# Patient Record
Sex: MALE | Race: OTHER | NOT HISPANIC OR LATINO | URBAN - METROPOLITAN AREA
[De-identification: names, ages, dates, MRNs, and addresses within clinical notes are randomized per-mention and may not be internally consistent; named-entity substitution may affect disease eponyms.]

---

## 2017-05-12 VITALS
HEART RATE: 91 BPM | RESPIRATION RATE: 18 BRPM | TEMPERATURE: 98 F | SYSTOLIC BLOOD PRESSURE: 110 MMHG | DIASTOLIC BLOOD PRESSURE: 72 MMHG | OXYGEN SATURATION: 98 %

## 2017-05-12 NOTE — H&P ADULT - ADDITIONAL PE
ASA: II  Mallampati: II  EKG: Sinus Rhythm @ 89 BPM with TWI in III, V5, V5, LVH, slightly prolonged QTc 467 ASA: II  Mallampati: II  EKG: Sinus Rhythm @ 89 BPM with TWI in III, V5, V5, LVH, slightly prolonged QTc 467. LVH

## 2017-05-12 NOTE — H&P ADULT - HISTORY OF PRESENT ILLNESS
History obtained from spouse (Cesar Ingram)-->limited historian    62yo male current smoker and ETOH abuse with PMHx significant for HTN and hyperlipidemia presented for routine checkup and endorses dyspnea with exertion, especially when walking more than 3 blocks or climbing 2-3 flights of stairs. Patient denies chest pain, shortness of breath, palpitations, diaphoresis, syncope, dizziness, LE edema, PND, or orthopnea.   Patient had recommended cardiac workup that includes ECG that showing evidence of TWI in the inferolateral leads with LVH, an echocardiogram that showed ~ LVEF 60-65%, grade 1 diastolic dysfunction c/w impaired relaxation and normal filling pressures, mild concentric LVH, mild MR, moderate mitral annular calcification, mild calcification of AV, and moderate aortic stenosis, and stress echocardiogram showing evidence of ischemia (2.0mm down sloping ST depression in the inferolateral leads which resolved in recovery).  In light of CCS II anginal equivalent symptoms and abnormal stress echocardiogram, patient now presents for cardiac catheterization History obtained from spouse (Cesar Ingram)-->limited historian    62yo male current smoker and ETOH abuse with PMHx significant for HTN and hyperlipidemia presented for routine checkup and endorses dyspnea with exertion, especially when walking more than 3 blocks or climbing 2-3 flights of stairs. Patient denies chest pain, shortness of breath, palpitations, diaphoresis, syncope, dizziness, LE edema, PND, or orthopnea.   Patient had recommended cardiac workup that includes ECG that showing evidence of TWI in the inferolateral leads with LVH, an echocardiogram that showed ~ LVEF 60-65%, grade 1 diastolic dysfunction c/w impaired relaxation and normal filling pressures, mild concentric LVH, mild MR, moderate mitral annular calcification, mild calcification of AV, and moderate aortic stenosis, and stress echocardiogram showing evidence of ischemia (2.0mm down sloping ST depression in the inferolateral leads which resolved in recovery).  In light of CCS II anginal equivalent symptoms and abnormal stress echocardiogram, patient now presents for cardiac catheterization with possible intervention. History obtained from spouse (Cesar Ingram)-->limited historian    62yo male current smoker and ETOH abuse (drinks 1 pint of vodka/day and Beers; last drink yesterday evening 5/14/2017) with PMHx significant for HTN and hyperlipidemia presented for routine checkup and endorses dyspnea with exertion, especially when walking more than 3 blocks or climbing 2-3 flights of stairs. Patient denies chest pain, shortness of breath, palpitations, diaphoresis, syncope, dizziness, LE edema, PND, or orthopnea. Patient had recommended cardiac workup that includes ECG that showing evidence of TWI in the inferolateral leads with LVH, an Echocardiogram (5/2017) that showed ~ LVEF 60-65%, grade 1 diastolic dysfunction c/w impaired relaxation and normal filling pressures, mild concentric LVH, mild MR, moderate mitral annular calcification, mild calcification of AV, and moderate aortic stenosis. Stress echocardiogram (5/2017) revealed evidence of ischemia (2.0mm down sloping ST depression in the inferolateral leads which resolved in recovery). In light of CCS II anginal equivalent symptoms and abnormal stress echocardiogram, patient now presents for cardiac catheterization with possible intervention.

## 2017-05-12 NOTE — H&P ADULT - ASSESSMENT
62yo male current smoker and ETOH abuse (drinks 1 pint of vodka/day and Beers; last drink yesterday evening 5/14/2017) with PMHx significant for HTN and hyperlipidemia presents for cardiac catheterization with possible intervention if clinically indicated due to patient's risk factors, stress Echo findings, aortic stenosis and CCS Angina Class II Equivalent symptoms.     Risks & benefits of procedure and alternative therapy have been explained to the patient including but not limited to: allergic reaction, bleeding w/possible need for blood transfusion, infection, renal and vascular compromise, limb damage, arrhythmia, stroke, vessel dissection/perforation, Myocardial infarction, emergent CABG. Informed consent obtained and in chart.

## 2017-05-12 NOTE — H&P ADULT - NEGATIVE CARDIOVASCULAR SYMPTOMS
no orthopnea/no chest pain/no paroxysmal nocturnal dyspnea/no peripheral edema/no palpitations/no claudication

## 2017-05-12 NOTE — H&P ADULT - NSHPSOCIALHISTORY_GEN_ALL_CORE
Admits to smoking 1ppd x 40yrs.  Per wife, patient drinks heavily every day (~1/2 bottle of liquor and several beers per day)

## 2017-05-15 ENCOUNTER — OUTPATIENT (OUTPATIENT)
Dept: OUTPATIENT SERVICES | Facility: HOSPITAL | Age: 62
LOS: 1 days | Discharge: NURSING, REHAB OR LONG-TERM CARE | End: 2017-05-15
Payer: MEDICAID

## 2017-05-15 DIAGNOSIS — I10 ESSENTIAL (PRIMARY) HYPERTENSION: ICD-10-CM

## 2017-05-15 DIAGNOSIS — E78.5 HYPERLIPIDEMIA, UNSPECIFIED: ICD-10-CM

## 2017-05-15 DIAGNOSIS — I25.110 ATHEROSCLEROTIC HEART DISEASE OF NATIVE CORONARY ARTERY WITH UNSTABLE ANGINA PECTORIS: ICD-10-CM

## 2017-05-15 LAB
APPEARANCE UR: CLEAR — SIGNIFICANT CHANGE UP
APTT BLD: 33.5 SEC — SIGNIFICANT CHANGE UP (ref 27.5–37.4)
BASOPHILS NFR BLD AUTO: 0.1 % — SIGNIFICANT CHANGE UP (ref 0–2)
BILIRUB UR-MCNC: NEGATIVE — SIGNIFICANT CHANGE UP
CHOLEST SERPL-MCNC: 204 MG/DL — HIGH
COLOR SPEC: YELLOW — SIGNIFICANT CHANGE UP
CRP SERPL-MCNC: <0.29 MG/DL — SIGNIFICANT CHANGE UP
DIFF PNL FLD: NEGATIVE — SIGNIFICANT CHANGE UP
EOSINOPHIL NFR BLD AUTO: 3.5 % — SIGNIFICANT CHANGE UP (ref 0–6)
GLUCOSE UR QL: NEGATIVE — SIGNIFICANT CHANGE UP
HBA1C BLD-MCNC: 5.5 % — SIGNIFICANT CHANGE UP (ref 4.8–5.6)
HCT VFR BLD CALC: 35.2 % — LOW (ref 39–50)
HDLC SERPL-MCNC: 36 MG/DL — LOW
HGB BLD-MCNC: 12.2 G/DL — LOW (ref 13–17)
INR BLD: 1.19 — HIGH (ref 0.88–1.16)
KETONES UR-MCNC: NEGATIVE — SIGNIFICANT CHANGE UP
LEUKOCYTE ESTERASE UR-ACNC: NEGATIVE — SIGNIFICANT CHANGE UP
LIPID PNL WITH DIRECT LDL SERPL: 105 MG/DL — HIGH
LYMPHOCYTES # BLD AUTO: 36.5 % — SIGNIFICANT CHANGE UP (ref 13–44)
MCHC RBC-ENTMCNC: 31.5 PG — SIGNIFICANT CHANGE UP (ref 27–34)
MCHC RBC-ENTMCNC: 34.7 G/DL — SIGNIFICANT CHANGE UP (ref 32–36)
MCV RBC AUTO: 91 FL — SIGNIFICANT CHANGE UP (ref 80–100)
MONOCYTES NFR BLD AUTO: 9.1 % — SIGNIFICANT CHANGE UP (ref 2–14)
NEUTROPHILS NFR BLD AUTO: 50.8 % — SIGNIFICANT CHANGE UP (ref 43–77)
NITRITE UR-MCNC: NEGATIVE — SIGNIFICANT CHANGE UP
PH UR: 5.5 — SIGNIFICANT CHANGE UP (ref 5–8)
PLATELET # BLD AUTO: 137 K/UL — LOW (ref 150–400)
PROT UR-MCNC: NEGATIVE MG/DL — SIGNIFICANT CHANGE UP
PROTHROM AB SERPL-ACNC: 13.2 SEC — HIGH (ref 9.8–12.7)
RBC # BLD: 3.87 M/UL — LOW (ref 4.2–5.8)
RBC # FLD: 15.8 % — SIGNIFICANT CHANGE UP (ref 10.3–16.9)
SP GR SPEC: <=1.005 — SIGNIFICANT CHANGE UP (ref 1–1.03)
TOTAL CHOLESTEROL/HDL RATIO MEASUREMENT: 5.7 RATIO — HIGH
TRIGL SERPL-MCNC: 316 MG/DL — HIGH
UROBILINOGEN FLD QL: 0.2 E.U./DL — SIGNIFICANT CHANGE UP
WBC # BLD: 8 K/UL — SIGNIFICANT CHANGE UP (ref 3.8–10.5)
WBC # FLD AUTO: 8 K/UL — SIGNIFICANT CHANGE UP (ref 3.8–10.5)

## 2017-05-15 PROCEDURE — 82553 CREATINE MB FRACTION: CPT

## 2017-05-15 PROCEDURE — 85730 THROMBOPLASTIN TIME PARTIAL: CPT

## 2017-05-15 PROCEDURE — 71010: CPT | Mod: 26

## 2017-05-15 PROCEDURE — 85025 COMPLETE CBC W/AUTO DIFF WBC: CPT

## 2017-05-15 PROCEDURE — 81003 URINALYSIS AUTO W/O SCOPE: CPT

## 2017-05-15 PROCEDURE — 93005 ELECTROCARDIOGRAM TRACING: CPT

## 2017-05-15 PROCEDURE — 85610 PROTHROMBIN TIME: CPT

## 2017-05-15 PROCEDURE — C1894: CPT

## 2017-05-15 PROCEDURE — C1769: CPT

## 2017-05-15 PROCEDURE — 93460 R&L HRT ART/VENTRICLE ANGIO: CPT

## 2017-05-15 PROCEDURE — 80061 LIPID PANEL: CPT

## 2017-05-15 PROCEDURE — 86140 C-REACTIVE PROTEIN: CPT

## 2017-05-15 PROCEDURE — C1889: CPT

## 2017-05-15 PROCEDURE — 84443 ASSAY THYROID STIM HORMONE: CPT

## 2017-05-15 PROCEDURE — 93010 ELECTROCARDIOGRAM REPORT: CPT

## 2017-05-15 PROCEDURE — 71045 X-RAY EXAM CHEST 1 VIEW: CPT

## 2017-05-15 PROCEDURE — C1887: CPT

## 2017-05-15 PROCEDURE — 83036 HEMOGLOBIN GLYCOSYLATED A1C: CPT

## 2017-05-15 PROCEDURE — 82550 ASSAY OF CK (CPK): CPT

## 2017-05-15 PROCEDURE — 80053 COMPREHEN METABOLIC PANEL: CPT

## 2017-05-15 RX ORDER — CHLORHEXIDINE GLUCONATE 213 G/1000ML
1 SOLUTION TOPICAL ONCE
Qty: 0 | Refills: 0 | Status: DISCONTINUED | OUTPATIENT
Start: 2017-05-15 | End: 2017-05-15

## 2017-05-15 RX ORDER — SODIUM CHLORIDE 9 MG/ML
500 INJECTION INTRAMUSCULAR; INTRAVENOUS; SUBCUTANEOUS
Qty: 0 | Refills: 0 | Status: DISCONTINUED | OUTPATIENT
Start: 2017-05-15 | End: 2017-05-15

## 2017-05-15 RX ORDER — ASPIRIN/CALCIUM CARB/MAGNESIUM 324 MG
81 TABLET ORAL ONCE
Qty: 0 | Refills: 0 | Status: DISCONTINUED | OUTPATIENT
Start: 2017-05-15 | End: 2017-05-15

## 2017-05-15 RX ADMIN — SODIUM CHLORIDE 50 MILLILITER(S): 9 INJECTION INTRAMUSCULAR; INTRAVENOUS; SUBCUTANEOUS at 13:50

## 2017-05-15 NOTE — PROGRESS NOTE ADULT - SUBJECTIVE AND OBJECTIVE BOX
Interventional Cardiology PA SDA Discharge Note    Patient without complaints.     Afebrile, VSS    Ext:    		Right  Groin:  No     hematoma, no    bruit, dressing; C/D/I  		Right  Radial : No   hematoma,  no   bleeding, dressing; C/D/I      Pulses:    intact RAD/DP/PT to baseline     A/P:    60 y/o male current smoker and ETOH abuse (drinks 1 pint of vodka/day and Beers; last drink yesterday evening 5/14/2017) with PMHx significant for HTN and hyperlipidemia presents for cardiac catheterization with possible intervention if clinically indicated due to patient's risk factors, stress Echo findings, aortic stenosis and CCS Angina Class II Equivalent symptoms. Patient is now s/p cardiac cath revealing distal LM 40%, pLAC 80%, pLcx 80%, pRCA 80%, EF 55%, EDP 6 mmHg, JONEL 0.89cm2 consistent with severe AS, right radial artery and right femoral vein access. Right heart cath revealed RAP 3, RV 22/1 mean 4, PAP 25/7 mean 16, PCWP: 6, Ao/PA sat 92/67, CO/CI 5.2/3.4 consistent with normal RV filling pressures and preserved cardiac output/indices.     1.	Stable for discharge as per attending Dr. Beauchamp after bed rest, pt voids, groin/wrist stable, 30 minutes of ambulation, and pre-op work-up completed.  2.	Follow-up with PMD/Cardiologist Dr. Beauchamp in clinic on Tuesday, May 16, 2017.   3.	Discharged forms signed and copies in chart Interventional Cardiology PA SDA Discharge Note    Patient without complaints.     Afebrile, VSS    Ext:    		Right  Groin:  No     hematoma, no    bruit, dressing; C/D/I  		Right  Radial : No   hematoma,  no   bleeding, dressing; C/D/I      Pulses:    intact RAD/DP/PT to baseline     A/P:    62 y/o male current smoker and ETOH abuse (drinks 1 pint of vodka/day and Beers; last drink yesterday evening 5/14/2017) with PMHx significant for HTN and hyperlipidemia presents for cardiac catheterization with possible intervention if clinically indicated due to patient's risk factors, stress Echo findings, aortic stenosis and CCS Angina Class II Equivalent symptoms. Patient is now s/p cardiac cath revealing distal LM 40%, pLAC 80%, pLcx 80%, pRCA 80%, EF 55%, EDP 6 mmHg, JONEL 0.89cm2 consistent with severe AS, right radial artery and right femoral vein access. Right heart cath revealed RAP 3, RV 22/1 mean 4, PAP 25/7 mean 16, PCWP: 6, Ao/PA sat 92/67, CO/CI 5.2/3.4 consistent with normal RV filling pressures and preserved cardiac output/indices. Patient had small right groin hematoma manually compressed with complete resolution.     1.	Stable for discharge as per attending Dr. Beauchamp after bed rest, pt voids, groin/wrist stable, 30 minutes of ambulation, and pre-op work-up completed.  2.	Follow-up with PMD/Cardiologist Dr. Beauchamp in clinic on Tuesday, May 16, 2017.   3.	Discharged forms signed and copies in chart

## 2017-05-16 PROBLEM — F10.10 ALCOHOL ABUSE, UNCOMPLICATED: Chronic | Status: ACTIVE | Noted: 2017-05-12

## 2017-05-16 PROBLEM — I10 ESSENTIAL (PRIMARY) HYPERTENSION: Chronic | Status: ACTIVE | Noted: 2017-05-12

## 2017-05-16 PROBLEM — F17.200 NICOTINE DEPENDENCE, UNSPECIFIED, UNCOMPLICATED: Chronic | Status: ACTIVE | Noted: 2017-05-12

## 2017-05-16 PROBLEM — E78.5 HYPERLIPIDEMIA, UNSPECIFIED: Chronic | Status: ACTIVE | Noted: 2017-05-12

## 2017-05-16 PROBLEM — Z00.00 ENCOUNTER FOR PREVENTIVE HEALTH EXAMINATION: Status: ACTIVE | Noted: 2017-05-16

## 2017-05-17 PROBLEM — Z86.79 HISTORY OF HYPERTENSION: Status: RESOLVED | Noted: 2017-05-17 | Resolved: 2017-05-17

## 2017-05-17 PROBLEM — F17.200 CURRENT EVERY DAY SMOKER: Status: ACTIVE | Noted: 2017-05-17

## 2017-05-17 PROBLEM — Z86.39 HISTORY OF HYPERLIPIDEMIA: Status: RESOLVED | Noted: 2017-05-17 | Resolved: 2017-05-17

## 2017-05-17 RX ORDER — FOLIC ACID 1 MG/1
1 TABLET ORAL DAILY
Refills: 0 | Status: ACTIVE | COMMUNITY

## 2017-05-17 RX ORDER — CYANOCOBALAMIN (VITAMIN B-12) 100 MCG
100 TABLET ORAL DAILY
Refills: 0 | Status: ACTIVE | COMMUNITY

## 2017-05-17 RX ORDER — VALACYCLOVIR 500 MG/1
500 TABLET, FILM COATED ORAL DAILY
Refills: 0 | Status: ACTIVE | COMMUNITY

## 2017-05-17 RX ORDER — ASPIRIN 81 MG
81 TABLET, DELAYED RELEASE (ENTERIC COATED) ORAL DAILY
Refills: 0 | Status: ACTIVE | COMMUNITY

## 2017-05-17 RX ORDER — CHOLECALCIFEROL (VITAMIN D3) 125 MCG
50 MCG TABLET ORAL
Refills: 0 | Status: ACTIVE | COMMUNITY

## 2017-05-23 ENCOUNTER — APPOINTMENT (OUTPATIENT)
Dept: CARDIOTHORACIC SURGERY | Facility: CLINIC | Age: 62
End: 2017-05-23

## 2017-05-23 ENCOUNTER — INPATIENT (INPATIENT)
Facility: HOSPITAL | Age: 62
LOS: 27 days | Discharge: HOME HEALTH | DRG: 266 | End: 2017-06-20
Attending: THORACIC SURGERY (CARDIOTHORACIC VASCULAR SURGERY) | Admitting: THORACIC SURGERY (CARDIOTHORACIC VASCULAR SURGERY)
Payer: MEDICAID

## 2017-05-23 VITALS
HEART RATE: 104 BPM | BODY MASS INDEX: 23.54 KG/M2 | SYSTOLIC BLOOD PRESSURE: 113 MMHG | HEIGHT: 67 IN | DIASTOLIC BLOOD PRESSURE: 71 MMHG | RESPIRATION RATE: 19 BRPM | OXYGEN SATURATION: 96 % | TEMPERATURE: 98.1 F | WEIGHT: 150 LBS

## 2017-05-23 VITALS
SYSTOLIC BLOOD PRESSURE: 164 MMHG | HEART RATE: 98 BPM | DIASTOLIC BLOOD PRESSURE: 102 MMHG | OXYGEN SATURATION: 99 % | RESPIRATION RATE: 16 BRPM

## 2017-05-23 DIAGNOSIS — Z78.9 OTHER SPECIFIED HEALTH STATUS: ICD-10-CM

## 2017-05-23 DIAGNOSIS — Z86.79 PERSONAL HISTORY OF OTHER DISEASES OF THE CIRCULATORY SYSTEM: ICD-10-CM

## 2017-05-23 DIAGNOSIS — Z87.898 PERSONAL HISTORY OF OTHER SPECIFIED CONDITIONS: ICD-10-CM

## 2017-05-23 DIAGNOSIS — Z86.39 PERSONAL HISTORY OF OTHER ENDOCRINE, NUTRITIONAL AND METABOLIC DISEASE: ICD-10-CM

## 2017-05-23 DIAGNOSIS — F17.200 NICOTINE DEPENDENCE, UNSPECIFIED, UNCOMPLICATED: ICD-10-CM

## 2017-05-23 LAB
ALBUMIN SERPL ELPH-MCNC: 3.9 G/DL — SIGNIFICANT CHANGE UP (ref 3.3–5)
ALP SERPL-CCNC: 103 U/L — SIGNIFICANT CHANGE UP (ref 40–120)
ALT FLD-CCNC: 31 U/L — SIGNIFICANT CHANGE UP (ref 10–45)
ANION GAP SERPL CALC-SCNC: 16 MMOL/L — SIGNIFICANT CHANGE UP (ref 5–17)
APPEARANCE UR: SIGNIFICANT CHANGE UP
APTT BLD: 34.2 SEC — SIGNIFICANT CHANGE UP (ref 27.5–37.4)
AST SERPL-CCNC: 68 U/L — HIGH (ref 10–40)
BASOPHILS NFR BLD AUTO: 0.3 % — SIGNIFICANT CHANGE UP (ref 0–2)
BILIRUB DIRECT SERPL-MCNC: <0.2 MG/DL — SIGNIFICANT CHANGE UP (ref 0–0.2)
BILIRUB SERPL-MCNC: 0.6 MG/DL — SIGNIFICANT CHANGE UP (ref 0.2–1.2)
BILIRUB UR-MCNC: NEGATIVE — SIGNIFICANT CHANGE UP
BUN SERPL-MCNC: 8 MG/DL — SIGNIFICANT CHANGE UP (ref 7–23)
CALCIUM SERPL-MCNC: 9 MG/DL — SIGNIFICANT CHANGE UP (ref 8.4–10.5)
CHLORIDE SERPL-SCNC: 100 MMOL/L — SIGNIFICANT CHANGE UP (ref 96–108)
CO2 SERPL-SCNC: 23 MMOL/L — SIGNIFICANT CHANGE UP (ref 22–31)
COLOR SPEC: YELLOW — SIGNIFICANT CHANGE UP
CREAT SERPL-MCNC: 0.7 MG/DL — SIGNIFICANT CHANGE UP (ref 0.5–1.3)
DIFF PNL FLD: NEGATIVE — SIGNIFICANT CHANGE UP
EOSINOPHIL NFR BLD AUTO: 2.9 % — SIGNIFICANT CHANGE UP (ref 0–6)
GLUCOSE SERPL-MCNC: 87 MG/DL — SIGNIFICANT CHANGE UP (ref 70–99)
GLUCOSE UR QL: NEGATIVE — SIGNIFICANT CHANGE UP
HBA1C BLD-MCNC: 5.2 % — SIGNIFICANT CHANGE UP (ref 4–5.6)
HCT VFR BLD CALC: 37.6 % — LOW (ref 39–50)
HGB BLD-MCNC: 13 G/DL — SIGNIFICANT CHANGE UP (ref 13–17)
INR BLD: 1.14 — SIGNIFICANT CHANGE UP (ref 0.88–1.16)
KETONES UR-MCNC: NEGATIVE — SIGNIFICANT CHANGE UP
LEUKOCYTE ESTERASE UR-ACNC: NEGATIVE — SIGNIFICANT CHANGE UP
LYMPHOCYTES # BLD AUTO: 34 % — SIGNIFICANT CHANGE UP (ref 13–44)
MCHC RBC-ENTMCNC: 31.3 PG — SIGNIFICANT CHANGE UP (ref 27–34)
MCHC RBC-ENTMCNC: 34.6 G/DL — SIGNIFICANT CHANGE UP (ref 32–36)
MCV RBC AUTO: 90.6 FL — SIGNIFICANT CHANGE UP (ref 80–100)
MONOCYTES NFR BLD AUTO: 9.9 % — SIGNIFICANT CHANGE UP (ref 2–14)
NEUTROPHILS NFR BLD AUTO: 52.9 % — SIGNIFICANT CHANGE UP (ref 43–77)
NITRITE UR-MCNC: NEGATIVE — SIGNIFICANT CHANGE UP
NT-PROBNP SERPL-SCNC: 112 PG/ML — SIGNIFICANT CHANGE UP (ref 0–300)
PH UR: 6 — SIGNIFICANT CHANGE UP (ref 5–8)
PLATELET # BLD AUTO: 149 K/UL — LOW (ref 150–400)
POTASSIUM SERPL-MCNC: 4.6 MMOL/L — SIGNIFICANT CHANGE UP (ref 3.5–5.3)
POTASSIUM SERPL-SCNC: 4.6 MMOL/L — SIGNIFICANT CHANGE UP (ref 3.5–5.3)
PROT SERPL-MCNC: 8.7 G/DL — HIGH (ref 6–8.3)
PROT UR-MCNC: NEGATIVE MG/DL — SIGNIFICANT CHANGE UP
PROTHROM AB SERPL-ACNC: 12.7 SEC — SIGNIFICANT CHANGE UP (ref 9.8–12.7)
RBC # BLD: 4.15 M/UL — LOW (ref 4.2–5.8)
RBC # FLD: 16.2 % — SIGNIFICANT CHANGE UP (ref 10.3–16.9)
SODIUM SERPL-SCNC: 139 MMOL/L — SIGNIFICANT CHANGE UP (ref 135–145)
SP GR SPEC: 1.01 — SIGNIFICANT CHANGE UP (ref 1–1.03)
TSH SERPL-MCNC: 0.74 UIU/ML — SIGNIFICANT CHANGE UP (ref 0.35–4.94)
UROBILINOGEN FLD QL: 0.2 E.U./DL — SIGNIFICANT CHANGE UP
WBC # BLD: 8 K/UL — SIGNIFICANT CHANGE UP (ref 3.8–10.5)
WBC # FLD AUTO: 8 K/UL — SIGNIFICANT CHANGE UP (ref 3.8–10.5)

## 2017-05-23 PROCEDURE — 93010 ELECTROCARDIOGRAM REPORT: CPT

## 2017-05-23 RX ORDER — METOPROLOL TARTRATE 50 MG
12.5 TABLET ORAL
Qty: 0 | Refills: 0 | Status: DISCONTINUED | OUTPATIENT
Start: 2017-05-23 | End: 2017-05-23

## 2017-05-23 RX ORDER — METOPROLOL TARTRATE 50 MG
25 TABLET ORAL
Qty: 0 | Refills: 0 | Status: DISCONTINUED | OUTPATIENT
Start: 2017-05-23 | End: 2017-05-26

## 2017-05-23 RX ORDER — HEPARIN SODIUM 5000 [USP'U]/ML
5000 INJECTION INTRAVENOUS; SUBCUTANEOUS EVERY 8 HOURS
Qty: 0 | Refills: 0 | Status: DISCONTINUED | OUTPATIENT
Start: 2017-05-23 | End: 2017-05-26

## 2017-05-23 RX ORDER — THIAMINE MONONITRATE (VIT B1) 100 MG
100 TABLET ORAL DAILY
Qty: 0 | Refills: 0 | Status: DISCONTINUED | OUTPATIENT
Start: 2017-05-23 | End: 2017-05-26

## 2017-05-23 RX ORDER — ATORVASTATIN CALCIUM 80 MG/1
40 TABLET, FILM COATED ORAL AT BEDTIME
Qty: 0 | Refills: 0 | Status: DISCONTINUED | OUTPATIENT
Start: 2017-05-23 | End: 2017-05-24

## 2017-05-23 RX ORDER — NICOTINE POLACRILEX 2 MG
1 GUM BUCCAL DAILY
Qty: 0 | Refills: 0 | Status: DISCONTINUED | OUTPATIENT
Start: 2017-05-23 | End: 2017-05-26

## 2017-05-23 RX ORDER — FOLIC ACID 0.8 MG
1 TABLET ORAL DAILY
Qty: 0 | Refills: 0 | Status: DISCONTINUED | OUTPATIENT
Start: 2017-05-23 | End: 2017-05-26

## 2017-05-23 RX ORDER — ASPIRIN/CALCIUM CARB/MAGNESIUM 324 MG
81 TABLET ORAL DAILY
Qty: 0 | Refills: 0 | Status: DISCONTINUED | OUTPATIENT
Start: 2017-05-23 | End: 2017-05-26

## 2017-05-23 RX ORDER — SODIUM CHLORIDE 9 MG/ML
3 INJECTION INTRAMUSCULAR; INTRAVENOUS; SUBCUTANEOUS EVERY 8 HOURS
Qty: 0 | Refills: 0 | Status: DISCONTINUED | OUTPATIENT
Start: 2017-05-23 | End: 2017-05-26

## 2017-05-23 RX ADMIN — Medication 25 MILLIGRAM(S): at 18:02

## 2017-05-23 RX ADMIN — SODIUM CHLORIDE 3 MILLILITER(S): 9 INJECTION INTRAMUSCULAR; INTRAVENOUS; SUBCUTANEOUS at 22:14

## 2017-05-23 RX ADMIN — HEPARIN SODIUM 5000 UNIT(S): 5000 INJECTION INTRAVENOUS; SUBCUTANEOUS at 22:13

## 2017-05-23 NOTE — H&P ADULT - HISTORY OF PRESENT ILLNESS
61M with PMH current smoking (1ppd x 45 years), current every day ETOH use (1 pink vodka and several beers daily x 45 years), HLD, HTN, who was recently admitted for chest pain workup. Pt came to [facility] in early May due to chest pain and worsening HEIN, echo was done showing EF 65%, moderate mitral annular calcification, moderate aortic stenosis. Cardiac cath then done showing LM 40%, pLAD 80%, pRCA 80%, severe AS. He was discharged home but then presented to [facility] on 5/22 for severe chest pain, which woke him from sleep. Per wife pt was only taking ASA, vitamins, and valcyclovir at home. Pt seen in clinic today and admitted for preop workup prior to possible CABG/ AVR. Currently pt is sitting in chair comfortable, denies any chest pain, SOB, N/V, dizziness, blurred vision. Admits to ETOH problem, shows no interest in quitting permanently. Last drink was 1 beer this AM. Will start patient on appropriate cardiac medication and withdrawal PPX.

## 2017-05-23 NOTE — CONSULT NOTE ADULT - SUBJECTIVE AND OBJECTIVE BOX
Chart reviewed and patient interviewed. Patient is a 61 year old  Black male [facility] current smoker ( 1 PPDx45 years), current every day use of Alcohol ( 1 pint of Vodka daily and several beers daily for 45 years ) and history of HLD and  HTN who was admitted to [facility] in early May 2017 with Chest pain and worsening HEIN. Had Cardiac Cath which showed CAD and Aortic Stenosis. He was discharged home and then seen at [facility] after awakening at home with severe Chest pain on 5/22/17. Patient was seen in Cardiac Clinic on 5/22/17 and was admitted for pre-op work up for possible CABG/AVR. Last drink was a bottle of beer on morning of 5/23/17, the day of admission to [facility].       Patient's wife reports he has gone trough acute Alcohol withdrawal in the pas when hospitalized and has become acutely agitated requiring sedation and restraint.          Mental Status: Patient is a 61 year old male of medium build who appears older than his stated age and is sitting up in bed. He appears calm. Is awake and engagable He is oriented to person, place, and time. Speech is clear and of normal rate and rhythm. Affect is constricted. Mood is euthymic Thought processes are linear. There are no auditory or visual hallucinations There is no suicidal or homicidal ideation Judjement is fair. Insight is limited.           Impression: Substance abuse: Alcohol Dependence            Recommend: Treat for potential Alcohol Withdrawal Syndrome ( Delirium Tremens ). Start with Librium 25mg po q 6 h and adjust dose as needed. Hold for sedation. Can use Lorazepam IV if becomes acutely agitated. Will discuss.

## 2017-05-23 NOTE — H&P ADULT - PMH
Aortic valve stenosis, unspecified etiology    ETOH abuse    HTN (hypertension)    Hyperlipidemia    Smoker

## 2017-05-23 NOTE — H&P ADULT - ASSESSMENT
61M with PMH current smoking (1ppd x 45 years), current every day ETOH use (1 pink vodka and several beers daily x 45 years), HLD, HTN, who is being worked up for CABG/ AVR as outpt, and presented to [facility] with severe CP last night. CC with 2 vessel disease, echo with moderate AS, EF 65%. Now admitted for preop testing. Stable on tele floor.     Neuro/ substance abuse:  - daily ETOH use, 1 pint vodka and ~3 beers a day. cont folic acid/ thiamine supplement  - Dr. Gonzalez consulted, appreciate recs  - monitor closely for signs of withdrawal tonight, will give beer for transition   - head CT ordered  - HIV, hepatitis panel ordered     Cards: moderate AS, 2 vessel CAD  - no chest pain currently, can not use nitro 2/2 AS. start heparin gtt if any symptoms  - started on ASA, metoprolol, statin    GI: cirrhosis workup  - RUQ ultrasound ordered, NPO after MN  - Chest/ Abdomen/ Pelvis CT scan  - ?GI consult for possible EGD  - f/u LFTs, albumin    Resp: no issues, breathing comfortably on room air    Misc: f/u labs, CXR.   - DVT ppx  - ?why on valcyclovir per outside hospital notes  - Admit to [address] [facility], Dr. Pirelli

## 2017-05-23 NOTE — H&P ADULT - NSHPREVIEWOFSYSTEMS_GEN_ALL_CORE
CONSTITUTIONAL: No fevers / chills, sweats, fatigue, weight loss, weight gain  NEUROLOGICAL: No parathesias, seizures, syncope, confusion  EYES: No blurry vision, discharge, pain, loss of vision  ENMT: No difficulty hearing, vertigo, dysphagia, epistaxis, recent dental work  CV: No chest pain, palpitations, HEIN, orthopnea  RESPIRATORY:  No wheezing, SOB, cough / sputum, hemoptysis  GI: No nausea, vomiting, diarrhea, constipation, melena  : No hematuria, dysuria, urgency, incontinence  MUSKULOSKELETAL: No arthritis, joint swelling, muscle weakness  SKIN/BREAST: No rash, itching, hair loss, masses  PSYCHIATRY: No depression, anxiety, suicidal ideation  HEMEATOLOGY:  No bruising easily, enlarged lymph nodes, tender lymph nodes  ENDOCRINE: No cold intolerance, heat intolerance, polydipsia

## 2017-05-23 NOTE — H&P ADULT - NSHPPHYSICALEXAM_GEN_ALL_CORE
Neuro: calm, NAD. +icteris  Cards: RRR, ANTHONY heard throughout  Resp: CTA b/l, unlabored on room air  Abdomen: distended, non tender, +BS  Ext: WWP, no edema b/l

## 2017-05-24 LAB
ALBUMIN SERPL ELPH-MCNC: 3.5 G/DL — SIGNIFICANT CHANGE UP (ref 3.3–5)
ALP SERPL-CCNC: 86 U/L — SIGNIFICANT CHANGE UP (ref 40–120)
ALT FLD-CCNC: 27 U/L — SIGNIFICANT CHANGE UP (ref 10–45)
ANION GAP SERPL CALC-SCNC: 15 MMOL/L — SIGNIFICANT CHANGE UP (ref 5–17)
AST SERPL-CCNC: 53 U/L — HIGH (ref 10–40)
BASOPHILS NFR BLD AUTO: 0.2 % — SIGNIFICANT CHANGE UP (ref 0–2)
BILIRUB SERPL-MCNC: 1.1 MG/DL — SIGNIFICANT CHANGE UP (ref 0.2–1.2)
BUN SERPL-MCNC: 7 MG/DL — SIGNIFICANT CHANGE UP (ref 7–23)
CALCIUM SERPL-MCNC: 8.9 MG/DL — SIGNIFICANT CHANGE UP (ref 8.4–10.5)
CHLORIDE SERPL-SCNC: 100 MMOL/L — SIGNIFICANT CHANGE UP (ref 96–108)
CO2 SERPL-SCNC: 22 MMOL/L — SIGNIFICANT CHANGE UP (ref 22–31)
CREAT SERPL-MCNC: 0.6 MG/DL — SIGNIFICANT CHANGE UP (ref 0.5–1.3)
EOSINOPHIL NFR BLD AUTO: 3.9 % — SIGNIFICANT CHANGE UP (ref 0–6)
GLUCOSE SERPL-MCNC: 106 MG/DL — HIGH (ref 70–99)
HAV IGM SER-ACNC: SIGNIFICANT CHANGE UP
HBV CORE IGM SER-ACNC: SIGNIFICANT CHANGE UP
HBV SURFACE AG SER-ACNC: SIGNIFICANT CHANGE UP
HCT VFR BLD CALC: 38.7 % — LOW (ref 39–50)
HCV AB S/CO SERPL IA: 0.18 S/CO — SIGNIFICANT CHANGE UP
HCV AB SERPL-IMP: SIGNIFICANT CHANGE UP
HGB BLD-MCNC: 13.6 G/DL — SIGNIFICANT CHANGE UP (ref 13–17)
HIV 1+2 AB+HIV1 P24 AG SERPL QL IA: SIGNIFICANT CHANGE UP
LYMPHOCYTES # BLD AUTO: 34.4 % — SIGNIFICANT CHANGE UP (ref 13–44)
MCHC RBC-ENTMCNC: 31.3 PG — SIGNIFICANT CHANGE UP (ref 27–34)
MCHC RBC-ENTMCNC: 35.1 G/DL — SIGNIFICANT CHANGE UP (ref 32–36)
MCV RBC AUTO: 89.2 FL — SIGNIFICANT CHANGE UP (ref 80–100)
MONOCYTES NFR BLD AUTO: 11.1 % — SIGNIFICANT CHANGE UP (ref 2–14)
NEUTROPHILS NFR BLD AUTO: 50.4 % — SIGNIFICANT CHANGE UP (ref 43–77)
PLATELET # BLD AUTO: 141 K/UL — LOW (ref 150–400)
POTASSIUM SERPL-MCNC: 3.9 MMOL/L — SIGNIFICANT CHANGE UP (ref 3.5–5.3)
POTASSIUM SERPL-SCNC: 3.9 MMOL/L — SIGNIFICANT CHANGE UP (ref 3.5–5.3)
PROT SERPL-MCNC: 8.2 G/DL — SIGNIFICANT CHANGE UP (ref 6–8.3)
RBC # BLD: 4.34 M/UL — SIGNIFICANT CHANGE UP (ref 4.2–5.8)
RBC # FLD: 15.6 % — SIGNIFICANT CHANGE UP (ref 10.3–16.9)
SODIUM SERPL-SCNC: 137 MMOL/L — SIGNIFICANT CHANGE UP (ref 135–145)
WBC # BLD: 8.8 K/UL — SIGNIFICANT CHANGE UP (ref 3.8–10.5)
WBC # FLD AUTO: 8.8 K/UL — SIGNIFICANT CHANGE UP (ref 3.8–10.5)

## 2017-05-24 PROCEDURE — 70450 CT HEAD/BRAIN W/O DYE: CPT | Mod: 26

## 2017-05-24 PROCEDURE — 93880 EXTRACRANIAL BILAT STUDY: CPT | Mod: 26

## 2017-05-24 PROCEDURE — 71010: CPT | Mod: 26

## 2017-05-24 PROCEDURE — 94010 BREATHING CAPACITY TEST: CPT | Mod: 26

## 2017-05-24 PROCEDURE — 76705 ECHO EXAM OF ABDOMEN: CPT | Mod: 26

## 2017-05-24 PROCEDURE — 71250 CT THORAX DX C-: CPT | Mod: 26

## 2017-05-24 PROCEDURE — 74176 CT ABD & PELVIS W/O CONTRAST: CPT | Mod: 26

## 2017-05-24 RX ORDER — NICOTINE POLACRILEX 2 MG
1 GUM BUCCAL DAILY
Qty: 0 | Refills: 0 | Status: DISCONTINUED | OUTPATIENT
Start: 2017-05-24 | End: 2017-05-24

## 2017-05-24 RX ORDER — POTASSIUM CHLORIDE 20 MEQ
20 PACKET (EA) ORAL ONCE
Qty: 0 | Refills: 0 | Status: COMPLETED | OUTPATIENT
Start: 2017-05-24 | End: 2017-05-24

## 2017-05-24 RX ADMIN — Medication 1 PATCH: at 11:42

## 2017-05-24 RX ADMIN — Medication 20 MILLIEQUIVALENT(S): at 11:42

## 2017-05-24 RX ADMIN — Medication 25 MILLIGRAM(S): at 11:42

## 2017-05-24 RX ADMIN — Medication 100 MILLIGRAM(S): at 11:42

## 2017-05-24 RX ADMIN — SODIUM CHLORIDE 3 MILLILITER(S): 9 INJECTION INTRAMUSCULAR; INTRAVENOUS; SUBCUTANEOUS at 05:37

## 2017-05-24 RX ADMIN — Medication 25 MILLIGRAM(S): at 17:51

## 2017-05-24 RX ADMIN — HEPARIN SODIUM 5000 UNIT(S): 5000 INJECTION INTRAVENOUS; SUBCUTANEOUS at 22:26

## 2017-05-24 RX ADMIN — Medication 25 MILLIGRAM(S): at 06:02

## 2017-05-24 RX ADMIN — ATORVASTATIN CALCIUM 40 MILLIGRAM(S): 80 TABLET, FILM COATED ORAL at 00:13

## 2017-05-24 RX ADMIN — SODIUM CHLORIDE 3 MILLILITER(S): 9 INJECTION INTRAMUSCULAR; INTRAVENOUS; SUBCUTANEOUS at 14:30

## 2017-05-24 RX ADMIN — Medication 25 MILLIGRAM(S): at 23:51

## 2017-05-24 RX ADMIN — SODIUM CHLORIDE 3 MILLILITER(S): 9 INJECTION INTRAMUSCULAR; INTRAVENOUS; SUBCUTANEOUS at 22:26

## 2017-05-24 RX ADMIN — HEPARIN SODIUM 5000 UNIT(S): 5000 INJECTION INTRAVENOUS; SUBCUTANEOUS at 06:01

## 2017-05-24 RX ADMIN — Medication 1 TABLET(S): at 11:42

## 2017-05-24 RX ADMIN — Medication 1 MILLIGRAM(S): at 11:42

## 2017-05-24 RX ADMIN — Medication 81 MILLIGRAM(S): at 11:42

## 2017-05-24 NOTE — PROGRESS NOTE ADULT - SUBJECTIVE AND OBJECTIVE BOX
Patient discussed on morning rounds with Dr. Wilcox     Operation / Date: RPe-op CABG AVR    SUBJECTIVE ASSESSMENT:  61y Male complains that he only received one beer last night. Denies symtpoms of withdrawal. Denies chest pain or SOB. Denies fever, chills, nausea, vomiting, diarrhea.         Vital Signs Last 24 Hrs  T(C): 35.9, Max: 36.3 ( @ 04:50)  T(F): 96.6, Max: 97.4 ( @ 04:50)  HR: 72 (72 - 86)  BP: 159/98 (119/87 - 159/98)  BP(mean): 100 (96 - 101)  RR: 17 (17 - 19)  SpO2: 94% (94% - 98%)  I&O's Detail        PHYSICAL EXAM:    General: AOx3 in no acute distress. sitting comfortably in bed.    Neurological: No focal neuro deficit.     Cardiovascular: RRR no murmurs rubs or gallops.     Respiratory: CTA bilaterally unlabored on RA.    Gastrointestinal: +icterus soft non tender non distended.     Extremities: WWP no peripheral edema.     Vascular: 2+ distal pulses bilaterally     Incision Sites: N/A    LABS:                        13.6   8.8   )-----------( 141      ( 24 May 2017 05:32 )             38.7       PT/INR - ( 23 May 2017 19:13 )   PT: 12.7 sec;   INR: 1.14          PTT - ( 23 May 2017 19:13 )  PTT:34.2 sec        137  |  100  |  7   ----------------------------<  106<H>  3.9   |  22  |  0.60    Ca    8.9      24 May 2017 05:32    TPro  8.2  /  Alb  3.5  /  TBili  1.1  /  DBili  x   /  AST  53<H>  /  ALT  27  /  AlkPhos  86        Urinalysis Basic - ( 23 May 2017 20:07 )    Color: Yellow / Appearance: SL CLOUDY / S.010 / pH: x  Gluc: x / Ketone: NEGATIVE  / Bili: NEGATIVE / Urobili: 0.2 E.U./dL   Blood: x / Protein: NEGATIVE mg/dL / Nitrite: NEGATIVE   Leuk Esterase: NEGATIVE / RBC: x / WBC x   Sq Epi: x / Non Sq Epi: x / Bacteria: x        MEDICATIONS  (STANDING):  sodium chloride 0.9% lock flush 3milliLiter(s) IV Push every 8 hours  heparin  Injectable 5000Unit(s) SubCutaneous every 8 hours  aspirin  chewable 81milliGRAM(s) Oral daily  thiamine 100milliGRAM(s) Oral daily  folic acid 1milliGRAM(s) Oral daily  metoprolol 25milliGRAM(s) Oral two times a day  nicotine - 21 mG/24Hr(s) Patch 1patch Transdermal daily  chlordiazePOXIDE 25milliGRAM(s) Oral every 6 hours  multivitamin 1Tablet(s) Oral daily    MEDICATIONS  (PRN):        RADIOLOGY & ADDITIONAL TESTS:

## 2017-05-24 NOTE — PROGRESS NOTE ADULT - ASSESSMENT
61M with PMH current smoking (1ppd x 45 years), current every day ETOH use (1 pink vodka and several beers daily x 45 years), HLD, HTN, who is being worked up for CABG/ AVR as outpt, and presented to [facility] with severe CP last night. CC with 2 vessel disease, echo with moderate AS, EF 65%. Now admitted for preop testing. Concern for cirrhosis. Needs GI work up pre-op     Neuro/ substance abuse:  - daily ETOH use, 1 pint vodka and ~3 beers a day. cont folic acid/ thiamine supplement and MVI  - Dr. Gonzalez consulted, appreciate recs Starting on Librium today 25 mg PO TID   - monitor closely for signs of withdrawal tonight  - head CT with previous cortical infarct.   - HIV, hepatitis panel negative     Cards: moderate AS, 2 vessel CAD  - no chest pain currently, can not use nitro 2/2 AS.   - started on ASA, metoprolol, statin Blood pressure well controlled this afternoon   - carotid performed with no significant stenosis.     GI: RUQ ultrasound with evidence of steatosis and early cirrohosis. CT scan with evidence of cirrhosis but no ascites. Albumin, platelets and liver enzymes normal.   - GI consult in the AM.   - f/u LFTs, albumin    Resp: no issues, breathing comfortably on room air  - IS and ambulation     :  No active issues. Creatinine. 0.6   - Trend Creatinine.     Endo: TSH normal HA1c 5.5     Misc: f/u labs, CXR.   - DVT ppx

## 2017-05-25 LAB
ANION GAP SERPL CALC-SCNC: 12 MMOL/L — SIGNIFICANT CHANGE UP (ref 5–17)
APTT BLD: 35.9 SEC — SIGNIFICANT CHANGE UP (ref 27.5–37.4)
BUN SERPL-MCNC: 14 MG/DL — SIGNIFICANT CHANGE UP (ref 7–23)
CALCIUM SERPL-MCNC: 9.3 MG/DL — SIGNIFICANT CHANGE UP (ref 8.4–10.5)
CHLORIDE SERPL-SCNC: 100 MMOL/L — SIGNIFICANT CHANGE UP (ref 96–108)
CO2 SERPL-SCNC: 23 MMOL/L — SIGNIFICANT CHANGE UP (ref 22–31)
CREAT SERPL-MCNC: 0.8 MG/DL — SIGNIFICANT CHANGE UP (ref 0.5–1.3)
GLUCOSE SERPL-MCNC: 107 MG/DL — HIGH (ref 70–99)
HCT VFR BLD CALC: 40.8 % — SIGNIFICANT CHANGE UP (ref 39–50)
HGB BLD-MCNC: 14.2 G/DL — SIGNIFICANT CHANGE UP (ref 13–17)
INR BLD: 1.19 — HIGH (ref 0.88–1.16)
MAGNESIUM SERPL-MCNC: 1.8 MG/DL — SIGNIFICANT CHANGE UP (ref 1.6–2.6)
MCHC RBC-ENTMCNC: 32.1 PG — SIGNIFICANT CHANGE UP (ref 27–34)
MCHC RBC-ENTMCNC: 34.8 G/DL — SIGNIFICANT CHANGE UP (ref 32–36)
MCV RBC AUTO: 92.3 FL — SIGNIFICANT CHANGE UP (ref 80–100)
PHOSPHATE SERPL-MCNC: 3.5 MG/DL — SIGNIFICANT CHANGE UP (ref 2.5–4.5)
PLATELET # BLD AUTO: 144 K/UL — LOW (ref 150–400)
POTASSIUM SERPL-MCNC: 4 MMOL/L — SIGNIFICANT CHANGE UP (ref 3.5–5.3)
POTASSIUM SERPL-SCNC: 4 MMOL/L — SIGNIFICANT CHANGE UP (ref 3.5–5.3)
PROTHROM AB SERPL-ACNC: 13.3 SEC — HIGH (ref 9.8–12.7)
RBC # BLD: 4.42 M/UL — SIGNIFICANT CHANGE UP (ref 4.2–5.8)
RBC # FLD: 16.1 % — SIGNIFICANT CHANGE UP (ref 10.3–16.9)
SODIUM SERPL-SCNC: 135 MMOL/L — SIGNIFICANT CHANGE UP (ref 135–145)
WBC # BLD: 8.7 K/UL — SIGNIFICANT CHANGE UP (ref 3.8–10.5)
WBC # FLD AUTO: 8.7 K/UL — SIGNIFICANT CHANGE UP (ref 3.8–10.5)

## 2017-05-25 PROCEDURE — 93930 UPPER EXTREMITY STUDY: CPT | Mod: 26

## 2017-05-25 PROCEDURE — 71010: CPT | Mod: 26

## 2017-05-25 RX ORDER — MAGNESIUM OXIDE 400 MG ORAL TABLET 241.3 MG
800 TABLET ORAL ONCE
Qty: 0 | Refills: 0 | Status: COMPLETED | OUTPATIENT
Start: 2017-05-25 | End: 2017-05-25

## 2017-05-25 RX ORDER — CHLORHEXIDINE GLUCONATE 213 G/1000ML
10 SOLUTION TOPICAL ONCE
Qty: 0 | Refills: 0 | Status: DISCONTINUED | OUTPATIENT
Start: 2017-05-25 | End: 2017-05-26

## 2017-05-25 RX ORDER — FAMOTIDINE 10 MG/ML
20 INJECTION INTRAVENOUS DAILY
Qty: 0 | Refills: 0 | Status: DISCONTINUED | OUTPATIENT
Start: 2017-05-25 | End: 2017-05-26

## 2017-05-25 RX ORDER — CHLORHEXIDINE GLUCONATE 213 G/1000ML
1 SOLUTION TOPICAL ONCE
Qty: 0 | Refills: 0 | Status: COMPLETED | OUTPATIENT
Start: 2017-05-25 | End: 2017-05-25

## 2017-05-25 RX ADMIN — HEPARIN SODIUM 5000 UNIT(S): 5000 INJECTION INTRAVENOUS; SUBCUTANEOUS at 07:14

## 2017-05-25 RX ADMIN — Medication 25 MILLIGRAM(S): at 12:00

## 2017-05-25 RX ADMIN — Medication 25 MILLIGRAM(S): at 17:37

## 2017-05-25 RX ADMIN — HEPARIN SODIUM 5000 UNIT(S): 5000 INJECTION INTRAVENOUS; SUBCUTANEOUS at 13:16

## 2017-05-25 RX ADMIN — SODIUM CHLORIDE 3 MILLILITER(S): 9 INJECTION INTRAMUSCULAR; INTRAVENOUS; SUBCUTANEOUS at 07:19

## 2017-05-25 RX ADMIN — SODIUM CHLORIDE 3 MILLILITER(S): 9 INJECTION INTRAMUSCULAR; INTRAVENOUS; SUBCUTANEOUS at 13:15

## 2017-05-25 RX ADMIN — Medication 25 MILLIGRAM(S): at 07:14

## 2017-05-25 RX ADMIN — FAMOTIDINE 20 MILLIGRAM(S): 10 INJECTION INTRAVENOUS at 17:37

## 2017-05-25 RX ADMIN — SODIUM CHLORIDE 3 MILLILITER(S): 9 INJECTION INTRAMUSCULAR; INTRAVENOUS; SUBCUTANEOUS at 22:16

## 2017-05-25 RX ADMIN — MAGNESIUM OXIDE 400 MG ORAL TABLET 800 MILLIGRAM(S): 241.3 TABLET ORAL at 09:35

## 2017-05-25 RX ADMIN — Medication 1 PATCH: at 11:43

## 2017-05-25 RX ADMIN — CHLORHEXIDINE GLUCONATE 1 APPLICATION(S): 213 SOLUTION TOPICAL at 21:53

## 2017-05-25 RX ADMIN — Medication 81 MILLIGRAM(S): at 12:00

## 2017-05-25 RX ADMIN — Medication 1 TABLET(S): at 12:00

## 2017-05-25 RX ADMIN — Medication 25 MILLIGRAM(S): at 23:53

## 2017-05-25 RX ADMIN — Medication 1 PATCH: at 12:00

## 2017-05-25 RX ADMIN — HEPARIN SODIUM 5000 UNIT(S): 5000 INJECTION INTRAVENOUS; SUBCUTANEOUS at 22:09

## 2017-05-25 RX ADMIN — Medication 1 MILLIGRAM(S): at 12:00

## 2017-05-25 RX ADMIN — Medication 100 MILLIGRAM(S): at 12:00

## 2017-05-25 NOTE — PRE-OP CHECKLIST - SELECT TESTS ORDERED
Urinalysis/INR/EKG/CBC/CXR/Type and Screen/PT/PTT/BMP CXR/PT/PTT/EKG/Type and Screen/Spirometry/INR/CBC/Urinalysis/BMP

## 2017-05-25 NOTE — PROGRESS NOTE ADULT - SUBJECTIVE AND OBJECTIVE BOX
Patient discussed on morning rounds with Dr. Wilcox    Operation / Date: admitted on 17 preop for CABG/AVR on 17    SUBJECTIVE ASSESSMENT:  61y Male seen at bedside this AM.  Pt is feeling well overall and offers no acute complaints at this time.  Currently denies HA, AMS, CP, palpitations, SOB, n/v/d, fever. No acute overnight events.         Vital Signs Last 24 Hrs  T(C): 36.6, Max: 37 ( @ 06:23)  T(F): 97.8, Max: 98.6 ( @ 06:23)  HR: 78 (69 - 84)  BP: 95/70 (95/70 - 159/98)  BP(mean): 91 (91 - 103)  RR: 18 (17 - 19)  SpO2: 97% (94% - 99%)  I&O's Detail      PHYSICAL EXAM:    General: OOB to chair, no acute distress.     Neurological: AAOx3, no AMS or focal deficits.     Eye: +b/l scleral icterus.    Cardiovascular: RRR, S1/S2, no m/r/g.     Respiratory: No acute distress.  CTA b/l, no w/r/r.     Gastrointestinal: ND, No palpable HSM, NBS, non-TTP.    Extremities: Warm and well perfused, no calf ttp b/l.     Vascular: Pulses 2+ throughout.    Incision Sites: N/A    LABS:                        14.2   8.7   )-----------( 144      ( 25 May 2017 06:07 )             40.8       COUMADIN: No.    PT/INR - ( 25 May 2017 06:07 )   PT: 13.3 sec;   INR: 1.19          PTT - ( 25 May 2017 06:07 )  PTT:35.9 sec        135  |  100  |  14  ----------------------------<  107<H>  4.0   |  23  |  0.80    Ca    9.3      25 May 2017 06:07  Phos  3.5       Mg     1.8         TPro  8.2  /  Alb  3.5  /  TBili  1.1  /  DBili  x   /  AST  53<H>  /  ALT  27  /  AlkPhos  86        Urinalysis Basic - ( 23 May 2017 20:07 )    Color: Yellow / Appearance: SL CLOUDY / S.010 / pH: x  Gluc: x / Ketone: NEGATIVE  / Bili: NEGATIVE / Urobili: 0.2 E.U./dL   Blood: x / Protein: NEGATIVE mg/dL / Nitrite: NEGATIVE   Leuk Esterase: NEGATIVE / RBC: x / WBC x   Sq Epi: x / Non Sq Epi: x / Bacteria: x      MEDICATIONS  (STANDING):  sodium chloride 0.9% lock flush 3milliLiter(s) IV Push every 8 hours  heparin  Injectable 5000Unit(s) SubCutaneous every 8 hours  aspirin  chewable 81milliGRAM(s) Oral daily  thiamine 100milliGRAM(s) Oral daily  folic acid 1milliGRAM(s) Oral daily  metoprolol 25milliGRAM(s) Oral two times a day  nicotine - 21 mG/24Hr(s) Patch 1patch Transdermal daily  chlordiazePOXIDE 25milliGRAM(s) Oral every 6 hours  multivitamin 1Tablet(s) Oral daily  famotidine    Tablet 20milliGRAM(s) Oral daily  chlorhexidine 4% Liquid 1Application(s) Topical once  chlorhexidine 0.12% Liquid 10milliLiter(s) Swish and Spit once    MEDICATIONS  (PRN):        RADIOLOGY & ADDITIONAL TESTS:    EXAM:  XR CHEST 1 VIEW PORT ROUTINE                          PROCEDURE DATE:  2017            INTERPRETATION:  XR CHEST 1 VIEW PORT ROUTINE DATED 2017 6:11 AM    INDICATION: 61 years-old Male with followu p.    PRIOR STUDIES: Chest x-ray from 2017    FINDINGS: AP view of the chest demonstrates no new infiltrate. No pleural   effusion or pneumothorax. Heart size stable. No other change.    IMPRESSION:  No interval change.

## 2017-05-25 NOTE — PROGRESS NOTE ADULT - SUBJECTIVE AND OBJECTIVE BOX
Chart reviewed and patient interviewed. Patient is a 61 year old  Black male [facility] current smoker ( 1 PPDx45 years), current every day use of Alcohol ( 1 pint of Vodka daily and several beers daily for 45 years ) and history of HLD and  HTN who was admitted to [facility] in early May 2017 with Chest pain and worsening HEIN. Had Cardiac Cath which showed CAD and Aortic Stenosis. He was discharged home and then seen at [facility] after awakening at home with severe Chest pain on 5/22/17. Patient was seen in Cardiac Clinic on 5/22/17 and was admitted for pre-op work up for possible CABG/AVR. Last drink was a bottle of beer on morning of 5/23/17, the day of admission to [facility].       Patient's wife reports he has gone through acute Alcohol withdrawal in the past when hospitalized and has become acutely agitated requiring sedation and restraint. Patient continues on Librium 25mg po q 6h and has no signs or symptoms of withdrawal. Patient is scheduled for surgery in Am for CABG and AVR. Continuing Supportive therapy.

## 2017-05-25 NOTE — PROGRESS NOTE ADULT - ASSESSMENT
61y M, current smoker (45 pack years) and every day ETOH user x 45 years with history of HLD, HTN, found to have 2vCAD on recent Cardiac cath and moderate AS on ECHO with EF 65%.  Admitted to [facility] on 5/23/17 to complete pre-op workup in anticipation of OR.  5/24, CT abdomen revealed evidence of cirrhosis with no ascites.  Carotid dopplers b/l with no HD significant stenosis.  CT Head showed no evidence of acute infarct.  That night, pt was found to be smoking in the bathroom and was placed on 61y M, current smoker (45 pack years) and every day ETOH user x 45 years with history of HLD, HTN, found to have 2vCAD on recent Cardiac cath and moderate AS on ECHO with EF 65%.  Admitted to [facility] on 5/23/17 to complete pre-op workup in anticipation of OR.  5/24, CT abdomen revealed evidence of cirrhosis with no ascites.  Carotid dopplers b/l with no HD significant stenosis.  CT Head showed no evidence of acute infarct.  That night, pt was found to be smoking in the bathroom and was placed on 1 to 1 and psych, Dr. Gonzalez, consulted.       1. Neuro: No delirium, significant hx of substance abuse and smoking hx.   -Dr. Gonzalez following for management of substance abuse, continue to appreciate recs.    -C/w Librium, Thiamine and Folate.   -Closely monitor for signs of withdrawal.     2. Respiratory: 98% on RA  -CXR in AM  -IS and ambulation  -Monitor SpO2 for respiratory status    3. CVS: HD Stable, 2vCAD with moderate AS, pre-op for CABG and AVR tomorrow.   -Pre-op workup completed.   -Per Dr. Marquez, b/l Arterial duplex performed in AM to r/o Subclavian stenosis.  No HD significant stenosis b/l.    -C/w ASA 81mg, and lopressor 25mg QD  -holding statin at this time 2/2 elevated LFTs on admission.   -Monitor HR/BP/Tele    4. GI: CT abdomen with evidence of cirrhosis, significant hx of ETOH abuse.  No ascites.    -Per Dr. Wilcox, no indication for GI consult at this time.  May readdress post-operatively.   -PPX: pepcid  -NPO at MN for OR tomorrow.       5. /Renal: Cr: 0.8; No urinary issues  -Trend AM Cr  -Monitor I/O    6. ID: Afebrile, Asymptomatic  -No acute issues at this time, continue to monitor for SIRS    7. Endo: A1C: 5.2, TSH: 0.742  -No acute issues at this time    8. Heme: H/H Stable  -DVT PPX: HSQ 5000 q8h   -CBC, chem in AM    Dispo: OR tomorrow.

## 2017-05-25 NOTE — DIETITIAN INITIAL EVALUATION ADULT. - OTHER INFO
60y/o M admitted for pre-op testing for CABG/AVR. Pt is a smoker and daily ETOH user; 1 pint of vodka + several beers a day for 45 years. Pt reports a normal appetite and intake. Denies GI distress, pain, and mechanical issues. PTA pt reports eating 3x meals/day. Possible suboptimal nutrient/vit/mineral intake due to h/o ETOH abuse. Continue thiamine, folic acid, and MVI. Pt denies recent wt and diet changes. Encouraged a Dash/TLC diet; not receptive to edu. RD to follow post-op and re-attempt diet education. Will follow.

## 2017-05-25 NOTE — CHART NOTE - NSCHARTNOTEFT_GEN_A_CORE
Planned Date of Surgery:                                                                                                                  Surgeon: Dr. Wilcox    Procedure: CABG/AVR for 17    HPI:  61M with PMH current smoking (1ppd x 45 years), current every day ETOH use (1 pink vodka and several beers daily x 45 years), HLD, HTN, who was recently admitted for chest pain workup. Pt came to [facility] in early May due to chest pain and worsening HEIN, echo was done showing EF 65%, moderate mitral annular calcification, moderate aortic stenosis. Cardiac cath then done showing LM 40%, pLAD 80%, pRCA 80%, severe AS. He was discharged home but then presented to [facility] on  for severe chest pain, which woke him from sleep. Per wife pt was only taking ASA, vitamins, and valcyclovir at home. Pt seen in clinic today and admitted for preop workup prior to possible CABG/ AVR. Currently pt is sitting in chair comfortable, denies any chest pain, SOB, N/V, dizziness, blurred vision. Admits to ETOH problem, shows no interest in quitting permanently. Last drink was 1 beer this AM. Will start patient on appropriate cardiac medication and withdrawal PPX.     PAST MEDICAL & SURGICAL HISTORY:  Aortic valve stenosis, unspecified etiology  Hyperlipidemia  Smoker  ETOH abuse  HTN (hypertension)      Allergy Status Unknown      MEDICATIONS  (STANDING):  sodium chloride 0.9% lock flush 3milliLiter(s) IV Push every 8 hours  heparin  Injectable 5000Unit(s) SubCutaneous every 8 hours  aspirin  chewable 81milliGRAM(s) Oral daily  thiamine 100milliGRAM(s) Oral daily  folic acid 1milliGRAM(s) Oral daily  metoprolol 25milliGRAM(s) Oral two times a day  nicotine - 21 mG/24Hr(s) Patch 1patch Transdermal daily  chlordiazePOXIDE 25milliGRAM(s) Oral every 6 hours  multivitamin 1Tablet(s) Oral daily  famotidine    Tablet 20milliGRAM(s) Oral daily  chlorhexidine 4% Liquid 1Application(s) Topical once  chlorhexidine 0.12% Liquid 10milliLiter(s) Swish and Spit once    MEDICATIONS  (PRN):      On Beta Blocker? YES     Labs:                        14.2   8.7   )-----------( 144      ( 25 May 2017 06:07 )             40.8     05-25    135  |  100  |  14  ----------------------------<  107<H>  4.0   |  23  |  0.80    Ca    9.3      25 May 2017 06:07  Phos  3.5       Mg     1.8         TPro  8.2  /  Alb  3.5  /  TBili  1.1  /  DBili  x   /  AST  53<H>  /  ALT  27  /  AlkPhos  86      PT/INR - ( 25 May 2017 06:07 )   PT: 13.3 sec;   INR: 1.19          PTT - ( 25 May 2017 06:07 )  PTT:35.9 sec  Urinalysis Basic - ( 23 May 2017 20:07 )    Color: Yellow / Appearance: SL CLOUDY / S.010 / pH: x  Gluc: x / Ketone: NEGATIVE  / Bili: NEGATIVE / Urobili: 0.2 E.U./dL   Blood: x / Protein: NEGATIVE mg/dL / Nitrite: NEGATIVE   Leuk Esterase: NEGATIVE / RBC: x / WBC x   Sq Epi: x / Non Sq Epi: x / Bacteria: x      Hemoglobin A1C, Whole Blood: 5.2 % ( @ 19:13)    EKG:    Ventricular Rate 94 BPM    Atrial Rate 94 BPM    P-R Interval 184 ms    QRS Duration 90 ms    Q-T Interval 382 ms    QTC Calculation(Bezet) 477 ms    P Axis 47 degrees    R Axis -5 degrees    T Axis 58 degrees    Diagnosis Line Normal sinus rhythm  ST & T wave abnormality, consider lateral ischemia  Prolonged QT    CXR:    EXAM:  XR CHEST 1 VIEW PORT ROUTINE                          PROCEDURE DATE:  2017        INTERPRETATION:  XR CHEST 1 VIEW PORT ROUTINE DATED 2017 6:11 AM    INDICATION: 61 years-old Male with followup.    PRIOR STUDIES: Chest x-ray from 2017    FINDINGS: AP view of the chest demonstrates no new infiltrate. No pleural   effusion or pneumothorax. Heart size stable. No other change.    IMPRESSION:  No interval change.    CT Scans:    CT Chest on 17:  IMPRESSION:  Mildly nodular liver contour with patent paraumbilical vein is noted,   suggestive of liver cirrhosis.     Early honeycombing versus traction bronchiolectasis noted in the right   upper lobe, as well as bibasilar groundglass and reticulation, this could   represent fibrotic NSIP, UIP.    Calcified pulmonary nodules measuring up to 0.3 cm, likely due to sequela   of previous granulomatous disease.    Dense aortic valve calcification.      EXAM:  CT ABDOMEN AND PELVIS                          EXAM:  CT CHEST                          PROCEDURE DATE:  2017    IMPRESSION:  Mildly nodular liver contour with patent paraumbilical vein is noted,   suggestive of liver cirrhosis.     Early honeycombing versus traction bronchiolectasis noted in the right   upper lobe, as well as bibasilar groundglass and reticulation, this could   represent fibrotic NSIP, UIP.    Calcified pulmonary nodules measuring up to 0.3 cm, likely due to sequela   of previous granulomatous disease.    Dense aortic valve calcification.      EXAM:  CT BRAIN                          PROCEDURE DATE:  2017        IMPRESSION:     No acute intracranial hemorrhage, mass effect or CT evidence of recent   transcortical infarction.     Cath Report: LM 40%, pLAD 80%, pRCA 80%,    Echo: EF 65%, moderate mitral annular calcification, moderate aortic stenosis.    PFT's:    Carotid Duplex:  IMPRESSION:  Moderate amount of plaque. No hemodynamically significant stenosis.    Consult in Chart?  YES   Consent in Chart? YES   Pre-op Orders Placed? YES  Blood Prodeucts Ordered? YES   NPO ordered? YES

## 2017-05-25 NOTE — DIETITIAN INITIAL EVALUATION ADULT. - ENERGY NEEDS
IBW 67.3Kg  %IBW 99%  BMI 23.1    Utilized ABW to calculate needs, pt falls within % of IBW. Pro increased for pre-op.

## 2017-05-26 ENCOUNTER — RESULT REVIEW (OUTPATIENT)
Age: 62
End: 2017-05-26

## 2017-05-26 ENCOUNTER — APPOINTMENT (OUTPATIENT)
Dept: CARDIOTHORACIC SURGERY | Facility: HOSPITAL | Age: 62
End: 2017-05-26

## 2017-05-26 LAB
ALBUMIN SERPL ELPH-MCNC: 2.8 G/DL — LOW (ref 3.3–5)
ALBUMIN SERPL ELPH-MCNC: 3 G/DL — LOW (ref 3.3–5)
ALBUMIN SERPL ELPH-MCNC: 3.4 G/DL — SIGNIFICANT CHANGE UP (ref 3.3–5)
ALBUMIN SERPL ELPH-MCNC: 3.4 G/DL — SIGNIFICANT CHANGE UP (ref 3.3–5)
ALP SERPL-CCNC: 43 U/L — SIGNIFICANT CHANGE UP (ref 40–120)
ALP SERPL-CCNC: 43 U/L — SIGNIFICANT CHANGE UP (ref 40–120)
ALP SERPL-CCNC: 77 U/L — SIGNIFICANT CHANGE UP (ref 40–120)
ALP SERPL-CCNC: 79 U/L — SIGNIFICANT CHANGE UP (ref 40–120)
ALT FLD-CCNC: 24 U/L — SIGNIFICANT CHANGE UP (ref 10–45)
ALT FLD-CCNC: 26 U/L — SIGNIFICANT CHANGE UP (ref 10–45)
ALT FLD-CCNC: 41 U/L — SIGNIFICANT CHANGE UP (ref 10–45)
ALT FLD-CCNC: 49 U/L — HIGH (ref 10–45)
ANION GAP SERPL CALC-SCNC: 11 MMOL/L — SIGNIFICANT CHANGE UP (ref 5–17)
ANION GAP SERPL CALC-SCNC: 14 MMOL/L — SIGNIFICANT CHANGE UP (ref 5–17)
ANION GAP SERPL CALC-SCNC: 15 MMOL/L — SIGNIFICANT CHANGE UP (ref 5–17)
ANION GAP SERPL CALC-SCNC: 15 MMOL/L — SIGNIFICANT CHANGE UP (ref 5–17)
ANION GAP SERPL CALC-SCNC: 17 MMOL/L — SIGNIFICANT CHANGE UP (ref 5–17)
APTT BLD: 33.4 SEC — SIGNIFICANT CHANGE UP (ref 27.5–37.4)
APTT BLD: 35 SEC — SIGNIFICANT CHANGE UP (ref 27.5–37.4)
APTT BLD: 38.6 SEC — HIGH (ref 27.5–37.4)
APTT BLD: 43.6 SEC — HIGH (ref 27.5–37.4)
APTT BLD: 56.7 SEC — HIGH (ref 27.5–37.4)
AST SERPL-CCNC: 122 U/L — HIGH (ref 10–40)
AST SERPL-CCNC: 146 U/L — HIGH (ref 10–40)
AST SERPL-CCNC: 44 U/L — HIGH (ref 10–40)
AST SERPL-CCNC: 45 U/L — HIGH (ref 10–40)
BASE EXCESS BLDA CALC-SCNC: -1.8 MMOL/L — SIGNIFICANT CHANGE UP (ref -2–3)
BASE EXCESS BLDA CALC-SCNC: -1.9 MMOL/L — SIGNIFICANT CHANGE UP (ref -2–3)
BASOPHILS NFR BLD AUTO: 0.1 % — SIGNIFICANT CHANGE UP (ref 0–2)
BILIRUB DIRECT SERPL-MCNC: 0.3 MG/DL — HIGH (ref 0–0.2)
BILIRUB INDIRECT FLD-MCNC: 0.8 MG/DL — SIGNIFICANT CHANGE UP (ref 0.2–1)
BILIRUB SERPL-MCNC: 1.1 MG/DL — SIGNIFICANT CHANGE UP (ref 0.2–1.2)
BILIRUB SERPL-MCNC: 1.1 MG/DL — SIGNIFICANT CHANGE UP (ref 0.2–1.2)
BILIRUB SERPL-MCNC: 1.8 MG/DL — HIGH (ref 0.2–1.2)
BILIRUB SERPL-MCNC: 2.5 MG/DL — HIGH (ref 0.2–1.2)
BUN SERPL-MCNC: 12 MG/DL — SIGNIFICANT CHANGE UP (ref 7–23)
BUN SERPL-MCNC: 12 MG/DL — SIGNIFICANT CHANGE UP (ref 7–23)
BUN SERPL-MCNC: 13 MG/DL — SIGNIFICANT CHANGE UP (ref 7–23)
BUN SERPL-MCNC: 23 MG/DL — SIGNIFICANT CHANGE UP (ref 7–23)
BUN SERPL-MCNC: 24 MG/DL — HIGH (ref 7–23)
CA-I BLDA-SCNC: 1.16 MMOL/L — SIGNIFICANT CHANGE UP (ref 1.1–1.3)
CALCIUM SERPL-MCNC: 7.3 MG/DL — LOW (ref 8.4–10.5)
CALCIUM SERPL-MCNC: 7.4 MG/DL — LOW (ref 8.4–10.5)
CALCIUM SERPL-MCNC: 8.3 MG/DL — LOW (ref 8.4–10.5)
CALCIUM SERPL-MCNC: 8.8 MG/DL — SIGNIFICANT CHANGE UP (ref 8.4–10.5)
CALCIUM SERPL-MCNC: 9 MG/DL — SIGNIFICANT CHANGE UP (ref 8.4–10.5)
CHLORIDE SERPL-SCNC: 101 MMOL/L — SIGNIFICANT CHANGE UP (ref 96–108)
CHLORIDE SERPL-SCNC: 108 MMOL/L — SIGNIFICANT CHANGE UP (ref 96–108)
CHLORIDE SERPL-SCNC: 109 MMOL/L — HIGH (ref 96–108)
CHLORIDE SERPL-SCNC: 109 MMOL/L — HIGH (ref 96–108)
CHLORIDE SERPL-SCNC: 99 MMOL/L — SIGNIFICANT CHANGE UP (ref 96–108)
CO2 SERPL-SCNC: 19 MMOL/L — LOW (ref 22–31)
CO2 SERPL-SCNC: 21 MMOL/L — LOW (ref 22–31)
CO2 SERPL-SCNC: 23 MMOL/L — SIGNIFICANT CHANGE UP (ref 22–31)
CO2 SERPL-SCNC: 23 MMOL/L — SIGNIFICANT CHANGE UP (ref 22–31)
CO2 SERPL-SCNC: 25 MMOL/L — SIGNIFICANT CHANGE UP (ref 22–31)
COHGB MFR BLDA: 1.1 % — SIGNIFICANT CHANGE UP
CREAT SERPL-MCNC: 0.7 MG/DL — SIGNIFICANT CHANGE UP (ref 0.5–1.3)
CREAT SERPL-MCNC: 0.9 MG/DL — SIGNIFICANT CHANGE UP (ref 0.5–1.3)
CREAT SERPL-MCNC: 1 MG/DL — SIGNIFICANT CHANGE UP (ref 0.5–1.3)
EOSINOPHIL NFR BLD AUTO: 0.2 % — SIGNIFICANT CHANGE UP (ref 0–6)
GAS PNL BLDA: SIGNIFICANT CHANGE UP
GLUCOSE SERPL-MCNC: 116 MG/DL — HIGH (ref 70–99)
GLUCOSE SERPL-MCNC: 117 MG/DL — HIGH (ref 70–99)
GLUCOSE SERPL-MCNC: 139 MG/DL — HIGH (ref 70–99)
GLUCOSE SERPL-MCNC: 154 MG/DL — HIGH (ref 70–99)
GLUCOSE SERPL-MCNC: 160 MG/DL — HIGH (ref 70–99)
HCO3 BLDA-SCNC: 23 MMOL/L — SIGNIFICANT CHANGE UP (ref 21–28)
HCO3 BLDA-SCNC: 23 MMOL/L — SIGNIFICANT CHANGE UP (ref 21–28)
HCT VFR BLD CALC: 25.2 % — LOW (ref 39–50)
HCT VFR BLD CALC: 25.6 % — LOW (ref 39–50)
HCT VFR BLD CALC: 26 % — LOW (ref 39–50)
HCT VFR BLD CALC: 30.4 % — LOW (ref 39–50)
HCT VFR BLD CALC: 36.3 % — LOW (ref 39–50)
HCT VFR BLD CALC: 37.6 % — LOW (ref 39–50)
HGB BLD-MCNC: 10.5 G/DL — LOW (ref 13–17)
HGB BLD-MCNC: 12.8 G/DL — LOW (ref 13–17)
HGB BLD-MCNC: 12.9 G/DL — LOW (ref 13–17)
HGB BLD-MCNC: 8.6 G/DL — LOW (ref 13–17)
HGB BLD-MCNC: 8.8 G/DL — LOW (ref 13–17)
HGB BLD-MCNC: 9 G/DL — LOW (ref 13–17)
HGB BLDA-MCNC: 13.9 G/DL — SIGNIFICANT CHANGE UP (ref 13–17)
INR BLD: 1.25 — HIGH (ref 0.88–1.16)
INR BLD: 1.53 — HIGH (ref 0.88–1.16)
INR BLD: 1.62 — HIGH (ref 0.88–1.16)
INR BLD: 1.65 — HIGH (ref 0.88–1.16)
INR BLD: 2.1 — HIGH (ref 0.88–1.16)
LACTATE SERPL-SCNC: 1.9 MMOL/L — SIGNIFICANT CHANGE UP (ref 0.5–2)
LACTATE SERPL-SCNC: 2.2 MMOL/L — HIGH (ref 0.5–2)
LACTATE SERPL-SCNC: 2.6 MMOL/L — HIGH (ref 0.5–2)
LACTATE SERPL-SCNC: 3.4 MMOL/L — HIGH (ref 0.5–2)
LACTATE SERPL-SCNC: 5.3 MMOL/L — CRITICAL HIGH (ref 0.5–2)
LYMPHOCYTES # BLD AUTO: 14.3 % — SIGNIFICANT CHANGE UP (ref 13–44)
MAGNESIUM SERPL-MCNC: 1.8 MG/DL — SIGNIFICANT CHANGE UP (ref 1.6–2.6)
MAGNESIUM SERPL-MCNC: 2.1 MG/DL — SIGNIFICANT CHANGE UP (ref 1.6–2.6)
MAGNESIUM SERPL-MCNC: 2.2 MG/DL — SIGNIFICANT CHANGE UP (ref 1.6–2.6)
MCHC RBC-ENTMCNC: 30.1 PG — SIGNIFICANT CHANGE UP (ref 27–34)
MCHC RBC-ENTMCNC: 30.4 PG — SIGNIFICANT CHANGE UP (ref 27–34)
MCHC RBC-ENTMCNC: 30.7 PG — SIGNIFICANT CHANGE UP (ref 27–34)
MCHC RBC-ENTMCNC: 31 PG — SIGNIFICANT CHANGE UP (ref 27–34)
MCHC RBC-ENTMCNC: 31.8 PG — SIGNIFICANT CHANGE UP (ref 27–34)
MCHC RBC-ENTMCNC: 32.4 PG — SIGNIFICANT CHANGE UP (ref 27–34)
MCHC RBC-ENTMCNC: 34.1 G/DL — SIGNIFICANT CHANGE UP (ref 32–36)
MCHC RBC-ENTMCNC: 34.3 G/DL — SIGNIFICANT CHANGE UP (ref 32–36)
MCHC RBC-ENTMCNC: 34.4 G/DL — SIGNIFICANT CHANGE UP (ref 32–36)
MCHC RBC-ENTMCNC: 34.5 G/DL — SIGNIFICANT CHANGE UP (ref 32–36)
MCHC RBC-ENTMCNC: 34.6 G/DL — SIGNIFICANT CHANGE UP (ref 32–36)
MCHC RBC-ENTMCNC: 35.3 G/DL — SIGNIFICANT CHANGE UP (ref 32–36)
MCV RBC AUTO: 88.1 FL — SIGNIFICANT CHANGE UP (ref 80–100)
MCV RBC AUTO: 88.6 FL — SIGNIFICANT CHANGE UP (ref 80–100)
MCV RBC AUTO: 88.9 FL — SIGNIFICANT CHANGE UP (ref 80–100)
MCV RBC AUTO: 90.4 FL — SIGNIFICANT CHANGE UP (ref 80–100)
MCV RBC AUTO: 91.9 FL — SIGNIFICANT CHANGE UP (ref 80–100)
MCV RBC AUTO: 91.9 FL — SIGNIFICANT CHANGE UP (ref 80–100)
METHGB MFR BLDA: 0.3 % — SIGNIFICANT CHANGE UP
MONOCYTES NFR BLD AUTO: 8.8 % — SIGNIFICANT CHANGE UP (ref 2–14)
NEUTROPHILS NFR BLD AUTO: 76.6 % — SIGNIFICANT CHANGE UP (ref 43–77)
O2 CT VFR BLDA CALC: 20.5 ML/DL — SIGNIFICANT CHANGE UP (ref 15–23)
OXYHGB MFR BLDA: 98 % — SIGNIFICANT CHANGE UP (ref 94–100)
PCO2 BLDA: 38 MMHG — SIGNIFICANT CHANGE UP (ref 35–48)
PCO2 BLDA: 38 MMHG — SIGNIFICANT CHANGE UP (ref 35–48)
PH BLDA: 7.39 — SIGNIFICANT CHANGE UP (ref 7.35–7.45)
PH BLDA: 7.4 — SIGNIFICANT CHANGE UP (ref 7.35–7.45)
PHOSPHATE SERPL-MCNC: 3.6 MG/DL — SIGNIFICANT CHANGE UP (ref 2.5–4.5)
PHOSPHATE SERPL-MCNC: 3.6 MG/DL — SIGNIFICANT CHANGE UP (ref 2.5–4.5)
PHOSPHATE SERPL-MCNC: 4.6 MG/DL — HIGH (ref 2.5–4.5)
PLATELET # BLD AUTO: 137 K/UL — LOW (ref 150–400)
PLATELET # BLD AUTO: 149 K/UL — LOW (ref 150–400)
PLATELET # BLD AUTO: 153 K/UL — SIGNIFICANT CHANGE UP (ref 150–400)
PLATELET # BLD AUTO: 84 K/UL — LOW (ref 150–400)
PLATELET # BLD AUTO: 92 K/UL — LOW (ref 150–400)
PLATELET # BLD AUTO: 98 K/UL — LOW (ref 150–400)
PO2 BLDA: 140 MMHG — HIGH (ref 83–108)
PO2 BLDA: 473 MMHG — HIGH (ref 83–108)
POTASSIUM BLDA-SCNC: 3.9 MMOL/L — SIGNIFICANT CHANGE UP (ref 3.5–4.9)
POTASSIUM SERPL-MCNC: 3.7 MMOL/L — SIGNIFICANT CHANGE UP (ref 3.5–5.3)
POTASSIUM SERPL-MCNC: 3.8 MMOL/L — SIGNIFICANT CHANGE UP (ref 3.5–5.3)
POTASSIUM SERPL-MCNC: 3.8 MMOL/L — SIGNIFICANT CHANGE UP (ref 3.5–5.3)
POTASSIUM SERPL-MCNC: 4 MMOL/L — SIGNIFICANT CHANGE UP (ref 3.5–5.3)
POTASSIUM SERPL-MCNC: 4.2 MMOL/L — SIGNIFICANT CHANGE UP (ref 3.5–5.3)
POTASSIUM SERPL-SCNC: 3.7 MMOL/L — SIGNIFICANT CHANGE UP (ref 3.5–5.3)
POTASSIUM SERPL-SCNC: 3.8 MMOL/L — SIGNIFICANT CHANGE UP (ref 3.5–5.3)
POTASSIUM SERPL-SCNC: 3.8 MMOL/L — SIGNIFICANT CHANGE UP (ref 3.5–5.3)
POTASSIUM SERPL-SCNC: 4 MMOL/L — SIGNIFICANT CHANGE UP (ref 3.5–5.3)
POTASSIUM SERPL-SCNC: 4.2 MMOL/L — SIGNIFICANT CHANGE UP (ref 3.5–5.3)
PROT SERPL-MCNC: 4.9 G/DL — LOW (ref 6–8.3)
PROT SERPL-MCNC: 5.1 G/DL — LOW (ref 6–8.3)
PROT SERPL-MCNC: 7.7 G/DL — SIGNIFICANT CHANGE UP (ref 6–8.3)
PROT SERPL-MCNC: 7.9 G/DL — SIGNIFICANT CHANGE UP (ref 6–8.3)
PROTHROM AB SERPL-ACNC: 13.9 SEC — HIGH (ref 9.8–12.7)
PROTHROM AB SERPL-ACNC: 17.1 SEC — HIGH (ref 9.8–12.7)
PROTHROM AB SERPL-ACNC: 18.2 SEC — HIGH (ref 9.8–12.7)
PROTHROM AB SERPL-ACNC: 18.5 SEC — HIGH (ref 9.8–12.7)
PROTHROM AB SERPL-ACNC: 23.7 SEC — HIGH (ref 9.8–12.7)
RBC # BLD: 2.83 M/UL — LOW (ref 4.2–5.8)
RBC # BLD: 2.86 M/UL — LOW (ref 4.2–5.8)
RBC # BLD: 2.89 M/UL — LOW (ref 4.2–5.8)
RBC # BLD: 3.42 M/UL — LOW (ref 4.2–5.8)
RBC # BLD: 3.95 M/UL — LOW (ref 4.2–5.8)
RBC # BLD: 4.16 M/UL — LOW (ref 4.2–5.8)
RBC # FLD: 14.9 % — SIGNIFICANT CHANGE UP (ref 10.3–16.9)
RBC # FLD: 15.4 % — SIGNIFICANT CHANGE UP (ref 10.3–16.9)
RBC # FLD: 15.9 % — SIGNIFICANT CHANGE UP (ref 10.3–16.9)
RBC # FLD: 16.2 % — SIGNIFICANT CHANGE UP (ref 10.3–16.9)
SAO2 % BLDA: 100 % — SIGNIFICANT CHANGE UP (ref 95–100)
SAO2 % BLDA: 98 % — SIGNIFICANT CHANGE UP (ref 95–100)
SODIUM BLDA-SCNC: 141 MMOL/L — SIGNIFICANT CHANGE UP (ref 138–146)
SODIUM SERPL-SCNC: 135 MMOL/L — SIGNIFICANT CHANGE UP (ref 135–145)
SODIUM SERPL-SCNC: 137 MMOL/L — SIGNIFICANT CHANGE UP (ref 135–145)
SODIUM SERPL-SCNC: 144 MMOL/L — SIGNIFICANT CHANGE UP (ref 135–145)
SODIUM SERPL-SCNC: 146 MMOL/L — HIGH (ref 135–145)
SODIUM SERPL-SCNC: 147 MMOL/L — HIGH (ref 135–145)
WBC # BLD: 10.2 K/UL — SIGNIFICANT CHANGE UP (ref 3.8–10.5)
WBC # BLD: 13.5 K/UL — HIGH (ref 3.8–10.5)
WBC # BLD: 7.4 K/UL — SIGNIFICANT CHANGE UP (ref 3.8–10.5)
WBC # BLD: 8.2 K/UL — SIGNIFICANT CHANGE UP (ref 3.8–10.5)
WBC # BLD: 8.3 K/UL — SIGNIFICANT CHANGE UP (ref 3.8–10.5)
WBC # BLD: 8.4 K/UL — SIGNIFICANT CHANGE UP (ref 3.8–10.5)
WBC # FLD AUTO: 10.2 K/UL — SIGNIFICANT CHANGE UP (ref 3.8–10.5)
WBC # FLD AUTO: 13.5 K/UL — HIGH (ref 3.8–10.5)
WBC # FLD AUTO: 7.4 K/UL — SIGNIFICANT CHANGE UP (ref 3.8–10.5)
WBC # FLD AUTO: 8.2 K/UL — SIGNIFICANT CHANGE UP (ref 3.8–10.5)
WBC # FLD AUTO: 8.3 K/UL — SIGNIFICANT CHANGE UP (ref 3.8–10.5)
WBC # FLD AUTO: 8.4 K/UL — SIGNIFICANT CHANGE UP (ref 3.8–10.5)

## 2017-05-26 PROCEDURE — 99291 CRITICAL CARE FIRST HOUR: CPT | Mod: 25

## 2017-05-26 PROCEDURE — 36556 INSERT NON-TUNNEL CV CATH: CPT

## 2017-05-26 PROCEDURE — 93010 ELECTROCARDIOGRAM REPORT: CPT

## 2017-05-26 PROCEDURE — 33533 CABG ARTERIAL SINGLE: CPT | Mod: AS

## 2017-05-26 PROCEDURE — 33405 REPLACEMENT AORTIC VALVE OPN: CPT | Mod: AS

## 2017-05-26 PROCEDURE — 71010: CPT | Mod: 26,77

## 2017-05-26 PROCEDURE — 33517 CABG ARTERY-VEIN SINGLE: CPT | Mod: AS

## 2017-05-26 PROCEDURE — 36620 INSERTION CATHETER ARTERY: CPT

## 2017-05-26 PROCEDURE — 31500 INSERT EMERGENCY AIRWAY: CPT

## 2017-05-26 PROCEDURE — 99292 CRITICAL CARE ADDL 30 MIN: CPT

## 2017-05-26 PROCEDURE — 71010: CPT | Mod: 26

## 2017-05-26 RX ORDER — DEXMEDETOMIDINE HYDROCHLORIDE IN 0.9% SODIUM CHLORIDE 4 UG/ML
0.7 INJECTION INTRAVENOUS
Qty: 200 | Refills: 0 | Status: DISCONTINUED | OUTPATIENT
Start: 2017-05-26 | End: 2017-05-26

## 2017-05-26 RX ORDER — DEXMEDETOMIDINE HYDROCHLORIDE IN 0.9% SODIUM CHLORIDE 4 UG/ML
0.03 INJECTION INTRAVENOUS
Qty: 200 | Refills: 0 | Status: DISCONTINUED | OUTPATIENT
Start: 2017-05-26 | End: 2017-05-26

## 2017-05-26 RX ORDER — MIDAZOLAM HYDROCHLORIDE 1 MG/ML
2 INJECTION, SOLUTION INTRAMUSCULAR; INTRAVENOUS ONCE
Qty: 0 | Refills: 0 | Status: DISCONTINUED | OUTPATIENT
Start: 2017-05-26 | End: 2017-05-26

## 2017-05-26 RX ORDER — INSULIN HUMAN 100 [IU]/ML
1 INJECTION, SOLUTION SUBCUTANEOUS
Qty: 250 | Refills: 0 | Status: DISCONTINUED | OUTPATIENT
Start: 2017-05-26 | End: 2017-05-28

## 2017-05-26 RX ORDER — FENTANYL CITRATE 50 UG/ML
50 INJECTION INTRAVENOUS ONCE
Qty: 0 | Refills: 0 | Status: DISCONTINUED | OUTPATIENT
Start: 2017-05-26 | End: 2017-05-26

## 2017-05-26 RX ORDER — ALBUMIN HUMAN 25 %
250 VIAL (ML) INTRAVENOUS ONCE
Qty: 0 | Refills: 0 | Status: COMPLETED | OUTPATIENT
Start: 2017-05-26 | End: 2017-05-26

## 2017-05-26 RX ORDER — DEXMEDETOMIDINE HYDROCHLORIDE IN 0.9% SODIUM CHLORIDE 4 UG/ML
0.7 INJECTION INTRAVENOUS
Qty: 200 | Refills: 0 | Status: DISCONTINUED | OUTPATIENT
Start: 2017-05-26 | End: 2017-05-29

## 2017-05-26 RX ORDER — CISATRACURIUM BESYLATE 2 MG/ML
10 INJECTION INTRAVENOUS ONCE
Qty: 0 | Refills: 0 | Status: COMPLETED | OUTPATIENT
Start: 2017-05-26 | End: 2017-05-26

## 2017-05-26 RX ORDER — PROTAMINE SULFATE 10 MG/ML
50 AMPUL (ML) INTRAVENOUS ONCE
Qty: 0 | Refills: 0 | Status: DISCONTINUED | OUTPATIENT
Start: 2017-05-26 | End: 2017-05-26

## 2017-05-26 RX ORDER — NOREPINEPHRINE BITARTRATE/D5W 8 MG/250ML
0.04 PLASTIC BAG, INJECTION (ML) INTRAVENOUS
Qty: 8 | Refills: 0 | Status: DISCONTINUED | OUTPATIENT
Start: 2017-05-26 | End: 2017-05-27

## 2017-05-26 RX ORDER — CEFAZOLIN SODIUM 1 G
2000 VIAL (EA) INJECTION EVERY 8 HOURS
Qty: 0 | Refills: 0 | Status: COMPLETED | OUTPATIENT
Start: 2017-05-26 | End: 2017-05-28

## 2017-05-26 RX ORDER — HEPARIN SODIUM 5000 [USP'U]/ML
5000 INJECTION INTRAVENOUS; SUBCUTANEOUS EVERY 8 HOURS
Qty: 0 | Refills: 0 | Status: DISCONTINUED | OUTPATIENT
Start: 2017-05-26 | End: 2017-05-27

## 2017-05-26 RX ORDER — CISATRACURIUM BESYLATE 2 MG/ML
20 INJECTION INTRAVENOUS ONCE
Qty: 0 | Refills: 0 | Status: COMPLETED | OUTPATIENT
Start: 2017-05-26 | End: 2017-05-26

## 2017-05-26 RX ORDER — PROTAMINE SULFATE 10 MG/ML
50 AMPUL (ML) INTRAVENOUS ONCE
Qty: 0 | Refills: 0 | Status: COMPLETED | OUTPATIENT
Start: 2017-05-26 | End: 2017-05-26

## 2017-05-26 RX ORDER — FAMOTIDINE 10 MG/ML
20 INJECTION INTRAVENOUS EVERY 12 HOURS
Qty: 0 | Refills: 0 | Status: COMPLETED | OUTPATIENT
Start: 2017-05-26 | End: 2017-05-27

## 2017-05-26 RX ORDER — FAMOTIDINE 10 MG/ML
20 INJECTION INTRAVENOUS EVERY 12 HOURS
Qty: 0 | Refills: 0 | Status: DISCONTINUED | OUTPATIENT
Start: 2017-05-26 | End: 2017-05-26

## 2017-05-26 RX ORDER — VASOPRESSIN 20 [USP'U]/ML
0.02 INJECTION INTRAVENOUS
Qty: 100 | Refills: 0 | Status: DISCONTINUED | OUTPATIENT
Start: 2017-05-26 | End: 2017-05-27

## 2017-05-26 RX ORDER — CHLORHEXIDINE GLUCONATE 213 G/1000ML
5 SOLUTION TOPICAL EVERY 4 HOURS
Qty: 0 | Refills: 0 | Status: DISCONTINUED | OUTPATIENT
Start: 2017-05-26 | End: 2017-05-30

## 2017-05-26 RX ORDER — ASPIRIN/CALCIUM CARB/MAGNESIUM 324 MG
81 TABLET ORAL DAILY
Qty: 0 | Refills: 0 | Status: DISCONTINUED | OUTPATIENT
Start: 2017-05-26 | End: 2017-06-20

## 2017-05-26 RX ORDER — FAMOTIDINE 10 MG/ML
20 INJECTION INTRAVENOUS EVERY 12 HOURS
Qty: 0 | Refills: 0 | Status: DISCONTINUED | OUTPATIENT
Start: 2017-05-27 | End: 2017-06-09

## 2017-05-26 RX ORDER — POTASSIUM CHLORIDE 20 MEQ
20 PACKET (EA) ORAL
Qty: 0 | Refills: 0 | Status: COMPLETED | OUTPATIENT
Start: 2017-05-26 | End: 2017-05-27

## 2017-05-26 RX ORDER — SODIUM CHLORIDE 9 MG/ML
1000 INJECTION, SOLUTION INTRAVENOUS
Qty: 0 | Refills: 0 | Status: DISCONTINUED | OUTPATIENT
Start: 2017-05-26 | End: 2017-06-20

## 2017-05-26 RX ORDER — CALCIUM GLUCONATE 100 MG/ML
2 VIAL (ML) INTRAVENOUS ONCE
Qty: 0 | Refills: 0 | Status: COMPLETED | OUTPATIENT
Start: 2017-05-26 | End: 2017-05-26

## 2017-05-26 RX ORDER — PROPOFOL 10 MG/ML
10 INJECTION, EMULSION INTRAVENOUS
Qty: 1000 | Refills: 0 | Status: DISCONTINUED | OUTPATIENT
Start: 2017-05-26 | End: 2017-05-26

## 2017-05-26 RX ADMIN — Medication 100 MILLIEQUIVALENT(S): at 23:00

## 2017-05-26 RX ADMIN — FENTANYL CITRATE 50 MICROGRAM(S): 50 INJECTION INTRAVENOUS at 05:40

## 2017-05-26 RX ADMIN — CISATRACURIUM BESYLATE 10 MILLIGRAM(S): 2 INJECTION INTRAVENOUS at 07:53

## 2017-05-26 RX ADMIN — Medication 125 MILLILITER(S): at 17:19

## 2017-05-26 RX ADMIN — Medication 200 GRAM(S): at 19:46

## 2017-05-26 RX ADMIN — CHLORHEXIDINE GLUCONATE 5 MILLILITER(S): 213 SOLUTION TOPICAL at 18:12

## 2017-05-26 RX ADMIN — CHLORHEXIDINE GLUCONATE 5 MILLILITER(S): 213 SOLUTION TOPICAL at 21:13

## 2017-05-26 RX ADMIN — DEXMEDETOMIDINE HYDROCHLORIDE IN 0.9% SODIUM CHLORIDE 11.69 MICROGRAM(S)/KG/HR: 4 INJECTION INTRAVENOUS at 22:24

## 2017-05-26 RX ADMIN — Medication 125 MILLILITER(S): at 17:17

## 2017-05-26 RX ADMIN — FENTANYL CITRATE 50 MICROGRAM(S): 50 INJECTION INTRAVENOUS at 05:20

## 2017-05-26 RX ADMIN — SODIUM CHLORIDE 3 MILLILITER(S): 9 INJECTION INTRAMUSCULAR; INTRAVENOUS; SUBCUTANEOUS at 06:26

## 2017-05-26 RX ADMIN — FAMOTIDINE 20 MILLIGRAM(S): 10 INJECTION INTRAVENOUS at 18:13

## 2017-05-26 RX ADMIN — CISATRACURIUM BESYLATE 20 MILLIGRAM(S): 2 INJECTION INTRAVENOUS at 05:24

## 2017-05-26 RX ADMIN — Medication 5.01 MICROGRAM(S)/KG/MIN: at 17:17

## 2017-05-26 RX ADMIN — MIDAZOLAM HYDROCHLORIDE 2 MILLIGRAM(S): 1 INJECTION, SOLUTION INTRAMUSCULAR; INTRAVENOUS at 05:52

## 2017-05-26 RX ADMIN — Medication 220 MILLIGRAM(S): at 17:12

## 2017-05-26 RX ADMIN — DEXMEDETOMIDINE HYDROCHLORIDE IN 0.9% SODIUM CHLORIDE 11.69 MICROGRAM(S)/KG/HR: 4 INJECTION INTRAVENOUS at 17:29

## 2017-05-26 RX ADMIN — Medication 100 MILLIGRAM(S): at 21:13

## 2017-05-26 NOTE — BRIEF OPERATIVE NOTE - PROCEDURE
AVR (aortic valve replacement)  05/26/2017  #23 Bio  Active  JLIGHTBODY  CABG  05/26/2017  LIMA - LAD  SVG - PDA  EF 65%  Active  JLIGHTBODY

## 2017-05-26 NOTE — PROGRESS NOTE ADULT - SUBJECTIVE AND OBJECTIVE BOX
Date of surgery : 5/26/2017    Surgeon : Brenden     Anesthesia : Mike     Procedure : AVR, CABG LIMA to LAD, SVG to PDA     Pump Time :     163min                         Aortic X -Clamp :    135min                                            Circulatory Arrest :    EF :     Pre OP :          >65%                         Post OP :               710%           Assist Device :    Airway :      Difficult Airway :                   [ ] Yes     [x ] No      ETT             Size :                         Lip Line :                           Ventilator setting :              [ x] ON          [x ] BS +           [x ] ETCO2 35      Mode: AC/ CMV (Assist Control/ Continuous Mandatory Ventilation)  RR (machine): 12  TV (machine): 650  FiO2: 60  PEEP: 5  ITime: 1  MAP: 9  PIP: 27          Lines :      [ ] PA catheter      [x ] Radial Arterial Line              [ x ] Right              [  ] Left       [ ] Femoral Arterial Line          [  ] Right              [  ] Left      [ x] Central Line   IJ                     [x  ] Right              [  ] Left      [x ] Femoral Central line            [ x ] Right              [  ] Left     Tubes :      Blakes :                                      [  ] Med.             [1  ] Pleural      Chest Tubes :                           [  ] Med.             [ 1 ] Pleural       Pacing     Wires :             [   ] Atrial                  [   x]  Venticular      Pacer Setting :       VVI @ 40         Threshold  :                Sensitivity :       Intake     Drips :   none     IVF : 3800     Albumin :     Product :             [3   ]  PRBC       [2  ] Platelet     [ 3  ] FFP          [   ] Cryoprecipitate                                  [   ]  Cell saver : 1174 ml                                   [   ]  Hemostatic agent :                                  [   ]   Factors :       Output      EBL :      Urine : 3000ml      Antibiotics :   ICU Vital Signs Last 24 Hrs  T(C): 36.6, Max: 36.7 (05-25 @ 22:31)  T(F): 97.8, Max: 98.1 (05-25 @ 22:31)  HR: 96 (65 - 96)  BP: 126/67 (83/68 - 138/90)  BP(mean): 98 (73 - 105)  ABP: 114/64 (100/52 - 186/120)  ABP(mean): 84 (72 - 144)  RR: 13 (12 - 19)  SpO2: 100% (97% - 100%)    I&O's Summary    I & Os for current day (as of 26 May 2017 16:29)  =============================================  IN: 0 ml / OUT: 350 ml / NET: -350 ml    ABG - ( 26 May 2017 16:24 )  pH: 7.40  /  pCO2: 38    /  pO2: 140   / HCO3: 23    / Base Excess: -1.8  /  SaO2: 98                              9.0    13.5  )-----------( 92       ( 26 May 2017 15:12 )             26.0     26 May 2017 05:54    137    |  101    |  24     ----------------------------<  116    3.8     |  21     |  0.90     Ca    9.0        26 May 2017 05:54  Phos  3.6       26 May 2017 05:54  Mg     1.8       26 May 2017 05:54    TPro  7.7    /  Alb  3.4    /  TBili  1.1    /  DBili  x      /  AST  45     /  ALT  26     /  AlkPhos  79     26 May 2017 05:54    PT/INR - ( 26 May 2017 15:12 )   PT: 23.7 sec;   INR: 2.10          PTT - ( 26 May 2017 15:12 )  PTT:56.7 sec  MEDICATIONS  (STANDING):  vasopressin Infusion 0.017Unit(s)/Min IV Continuous <Continuous>  aspirin enteric coated 81milliGRAM(s) Oral daily  sodium chloride 0.45%. 1000milliLiter(s) IV Continuous <Continuous>  heparin  Injectable 5000Unit(s) SubCutaneous every 8 hours  ceFAZolin   IVPB 2000milliGRAM(s) IV Intermittent every 8 hours  chlorhexidine 0.12% Liquid 5milliLiter(s) Swish and Spit every 4 hours  famotidine Injectable 20milliGRAM(s) IV Push every 12 hours  insulin Infusion 1Unit(s)/Hr IV Continuous <Continuous>  protamine Injectable 50milliGRAM(s) IV Push once    CAD  Aortic valve stenosis, unspecified etiology  Hyperlipidemia  Smoker  ETOH abuse  HTN (hypertension)  Aortic valve stenosis, unspecified etiology  Coronary artery disease with other form of angina pectoris  Aortic valve stenosis, unspecified etiology  Coronary artery disease with other form of angina pectoris  CABG  AVR (aortic valve replacement)      PHYSICAL EXAM:  Gen : Intubated sedated   Respiratory: decreased in the bases  Cardiovascular: S1 and S2, RRR, no M/G/R  Gastrointestinal: BS+, soft, NT/ND  Extremities: No peripheral edema  Vascular: 2+ peripheral pulses  Neurological: A/O x 3, no focal deficits  Incision: clean dry/ no sign of infection  Lines: no sign of infection

## 2017-05-26 NOTE — PROGRESS NOTE ADULT - ASSESSMENT
60yo with history of heavy alcohol and tobacco use, s/p cardiac workup for angina.  Pt diagnosed with AS and CAD .  S/p Uncomplicated CABG X2, AVR.  Arrived on no pressors but currently on low dose levo.  Of note pt went into ETOH withdrawl pre-op this AM intubated prior to going to surgery for management of agitation    Problems  1. s/p CABG/AVR  2. Hemodynamic instability  3. Etoh withdrawl pre-op  4. Concern for coagulopathy due to history of extensive Etoh use and possible liver disease    Plan   Neuro -- need to assess for neuro exam due to calcified aorta but afterward needs sedation due to withdrawal pre-op   CVS - hemodynamic support, monitor for arrhythmia.  Pt with hyperdynamic LV needs aggressive volume resuscitation.  Monitor for bleeding  wean pressors as tolerated  chest tube management  Pulm - vent weaning as tolerated   GI - GI proph   - monitor UOP  Endo - glycemic control  Heme - correct coagulapathy, monitor for bleeding  ID - periop antibiotics.       Critical post op.    Critical care time spent 50 min

## 2017-05-26 NOTE — BRIEF OPERATIVE NOTE - PRE-OP DX
Aortic valve stenosis, unspecified etiology  05/26/2017    Tera Herrera  Coronary artery disease with other form of angina pectoris  05/26/2017    Tera Herrera

## 2017-05-27 LAB
ALBUMIN SERPL ELPH-MCNC: 2.7 G/DL — LOW (ref 3.3–5)
ALBUMIN SERPL ELPH-MCNC: 3 G/DL — LOW (ref 3.3–5)
ALP SERPL-CCNC: 41 U/L — SIGNIFICANT CHANGE UP (ref 40–120)
ALP SERPL-CCNC: 50 U/L — SIGNIFICANT CHANGE UP (ref 40–120)
ALT FLD-CCNC: 56 U/L — HIGH (ref 10–45)
ALT FLD-CCNC: 60 U/L — HIGH (ref 10–45)
ANION GAP SERPL CALC-SCNC: 10 MMOL/L — SIGNIFICANT CHANGE UP (ref 5–17)
ANION GAP SERPL CALC-SCNC: 12 MMOL/L — SIGNIFICANT CHANGE UP (ref 5–17)
ANION GAP SERPL CALC-SCNC: 14 MMOL/L — SIGNIFICANT CHANGE UP (ref 5–17)
APTT BLD: 32 SEC — SIGNIFICANT CHANGE UP (ref 27.5–37.4)
APTT BLD: 32.1 SEC — SIGNIFICANT CHANGE UP (ref 27.5–37.4)
APTT BLD: 32.2 SEC — SIGNIFICANT CHANGE UP (ref 27.5–37.4)
AST SERPL-CCNC: 145 U/L — HIGH (ref 10–40)
AST SERPL-CCNC: 162 U/L — HIGH (ref 10–40)
BASE EXCESS BLDA CALC-SCNC: -0.4 MMOL/L — SIGNIFICANT CHANGE UP (ref -2–3)
BASE EXCESS BLDV CALC-SCNC: 3.4 MMOL/L — SIGNIFICANT CHANGE UP
BILIRUB DIRECT SERPL-MCNC: 0.4 MG/DL — HIGH (ref 0–0.2)
BILIRUB INDIRECT FLD-MCNC: 0.6 MG/DL — SIGNIFICANT CHANGE UP (ref 0.2–1)
BILIRUB SERPL-MCNC: 1 MG/DL — SIGNIFICANT CHANGE UP (ref 0.2–1.2)
BILIRUB SERPL-MCNC: 1.4 MG/DL — HIGH (ref 0.2–1.2)
BUN SERPL-MCNC: 13 MG/DL — SIGNIFICANT CHANGE UP (ref 7–23)
BUN SERPL-MCNC: 14 MG/DL — SIGNIFICANT CHANGE UP (ref 7–23)
BUN SERPL-MCNC: 14 MG/DL — SIGNIFICANT CHANGE UP (ref 7–23)
CALCIUM SERPL-MCNC: 8 MG/DL — LOW (ref 8.4–10.5)
CALCIUM SERPL-MCNC: 8 MG/DL — LOW (ref 8.4–10.5)
CALCIUM SERPL-MCNC: 8.2 MG/DL — LOW (ref 8.4–10.5)
CHLORIDE SERPL-SCNC: 107 MMOL/L — SIGNIFICANT CHANGE UP (ref 96–108)
CHLORIDE SERPL-SCNC: 108 MMOL/L — SIGNIFICANT CHANGE UP (ref 96–108)
CHLORIDE SERPL-SCNC: 110 MMOL/L — HIGH (ref 96–108)
CO2 SERPL-SCNC: 20 MMOL/L — LOW (ref 22–31)
CO2 SERPL-SCNC: 23 MMOL/L — SIGNIFICANT CHANGE UP (ref 22–31)
CO2 SERPL-SCNC: 25 MMOL/L — SIGNIFICANT CHANGE UP (ref 22–31)
CREAT SERPL-MCNC: 0.7 MG/DL — SIGNIFICANT CHANGE UP (ref 0.5–1.3)
GAS PNL BLDA: SIGNIFICANT CHANGE UP
GAS PNL BLDV: SIGNIFICANT CHANGE UP
GLUCOSE SERPL-MCNC: 110 MG/DL — HIGH (ref 70–99)
GLUCOSE SERPL-MCNC: 137 MG/DL — HIGH (ref 70–99)
GLUCOSE SERPL-MCNC: 79 MG/DL — SIGNIFICANT CHANGE UP (ref 70–99)
HCO3 BLDA-SCNC: 23 MMOL/L — SIGNIFICANT CHANGE UP (ref 21–28)
HCO3 BLDV-SCNC: 27 MMOL/L — SIGNIFICANT CHANGE UP (ref 20–27)
HCT VFR BLD CALC: 23.1 % — LOW (ref 39–50)
HCT VFR BLD CALC: 29.2 % — LOW (ref 39–50)
HCT VFR BLD CALC: 29.9 % — LOW (ref 39–50)
HGB BLD-MCNC: 10.3 G/DL — LOW (ref 13–17)
HGB BLD-MCNC: 10.4 G/DL — LOW (ref 13–17)
HGB BLD-MCNC: 8.1 G/DL — LOW (ref 13–17)
INR BLD: 1.43 — HIGH (ref 0.88–1.16)
INR BLD: 1.47 — HIGH (ref 0.88–1.16)
INR BLD: 1.49 — HIGH (ref 0.88–1.16)
LACTATE SERPL-SCNC: 1.1 MMOL/L — SIGNIFICANT CHANGE UP (ref 0.5–2)
LACTATE SERPL-SCNC: 1.4 MMOL/L — SIGNIFICANT CHANGE UP (ref 0.5–2)
MAGNESIUM SERPL-MCNC: 1.9 MG/DL — SIGNIFICANT CHANGE UP (ref 1.6–2.6)
MAGNESIUM SERPL-MCNC: 2 MG/DL — SIGNIFICANT CHANGE UP (ref 1.6–2.6)
MAGNESIUM SERPL-MCNC: 2.2 MG/DL — SIGNIFICANT CHANGE UP (ref 1.6–2.6)
MCHC RBC-ENTMCNC: 29.5 PG — SIGNIFICANT CHANGE UP (ref 27–34)
MCHC RBC-ENTMCNC: 29.9 PG — SIGNIFICANT CHANGE UP (ref 27–34)
MCHC RBC-ENTMCNC: 30.9 PG — SIGNIFICANT CHANGE UP (ref 27–34)
MCHC RBC-ENTMCNC: 34.8 G/DL — SIGNIFICANT CHANGE UP (ref 32–36)
MCHC RBC-ENTMCNC: 35.1 G/DL — SIGNIFICANT CHANGE UP (ref 32–36)
MCHC RBC-ENTMCNC: 35.3 G/DL — SIGNIFICANT CHANGE UP (ref 32–36)
MCV RBC AUTO: 84.9 FL — SIGNIFICANT CHANGE UP (ref 80–100)
MCV RBC AUTO: 84.9 FL — SIGNIFICANT CHANGE UP (ref 80–100)
MCV RBC AUTO: 88.2 FL — SIGNIFICANT CHANGE UP (ref 80–100)
PCO2 BLDA: 31 MMHG — LOW (ref 35–48)
PCO2 BLDV: 37 MMHG — LOW (ref 41–51)
PH BLDA: 7.48 — HIGH (ref 7.35–7.45)
PH BLDV: 7.48 — HIGH (ref 7.32–7.43)
PHOSPHATE SERPL-MCNC: 3.1 MG/DL — SIGNIFICANT CHANGE UP (ref 2.5–4.5)
PHOSPHATE SERPL-MCNC: 3.2 MG/DL — SIGNIFICANT CHANGE UP (ref 2.5–4.5)
PLATELET # BLD AUTO: 43 K/UL — LOW (ref 150–400)
PLATELET # BLD AUTO: 54 K/UL — LOW (ref 150–400)
PLATELET # BLD AUTO: 72 K/UL — LOW (ref 150–400)
PO2 BLDA: 98 MMHG — SIGNIFICANT CHANGE UP (ref 83–108)
PO2 BLDV: 30 MMHG — SIGNIFICANT CHANGE UP
POTASSIUM SERPL-MCNC: 3.6 MMOL/L — SIGNIFICANT CHANGE UP (ref 3.5–5.3)
POTASSIUM SERPL-MCNC: 3.7 MMOL/L — SIGNIFICANT CHANGE UP (ref 3.5–5.3)
POTASSIUM SERPL-MCNC: 3.8 MMOL/L — SIGNIFICANT CHANGE UP (ref 3.5–5.3)
POTASSIUM SERPL-SCNC: 3.6 MMOL/L — SIGNIFICANT CHANGE UP (ref 3.5–5.3)
POTASSIUM SERPL-SCNC: 3.7 MMOL/L — SIGNIFICANT CHANGE UP (ref 3.5–5.3)
POTASSIUM SERPL-SCNC: 3.8 MMOL/L — SIGNIFICANT CHANGE UP (ref 3.5–5.3)
PROT SERPL-MCNC: 5 G/DL — LOW (ref 6–8.3)
PROT SERPL-MCNC: 5.2 G/DL — LOW (ref 6–8.3)
PROTHROM AB SERPL-ACNC: 16 SEC — HIGH (ref 9.8–12.7)
PROTHROM AB SERPL-ACNC: 16.4 SEC — HIGH (ref 9.8–12.7)
PROTHROM AB SERPL-ACNC: 16.7 SEC — HIGH (ref 9.8–12.7)
RBC # BLD: 2.62 M/UL — LOW (ref 4.2–5.8)
RBC # BLD: 3.44 M/UL — LOW (ref 4.2–5.8)
RBC # BLD: 3.52 M/UL — LOW (ref 4.2–5.8)
RBC # FLD: 17.3 % — HIGH (ref 10.3–16.9)
RBC # FLD: 17.4 % — HIGH (ref 10.3–16.9)
RBC # FLD: 17.9 % — HIGH (ref 10.3–16.9)
SAO2 % BLDA: 97 % — SIGNIFICANT CHANGE UP (ref 95–100)
SAO2 % BLDV: 63 % — SIGNIFICANT CHANGE UP
SODIUM SERPL-SCNC: 141 MMOL/L — SIGNIFICANT CHANGE UP (ref 135–145)
SODIUM SERPL-SCNC: 143 MMOL/L — SIGNIFICANT CHANGE UP (ref 135–145)
SODIUM SERPL-SCNC: 145 MMOL/L — SIGNIFICANT CHANGE UP (ref 135–145)
WBC # BLD: 7.6 K/UL — SIGNIFICANT CHANGE UP (ref 3.8–10.5)
WBC # BLD: 8.5 K/UL — SIGNIFICANT CHANGE UP (ref 3.8–10.5)
WBC # BLD: 8.8 K/UL — SIGNIFICANT CHANGE UP (ref 3.8–10.5)
WBC # FLD AUTO: 7.6 K/UL — SIGNIFICANT CHANGE UP (ref 3.8–10.5)
WBC # FLD AUTO: 8.5 K/UL — SIGNIFICANT CHANGE UP (ref 3.8–10.5)
WBC # FLD AUTO: 8.8 K/UL — SIGNIFICANT CHANGE UP (ref 3.8–10.5)

## 2017-05-27 PROCEDURE — 99291 CRITICAL CARE FIRST HOUR: CPT

## 2017-05-27 PROCEDURE — 93010 ELECTROCARDIOGRAM REPORT: CPT

## 2017-05-27 PROCEDURE — 71010: CPT | Mod: 26

## 2017-05-27 PROCEDURE — 99292 CRITICAL CARE ADDL 30 MIN: CPT

## 2017-05-27 RX ORDER — NICARDIPINE HYDROCHLORIDE 30 MG/1
5 CAPSULE, EXTENDED RELEASE ORAL
Qty: 40 | Refills: 0 | Status: DISCONTINUED | OUTPATIENT
Start: 2017-05-27 | End: 2017-05-29

## 2017-05-27 RX ORDER — METOCLOPRAMIDE HCL 10 MG
10 TABLET ORAL ONCE
Qty: 0 | Refills: 0 | Status: COMPLETED | OUTPATIENT
Start: 2017-05-27 | End: 2017-05-27

## 2017-05-27 RX ORDER — ALBUMIN HUMAN 25 %
250 VIAL (ML) INTRAVENOUS ONCE
Qty: 0 | Refills: 0 | Status: COMPLETED | OUTPATIENT
Start: 2017-05-27 | End: 2017-05-27

## 2017-05-27 RX ORDER — POTASSIUM CHLORIDE 20 MEQ
20 PACKET (EA) ORAL ONCE
Qty: 0 | Refills: 0 | Status: COMPLETED | OUTPATIENT
Start: 2017-05-27 | End: 2017-05-27

## 2017-05-27 RX ORDER — FENTANYL CITRATE 50 UG/ML
50 INJECTION INTRAVENOUS ONCE
Qty: 0 | Refills: 0 | Status: DISCONTINUED | OUTPATIENT
Start: 2017-05-27 | End: 2017-05-27

## 2017-05-27 RX ORDER — FUROSEMIDE 40 MG
20 TABLET ORAL ONCE
Qty: 0 | Refills: 0 | Status: COMPLETED | OUTPATIENT
Start: 2017-05-27 | End: 2017-05-27

## 2017-05-27 RX ORDER — MAGNESIUM SULFATE 500 MG/ML
2 VIAL (ML) INJECTION ONCE
Qty: 0 | Refills: 0 | Status: COMPLETED | OUTPATIENT
Start: 2017-05-27 | End: 2017-05-27

## 2017-05-27 RX ADMIN — FAMOTIDINE 20 MILLIGRAM(S): 10 INJECTION INTRAVENOUS at 18:27

## 2017-05-27 RX ADMIN — Medication 100 MILLIGRAM(S): at 05:25

## 2017-05-27 RX ADMIN — DEXMEDETOMIDINE HYDROCHLORIDE IN 0.9% SODIUM CHLORIDE 11.69 MICROGRAM(S)/KG/HR: 4 INJECTION INTRAVENOUS at 06:46

## 2017-05-27 RX ADMIN — Medication 100 MILLIEQUIVALENT(S): at 06:46

## 2017-05-27 RX ADMIN — Medication 100 MILLIEQUIVALENT(S): at 15:11

## 2017-05-27 RX ADMIN — HEPARIN SODIUM 5000 UNIT(S): 5000 INJECTION INTRAVENOUS; SUBCUTANEOUS at 21:57

## 2017-05-27 RX ADMIN — Medication 10 MILLIGRAM(S): at 23:07

## 2017-05-27 RX ADMIN — Medication 50 GRAM(S): at 15:11

## 2017-05-27 RX ADMIN — CHLORHEXIDINE GLUCONATE 5 MILLILITER(S): 213 SOLUTION TOPICAL at 21:57

## 2017-05-27 RX ADMIN — Medication 81 MILLIGRAM(S): at 14:17

## 2017-05-27 RX ADMIN — CHLORHEXIDINE GLUCONATE 5 MILLILITER(S): 213 SOLUTION TOPICAL at 05:22

## 2017-05-27 RX ADMIN — Medication 20 MILLIGRAM(S): at 04:50

## 2017-05-27 RX ADMIN — Medication 1 PATCH: at 09:00

## 2017-05-27 RX ADMIN — Medication 100 MILLIGRAM(S): at 21:57

## 2017-05-27 RX ADMIN — Medication 100 MILLIEQUIVALENT(S): at 00:00

## 2017-05-27 RX ADMIN — Medication 100 MILLIGRAM(S): at 14:17

## 2017-05-27 RX ADMIN — Medication 125 MILLILITER(S): at 14:03

## 2017-05-27 RX ADMIN — Medication 125 MILLILITER(S): at 17:04

## 2017-05-27 RX ADMIN — Medication 50 MILLIGRAM(S): at 23:42

## 2017-05-27 RX ADMIN — Medication 50 MILLIGRAM(S): at 11:39

## 2017-05-27 RX ADMIN — CHLORHEXIDINE GLUCONATE 5 MILLILITER(S): 213 SOLUTION TOPICAL at 11:40

## 2017-05-27 RX ADMIN — FENTANYL CITRATE 50 MICROGRAM(S): 50 INJECTION INTRAVENOUS at 16:15

## 2017-05-27 RX ADMIN — Medication 10 MILLIGRAM(S): at 23:08

## 2017-05-27 RX ADMIN — FENTANYL CITRATE 50 MICROGRAM(S): 50 INJECTION INTRAVENOUS at 16:02

## 2017-05-27 RX ADMIN — FAMOTIDINE 20 MILLIGRAM(S): 10 INJECTION INTRAVENOUS at 05:25

## 2017-05-27 RX ADMIN — Medication 100 MILLIEQUIVALENT(S): at 05:58

## 2017-05-27 RX ADMIN — Medication 50 MILLIGRAM(S): at 15:10

## 2017-05-27 NOTE — PROGRESS NOTE ADULT - SUBJECTIVE AND OBJECTIVE BOX
Pt seen and chart reviewed. Case d/w staff. Pt not responsive at this time, intubated and sedated, though reportedly agitated w/ WDL sx earlier today and attempted to self-extubate. Consideration of extubation today.      From Dr. Gonzalez's note:  " Patient is a 61 year old  Black male from [facility] current smoker ( 1 PPDx45 years), current every day use of Alcohol ( 1 pint of Vodka daily and several beers daily for 45 years ) and history of HLD and  HTN who was admitted to [facility] in early May 2017 with Chest pain and worsening HEIN. Had Cardiac Cath which showed CAD and Aortic Stenosis. He was discharged home and then seen at [facility] after awakening at home with severe Chest pain on 5/22/17. Patient was seen in Cardiac Clinic on 5/22/17 and was admitted for pre-op work up for possible CABG/AVR. Last drink was a bottle of beer on morning of 5/23/17, the day of admission to [facility].       Patient's wife reports he has gone through acute Alcohol withdrawal in the past when hospitalized and has become acutely agitated requiring sedation and restraint. Overnight patient became acutely agitated secondary to alcohol withdrawal and required sedation and intubation.  Patient proceeded to surgery... and underwent a CABG and AVR. Patient tolerated surgery well and remains intubated and sedated."

## 2017-05-27 NOTE — PROGRESS NOTE ADULT - SUBJECTIVE AND OBJECTIVE BOX
CTICU  CRITICAL  CARE  attending     Hand off received 					   Pertinent clinical, laboratory, radiographic, hemodynamic, echocardiographic, respiratory data, microbiologic data and chart were reviewed and analyzed frequently throughout the course of the day and night  Patient seen and examined with CTS/ SH attending at bedside  Pt is a 61y , Male, HEALTH ISSUES - PROBLEM Dx:      , FAMILY HISTORY:  PAST MEDICAL & SURGICAL HISTORY:  Aortic valve stenosis, unspecified etiology  Hyperlipidemia  Smoker  ETOH abuse  HTN (hypertension)    Patient is a 61y old  Male who presents with a chief complaint of chest pain, admission from office for preop workup (23 May 2017 18:15)      14 system review was unremarkable  acute changes include acute respiratory failure  Vital signs, hemodynamic and respiratory parameters were reviewed from the bedside nursing flowsheet.  ICU Vital Signs Last 24 Hrs  T(C): 36.8, Max: 37.7 (05-27 @ 01:00)  T(F): 98.2, Max: 99.9 (05-27 @ 01:00)  HR: 72 (66 - 86)  BP: 120/73 (108/65 - 120/73)  BP(mean): 91 (82 - 91)  ABP: 124/60 (76/40 - 182/64)  ABP(mean): 84 (52 - 198)  RR: 12 (11 - 27)  SpO2: 100% (99% - 100%)    Adult Advanced Hemodynamics Last 24 Hrs  CVP(mm Hg): 5 (5 - 15)  CVP(cm H2O): --  CO: --  CI: --  PA: --  PA(mean): --  PCWP: --  SVR: --  SVRI: --  PVR: --  PVRI: --, ABG - ( 27 May 2017 16:47 )  pH: 7.48  /  pCO2: 31    /  pO2: 98    / HCO3: 23    / Base Excess: -0.4  /  SaO2: 97                Mode: AC/ CMV (Assist Control/ Continuous Mandatory Ventilation)  RR (machine): 12  TV (machine): 650  FiO2: 40  PEEP: 5  ITime: 1  MAP: 9  PIP: 29    Intake and output was reviewed and the fluid balance was calculated  Daily     Daily   I&O's Summary  I & Os for 24h ending 27 May 2017 07:00  =============================================  IN: 2891.9 ml / OUT: 3730 ml / NET: -838.1 ml    I & Os for current day (as of 27 May 2017 21:46)  =============================================  IN: 847.3 ml / OUT: 1704 ml / NET: -856.7 ml      All lines and drain sites were assessed  Glycemic trend was reviewedCAPILLARY BLOOD GLUCOSE  109 (27 May 2017 17:00)    No acute change in mental status  Auscultation of the chest reveals equal bs  Abdomen is soft  Extremities are warm and well perfused  Wounds appear clean and unremarkable  Antibiotics are periop    labs  CBC Full  -  ( 27 May 2017 13:09 )  WBC Count : 8.5 K/uL  Hemoglobin : 10.3 g/dL  Hematocrit : 29.2 %  Platelet Count - Automated : 54 K/uL  Mean Cell Volume : 84.9 fL  Mean Cell Hemoglobin : 29.9 pg  Mean Cell Hemoglobin Concentration : 35.3 g/dL  Auto Neutrophil # : x  Auto Lymphocyte # : x  Auto Monocyte # : x  Auto Eosinophil # : x  Auto Basophil # : x  Auto Neutrophil % : x  Auto Lymphocyte % : x  Auto Monocyte % : x  Auto Eosinophil % : x  Auto Basophil % : x    05-27    143  |  108  |  13  ----------------------------<  79  3.6   |  23  |  0.70    Ca    8.0<L>      27 May 2017 13:09  Phos  3.1     05-27  Mg     1.9     05-27    TPro  5.0<L>  /  Alb  2.7<L>  /  TBili  1.4<H>  /  DBili  x   /  AST  162<H>  /  ALT  56<H>  /  AlkPhos  41  05-27    PT/INR - ( 27 May 2017 13:09 )   PT: 16.4 sec;   INR: 1.47          PTT - ( 27 May 2017 13:09 )  PTT:32.2 sec  The current medications were reviewed   MEDICATIONS  (STANDING):  aspirin enteric coated 81milliGRAM(s) Oral daily  sodium chloride 0.45%. 1000milliLiter(s) IV Continuous <Continuous>  heparin  Injectable 5000Unit(s) SubCutaneous every 8 hours  ceFAZolin   IVPB 2000milliGRAM(s) IV Intermittent every 8 hours  chlorhexidine 0.12% Liquid 5milliLiter(s) Swish and Spit every 4 hours  insulin Infusion 1Unit(s)/Hr IV Continuous <Continuous>  dexmedetomidine Infusion 0.7MICROgram(s)/kG/Hr IV Continuous <Continuous>  famotidine Injectable 20milliGRAM(s) IV Push every 12 hours  chlordiazePOXIDE 50milliGRAM(s) Oral every 6 hours  niCARdipine Infusion 5mG/Hr IV Continuous <Continuous>    MEDICATIONS  (PRN):       PROBLEM LIST/ ASSESSMENT:  HEALTH ISSUES - PROBLEM Dx:      ,   Patient is a 61y old  Male who presents with a chief complaint of chest pain, admission from office for preop workup (23 May 2017 18:15)     s/p avr cabg  acute changes include acute respiratory failure    My plan includes :  close hemodynamic, ventilatory and drain monitoring and management per post op routine    Monitor for arrhythmias and monitor parameters for organ perfusion  monitor neurologic status  Head of the bed should remain elevated to 45 deg .   chest PT and IS will be encouraged  monitor adequacy of oxygenation and ventilation and attempt to wean oxygen  Nutritional goals will be met using po eventually , ensure adequate caloric intake and montior the same  Stress ulcer and VTE prophylaxis will be achieved    Glycemic control is satisfactory  Electrolytes have been repleted as necessary and wound care has been carried out. Pain control has been achieved.   agressive physical therapy and early mobility and ambulation goals will be met   The family was updated about the course and plan  CRITICAL CARE TIME SPENT in evaluation and management, reassessments, review and interpretation of labs and x-rays, ventilator and hemodynamic management, formulating a plan and coordinating care: ___90____ MIN.  Time does not include procedural time.  CTICU ATTENDING     					    José Miguel Sandoval MD

## 2017-05-27 NOTE — PROGRESS NOTE ADULT - SUBJECTIVE AND OBJECTIVE BOX
CTICU  CRITICAL  CARE  attending   LATE entry for 05/26/17.  Hand off received 					   Pertinent clinical, laboratory, radiographic, hemodynamic, echocardiographic, respiratory data, microbiologic data and chart were reviewed and analyzed frequently throughout the course of the day and night  Patient seen and examined with CTS/ SH attending at bedside  Pt is a 61y , Male with ETOH abuse, HTN, HLD.  Patient evaluated for angina.  WORK UP: Moderate to severe AS and CAD.      FAMILY HISTORY:  PAST MEDICAL & SURGICAL HISTORY:  Aortic valve stenosis, unspecified etiology  Hyperlipidemia  Smoker  ETOH abuse  HTN (hypertension)    Patient is a 61y old  Male who presents with a chief complaint of chest pain, admission from office for preop workup (23 May 2017 18:15)      14 system review was unremarkable  acute changes include acute respiratory failure  Vital signs, hemodynamic and respiratory parameters were reviewed from the bedside nursing flowsheet.  ICU Vital Signs Last 24 Hrs  T(C): 37.5, Max: 37.5 (05-27 @ 00:00)  T(F): 99.5, Max: 99.5 (05-27 @ 00:00)  HR: 80 (65 - 96)  BP: 126/67 (83/68 - 127/84)  BP(mean): 98 (73 - 105)  ABP: 110/58 (86/42 - 186/120)  ABP(mean): 78 (58 - 144)  RR: 12 (11 - 19)  SpO2: 100% (99% - 100%)    Adult Advanced Hemodynamics Last 24 Hrs  CVP(mm Hg): --  CVP(cm H2O): --  CO: --  CI: --  PA: --  PA(mean): --  PCWP: --  SVR: --  SVRI: --  PVR: --  PVRI: --, ABG - ( 26 May 2017 21:33 )  pH: 7.50  /  pCO2: 34    /  pO2: 202   / HCO3: 26    / Base Excess: 3.1   /  SaO2: 100               Mode: AC/ CMV (Assist Control/ Continuous Mandatory Ventilation)  RR (machine): 12  TV (machine): 650  FiO2: 60  PEEP: 5  ITime: 1  MAP: 9  PIP: 27    Intake and output was reviewed and the fluid balance was calculated  Daily Height in cm: 170 (26 May 2017 07:18)    Daily   I&O's Summary    I & Os for current day (as of 27 May 2017 00:23)  =============================================  IN: 2449.5 ml / OUT: 2623 ml / NET: -173.5 ml      All lines and drain sites were assessed  Glycemic trend was reviewed CAPILLARY BLOOD GLUCOSE: 155 (27 May 2017 00:00)    NEURO: No acute change in mental status.  CVS: S1, S2, No S3.  RESPIRATORY: Auscultation of the chest reveals equal breath sounds.  GI: Abdomen is soft. No tenderness. + Bowel sounds.  Extremities are warm and well perfused  Wounds appear clean and unremarkable  Antibiotics are perioperative ancef.    labs  CBC Full  -  ( 26 May 2017 21:37 )  WBC Count : 7.4 K/uL  Hemoglobin : 8.6 g/dL  Hematocrit : 25.2 %  Platelet Count - Automated : 84 K/uL  Mean Cell Volume : 88.1 fL  Mean Cell Hemoglobin : 30.1 pg  Mean Cell Hemoglobin Concentration : 34.1 g/dL  Auto Neutrophil # : x  Auto Lymphocyte # : x  Auto Monocyte # : x  Auto Eosinophil # : x  Auto Basophil # : x  Auto Neutrophil % : x  Auto Lymphocyte % : x  Auto Monocyte % : x  Auto Eosinophil % : x  Auto Basophil % : x    05-26    144  |  108  |  12  ----------------------------<  160<H>  3.8   |  25  |  0.70    Ca    8.3<L>      26 May 2017 21:36  Phos  3.6     05-26  Mg     2.1     05-26    TPro  5.1<L>  /  Alb  3.0<L>  /  TBili  1.8<H>  /  DBili  x   /  AST  146<H>  /  ALT  49<H>  /  AlkPhos  43  05-26    PT/INR - ( 26 May 2017 21:36 )   PT: 17.1 sec;   INR: 1.53          PTT - ( 26 May 2017 21:36 )  PTT:33.4 sec  The current medications were reviewed   MEDICATIONS  (STANDING):  vasopressin Infusion 0.017Unit(s)/Min IV Continuous <Continuous>  aspirin enteric coated 81milliGRAM(s) Oral daily  sodium chloride 0.45%. 1000milliLiter(s) IV Continuous <Continuous>  heparin  Injectable 5000Unit(s) SubCutaneous every 8 hours  ceFAZolin   IVPB 2000milliGRAM(s) IV Intermittent every 8 hours  chlorhexidine 0.12% Liquid 5milliLiter(s) Swish and Spit every 4 hours  insulin Infusion 1Unit(s)/Hr IV Continuous <Continuous>  dexmedetomidine Infusion 0.7MICROgram(s)/kG/Hr IV Continuous <Continuous>  norepinephrine Infusion 0.04MICROgram(s)/kG/Min IV Continuous <Continuous>  famotidine Injectable 20milliGRAM(s) IV Push every 12 hours  famotidine Injectable 20milliGRAM(s) IV Push every 12 hours  potassium chloride  20 mEq/100 mL IVPB 20milliEquivalent(s) IV Intermittent every 1 hour    MEDICATIONS  (PRN):       PROBLEM LIST/ ASSESSMENT:  HEALTH ISSUES - PROBLEM Dx:        Patient is a 61y old  Male who presents with a chief complaint of chest pain, admission from office for preop workup (23 May 2017 18:15)    Aortic stenosis and CAD.  Acute changes include acute respiratory failure.  Stable hemodynamics.  GOOD OXYGENATION.  Diuresing well.     My plan includes :  Close hemodynamic, ventilatory and drain monitoring and management per post op routine.  D/C Levophed.  Optimize glycemic control.  Wean to extubate.  Monitor for arrhythmias and monitor parameters for organ perfusion  Monitor neurologic status  Head of the bed should remain elevated to 45 deg .   Chest PT and IS will be encouraged  Monitor adequacy of oxygenation and ventilation and attempt to wean oxygen  Nutritional goals will be met using po eventually , ensure adequate caloric intake and monitor  the same  Stress ulcer and VTE prophylaxis will be achieved.  Electrolytes have been repleted as necessary and wound care has been carried out. Pain control has been achieved.   Aggressive  physical therapy and early mobility and ambulation goals will be met   The family was updated about the course and plan  CRITICAL CARE TIME SPENT in evaluation and management, reassessments, review and interpretation of labs and x-rays, ventilator and hemodynamic management, formulating a plan and coordinating care: ___30____ MIN.  Time does not include procedural time.  CTICU ATTENDING     					    Vincenzo Prather MD

## 2017-05-27 NOTE — PROGRESS NOTE ADULT - ATTENDING COMMENTS
Recs:  -Cont. mgmt. and supportive tx  -Consider midazolam for sedation as also can help for WDL  -Can consider chlordiazepoxide longer-term  -Cont. f/u w/ Dr. Gonzalez for ETOH WDL

## 2017-05-27 NOTE — PROGRESS NOTE ADULT - SUBJECTIVE AND OBJECTIVE BOX
CTICU  CRITICAL  CARE  attending     Hand off received 					   Pertinent clinical, laboratory, radiographic, hemodynamic, echocardiographic, respiratory data, microbiologic data and chart were reviewed and analyzed frequently throughout the course of the day and night  Patient seen and examined with CTS/ SH attending at bedside  Pt is a 61y , Male with ETOH abuse, HTN, HLD, AS, CAD.     FAMILY HISTORY:  PAST MEDICAL & SURGICAL HISTORY:  Aortic valve stenosis, unspecified etiology  Hyperlipidemia  Smoker  ETOH abuse  HTN (hypertension)    Patient is a 61y old  Male who presents with a chief complaint of chest pain, admission from office for preop workup (23 May 2017 18:15)    14 system review was unremarkable  acute changes include acute respiratory failure  Vital signs, hemodynamic and respiratory parameters were reviewed from the bedside nursing flowsheet.  ICU Vital Signs Last 24 Hrs  T(C): 36.5, Max: 37.7 (05-27 @ 01:00)  T(F): 97.7, Max: 99.9 (05-27 @ 01:00)  HR: 72 (66 - 86)  BP: 120/73 (108/65 - 120/73)  BP(mean): 91 (82 - 91)  ABP: 124/58 (76/40 - 182/64)  ABP(mean): 84 (52 - 198)  RR: 12 (11 - 27)  SpO2: 100% (99% - 100%)    Adult Advanced Hemodynamics Last 24 Hrs  CVP(mm Hg): 3 (3 - 15)  CVP(cm H2O): --  CO: --  CI: --  PA: --  PA(mean): --  PCWP: --  SVR: --  SVRI: --  PVR: --  PVRI: --, ABG - ( 27 May 2017 16:47 )  pH: 7.48  /  pCO2: 31    /  pO2: 98    / HCO3: 23    / Base Excess: -0.4  /  SaO2: 97                Mode: AC/ CMV (Assist Control/ Continuous Mandatory Ventilation)  RR (machine): 12  TV (machine): 650  FiO2: 40  PEEP: 5  ITime: 1  MAP: 9  PIP: 29    Intake and output was reviewed and the fluid balance was calculated  Daily     Daily   I&O's Summary  I & Os for 24h ending 27 May 2017 07:00  =============================================  IN: 2891.9 ml / OUT: 3730 ml / NET: -838.1 ml    I & Os for current day (as of 27 May 2017 22:23)  =============================================  IN: 924.1 ml / OUT: 1734 ml / NET: -809.9 ml      All lines and drain sites were assessed  Glycemic trend was reviewed CAPILLARY BLOOD GLUCOSE: 109 (27 May 2017 17:00)    NEURO: No acute change in mental status.  EYES: MILD Icterus.  CVS: S1, S2, No S3.  RESPIRATORY: Auscultation of the chest reveals equal BREATH SOUNDS.  GI: Abdomen is soft. DISTENDED, TYMPANITIC. No tenderness. + Bowel sounds. + midline scar.  Extremities are warm and well perfused  Wounds appear clean and unremarkable  Antibiotics are perioperative ancef.     labs  CBC Full  -  ( 27 May 2017 13:09 )  WBC Count : 8.5 K/uL  Hemoglobin : 10.3 g/dL  Hematocrit : 29.2 %  Platelet Count - Automated : 54 K/uL  Mean Cell Volume : 84.9 fL  Mean Cell Hemoglobin : 29.9 pg  Mean Cell Hemoglobin Concentration : 35.3 g/dL  Auto Neutrophil # : x  Auto Lymphocyte # : x  Auto Monocyte # : x  Auto Eosinophil # : x  Auto Basophil # : x  Auto Neutrophil % : x  Auto Lymphocyte % : x  Auto Monocyte % : x  Auto Eosinophil % : x  Auto Basophil % : x    05-27    143  |  108  |  13  ----------------------------<  79  3.6   |  23  |  0.70    Ca    8.0<L>      27 May 2017 13:09  Phos  3.1     05-27  Mg     1.9     05-27    TPro  5.0<L>  /  Alb  2.7<L>  /  TBili  1.4<H>  /  DBili  x   /  AST  162<H>  /  ALT  56<H>  /  AlkPhos  41  05-27    PT/INR - ( 27 May 2017 13:09 )   PT: 16.4 sec;   INR: 1.47          PTT - ( 27 May 2017 13:09 )  PTT:32.2 sec  The current medications were reviewed   MEDICATIONS  (STANDING):  aspirin enteric coated 81milliGRAM(s) Oral daily  sodium chloride 0.45%. 1000milliLiter(s) IV Continuous <Continuous>  heparin  Injectable 5000Unit(s) SubCutaneous every 8 hours  ceFAZolin   IVPB 2000milliGRAM(s) IV Intermittent every 8 hours  chlorhexidine 0.12% Liquid 5milliLiter(s) Swish and Spit every 4 hours  insulin Infusion 1Unit(s)/Hr IV Continuous <Continuous>  dexmedetomidine Infusion 0.7MICROgram(s)/kG/Hr IV Continuous <Continuous>  famotidine Injectable 20milliGRAM(s) IV Push every 12 hours  chlordiazePOXIDE 50milliGRAM(s) Oral every 6 hours  niCARdipine Infusion 5mG/Hr IV Continuous <Continuous>    MEDICATIONS  (PRN):       PROBLEM LIST/ ASSESSMENT:  HEALTH ISSUES - PROBLEM Dx:        Patient is a 61y old  Male who presents with aortic stenosis and CAD.  S/P AVR and CABG x 2.  Hemodynamically stable.  Fair urine output.  Good oxygenation.  + Thrombocytopenia.    My plan includes :  Wean vent as tolerated.  Start after load reduction.  D/C Chest tubes.   Monitor platelet count.  Monitor for arrhythmias and monitor parameters for organ perfusion  Monitor neurologic status  Head of the bed should remain elevated to 45 deg .   Chest PT and IS will be encouraged  Monitor adequacy of oxygenation and ventilation and attempt to wean oxygen  Nutritional goals will be met using po eventually , ensure adequate caloric intake and monitor the same  Stress ulcer and VTE prophylaxis will be achieved    Glycemic control is satisfactory  Electrolytes have been repleted as necessary and wound care has been carried out. Pain control has been achieved.   Aggressive physical therapy and early mobility and ambulation goals will be met   The family was updated about the course and plan  CRITICAL CARE TIME SPENT in evaluation and management, reassessments, review and interpretation of labs and x-rays, ventilator and hemodynamic management, formulating a plan and coordinating care: ___30____ MIN.  Time does not include procedural time.  CTICU ATTENDING     					    Vincenzo Prather MD

## 2017-05-28 LAB
ALBUMIN SERPL ELPH-MCNC: 2.9 G/DL — LOW (ref 3.3–5)
ALP SERPL-CCNC: 41 U/L — SIGNIFICANT CHANGE UP (ref 40–120)
ALT FLD-CCNC: 59 U/L — HIGH (ref 10–45)
ANION GAP SERPL CALC-SCNC: 10 MMOL/L — SIGNIFICANT CHANGE UP (ref 5–17)
ANION GAP SERPL CALC-SCNC: 12 MMOL/L — SIGNIFICANT CHANGE UP (ref 5–17)
ANION GAP SERPL CALC-SCNC: 13 MMOL/L — SIGNIFICANT CHANGE UP (ref 5–17)
APTT BLD: 31.1 SEC — SIGNIFICANT CHANGE UP (ref 27.5–37.4)
APTT BLD: 31.4 SEC — SIGNIFICANT CHANGE UP (ref 27.5–37.4)
APTT BLD: 34.5 SEC — SIGNIFICANT CHANGE UP (ref 27.5–37.4)
AST SERPL-CCNC: 137 U/L — HIGH (ref 10–40)
BASE EXCESS BLDA CALC-SCNC: -1.5 MMOL/L — SIGNIFICANT CHANGE UP (ref -2–3)
BILIRUB DIRECT SERPL-MCNC: 0.4 MG/DL — HIGH (ref 0–0.2)
BILIRUB INDIRECT FLD-MCNC: 0.5 MG/DL — SIGNIFICANT CHANGE UP (ref 0.2–1)
BILIRUB SERPL-MCNC: 0.9 MG/DL — SIGNIFICANT CHANGE UP (ref 0.2–1.2)
BLD GP AB SCN SERPL QL: NEGATIVE — SIGNIFICANT CHANGE UP
BUN SERPL-MCNC: 13 MG/DL — SIGNIFICANT CHANGE UP (ref 7–23)
BUN SERPL-MCNC: 13 MG/DL — SIGNIFICANT CHANGE UP (ref 7–23)
BUN SERPL-MCNC: 14 MG/DL — SIGNIFICANT CHANGE UP (ref 7–23)
CALCIUM SERPL-MCNC: 7.8 MG/DL — LOW (ref 8.4–10.5)
CALCIUM SERPL-MCNC: 7.8 MG/DL — LOW (ref 8.4–10.5)
CALCIUM SERPL-MCNC: 8 MG/DL — LOW (ref 8.4–10.5)
CHLORIDE SERPL-SCNC: 106 MMOL/L — SIGNIFICANT CHANGE UP (ref 96–108)
CHLORIDE SERPL-SCNC: 106 MMOL/L — SIGNIFICANT CHANGE UP (ref 96–108)
CHLORIDE SERPL-SCNC: 107 MMOL/L — SIGNIFICANT CHANGE UP (ref 96–108)
CO2 SERPL-SCNC: 20 MMOL/L — LOW (ref 22–31)
CO2 SERPL-SCNC: 20 MMOL/L — LOW (ref 22–31)
CO2 SERPL-SCNC: 21 MMOL/L — LOW (ref 22–31)
CREAT SERPL-MCNC: 0.6 MG/DL — SIGNIFICANT CHANGE UP (ref 0.5–1.3)
CREAT SERPL-MCNC: 0.6 MG/DL — SIGNIFICANT CHANGE UP (ref 0.5–1.3)
CREAT SERPL-MCNC: 0.7 MG/DL — SIGNIFICANT CHANGE UP (ref 0.5–1.3)
GAS PNL BLDA: SIGNIFICANT CHANGE UP
GLUCOSE SERPL-MCNC: 123 MG/DL — HIGH (ref 70–99)
GLUCOSE SERPL-MCNC: 127 MG/DL — HIGH (ref 70–99)
GLUCOSE SERPL-MCNC: 140 MG/DL — HIGH (ref 70–99)
HCO3 BLDA-SCNC: 22 MMOL/L — SIGNIFICANT CHANGE UP (ref 21–28)
HCT VFR BLD CALC: 28.7 % — LOW (ref 39–50)
HCT VFR BLD CALC: 28.8 % — LOW (ref 39–50)
HCT VFR BLD CALC: 29.4 % — LOW (ref 39–50)
HGB BLD-MCNC: 10 G/DL — LOW (ref 13–17)
HGB BLD-MCNC: 10.3 G/DL — LOW (ref 13–17)
HGB BLD-MCNC: 9.8 G/DL — LOW (ref 13–17)
INR BLD: 1.31 — HIGH (ref 0.88–1.16)
INR BLD: 1.32 — HIGH (ref 0.88–1.16)
INR BLD: 1.51 — HIGH (ref 0.88–1.16)
MAGNESIUM SERPL-MCNC: 2 MG/DL — SIGNIFICANT CHANGE UP (ref 1.6–2.6)
MAGNESIUM SERPL-MCNC: 2 MG/DL — SIGNIFICANT CHANGE UP (ref 1.6–2.6)
MAGNESIUM SERPL-MCNC: 2.1 MG/DL — SIGNIFICANT CHANGE UP (ref 1.6–2.6)
MCHC RBC-ENTMCNC: 29.3 PG — SIGNIFICANT CHANGE UP (ref 27–34)
MCHC RBC-ENTMCNC: 29.9 PG — SIGNIFICANT CHANGE UP (ref 27–34)
MCHC RBC-ENTMCNC: 30.1 PG — SIGNIFICANT CHANGE UP (ref 27–34)
MCHC RBC-ENTMCNC: 34 G/DL — SIGNIFICANT CHANGE UP (ref 32–36)
MCHC RBC-ENTMCNC: 34.8 G/DL — SIGNIFICANT CHANGE UP (ref 32–36)
MCHC RBC-ENTMCNC: 35 G/DL — SIGNIFICANT CHANGE UP (ref 32–36)
MCV RBC AUTO: 85.5 FL — SIGNIFICANT CHANGE UP (ref 80–100)
MCV RBC AUTO: 86 FL — SIGNIFICANT CHANGE UP (ref 80–100)
MCV RBC AUTO: 86.4 FL — SIGNIFICANT CHANGE UP (ref 80–100)
PCO2 BLDA: 33 MMHG — LOW (ref 35–48)
PH BLDA: 7.44 — SIGNIFICANT CHANGE UP (ref 7.35–7.45)
PHOSPHATE SERPL-MCNC: 2.4 MG/DL — LOW (ref 2.5–4.5)
PHOSPHATE SERPL-MCNC: 2.9 MG/DL — SIGNIFICANT CHANGE UP (ref 2.5–4.5)
PLATELET # BLD AUTO: 40 K/UL — LOW (ref 150–400)
PLATELET # BLD AUTO: 59 K/UL — LOW (ref 150–400)
PLATELET # BLD AUTO: 67 K/UL — LOW (ref 150–400)
PO2 BLDA: 126 MMHG — HIGH (ref 83–108)
POTASSIUM SERPL-MCNC: 3.6 MMOL/L — SIGNIFICANT CHANGE UP (ref 3.5–5.3)
POTASSIUM SERPL-MCNC: 3.8 MMOL/L — SIGNIFICANT CHANGE UP (ref 3.5–5.3)
POTASSIUM SERPL-MCNC: 4.2 MMOL/L — SIGNIFICANT CHANGE UP (ref 3.5–5.3)
POTASSIUM SERPL-SCNC: 3.6 MMOL/L — SIGNIFICANT CHANGE UP (ref 3.5–5.3)
POTASSIUM SERPL-SCNC: 3.8 MMOL/L — SIGNIFICANT CHANGE UP (ref 3.5–5.3)
POTASSIUM SERPL-SCNC: 4.2 MMOL/L — SIGNIFICANT CHANGE UP (ref 3.5–5.3)
PROT SERPL-MCNC: 5.2 G/DL — LOW (ref 6–8.3)
PROTHROM AB SERPL-ACNC: 14.6 SEC — HIGH (ref 9.8–12.7)
PROTHROM AB SERPL-ACNC: 14.7 SEC — HIGH (ref 9.8–12.7)
PROTHROM AB SERPL-ACNC: 16.9 SEC — HIGH (ref 9.8–12.7)
RBC # BLD: 3.32 M/UL — LOW (ref 4.2–5.8)
RBC # BLD: 3.35 M/UL — LOW (ref 4.2–5.8)
RBC # BLD: 3.44 M/UL — LOW (ref 4.2–5.8)
RBC # FLD: 17.4 % — HIGH (ref 10.3–16.9)
RBC # FLD: 18.1 % — HIGH (ref 10.3–16.9)
RBC # FLD: 18.2 % — HIGH (ref 10.3–16.9)
RH IG SCN BLD-IMP: POSITIVE — SIGNIFICANT CHANGE UP
SAO2 % BLDA: 98 % — SIGNIFICANT CHANGE UP (ref 95–100)
SODIUM SERPL-SCNC: 136 MMOL/L — SIGNIFICANT CHANGE UP (ref 135–145)
SODIUM SERPL-SCNC: 139 MMOL/L — SIGNIFICANT CHANGE UP (ref 135–145)
SODIUM SERPL-SCNC: 140 MMOL/L — SIGNIFICANT CHANGE UP (ref 135–145)
WBC # BLD: 8 K/UL — SIGNIFICANT CHANGE UP (ref 3.8–10.5)
WBC # BLD: 8 K/UL — SIGNIFICANT CHANGE UP (ref 3.8–10.5)
WBC # BLD: 8.2 K/UL — SIGNIFICANT CHANGE UP (ref 3.8–10.5)
WBC # FLD AUTO: 8 K/UL — SIGNIFICANT CHANGE UP (ref 3.8–10.5)
WBC # FLD AUTO: 8 K/UL — SIGNIFICANT CHANGE UP (ref 3.8–10.5)
WBC # FLD AUTO: 8.2 K/UL — SIGNIFICANT CHANGE UP (ref 3.8–10.5)

## 2017-05-28 PROCEDURE — 93010 ELECTROCARDIOGRAM REPORT: CPT

## 2017-05-28 PROCEDURE — 99291 CRITICAL CARE FIRST HOUR: CPT

## 2017-05-28 PROCEDURE — 71010: CPT | Mod: 26,76

## 2017-05-28 RX ORDER — PROPOFOL 10 MG/ML
5 INJECTION, EMULSION INTRAVENOUS
Qty: 1000 | Refills: 0 | Status: DISCONTINUED | OUTPATIENT
Start: 2017-05-28 | End: 2017-05-29

## 2017-05-28 RX ORDER — THIAMINE MONONITRATE (VIT B1) 100 MG
100 TABLET ORAL DAILY
Qty: 0 | Refills: 0 | Status: DISCONTINUED | OUTPATIENT
Start: 2017-05-28 | End: 2017-06-06

## 2017-05-28 RX ORDER — SODIUM CHLORIDE 9 MG/ML
1000 INJECTION, SOLUTION INTRAVENOUS
Qty: 0 | Refills: 0 | Status: DISCONTINUED | OUTPATIENT
Start: 2017-05-28 | End: 2017-06-14

## 2017-05-28 RX ORDER — POTASSIUM CHLORIDE 20 MEQ
20 PACKET (EA) ORAL
Qty: 0 | Refills: 0 | Status: COMPLETED | OUTPATIENT
Start: 2017-05-28 | End: 2017-05-28

## 2017-05-28 RX ORDER — DEXTROSE 50 % IN WATER 50 %
25 SYRINGE (ML) INTRAVENOUS ONCE
Qty: 0 | Refills: 0 | Status: DISCONTINUED | OUTPATIENT
Start: 2017-05-28 | End: 2017-06-14

## 2017-05-28 RX ORDER — GLUCAGON INJECTION, SOLUTION 0.5 MG/.1ML
1 INJECTION, SOLUTION SUBCUTANEOUS ONCE
Qty: 0 | Refills: 0 | Status: DISCONTINUED | OUTPATIENT
Start: 2017-05-28 | End: 2017-06-14

## 2017-05-28 RX ORDER — INSULIN LISPRO 100/ML
VIAL (ML) SUBCUTANEOUS EVERY 6 HOURS
Qty: 0 | Refills: 0 | Status: DISCONTINUED | OUTPATIENT
Start: 2017-05-28 | End: 2017-06-10

## 2017-05-28 RX ORDER — POTASSIUM CHLORIDE 20 MEQ
20 PACKET (EA) ORAL
Qty: 0 | Refills: 0 | Status: COMPLETED | OUTPATIENT
Start: 2017-05-28 | End: 2017-05-29

## 2017-05-28 RX ORDER — DEXTROSE 50 % IN WATER 50 %
1 SYRINGE (ML) INTRAVENOUS ONCE
Qty: 0 | Refills: 0 | Status: DISCONTINUED | OUTPATIENT
Start: 2017-05-28 | End: 2017-06-14

## 2017-05-28 RX ORDER — FOLIC ACID 0.8 MG
1 TABLET ORAL DAILY
Qty: 0 | Refills: 0 | Status: DISCONTINUED | OUTPATIENT
Start: 2017-05-28 | End: 2017-06-06

## 2017-05-28 RX ORDER — POTASSIUM CHLORIDE 20 MEQ
20 PACKET (EA) ORAL ONCE
Qty: 0 | Refills: 0 | Status: COMPLETED | OUTPATIENT
Start: 2017-05-28 | End: 2017-05-28

## 2017-05-28 RX ORDER — DEXTROSE 50 % IN WATER 50 %
12.5 SYRINGE (ML) INTRAVENOUS ONCE
Qty: 0 | Refills: 0 | Status: DISCONTINUED | OUTPATIENT
Start: 2017-05-28 | End: 2017-06-14

## 2017-05-28 RX ADMIN — CHLORHEXIDINE GLUCONATE 5 MILLILITER(S): 213 SOLUTION TOPICAL at 02:15

## 2017-05-28 RX ADMIN — DEXMEDETOMIDINE HYDROCHLORIDE IN 0.9% SODIUM CHLORIDE 11.69 MICROGRAM(S)/KG/HR: 4 INJECTION INTRAVENOUS at 01:21

## 2017-05-28 RX ADMIN — FAMOTIDINE 20 MILLIGRAM(S): 10 INJECTION INTRAVENOUS at 18:12

## 2017-05-28 RX ADMIN — CHLORHEXIDINE GLUCONATE 5 MILLILITER(S): 213 SOLUTION TOPICAL at 18:13

## 2017-05-28 RX ADMIN — PROPOFOL 2 MICROGRAM(S)/KG/MIN: 10 INJECTION, EMULSION INTRAVENOUS at 01:05

## 2017-05-28 RX ADMIN — CHLORHEXIDINE GLUCONATE 5 MILLILITER(S): 213 SOLUTION TOPICAL at 05:08

## 2017-05-28 RX ADMIN — Medication 50 MILLIGRAM(S): at 05:23

## 2017-05-28 RX ADMIN — FAMOTIDINE 20 MILLIGRAM(S): 10 INJECTION INTRAVENOUS at 05:07

## 2017-05-28 RX ADMIN — Medication 1 MILLIGRAM(S): at 11:54

## 2017-05-28 RX ADMIN — CHLORHEXIDINE GLUCONATE 5 MILLILITER(S): 213 SOLUTION TOPICAL at 22:45

## 2017-05-28 RX ADMIN — DEXMEDETOMIDINE HYDROCHLORIDE IN 0.9% SODIUM CHLORIDE 11.69 MICROGRAM(S)/KG/HR: 4 INJECTION INTRAVENOUS at 11:54

## 2017-05-28 RX ADMIN — Medication 100 MILLIGRAM(S): at 05:07

## 2017-05-28 RX ADMIN — Medication 100 MILLIEQUIVALENT(S): at 00:20

## 2017-05-28 RX ADMIN — Medication 50 MILLIGRAM(S): at 18:12

## 2017-05-28 RX ADMIN — Medication 1 TABLET(S): at 11:53

## 2017-05-28 RX ADMIN — Medication 100 MILLIEQUIVALENT(S): at 18:18

## 2017-05-28 RX ADMIN — Medication 100 MILLIGRAM(S): at 11:54

## 2017-05-28 RX ADMIN — Medication 100 MILLIEQUIVALENT(S): at 23:57

## 2017-05-28 RX ADMIN — DEXMEDETOMIDINE HYDROCHLORIDE IN 0.9% SODIUM CHLORIDE 11.69 MICROGRAM(S)/KG/HR: 4 INJECTION INTRAVENOUS at 08:15

## 2017-05-28 RX ADMIN — Medication 50 MILLIGRAM(S): at 11:54

## 2017-05-28 RX ADMIN — Medication 100 MILLIEQUIVALENT(S): at 01:21

## 2017-05-28 NOTE — PROGRESS NOTE ADULT - SUBJECTIVE AND OBJECTIVE BOX
Chart reviewed and patient interviewed. Patient is a 61 year old  Black male from [facility] current smoker ( 1 PPDx45 years), current every day use of Alcohol ( 1 pint of Vodka daily and several beers daily for 45 years ) and history of HLD and  HTN who was admitted to [facility] in early May 2017 with Chest pain and worsening HEIN. Had Cardiac Cath which showed CAD and Aortic Stenosis. He was discharged home and then seen at [facility] after awakening at home with severe Chest pain on 5/22/17. Patient was seen in Cardiac Clinic on 5/22/17 and was admitted for pre-op work up for possible CABG/AVR. Last drink was a bottle of beer on morning of 5/23/17, the day of admission to [facility].       Patient's wife reports he has gone through acute Alcohol withdrawal in the past when hospitalized and has become acutely agitated requiring sedation and restraint. Overnight patient became acutely agitated secondary to alcohol withdrawal and required sedation and intubation.  Patient proceeded to surgery this morning and underwent a CABG and AVR. Patient tolerated surgery well and remains intubated and sedated. Patient reported to have been agitated yesterday secondary to Alcohol Withdrawal and is now receiving Librium 50mg q 6h and agitation is reported to be controlled. Patient remains intubated. Is able to follow simple commands  Continuing present treatment. Is gradually being weaned from the ventilator.

## 2017-05-28 NOTE — PROGRESS NOTE ADULT - SUBJECTIVE AND OBJECTIVE BOX
POD #2 s/p AVR, CABG X2  EF 70%    post ob uncomplicated  remains intubated for concern of DT - pt started having alcohol withdrawal symptoms pre-op  Stable on precedex gtt over the course of the day  tolerated CPAP PS 10/5 40%      PMH :  CAD  Aortic valve stenosis, unspecified etiology  Hyperlipidemia  Smoker  ETOH abuse  HTN (hypertension)  Aortic valve stenosis, unspecified etiology  Coronary artery disease with other form of angina pectoris  Aortic valve stenosis, unspecified etiology  Coronary artery disease with other form of angina pectoris  CABG  AVR (aortic valve replacement)    ROS remains intubated   All other systems reviewed and negative.    ICU Vital Signs Last 24 Hrs  T(C): 36.8, Max: 37.5 (05-28 @ 16:00)  T(F): 98.3, Max: 99.5 (05-28 @ 16:00)  HR: 82 (74 - 106)  BP: 95/60 (95/60 - 95/60)  BP(mean): 70 (70 - 70)  ABP: 106/56 (92/72 - 150/76)  ABP(mean): 76 (66 - 106)  RR: 11 (11 - 39)  SpO2: 98% (98% - 100%)    I&O's Summary  I & Os for 24h ending 28 May 2017 07:00  =============================================  IN: 1399.3 ml / OUT: 2336 ml / NET: -936.7 ml    I & Os for current day (as of 28 May 2017 22:48)  =============================================  IN: 849.4 ml / OUT: 897 ml / NET: -47.6 ml    Mode: AC/ CMV (Assist Control/ Continuous Mandatory Ventilation)  RR (machine): 12  TV (machine): 650  FiO2: 40  PEEP: 5  ITime: 1  MAP: 12  PIP: 31    ABG - ( 28 May 2017 21:47 )  pH: 7.48  /  pCO2: 28    /  pO2: 108   / HCO3: 21    / Base Excess: -1.4  /  SaO2: 98                              10.0   8.0   )-----------( 59       ( 28 May 2017 21:45 )             28.7     28 May 2017 21:45    136    |  106    |  13     ----------------------------<  123    3.8     |  20     |  0.60     Ca    8.0        28 May 2017 21:45  Phos  2.4       28 May 2017 21:45  Mg     2.0       28 May 2017 21:45    TPro  5.2    /  Alb  2.9    /  TBili  0.9    /  DBili  0.4    /  AST  137    /  ALT  59     /  AlkPhos  41     28 May 2017 03:02    PT/INR - ( 28 May 2017 21:45 )   PT: 14.7 sec;   INR: 1.32      PTT - ( 28 May 2017 21:45 )  PTT:31.1 sec    MEDICATIONS  (STANDING):  aspirin enteric coated 81milliGRAM(s) Oral daily  sodium chloride 0.45%. 1000milliLiter(s) IV Continuous <Continuous>  chlorhexidine 0.12% Liquid 5milliLiter(s) Swish and Spit every 4 hours  dexmedetomidine Infusion 0.7MICROgram(s)/kG/Hr IV Continuous <Continuous>  famotidine Injectable 20milliGRAM(s) IV Push every 12 hours  chlordiazePOXIDE 50milliGRAM(s) Oral every 6 hours  niCARdipine Infusion 5mG/Hr IV Continuous <Continuous>  propofol Infusion 5MICROgram(s)/kG/Min IV Continuous <Continuous>  insulin lispro (HumaLOG) corrective regimen sliding scale  SubCutaneous every 6 hours  thiamine 100milliGRAM(s) Oral daily  folic acid 1milliGRAM(s) Oral daily    PHYSICAL EXAM:  Gen : +tremors  Respiratory: decreased in the bases  Cardiovascular: S1 and S2, RRR, no M/G/R  Gastrointestinal: BS+, soft, NT/ND  Extremities: No peripheral edema  Vascular: 2+ peripheral pulses  Incision: clean dry/ no sign of infection  Lines: no sign of infection

## 2017-05-28 NOTE — PROGRESS NOTE ADULT - ASSESSMENT
60yo with history of heavy alcohol and tobacco use, s/p cardiac workup for angina.  Pt diagnosed with AS and CAD .  S/p Uncomplicated CABG X2, AVR.     Problems  1. s/p CABG/AVR  2. . Etoh withdrawl pre-op    Plan   Neuro -- continue Precedex, librium  CVS - hemodynamic support, monitor for arrhythmia.    Pulm - vent weaning as tolerated   GI - GI proph   - monitor UOP  Endo - glycemic control  Heme - correct coagulapathy, monitor for bleeding        Critical post op.    Critical care time spent 50 min

## 2017-05-29 LAB
ALBUMIN SERPL ELPH-MCNC: 2.8 G/DL — LOW (ref 3.3–5)
ALP SERPL-CCNC: 49 U/L — SIGNIFICANT CHANGE UP (ref 40–120)
ALT FLD-CCNC: 53 U/L — HIGH (ref 10–45)
ANION GAP SERPL CALC-SCNC: 11 MMOL/L — SIGNIFICANT CHANGE UP (ref 5–17)
ANION GAP SERPL CALC-SCNC: 13 MMOL/L — SIGNIFICANT CHANGE UP (ref 5–17)
APTT BLD: 31.2 SEC — SIGNIFICANT CHANGE UP (ref 27.5–37.4)
AST SERPL-CCNC: 89 U/L — HIGH (ref 10–40)
BILIRUB DIRECT SERPL-MCNC: 0.4 MG/DL — HIGH (ref 0–0.2)
BILIRUB INDIRECT FLD-MCNC: 0.7 MG/DL — SIGNIFICANT CHANGE UP (ref 0.2–1)
BILIRUB SERPL-MCNC: 1.1 MG/DL — SIGNIFICANT CHANGE UP (ref 0.2–1.2)
BUN SERPL-MCNC: 11 MG/DL — SIGNIFICANT CHANGE UP (ref 7–23)
BUN SERPL-MCNC: 12 MG/DL — SIGNIFICANT CHANGE UP (ref 7–23)
CALCIUM SERPL-MCNC: 7.9 MG/DL — LOW (ref 8.4–10.5)
CALCIUM SERPL-MCNC: 8.4 MG/DL — SIGNIFICANT CHANGE UP (ref 8.4–10.5)
CHLORIDE SERPL-SCNC: 104 MMOL/L — SIGNIFICANT CHANGE UP (ref 96–108)
CHLORIDE SERPL-SCNC: 108 MMOL/L — SIGNIFICANT CHANGE UP (ref 96–108)
CO2 SERPL-SCNC: 20 MMOL/L — LOW (ref 22–31)
CO2 SERPL-SCNC: 20 MMOL/L — LOW (ref 22–31)
CREAT SERPL-MCNC: 0.6 MG/DL — SIGNIFICANT CHANGE UP (ref 0.5–1.3)
CREAT SERPL-MCNC: 0.6 MG/DL — SIGNIFICANT CHANGE UP (ref 0.5–1.3)
GAS PNL BLDA: SIGNIFICANT CHANGE UP
GLUCOSE SERPL-MCNC: 106 MG/DL — HIGH (ref 70–99)
GLUCOSE SERPL-MCNC: 127 MG/DL — HIGH (ref 70–99)
HCT VFR BLD CALC: 30 % — LOW (ref 39–50)
HCT VFR BLD CALC: 30.9 % — LOW (ref 39–50)
HGB BLD-MCNC: 10.2 G/DL — LOW (ref 13–17)
HGB BLD-MCNC: 10.4 G/DL — LOW (ref 13–17)
INR BLD: 1.3 — HIGH (ref 0.88–1.16)
MAGNESIUM SERPL-MCNC: 1.7 MG/DL — SIGNIFICANT CHANGE UP (ref 1.6–2.6)
MCHC RBC-ENTMCNC: 29.3 PG — SIGNIFICANT CHANGE UP (ref 27–34)
MCHC RBC-ENTMCNC: 29.7 PG — SIGNIFICANT CHANGE UP (ref 27–34)
MCHC RBC-ENTMCNC: 33.7 G/DL — SIGNIFICANT CHANGE UP (ref 32–36)
MCHC RBC-ENTMCNC: 34 G/DL — SIGNIFICANT CHANGE UP (ref 32–36)
MCV RBC AUTO: 87 FL — SIGNIFICANT CHANGE UP (ref 80–100)
MCV RBC AUTO: 87.2 FL — SIGNIFICANT CHANGE UP (ref 80–100)
PLATELET # BLD AUTO: 56 K/UL — LOW (ref 150–400)
PLATELET # BLD AUTO: 62 K/UL — LOW (ref 150–400)
POTASSIUM SERPL-MCNC: 3.6 MMOL/L — SIGNIFICANT CHANGE UP (ref 3.5–5.3)
POTASSIUM SERPL-MCNC: 4.6 MMOL/L — SIGNIFICANT CHANGE UP (ref 3.5–5.3)
POTASSIUM SERPL-SCNC: 3.6 MMOL/L — SIGNIFICANT CHANGE UP (ref 3.5–5.3)
POTASSIUM SERPL-SCNC: 4.6 MMOL/L — SIGNIFICANT CHANGE UP (ref 3.5–5.3)
PROT SERPL-MCNC: 5.7 G/DL — LOW (ref 6–8.3)
PROTHROM AB SERPL-ACNC: 14.5 SEC — HIGH (ref 9.8–12.7)
RBC # BLD: 3.44 M/UL — LOW (ref 4.2–5.8)
RBC # BLD: 3.55 M/UL — LOW (ref 4.2–5.8)
RBC # FLD: 17.6 % — HIGH (ref 10.3–16.9)
RBC # FLD: 17.6 % — HIGH (ref 10.3–16.9)
SODIUM SERPL-SCNC: 137 MMOL/L — SIGNIFICANT CHANGE UP (ref 135–145)
SODIUM SERPL-SCNC: 139 MMOL/L — SIGNIFICANT CHANGE UP (ref 135–145)
WBC # BLD: 7.5 K/UL — SIGNIFICANT CHANGE UP (ref 3.8–10.5)
WBC # BLD: 8.1 K/UL — SIGNIFICANT CHANGE UP (ref 3.8–10.5)
WBC # FLD AUTO: 7.5 K/UL — SIGNIFICANT CHANGE UP (ref 3.8–10.5)
WBC # FLD AUTO: 8.1 K/UL — SIGNIFICANT CHANGE UP (ref 3.8–10.5)

## 2017-05-29 PROCEDURE — 99291 CRITICAL CARE FIRST HOUR: CPT

## 2017-05-29 PROCEDURE — 71010: CPT | Mod: 26

## 2017-05-29 RX ORDER — FUROSEMIDE 40 MG
20 TABLET ORAL ONCE
Qty: 0 | Refills: 0 | Status: COMPLETED | OUTPATIENT
Start: 2017-05-29 | End: 2017-05-29

## 2017-05-29 RX ORDER — DEXMEDETOMIDINE HYDROCHLORIDE IN 0.9% SODIUM CHLORIDE 4 UG/ML
0.3 INJECTION INTRAVENOUS
Qty: 200 | Refills: 0 | Status: DISCONTINUED | OUTPATIENT
Start: 2017-05-29 | End: 2017-05-30

## 2017-05-29 RX ORDER — POTASSIUM CHLORIDE 20 MEQ
20 PACKET (EA) ORAL ONCE
Qty: 0 | Refills: 0 | Status: COMPLETED | OUTPATIENT
Start: 2017-05-29 | End: 2017-05-29

## 2017-05-29 RX ORDER — ACETYLCYSTEINE 200 MG/ML
3 VIAL (ML) MISCELLANEOUS EVERY 6 HOURS
Qty: 0 | Refills: 0 | Status: DISCONTINUED | OUTPATIENT
Start: 2017-05-29 | End: 2017-06-09

## 2017-05-29 RX ORDER — METOCLOPRAMIDE HCL 10 MG
10 TABLET ORAL ONCE
Qty: 0 | Refills: 0 | Status: COMPLETED | OUTPATIENT
Start: 2017-05-29 | End: 2017-05-29

## 2017-05-29 RX ORDER — MAGNESIUM SULFATE 500 MG/ML
2 VIAL (ML) INJECTION ONCE
Qty: 0 | Refills: 0 | Status: COMPLETED | OUTPATIENT
Start: 2017-05-29 | End: 2017-05-29

## 2017-05-29 RX ORDER — DEXMEDETOMIDINE HYDROCHLORIDE IN 0.9% SODIUM CHLORIDE 4 UG/ML
0.03 INJECTION INTRAVENOUS
Qty: 200 | Refills: 0 | Status: DISCONTINUED | OUTPATIENT
Start: 2017-05-29 | End: 2017-05-29

## 2017-05-29 RX ADMIN — Medication 1 MILLIGRAM(S): at 11:23

## 2017-05-29 RX ADMIN — CHLORHEXIDINE GLUCONATE 5 MILLILITER(S): 213 SOLUTION TOPICAL at 10:00

## 2017-05-29 RX ADMIN — DEXMEDETOMIDINE HYDROCHLORIDE IN 0.9% SODIUM CHLORIDE 11.69 MICROGRAM(S)/KG/HR: 4 INJECTION INTRAVENOUS at 01:11

## 2017-05-29 RX ADMIN — Medication 50 MILLIGRAM(S): at 06:11

## 2017-05-29 RX ADMIN — Medication 50 MILLIGRAM(S): at 00:03

## 2017-05-29 RX ADMIN — Medication 100 MILLIGRAM(S): at 11:23

## 2017-05-29 RX ADMIN — FAMOTIDINE 20 MILLIGRAM(S): 10 INJECTION INTRAVENOUS at 17:02

## 2017-05-29 RX ADMIN — Medication 100 MILLIEQUIVALENT(S): at 01:12

## 2017-05-29 RX ADMIN — Medication 10 MILLIGRAM(S): at 19:56

## 2017-05-29 RX ADMIN — Medication 81 MILLIGRAM(S): at 11:23

## 2017-05-29 RX ADMIN — CHLORHEXIDINE GLUCONATE 5 MILLILITER(S): 213 SOLUTION TOPICAL at 06:10

## 2017-05-29 RX ADMIN — CHLORHEXIDINE GLUCONATE 5 MILLILITER(S): 213 SOLUTION TOPICAL at 01:59

## 2017-05-29 RX ADMIN — Medication 50 GRAM(S): at 17:00

## 2017-05-29 RX ADMIN — Medication 20 MILLIGRAM(S): at 12:11

## 2017-05-29 RX ADMIN — Medication 1 TABLET(S): at 11:23

## 2017-05-29 RX ADMIN — Medication 20 MILLIGRAM(S): at 04:37

## 2017-05-29 RX ADMIN — DEXMEDETOMIDINE HYDROCHLORIDE IN 0.9% SODIUM CHLORIDE 11.69 MICROGRAM(S)/KG/HR: 4 INJECTION INTRAVENOUS at 06:13

## 2017-05-29 RX ADMIN — FAMOTIDINE 20 MILLIGRAM(S): 10 INJECTION INTRAVENOUS at 06:10

## 2017-05-29 RX ADMIN — Medication 100 MILLIEQUIVALENT(S): at 18:11

## 2017-05-29 RX ADMIN — Medication 100 MILLIEQUIVALENT(S): at 16:01

## 2017-05-29 NOTE — PROGRESS NOTE ADULT - SUBJECTIVE AND OBJECTIVE BOX
Chart reviewed and patient interviewed. Patient is a 61 year old  Black male from [facility] current smoker ( 1 PPDx45 years), current every day use of Alcohol ( 1 pint of Vodka daily and several beers daily for 45 years ) and history of HLD and  HTN who was admitted to [facility] in early May 2017 with Chest pain and worsening HEIN. Had Cardiac Cath which showed CAD and Aortic Stenosis. He was discharged home and then seen at [facility] after awakening at home with severe Chest pain on 5/22/17. Patient was seen in Cardiac Clinic on 5/22/17 and was admitted for pre-op work up for possible CABG/AVR. Last drink was a bottle of beer on morning of 5/23/17, the day of admission to [facility].       Patient's wife reports he has gone through acute Alcohol withdrawal in the past when hospitalized and has become acutely agitated requiring sedation and restraint. Overnight patient became acutely agitated secondary to alcohol withdrawal and required sedation and intubation.  Patient proceeded to surgery 5/26 and underwent a CABG and AVR. Patient tolerated surgery well and remained intubated and sedated. Patient reported to have been agitated yesterday secondary to Alcohol Withdrawal and is now receiving Librium 50mg q 6h and agitation is reported to be controlled. Patient was extubated early this afternoon 5/29 which he is tolerating well. Is able to follow simple commands and is attempting to speak  Continuing present treatment.

## 2017-05-29 NOTE — PROGRESS NOTE ADULT - ASSESSMENT
60yo with history of heavy alcohol and tobacco use, s/p cardiac workup for angina.  Pt diagnosed with AS and CAD .  S/p Uncomplicated CABG X2, AVR on 5/26    Problems  1. s/p CABG/AVR  2. DT    Plan   Neuro -- continue follow closely.  Resume librium when able to take PO but for now precedex and ativan for withdrawal symptoms  CVS - stable hemodynamics. .    Pulm - Extubated, needs aggressive pulm toileting  GI - GI proph, monitor abd closely.  Pt with ileus noted on last CXR, abdominal exam benign   - monitor UOP  Endo - glycemic control  Heme - thrombocytopenia stable.         Critical post op.    Critical care time spent 60 min

## 2017-05-29 NOTE — PROGRESS NOTE ADULT - SUBJECTIVE AND OBJECTIVE BOX
POD #3 s/p AVR, CABG X2  EF 70%    Extubated this AM stable on HF nasal cannula  complaining of abdominal distention    PMH :  CAD  Aortic valve stenosis, unspecified etiology  Hyperlipidemia  Smoker  ETOH abuse  HTN (hypertension)  Aortic valve stenosis, unspecified etiology  Coronary artery disease with other form of angina pectoris  Aortic valve stenosis, unspecified etiology  Coronary artery disease with other form of angina pectoris  CABG  AVR (aortic valve replacement)      ROS no complaint, no tremors   All other systems reviewed and negative.    ICU Vital Signs Last 24 Hrs  T(C): 36.8, Max: 36.8 (05-28 @ 20:58)  T(F): 98.3, Max: 98.3 (05-28 @ 20:58)  HR: 108 (70 - 112)  BP: 161/95 (161/95 - 161/95)  BP(mean): 114 (114 - 114)  ABP: 120/70 (94/48 - 162/82)  ABP(mean): 90 (66 - 114)  RR: 27 (11 - 28)  SpO2: 97% (97% - 100%)    I&O's Summary  I & Os for 24h ending 29 May 2017 07:00  =============================================  IN: 1539.4 ml / OUT: 2317 ml / NET: -777.6 ml    I & Os for current day (as of 29 May 2017 18:34)  =============================================  IN: 411.7 ml / OUT: 2005 ml / NET: -1593.3 ml      ABG - ( 29 May 2017 15:01 )  pH: 7.47  /  pCO2: 29    /  pO2: 135   / HCO3: 21    / Base Excess: -1.7  /  SaO2: 99                              10.4   7.5   )-----------( 56       ( 29 May 2017 15:06 )             30.9     29 May 2017 15:06    137    |  104    |  11     ----------------------------<  106    3.6     |  20     |  0.60     Ca    8.4        29 May 2017 15:06  Phos  2.4       28 May 2017 21:45  Mg     1.7       29 May 2017 15:06    TPro  5.7    /  Alb  2.8    /  TBili  1.1    /  DBili  0.4    /  AST  89     /  ALT  53     /  AlkPhos  49     29 May 2017 02:46    PT/INR - ( 29 May 2017 02:46 )   PT: 14.5 sec;   INR: 1.30    PTT - ( 29 May 2017 02:46 )  PTT:31.2 sec    MEDICATIONS  (STANDING):  aspirin enteric coated 81milliGRAM(s) Oral daily  chlorhexidine 0.12% Liquid 5milliLiter(s) Swish and Spit every 4 hours  famotidine Injectable 20milliGRAM(s) IV Push every 12 hours  chlordiazePOXIDE 50milliGRAM(s) Oral every 6 hours  insulin lispro (HumaLOG) corrective regimen sliding scale  SubCutaneous every 6 hours  multivitamin 1Tablet(s) Oral daily  thiamine 100milliGRAM(s) Oral daily  folic acid 1milliGRAM(s) Oral daily    PHYSICAL EXAM:  Gen : Awake alert   Respiratory: decreased in the bases  Cardiovascular: S1 and S2, RRR, no M/G/R  Gastrointestinal: BS+, distended   Extremities: No peripheral edema  Vascular: 2+ peripheral pulses  Neurological: no focal deficits  Incision: clean dry/ no sign of infection  Lines: no sign of infection

## 2017-05-30 LAB
ALBUMIN SERPL ELPH-MCNC: 3.1 G/DL — LOW (ref 3.3–5)
ALBUMIN SERPL ELPH-MCNC: 3.1 G/DL — LOW (ref 3.3–5)
ALBUMIN SERPL ELPH-MCNC: 3.5 G/DL — SIGNIFICANT CHANGE UP (ref 3.3–5)
ALP SERPL-CCNC: 47 U/L — SIGNIFICANT CHANGE UP (ref 40–120)
ALP SERPL-CCNC: 49 U/L — SIGNIFICANT CHANGE UP (ref 40–120)
ALP SERPL-CCNC: 53 U/L — SIGNIFICANT CHANGE UP (ref 40–120)
ALT FLD-CCNC: 31 U/L — SIGNIFICANT CHANGE UP (ref 10–45)
ALT FLD-CCNC: 34 U/L — SIGNIFICANT CHANGE UP (ref 10–45)
ALT FLD-CCNC: 34 U/L — SIGNIFICANT CHANGE UP (ref 10–45)
ANION GAP SERPL CALC-SCNC: 11 MMOL/L — SIGNIFICANT CHANGE UP (ref 5–17)
ANION GAP SERPL CALC-SCNC: 12 MMOL/L — SIGNIFICANT CHANGE UP (ref 5–17)
ANION GAP SERPL CALC-SCNC: 15 MMOL/L — SIGNIFICANT CHANGE UP (ref 5–17)
APTT BLD: 30 SEC — SIGNIFICANT CHANGE UP (ref 27.5–37.4)
APTT BLD: 31 SEC — SIGNIFICANT CHANGE UP (ref 27.5–37.4)
APTT BLD: 34.4 SEC — SIGNIFICANT CHANGE UP (ref 27.5–37.4)
AST SERPL-CCNC: 45 U/L — HIGH (ref 10–40)
AST SERPL-CCNC: 45 U/L — HIGH (ref 10–40)
AST SERPL-CCNC: 47 U/L — HIGH (ref 10–40)
BILIRUB DIRECT SERPL-MCNC: 0.6 MG/DL — HIGH (ref 0–0.2)
BILIRUB INDIRECT FLD-MCNC: 1 MG/DL — SIGNIFICANT CHANGE UP (ref 0.2–1)
BILIRUB SERPL-MCNC: 1.5 MG/DL — HIGH (ref 0.2–1.2)
BILIRUB SERPL-MCNC: 1.6 MG/DL — HIGH (ref 0.2–1.2)
BILIRUB SERPL-MCNC: 1.7 MG/DL — HIGH (ref 0.2–1.2)
BUN SERPL-MCNC: 10 MG/DL — SIGNIFICANT CHANGE UP (ref 7–23)
BUN SERPL-MCNC: 12 MG/DL — SIGNIFICANT CHANGE UP (ref 7–23)
BUN SERPL-MCNC: 9 MG/DL — SIGNIFICANT CHANGE UP (ref 7–23)
CALCIUM SERPL-MCNC: 8.2 MG/DL — LOW (ref 8.4–10.5)
CALCIUM SERPL-MCNC: 8.4 MG/DL — SIGNIFICANT CHANGE UP (ref 8.4–10.5)
CALCIUM SERPL-MCNC: 8.5 MG/DL — SIGNIFICANT CHANGE UP (ref 8.4–10.5)
CHLORIDE SERPL-SCNC: 104 MMOL/L — SIGNIFICANT CHANGE UP (ref 96–108)
CHLORIDE SERPL-SCNC: 104 MMOL/L — SIGNIFICANT CHANGE UP (ref 96–108)
CHLORIDE SERPL-SCNC: 105 MMOL/L — SIGNIFICANT CHANGE UP (ref 96–108)
CO2 SERPL-SCNC: 19 MMOL/L — LOW (ref 22–31)
CO2 SERPL-SCNC: 20 MMOL/L — LOW (ref 22–31)
CO2 SERPL-SCNC: 23 MMOL/L — SIGNIFICANT CHANGE UP (ref 22–31)
CREAT SERPL-MCNC: 0.6 MG/DL — SIGNIFICANT CHANGE UP (ref 0.5–1.3)
GAS PNL BLDA: SIGNIFICANT CHANGE UP
GLUCOSE SERPL-MCNC: 109 MG/DL — HIGH (ref 70–99)
GLUCOSE SERPL-MCNC: 116 MG/DL — HIGH (ref 70–99)
GLUCOSE SERPL-MCNC: 132 MG/DL — HIGH (ref 70–99)
HCT VFR BLD CALC: 26.4 % — LOW (ref 39–50)
HCT VFR BLD CALC: 28.1 % — LOW (ref 39–50)
HCT VFR BLD CALC: 28.4 % — LOW (ref 39–50)
HGB BLD-MCNC: 8.9 G/DL — LOW (ref 13–17)
HGB BLD-MCNC: 9.5 G/DL — LOW (ref 13–17)
HGB BLD-MCNC: 9.5 G/DL — LOW (ref 13–17)
INR BLD: 1.33 — HIGH (ref 0.88–1.16)
INR BLD: 1.39 — HIGH (ref 0.88–1.16)
INR BLD: 1.43 — HIGH (ref 0.88–1.16)
LACTATE SERPL-SCNC: 1 MMOL/L — SIGNIFICANT CHANGE UP (ref 0.5–2)
MAGNESIUM SERPL-MCNC: 2 MG/DL — SIGNIFICANT CHANGE UP (ref 1.6–2.6)
MAGNESIUM SERPL-MCNC: 2.1 MG/DL — SIGNIFICANT CHANGE UP (ref 1.6–2.6)
MAGNESIUM SERPL-MCNC: 2.3 MG/DL — SIGNIFICANT CHANGE UP (ref 1.6–2.6)
MCHC RBC-ENTMCNC: 29.4 PG — SIGNIFICANT CHANGE UP (ref 27–34)
MCHC RBC-ENTMCNC: 29.6 PG — SIGNIFICANT CHANGE UP (ref 27–34)
MCHC RBC-ENTMCNC: 30.1 PG — SIGNIFICANT CHANGE UP (ref 27–34)
MCHC RBC-ENTMCNC: 33.5 G/DL — SIGNIFICANT CHANGE UP (ref 32–36)
MCHC RBC-ENTMCNC: 33.7 G/DL — SIGNIFICANT CHANGE UP (ref 32–36)
MCHC RBC-ENTMCNC: 33.8 G/DL — SIGNIFICANT CHANGE UP (ref 32–36)
MCV RBC AUTO: 87.7 FL — SIGNIFICANT CHANGE UP (ref 80–100)
MCV RBC AUTO: 87.9 FL — SIGNIFICANT CHANGE UP (ref 80–100)
MCV RBC AUTO: 88.9 FL — SIGNIFICANT CHANGE UP (ref 80–100)
PF4 HEPARIN CMPLX AB SER-ACNC: SIGNIFICANT CHANGE UP
PF4 HEPARIN CMPLX AB SERPL QL IA: POSITIVE
PHOSPHATE SERPL-MCNC: 3 MG/DL — SIGNIFICANT CHANGE UP (ref 2.5–4.5)
PHOSPHATE SERPL-MCNC: 3.4 MG/DL — SIGNIFICANT CHANGE UP (ref 2.5–4.5)
PHOSPHATE SERPL-MCNC: 3.4 MG/DL — SIGNIFICANT CHANGE UP (ref 2.5–4.5)
PLATELET # BLD AUTO: 50 K/UL — LOW (ref 150–400)
PLATELET # BLD AUTO: 56 K/UL — LOW (ref 150–400)
PLATELET # BLD AUTO: 60 K/UL — LOW (ref 150–400)
POTASSIUM SERPL-MCNC: 3.7 MMOL/L — SIGNIFICANT CHANGE UP (ref 3.5–5.3)
POTASSIUM SERPL-MCNC: 3.8 MMOL/L — SIGNIFICANT CHANGE UP (ref 3.5–5.3)
POTASSIUM SERPL-MCNC: 4.3 MMOL/L — SIGNIFICANT CHANGE UP (ref 3.5–5.3)
POTASSIUM SERPL-SCNC: 3.7 MMOL/L — SIGNIFICANT CHANGE UP (ref 3.5–5.3)
POTASSIUM SERPL-SCNC: 3.8 MMOL/L — SIGNIFICANT CHANGE UP (ref 3.5–5.3)
POTASSIUM SERPL-SCNC: 4.3 MMOL/L — SIGNIFICANT CHANGE UP (ref 3.5–5.3)
PROT SERPL-MCNC: 5.8 G/DL — LOW (ref 6–8.3)
PROT SERPL-MCNC: 6.3 G/DL — SIGNIFICANT CHANGE UP (ref 6–8.3)
PROT SERPL-MCNC: 6.5 G/DL — SIGNIFICANT CHANGE UP (ref 6–8.3)
PROTHROM AB SERPL-ACNC: 14.8 SEC — HIGH (ref 9.8–12.7)
PROTHROM AB SERPL-ACNC: 15.5 SEC — HIGH (ref 9.8–12.7)
PROTHROM AB SERPL-ACNC: 16 SEC — HIGH (ref 9.8–12.7)
RBC # BLD: 3.01 M/UL — LOW (ref 4.2–5.8)
RBC # BLD: 3.16 M/UL — LOW (ref 4.2–5.8)
RBC # BLD: 3.23 M/UL — LOW (ref 4.2–5.8)
RBC # FLD: 16.6 % — SIGNIFICANT CHANGE UP (ref 10.3–16.9)
RBC # FLD: 17.2 % — HIGH (ref 10.3–16.9)
RBC # FLD: 17.6 % — HIGH (ref 10.3–16.9)
SODIUM SERPL-SCNC: 136 MMOL/L — SIGNIFICANT CHANGE UP (ref 135–145)
SODIUM SERPL-SCNC: 138 MMOL/L — SIGNIFICANT CHANGE UP (ref 135–145)
SODIUM SERPL-SCNC: 139 MMOL/L — SIGNIFICANT CHANGE UP (ref 135–145)
WBC # BLD: 6.9 K/UL — SIGNIFICANT CHANGE UP (ref 3.8–10.5)
WBC # BLD: 8.5 K/UL — SIGNIFICANT CHANGE UP (ref 3.8–10.5)
WBC # BLD: 8.6 K/UL — SIGNIFICANT CHANGE UP (ref 3.8–10.5)
WBC # FLD AUTO: 6.9 K/UL — SIGNIFICANT CHANGE UP (ref 3.8–10.5)
WBC # FLD AUTO: 8.5 K/UL — SIGNIFICANT CHANGE UP (ref 3.8–10.5)
WBC # FLD AUTO: 8.6 K/UL — SIGNIFICANT CHANGE UP (ref 3.8–10.5)

## 2017-05-30 PROCEDURE — 71010: CPT | Mod: 26,76

## 2017-05-30 RX ORDER — ALBUMIN HUMAN 25 %
250 VIAL (ML) INTRAVENOUS ONCE
Qty: 0 | Refills: 0 | Status: COMPLETED | OUTPATIENT
Start: 2017-05-30 | End: 2017-05-30

## 2017-05-30 RX ORDER — METOCLOPRAMIDE HCL 10 MG
10 TABLET ORAL ONCE
Qty: 0 | Refills: 0 | Status: COMPLETED | OUTPATIENT
Start: 2017-05-30 | End: 2017-05-30

## 2017-05-30 RX ORDER — METOPROLOL TARTRATE 50 MG
2.5 TABLET ORAL ONCE
Qty: 0 | Refills: 0 | Status: COMPLETED | OUTPATIENT
Start: 2017-05-30 | End: 2017-05-30

## 2017-05-30 RX ORDER — METOPROLOL TARTRATE 50 MG
5 TABLET ORAL EVERY 6 HOURS
Qty: 0 | Refills: 0 | Status: DISCONTINUED | OUTPATIENT
Start: 2017-05-30 | End: 2017-06-03

## 2017-05-30 RX ORDER — ATORVASTATIN CALCIUM 80 MG/1
20 TABLET, FILM COATED ORAL AT BEDTIME
Qty: 0 | Refills: 0 | Status: DISCONTINUED | OUTPATIENT
Start: 2017-05-30 | End: 2017-06-20

## 2017-05-30 RX ORDER — POTASSIUM CHLORIDE 20 MEQ
20 PACKET (EA) ORAL
Qty: 0 | Refills: 0 | Status: COMPLETED | OUTPATIENT
Start: 2017-05-30 | End: 2017-05-30

## 2017-05-30 RX ORDER — SENNA PLUS 8.6 MG/1
10 TABLET ORAL DAILY
Qty: 0 | Refills: 0 | Status: DISCONTINUED | OUTPATIENT
Start: 2017-05-30 | End: 2017-05-30

## 2017-05-30 RX ORDER — METOPROLOL TARTRATE 50 MG
2.5 TABLET ORAL EVERY 6 HOURS
Qty: 0 | Refills: 0 | Status: DISCONTINUED | OUTPATIENT
Start: 2017-05-30 | End: 2017-05-30

## 2017-05-30 RX ORDER — DEXMEDETOMIDINE HYDROCHLORIDE IN 0.9% SODIUM CHLORIDE 4 UG/ML
0.5 INJECTION INTRAVENOUS
Qty: 200 | Refills: 0 | Status: DISCONTINUED | OUTPATIENT
Start: 2017-05-30 | End: 2017-06-02

## 2017-05-30 RX ORDER — METOCLOPRAMIDE HCL 10 MG
10 TABLET ORAL EVERY 8 HOURS
Qty: 0 | Refills: 0 | Status: COMPLETED | OUTPATIENT
Start: 2017-05-30 | End: 2017-06-01

## 2017-05-30 RX ORDER — KETOROLAC TROMETHAMINE 30 MG/ML
30 SYRINGE (ML) INJECTION ONCE
Qty: 0 | Refills: 0 | Status: DISCONTINUED | OUTPATIENT
Start: 2017-05-30 | End: 2017-05-30

## 2017-05-30 RX ORDER — POTASSIUM CHLORIDE 20 MEQ
20 PACKET (EA) ORAL ONCE
Qty: 0 | Refills: 0 | Status: COMPLETED | OUTPATIENT
Start: 2017-05-30 | End: 2017-05-30

## 2017-05-30 RX ORDER — MAGNESIUM SULFATE 500 MG/ML
2 VIAL (ML) INJECTION ONCE
Qty: 0 | Refills: 0 | Status: COMPLETED | OUTPATIENT
Start: 2017-05-30 | End: 2017-05-30

## 2017-05-30 RX ORDER — ACETAMINOPHEN 500 MG
1000 TABLET ORAL ONCE
Qty: 0 | Refills: 0 | Status: COMPLETED | OUTPATIENT
Start: 2017-05-30 | End: 2017-05-31

## 2017-05-30 RX ORDER — LEVALBUTEROL 1.25 MG/.5ML
0.63 SOLUTION, CONCENTRATE RESPIRATORY (INHALATION) EVERY 6 HOURS
Qty: 0 | Refills: 0 | Status: COMPLETED | OUTPATIENT
Start: 2017-05-30 | End: 2017-06-06

## 2017-05-30 RX ADMIN — Medication 2.5 MILLIGRAM(S): at 10:11

## 2017-05-30 RX ADMIN — Medication 125 MILLILITER(S): at 00:55

## 2017-05-30 RX ADMIN — DEXMEDETOMIDINE HYDROCHLORIDE IN 0.9% SODIUM CHLORIDE 8.35 MICROGRAM(S)/KG/HR: 4 INJECTION INTRAVENOUS at 22:00

## 2017-05-30 RX ADMIN — Medication 3 MILLILITER(S): at 06:02

## 2017-05-30 RX ADMIN — Medication 30 MILLIGRAM(S): at 21:50

## 2017-05-30 RX ADMIN — Medication 100 MILLIEQUIVALENT(S): at 17:13

## 2017-05-30 RX ADMIN — Medication 2.5 MILLIGRAM(S): at 12:58

## 2017-05-30 RX ADMIN — Medication 10 MILLIGRAM(S): at 22:13

## 2017-05-30 RX ADMIN — Medication 5 MILLIGRAM(S): at 18:14

## 2017-05-30 RX ADMIN — Medication 50 GRAM(S): at 06:51

## 2017-05-30 RX ADMIN — Medication 25 MILLIGRAM(S): at 11:11

## 2017-05-30 RX ADMIN — Medication 10 MILLIGRAM(S): at 11:05

## 2017-05-30 RX ADMIN — Medication 100 MILLIGRAM(S): at 11:11

## 2017-05-30 RX ADMIN — Medication 125 MILLILITER(S): at 00:54

## 2017-05-30 RX ADMIN — Medication 1 MILLIGRAM(S): at 11:11

## 2017-05-30 RX ADMIN — Medication 1 TABLET(S): at 11:11

## 2017-05-30 RX ADMIN — Medication 81 MILLIGRAM(S): at 14:38

## 2017-05-30 RX ADMIN — Medication 2.5 MILLIGRAM(S): at 15:14

## 2017-05-30 RX ADMIN — Medication 125 MILLILITER(S): at 14:04

## 2017-05-30 RX ADMIN — Medication 125 MILLILITER(S): at 23:37

## 2017-05-30 RX ADMIN — ATORVASTATIN CALCIUM 20 MILLIGRAM(S): 80 TABLET, FILM COATED ORAL at 22:13

## 2017-05-30 RX ADMIN — Medication 3 MILLILITER(S): at 19:06

## 2017-05-30 RX ADMIN — Medication 100 MILLIEQUIVALENT(S): at 19:13

## 2017-05-30 RX ADMIN — Medication 25 MILLIGRAM(S): at 19:15

## 2017-05-30 RX ADMIN — Medication 100 MILLIEQUIVALENT(S): at 06:51

## 2017-05-30 RX ADMIN — Medication 30 MILLIGRAM(S): at 22:15

## 2017-05-30 RX ADMIN — FAMOTIDINE 20 MILLIGRAM(S): 10 INJECTION INTRAVENOUS at 18:13

## 2017-05-30 RX ADMIN — FAMOTIDINE 20 MILLIGRAM(S): 10 INJECTION INTRAVENOUS at 06:51

## 2017-05-30 NOTE — PHYSICAL THERAPY INITIAL EVALUATION ADULT - GENERAL OBSERVATIONS, REHAB EVAL
Patient encountered supine in bed, NAD, +b/l wrist restraints, RN present, +EKG, +TLC, +rogers, +PEG, +A-Line.

## 2017-05-30 NOTE — PHYSICAL THERAPY INITIAL EVALUATION ADULT - ADDITIONAL COMMENTS
Patient if AOx2 confused/agitated with limited command follow reporting independence with no AD and unclear whether or not he has stairs in the home.

## 2017-05-30 NOTE — SWALLOW BEDSIDE ASSESSMENT ADULT - NS SPL SWALLOW CLINIC TRIAL FT
Oral stage was significant for prolonged bolus processing. Pharyngeal stage was significant for suspected delay in swallow initiation followed by overt signs of airway protection deficits. PO diet is premature at this time.

## 2017-05-30 NOTE — PHYSICAL THERAPY INITIAL EVALUATION ADULT - CRITERIA FOR SKILLED THERAPEUTIC INTERVENTIONS
predicted duration of therapy intervention/rehab potential/functional limitations in following categories/anticipated discharge recommendation/impairments found/therapy frequency/risk reduction/prevention/anticipated equipment needs at discharge

## 2017-05-30 NOTE — SWALLOW BEDSIDE ASSESSMENT ADULT - ASR SWALLOW LINGUAL MOBILITY
impaired right lateral movement/impaired left lateral movement/impaired protrusion/impaired anterior elevation

## 2017-05-30 NOTE — PHYSICAL THERAPY INITIAL EVALUATION ADULT - PLANNED THERAPY INTERVENTIONS, PT EVAL
transfer training/bed mobility training/postural re-education/balance training/gait training/strengthening

## 2017-05-30 NOTE — PHYSICAL THERAPY INITIAL EVALUATION ADULT - IMPAIRMENTS FOUND, PT EVAL
posture/gait, locomotion, and balance/muscle strength/poor safety awareness/ergonomics and body mechanics/aerobic capacity/endurance

## 2017-05-30 NOTE — PHYSICAL THERAPY INITIAL EVALUATION ADULT - PERTINENT HX OF CURRENT PROBLEM, REHAB EVAL
Patient is a 62 y/o M admitted to [facility] after discharge following cardiac cath with chief c/o severe chest pain.

## 2017-05-30 NOTE — PROGRESS NOTE ADULT - SUBJECTIVE AND OBJECTIVE BOX
Chart reviewed and patient interviewed. Patient is a 61 year old  Black male from [facility] current smoker ( 1 PPDx45 years), current every day use of Alcohol ( 1 pint of Vodka daily and several beers daily for 45 years ) and history of HLD and  HTN who was admitted to [facility] in early May 2017 with Chest pain and worsening HEIN. Had Cardiac Cath which showed CAD and Aortic Stenosis. He was discharged home and then seen at [facility] after awakening at home with severe Chest pain on 5/22/17. Patient was seen in Cardiac Clinic on 5/22/17 and was admitted for pre-op work up for possible CABG/AVR. Last drink was a bottle of beer on morning of 5/23/17, the day of admission to [facility].       Patient's wife reports he has gone through acute Alcohol withdrawal in the past when hospitalized and has become acutely agitated requiring sedation and restraint. Overnight patient became acutely agitated secondary to alcohol withdrawal and required sedation and intubation.  Patient proceeded to surgery 5/26 and underwent a CABG and AVR. Patient tolerated surgery well and remained intubated and sedated. Patient reported to have been agitated yesterday secondary to Alcohol Withdrawal and is now receiving Librium 50mg q 6h and agitation is reported to be controlled. Patient was extubated early this afternoon 5/29 which he is tolerating well. Is able to follow simple commands and is attempting to speak. NGT was removed yesterday and he did not have Librium overnight. He became more anxious consistent with persistent withdrawal. NGT was reinserted and he has been restarted on Librium at 25mg via NGT q6h until withdrawal is completed. Is on Enhanced Observation with a sitter because he is trying to get out of bed unassisted. Continuing present treatment. Encouragement and support provided.

## 2017-05-30 NOTE — PHYSICAL THERAPY INITIAL EVALUATION ADULT - LEVEL OF INDEPENDENCE: SIT/STAND, REHAB EVAL
unable to perform/patient confused/agitated refusing to stand up, fighting to return to supine position

## 2017-05-31 LAB
ALBUMIN SERPL ELPH-MCNC: 2.9 G/DL — LOW (ref 3.3–5)
ALBUMIN SERPL ELPH-MCNC: 3.3 G/DL — SIGNIFICANT CHANGE UP (ref 3.3–5)
ALP SERPL-CCNC: 48 U/L — SIGNIFICANT CHANGE UP (ref 40–120)
ALP SERPL-CCNC: 52 U/L — SIGNIFICANT CHANGE UP (ref 40–120)
ALT FLD-CCNC: 27 U/L — SIGNIFICANT CHANGE UP (ref 10–45)
ALT FLD-CCNC: 27 U/L — SIGNIFICANT CHANGE UP (ref 10–45)
ANION GAP SERPL CALC-SCNC: 11 MMOL/L — SIGNIFICANT CHANGE UP (ref 5–17)
ANION GAP SERPL CALC-SCNC: 11 MMOL/L — SIGNIFICANT CHANGE UP (ref 5–17)
APTT BLD: 30.6 SEC — SIGNIFICANT CHANGE UP (ref 27.5–37.4)
APTT BLD: 31.5 SEC — SIGNIFICANT CHANGE UP (ref 27.5–37.4)
AST SERPL-CCNC: 41 U/L — HIGH (ref 10–40)
AST SERPL-CCNC: 45 U/L — HIGH (ref 10–40)
BILIRUB SERPL-MCNC: 1.4 MG/DL — HIGH (ref 0.2–1.2)
BILIRUB SERPL-MCNC: 1.4 MG/DL — HIGH (ref 0.2–1.2)
BUN SERPL-MCNC: 13 MG/DL — SIGNIFICANT CHANGE UP (ref 7–23)
BUN SERPL-MCNC: 14 MG/DL — SIGNIFICANT CHANGE UP (ref 7–23)
CALCIUM SERPL-MCNC: 7.9 MG/DL — LOW (ref 8.4–10.5)
CALCIUM SERPL-MCNC: 8.2 MG/DL — LOW (ref 8.4–10.5)
CHLORIDE SERPL-SCNC: 106 MMOL/L — SIGNIFICANT CHANGE UP (ref 96–108)
CHLORIDE SERPL-SCNC: 106 MMOL/L — SIGNIFICANT CHANGE UP (ref 96–108)
CO2 SERPL-SCNC: 21 MMOL/L — LOW (ref 22–31)
CO2 SERPL-SCNC: 22 MMOL/L — SIGNIFICANT CHANGE UP (ref 22–31)
CREAT SERPL-MCNC: 0.6 MG/DL — SIGNIFICANT CHANGE UP (ref 0.5–1.3)
CREAT SERPL-MCNC: 0.7 MG/DL — SIGNIFICANT CHANGE UP (ref 0.5–1.3)
GAS PNL BLDA: SIGNIFICANT CHANGE UP
GLUCOSE SERPL-MCNC: 124 MG/DL — HIGH (ref 70–99)
GLUCOSE SERPL-MCNC: 134 MG/DL — HIGH (ref 70–99)
HCT VFR BLD CALC: 26.9 % — LOW (ref 39–50)
HCT VFR BLD CALC: 27.4 % — LOW (ref 39–50)
HCT VFR BLD CALC: 27.5 % — LOW (ref 39–50)
HGB BLD-MCNC: 8.9 G/DL — LOW (ref 13–17)
HGB BLD-MCNC: 9 G/DL — LOW (ref 13–17)
HGB BLD-MCNC: 9.1 G/DL — LOW (ref 13–17)
INR BLD: 1.47 — HIGH (ref 0.88–1.16)
INR BLD: 1.53 — HIGH (ref 0.88–1.16)
LACTATE SERPL-SCNC: 0.9 MMOL/L — SIGNIFICANT CHANGE UP (ref 0.5–2)
LACTATE SERPL-SCNC: 1.3 MMOL/L — SIGNIFICANT CHANGE UP (ref 0.5–2)
MAGNESIUM SERPL-MCNC: 2.1 MG/DL — SIGNIFICANT CHANGE UP (ref 1.6–2.6)
MAGNESIUM SERPL-MCNC: 2.1 MG/DL — SIGNIFICANT CHANGE UP (ref 1.6–2.6)
MCHC RBC-ENTMCNC: 29.2 PG — SIGNIFICANT CHANGE UP (ref 27–34)
MCHC RBC-ENTMCNC: 29.7 PG — SIGNIFICANT CHANGE UP (ref 27–34)
MCHC RBC-ENTMCNC: 29.7 PG — SIGNIFICANT CHANGE UP (ref 27–34)
MCHC RBC-ENTMCNC: 32.5 G/DL — SIGNIFICANT CHANGE UP (ref 32–36)
MCHC RBC-ENTMCNC: 33.1 G/DL — SIGNIFICANT CHANGE UP (ref 32–36)
MCHC RBC-ENTMCNC: 33.5 G/DL — SIGNIFICANT CHANGE UP (ref 32–36)
MCV RBC AUTO: 88.8 FL — SIGNIFICANT CHANGE UP (ref 80–100)
MCV RBC AUTO: 89.8 FL — SIGNIFICANT CHANGE UP (ref 80–100)
MCV RBC AUTO: 89.9 FL — SIGNIFICANT CHANGE UP (ref 80–100)
PHOSPHATE SERPL-MCNC: 2.8 MG/DL — SIGNIFICANT CHANGE UP (ref 2.5–4.5)
PLATELET # BLD AUTO: 57 K/UL — LOW (ref 150–400)
PLATELET # BLD AUTO: 62 K/UL — LOW (ref 150–400)
PLATELET # BLD AUTO: 64 K/UL — LOW (ref 150–400)
POTASSIUM SERPL-MCNC: 3.9 MMOL/L — SIGNIFICANT CHANGE UP (ref 3.5–5.3)
POTASSIUM SERPL-MCNC: 4.2 MMOL/L — SIGNIFICANT CHANGE UP (ref 3.5–5.3)
POTASSIUM SERPL-SCNC: 3.9 MMOL/L — SIGNIFICANT CHANGE UP (ref 3.5–5.3)
POTASSIUM SERPL-SCNC: 4.2 MMOL/L — SIGNIFICANT CHANGE UP (ref 3.5–5.3)
PROT SERPL-MCNC: 6 G/DL — SIGNIFICANT CHANGE UP (ref 6–8.3)
PROT SERPL-MCNC: 6.2 G/DL — SIGNIFICANT CHANGE UP (ref 6–8.3)
PROTHROM AB SERPL-ACNC: 16.4 SEC — HIGH (ref 9.8–12.7)
PROTHROM AB SERPL-ACNC: 17.1 SEC — HIGH (ref 9.8–12.7)
RBC # BLD: 3.03 M/UL — LOW (ref 4.2–5.8)
RBC # BLD: 3.05 M/UL — LOW (ref 4.2–5.8)
RBC # BLD: 3.06 M/UL — LOW (ref 4.2–5.8)
RBC # FLD: 16.3 % — SIGNIFICANT CHANGE UP (ref 10.3–16.9)
RBC # FLD: 16.3 % — SIGNIFICANT CHANGE UP (ref 10.3–16.9)
RBC # FLD: 16.4 % — SIGNIFICANT CHANGE UP (ref 10.3–16.9)
SODIUM SERPL-SCNC: 138 MMOL/L — SIGNIFICANT CHANGE UP (ref 135–145)
SODIUM SERPL-SCNC: 139 MMOL/L — SIGNIFICANT CHANGE UP (ref 135–145)
SURGICAL PATHOLOGY STUDY: SIGNIFICANT CHANGE UP
WBC # BLD: 6.4 K/UL — SIGNIFICANT CHANGE UP (ref 3.8–10.5)
WBC # BLD: 6.5 K/UL — SIGNIFICANT CHANGE UP (ref 3.8–10.5)
WBC # BLD: 7 K/UL — SIGNIFICANT CHANGE UP (ref 3.8–10.5)
WBC # FLD AUTO: 6.4 K/UL — SIGNIFICANT CHANGE UP (ref 3.8–10.5)
WBC # FLD AUTO: 6.5 K/UL — SIGNIFICANT CHANGE UP (ref 3.8–10.5)
WBC # FLD AUTO: 7 K/UL — SIGNIFICANT CHANGE UP (ref 3.8–10.5)

## 2017-05-31 PROCEDURE — 31622 DX BRONCHOSCOPE/WASH: CPT | Mod: 59

## 2017-05-31 PROCEDURE — 99292 CRITICAL CARE ADDL 30 MIN: CPT

## 2017-05-31 PROCEDURE — 99291 CRITICAL CARE FIRST HOUR: CPT

## 2017-05-31 PROCEDURE — 31500 INSERT EMERGENCY AIRWAY: CPT | Mod: 59

## 2017-05-31 PROCEDURE — 71010: CPT | Mod: 26,76

## 2017-05-31 PROCEDURE — 93010 ELECTROCARDIOGRAM REPORT: CPT

## 2017-05-31 RX ORDER — ACETAMINOPHEN 500 MG
1000 TABLET ORAL ONCE
Qty: 0 | Refills: 0 | Status: COMPLETED | OUTPATIENT
Start: 2017-05-31 | End: 2017-05-31

## 2017-05-31 RX ORDER — POTASSIUM CHLORIDE 20 MEQ
10 PACKET (EA) ORAL ONCE
Qty: 0 | Refills: 0 | Status: DISCONTINUED | OUTPATIENT
Start: 2017-05-31 | End: 2017-05-31

## 2017-05-31 RX ORDER — POTASSIUM CHLORIDE 20 MEQ
10 PACKET (EA) ORAL ONCE
Qty: 0 | Refills: 0 | Status: COMPLETED | OUTPATIENT
Start: 2017-05-31 | End: 2017-05-31

## 2017-05-31 RX ORDER — FUROSEMIDE 40 MG
20 TABLET ORAL ONCE
Qty: 0 | Refills: 0 | Status: COMPLETED | OUTPATIENT
Start: 2017-05-31 | End: 2017-05-31

## 2017-05-31 RX ORDER — ALBUMIN HUMAN 25 %
250 VIAL (ML) INTRAVENOUS ONCE
Qty: 0 | Refills: 0 | Status: COMPLETED | OUTPATIENT
Start: 2017-05-31 | End: 2017-05-31

## 2017-05-31 RX ORDER — PIPERACILLIN AND TAZOBACTAM 4; .5 G/20ML; G/20ML
3.38 INJECTION, POWDER, LYOPHILIZED, FOR SOLUTION INTRAVENOUS EVERY 6 HOURS
Qty: 0 | Refills: 0 | Status: DISCONTINUED | OUTPATIENT
Start: 2017-05-31 | End: 2017-06-07

## 2017-05-31 RX ORDER — FUROSEMIDE 40 MG
20 TABLET ORAL DAILY
Qty: 0 | Refills: 0 | Status: DISCONTINUED | OUTPATIENT
Start: 2017-05-31 | End: 2017-06-09

## 2017-05-31 RX ORDER — VANCOMYCIN HCL 1 G
1000 VIAL (EA) INTRAVENOUS EVERY 12 HOURS
Qty: 0 | Refills: 0 | Status: DISCONTINUED | OUTPATIENT
Start: 2017-05-31 | End: 2017-06-03

## 2017-05-31 RX ORDER — PROPOFOL 10 MG/ML
10 INJECTION, EMULSION INTRAVENOUS
Qty: 1000 | Refills: 0 | Status: DISCONTINUED | OUTPATIENT
Start: 2017-05-31 | End: 2017-06-02

## 2017-05-31 RX ORDER — POTASSIUM CHLORIDE 20 MEQ
20 PACKET (EA) ORAL ONCE
Qty: 0 | Refills: 0 | Status: COMPLETED | OUTPATIENT
Start: 2017-05-31 | End: 2017-05-31

## 2017-05-31 RX ORDER — ALBUMIN HUMAN 25 %
250 VIAL (ML) INTRAVENOUS
Qty: 0 | Refills: 0 | Status: COMPLETED | OUTPATIENT
Start: 2017-05-31 | End: 2017-05-31

## 2017-05-31 RX ORDER — PIPERACILLIN AND TAZOBACTAM 4; .5 G/20ML; G/20ML
INJECTION, POWDER, LYOPHILIZED, FOR SOLUTION INTRAVENOUS
Qty: 0 | Refills: 0 | Status: DISCONTINUED | OUTPATIENT
Start: 2017-05-31 | End: 2017-06-07

## 2017-05-31 RX ORDER — FENTANYL CITRATE 50 UG/ML
50 INJECTION INTRAVENOUS ONCE
Qty: 0 | Refills: 0 | Status: DISCONTINUED | OUTPATIENT
Start: 2017-05-31 | End: 2017-05-31

## 2017-05-31 RX ORDER — PIPERACILLIN AND TAZOBACTAM 4; .5 G/20ML; G/20ML
3.38 INJECTION, POWDER, LYOPHILIZED, FOR SOLUTION INTRAVENOUS ONCE
Qty: 0 | Refills: 0 | Status: COMPLETED | OUTPATIENT
Start: 2017-05-31 | End: 2017-05-31

## 2017-05-31 RX ADMIN — FAMOTIDINE 20 MILLIGRAM(S): 10 INJECTION INTRAVENOUS at 05:58

## 2017-05-31 RX ADMIN — FAMOTIDINE 20 MILLIGRAM(S): 10 INJECTION INTRAVENOUS at 18:19

## 2017-05-31 RX ADMIN — Medication 25 MILLIGRAM(S): at 00:19

## 2017-05-31 RX ADMIN — Medication 10 MILLIGRAM(S): at 05:58

## 2017-05-31 RX ADMIN — Medication 2: at 12:16

## 2017-05-31 RX ADMIN — Medication 81 MILLIGRAM(S): at 09:09

## 2017-05-31 RX ADMIN — Medication 100 MILLIEQUIVALENT(S): at 05:57

## 2017-05-31 RX ADMIN — Medication 20 MILLIGRAM(S): at 18:38

## 2017-05-31 RX ADMIN — Medication 125 MILLILITER(S): at 11:36

## 2017-05-31 RX ADMIN — Medication 10 MILLIGRAM(S): at 14:08

## 2017-05-31 RX ADMIN — Medication 1000 MILLIGRAM(S): at 11:20

## 2017-05-31 RX ADMIN — Medication 125 MILLILITER(S): at 19:34

## 2017-05-31 RX ADMIN — PIPERACILLIN AND TAZOBACTAM 200 GRAM(S): 4; .5 INJECTION, POWDER, LYOPHILIZED, FOR SOLUTION INTRAVENOUS at 12:06

## 2017-05-31 RX ADMIN — Medication 20 MILLIGRAM(S): at 07:10

## 2017-05-31 RX ADMIN — Medication 1 MILLIGRAM(S): at 09:09

## 2017-05-31 RX ADMIN — Medication 250 MILLIGRAM(S): at 20:33

## 2017-05-31 RX ADMIN — FENTANYL CITRATE 50 MICROGRAM(S): 50 INJECTION INTRAVENOUS at 18:38

## 2017-05-31 RX ADMIN — Medication 50 MILLIEQUIVALENT(S): at 19:13

## 2017-05-31 RX ADMIN — LEVALBUTEROL 0.63 MILLIGRAM(S): 1.25 SOLUTION, CONCENTRATE RESPIRATORY (INHALATION) at 17:48

## 2017-05-31 RX ADMIN — Medication 25 MILLIGRAM(S): at 05:58

## 2017-05-31 RX ADMIN — Medication 5 MILLIGRAM(S): at 05:57

## 2017-05-31 RX ADMIN — Medication 5 MILLIGRAM(S): at 19:13

## 2017-05-31 RX ADMIN — LEVALBUTEROL 0.63 MILLIGRAM(S): 1.25 SOLUTION, CONCENTRATE RESPIRATORY (INHALATION) at 11:39

## 2017-05-31 RX ADMIN — Medication 3 MILLILITER(S): at 06:15

## 2017-05-31 RX ADMIN — Medication 3 MILLILITER(S): at 11:39

## 2017-05-31 RX ADMIN — Medication 1 TABLET(S): at 09:09

## 2017-05-31 RX ADMIN — DEXMEDETOMIDINE HYDROCHLORIDE IN 0.9% SODIUM CHLORIDE 8.35 MICROGRAM(S)/KG/HR: 4 INJECTION INTRAVENOUS at 11:15

## 2017-05-31 RX ADMIN — Medication 100 MILLIGRAM(S): at 09:09

## 2017-05-31 RX ADMIN — FENTANYL CITRATE 50 MICROGRAM(S): 50 INJECTION INTRAVENOUS at 16:32

## 2017-05-31 RX ADMIN — Medication 400 MILLIGRAM(S): at 11:13

## 2017-05-31 RX ADMIN — Medication 125 MILLILITER(S): at 11:46

## 2017-05-31 RX ADMIN — PIPERACILLIN AND TAZOBACTAM 200 GRAM(S): 4; .5 INJECTION, POWDER, LYOPHILIZED, FOR SOLUTION INTRAVENOUS at 18:01

## 2017-05-31 RX ADMIN — LEVALBUTEROL 0.63 MILLIGRAM(S): 1.25 SOLUTION, CONCENTRATE RESPIRATORY (INHALATION) at 06:15

## 2017-05-31 RX ADMIN — Medication 3 MILLILITER(S): at 17:47

## 2017-05-31 RX ADMIN — PIPERACILLIN AND TAZOBACTAM 200 GRAM(S): 4; .5 INJECTION, POWDER, LYOPHILIZED, FOR SOLUTION INTRAVENOUS at 06:14

## 2017-05-31 NOTE — PROGRESS NOTE ADULT - SUBJECTIVE AND OBJECTIVE BOX
CTICU  CRITICAL  CARE  attending     Hand off received 					   Pertinent clinical, laboratory, radiographic, hemodynamic, echocardiographic, respiratory data, microbiologic data and chart were reviewed and analyzed frequently throughout the course of the day and night  Patient seen and examined with CTS/ SH attending at bedside    Pt is a 61y ,Male, POD # 4 s/p AVR/CABG x 2V  post op EF 70%    Post op course c/b     Delirium tremens      Today:    electively intubated to protect airway  right middle lobe infiltrate /PNA  low grade temp  post intubation bronchoscopy at bedside for pulmonary toilet      FAMILY HISTORY:  PAST MEDICAL & SURGICAL HISTORY:  Aortic valve stenosis, unspecified etiology  Hyperlipidemia  Smoker  ETOH abuse  HTN (hypertension)    Patient is a 61y old  Male who is POD # 4 s/p AVR/CABG x 2V    14 system review was unremarkable  acute changes include acute respiratory failure  Vital signs, hemodynamic and respiratory parameters were reviewed from the bedside nursing flowsheet.  ICU Vital Signs Last 24 Hrs  T(C): 36.4, Max: 38.3 (05-30 @ 18:00)  T(F): 97.5, Max: 101 (05-30 @ 19:03)  HR: 98 (90 - 910)  BP: 87/49 (87/49 - 134/86)  BP(mean): 61 (61 - 103)  ABP: 122/62 (84/42 - 168/90)  ABP(mean): 86 (58 - 122)  RR: 22 (12 - 36)  SpO2: 99% (92% - 979%)    Adult Advanced Hemodynamics Last 24 Hrs  CVP(mm Hg): 10 (5 - 18)  CVP(cm H2O): --  CO: --  CI: --  PA: --  PA(mean): --  PCWP: --  SVR: --  SVRI: --  PVR: --  PVRI: --, ABG - ( 31 May 2017 16:35 )  pH: 7.37  /  pCO2: 40    /  pO2: 92    / HCO3: 22    / Base Excess: -2.7  /  SaO2: 96                Mode: AC/ CMV (Assist Control/ Continuous Mandatory Ventilation)  RR (machine): 14  TV (machine): 600  FiO2: 50  PEEP: 5  MAP: 10  PIP: 24    Intake and output was reviewed and the fluid balance was calculated  Daily     Daily   I&O's Summary  I & Os for 24h ending 31 May 2017 07:00  =============================================  IN: 1931.5 ml / OUT: 2007 ml / NET: -75.5 ml    I & Os for current day (as of 31 May 2017 17:18)  =============================================  IN: 1165 ml / OUT: 1110 ml / NET: 55 ml      All lines and drain sites were assessed  Glycemic trend was reviewedCAPILLARY BLOOD GLUCOSE  119 (31 May 2017 06:00)    No acute change in mental status  Auscultation of the chest reveals equal bs  Abdomen is soft  Extremities are warm and well perfused  Wounds appear clean and unremarkable  Antibiotics are periop    labs  CBC Full  -  ( 31 May 2017 16:31 )  WBC Count : 6.5 K/uL  Hemoglobin : 9.1 g/dL  Hematocrit : 27.5 %  Platelet Count - Automated : 64 K/uL  Mean Cell Volume : 89.9 fL  Mean Cell Hemoglobin : 29.7 pg  Mean Cell Hemoglobin Concentration : 33.1 g/dL  Auto Neutrophil # : x  Auto Lymphocyte # : x  Auto Monocyte # : x  Auto Eosinophil # : x  Auto Basophil # : x  Auto Neutrophil % : x  Auto Lymphocyte % : x  Auto Monocyte % : x  Auto Eosinophil % : x  Auto Basophil % : x    05-31    139  |  106  |  14  ----------------------------<  124<H>  4.2   |  22  |  0.70    Ca    7.9<L>      31 May 2017 16:31  Phos  2.8     05-31  Mg     2.1     05-31    TPro  6.2  /  Alb  3.3  /  TBili  1.4<H>  /  DBili  x   /  AST  45<H>  /  ALT  27  /  AlkPhos  52  05-31    PT/INR - ( 31 May 2017 16:31 )   PT: 17.1 sec;   INR: 1.53          PTT - ( 31 May 2017 16:31 )  PTT:31.5 sec  The current medications were reviewed   MEDICATIONS  (STANDING):  aspirin enteric coated 81milliGRAM(s) Oral daily  sodium chloride 0.45%. 1000milliLiter(s) IV Continuous <Continuous>  famotidine Injectable 20milliGRAM(s) IV Push every 12 hours  insulin lispro (HumaLOG) corrective regimen sliding scale  SubCutaneous every 6 hours  dextrose 5%. 1000milliLiter(s) IV Continuous <Continuous>  dextrose 50% Injectable 12.5Gram(s) IV Push once  dextrose 50% Injectable 25Gram(s) IV Push once  dextrose 50% Injectable 25Gram(s) IV Push once  multivitamin 1Tablet(s) Oral daily  thiamine 100milliGRAM(s) Oral daily  folic acid 1milliGRAM(s) Oral daily  acetylcysteine 20% Inhalation 3milliLiter(s) Inhalation every 6 hours  metoclopramide Injectable 10milliGRAM(s) IV Push every 8 hours  atorvastatin 20milliGRAM(s) Oral at bedtime  metoprolol Injectable 5milliGRAM(s) IV Push every 6 hours  levalbuterol Inhalation 0.63milliGRAM(s) Inhalation every 6 hours  dexmedetomidine Infusion 0.5MICROgram(s)/kG/Hr IV Continuous <Continuous>  piperacillin/tazobactam IVPB. 3.375Gram(s) IV Intermittent every 6 hours  piperacillin/tazobactam IVPB.  IV Intermittent   furosemide   Injectable 20milliGRAM(s) IV Push daily    MEDICATIONS  (PRN):  bisacodyl Suppository 10milliGRAM(s) Rectal daily PRN Constipation  dextrose Gel 1Dose(s) Oral once PRN Blood Glucose LESS THAN 70 milliGRAM(s)/deciliter  glucagon  Injectable 1milliGRAM(s) IntraMuscular once PRN Glucose LESS THAN 70 milligrams/deciliter       PROBLEM LIST/ ASSESSMENT:  HEALTH ISSUES - PROBLEM Dx:    Current diagnosis:    Acute hypoxic respiratory failure  Delirium tremens  Right middle lobe PNA  ,   Patient is a 61y old  Male who is  POD # 4 s/p AVR/CABG x 2V     s/p   acute changes include acute respiratory failure    My plan includes :    close hemodynamic, ventilatory and drain monitoring and management per post op routine  Monitor for arrhythmias and monitor parameters for organ perfusion  monitor neurologic status  Head of the bed should remain elevated to 45 deg .   chest PT and IS will be encouraged  monitor adequacy of oxygenation and ventilation and attempt to wean oxygen  Nutritional goals will be met using po eventually , ensure adequate caloric intake and montior the same  Stress ulcer and VTE prophylaxis will be achieved    Glycemic control is satisfactory  Electrolytes have been repleted as necessary and wound care has been carried out. Pain control has been achieved.   agressive physical therapy and early mobility and ambulation goals will be met   The family was updated about the course and plan  CRITICAL CARE TIME SPENT in evaluation and management, reassessments, review and interpretation of labs and x-rays, ventilator and hemodynamic management, formulating a plan and coordinating care: ___90____ MIN.  Time does not include procedural time.  CTICU ATTENDING     					    Delroy Nunez MD

## 2017-05-31 NOTE — PROGRESS NOTE ADULT - SUBJECTIVE AND OBJECTIVE BOX
Name of procedure – Flexible fiberoptic bronchoscopy    Indication –   Respiratory failure    Flexible fiberoptic bronchoscopy was performed under propofol and fentanyl sedation while the patient was intubated for controlled mechanical ventilation  Ventilator settings were adjusted to reduce airway pressures to reduce the risk of ptx  The scope was advanced past the ett which was noted to be 2 cm the salvador   The salvador was sharp  Right LL and RML air way were patent , mucopur thick secretions  Lavaged and sent for culture  Left LL air way mucosae were hemorrhagic with copious purulent secretions, lavaged, Lavaged and sent for culture  CXR confirmed no pneumothorax post bronch  There were no complications  The patient tolerated the procedure well

## 2017-05-31 NOTE — PROGRESS NOTE ADULT - SUBJECTIVE AND OBJECTIVE BOX
Chart reviewed and patient interviewed. Patient is a 61 year old  Black male from [facility] current smoker ( 1 PPDx45 years), current every day use of Alcohol ( 1 pint of Vodka daily and several beers daily for 45 years ) and history of HLD and  HTN who was admitted to [facility] in early May 2017 with Chest pain and worsening HEIN. Had Cardiac Cath which showed CAD and Aortic Stenosis. He was discharged home and then seen at [facility] after awakening at home with severe Chest pain on 5/22/17. Patient was seen in Cardiac Clinic on 5/22/17 and was admitted for pre-op work up for possible CABG/AVR. Last drink was a bottle of beer on morning of 5/23/17, the day of admission to [facility].       Patient's wife reports he has gone through acute Alcohol withdrawal in the past when hospitalized and has become acutely agitated requiring sedation and restraint. Overnight patient became acutely agitated secondary to alcohol withdrawal and required sedation and intubation.  Patient proceeded to surgery 5/26 and underwent a CABG and AVR. Patient tolerated surgery well and remained intubated and sedated. Patient reported to have been agitated yesterday secondary to Alcohol Withdrawal and is now receiving Librium 50mg q 6h and agitation is reported to be controlled. Patient was extubated early this afternoon 5/29 which he is tolerating well. Is able to follow simple commands and is attempting to speak. NGT was removed yesterday and he did not have Librium overnight. He became more anxious consistent with persistent withdrawal. NGT was reinserted and he has been restarted on Librium at 25mg via NGT q6h until withdrawal is completed. This afternoon patient required reintubation because of compromised pulmonary status and possible pneumonia. Patient continuing on sedation as needed.  Continuing present treatment. Encouragement and support provided.

## 2017-06-01 LAB
ALBUMIN SERPL ELPH-MCNC: 3 G/DL — LOW (ref 3.3–5)
ALBUMIN SERPL ELPH-MCNC: 3.1 G/DL — LOW (ref 3.3–5)
ALBUMIN SERPL ELPH-MCNC: 3.3 G/DL — SIGNIFICANT CHANGE UP (ref 3.3–5)
ALP SERPL-CCNC: 48 U/L — SIGNIFICANT CHANGE UP (ref 40–120)
ALP SERPL-CCNC: 52 U/L — SIGNIFICANT CHANGE UP (ref 40–120)
ALP SERPL-CCNC: 56 U/L — SIGNIFICANT CHANGE UP (ref 40–120)
ALT FLD-CCNC: 20 U/L — SIGNIFICANT CHANGE UP (ref 10–45)
ALT FLD-CCNC: 23 U/L — SIGNIFICANT CHANGE UP (ref 10–45)
ALT FLD-CCNC: 24 U/L — SIGNIFICANT CHANGE UP (ref 10–45)
ANION GAP SERPL CALC-SCNC: 10 MMOL/L — SIGNIFICANT CHANGE UP (ref 5–17)
ANION GAP SERPL CALC-SCNC: 11 MMOL/L — SIGNIFICANT CHANGE UP (ref 5–17)
ANION GAP SERPL CALC-SCNC: 12 MMOL/L — SIGNIFICANT CHANGE UP (ref 5–17)
APTT BLD: 30.6 SEC — SIGNIFICANT CHANGE UP (ref 27.5–37.4)
APTT BLD: 32.4 SEC — SIGNIFICANT CHANGE UP (ref 27.5–37.4)
APTT BLD: 33.4 SEC — SIGNIFICANT CHANGE UP (ref 27.5–37.4)
AST SERPL-CCNC: 32 U/L — SIGNIFICANT CHANGE UP (ref 10–40)
AST SERPL-CCNC: 35 U/L — SIGNIFICANT CHANGE UP (ref 10–40)
AST SERPL-CCNC: 37 U/L — SIGNIFICANT CHANGE UP (ref 10–40)
BILIRUB DIRECT SERPL-MCNC: 0.7 MG/DL — HIGH (ref 0–0.2)
BILIRUB INDIRECT FLD-MCNC: 1 MG/DL — SIGNIFICANT CHANGE UP (ref 0.2–1)
BILIRUB SERPL-MCNC: 1.4 MG/DL — HIGH (ref 0.2–1.2)
BILIRUB SERPL-MCNC: 1.4 MG/DL — HIGH (ref 0.2–1.2)
BILIRUB SERPL-MCNC: 1.7 MG/DL — HIGH (ref 0.2–1.2)
BLD GP AB SCN SERPL QL: NEGATIVE — SIGNIFICANT CHANGE UP
BUN SERPL-MCNC: 11 MG/DL — SIGNIFICANT CHANGE UP (ref 7–23)
BUN SERPL-MCNC: 13 MG/DL — SIGNIFICANT CHANGE UP (ref 7–23)
BUN SERPL-MCNC: 13 MG/DL — SIGNIFICANT CHANGE UP (ref 7–23)
CALCIUM SERPL-MCNC: 7.8 MG/DL — LOW (ref 8.4–10.5)
CALCIUM SERPL-MCNC: 8.1 MG/DL — LOW (ref 8.4–10.5)
CALCIUM SERPL-MCNC: 8.3 MG/DL — LOW (ref 8.4–10.5)
CHLORIDE SERPL-SCNC: 103 MMOL/L — SIGNIFICANT CHANGE UP (ref 96–108)
CHLORIDE SERPL-SCNC: 104 MMOL/L — SIGNIFICANT CHANGE UP (ref 96–108)
CHLORIDE SERPL-SCNC: 108 MMOL/L — SIGNIFICANT CHANGE UP (ref 96–108)
CO2 SERPL-SCNC: 22 MMOL/L — SIGNIFICANT CHANGE UP (ref 22–31)
CO2 SERPL-SCNC: 23 MMOL/L — SIGNIFICANT CHANGE UP (ref 22–31)
CO2 SERPL-SCNC: 23 MMOL/L — SIGNIFICANT CHANGE UP (ref 22–31)
CREAT SERPL-MCNC: 0.6 MG/DL — SIGNIFICANT CHANGE UP (ref 0.5–1.3)
CREAT SERPL-MCNC: 0.7 MG/DL — SIGNIFICANT CHANGE UP (ref 0.5–1.3)
CREAT SERPL-MCNC: 0.7 MG/DL — SIGNIFICANT CHANGE UP (ref 0.5–1.3)
CULTURE RESULTS: SIGNIFICANT CHANGE UP
GAS PNL BLDA: SIGNIFICANT CHANGE UP
GLUCOSE SERPL-MCNC: 128 MG/DL — HIGH (ref 70–99)
GLUCOSE SERPL-MCNC: 147 MG/DL — HIGH (ref 70–99)
GLUCOSE SERPL-MCNC: 182 MG/DL — HIGH (ref 70–99)
GRAM STN FLD: SIGNIFICANT CHANGE UP
GRAM STN FLD: SIGNIFICANT CHANGE UP
HCT VFR BLD CALC: 26.7 % — LOW (ref 39–50)
HCT VFR BLD CALC: 27.3 % — LOW (ref 39–50)
HGB BLD-MCNC: 8.9 G/DL — LOW (ref 13–17)
HGB BLD-MCNC: 9.3 G/DL — LOW (ref 13–17)
INR BLD: 1.65 — HIGH (ref 0.88–1.16)
INR BLD: 1.76 — HIGH (ref 0.88–1.16)
INR BLD: 1.85 — HIGH (ref 0.88–1.16)
LACTATE SERPL-SCNC: 1.7 MMOL/L — SIGNIFICANT CHANGE UP (ref 0.5–2)
MAGNESIUM SERPL-MCNC: 1.9 MG/DL — SIGNIFICANT CHANGE UP (ref 1.6–2.6)
MAGNESIUM SERPL-MCNC: 2 MG/DL — SIGNIFICANT CHANGE UP (ref 1.6–2.6)
MAGNESIUM SERPL-MCNC: 2 MG/DL — SIGNIFICANT CHANGE UP (ref 1.6–2.6)
MCHC RBC-ENTMCNC: 29.4 PG — SIGNIFICANT CHANGE UP (ref 27–34)
MCHC RBC-ENTMCNC: 30.2 PG — SIGNIFICANT CHANGE UP (ref 27–34)
MCHC RBC-ENTMCNC: 33.3 G/DL — SIGNIFICANT CHANGE UP (ref 32–36)
MCHC RBC-ENTMCNC: 34.1 G/DL — SIGNIFICANT CHANGE UP (ref 32–36)
MCV RBC AUTO: 88.1 FL — SIGNIFICANT CHANGE UP (ref 80–100)
MCV RBC AUTO: 88.6 FL — SIGNIFICANT CHANGE UP (ref 80–100)
PHOSPHATE SERPL-MCNC: 2 MG/DL — LOW (ref 2.5–4.5)
PHOSPHATE SERPL-MCNC: 2.2 MG/DL — LOW (ref 2.5–4.5)
PHOSPHATE SERPL-MCNC: 3.1 MG/DL — SIGNIFICANT CHANGE UP (ref 2.5–4.5)
PLATELET # BLD AUTO: 68 K/UL — LOW (ref 150–400)
PLATELET # BLD AUTO: 69 K/UL — LOW (ref 150–400)
POTASSIUM SERPL-MCNC: 3.7 MMOL/L — SIGNIFICANT CHANGE UP (ref 3.5–5.3)
POTASSIUM SERPL-MCNC: 3.8 MMOL/L — SIGNIFICANT CHANGE UP (ref 3.5–5.3)
POTASSIUM SERPL-MCNC: 4.2 MMOL/L — SIGNIFICANT CHANGE UP (ref 3.5–5.3)
POTASSIUM SERPL-SCNC: 3.7 MMOL/L — SIGNIFICANT CHANGE UP (ref 3.5–5.3)
POTASSIUM SERPL-SCNC: 3.8 MMOL/L — SIGNIFICANT CHANGE UP (ref 3.5–5.3)
POTASSIUM SERPL-SCNC: 4.2 MMOL/L — SIGNIFICANT CHANGE UP (ref 3.5–5.3)
PROT SERPL-MCNC: 5.9 G/DL — LOW (ref 6–8.3)
PROT SERPL-MCNC: 6.1 G/DL — SIGNIFICANT CHANGE UP (ref 6–8.3)
PROT SERPL-MCNC: 6.1 G/DL — SIGNIFICANT CHANGE UP (ref 6–8.3)
PROTHROM AB SERPL-ACNC: 18.5 SEC — HIGH (ref 9.8–12.7)
PROTHROM AB SERPL-ACNC: 19.8 SEC — HIGH (ref 9.8–12.7)
PROTHROM AB SERPL-ACNC: 20.8 SEC — HIGH (ref 9.8–12.7)
RBC # BLD: 3.03 M/UL — LOW (ref 4.2–5.8)
RBC # BLD: 3.08 M/UL — LOW (ref 4.2–5.8)
RBC # FLD: 16.4 % — SIGNIFICANT CHANGE UP (ref 10.3–16.9)
RBC # FLD: 16.8 % — SIGNIFICANT CHANGE UP (ref 10.3–16.9)
RH IG SCN BLD-IMP: POSITIVE — SIGNIFICANT CHANGE UP
SODIUM SERPL-SCNC: 138 MMOL/L — SIGNIFICANT CHANGE UP (ref 135–145)
SODIUM SERPL-SCNC: 138 MMOL/L — SIGNIFICANT CHANGE UP (ref 135–145)
SODIUM SERPL-SCNC: 140 MMOL/L — SIGNIFICANT CHANGE UP (ref 135–145)
SPECIMEN SOURCE: SIGNIFICANT CHANGE UP
SPECIMEN SOURCE: SIGNIFICANT CHANGE UP
SRA INTERP SER-IMP: SIGNIFICANT CHANGE UP
VANCOMYCIN TROUGH SERPL-MCNC: 5.4 UG/ML — LOW (ref 10–20)
WBC # BLD: 8.1 K/UL — SIGNIFICANT CHANGE UP (ref 3.8–10.5)
WBC # BLD: 9.6 K/UL — SIGNIFICANT CHANGE UP (ref 3.8–10.5)
WBC # FLD AUTO: 8.1 K/UL — SIGNIFICANT CHANGE UP (ref 3.8–10.5)
WBC # FLD AUTO: 9.6 K/UL — SIGNIFICANT CHANGE UP (ref 3.8–10.5)

## 2017-06-01 PROCEDURE — 99292 CRITICAL CARE ADDL 30 MIN: CPT | Mod: 25

## 2017-06-01 PROCEDURE — 31622 DX BRONCHOSCOPE/WASH: CPT

## 2017-06-01 PROCEDURE — 93010 ELECTROCARDIOGRAM REPORT: CPT

## 2017-06-01 PROCEDURE — 71010: CPT | Mod: 26

## 2017-06-01 PROCEDURE — 99291 CRITICAL CARE FIRST HOUR: CPT | Mod: 25

## 2017-06-01 RX ORDER — ALBUMIN HUMAN 25 %
250 VIAL (ML) INTRAVENOUS
Qty: 0 | Refills: 0 | Status: COMPLETED | OUTPATIENT
Start: 2017-06-01 | End: 2017-06-01

## 2017-06-01 RX ORDER — NOREPINEPHRINE BITARTRATE/D5W 8 MG/250ML
0.01 PLASTIC BAG, INJECTION (ML) INTRAVENOUS
Qty: 8 | Refills: 0 | Status: DISCONTINUED | OUTPATIENT
Start: 2017-06-01 | End: 2017-06-02

## 2017-06-01 RX ORDER — FUROSEMIDE 40 MG
20 TABLET ORAL ONCE
Qty: 0 | Refills: 0 | Status: COMPLETED | OUTPATIENT
Start: 2017-06-01 | End: 2017-06-01

## 2017-06-01 RX ORDER — POTASSIUM CHLORIDE 20 MEQ
20 PACKET (EA) ORAL ONCE
Qty: 0 | Refills: 0 | Status: COMPLETED | OUTPATIENT
Start: 2017-06-01 | End: 2017-06-01

## 2017-06-01 RX ORDER — DEXMEDETOMIDINE HYDROCHLORIDE IN 0.9% SODIUM CHLORIDE 4 UG/ML
0.1 INJECTION INTRAVENOUS
Qty: 200 | Refills: 0 | Status: DISCONTINUED | OUTPATIENT
Start: 2017-06-01 | End: 2017-06-08

## 2017-06-01 RX ORDER — VANCOMYCIN HCL 1 G
1500 VIAL (EA) INTRAVENOUS ONCE
Qty: 0 | Refills: 0 | Status: COMPLETED | OUTPATIENT
Start: 2017-06-01 | End: 2017-06-01

## 2017-06-01 RX ORDER — ALBUMIN HUMAN 25 %
250 VIAL (ML) INTRAVENOUS ONCE
Qty: 0 | Refills: 0 | Status: COMPLETED | OUTPATIENT
Start: 2017-06-01 | End: 2017-06-01

## 2017-06-01 RX ORDER — CALCIUM GLUCONATE 100 MG/ML
2 VIAL (ML) INTRAVENOUS ONCE
Qty: 0 | Refills: 0 | Status: COMPLETED | OUTPATIENT
Start: 2017-06-01 | End: 2017-06-01

## 2017-06-01 RX ORDER — POTASSIUM PHOSPHATE, MONOBASIC POTASSIUM PHOSPHATE, DIBASIC 236; 224 MG/ML; MG/ML
15 INJECTION, SOLUTION INTRAVENOUS ONCE
Qty: 0 | Refills: 0 | Status: COMPLETED | OUTPATIENT
Start: 2017-06-01 | End: 2017-06-01

## 2017-06-01 RX ADMIN — PIPERACILLIN AND TAZOBACTAM 200 GRAM(S): 4; .5 INJECTION, POWDER, LYOPHILIZED, FOR SOLUTION INTRAVENOUS at 06:46

## 2017-06-01 RX ADMIN — DEXMEDETOMIDINE HYDROCHLORIDE IN 0.9% SODIUM CHLORIDE 8.35 MICROGRAM(S)/KG/HR: 4 INJECTION INTRAVENOUS at 14:16

## 2017-06-01 RX ADMIN — ATORVASTATIN CALCIUM 20 MILLIGRAM(S): 80 TABLET, FILM COATED ORAL at 00:01

## 2017-06-01 RX ADMIN — FAMOTIDINE 20 MILLIGRAM(S): 10 INJECTION INTRAVENOUS at 18:38

## 2017-06-01 RX ADMIN — LEVALBUTEROL 0.63 MILLIGRAM(S): 1.25 SOLUTION, CONCENTRATE RESPIRATORY (INHALATION) at 17:29

## 2017-06-01 RX ADMIN — Medication 3 MILLILITER(S): at 23:40

## 2017-06-01 RX ADMIN — Medication 125 MILLILITER(S): at 18:59

## 2017-06-01 RX ADMIN — Medication 1 TABLET(S): at 14:33

## 2017-06-01 RX ADMIN — Medication 125 MILLILITER(S): at 19:55

## 2017-06-01 RX ADMIN — Medication 10 MILLIGRAM(S): at 00:01

## 2017-06-01 RX ADMIN — PIPERACILLIN AND TAZOBACTAM 200 GRAM(S): 4; .5 INJECTION, POWDER, LYOPHILIZED, FOR SOLUTION INTRAVENOUS at 00:01

## 2017-06-01 RX ADMIN — Medication 3 MILLILITER(S): at 17:29

## 2017-06-01 RX ADMIN — POTASSIUM PHOSPHATE, MONOBASIC POTASSIUM PHOSPHATE, DIBASIC 62.5 MILLIMOLE(S): 236; 224 INJECTION, SOLUTION INTRAVENOUS at 05:32

## 2017-06-01 RX ADMIN — Medication 81 MILLIGRAM(S): at 14:33

## 2017-06-01 RX ADMIN — Medication 100 MILLIEQUIVALENT(S): at 05:06

## 2017-06-01 RX ADMIN — LEVALBUTEROL 0.63 MILLIGRAM(S): 1.25 SOLUTION, CONCENTRATE RESPIRATORY (INHALATION) at 14:11

## 2017-06-01 RX ADMIN — PIPERACILLIN AND TAZOBACTAM 200 GRAM(S): 4; .5 INJECTION, POWDER, LYOPHILIZED, FOR SOLUTION INTRAVENOUS at 18:38

## 2017-06-01 RX ADMIN — LEVALBUTEROL 0.63 MILLIGRAM(S): 1.25 SOLUTION, CONCENTRATE RESPIRATORY (INHALATION) at 00:51

## 2017-06-01 RX ADMIN — Medication 125 MILLILITER(S): at 08:00

## 2017-06-01 RX ADMIN — Medication 2: at 06:47

## 2017-06-01 RX ADMIN — FAMOTIDINE 20 MILLIGRAM(S): 10 INJECTION INTRAVENOUS at 06:47

## 2017-06-01 RX ADMIN — PIPERACILLIN AND TAZOBACTAM 200 GRAM(S): 4; .5 INJECTION, POWDER, LYOPHILIZED, FOR SOLUTION INTRAVENOUS at 11:23

## 2017-06-01 RX ADMIN — LEVALBUTEROL 0.63 MILLIGRAM(S): 1.25 SOLUTION, CONCENTRATE RESPIRATORY (INHALATION) at 06:06

## 2017-06-01 RX ADMIN — Medication 1 MILLIGRAM(S): at 14:33

## 2017-06-01 RX ADMIN — Medication 250 MILLIGRAM(S): at 06:46

## 2017-06-01 RX ADMIN — Medication 3 MILLILITER(S): at 00:50

## 2017-06-01 RX ADMIN — Medication 100 MILLIGRAM(S): at 14:33

## 2017-06-01 RX ADMIN — LEVALBUTEROL 0.63 MILLIGRAM(S): 1.25 SOLUTION, CONCENTRATE RESPIRATORY (INHALATION) at 23:41

## 2017-06-01 RX ADMIN — Medication 200 GRAM(S): at 09:00

## 2017-06-01 RX ADMIN — Medication 3 MILLILITER(S): at 12:09

## 2017-06-01 RX ADMIN — Medication 3 MILLILITER(S): at 06:07

## 2017-06-01 NOTE — PROGRESS NOTE ADULT - SUBJECTIVE AND OBJECTIVE BOX
Chart reviewed and patient interviewed. Patient is a 61 year old  Black male from [facility] current smoker ( 1 PPDx45 years), current every day use of Alcohol ( 1 pint of Vodka daily and several beers daily for 45 years ) and history of HLD and  HTN who was admitted to [facility] in early May 2017 with Chest pain and worsening HEIN. Had Cardiac Cath which showed CAD and Aortic Stenosis. He was discharged home and then seen at [facility] after awakening at home with severe Chest pain on 5/22/17. Patient was seen in Cardiac Clinic on 5/22/17 and was admitted for pre-op work up for possible CABG/AVR. Last drink was a bottle of beer on morning of 5/23/17, the day of admission to [facility].       Patient's wife reports he has gone through acute Alcohol withdrawal in the past when hospitalized and has become acutely agitated requiring sedation and restraint. Overnight patient became acutely agitated secondary to alcohol withdrawal and required sedation and intubation.  Patient proceeded to surgery 5/26 and underwent a CABG and AVR. Patient tolerated surgery well and remained intubated and sedated. Patient reported to have been agitated yesterday secondary to Alcohol Withdrawal and is now receiving Librium 50mg q 6h and agitation is reported to be controlled. Patient was extubated 5/29 which he was tolerating well and able to follow simple commands and  attempting to speak. NGT was removed. He did not have Librium overnight. He then became more anxious consistent with persistent withdrawal. NGT was reinserted and he was restarted on Librium at 25mg via NGT q6h until withdrawal is completed. Yesterday patient required reintubation because of compromised pulmonary status and possible pneumonia. Patient continuing on sedation as needed. Today patient remains intubated and is receiving Precedex and is sedated. When turned becomes restless.  Continuing present treatment.

## 2017-06-02 LAB
ALBUMIN SERPL ELPH-MCNC: 3.3 G/DL — SIGNIFICANT CHANGE UP (ref 3.3–5)
ALP SERPL-CCNC: 62 U/L — SIGNIFICANT CHANGE UP (ref 40–120)
ALT FLD-CCNC: 18 U/L — SIGNIFICANT CHANGE UP (ref 10–45)
ANION GAP SERPL CALC-SCNC: 11 MMOL/L — SIGNIFICANT CHANGE UP (ref 5–17)
APPEARANCE UR: CLEAR — SIGNIFICANT CHANGE UP
APTT BLD: 34 SEC — SIGNIFICANT CHANGE UP (ref 27.5–37.4)
AST SERPL-CCNC: 30 U/L — SIGNIFICANT CHANGE UP (ref 10–40)
BILIRUB SERPL-MCNC: 1.4 MG/DL — HIGH (ref 0.2–1.2)
BILIRUB UR-MCNC: (no result)
BUN SERPL-MCNC: 12 MG/DL — SIGNIFICANT CHANGE UP (ref 7–23)
CALCIUM SERPL-MCNC: 8.3 MG/DL — LOW (ref 8.4–10.5)
CHLORIDE SERPL-SCNC: 104 MMOL/L — SIGNIFICANT CHANGE UP (ref 96–108)
CO2 SERPL-SCNC: 23 MMOL/L — SIGNIFICANT CHANGE UP (ref 22–31)
COLOR SPEC: SIGNIFICANT CHANGE UP
CREAT SERPL-MCNC: 0.7 MG/DL — SIGNIFICANT CHANGE UP (ref 0.5–1.3)
DIFF PNL FLD: NEGATIVE — SIGNIFICANT CHANGE UP
GAS PNL BLDA: SIGNIFICANT CHANGE UP
GLUCOSE SERPL-MCNC: 137 MG/DL — HIGH (ref 70–99)
GLUCOSE UR QL: NEGATIVE — SIGNIFICANT CHANGE UP
HCT VFR BLD CALC: 28.9 % — LOW (ref 39–50)
HGB BLD-MCNC: 9.7 G/DL — LOW (ref 13–17)
INR BLD: 1.75 — HIGH (ref 0.88–1.16)
KETONES UR-MCNC: NEGATIVE — SIGNIFICANT CHANGE UP
LEUKOCYTE ESTERASE UR-ACNC: NEGATIVE — SIGNIFICANT CHANGE UP
MAGNESIUM SERPL-MCNC: 2.1 MG/DL — SIGNIFICANT CHANGE UP (ref 1.6–2.6)
MCHC RBC-ENTMCNC: 29.8 PG — SIGNIFICANT CHANGE UP (ref 27–34)
MCHC RBC-ENTMCNC: 33.6 G/DL — SIGNIFICANT CHANGE UP (ref 32–36)
MCV RBC AUTO: 88.9 FL — SIGNIFICANT CHANGE UP (ref 80–100)
NITRITE UR-MCNC: NEGATIVE — SIGNIFICANT CHANGE UP
PH UR: 6.5 — SIGNIFICANT CHANGE UP (ref 5–8)
PHOSPHATE SERPL-MCNC: 3.5 MG/DL — SIGNIFICANT CHANGE UP (ref 2.5–4.5)
PLATELET # BLD AUTO: 98 K/UL — LOW (ref 150–400)
POTASSIUM SERPL-MCNC: 4.1 MMOL/L — SIGNIFICANT CHANGE UP (ref 3.5–5.3)
POTASSIUM SERPL-SCNC: 4.1 MMOL/L — SIGNIFICANT CHANGE UP (ref 3.5–5.3)
PROT SERPL-MCNC: 6.2 G/DL — SIGNIFICANT CHANGE UP (ref 6–8.3)
PROT UR-MCNC: (no result) MG/DL
PROTHROM AB SERPL-ACNC: 19.7 SEC — HIGH (ref 9.8–12.7)
RBC # BLD: 3.25 M/UL — LOW (ref 4.2–5.8)
RBC # FLD: 16.2 % — SIGNIFICANT CHANGE UP (ref 10.3–16.9)
SODIUM SERPL-SCNC: 138 MMOL/L — SIGNIFICANT CHANGE UP (ref 135–145)
SP GR SPEC: 1.01 — SIGNIFICANT CHANGE UP (ref 1–1.03)
UROBILINOGEN FLD QL: >=8 E.U./DL
WBC # BLD: 14.5 K/UL — HIGH (ref 3.8–10.5)
WBC # FLD AUTO: 14.5 K/UL — HIGH (ref 3.8–10.5)

## 2017-06-02 PROCEDURE — 99291 CRITICAL CARE FIRST HOUR: CPT

## 2017-06-02 PROCEDURE — 71010: CPT | Mod: 26,76

## 2017-06-02 RX ORDER — ACETAMINOPHEN 500 MG
1000 TABLET ORAL ONCE
Qty: 0 | Refills: 0 | Status: COMPLETED | OUTPATIENT
Start: 2017-06-02 | End: 2017-06-02

## 2017-06-02 RX ORDER — ACETAMINOPHEN 500 MG
650 TABLET ORAL ONCE
Qty: 0 | Refills: 0 | Status: COMPLETED | OUTPATIENT
Start: 2017-06-02 | End: 2017-06-02

## 2017-06-02 RX ORDER — NOREPINEPHRINE BITARTRATE/D5W 8 MG/250ML
0.01 PLASTIC BAG, INJECTION (ML) INTRAVENOUS
Qty: 8 | Refills: 0 | Status: DISCONTINUED | OUTPATIENT
Start: 2017-06-02 | End: 2017-06-06

## 2017-06-02 RX ADMIN — Medication 10 MILLIGRAM(S): at 18:16

## 2017-06-02 RX ADMIN — PIPERACILLIN AND TAZOBACTAM 200 GRAM(S): 4; .5 INJECTION, POWDER, LYOPHILIZED, FOR SOLUTION INTRAVENOUS at 00:32

## 2017-06-02 RX ADMIN — FAMOTIDINE 20 MILLIGRAM(S): 10 INJECTION INTRAVENOUS at 18:16

## 2017-06-02 RX ADMIN — Medication 250 MILLIGRAM(S): at 12:00

## 2017-06-02 RX ADMIN — Medication 81 MILLIGRAM(S): at 15:08

## 2017-06-02 RX ADMIN — LEVALBUTEROL 0.63 MILLIGRAM(S): 1.25 SOLUTION, CONCENTRATE RESPIRATORY (INHALATION) at 07:13

## 2017-06-02 RX ADMIN — PIPERACILLIN AND TAZOBACTAM 200 GRAM(S): 4; .5 INJECTION, POWDER, LYOPHILIZED, FOR SOLUTION INTRAVENOUS at 18:16

## 2017-06-02 RX ADMIN — Medication 3 MILLILITER(S): at 18:15

## 2017-06-02 RX ADMIN — LEVALBUTEROL 0.63 MILLIGRAM(S): 1.25 SOLUTION, CONCENTRATE RESPIRATORY (INHALATION) at 18:15

## 2017-06-02 RX ADMIN — Medication 250 MILLIGRAM(S): at 23:31

## 2017-06-02 RX ADMIN — ATORVASTATIN CALCIUM 20 MILLIGRAM(S): 80 TABLET, FILM COATED ORAL at 23:29

## 2017-06-02 RX ADMIN — Medication 400 MILLIGRAM(S): at 17:30

## 2017-06-02 RX ADMIN — LEVALBUTEROL 0.63 MILLIGRAM(S): 1.25 SOLUTION, CONCENTRATE RESPIRATORY (INHALATION) at 11:35

## 2017-06-02 RX ADMIN — ATORVASTATIN CALCIUM 20 MILLIGRAM(S): 80 TABLET, FILM COATED ORAL at 00:32

## 2017-06-02 RX ADMIN — Medication 20 MILLIGRAM(S): at 06:30

## 2017-06-02 RX ADMIN — PIPERACILLIN AND TAZOBACTAM 200 GRAM(S): 4; .5 INJECTION, POWDER, LYOPHILIZED, FOR SOLUTION INTRAVENOUS at 06:32

## 2017-06-02 RX ADMIN — FAMOTIDINE 20 MILLIGRAM(S): 10 INJECTION INTRAVENOUS at 06:30

## 2017-06-02 RX ADMIN — Medication 300 MILLIGRAM(S): at 00:31

## 2017-06-02 RX ADMIN — Medication 3 MILLILITER(S): at 11:35

## 2017-06-02 RX ADMIN — PIPERACILLIN AND TAZOBACTAM 200 GRAM(S): 4; .5 INJECTION, POWDER, LYOPHILIZED, FOR SOLUTION INTRAVENOUS at 23:29

## 2017-06-02 RX ADMIN — Medication 3 MILLILITER(S): at 07:12

## 2017-06-02 RX ADMIN — PIPERACILLIN AND TAZOBACTAM 200 GRAM(S): 4; .5 INJECTION, POWDER, LYOPHILIZED, FOR SOLUTION INTRAVENOUS at 12:00

## 2017-06-02 RX ADMIN — Medication 1 MILLIGRAM(S): at 15:08

## 2017-06-02 RX ADMIN — Medication 100 MILLIGRAM(S): at 15:08

## 2017-06-02 RX ADMIN — Medication 1 TABLET(S): at 15:09

## 2017-06-02 NOTE — PROGRESS NOTE ADULT - SUBJECTIVE AND OBJECTIVE BOX
Chart reviewed and patient interviewed. Patient is a 61 year old  Black male from [facility] current smoker ( 1 PPDx45 years), current every day use of Alcohol ( 1 pint of Vodka daily and several beers daily for 45 years ) and history of HLD and  HTN who was admitted to [facility] in early May 2017 with Chest pain and worsening HEIN. Had Cardiac Cath which showed CAD and Aortic Stenosis. He was discharged home and then seen at [facility] after awakening at home with severe Chest pain on 5/22/17. Patient was seen in Cardiac Clinic on 5/22/17 and was admitted for pre-op work up for possible CABG/AVR. Last drink was a bottle of beer on morning of 5/23/17, the day of admission to [facility].       Patient's wife reports he has gone through acute Alcohol withdrawal in the past when hospitalized and has become acutely agitated requiring sedation and restraint. Overnight patient became acutely agitated secondary to alcohol withdrawal and required sedation and intubation.  Patient proceeded to surgery 5/26 and underwent a CABG and AVR. Patient tolerated surgery well and remained intubated and sedated. Patient reported to have been agitated yesterday secondary to Alcohol Withdrawal and is now receiving Librium 50mg q 6h and agitation is reported to be controlled. Patient was extubated 5/29 which he was tolerating well and able to follow simple commands and  attempting to speak. NGT was removed. He did not have Librium overnight. He then became more anxious consistent with persistent withdrawal. NGT was reinserted and he was restarted on Librium at 25mg via NGT q6h until withdrawal is completed. Yesterday patient required reintubation because of compromised pulmonary status and possible pneumonia. Patient continuing on sedation as needed. Patient has remained intubated and is receiving Precedex and is sedated. Today he is reported to have had periods when he was awake, responsive and somewhat engagable. He is now partially arousable  Continuing present treatment.

## 2017-06-02 NOTE — PROGRESS NOTE ADULT - SUBJECTIVE AND OBJECTIVE BOX
PMH :  CAD  No h/o HF  Handoff  MEWS Score  Aortic valve stenosis, unspecified etiology  Hyperlipidemia  Smoker  ETOH abuse  HTN (hypertension)  Aortic valve stenosis, unspecified etiology  Coronary artery disease with other form of angina pectoris  Aortic valve stenosis, unspecified etiology  Coronary artery disease with other form of angina pectoris  CABG  AVR (aortic valve replacement)    ROS  All other systems reviewed and negative.    ICU Vital Signs Last 24 Hrs  T(C): 37.8, Max: 39.5 (06-02 @ 15:17)  T(F): 100, Max: 103.1 (06-02 @ 15:17)  HR: 80 (68 - 94)  BP: 109/65 (95/63 - 136/71)  BP(mean): 80 (71 - 96)  ABP: 124/52 (88/42 - 162/70)  ABP(mean): 80 (58 - 108)  RR: 28 (11 - 37)  SpO2: 98% (96% - 100%)    I&O's Summary  I & Os for 24h ending 02 Jun 2017 07:00  =============================================  IN: 1768.2 ml / OUT: 2175 ml / NET: -406.8 ml    I & Os for current day (as of 02 Jun 2017 19:36)  =============================================  IN: 753.7 ml / OUT: 1290 ml / NET: -536.3 ml    Mode: AC/ CMV (Assist Control/ Continuous Mandatory Ventilation)  RR (machine): 12  TV (machine): 600  FiO2: 50  PEEP: 8  ITime: 1  MAP: 11  PIP: 22    ABG - ( 02 Jun 2017 04:00 )  pH: 7.40  /  pCO2: 37    /  pO2: 143   / HCO3: 22    / Base Excess: -2.0  /  SaO2: 99                                      9.7    14.5  )-----------( 98       ( 02 Jun 2017 04:10 )             28.9     02 Jun 2017 04:10    138    |  104    |  12     ----------------------------<  137    4.1     |  23     |  0.70     Ca    8.3        02 Jun 2017 04:10  Phos  3.5       02 Jun 2017 04:10  Mg     2.1       02 Jun 2017 04:10    TPro  6.2    /  Alb  3.3    /  TBili  1.4    /  DBili  x      /  AST  30     /  ALT  18     /  AlkPhos  62     02 Jun 2017 04:10    PT/INR - ( 02 Jun 2017 04:10 )   PT: 19.7 sec;   INR: 1.75          PTT - ( 02 Jun 2017 04:10 )  PTT:34.0 sec  MEDICATIONS  (STANDING):  aspirin enteric coated 81milliGRAM(s) Oral daily  sodium chloride 0.45%. 1000milliLiter(s) IV Continuous <Continuous>  famotidine Injectable 20milliGRAM(s) IV Push every 12 hours  insulin lispro (HumaLOG) corrective regimen sliding scale  SubCutaneous every 6 hours  dextrose 5%. 1000milliLiter(s) IV Continuous <Continuous>  dextrose 50% Injectable 12.5Gram(s) IV Push once  dextrose 50% Injectable 25Gram(s) IV Push once  dextrose 50% Injectable 25Gram(s) IV Push once  multivitamin 1Tablet(s) Oral daily  thiamine 100milliGRAM(s) Oral daily  folic acid 1milliGRAM(s) Oral daily  acetylcysteine 20% Inhalation 3milliLiter(s) Inhalation every 6 hours  atorvastatin 20milliGRAM(s) Oral at bedtime  metoprolol Injectable 5milliGRAM(s) IV Push every 6 hours  levalbuterol Inhalation 0.63milliGRAM(s) Inhalation every 6 hours  piperacillin/tazobactam IVPB. 3.375Gram(s) IV Intermittent every 6 hours  piperacillin/tazobactam IVPB.  IV Intermittent   furosemide   Injectable 20milliGRAM(s) IV Push daily  propofol Infusion 10MICROgram(s)/kG/Min IV Continuous <Continuous>  vancomycin  IVPB 1000milliGRAM(s) IV Intermittent every 12 hours  dexmedetomidine Infusion 0.1MICROgram(s)/kG/Hr IV Continuous <Continuous>  norepinephrine Infusion 0.01MICROgram(s)/kG/Min IV Continuous <Continuous>    PHYSICAL EXAM:  Gen : no acute distress  Neck: No LAD, No JVD  Respiratory: decreased in the bases  Cardiovascular: S1 and S2, RRR, no M/G/R  Gastrointestinal: BS+, soft, NT/ND  Extremities: No peripheral edema  Vascular: 2+ peripheral pulses  Neurological: A/O x 3, no focal deficits  Incision: clean dry/ no sign of infection  Lines: no sign of infection

## 2017-06-03 LAB
ALBUMIN SERPL ELPH-MCNC: 3 G/DL — LOW (ref 3.3–5)
ALBUMIN SERPL ELPH-MCNC: 3 G/DL — LOW (ref 3.3–5)
ALBUMIN SERPL ELPH-MCNC: 3.1 G/DL — LOW (ref 3.3–5)
ALP SERPL-CCNC: 66 U/L — SIGNIFICANT CHANGE UP (ref 40–120)
ALP SERPL-CCNC: 73 U/L — SIGNIFICANT CHANGE UP (ref 40–120)
ALP SERPL-CCNC: 78 U/L — SIGNIFICANT CHANGE UP (ref 40–120)
ALT FLD-CCNC: 17 U/L — SIGNIFICANT CHANGE UP (ref 10–45)
ALT FLD-CCNC: 19 U/L — SIGNIFICANT CHANGE UP (ref 10–45)
ALT FLD-CCNC: 23 U/L — SIGNIFICANT CHANGE UP (ref 10–45)
ANION GAP SERPL CALC-SCNC: 10 MMOL/L — SIGNIFICANT CHANGE UP (ref 5–17)
ANION GAP SERPL CALC-SCNC: 12 MMOL/L — SIGNIFICANT CHANGE UP (ref 5–17)
ANION GAP SERPL CALC-SCNC: 9 MMOL/L — SIGNIFICANT CHANGE UP (ref 5–17)
APTT BLD: 31.9 SEC — SIGNIFICANT CHANGE UP (ref 27.5–37.4)
APTT BLD: 32.6 SEC — SIGNIFICANT CHANGE UP (ref 27.5–37.4)
APTT BLD: 33.7 SEC — SIGNIFICANT CHANGE UP (ref 27.5–37.4)
AST SERPL-CCNC: 31 U/L — SIGNIFICANT CHANGE UP (ref 10–40)
AST SERPL-CCNC: 38 U/L — SIGNIFICANT CHANGE UP (ref 10–40)
AST SERPL-CCNC: 44 U/L — HIGH (ref 10–40)
BASE EXCESS BLDA CALC-SCNC: 2.2 MMOL/L — SIGNIFICANT CHANGE UP (ref -2–3)
BILIRUB SERPL-MCNC: 1.2 MG/DL — SIGNIFICANT CHANGE UP (ref 0.2–1.2)
BILIRUB SERPL-MCNC: 1.2 MG/DL — SIGNIFICANT CHANGE UP (ref 0.2–1.2)
BILIRUB SERPL-MCNC: 1.4 MG/DL — HIGH (ref 0.2–1.2)
BUN SERPL-MCNC: 14 MG/DL — SIGNIFICANT CHANGE UP (ref 7–23)
BUN SERPL-MCNC: 15 MG/DL — SIGNIFICANT CHANGE UP (ref 7–23)
BUN SERPL-MCNC: 20 MG/DL — SIGNIFICANT CHANGE UP (ref 7–23)
CALCIUM SERPL-MCNC: 7.9 MG/DL — LOW (ref 8.4–10.5)
CALCIUM SERPL-MCNC: 8.2 MG/DL — LOW (ref 8.4–10.5)
CALCIUM SERPL-MCNC: 8.4 MG/DL — SIGNIFICANT CHANGE UP (ref 8.4–10.5)
CHLORIDE SERPL-SCNC: 102 MMOL/L — SIGNIFICANT CHANGE UP (ref 96–108)
CHLORIDE SERPL-SCNC: 102 MMOL/L — SIGNIFICANT CHANGE UP (ref 96–108)
CHLORIDE SERPL-SCNC: 104 MMOL/L — SIGNIFICANT CHANGE UP (ref 96–108)
CO2 SERPL-SCNC: 24 MMOL/L — SIGNIFICANT CHANGE UP (ref 22–31)
CO2 SERPL-SCNC: 25 MMOL/L — SIGNIFICANT CHANGE UP (ref 22–31)
CO2 SERPL-SCNC: 25 MMOL/L — SIGNIFICANT CHANGE UP (ref 22–31)
CREAT SERPL-MCNC: 0.6 MG/DL — SIGNIFICANT CHANGE UP (ref 0.5–1.3)
CREAT SERPL-MCNC: 0.6 MG/DL — SIGNIFICANT CHANGE UP (ref 0.5–1.3)
CREAT SERPL-MCNC: 0.7 MG/DL — SIGNIFICANT CHANGE UP (ref 0.5–1.3)
CULTURE RESULTS: SIGNIFICANT CHANGE UP
GAS PNL BLDA: SIGNIFICANT CHANGE UP
GLUCOSE SERPL-MCNC: 127 MG/DL — HIGH (ref 70–99)
GLUCOSE SERPL-MCNC: 128 MG/DL — HIGH (ref 70–99)
GLUCOSE SERPL-MCNC: 153 MG/DL — HIGH (ref 70–99)
GRAM STN FLD: SIGNIFICANT CHANGE UP
HCO3 BLDA-SCNC: 26 MMOL/L — SIGNIFICANT CHANGE UP (ref 21–28)
HCT VFR BLD CALC: 29.8 % — LOW (ref 39–50)
HCT VFR BLD CALC: 30.5 % — LOW (ref 39–50)
HCT VFR BLD CALC: 31.3 % — LOW (ref 39–50)
HGB BLD-MCNC: 10.1 G/DL — LOW (ref 13–17)
HGB BLD-MCNC: 10.2 G/DL — LOW (ref 13–17)
HGB BLD-MCNC: 10.3 G/DL — LOW (ref 13–17)
INR BLD: 1.66 — HIGH (ref 0.88–1.16)
INR BLD: 1.67 — HIGH (ref 0.88–1.16)
INR BLD: 1.68 — HIGH (ref 0.88–1.16)
LACTATE SERPL-SCNC: 1.3 MMOL/L — SIGNIFICANT CHANGE UP (ref 0.5–2)
LACTATE SERPL-SCNC: 1.4 MMOL/L — SIGNIFICANT CHANGE UP (ref 0.5–2)
MAGNESIUM SERPL-MCNC: 2 MG/DL — SIGNIFICANT CHANGE UP (ref 1.6–2.6)
MAGNESIUM SERPL-MCNC: 2 MG/DL — SIGNIFICANT CHANGE UP (ref 1.6–2.6)
MAGNESIUM SERPL-MCNC: 2.1 MG/DL — SIGNIFICANT CHANGE UP (ref 1.6–2.6)
MCHC RBC-ENTMCNC: 29.4 PG — SIGNIFICANT CHANGE UP (ref 27–34)
MCHC RBC-ENTMCNC: 29.9 PG — SIGNIFICANT CHANGE UP (ref 27–34)
MCHC RBC-ENTMCNC: 30.2 PG — SIGNIFICANT CHANGE UP (ref 27–34)
MCHC RBC-ENTMCNC: 32.9 G/DL — SIGNIFICANT CHANGE UP (ref 32–36)
MCHC RBC-ENTMCNC: 33.4 G/DL — SIGNIFICANT CHANGE UP (ref 32–36)
MCHC RBC-ENTMCNC: 33.9 G/DL — SIGNIFICANT CHANGE UP (ref 32–36)
MCV RBC AUTO: 89.2 FL — SIGNIFICANT CHANGE UP (ref 80–100)
MCV RBC AUTO: 89.4 FL — SIGNIFICANT CHANGE UP (ref 80–100)
MCV RBC AUTO: 89.4 FL — SIGNIFICANT CHANGE UP (ref 80–100)
PCO2 BLDA: 37 MMHG — SIGNIFICANT CHANGE UP (ref 35–48)
PH BLDA: 7.46 — HIGH (ref 7.35–7.45)
PHOSPHATE SERPL-MCNC: 3.2 MG/DL — SIGNIFICANT CHANGE UP (ref 2.5–4.5)
PLATELET # BLD AUTO: 101 K/UL — LOW (ref 150–400)
PLATELET # BLD AUTO: 121 K/UL — LOW (ref 150–400)
PLATELET # BLD AUTO: 96 K/UL — LOW (ref 150–400)
PO2 BLDA: 114 MMHG — HIGH (ref 83–108)
POTASSIUM SERPL-MCNC: 3.7 MMOL/L — SIGNIFICANT CHANGE UP (ref 3.5–5.3)
POTASSIUM SERPL-MCNC: 3.9 MMOL/L — SIGNIFICANT CHANGE UP (ref 3.5–5.3)
POTASSIUM SERPL-MCNC: 4.2 MMOL/L — SIGNIFICANT CHANGE UP (ref 3.5–5.3)
POTASSIUM SERPL-SCNC: 3.7 MMOL/L — SIGNIFICANT CHANGE UP (ref 3.5–5.3)
POTASSIUM SERPL-SCNC: 3.9 MMOL/L — SIGNIFICANT CHANGE UP (ref 3.5–5.3)
POTASSIUM SERPL-SCNC: 4.2 MMOL/L — SIGNIFICANT CHANGE UP (ref 3.5–5.3)
PROT SERPL-MCNC: 6 G/DL — SIGNIFICANT CHANGE UP (ref 6–8.3)
PROT SERPL-MCNC: 6.2 G/DL — SIGNIFICANT CHANGE UP (ref 6–8.3)
PROT SERPL-MCNC: 6.6 G/DL — SIGNIFICANT CHANGE UP (ref 6–8.3)
PROTHROM AB SERPL-ACNC: 18.6 SEC — HIGH (ref 9.8–12.7)
PROTHROM AB SERPL-ACNC: 18.7 SEC — HIGH (ref 9.8–12.7)
PROTHROM AB SERPL-ACNC: 18.8 SEC — HIGH (ref 9.8–12.7)
RBC # BLD: 3.34 M/UL — LOW (ref 4.2–5.8)
RBC # BLD: 3.41 M/UL — LOW (ref 4.2–5.8)
RBC # BLD: 3.5 M/UL — LOW (ref 4.2–5.8)
RBC # FLD: 15.8 % — SIGNIFICANT CHANGE UP (ref 10.3–16.9)
RBC # FLD: 16.2 % — SIGNIFICANT CHANGE UP (ref 10.3–16.9)
RBC # FLD: 16.3 % — SIGNIFICANT CHANGE UP (ref 10.3–16.9)
SAO2 % BLDA: 98 % — SIGNIFICANT CHANGE UP (ref 95–100)
SODIUM SERPL-SCNC: 137 MMOL/L — SIGNIFICANT CHANGE UP (ref 135–145)
SODIUM SERPL-SCNC: 138 MMOL/L — SIGNIFICANT CHANGE UP (ref 135–145)
SODIUM SERPL-SCNC: 138 MMOL/L — SIGNIFICANT CHANGE UP (ref 135–145)
SPECIMEN SOURCE: SIGNIFICANT CHANGE UP
VANCOMYCIN TROUGH SERPL-MCNC: 8 UG/ML — LOW (ref 10–20)
WBC # BLD: 11.2 K/UL — HIGH (ref 3.8–10.5)
WBC # BLD: 11.9 K/UL — HIGH (ref 3.8–10.5)
WBC # BLD: 13.6 K/UL — HIGH (ref 3.8–10.5)
WBC # FLD AUTO: 11.2 K/UL — HIGH (ref 3.8–10.5)
WBC # FLD AUTO: 11.9 K/UL — HIGH (ref 3.8–10.5)
WBC # FLD AUTO: 13.6 K/UL — HIGH (ref 3.8–10.5)

## 2017-06-03 PROCEDURE — 99292 CRITICAL CARE ADDL 30 MIN: CPT

## 2017-06-03 PROCEDURE — 74000: CPT | Mod: 26

## 2017-06-03 PROCEDURE — 99291 CRITICAL CARE FIRST HOUR: CPT

## 2017-06-03 PROCEDURE — 71010: CPT | Mod: 26

## 2017-06-03 PROCEDURE — 43752 NASAL/OROGASTRIC W/TUBE PLMT: CPT

## 2017-06-03 PROCEDURE — 74176 CT ABD & PELVIS W/O CONTRAST: CPT | Mod: 26

## 2017-06-03 PROCEDURE — 71250 CT THORAX DX C-: CPT | Mod: 26

## 2017-06-03 RX ORDER — POTASSIUM CHLORIDE 20 MEQ
20 PACKET (EA) ORAL ONCE
Qty: 0 | Refills: 0 | Status: COMPLETED | OUTPATIENT
Start: 2017-06-03 | End: 2017-06-03

## 2017-06-03 RX ORDER — FUROSEMIDE 40 MG
20 TABLET ORAL ONCE
Qty: 0 | Refills: 0 | Status: COMPLETED | OUTPATIENT
Start: 2017-06-03 | End: 2017-06-03

## 2017-06-03 RX ORDER — DIATRIZOATE MEGLUMINE 180 MG/ML
30 INJECTION, SOLUTION INTRAVESICAL ONCE
Qty: 0 | Refills: 0 | Status: COMPLETED | OUTPATIENT
Start: 2017-06-03 | End: 2017-06-03

## 2017-06-03 RX ORDER — MULTIVIT WITH MIN/MFOLATE/K2 340-15/3 G
200 POWDER (GRAM) ORAL ONCE
Qty: 0 | Refills: 0 | Status: COMPLETED | OUTPATIENT
Start: 2017-06-03 | End: 2017-06-03

## 2017-06-03 RX ORDER — VANCOMYCIN HCL 1 G
1250 VIAL (EA) INTRAVENOUS EVERY 12 HOURS
Qty: 0 | Refills: 0 | Status: DISCONTINUED | OUTPATIENT
Start: 2017-06-03 | End: 2017-06-07

## 2017-06-03 RX ORDER — METOCLOPRAMIDE HCL 10 MG
10 TABLET ORAL ONCE
Qty: 0 | Refills: 0 | Status: COMPLETED | OUTPATIENT
Start: 2017-06-03 | End: 2017-06-03

## 2017-06-03 RX ORDER — GLYCERIN ADULT
1 SUPPOSITORY, RECTAL RECTAL ONCE
Qty: 0 | Refills: 0 | Status: COMPLETED | OUTPATIENT
Start: 2017-06-03 | End: 2017-06-03

## 2017-06-03 RX ORDER — MAGNESIUM SULFATE 500 MG/ML
1 VIAL (ML) INJECTION ONCE
Qty: 0 | Refills: 0 | Status: COMPLETED | OUTPATIENT
Start: 2017-06-03 | End: 2017-06-03

## 2017-06-03 RX ADMIN — DIATRIZOATE MEGLUMINE 30 MILLILITER(S): 180 INJECTION, SOLUTION INTRAVESICAL at 18:09

## 2017-06-03 RX ADMIN — Medication 3 MILLILITER(S): at 23:45

## 2017-06-03 RX ADMIN — Medication 100 MILLIEQUIVALENT(S): at 08:57

## 2017-06-03 RX ADMIN — LEVALBUTEROL 0.63 MILLIGRAM(S): 1.25 SOLUTION, CONCENTRATE RESPIRATORY (INHALATION) at 18:13

## 2017-06-03 RX ADMIN — PIPERACILLIN AND TAZOBACTAM 200 GRAM(S): 4; .5 INJECTION, POWDER, LYOPHILIZED, FOR SOLUTION INTRAVENOUS at 18:08

## 2017-06-03 RX ADMIN — LEVALBUTEROL 0.63 MILLIGRAM(S): 1.25 SOLUTION, CONCENTRATE RESPIRATORY (INHALATION) at 06:20

## 2017-06-03 RX ADMIN — Medication 3 MILLILITER(S): at 00:56

## 2017-06-03 RX ADMIN — Medication 20 MILLIGRAM(S): at 08:57

## 2017-06-03 RX ADMIN — Medication 1 MILLIGRAM(S): at 12:42

## 2017-06-03 RX ADMIN — Medication 3 MILLILITER(S): at 06:19

## 2017-06-03 RX ADMIN — PIPERACILLIN AND TAZOBACTAM 200 GRAM(S): 4; .5 INJECTION, POWDER, LYOPHILIZED, FOR SOLUTION INTRAVENOUS at 06:40

## 2017-06-03 RX ADMIN — Medication 100 MILLIEQUIVALENT(S): at 06:40

## 2017-06-03 RX ADMIN — Medication 81 MILLIGRAM(S): at 12:42

## 2017-06-03 RX ADMIN — LEVALBUTEROL 0.63 MILLIGRAM(S): 1.25 SOLUTION, CONCENTRATE RESPIRATORY (INHALATION) at 23:46

## 2017-06-03 RX ADMIN — DEXMEDETOMIDINE HYDROCHLORIDE IN 0.9% SODIUM CHLORIDE 1.67 MICROGRAM(S)/KG/HR: 4 INJECTION INTRAVENOUS at 21:32

## 2017-06-03 RX ADMIN — PIPERACILLIN AND TAZOBACTAM 200 GRAM(S): 4; .5 INJECTION, POWDER, LYOPHILIZED, FOR SOLUTION INTRAVENOUS at 12:43

## 2017-06-03 RX ADMIN — LEVALBUTEROL 0.63 MILLIGRAM(S): 1.25 SOLUTION, CONCENTRATE RESPIRATORY (INHALATION) at 00:57

## 2017-06-03 RX ADMIN — Medication 250 MILLIGRAM(S): at 12:42

## 2017-06-03 RX ADMIN — Medication 1 SUPPOSITORY(S): at 18:08

## 2017-06-03 RX ADMIN — LEVALBUTEROL 0.63 MILLIGRAM(S): 1.25 SOLUTION, CONCENTRATE RESPIRATORY (INHALATION) at 11:45

## 2017-06-03 RX ADMIN — Medication 100 MILLIGRAM(S): at 12:42

## 2017-06-03 RX ADMIN — Medication 1 TABLET(S): at 12:42

## 2017-06-03 RX ADMIN — Medication 100 MILLIEQUIVALENT(S): at 18:43

## 2017-06-03 RX ADMIN — Medication 100 GRAM(S): at 18:06

## 2017-06-03 RX ADMIN — Medication 3 MILLILITER(S): at 11:45

## 2017-06-03 RX ADMIN — Medication 10 MILLIGRAM(S): at 18:08

## 2017-06-03 RX ADMIN — FAMOTIDINE 20 MILLIGRAM(S): 10 INJECTION INTRAVENOUS at 18:08

## 2017-06-03 RX ADMIN — Medication 3 MILLILITER(S): at 18:13

## 2017-06-03 RX ADMIN — Medication 10 MILLIGRAM(S): at 15:29

## 2017-06-03 RX ADMIN — Medication 20 MILLIGRAM(S): at 15:29

## 2017-06-03 RX ADMIN — FAMOTIDINE 20 MILLIGRAM(S): 10 INJECTION INTRAVENOUS at 06:40

## 2017-06-03 NOTE — PROGRESS NOTE ADULT - SUBJECTIVE AND OBJECTIVE BOX
CTICU  CRITICAL  CARE  attending     Hand off received 					   Pertinent clinical, laboratory, radiographic, hemodynamic, echocardiographic, respiratory data, microbiologic data and chart were reviewed and analyzed frequently throughout the course of the day and night  Patient seen and examined with CTS/ SH attending at bedside  Pt is a 61y , Male with HTN, HLD, CAD, Aortic stenosis, H/O ETOH abuse.      FAMILY HISTORY:  PAST MEDICAL & SURGICAL HISTORY:  Aortic valve stenosis, unspecified etiology  Hyperlipidemia  Smoker  ETOH abuse  HTN (hypertension)    Patient is a 61y old  Male who presents with a chief complaint of chest pain, admission from office for preop workup (23 May 2017 18:15)      14 system review was unremarkable  acute changes include acute respiratory failure  Vital signs, hemodynamic and respiratory parameters were reviewed from the bedside nursing flow sheet.  ICU Vital Signs Last 24 Hrs  T(C): 36.4, Max: 37.2 (06-03 @ 13:20)  T(F): 97.6, Max: 98.9 (06-03 @ 13:20)  HR: 76 (70 - 98)  BP: --  BP(mean): --  ABP: 138/58 (90/42 - 154/62)  ABP(mean): 88 (58 - 96)  RR: 16 (13 - 38)  SpO2: 98% (96% - 100%)    Adult Advanced Hemodynamics Last 24 Hrs  CVP(mm Hg): 15 (4 - 21)  CVP(cm H2O): --  CO: --  CI: --  PA: --  PA(mean): --  PCWP: --  SVR: --  SVRI: --  PVR: --  PVRI: --, ABG - ( 03 Jun 2017 18:12 )  pH: 7.46  /  pCO2: 39    /  pO2: 117   / HCO3: 27    / Base Excess: 2.7   /  SaO2: 98                Mode: AC/ CMV (Assist Control/ Continuous Mandatory Ventilation)  RR (machine): 12  TV (machine): 600  FiO2: 40  PEEP: 8  ITime: 1  MAP: 12  PIP: 15    Intake and output was reviewed and the fluid balance was calculated  Daily     Daily   I&O's Summary  I & Os for 24h ending 03 Jun 2017 07:00  =============================================  IN: 2617.3 ml / OUT: 2070 ml / NET: 547.3 ml    I & Os for current day (as of 03 Jun 2017 22:08)  =============================================  IN: 1815.8 ml / OUT: 2110 ml / NET: -294.2 ml      All lines and drain sites were assessed  Glycemic trend was reviewed CAPILLARY BLOOD GLUCOSE: 133 (03 Jun 2017 12:00)    NEURO: No acute change in mental status.  CVS: S1 S2, No S3.  RESPIRATORY: Auscultation of the chest reveal BIBASILAR RALES.  Abdomen is soft . + Distension. No tenderness. Hypoactive bowel sounds.  Extremities are warm and well perfused  Wounds appear clean and unremarkable  Antibiotics are vanco and zosyn.    labs  CBC Full  -  ( 03 Jun 2017 18:03 )  WBC Count : 13.6 K/uL  Hemoglobin : 10.3 g/dL  Hematocrit : 31.3 %  Platelet Count - Automated : 121 K/uL  Mean Cell Volume : 89.4 fL  Mean Cell Hemoglobin : 29.4 pg  Mean Cell Hemoglobin Concentration : 32.9 g/dL  Auto Neutrophil # : x  Auto Lymphocyte # : x  Auto Monocyte # : x  Auto Eosinophil # : x  Auto Basophil # : x  Auto Neutrophil % : x  Auto Lymphocyte % : x  Auto Monocyte % : x  Auto Eosinophil % : x  Auto Basophil % : x    06-03    138  |  102  |  20  ----------------------------<  127<H>  3.9   |  24  |  0.70    Ca    8.4      03 Jun 2017 18:03  Phos  3.2     06-03  Mg     2.1     06-03    TPro  6.6  /  Alb  3.0<L>  /  TBili  1.2  /  DBili  x   /  AST  44<H>  /  ALT  23  /  AlkPhos  78  06-03    PT/INR - ( 03 Jun 2017 18:03 )   PT: 18.6 sec;   INR: 1.66          PTT - ( 03 Jun 2017 18:03 )  PTT:32.6 sec  The current medications were reviewed   MEDICATIONS  (STANDING):  aspirin enteric coated 81milliGRAM(s) Oral daily  sodium chloride 0.45%. 1000milliLiter(s) IV Continuous <Continuous>  famotidine Injectable 20milliGRAM(s) IV Push every 12 hours  insulin lispro (HumaLOG) corrective regimen sliding scale  SubCutaneous every 6 hours  dextrose 5%. 1000milliLiter(s) IV Continuous <Continuous>  dextrose 50% Injectable 12.5Gram(s) IV Push once  dextrose 50% Injectable 25Gram(s) IV Push once  dextrose 50% Injectable 25Gram(s) IV Push once  multivitamin 1Tablet(s) Oral daily  thiamine 100milliGRAM(s) Oral daily  folic acid 1milliGRAM(s) Oral daily  acetylcysteine 20% Inhalation 3milliLiter(s) Inhalation every 6 hours  atorvastatin 20milliGRAM(s) Oral at bedtime  levalbuterol Inhalation 0.63milliGRAM(s) Inhalation every 6 hours  piperacillin/tazobactam IVPB. 3.375Gram(s) IV Intermittent every 6 hours  piperacillin/tazobactam IVPB.  IV Intermittent   furosemide   Injectable 20milliGRAM(s) IV Push daily  dexmedetomidine Infusion 0.1MICROgram(s)/kG/Hr IV Continuous <Continuous>  norepinephrine Infusion 0.01MICROgram(s)/kG/Min IV Continuous <Continuous>  vancomycin  IVPB 1250milliGRAM(s) IV Intermittent every 12 hours    MEDICATIONS  (PRN):  bisacodyl Suppository 10milliGRAM(s) Rectal daily PRN Constipation  dextrose Gel 1Dose(s) Oral once PRN Blood Glucose LESS THAN 70 milliGRAM(s)/deciliter  glucagon  Injectable 1milliGRAM(s) IntraMuscular once PRN Glucose LESS THAN 70 milligrams/deciliter       PROBLEM LIST/ ASSESSMENT:  HEALTH ISSUES - PROBLEM Dx:        Patient is a 61y old  Male who presents with AS and CAD.  S/P AVR and CABG. Post operative course complicated by  Multilobar Pneumonia  Vent dependent Respiratory Failure  Thrombocytopenia  H/O Delerium Tremens  Ileus    Hemodynamically  stable  Good oxygenation  Fair urine out put.  Overall satisfactory progress.    My plan is  NGT to suction.  CT chest and abdomen tonight.  Promotility  agents  GI evaluation for colonoscopic decompression.  Continue IV antibiotics.  Wean  vent as tolerated.  Monitor for arrhythmias and monitor parameters for organ perfusion  Monitor neurologic status  Head of the bed should remain elevated to 45 deg .   Chest PT and IS will be encouraged  Monitor adequacy of oxygenation and ventilation and attempt to wean oxygen  Nutritional goals will be met using po eventually , ensure adequate caloric intake and monitor  the same  Stress ulcer and VTE prophylaxis will be achieved    Glycemic control is satisfactory  Electrolytes have been repleted as necessary and wound care has been carried out. Pain control has been achieved.   Aggressive  physical therapy and early mobility and ambulation goals will be met   The family was updated about the course and plan  CRITICAL CARE TIME SPENT in evaluation and management, reassessments, review and interpretation of labs and x-rays, ventilator and hemodynamic management, formulating a plan and coordinating care: ___30____ MIN.  Time does not include procedural time.  CTICU ATTENDING     					    Vincenzo Prather MD

## 2017-06-03 NOTE — PROGRESS NOTE ADULT - SUBJECTIVE AND OBJECTIVE BOX
Pt seen and chart reviewed. Case d/w staff. Pt intubated, sedated, and not responsive to voice. Reportedly, when sedation is weaned he has been less agitated recently.    Per Dr. Gonzalez's Note:  "  Patient is a 61 year old  Black male from [facility] current smoker ( 1 PPDx45 years), current every day use of Alcohol ( 1 pint of Vodka daily and several beers daily for 45 years ) and history of HLD and  HTN who was admitted to [facility] in early May 2017 with Chest pain and worsening HEIN. Had Cardiac Cath which showed CAD and Aortic Stenosis. He was discharged home and then seen at [facility] after awakening at home with severe Chest pain on 5/22/17. Patient was seen in Cardiac Clinic on 5/22/17 and was admitted for pre-op work up for possible CABG/AVR. Last drink was a bottle of beer on morning of 5/23/17, the day of admission to [facility].       Patient's wife reports he has gone through acute Alcohol withdrawal in the past when hospitalized and has become acutely agitated requiring sedation and restraint. Overnight patient became acutely agitated secondary to alcohol withdrawal and required sedation and intubation.  Patient proceeded to surgery 5/26 and underwent a CABG and AVR. Patient tolerated surgery well and remained intubated and sedated. Patient reported to have been agitated yesterday secondary to Alcohol Withdrawal and is now receiving Librium 50mg q 6h and agitation is reported to be controlled. Patient was extubated 5/29 which he was tolerating well and able to follow simple commands and  attempting to speak. NGT was removed. He did not have Librium overnight. He then became more anxious consistent with persistent withdrawal. NGT was reinserted and he was restarted on Librium at 25mg via NGT q6h until withdrawal is completed. Yesterday patient required reintubation because of compromised pulmonary status and possible pneumonia. Patient continuing on sedation as needed. Patient has remained intubated and is receiving Precedex and is sedated."

## 2017-06-03 NOTE — PROGRESS NOTE ADULT - SUBJECTIVE AND OBJECTIVE BOX
CTICU  CRITICAL  CARE  attending     Hand off received 					   Pertinent clinical, laboratory, radiographic, hemodynamic, echocardiographic, respiratory data, microbiologic data and chart were reviewed and analyzed frequently throughout the course of the day and night  Patient seen and examined with CTS/ SH attending at bedside  Pt is a 61y , Male, HEALTH ISSUES - PROBLEM Dx:      , FAMILY HISTORY:  PAST MEDICAL & SURGICAL HISTORY:  Aortic valve stenosis, unspecified etiology  Hyperlipidemia  Smoker  ETOH abuse  HTN (hypertension)    Patient is a 61y old  Male who presents with a chief complaint of chest pain, admission from office for preop workup (23 May 2017 18:15)      14 system review was unremarkable  acute changes include acute respiratory failure  Vital signs, hemodynamic and respiratory parameters were reviewed from the bedside nursing flowsheet.  ICU Vital Signs Last 24 Hrs  T(C): 36.4, Max: 37.2 (06-03 @ 13:20)  T(F): 97.6, Max: 98.9 (06-03 @ 13:20)  HR: 76 (70 - 98)  BP: --  BP(mean): --  ABP: 138/58 (90/42 - 154/62)  ABP(mean): 88 (58 - 96)  RR: 16 (13 - 38)  SpO2: 98% (96% - 100%)    Adult Advanced Hemodynamics Last 24 Hrs  CVP(mm Hg): 15 (4 - 21)  CVP(cm H2O): --  CO: --  CI: --  PA: --  PA(mean): --  PCWP: --  SVR: --  SVRI: --  PVR: --  PVRI: --, ABG - ( 03 Jun 2017 18:12 )  pH: 7.46  /  pCO2: 39    /  pO2: 117   / HCO3: 27    / Base Excess: 2.7   /  SaO2: 98                Mode: AC/ CMV (Assist Control/ Continuous Mandatory Ventilation)  RR (machine): 12  TV (machine): 600  FiO2: 40  PEEP: 8  ITime: 1  MAP: 12  PIP: 15    Intake and output was reviewed and the fluid balance was calculated  Daily     Daily   I&O's Summary  I & Os for 24h ending 03 Jun 2017 07:00  =============================================  IN: 2617.3 ml / OUT: 2070 ml / NET: 547.3 ml    I & Os for current day (as of 03 Jun 2017 23:35)  =============================================  IN: 1815.8 ml / OUT: 2110 ml / NET: -294.2 ml      All lines and drain sites were assessed  Glycemic trend was reviewedCAPILLARY BLOOD GLUCOSE  133 (03 Jun 2017 12:00)    No acute change in mental status  Auscultation of the chest reveals equal bs  Abdomen is soft  Extremities are warm and well perfused  Wounds appear clean and unremarkable  Antibiotics are periop    labs  CBC Full  -  ( 03 Jun 2017 18:03 )  WBC Count : 13.6 K/uL  Hemoglobin : 10.3 g/dL  Hematocrit : 31.3 %  Platelet Count - Automated : 121 K/uL  Mean Cell Volume : 89.4 fL  Mean Cell Hemoglobin : 29.4 pg  Mean Cell Hemoglobin Concentration : 32.9 g/dL  Auto Neutrophil # : x  Auto Lymphocyte # : x  Auto Monocyte # : x  Auto Eosinophil # : x  Auto Basophil # : x  Auto Neutrophil % : x  Auto Lymphocyte % : x  Auto Monocyte % : x  Auto Eosinophil % : x  Auto Basophil % : x    06-03    138  |  102  |  20  ----------------------------<  127<H>  3.9   |  24  |  0.70    Ca    8.4      03 Jun 2017 18:03  Phos  3.2     06-03  Mg     2.1     06-03    TPro  6.6  /  Alb  3.0<L>  /  TBili  1.2  /  DBili  x   /  AST  44<H>  /  ALT  23  /  AlkPhos  78  06-03    PT/INR - ( 03 Jun 2017 18:03 )   PT: 18.6 sec;   INR: 1.66          PTT - ( 03 Jun 2017 18:03 )  PTT:32.6 sec  The current medications were reviewed   MEDICATIONS  (STANDING):  aspirin enteric coated 81milliGRAM(s) Oral daily  sodium chloride 0.45%. 1000milliLiter(s) IV Continuous <Continuous>  famotidine Injectable 20milliGRAM(s) IV Push every 12 hours  insulin lispro (HumaLOG) corrective regimen sliding scale  SubCutaneous every 6 hours  dextrose 5%. 1000milliLiter(s) IV Continuous <Continuous>  dextrose 50% Injectable 12.5Gram(s) IV Push once  dextrose 50% Injectable 25Gram(s) IV Push once  dextrose 50% Injectable 25Gram(s) IV Push once  multivitamin 1Tablet(s) Oral daily  thiamine 100milliGRAM(s) Oral daily  folic acid 1milliGRAM(s) Oral daily  acetylcysteine 20% Inhalation 3milliLiter(s) Inhalation every 6 hours  atorvastatin 20milliGRAM(s) Oral at bedtime  levalbuterol Inhalation 0.63milliGRAM(s) Inhalation every 6 hours  piperacillin/tazobactam IVPB. 3.375Gram(s) IV Intermittent every 6 hours  piperacillin/tazobactam IVPB.  IV Intermittent   furosemide   Injectable 20milliGRAM(s) IV Push daily  dexmedetomidine Infusion 0.1MICROgram(s)/kG/Hr IV Continuous <Continuous>  norepinephrine Infusion 0.01MICROgram(s)/kG/Min IV Continuous <Continuous>  vancomycin  IVPB 1250milliGRAM(s) IV Intermittent every 12 hours    MEDICATIONS  (PRN):  bisacodyl Suppository 10milliGRAM(s) Rectal daily PRN Constipation  dextrose Gel 1Dose(s) Oral once PRN Blood Glucose LESS THAN 70 milliGRAM(s)/deciliter  glucagon  Injectable 1milliGRAM(s) IntraMuscular once PRN Glucose LESS THAN 70 milligrams/deciliter       PROBLEM LIST/ ASSESSMENT:  HEALTH ISSUES - PROBLEM Dx:      ,   Patient is a 61y old  Male who presents with a chief complaint of chest pain, admission from office for preop workup (23 May 2017 18:15)     s/p avr cabg  acute changes include acute respiratory failure    My plan includes :  close hemodynamic, ventilatory and drain monitoring and management per post op routine    Monitor for arrhythmias and monitor parameters for organ perfusion  monitor neurologic status  Head of the bed should remain elevated to 45 deg .   chest PT and IS will be encouraged  monitor adequacy of oxygenation and ventilation and attempt to wean oxygen  Nutritional goals will be met using po eventually , ensure adequate caloric intake and montior the same  Stress ulcer and VTE prophylaxis will be achieved    Glycemic control is satisfactory  Electrolytes have been repleted as necessary and wound care has been carried out. Pain control has been achieved.   agressive physical therapy and early mobility and ambulation goals will be met   The family was updated about the course and plan  CRITICAL CARE TIME SPENT in evaluation and management, reassessments, review and interpretation of labs and x-rays, ventilator and hemodynamic management, formulating a plan and coordinating care: ___90____ MIN.  Time does not include procedural time.  CTICU ATTENDING     					    José Miguel Sandoval MD

## 2017-06-04 DIAGNOSIS — K59.00 CONSTIPATION, UNSPECIFIED: ICD-10-CM

## 2017-06-04 DIAGNOSIS — R18.8 OTHER ASCITES: ICD-10-CM

## 2017-06-04 DIAGNOSIS — K56.7 ILEUS, UNSPECIFIED: ICD-10-CM

## 2017-06-04 DIAGNOSIS — F10.10 ALCOHOL ABUSE, UNCOMPLICATED: ICD-10-CM

## 2017-06-04 DIAGNOSIS — K74.60 UNSPECIFIED CIRRHOSIS OF LIVER: ICD-10-CM

## 2017-06-04 LAB
ALBUMIN SERPL ELPH-MCNC: 2.8 G/DL — LOW (ref 3.3–5)
ALP SERPL-CCNC: 76 U/L — SIGNIFICANT CHANGE UP (ref 40–120)
ALT FLD-CCNC: 21 U/L — SIGNIFICANT CHANGE UP (ref 10–45)
AMYLASE P1 CFR SERPL: 59 U/L — SIGNIFICANT CHANGE UP (ref 25–125)
ANION GAP SERPL CALC-SCNC: 11 MMOL/L — SIGNIFICANT CHANGE UP (ref 5–17)
APTT BLD: 33.5 SEC — SIGNIFICANT CHANGE UP (ref 27.5–37.4)
AST SERPL-CCNC: 40 U/L — SIGNIFICANT CHANGE UP (ref 10–40)
BASE EXCESS BLDA CALC-SCNC: 2.4 MMOL/L — SIGNIFICANT CHANGE UP (ref -2–3)
BILIRUB SERPL-MCNC: 1.3 MG/DL — HIGH (ref 0.2–1.2)
BUN SERPL-MCNC: 19 MG/DL — SIGNIFICANT CHANGE UP (ref 7–23)
CALCIUM SERPL-MCNC: 8.2 MG/DL — LOW (ref 8.4–10.5)
CHLORIDE SERPL-SCNC: 101 MMOL/L — SIGNIFICANT CHANGE UP (ref 96–108)
CO2 SERPL-SCNC: 24 MMOL/L — SIGNIFICANT CHANGE UP (ref 22–31)
CREAT SERPL-MCNC: 0.7 MG/DL — SIGNIFICANT CHANGE UP (ref 0.5–1.3)
CULTURE RESULTS: NO GROWTH — SIGNIFICANT CHANGE UP
CULTURE RESULTS: SIGNIFICANT CHANGE UP
GAS PNL BLDA: SIGNIFICANT CHANGE UP
GLUCOSE SERPL-MCNC: 117 MG/DL — HIGH (ref 70–99)
GRAM STN FLD: SIGNIFICANT CHANGE UP
HCO3 BLDA-SCNC: 26 MMOL/L — SIGNIFICANT CHANGE UP (ref 21–28)
HCT VFR BLD CALC: 29.4 % — LOW (ref 39–50)
HGB BLD-MCNC: 10.3 G/DL — LOW (ref 13–17)
INR BLD: 1.64 — HIGH (ref 0.88–1.16)
LACTATE SERPL-SCNC: 1 MMOL/L — SIGNIFICANT CHANGE UP (ref 0.5–2)
LIDOCAIN IGE QN: 27 U/L — SIGNIFICANT CHANGE UP (ref 7–60)
MAGNESIUM SERPL-MCNC: 2.2 MG/DL — SIGNIFICANT CHANGE UP (ref 1.6–2.6)
MCHC RBC-ENTMCNC: 31.2 PG — SIGNIFICANT CHANGE UP (ref 27–34)
MCHC RBC-ENTMCNC: 35 G/DL — SIGNIFICANT CHANGE UP (ref 32–36)
MCV RBC AUTO: 89.1 FL — SIGNIFICANT CHANGE UP (ref 80–100)
PCO2 BLDA: 36 MMHG — SIGNIFICANT CHANGE UP (ref 35–48)
PH BLDA: 7.47 — HIGH (ref 7.35–7.45)
PLATELET # BLD AUTO: 113 K/UL — LOW (ref 150–400)
PO2 BLDA: 125 MMHG — HIGH (ref 83–108)
POTASSIUM SERPL-MCNC: 4 MMOL/L — SIGNIFICANT CHANGE UP (ref 3.5–5.3)
POTASSIUM SERPL-SCNC: 4 MMOL/L — SIGNIFICANT CHANGE UP (ref 3.5–5.3)
PROT SERPL-MCNC: 6.3 G/DL — SIGNIFICANT CHANGE UP (ref 6–8.3)
PROTHROM AB SERPL-ACNC: 18.4 SEC — HIGH (ref 9.8–12.7)
RBC # BLD: 3.3 M/UL — LOW (ref 4.2–5.8)
RBC # FLD: 15.9 % — SIGNIFICANT CHANGE UP (ref 10.3–16.9)
SAO2 % BLDA: 99 % — SIGNIFICANT CHANGE UP (ref 95–100)
SODIUM SERPL-SCNC: 136 MMOL/L — SIGNIFICANT CHANGE UP (ref 135–145)
SPECIMEN SOURCE: SIGNIFICANT CHANGE UP
SPECIMEN SOURCE: SIGNIFICANT CHANGE UP
WBC # BLD: 14.1 K/UL — HIGH (ref 3.8–10.5)
WBC # FLD AUTO: 14.1 K/UL — HIGH (ref 3.8–10.5)

## 2017-06-04 PROCEDURE — 71010: CPT | Mod: 26

## 2017-06-04 PROCEDURE — 99291 CRITICAL CARE FIRST HOUR: CPT

## 2017-06-04 RX ORDER — LACTULOSE 10 G/15ML
10 SOLUTION ORAL ONCE
Qty: 0 | Refills: 0 | Status: COMPLETED | OUTPATIENT
Start: 2017-06-04 | End: 2017-06-04

## 2017-06-04 RX ORDER — METOCLOPRAMIDE HCL 10 MG
10 TABLET ORAL ONCE
Qty: 0 | Refills: 0 | Status: COMPLETED | OUTPATIENT
Start: 2017-06-04 | End: 2017-06-04

## 2017-06-04 RX ORDER — MULTIVIT WITH MIN/MFOLATE/K2 340-15/3 G
15 POWDER (GRAM) ORAL ONCE
Qty: 0 | Refills: 0 | Status: DISCONTINUED | OUTPATIENT
Start: 2017-06-04 | End: 2017-06-04

## 2017-06-04 RX ORDER — MULTIVIT WITH MIN/MFOLATE/K2 340-15/3 G
150 POWDER (GRAM) ORAL ONCE
Qty: 0 | Refills: 0 | Status: COMPLETED | OUTPATIENT
Start: 2017-06-04 | End: 2017-06-04

## 2017-06-04 RX ORDER — POTASSIUM CHLORIDE 20 MEQ
20 PACKET (EA) ORAL ONCE
Qty: 0 | Refills: 0 | Status: COMPLETED | OUTPATIENT
Start: 2017-06-04 | End: 2017-06-04

## 2017-06-04 RX ADMIN — Medication 20 MILLIGRAM(S): at 06:15

## 2017-06-04 RX ADMIN — Medication 81 MILLIGRAM(S): at 13:52

## 2017-06-04 RX ADMIN — DEXMEDETOMIDINE HYDROCHLORIDE IN 0.9% SODIUM CHLORIDE 1.67 MICROGRAM(S)/KG/HR: 4 INJECTION INTRAVENOUS at 02:43

## 2017-06-04 RX ADMIN — Medication 10 MILLIGRAM(S): at 22:46

## 2017-06-04 RX ADMIN — Medication 1 TABLET(S): at 13:52

## 2017-06-04 RX ADMIN — DEXMEDETOMIDINE HYDROCHLORIDE IN 0.9% SODIUM CHLORIDE 1.67 MICROGRAM(S)/KG/HR: 4 INJECTION INTRAVENOUS at 07:02

## 2017-06-04 RX ADMIN — DEXMEDETOMIDINE HYDROCHLORIDE IN 0.9% SODIUM CHLORIDE 1.67 MICROGRAM(S)/KG/HR: 4 INJECTION INTRAVENOUS at 18:50

## 2017-06-04 RX ADMIN — FAMOTIDINE 20 MILLIGRAM(S): 10 INJECTION INTRAVENOUS at 06:16

## 2017-06-04 RX ADMIN — PIPERACILLIN AND TAZOBACTAM 200 GRAM(S): 4; .5 INJECTION, POWDER, LYOPHILIZED, FOR SOLUTION INTRAVENOUS at 06:15

## 2017-06-04 RX ADMIN — Medication 166.67 MILLIGRAM(S): at 08:00

## 2017-06-04 RX ADMIN — Medication 150 MILLILITER(S): at 06:27

## 2017-06-04 RX ADMIN — LACTULOSE 10 GRAM(S): 10 SOLUTION ORAL at 22:47

## 2017-06-04 RX ADMIN — PIPERACILLIN AND TAZOBACTAM 200 GRAM(S): 4; .5 INJECTION, POWDER, LYOPHILIZED, FOR SOLUTION INTRAVENOUS at 00:21

## 2017-06-04 RX ADMIN — LEVALBUTEROL 0.63 MILLIGRAM(S): 1.25 SOLUTION, CONCENTRATE RESPIRATORY (INHALATION) at 13:08

## 2017-06-04 RX ADMIN — Medication 166.67 MILLIGRAM(S): at 18:49

## 2017-06-04 RX ADMIN — Medication 3 MILLILITER(S): at 17:48

## 2017-06-04 RX ADMIN — Medication 3 MILLILITER(S): at 05:28

## 2017-06-04 RX ADMIN — Medication 1 MILLIGRAM(S): at 13:52

## 2017-06-04 RX ADMIN — PIPERACILLIN AND TAZOBACTAM 200 GRAM(S): 4; .5 INJECTION, POWDER, LYOPHILIZED, FOR SOLUTION INTRAVENOUS at 13:52

## 2017-06-04 RX ADMIN — LEVALBUTEROL 0.63 MILLIGRAM(S): 1.25 SOLUTION, CONCENTRATE RESPIRATORY (INHALATION) at 17:46

## 2017-06-04 RX ADMIN — Medication 100 MILLIEQUIVALENT(S): at 05:11

## 2017-06-04 RX ADMIN — Medication 3 MILLILITER(S): at 13:10

## 2017-06-04 RX ADMIN — PIPERACILLIN AND TAZOBACTAM 200 GRAM(S): 4; .5 INJECTION, POWDER, LYOPHILIZED, FOR SOLUTION INTRAVENOUS at 18:50

## 2017-06-04 RX ADMIN — LEVALBUTEROL 0.63 MILLIGRAM(S): 1.25 SOLUTION, CONCENTRATE RESPIRATORY (INHALATION) at 05:29

## 2017-06-04 RX ADMIN — Medication 100 MILLIGRAM(S): at 13:51

## 2017-06-04 RX ADMIN — FAMOTIDINE 20 MILLIGRAM(S): 10 INJECTION INTRAVENOUS at 18:49

## 2017-06-04 NOTE — CONSULT NOTE ADULT - SUBJECTIVE AND OBJECTIVE BOX
62 y/o male CAD. AS. ETOH abuse admitted for CABG and AVR  9 days post opd called for abdominal distension.  CT #1. Likely cirrhotic liver as discussed on 5-24-17. Patent paraumbilical   vein likely due to cirrhosis.  #2. There has been the development of ascites particular surrounding the   liver more than the spleen  and extending into both gutters with mesenteric infiltration and fluid   extending into the pelvis.  #3.There is extensive mesenteric infiltration throughout the abdomen   extending into the pelvis. See  coronal image 38 of series 6-this has developed since prior study. This   begins at the level of the  pancreas and extends downward into the central lower right abdomen.   Differential includes possible  inflammatory changes for example from pancreatitis. Clinical correlation.   Cannot exclude an element  of hemorrhage within the peritoneal cavity.  #4. There is a large amount of fecal impaction particularly in the   ascending colon and cecum. There  is no definite evidence of obstruction nor pneumoperitoneum      ROS: unable to give    MEDICATIONS  (STANDING):  aspirin enteric coated 81milliGRAM(s) Oral daily  sodium chloride 0.45%. 1000milliLiter(s) IV Continuous <Continuous>  famotidine Injectable 20milliGRAM(s) IV Push every 12 hours  insulin lispro (HumaLOG) corrective regimen sliding scale  SubCutaneous every 6 hours  dextrose 5%. 1000milliLiter(s) IV Continuous <Continuous>  dextrose 50% Injectable 12.5Gram(s) IV Push once  dextrose 50% Injectable 25Gram(s) IV Push once  dextrose 50% Injectable 25Gram(s) IV Push once  multivitamin 1Tablet(s) Oral daily  thiamine 100milliGRAM(s) Oral daily  folic acid 1milliGRAM(s) Oral daily  acetylcysteine 20% Inhalation 3milliLiter(s) Inhalation every 6 hours  atorvastatin 20milliGRAM(s) Oral at bedtime  levalbuterol Inhalation 0.63milliGRAM(s) Inhalation every 6 hours  piperacillin/tazobactam IVPB. 3.375Gram(s) IV Intermittent every 6 hours  piperacillin/tazobactam IVPB.  IV Intermittent   furosemide   Injectable 20milliGRAM(s) IV Push daily  dexmedetomidine Infusion 0.1MICROgram(s)/kG/Hr IV Continuous <Continuous>  norepinephrine Infusion 0.01MICROgram(s)/kG/Min IV Continuous <Continuous>  vancomycin  IVPB 1250milliGRAM(s) IV Intermittent every 12 hours  metoclopramide Injectable 10milliGRAM(s) IV Push once    MEDICATIONS  (PRN):  bisacodyl Suppository 10milliGRAM(s) Rectal daily PRN Constipation  dextrose Gel 1Dose(s) Oral once PRN Blood Glucose LESS THAN 70 milliGRAM(s)/deciliter  glucagon  Injectable 1milliGRAM(s) IntraMuscular once PRN Glucose LESS THAN 70 milligrams/deciliter    Allergies    Allergy Status Unknown    Intolerances    PHYSICAL EXAM:      Constitutional:intubated    Eyes:no jaundice    ENMT:mosit NGT darinung bilious material    Neck:supple    Breasts:deferred    Back:deferred    Respiratory:vent    Cardiovascular:reagular    Gastrointestinal:distended, softish, rare bowel sounds, no response to palpation    Genitourinary:deferred    Rectal:deferred    Extremities:no cyanosis

## 2017-06-04 NOTE — PROGRESS NOTE ADULT - SUBJECTIVE AND OBJECTIVE BOX
Chart reviewed and patient interviewed. Patient is a 61 year old  Black male from [facility] current smoker ( 1 PPDx45 years), current every day use of Alcohol ( 1 pint of Vodka daily and several beers daily for 45 years ) and history of HLD and  HTN who was admitted to [facility] in early May 2017 with Chest pain and worsening HEIN. Had Cardiac Cath which showed CAD and Aortic Stenosis. He was discharged home and then seen at [facility] after awakening at home with severe Chest pain on 5/22/17. Patient was seen in Cardiac Clinic on 5/22/17 and was admitted for pre-op work up for possible CABG/AVR. Last drink was a bottle of beer on morning of 5/23/17, the day of admission to [facility].       Patient's wife reports he has gone through acute Alcohol withdrawal in the past when hospitalized and has become acutely agitated requiring sedation and restraint. Overnight patient became acutely agitated secondary to alcohol withdrawal and required sedation and intubation.  Patient proceeded to surgery 5/26 and underwent a CABG and AVR. Patient tolerated surgery well and remained intubated and sedated. Patient reported to have been agitated yesterday secondary to Alcohol Withdrawal and is now receiving Librium 50mg q 6h and agitation is reported to be controlled. Patient was extubated 5/29 which he was tolerating well and able to follow simple commands and  attempting to speak. NGT was removed. He did not have Librium overnight. He then became more anxious consistent with persistent withdrawal. NGT was reinserted and he was restarted on Librium at 25mg via NGT q6h until withdrawal is completed. Yesterday patient required reintubation because of compromised pulmonary status and possible pneumonia. Patient continuing on sedation as needed. Patient has remained intubated and is receiving Precedex and is sedated. Today he is reported to have had periods when he was awake, responsive and somewhat engagable. Patient has had some abdominal distension and continues to be followed closely.  He is now partially arousable  Continuing present treatment.

## 2017-06-04 NOTE — PROGRESS NOTE ADULT - SUBJECTIVE AND OBJECTIVE BOX
CTICU  CRITICAL  CARE  attending     Hand off received 					   Pertinent clinical, laboratory, radiographic, hemodynamic, echocardiographic, respiratory data, microbiologic data and chart were reviewed and analyzed frequently throughout the course of the day and night  Patient seen and examined with CTS/ SH attending at bedside  Pt is a 61y , Male with HTN, HLD, AS, CAD, ETOH abuse.      Ascites: Ascites  Ileus: Ileus  Constipation: Constipation  Cirrhosis of liver not due to alcohol: Cirrhosis of liver not due to alcohol  ETOH abuse: ETOH abuse       FAMILY HISTORY:  PAST MEDICAL & SURGICAL HISTORY:  Aortic valve stenosis, unspecified etiology  Hyperlipidemia  Smoker  ETOH abuse  HTN (hypertension)    Patient is a 61y old  Male who presents with a chief complaint of chest pain, admission from office for preop workup (23 May 2017 18:15)      14 system review was unremarkable  acute changes include acute respiratory failure  Vital signs, hemodynamic and respiratory parameters were reviewed from the bedside nursing flowsheet.  ICU Vital Signs Last 24 Hrs  T(C): 36.4, Max: 37.2 (06-04 @ 09:54)  T(F): 97.6, Max: 98.9 (06-04 @ 09:54)  HR: 73 (64 - 78)  BP: 72/46 (72/46 - 72/46)  BP(mean): 54 (54 - 54)  ABP: 130/58 (80/44 - 164/70)  ABP(mean): 86 (56 - 106)  RR: 17 (11 - 25)  SpO2: 99% (96% - 100%)    Adult Advanced Hemodynamics Last 24 Hrs  CVP(mm Hg): 10 (5 - 42)  CVP(cm H2O): --  CO: --  CI: --  PA: --  PA(mean): --  PCWP: --  SVR: --  SVRI: --  PVR: --  PVRI: --, ABG - ( 04 Jun 2017 02:22 )  pH: 7.47  /  pCO2: 36    /  pO2: 125   / HCO3: 26    / Base Excess: 2.4   /  SaO2: 99                Mode: AC/ CMV (Assist Control/ Continuous Mandatory Ventilation)  RR (machine): 12  TV (machine): 600  FiO2: 40  PEEP: 8  ITime: 1  MAP: 13  PIP: 24    Intake and output was reviewed and the fluid balance was calculated  Daily     Daily   I&O's Summary  I & Os for 24h ending 04 Jun 2017 07:00  =============================================  IN: 2340.8 ml / OUT: 3225 ml / NET: -884.2 ml    I & Os for current day (as of 04 Jun 2017 22:10)  =============================================  IN: 1011.1 ml / OUT: 2135 ml / NET: -1123.9 ml      All lines and drain sites were assessed  Glycemic trend was reviewed CAPILLARY BLOOD GLUCOSE: 93 (04 Jun 2017 12:00)    NEURO: No acute change in mental status.  CVS: S1, S2, No S3.  RESPIRATORY: Auscultation of the chest reveals equal breath sounds.  GI; Abdomen is soft. Distended. Tympanitic. Hypoactive bowel sounds. No tenderness.  Extremities are warm and well perfused  Wounds appear clean and unremarkable  Antibiotics are vancomycin and zosyn.  labs  CBC Full  -  ( 04 Jun 2017 02:26 )  WBC Count : 14.1 K/uL  Hemoglobin : 10.3 g/dL  Hematocrit : 29.4 %  Platelet Count - Automated : 113 K/uL  Mean Cell Volume : 89.1 fL  Mean Cell Hemoglobin : 31.2 pg  Mean Cell Hemoglobin Concentration : 35.0 g/dL  Auto Neutrophil # : x  Auto Lymphocyte # : x  Auto Monocyte # : x  Auto Eosinophil # : x  Auto Basophil # : x  Auto Neutrophil % : x  Auto Lymphocyte % : x  Auto Monocyte % : x  Auto Eosinophil % : x  Auto Basophil % : x    06-04    136  |  101  |  19  ----------------------------<  117<H>  4.0   |  24  |  0.70    Ca    8.2<L>      04 Jun 2017 02:26  Phos  3.2     06-03  Mg     2.2     06-04    TPro  6.3  /  Alb  2.8<L>  /  TBili  1.3<H>  /  DBili  x   /  AST  40  /  ALT  21  /  AlkPhos  76  06-04    PT/INR - ( 04 Jun 2017 02:26 )   PT: 18.4 sec;   INR: 1.64          PTT - ( 04 Jun 2017 02:26 )  PTT:33.5 sec  The current medications were reviewed   MEDICATIONS  (STANDING):  aspirin enteric coated 81milliGRAM(s) Oral daily  sodium chloride 0.45%. 1000milliLiter(s) IV Continuous <Continuous>  famotidine Injectable 20milliGRAM(s) IV Push every 12 hours  insulin lispro (HumaLOG) corrective regimen sliding scale  SubCutaneous every 6 hours  dextrose 5%. 1000milliLiter(s) IV Continuous <Continuous>  dextrose 50% Injectable 12.5Gram(s) IV Push once  dextrose 50% Injectable 25Gram(s) IV Push once  dextrose 50% Injectable 25Gram(s) IV Push once  multivitamin 1Tablet(s) Oral daily  thiamine 100milliGRAM(s) Oral daily  folic acid 1milliGRAM(s) Oral daily  acetylcysteine 20% Inhalation 3milliLiter(s) Inhalation every 6 hours  atorvastatin 20milliGRAM(s) Oral at bedtime  levalbuterol Inhalation 0.63milliGRAM(s) Inhalation every 6 hours  piperacillin/tazobactam IVPB. 3.375Gram(s) IV Intermittent every 6 hours  piperacillin/tazobactam IVPB.  IV Intermittent   furosemide   Injectable 20milliGRAM(s) IV Push daily  dexmedetomidine Infusion 0.1MICROgram(s)/kG/Hr IV Continuous <Continuous>  norepinephrine Infusion 0.01MICROgram(s)/kG/Min IV Continuous <Continuous>  vancomycin  IVPB 1250milliGRAM(s) IV Intermittent every 12 hours  metoclopramide Injectable 10milliGRAM(s) IV Push once  sodium biphosphate Rectal Enema 1Enema Rectal once  lactulose Syrup 10Gram(s) Oral once    MEDICATIONS  (PRN):  bisacodyl Suppository 10milliGRAM(s) Rectal daily PRN Constipation  dextrose Gel 1Dose(s) Oral once PRN Blood Glucose LESS THAN 70 milliGRAM(s)/deciliter  glucagon  Injectable 1milliGRAM(s) IntraMuscular once PRN Glucose LESS THAN 70 milligrams/deciliter       PROBLEM LIST/ ASSESSMENT:  HEALTH ISSUES - PROBLEM Dx:  Ascites: Ascites  Ileus: Ileus  Constipation: Constipation  Cirrhosis of liver not due to alcohol: Cirrhosis of liver not due to alcohol  ETOH abuse: ETOH abuse      S/P AVR/CABG  Ileus  Vent dependent respiratory failure.  Thrombocytopenia.  Aspiration Pneumonia.    Hemodynamically stable.  Good oxygenation.   Fair urine out put.  NGT drainage  700 cc.  Decreasing abdominal distension.  Overall doing well.    My plan includes :  Close hemodynamic, ventilatory and drain monitoring and management per post op routine  Attempt CPAP for few hours.  Fleet enema.  Promotility agents.  IV antibiotics.  Monitor for arrhythmias and monitor parameters for organ perfusion  monitor neurologic status  Head of the bed should remain elevated to 45 deg .   Chest PT and IS will be encouraged  Monitor adequacy of oxygenation and ventilation and attempt to wean oxygen  Nutritional goals will be met using po eventually , ensure adequate caloric intake and monitor the same  Stress ulcer and VTE prophylaxis will be achieved    Glycemic control is satisfactory  Electrolytes have been repleted as necessary and wound care has been carried out. Pain control has been achieved.   Aggressive  physical therapy and early mobility and ambulation goals will be met   The family was updated about the course and plan  CRITICAL CARE TIME SPENT in evaluation and management, reassessments, review and interpretation of labs and x-rays, ventilator and hemodynamic management, formulating a plan and coordinating care: ___30____ MIN.  Time does not include procedural time.  CTICU ATTENDING     					    Vincenzo Prather MD

## 2017-06-05 LAB
ALBUMIN SERPL ELPH-MCNC: 2.6 G/DL — LOW (ref 3.3–5)
ALP SERPL-CCNC: 80 U/L — SIGNIFICANT CHANGE UP (ref 40–120)
ALT FLD-CCNC: 22 U/L — SIGNIFICANT CHANGE UP (ref 10–45)
ANION GAP SERPL CALC-SCNC: 11 MMOL/L — SIGNIFICANT CHANGE UP (ref 5–17)
ANION GAP SERPL CALC-SCNC: 12 MMOL/L — SIGNIFICANT CHANGE UP (ref 5–17)
APTT BLD: 31.7 SEC — SIGNIFICANT CHANGE UP (ref 27.5–37.4)
APTT BLD: 32.2 SEC — SIGNIFICANT CHANGE UP (ref 27.5–37.4)
AST SERPL-CCNC: 42 U/L — HIGH (ref 10–40)
BASE EXCESS BLDA CALC-SCNC: 1 MMOL/L — SIGNIFICANT CHANGE UP (ref -2–3)
BASE EXCESS BLDA CALC-SCNC: 1.8 MMOL/L — SIGNIFICANT CHANGE UP (ref -2–3)
BASE EXCESS BLDA CALC-SCNC: 2.2 MMOL/L — SIGNIFICANT CHANGE UP (ref -2–3)
BILIRUB SERPL-MCNC: 1.3 MG/DL — HIGH (ref 0.2–1.2)
BLD GP AB SCN SERPL QL: NEGATIVE — SIGNIFICANT CHANGE UP
BUN SERPL-MCNC: 16 MG/DL — SIGNIFICANT CHANGE UP (ref 7–23)
BUN SERPL-MCNC: 17 MG/DL — SIGNIFICANT CHANGE UP (ref 7–23)
CALCIUM SERPL-MCNC: 8.4 MG/DL — SIGNIFICANT CHANGE UP (ref 8.4–10.5)
CALCIUM SERPL-MCNC: 8.7 MG/DL — SIGNIFICANT CHANGE UP (ref 8.4–10.5)
CHLORIDE SERPL-SCNC: 100 MMOL/L — SIGNIFICANT CHANGE UP (ref 96–108)
CHLORIDE SERPL-SCNC: 101 MMOL/L — SIGNIFICANT CHANGE UP (ref 96–108)
CO2 SERPL-SCNC: 23 MMOL/L — SIGNIFICANT CHANGE UP (ref 22–31)
CO2 SERPL-SCNC: 23 MMOL/L — SIGNIFICANT CHANGE UP (ref 22–31)
CREAT SERPL-MCNC: 0.6 MG/DL — SIGNIFICANT CHANGE UP (ref 0.5–1.3)
CREAT SERPL-MCNC: 0.6 MG/DL — SIGNIFICANT CHANGE UP (ref 0.5–1.3)
GAS PNL BLDA: SIGNIFICANT CHANGE UP
GLUCOSE SERPL-MCNC: 112 MG/DL — HIGH (ref 70–99)
GLUCOSE SERPL-MCNC: 115 MG/DL — HIGH (ref 70–99)
HCO3 BLDA-SCNC: 22 MMOL/L — SIGNIFICANT CHANGE UP (ref 21–28)
HCO3 BLDA-SCNC: 25 MMOL/L — SIGNIFICANT CHANGE UP (ref 21–28)
HCO3 BLDA-SCNC: 25 MMOL/L — SIGNIFICANT CHANGE UP (ref 21–28)
HCT VFR BLD CALC: 30.5 % — LOW (ref 39–50)
HCT VFR BLD CALC: 31.2 % — LOW (ref 39–50)
HGB BLD-MCNC: 10.2 G/DL — LOW (ref 13–17)
HGB BLD-MCNC: 10.4 G/DL — LOW (ref 13–17)
INR BLD: 1.6 — HIGH (ref 0.88–1.16)
INR BLD: 1.6 — HIGH (ref 0.88–1.16)
MAGNESIUM SERPL-MCNC: 2.1 MG/DL — SIGNIFICANT CHANGE UP (ref 1.6–2.6)
MAGNESIUM SERPL-MCNC: 2.1 MG/DL — SIGNIFICANT CHANGE UP (ref 1.6–2.6)
MCHC RBC-ENTMCNC: 29.2 PG — SIGNIFICANT CHANGE UP (ref 27–34)
MCHC RBC-ENTMCNC: 29.7 PG — SIGNIFICANT CHANGE UP (ref 27–34)
MCHC RBC-ENTMCNC: 33.3 G/DL — SIGNIFICANT CHANGE UP (ref 32–36)
MCHC RBC-ENTMCNC: 33.4 G/DL — SIGNIFICANT CHANGE UP (ref 32–36)
MCV RBC AUTO: 87.6 FL — SIGNIFICANT CHANGE UP (ref 80–100)
MCV RBC AUTO: 88.9 FL — SIGNIFICANT CHANGE UP (ref 80–100)
PCO2 BLDA: 24 MMHG — LOW (ref 35–48)
PCO2 BLDA: 35 MMHG — SIGNIFICANT CHANGE UP (ref 35–48)
PCO2 BLDA: 37 MMHG — SIGNIFICANT CHANGE UP (ref 35–48)
PH BLDA: 7.44 — SIGNIFICANT CHANGE UP (ref 7.35–7.45)
PH BLDA: 7.48 — HIGH (ref 7.35–7.45)
PH BLDA: 7.57 — HIGH (ref 7.35–7.45)
PLATELET # BLD AUTO: 141 K/UL — LOW (ref 150–400)
PLATELET # BLD AUTO: 170 K/UL — SIGNIFICANT CHANGE UP (ref 150–400)
PO2 BLDA: 107 MMHG — SIGNIFICANT CHANGE UP (ref 83–108)
PO2 BLDA: 109 MMHG — HIGH (ref 83–108)
PO2 BLDA: 126 MMHG — HIGH (ref 83–108)
POTASSIUM SERPL-MCNC: 3.6 MMOL/L — SIGNIFICANT CHANGE UP (ref 3.5–5.3)
POTASSIUM SERPL-MCNC: 3.8 MMOL/L — SIGNIFICANT CHANGE UP (ref 3.5–5.3)
POTASSIUM SERPL-SCNC: 3.6 MMOL/L — SIGNIFICANT CHANGE UP (ref 3.5–5.3)
POTASSIUM SERPL-SCNC: 3.8 MMOL/L — SIGNIFICANT CHANGE UP (ref 3.5–5.3)
PROT SERPL-MCNC: 6.4 G/DL — SIGNIFICANT CHANGE UP (ref 6–8.3)
PROTHROM AB SERPL-ACNC: 17.9 SEC — HIGH (ref 9.8–12.7)
PROTHROM AB SERPL-ACNC: 17.9 SEC — HIGH (ref 9.8–12.7)
RBC # BLD: 3.43 M/UL — LOW (ref 4.2–5.8)
RBC # BLD: 3.56 M/UL — LOW (ref 4.2–5.8)
RBC # FLD: 15.3 % — SIGNIFICANT CHANGE UP (ref 10.3–16.9)
RBC # FLD: 15.6 % — SIGNIFICANT CHANGE UP (ref 10.3–16.9)
RH IG SCN BLD-IMP: POSITIVE — SIGNIFICANT CHANGE UP
SAO2 % BLDA: 98 % — SIGNIFICANT CHANGE UP (ref 95–100)
SAO2 % BLDA: 98 % — SIGNIFICANT CHANGE UP (ref 95–100)
SAO2 % BLDA: 99 % — SIGNIFICANT CHANGE UP (ref 95–100)
SODIUM SERPL-SCNC: 134 MMOL/L — LOW (ref 135–145)
SODIUM SERPL-SCNC: 136 MMOL/L — SIGNIFICANT CHANGE UP (ref 135–145)
WBC # BLD: 12.3 K/UL — HIGH (ref 3.8–10.5)
WBC # BLD: 12.5 K/UL — HIGH (ref 3.8–10.5)
WBC # FLD AUTO: 12.3 K/UL — HIGH (ref 3.8–10.5)
WBC # FLD AUTO: 12.5 K/UL — HIGH (ref 3.8–10.5)

## 2017-06-05 PROCEDURE — 99292 CRITICAL CARE ADDL 30 MIN: CPT

## 2017-06-05 PROCEDURE — 71010: CPT | Mod: 26

## 2017-06-05 PROCEDURE — 99291 CRITICAL CARE FIRST HOUR: CPT

## 2017-06-05 RX ORDER — POTASSIUM CHLORIDE 20 MEQ
20 PACKET (EA) ORAL ONCE
Qty: 0 | Refills: 0 | Status: COMPLETED | OUTPATIENT
Start: 2017-06-05 | End: 2017-06-05

## 2017-06-05 RX ORDER — MIDAZOLAM HYDROCHLORIDE 1 MG/ML
2 INJECTION, SOLUTION INTRAMUSCULAR; INTRAVENOUS ONCE
Qty: 0 | Refills: 0 | Status: DISCONTINUED | OUTPATIENT
Start: 2017-06-05 | End: 2017-06-05

## 2017-06-05 RX ORDER — ACETAMINOPHEN 500 MG
1000 TABLET ORAL ONCE
Qty: 0 | Refills: 0 | Status: COMPLETED | OUTPATIENT
Start: 2017-06-05 | End: 2017-06-05

## 2017-06-05 RX ADMIN — PIPERACILLIN AND TAZOBACTAM 200 GRAM(S): 4; .5 INJECTION, POWDER, LYOPHILIZED, FOR SOLUTION INTRAVENOUS at 06:30

## 2017-06-05 RX ADMIN — PIPERACILLIN AND TAZOBACTAM 200 GRAM(S): 4; .5 INJECTION, POWDER, LYOPHILIZED, FOR SOLUTION INTRAVENOUS at 00:31

## 2017-06-05 RX ADMIN — Medication 1000 MILLIGRAM(S): at 18:14

## 2017-06-05 RX ADMIN — Medication 400 MILLIGRAM(S): at 17:30

## 2017-06-05 RX ADMIN — FAMOTIDINE 20 MILLIGRAM(S): 10 INJECTION INTRAVENOUS at 07:09

## 2017-06-05 RX ADMIN — MIDAZOLAM HYDROCHLORIDE 2 MILLIGRAM(S): 1 INJECTION, SOLUTION INTRAMUSCULAR; INTRAVENOUS at 07:40

## 2017-06-05 RX ADMIN — Medication 3 MILLILITER(S): at 23:28

## 2017-06-05 RX ADMIN — ATORVASTATIN CALCIUM 20 MILLIGRAM(S): 80 TABLET, FILM COATED ORAL at 22:12

## 2017-06-05 RX ADMIN — Medication 100 MILLIGRAM(S): at 14:46

## 2017-06-05 RX ADMIN — Medication 81 MILLIGRAM(S): at 14:46

## 2017-06-05 RX ADMIN — Medication 1 TABLET(S): at 14:46

## 2017-06-05 RX ADMIN — Medication 3 MILLILITER(S): at 12:05

## 2017-06-05 RX ADMIN — LEVALBUTEROL 0.63 MILLIGRAM(S): 1.25 SOLUTION, CONCENTRATE RESPIRATORY (INHALATION) at 18:19

## 2017-06-05 RX ADMIN — Medication 20 MILLIGRAM(S): at 07:09

## 2017-06-05 RX ADMIN — PIPERACILLIN AND TAZOBACTAM 200 GRAM(S): 4; .5 INJECTION, POWDER, LYOPHILIZED, FOR SOLUTION INTRAVENOUS at 23:28

## 2017-06-05 RX ADMIN — PIPERACILLIN AND TAZOBACTAM 200 GRAM(S): 4; .5 INJECTION, POWDER, LYOPHILIZED, FOR SOLUTION INTRAVENOUS at 12:46

## 2017-06-05 RX ADMIN — Medication 166.67 MILLIGRAM(S): at 19:03

## 2017-06-05 RX ADMIN — LEVALBUTEROL 0.63 MILLIGRAM(S): 1.25 SOLUTION, CONCENTRATE RESPIRATORY (INHALATION) at 12:06

## 2017-06-05 RX ADMIN — LEVALBUTEROL 0.63 MILLIGRAM(S): 1.25 SOLUTION, CONCENTRATE RESPIRATORY (INHALATION) at 23:28

## 2017-06-05 RX ADMIN — Medication 3 MILLILITER(S): at 00:50

## 2017-06-05 RX ADMIN — PIPERACILLIN AND TAZOBACTAM 200 GRAM(S): 4; .5 INJECTION, POWDER, LYOPHILIZED, FOR SOLUTION INTRAVENOUS at 18:18

## 2017-06-05 RX ADMIN — Medication 3 MILLILITER(S): at 18:18

## 2017-06-05 RX ADMIN — Medication 1 MILLIGRAM(S): at 14:46

## 2017-06-05 RX ADMIN — LEVALBUTEROL 0.63 MILLIGRAM(S): 1.25 SOLUTION, CONCENTRATE RESPIRATORY (INHALATION) at 05:28

## 2017-06-05 RX ADMIN — FAMOTIDINE 20 MILLIGRAM(S): 10 INJECTION INTRAVENOUS at 18:18

## 2017-06-05 RX ADMIN — Medication 1 ENEMA: at 05:00

## 2017-06-05 RX ADMIN — Medication 100 MILLIEQUIVALENT(S): at 05:30

## 2017-06-05 RX ADMIN — Medication 3 MILLILITER(S): at 05:27

## 2017-06-05 RX ADMIN — LEVALBUTEROL 0.63 MILLIGRAM(S): 1.25 SOLUTION, CONCENTRATE RESPIRATORY (INHALATION) at 00:51

## 2017-06-05 RX ADMIN — Medication 166.67 MILLIGRAM(S): at 07:11

## 2017-06-05 NOTE — PROGRESS NOTE ADULT - SUBJECTIVE AND OBJECTIVE BOX
Pt seen and examined  abdomen softer today, opens eyes and responds    REVIEW OF SYSTEMS:  Constitutional: No fever, weight loss or fatigue  Cardiovascular: No chest pain, palpitations, dizziness or leg swelling  Gastrointestinal: No abdominal or epigastric pain. No nausea, vomiting or hematemesis; No diarrhea or constipation. No melena or hematochezia.  Skin: No itching, burning, rashes or lesions       MEDICATIONS:  MEDICATIONS  (STANDING):  aspirin enteric coated 81milliGRAM(s) Oral daily  sodium chloride 0.45%. 1000milliLiter(s) IV Continuous <Continuous>  famotidine Injectable 20milliGRAM(s) IV Push every 12 hours  insulin lispro (HumaLOG) corrective regimen sliding scale  SubCutaneous every 6 hours  dextrose 5%. 1000milliLiter(s) IV Continuous <Continuous>  dextrose 50% Injectable 12.5Gram(s) IV Push once  dextrose 50% Injectable 25Gram(s) IV Push once  dextrose 50% Injectable 25Gram(s) IV Push once  multivitamin 1Tablet(s) Oral daily  thiamine 100milliGRAM(s) Oral daily  folic acid 1milliGRAM(s) Oral daily  acetylcysteine 20% Inhalation 3milliLiter(s) Inhalation every 6 hours  atorvastatin 20milliGRAM(s) Oral at bedtime  levalbuterol Inhalation 0.63milliGRAM(s) Inhalation every 6 hours  piperacillin/tazobactam IVPB. 3.375Gram(s) IV Intermittent every 6 hours  piperacillin/tazobactam IVPB.  IV Intermittent   furosemide   Injectable 20milliGRAM(s) IV Push daily  dexmedetomidine Infusion 0.1MICROgram(s)/kG/Hr IV Continuous <Continuous>  norepinephrine Infusion 0.01MICROgram(s)/kG/Min IV Continuous <Continuous>  vancomycin  IVPB 1250milliGRAM(s) IV Intermittent every 12 hours    MEDICATIONS  (PRN):  bisacodyl Suppository 10milliGRAM(s) Rectal daily PRN Constipation  dextrose Gel 1Dose(s) Oral once PRN Blood Glucose LESS THAN 70 milliGRAM(s)/deciliter  glucagon  Injectable 1milliGRAM(s) IntraMuscular once PRN Glucose LESS THAN 70 milligrams/deciliter      Allergies    Allergy Status Unknown    Intolerances        Vital Signs Last 24 Hrs  T(C): 37.4, Max: 37.4 (06-05 @ 06:00)  T(F): 99.4, Max: 99.4 (06-05 @ 06:00)  HR: 74 (68 - 96)  BP: --  BP(mean): --  RR: 23 (11 - 37)  SpO2: 99% (92% - 100%)  I & Os for 24h ending 06-05 @ 07:00  =============================================  IN: 1820.1 ml / OUT: 3065 ml / NET: -1244.9 ml    I & Os for current day (as of 06-05 @ 12:09)  =============================================  IN: 97.5 ml / OUT: 1225 ml / NET: -1127.5 ml      PHYSICAL EXAM:    General: intubated; in no acute distress  HEENT: MMM, conjunctiva and sclera clear  Gastrointestinal: Soft non-tender less-distended; Normal bowel sounds; No hepatosplenomegaly  Skin: Warm and dry. No obvious rash    LABS:  ABG - ( 05 Jun 2017 03:43 )  pH: 7.44  /  pCO2: 37    /  pO2: 109   / HCO3: 25    / Base Excess: 1.0   /  SaO2: 98                  CBC Full  -  ( 05 Jun 2017 03:44 )  WBC Count : 12.3 K/uL  Hemoglobin : 10.2 g/dL  Hematocrit : 30.5 %  Platelet Count - Automated : 141 K/uL  Mean Cell Volume : 88.9 fL  Mean Cell Hemoglobin : 29.7 pg  Mean Cell Hemoglobin Concentration : 33.4 g/dL  Auto Neutrophil # : x  Auto Lymphocyte # : x  Auto Monocyte # : x  Auto Eosinophil # : x  Auto Basophil # : x  Auto Neutrophil % : x  Auto Lymphocyte % : x  Auto Monocyte % : x  Auto Eosinophil % : x  Auto Basophil % : x    06-05    134<L>  |  100  |  17  ----------------------------<  115<H>  3.8   |  23  |  0.60    Ca    8.4      05 Jun 2017 03:44  Mg     2.1     06-05    TPro  6.4  /  Alb  2.6<L>  /  TBili  1.3<H>  /  DBili  x   /  AST  42<H>  /  ALT  22  /  AlkPhos  80  06-05    PT/INR - ( 05 Jun 2017 03:44 )   PT: 17.9 sec;   INR: 1.60          PTT - ( 05 Jun 2017 03:44 )  PTT:32.2 sec                  RADIOLOGY & ADDITIONAL STUDIES (The following images were personally reviewed):

## 2017-06-05 NOTE — PROGRESS NOTE ADULT - SUBJECTIVE AND OBJECTIVE BOX
INTERVAL HPI/OVERNIGHT EVENTS:    POD #9 AVR/CABG x2  EF 70%    62yo male - active tobacco use/daily ETOH intake (1 pint vodka and several beers x 45 years), HLD, HTN presented to [facility] with Chest Pain/HEIN    ECHO: EF 65%, mod mitral annular calcification, mod aortic stenosis  Cath: LM 40%, pLAD 80%, pRCA 80%, severe AS.  d/c home but returned to hospital ([place]) 5/22 with severe CP that woke patient from sleep            PAST MEDICAL & SURGICAL HISTORY:  Aortic valve stenosis, unspecified etiology  Hyperlipidemia  Smoker  ETOH abuse  HTN (hypertension)        ICU Vital Signs Last 24 Hrs  T(C): 37.4, Max: 37.4 (06-05 @ 06:00)  T(F): 99.4, Max: 99.4 (06-05 @ 06:00)  HR: 77 (68 - 96)  BP: 126/68 (126/68 - 126/68)  BP(mean): 95 (95 - 95)  ABP: 146/60 (94/42 - 156/66)  ABP(mean): 92 (64 - 100)  RR: 23 (11 - 37)  SpO2: 99% (92% - 100%)    Qtts:     I&O's Summary  I & Os for 24h ending 05 Jun 2017 07:00  =============================================  IN: 1820.1 ml / OUT: 3065 ml / NET: -1244.9 ml    I & Os for current day (as of 05 Jun 2017 13:07)  =============================================  IN: 97.5 ml / OUT: 1225 ml / NET: -1127.5 ml      Mode: AC/ CMV (Assist Control/ Continuous Mandatory Ventilation)  RR (machine): 12  TV (machine): 600  FiO2: 40  PEEP: 5  ITime: 1  MAP: 8  PIP: 13      Physical Exam    Heart  Lungs  Abd  Ext  Chest  Neuro  Skin    LABS:                        10.2   12.3  )-----------( 141      ( 05 Jun 2017 03:44 )             30.5     06-05    134<L>  |  100  |  17  ----------------------------<  115<H>  3.8   |  23  |  0.60    Ca    8.4      05 Jun 2017 03:44  Mg     2.1     06-05    TPro  6.4  /  Alb  2.6<L>  /  TBili  1.3<H>  /  DBili  x   /  AST  42<H>  /  ALT  22  /  AlkPhos  80  06-05    PT/INR - ( 05 Jun 2017 03:44 )   PT: 17.9 sec;   INR: 1.60          PTT - ( 05 Jun 2017 03:44 )  PTT:32.2 sec    ABG - ( 05 Jun 2017 03:43 )  pH: 7.44  /  pCO2: 37    /  pO2: 109   / HCO3: 25    / Base Excess: 1.0   /  SaO2: 98                  RADIOLOGY & ADDITIONAL STUDIES:    I have spent/provided stated minutes of critical care time to this patient: INTERVAL HPI/OVERNIGHT EVENTS:    POD #9 AVR/CABG x2  EF 70%    62yo male - active tobacco use/daily ETOH intake (1 pint vodka and several beers x 45 years), HLD, HTN presented to [facility] with Chest Pain/HEIN    ECHO: EF 65%, mod mitral annular calcification, mod aortic stenosis  Cath: LM 40%, pLAD 80%, pRCA 80%, severe AS.  d/c home but returned to hospital ([place]) 5/22 with severe CP that woke patient from sleep    to OR 5/27 AVR/CABG x 2  agitation/combative in initial post-op course - concern for withdrawl treated  Extubated 5/29 to HFNC  5/31- low grade temps/inc agitation; delirium --> precedex infusion. reintubated for airway protection  6/1 - Bronch  6/3 - noted Abd distention - XRAY c/w ileus - NGT for decompression; TF stopped  CT scan  GI consulted (Dr Barcenas) following - ileus improving    ETT repositioned this am after noting gurgling   direct visualization with glidescope and ETT advanced - Xray confirmed positioning    (+)BM today - NGT still with bilious output          PAST MEDICAL & SURGICAL HISTORY:  Aortic valve stenosis, unspecified etiology  Hyperlipidemia  Smoker  ETOH abuse  HTN (hypertension)        ICU Vital Signs Last 24 Hrs  T(C): 37.4, Max: 37.4 (06-05 @ 06:00)  T(F): 99.4, Max: 99.4 (06-05 @ 06:00)  HR: 77 (68 - 96)  BP: 126/68 (126/68 - 126/68)  BP(mean): 95 (95 - 95)  ABP: 146/60 (94/42 - 156/66)  ABP(mean): 92 (64 - 100)  RR: 23 (11 - 37)  SpO2: 99% (92% - 100%)    Qtts:     I&O's Summary  I & Os for 24h ending 05 Jun 2017 07:00  =============================================  IN: 1820.1 ml / OUT: 3065 ml / NET: -1244.9 ml    I & Os for current day (as of 05 Jun 2017 13:07)  =============================================  IN: 97.5 ml / OUT: 1225 ml / NET: -1127.5 ml      Mode: AC/ CMV (Assist Control/ Continuous Mandatory Ventilation)  RR (machine): 12  TV (machine): 600  FiO2: 40  PEEP: 5  ITime: 1  MAP: 8  PIP: 13      Physical Exam    Heart  Lungs  Abd  Ext  Chest  Neuro  Skin    LABS:                        10.2   12.3  )-----------( 141      ( 05 Jun 2017 03:44 )             30.5     06-05    134<L>  |  100  |  17  ----------------------------<  115<H>  3.8   |  23  |  0.60    Ca    8.4      05 Jun 2017 03:44  Mg     2.1     06-05    TPro  6.4  /  Alb  2.6<L>  /  TBili  1.3<H>  /  DBili  x   /  AST  42<H>  /  ALT  22  /  AlkPhos  80  06-05    PT/INR - ( 05 Jun 2017 03:44 )   PT: 17.9 sec;   INR: 1.60          PTT - ( 05 Jun 2017 03:44 )  PTT:32.2 sec    ABG - ( 05 Jun 2017 03:43 )  pH: 7.44  /  pCO2: 37    /  pO2: 109   / HCO3: 25    / Base Excess: 1.0   /  SaO2: 98                  RADIOLOGY & ADDITIONAL STUDIES:    I have spent/provided stated minutes of critical care time to this patient: INTERVAL HPI/OVERNIGHT EVENTS:    POD #9 AVR/CABG x2  EF 70%    60yo male - active tobacco use/daily ETOH intake (1 pint vodka and several beers x 45 years), HLD, HTN presented to [facility] with Chest Pain/HEIN    ECHO: EF 65%, mod mitral annular calcification, mod aortic stenosis  Cath: LM 40%, pLAD 80%, pRCA 80%, severe AS.  d/c home but returned to hospital ([place]) 5/22 with severe CP that woke patient from sleep    to OR 5/27 AVR/CABG x 2  agitation/combative in initial post-op course - concern for withdrawl treated  Extubated 5/29 to HFNC  5/31- low grade temps/inc agitation; delirium --> precedex infusion. reintubated for airway protection  6/1 - Bronch  6/3 - noted Abd distention - XRAY c/w ileus - NGT for decompression; TF stopped  CT scan  GI consulted (Dr Barcenas) following - ileus improving    ETT repositioned this am after noting gurgling   direct visualization with glidescope and ETT advanced - Xray confirmed positioning    (+)BM today - NGT still with bilious output  patient on low dose levophed - no off and precedex  awake and interactive    PMHx includes but is not limited to:  Aortic valve stenosis  Hyperlipidemia  Smoker  ETOH abuse  HTN     ICU Vital Signs Last 24 Hrs  T(C): 37.4, Max: 37.4 (06-05 @ 06:00)  T(F): 99.4, Max: 99.4 (06-05 @ 06:00)  HR: 77 (68 - 96) sinus  BP: 126/68 (126/68 - 126/68)  BP(mean): 95 (95 - 95)  ABP: 146/60 (94/42 - 156/66)  ABP(mean): 92 (64 - 100)  SpO2: 99% (92% - 100%) Vent Fi02 40%    Qtts:   Precedex  levophed - off    I&O's Summary  I & Os for 24h ending 05 Jun 2017 07:00  =============================================  IN: 1820.1 ml / OUT: 3065 ml / NET: -1244.9 ml    I & Os for current day (as of 05 Jun 2017 13:07)  =============================================  IN: 97.5 ml / OUT: 1225 ml / NET: -1127.5 ml    Vent settings: AC 12/600/40/5    Physical Exam    Heart - regular (-)rub/gallop  Lungs - CTA anterior (-)rales/wheeze  Abd - (+)BS distended; soft (-)r/r/g  Ext - warm to touch; no cyanosis/clubbing/edema  Chest - incision site clean/dry  Neuro - alert/oriented and following simple commands  Skin - no rash    LABS:                        10.2   12.3  )-----------( 141      ( 05 Jun 2017 03:44 )             30.5     06-05    134<L>  |  100  |  17  ----------------------------<  115<H>  3.8   |  23  |  0.60    Ca    8.4      05 Jun 2017 03:44  Mg     2.1     06-05    TPro  6.4  /  Alb  2.6<L>  /  TBili  1.3<H>  /  DBili  x   /  AST  42<H>  /  ALT  22  /  AlkPhos  80  06-05    PT/INR - ( 05 Jun 2017 03:44 )   PT: 17.9 sec;   INR: 1.60     PTT - ( 05 Jun 2017 03:44 )  PTT:32.2 sec    ABG - ( 05 Jun 2017 03:43 ) 7.44/37/109/98    RADIOLOGY & ADDITIONAL STUDIES: reviewed    Patient now POD #9 AVR/CABG x2 - reintubated in setting of delirium/concern for withdrawl 5/31 with post-op ileus - improving    1. CV -   levophed titrated down to off today  maintain MAP 60 or greater  sinus rhythm  cont ASA/statin  lasix 20mg IV daily    2. Pulm -   intubated in settings of delirium - on precedex and now awake and following commands  changed to PS - period of apnea requiring prompting for patient to breathe  changed to IMV to ensure back up rate for safety - precedex dec to 0.4  oral secretions  on Zosyn/Vancomycin for bowel and pulm coverage  Sputum Cx 6/3 & 6/2 - contaminated specimen  cont Nebs  assess for candidacy liberation from vent now that abd better and delirium improved    3. GI -   (+)BM today  post-op ileus improved - bowel sounds ausculated  possible restart TF soon  GI input noted and appreciated    4. Psych -   daily ETOH use noted  thiamine/folate; on precedex at this time  cessation support once appropriate    5. 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

## 2017-06-06 LAB
ALBUMIN SERPL ELPH-MCNC: 2.8 G/DL — LOW (ref 3.3–5)
ALP SERPL-CCNC: 76 U/L — SIGNIFICANT CHANGE UP (ref 40–120)
ALT FLD-CCNC: 21 U/L — SIGNIFICANT CHANGE UP (ref 10–45)
ANION GAP SERPL CALC-SCNC: 12 MMOL/L — SIGNIFICANT CHANGE UP (ref 5–17)
ANION GAP SERPL CALC-SCNC: 13 MMOL/L — SIGNIFICANT CHANGE UP (ref 5–17)
ANION GAP SERPL CALC-SCNC: 14 MMOL/L — SIGNIFICANT CHANGE UP (ref 5–17)
ANION GAP SERPL CALC-SCNC: 16 MMOL/L — SIGNIFICANT CHANGE UP (ref 5–17)
APTT BLD: 30.9 SEC — SIGNIFICANT CHANGE UP (ref 27.5–37.4)
APTT BLD: 31.3 SEC — SIGNIFICANT CHANGE UP (ref 27.5–37.4)
APTT BLD: 33.6 SEC — SIGNIFICANT CHANGE UP (ref 27.5–37.4)
APTT BLD: 33.8 SEC — SIGNIFICANT CHANGE UP (ref 27.5–37.4)
AST SERPL-CCNC: 40 U/L — SIGNIFICANT CHANGE UP (ref 10–40)
BASE EXCESS BLDA CALC-SCNC: -0.6 MMOL/L — SIGNIFICANT CHANGE UP (ref -2–3)
BILIRUB SERPL-MCNC: 1.4 MG/DL — HIGH (ref 0.2–1.2)
BUN SERPL-MCNC: 13 MG/DL — SIGNIFICANT CHANGE UP (ref 7–23)
BUN SERPL-MCNC: 14 MG/DL — SIGNIFICANT CHANGE UP (ref 7–23)
BUN SERPL-MCNC: 14 MG/DL — SIGNIFICANT CHANGE UP (ref 7–23)
BUN SERPL-MCNC: 15 MG/DL — SIGNIFICANT CHANGE UP (ref 7–23)
CALCIUM SERPL-MCNC: 8.1 MG/DL — LOW (ref 8.4–10.5)
CALCIUM SERPL-MCNC: 8.4 MG/DL — SIGNIFICANT CHANGE UP (ref 8.4–10.5)
CALCIUM SERPL-MCNC: 8.5 MG/DL — SIGNIFICANT CHANGE UP (ref 8.4–10.5)
CALCIUM SERPL-MCNC: 8.5 MG/DL — SIGNIFICANT CHANGE UP (ref 8.4–10.5)
CHLORIDE SERPL-SCNC: 100 MMOL/L — SIGNIFICANT CHANGE UP (ref 96–108)
CHLORIDE SERPL-SCNC: 99 MMOL/L — SIGNIFICANT CHANGE UP (ref 96–108)
CO2 SERPL-SCNC: 21 MMOL/L — LOW (ref 22–31)
CO2 SERPL-SCNC: 21 MMOL/L — LOW (ref 22–31)
CO2 SERPL-SCNC: 22 MMOL/L — SIGNIFICANT CHANGE UP (ref 22–31)
CO2 SERPL-SCNC: 22 MMOL/L — SIGNIFICANT CHANGE UP (ref 22–31)
CREAT SERPL-MCNC: 0.6 MG/DL — SIGNIFICANT CHANGE UP (ref 0.5–1.3)
CREAT SERPL-MCNC: 0.6 MG/DL — SIGNIFICANT CHANGE UP (ref 0.5–1.3)
CREAT SERPL-MCNC: 0.7 MG/DL — SIGNIFICANT CHANGE UP (ref 0.5–1.3)
CREAT SERPL-MCNC: 0.7 MG/DL — SIGNIFICANT CHANGE UP (ref 0.5–1.3)
GAS PNL BLDA: SIGNIFICANT CHANGE UP
GLUCOSE SERPL-MCNC: 101 MG/DL — HIGH (ref 70–99)
GLUCOSE SERPL-MCNC: 105 MG/DL — HIGH (ref 70–99)
GLUCOSE SERPL-MCNC: 110 MG/DL — HIGH (ref 70–99)
GLUCOSE SERPL-MCNC: 98 MG/DL — SIGNIFICANT CHANGE UP (ref 70–99)
HCO3 BLDA-SCNC: 22 MMOL/L — SIGNIFICANT CHANGE UP (ref 21–28)
HCT VFR BLD CALC: 28.2 % — LOW (ref 39–50)
HCT VFR BLD CALC: 29.2 % — LOW (ref 39–50)
HCT VFR BLD CALC: 30 % — LOW (ref 39–50)
HCT VFR BLD CALC: 30.5 % — LOW (ref 39–50)
HGB BLD-MCNC: 10.1 G/DL — LOW (ref 13–17)
HGB BLD-MCNC: 10.4 G/DL — LOW (ref 13–17)
HGB BLD-MCNC: 9.5 G/DL — LOW (ref 13–17)
HGB BLD-MCNC: 9.8 G/DL — LOW (ref 13–17)
INR BLD: 1.6 — HIGH (ref 0.88–1.16)
INR BLD: 1.61 — HIGH (ref 0.88–1.16)
INR BLD: 1.62 — HIGH (ref 0.88–1.16)
INR BLD: 1.68 — HIGH (ref 0.88–1.16)
LACTATE SERPL-SCNC: 0.8 MMOL/L — SIGNIFICANT CHANGE UP (ref 0.5–2)
LACTATE SERPL-SCNC: 1 MMOL/L — SIGNIFICANT CHANGE UP (ref 0.5–2)
LACTATE SERPL-SCNC: 1 MMOL/L — SIGNIFICANT CHANGE UP (ref 0.5–2)
LACTATE SERPL-SCNC: 1.4 MMOL/L — SIGNIFICANT CHANGE UP (ref 0.5–2)
MAGNESIUM SERPL-MCNC: 1.9 MG/DL — SIGNIFICANT CHANGE UP (ref 1.6–2.6)
MAGNESIUM SERPL-MCNC: 2 MG/DL — SIGNIFICANT CHANGE UP (ref 1.6–2.6)
MAGNESIUM SERPL-MCNC: 2.2 MG/DL — SIGNIFICANT CHANGE UP (ref 1.6–2.6)
MAGNESIUM SERPL-MCNC: 2.2 MG/DL — SIGNIFICANT CHANGE UP (ref 1.6–2.6)
MCHC RBC-ENTMCNC: 29.5 PG — SIGNIFICANT CHANGE UP (ref 27–34)
MCHC RBC-ENTMCNC: 29.6 PG — SIGNIFICANT CHANGE UP (ref 27–34)
MCHC RBC-ENTMCNC: 29.7 PG — SIGNIFICANT CHANGE UP (ref 27–34)
MCHC RBC-ENTMCNC: 30 PG — SIGNIFICANT CHANGE UP (ref 27–34)
MCHC RBC-ENTMCNC: 33.6 G/DL — SIGNIFICANT CHANGE UP (ref 32–36)
MCHC RBC-ENTMCNC: 33.7 G/DL — SIGNIFICANT CHANGE UP (ref 32–36)
MCHC RBC-ENTMCNC: 33.7 G/DL — SIGNIFICANT CHANGE UP (ref 32–36)
MCHC RBC-ENTMCNC: 34.1 G/DL — SIGNIFICANT CHANGE UP (ref 32–36)
MCV RBC AUTO: 87.9 FL — SIGNIFICANT CHANGE UP (ref 80–100)
MCV RBC AUTO: 88 FL — SIGNIFICANT CHANGE UP (ref 80–100)
MCV RBC AUTO: 88 FL — SIGNIFICANT CHANGE UP (ref 80–100)
MCV RBC AUTO: 88.1 FL — SIGNIFICANT CHANGE UP (ref 80–100)
PCO2 BLDA: 29 MMHG — LOW (ref 35–48)
PH BLDA: 7.49 — HIGH (ref 7.35–7.45)
PHOSPHATE SERPL-MCNC: 3.1 MG/DL — SIGNIFICANT CHANGE UP (ref 2.5–4.5)
PHOSPHATE SERPL-MCNC: 3.4 MG/DL — SIGNIFICANT CHANGE UP (ref 2.5–4.5)
PHOSPHATE SERPL-MCNC: 3.7 MG/DL — SIGNIFICANT CHANGE UP (ref 2.5–4.5)
PLATELET # BLD AUTO: 165 K/UL — SIGNIFICANT CHANGE UP (ref 150–400)
PLATELET # BLD AUTO: 182 K/UL — SIGNIFICANT CHANGE UP (ref 150–400)
PLATELET # BLD AUTO: 218 K/UL — SIGNIFICANT CHANGE UP (ref 150–400)
PLATELET # BLD AUTO: 221 K/UL — SIGNIFICANT CHANGE UP (ref 150–400)
PO2 BLDA: 134 MMHG — HIGH (ref 83–108)
POTASSIUM SERPL-MCNC: 3.4 MMOL/L — LOW (ref 3.5–5.3)
POTASSIUM SERPL-MCNC: 3.7 MMOL/L — SIGNIFICANT CHANGE UP (ref 3.5–5.3)
POTASSIUM SERPL-MCNC: 3.8 MMOL/L — SIGNIFICANT CHANGE UP (ref 3.5–5.3)
POTASSIUM SERPL-MCNC: 4 MMOL/L — SIGNIFICANT CHANGE UP (ref 3.5–5.3)
POTASSIUM SERPL-SCNC: 3.4 MMOL/L — LOW (ref 3.5–5.3)
POTASSIUM SERPL-SCNC: 3.7 MMOL/L — SIGNIFICANT CHANGE UP (ref 3.5–5.3)
POTASSIUM SERPL-SCNC: 3.8 MMOL/L — SIGNIFICANT CHANGE UP (ref 3.5–5.3)
POTASSIUM SERPL-SCNC: 4 MMOL/L — SIGNIFICANT CHANGE UP (ref 3.5–5.3)
PROT SERPL-MCNC: 6.5 G/DL — SIGNIFICANT CHANGE UP (ref 6–8.3)
PROTHROM AB SERPL-ACNC: 17.9 SEC — HIGH (ref 9.8–12.7)
PROTHROM AB SERPL-ACNC: 18.1 SEC — HIGH (ref 9.8–12.7)
PROTHROM AB SERPL-ACNC: 18.2 SEC — HIGH (ref 9.8–12.7)
PROTHROM AB SERPL-ACNC: 18.9 SEC — HIGH (ref 9.8–12.7)
RBC # BLD: 3.2 M/UL — LOW (ref 4.2–5.8)
RBC # BLD: 3.32 M/UL — LOW (ref 4.2–5.8)
RBC # BLD: 3.41 M/UL — LOW (ref 4.2–5.8)
RBC # BLD: 3.47 M/UL — LOW (ref 4.2–5.8)
RBC # FLD: 15 % — SIGNIFICANT CHANGE UP (ref 10.3–16.9)
RBC # FLD: 15.1 % — SIGNIFICANT CHANGE UP (ref 10.3–16.9)
RBC # FLD: 15.7 % — SIGNIFICANT CHANGE UP (ref 10.3–16.9)
RBC # FLD: 15.8 % — SIGNIFICANT CHANGE UP (ref 10.3–16.9)
SAO2 % BLDA: 99 % — SIGNIFICANT CHANGE UP (ref 95–100)
SODIUM SERPL-SCNC: 133 MMOL/L — LOW (ref 135–145)
SODIUM SERPL-SCNC: 133 MMOL/L — LOW (ref 135–145)
SODIUM SERPL-SCNC: 136 MMOL/L — SIGNIFICANT CHANGE UP (ref 135–145)
SODIUM SERPL-SCNC: 136 MMOL/L — SIGNIFICANT CHANGE UP (ref 135–145)
VANCOMYCIN TROUGH SERPL-MCNC: 20.4 UG/ML — HIGH (ref 10–20)
WBC # BLD: 10.3 K/UL — SIGNIFICANT CHANGE UP (ref 3.8–10.5)
WBC # BLD: 11.5 K/UL — HIGH (ref 3.8–10.5)
WBC # BLD: 12.2 K/UL — HIGH (ref 3.8–10.5)
WBC # BLD: 14.4 K/UL — HIGH (ref 3.8–10.5)
WBC # FLD AUTO: 10.3 K/UL — SIGNIFICANT CHANGE UP (ref 3.8–10.5)
WBC # FLD AUTO: 11.5 K/UL — HIGH (ref 3.8–10.5)
WBC # FLD AUTO: 12.2 K/UL — HIGH (ref 3.8–10.5)
WBC # FLD AUTO: 14.4 K/UL — HIGH (ref 3.8–10.5)

## 2017-06-06 PROCEDURE — 71010: CPT | Mod: 26,76

## 2017-06-06 PROCEDURE — 31622 DX BRONCHOSCOPE/WASH: CPT

## 2017-06-06 PROCEDURE — 99291 CRITICAL CARE FIRST HOUR: CPT | Mod: 25

## 2017-06-06 PROCEDURE — 99292 CRITICAL CARE ADDL 30 MIN: CPT | Mod: 25

## 2017-06-06 RX ORDER — MAGNESIUM SULFATE 500 MG/ML
2 VIAL (ML) INJECTION ONCE
Qty: 0 | Refills: 0 | Status: COMPLETED | OUTPATIENT
Start: 2017-06-06 | End: 2017-06-06

## 2017-06-06 RX ORDER — FOLIC ACID 0.8 MG
1 TABLET ORAL DAILY
Qty: 0 | Refills: 0 | Status: DISCONTINUED | OUTPATIENT
Start: 2017-06-06 | End: 2017-06-09

## 2017-06-06 RX ORDER — MIDAZOLAM HYDROCHLORIDE 1 MG/ML
1 INJECTION, SOLUTION INTRAMUSCULAR; INTRAVENOUS ONCE
Qty: 0 | Refills: 0 | Status: DISCONTINUED | OUTPATIENT
Start: 2017-06-06 | End: 2017-06-06

## 2017-06-06 RX ORDER — POTASSIUM CHLORIDE 20 MEQ
20 PACKET (EA) ORAL ONCE
Qty: 0 | Refills: 0 | Status: COMPLETED | OUTPATIENT
Start: 2017-06-06 | End: 2017-06-07

## 2017-06-06 RX ORDER — THIAMINE MONONITRATE (VIT B1) 100 MG
100 TABLET ORAL DAILY
Qty: 0 | Refills: 0 | Status: DISCONTINUED | OUTPATIENT
Start: 2017-06-06 | End: 2017-06-09

## 2017-06-06 RX ORDER — POTASSIUM CHLORIDE 20 MEQ
20 PACKET (EA) ORAL
Qty: 0 | Refills: 0 | Status: COMPLETED | OUTPATIENT
Start: 2017-06-06 | End: 2017-06-06

## 2017-06-06 RX ORDER — POTASSIUM CHLORIDE 20 MEQ
20 PACKET (EA) ORAL ONCE
Qty: 0 | Refills: 0 | Status: COMPLETED | OUTPATIENT
Start: 2017-06-06 | End: 2017-06-06

## 2017-06-06 RX ADMIN — Medication 3 MILLILITER(S): at 13:03

## 2017-06-06 RX ADMIN — LEVALBUTEROL 0.63 MILLIGRAM(S): 1.25 SOLUTION, CONCENTRATE RESPIRATORY (INHALATION) at 17:46

## 2017-06-06 RX ADMIN — PIPERACILLIN AND TAZOBACTAM 200 GRAM(S): 4; .5 INJECTION, POWDER, LYOPHILIZED, FOR SOLUTION INTRAVENOUS at 23:27

## 2017-06-06 RX ADMIN — PIPERACILLIN AND TAZOBACTAM 200 GRAM(S): 4; .5 INJECTION, POWDER, LYOPHILIZED, FOR SOLUTION INTRAVENOUS at 05:08

## 2017-06-06 RX ADMIN — LEVALBUTEROL 0.63 MILLIGRAM(S): 1.25 SOLUTION, CONCENTRATE RESPIRATORY (INHALATION) at 13:02

## 2017-06-06 RX ADMIN — Medication 50 MILLIEQUIVALENT(S): at 09:01

## 2017-06-06 RX ADMIN — FAMOTIDINE 20 MILLIGRAM(S): 10 INJECTION INTRAVENOUS at 18:39

## 2017-06-06 RX ADMIN — Medication 100 MILLIGRAM(S): at 18:38

## 2017-06-06 RX ADMIN — Medication 166.67 MILLIGRAM(S): at 05:08

## 2017-06-06 RX ADMIN — Medication 50 GRAM(S): at 13:34

## 2017-06-06 RX ADMIN — MIDAZOLAM HYDROCHLORIDE 1 MILLIGRAM(S): 1 INJECTION, SOLUTION INTRAMUSCULAR; INTRAVENOUS at 02:30

## 2017-06-06 RX ADMIN — Medication 3 MILLILITER(S): at 05:09

## 2017-06-06 RX ADMIN — PIPERACILLIN AND TAZOBACTAM 200 GRAM(S): 4; .5 INJECTION, POWDER, LYOPHILIZED, FOR SOLUTION INTRAVENOUS at 18:39

## 2017-06-06 RX ADMIN — DEXMEDETOMIDINE HYDROCHLORIDE IN 0.9% SODIUM CHLORIDE 1.67 MICROGRAM(S)/KG/HR: 4 INJECTION INTRAVENOUS at 23:39

## 2017-06-06 RX ADMIN — Medication 50 MILLIEQUIVALENT(S): at 07:05

## 2017-06-06 RX ADMIN — Medication 50 MILLIEQUIVALENT(S): at 06:00

## 2017-06-06 RX ADMIN — Medication 1 MILLIGRAM(S): at 18:38

## 2017-06-06 RX ADMIN — LEVALBUTEROL 0.63 MILLIGRAM(S): 1.25 SOLUTION, CONCENTRATE RESPIRATORY (INHALATION) at 05:08

## 2017-06-06 RX ADMIN — Medication 100 MILLIEQUIVALENT(S): at 13:34

## 2017-06-06 RX ADMIN — FAMOTIDINE 20 MILLIGRAM(S): 10 INJECTION INTRAVENOUS at 05:08

## 2017-06-06 RX ADMIN — Medication 3 MILLILITER(S): at 17:46

## 2017-06-06 RX ADMIN — Medication 100 MILLIEQUIVALENT(S): at 19:21

## 2017-06-06 RX ADMIN — PIPERACILLIN AND TAZOBACTAM 200 GRAM(S): 4; .5 INJECTION, POWDER, LYOPHILIZED, FOR SOLUTION INTRAVENOUS at 12:54

## 2017-06-06 RX ADMIN — Medication 20 MILLIGRAM(S): at 05:08

## 2017-06-06 NOTE — PROGRESS NOTE ADULT - SUBJECTIVE AND OBJECTIVE BOX
Pt seen and examined  remains intubated.  Had BM yesterday    REVIEW OF SYSTEMS:  vent      MEDICATIONS:  MEDICATIONS  (STANDING):  aspirin enteric coated 81milliGRAM(s) Oral daily  sodium chloride 0.45%. 1000milliLiter(s) IV Continuous <Continuous>  famotidine Injectable 20milliGRAM(s) IV Push every 12 hours  insulin lispro (HumaLOG) corrective regimen sliding scale  SubCutaneous every 6 hours  dextrose 5%. 1000milliLiter(s) IV Continuous <Continuous>  dextrose 50% Injectable 12.5Gram(s) IV Push once  dextrose 50% Injectable 25Gram(s) IV Push once  dextrose 50% Injectable 25Gram(s) IV Push once  multivitamin 1Tablet(s) Oral daily  thiamine 100milliGRAM(s) Oral daily  folic acid 1milliGRAM(s) Oral daily  acetylcysteine 20% Inhalation 3milliLiter(s) Inhalation every 6 hours  atorvastatin 20milliGRAM(s) Oral at bedtime  levalbuterol Inhalation 0.63milliGRAM(s) Inhalation every 6 hours  piperacillin/tazobactam IVPB. 3.375Gram(s) IV Intermittent every 6 hours  piperacillin/tazobactam IVPB.  IV Intermittent   furosemide   Injectable 20milliGRAM(s) IV Push daily  dexmedetomidine Infusion 0.1MICROgram(s)/kG/Hr IV Continuous <Continuous>  vancomycin  IVPB 1250milliGRAM(s) IV Intermittent every 12 hours    MEDICATIONS  (PRN):  bisacodyl Suppository 10milliGRAM(s) Rectal daily PRN Constipation  dextrose Gel 1Dose(s) Oral once PRN Blood Glucose LESS THAN 70 milliGRAM(s)/deciliter  glucagon  Injectable 1milliGRAM(s) IntraMuscular once PRN Glucose LESS THAN 70 milligrams/deciliter      Allergies    Allergy Status Unknown    Intolerances        Vital Signs Last 24 Hrs  T(C): 37.4, Max: 37.4 (06-06 @ 05:52)  T(F): 99.3, Max: 99.3 (06-06 @ 05:52)  HR: 72 (58 - 77)  BP: 126/68 (126/68 - 126/68)  BP(mean): 95 (95 - 95)  RR: 16 (9 - 30)  SpO2: 100% (95% - 100%)  I & Os for 24h ending 06-06 @ 07:00  =============================================  IN: 2003.2 ml / OUT: 4470 ml / NET: -2466.8 ml    I & Os for current day (as of 06-06 @ 09:55)  =============================================  IN: 215 ml / OUT: 350 ml / NET: -135 ml      PHYSICAL EXAM:    General: Vent; in no acute distress  HEENT: MMM, conjunctiva and sclera clear  Gastrointestinal: Soft non-tender mildly-distended; Normal bowel sounds; No hepatosplenomegaly  Skin: Warm and dry. No obvious rash    LABS:  ABG - ( 06 Jun 2017 03:30 )  pH: 7.49  /  pCO2: 30    /  pO2: 119   / HCO3: 23    / Base Excess: 0.0   /  SaO2: 98                  CBC Full  -  ( 06 Jun 2017 03:34 )  WBC Count : 10.3 K/uL  Hemoglobin : 9.8 g/dL  Hematocrit : 29.2 %  Platelet Count - Automated : 165 K/uL  Mean Cell Volume : 88.0 fL  Mean Cell Hemoglobin : 29.5 pg  Mean Cell Hemoglobin Concentration : 33.6 g/dL  Auto Neutrophil # : x  Auto Lymphocyte # : x  Auto Monocyte # : x  Auto Eosinophil # : x  Auto Basophil # : x  Auto Neutrophil % : x  Auto Lymphocyte % : x  Auto Monocyte % : x  Auto Eosinophil % : x  Auto Basophil % : x    06-06    133<L>  |  99  |  15  ----------------------------<  105<H>  3.4<L>   |  22  |  0.60    Ca    8.1<L>      06 Jun 2017 03:34  Phos  3.1     06-06  Mg     2.0     06-06    TPro  6.4  /  Alb  2.6<L>  /  TBili  1.3<H>  /  DBili  x   /  AST  42<H>  /  ALT  22  /  AlkPhos  80  06-05    PT/INR - ( 06 Jun 2017 03:34 )   PT: 17.9 sec;   INR: 1.60          PTT - ( 06 Jun 2017 03:34 )  PTT:31.3 sec                  RADIOLOGY & ADDITIONAL STUDIES (The following images were personally reviewed):

## 2017-06-06 NOTE — PROGRESS NOTE ADULT - SUBJECTIVE AND OBJECTIVE BOX
CTICU  CRITICAL  CARE  attending     Hand off received 					   Pertinent clinical, laboratory, radiographic, hemodynamic, echocardiographic, respiratory data, microbiologic data and chart were reviewed and analyzed frequently throughout the course of the day and night  Patient seen and examined with CTS/ SH attending at bedside  Pt is a 61y , Male, HEALTH ISSUES - PROBLEM Dx:  Ascites: Ascites  Ileus: Ileus  Constipation: Constipation  Cirrhosis of liver not due to alcohol: Cirrhosis of liver not due to alcohol  ETOH abuse: ETOH abuse      , FAMILY HISTORY:  PAST MEDICAL & SURGICAL HISTORY:  Aortic valve stenosis, unspecified etiology  Hyperlipidemia  Smoker  ETOH abuse  HTN (hypertension)    Patient is a 61y old  Male who presents with a chief complaint of chest pain, admission from office for preop workup (23 May 2017 18:15)      14 system review was unremarkable  acute changes include acute respiratory failure  Vital signs, hemodynamic and respiratory parameters were reviewed from the bedside nursing flowsheet.  ICU Vital Signs Last 24 Hrs  T(C): 37.6, Max: 37.6 (06-06 @ 20:58)  T(F): 99.7, Max: 99.7 (06-06 @ 20:58)  HR: 82 (58 - 100)  BP: 100/68 (100/68 - 100/68)  BP(mean): 78 (78 - 78)  ABP: 142/66 (98/64 - 162/66)  ABP(mean): 96 (70 - 106)  RR: 24 (9 - 36)  SpO2: 100% (95% - 100%)    Adult Advanced Hemodynamics Last 24 Hrs  CVP(mm Hg): 7 (5 - 15)  CVP(cm H2O): --  CO: --  CI: --  PA: --  PA(mean): --  PCWP: --  SVR: --  SVRI: --  PVR: --  PVRI: --, ABG - ( 06 Jun 2017 17:30 )  pH: 7.46  /  pCO2: 32    /  pO2: 159   / HCO3: 22    / Base Excess: -1.3  /  SaO2: 99                Mode: CPAP with PS  FiO2: 40  PEEP: 5  PS: 12  MAP: 8  PIP: 17    Intake and output was reviewed and the fluid balance was calculated  Daily     Daily   I&O's Summary  I & Os for 24h ending 06 Jun 2017 07:00  =============================================  IN: 2003.2 ml / OUT: 4470 ml / NET: -2466.8 ml    I & Os for current day (as of 06 Jun 2017 21:33)  =============================================  IN: 881.7 ml / OUT: 2040 ml / NET: -1158.3 ml      All lines and drain sites were assessed  Glycemic trend was reviewedCAPILLARY BLOOD GLUCOSE  87 (06 Jun 2017 16:00)    No acute change in mental status  Auscultation of the chest reveals equal bs  Abdomen is soft  Extremities are warm and well perfused  Wounds appear clean and unremarkable  Antibiotics are periop    labs  CBC Full  -  ( 06 Jun 2017 17:48 )  WBC Count : 14.4 K/uL  Hemoglobin : 10.4 g/dL  Hematocrit : 30.5 %  Platelet Count - Automated : 221 K/uL  Mean Cell Volume : 87.9 fL  Mean Cell Hemoglobin : 30.0 pg  Mean Cell Hemoglobin Concentration : 34.1 g/dL  Auto Neutrophil # : x  Auto Lymphocyte # : x  Auto Monocyte # : x  Auto Eosinophil # : x  Auto Basophil # : x  Auto Neutrophil % : x  Auto Lymphocyte % : x  Auto Monocyte % : x  Auto Eosinophil % : x  Auto Basophil % : x    06-06    136  |  99  |  14  ----------------------------<  101<H>  3.8   |  21<L>  |  0.70    Ca    8.5      06 Jun 2017 17:48  Phos  3.4     06-06  Mg     2.2     06-06    TPro  6.4  /  Alb  2.6<L>  /  TBili  1.3<H>  /  DBili  x   /  AST  42<H>  /  ALT  22  /  AlkPhos  80  06-05    PT/INR - ( 06 Jun 2017 17:48 )   PT: 18.2 sec;   INR: 1.62          PTT - ( 06 Jun 2017 17:48 )  PTT:33.8 sec  The current medications were reviewed   MEDICATIONS  (STANDING):  aspirin enteric coated 81milliGRAM(s) Oral daily  sodium chloride 0.45%. 1000milliLiter(s) IV Continuous <Continuous>  famotidine Injectable 20milliGRAM(s) IV Push every 12 hours  insulin lispro (HumaLOG) corrective regimen sliding scale  SubCutaneous every 6 hours  dextrose 5%. 1000milliLiter(s) IV Continuous <Continuous>  dextrose 50% Injectable 12.5Gram(s) IV Push once  dextrose 50% Injectable 25Gram(s) IV Push once  dextrose 50% Injectable 25Gram(s) IV Push once  multivitamin 1Tablet(s) Oral daily  acetylcysteine 20% Inhalation 3milliLiter(s) Inhalation every 6 hours  atorvastatin 20milliGRAM(s) Oral at bedtime  piperacillin/tazobactam IVPB. 3.375Gram(s) IV Intermittent every 6 hours  piperacillin/tazobactam IVPB.  IV Intermittent   furosemide   Injectable 20milliGRAM(s) IV Push daily  dexmedetomidine Infusion 0.1MICROgram(s)/kG/Hr IV Continuous <Continuous>  vancomycin  IVPB 1250milliGRAM(s) IV Intermittent every 12 hours  thiamine Injectable 100milliGRAM(s) IV Push daily  folic acid Injectable 1milliGRAM(s) IV Push daily    MEDICATIONS  (PRN):  bisacodyl Suppository 10milliGRAM(s) Rectal daily PRN Constipation  dextrose Gel 1Dose(s) Oral once PRN Blood Glucose LESS THAN 70 milliGRAM(s)/deciliter  glucagon  Injectable 1milliGRAM(s) IntraMuscular once PRN Glucose LESS THAN 70 milligrams/deciliter       PROBLEM LIST/ ASSESSMENT:  HEALTH ISSUES - PROBLEM Dx:  Ascites: Ascites  Ileus: Ileus  Constipation: Constipation  Cirrhosis of liver not due to alcohol: Cirrhosis of liver not due to alcohol  ETOH abuse: ETOH abuse      ,   Patient is a 61y old  Male who presents with a chief complaint of chest pain, admission from office for preop workup (23 May 2017 18:15)       acute changes include acute respiratory failure    My plan includes :  close hemodynamic, ventilatory and drain monitoring and management per post op routine  bronch wean to extubate  Monitor for arrhythmias and monitor parameters for organ perfusion  monitor neurologic status  Head of the bed should remain elevated to 45 deg .   chest PT and IS will be encouraged  monitor adequacy of oxygenation and ventilation and attempt to wean oxygen  Nutritional goals will be met using po eventually , ensure adequate caloric intake and montior the same  Stress ulcer and VTE prophylaxis will be achieved    Glycemic control is satisfactory  Electrolytes have been repleted as necessary and wound care has been carried out. Pain control has been achieved.   agressive physical therapy and early mobility and ambulation goals will be met   The family was updated about the course and plan  CRITICAL CARE TIME SPENT in evaluation and management, reassessments, review and interpretation of labs and x-rays, ventilator and hemodynamic management, formulating a plan and coordinating care: ___90____ MIN.  Time does not include procedural time.  CTICU ATTENDING     					    José Miguel Sandoval MD

## 2017-06-06 NOTE — PROGRESS NOTE ADULT - SUBJECTIVE AND OBJECTIVE BOX
Respiratory failure    Flexible fiberoptic bronchoscopy was performed under propofol and fentanyl sedation while the patient was intubated for controlled mechanical ventilation  Ventilator settings were adjusted to reduce airway pressures to reduce the risk of ptx  The scope was advanced past the ett which was noted to be 2 cm the salvador   The salvador was sharp  Right LL and RML air way were patent , mucopur thick secretions  Lavaged and sent for culture  Left LL air way mucosae were hemorrhagic with copious purulent secretions, lavaged, Lavaged and sent for culture  CXR confirmed no pneumothorax post bronch  There were no complications  The patient tolerated the procedure well

## 2017-06-06 NOTE — PROGRESS NOTE ADULT - SUBJECTIVE AND OBJECTIVE BOX
Chart reviewed and patient interviewed. Patient is a 61 year old  Black male from [facility] current smoker ( 1 PPDx45 years), current every day use of Alcohol ( 1 pint of Vodka daily and several beers daily for 45 years ) and history of HLD and  HTN who was admitted to [facility] in early May 2017 with Chest pain and worsening HEIN. Had Cardiac Cath which showed CAD and Aortic Stenosis. He was discharged home and then seen at [facility] after awakening at home with severe Chest pain on 5/22/17. Patient was seen in Cardiac Clinic on 5/22/17 and was admitted for pre-op work up for possible CABG/AVR. Last drink was a bottle of beer on morning of 5/23/17, the day of admission to [facility].       Patient's wife reports he has gone through acute Alcohol withdrawal in the past when hospitalized and has become acutely agitated requiring sedation and restraint. Overnight patient became acutely agitated secondary to alcohol withdrawal and required sedation and intubation.  Patient proceeded to surgery 5/26 and underwent a CABG and AVR. Patient tolerated surgery well and remained intubated and sedated. Patient reported to have been agitated yesterday secondary to Alcohol Withdrawal and is now receiving Librium 50mg q 6h and agitation is reported to be controlled. Patient was extubated 5/29 which he was tolerating well and able to follow simple commands and  attempting to speak. NGT was removed. He did not have Librium overnight. He then became more anxious consistent with persistent withdrawal. NGT was reinserted and he was restarted on Librium at 25mg via NGT q6h until withdrawal is completed. Patient required reintubation because of compromised pulmonary status and possible pneumonia. Patient continued on sedation as needed. Patient remained intubated and is received Precedex for sedation. Today patient was extubated around 12:30 PM and is now engagable. He is asking for his wife. He is not restless or agitated and appears comfortable. Continuing present treatment.

## 2017-06-07 LAB
ALBUMIN SERPL ELPH-MCNC: 2.9 G/DL — LOW (ref 3.3–5)
ALBUMIN SERPL ELPH-MCNC: 3 G/DL — LOW (ref 3.3–5)
ALP SERPL-CCNC: 79 U/L — SIGNIFICANT CHANGE UP (ref 40–120)
ALP SERPL-CCNC: 83 U/L — SIGNIFICANT CHANGE UP (ref 40–120)
ALT FLD-CCNC: 21 U/L — SIGNIFICANT CHANGE UP (ref 10–45)
ALT FLD-CCNC: 23 U/L — SIGNIFICANT CHANGE UP (ref 10–45)
ANION GAP SERPL CALC-SCNC: 15 MMOL/L — SIGNIFICANT CHANGE UP (ref 5–17)
ANION GAP SERPL CALC-SCNC: 9 MMOL/L — SIGNIFICANT CHANGE UP (ref 5–17)
APTT BLD: 32.7 SEC — SIGNIFICANT CHANGE UP (ref 27.5–37.4)
APTT BLD: 32.8 SEC — SIGNIFICANT CHANGE UP (ref 27.5–37.4)
AST SERPL-CCNC: 39 U/L — SIGNIFICANT CHANGE UP (ref 10–40)
AST SERPL-CCNC: 45 U/L — HIGH (ref 10–40)
BILIRUB SERPL-MCNC: 1.4 MG/DL — HIGH (ref 0.2–1.2)
BILIRUB SERPL-MCNC: 1.4 MG/DL — HIGH (ref 0.2–1.2)
BUN SERPL-MCNC: 12 MG/DL — SIGNIFICANT CHANGE UP (ref 7–23)
BUN SERPL-MCNC: 14 MG/DL — SIGNIFICANT CHANGE UP (ref 7–23)
CALCIUM SERPL-MCNC: 8.4 MG/DL — SIGNIFICANT CHANGE UP (ref 8.4–10.5)
CALCIUM SERPL-MCNC: 9 MG/DL — SIGNIFICANT CHANGE UP (ref 8.4–10.5)
CHLORIDE SERPL-SCNC: 102 MMOL/L — SIGNIFICANT CHANGE UP (ref 96–108)
CHLORIDE SERPL-SCNC: 104 MMOL/L — SIGNIFICANT CHANGE UP (ref 96–108)
CO2 SERPL-SCNC: 21 MMOL/L — LOW (ref 22–31)
CO2 SERPL-SCNC: 23 MMOL/L — SIGNIFICANT CHANGE UP (ref 22–31)
CREAT SERPL-MCNC: 0.6 MG/DL — SIGNIFICANT CHANGE UP (ref 0.5–1.3)
CREAT SERPL-MCNC: 0.7 MG/DL — SIGNIFICANT CHANGE UP (ref 0.5–1.3)
CULTURE RESULTS: SIGNIFICANT CHANGE UP
CULTURE RESULTS: SIGNIFICANT CHANGE UP
GAS PNL BLDA: SIGNIFICANT CHANGE UP
GAS PNL BLDA: SIGNIFICANT CHANGE UP
GLUCOSE SERPL-MCNC: 103 MG/DL — HIGH (ref 70–99)
GLUCOSE SERPL-MCNC: 97 MG/DL — SIGNIFICANT CHANGE UP (ref 70–99)
HCT VFR BLD CALC: 29.8 % — LOW (ref 39–50)
HCT VFR BLD CALC: 30 % — LOW (ref 39–50)
HGB BLD-MCNC: 10.1 G/DL — LOW (ref 13–17)
HGB BLD-MCNC: 10.2 G/DL — LOW (ref 13–17)
INR BLD: 1.63 — HIGH (ref 0.88–1.16)
INR BLD: 1.73 — HIGH (ref 0.88–1.16)
LACTATE SERPL-SCNC: 0.8 MMOL/L — SIGNIFICANT CHANGE UP (ref 0.5–2)
MAGNESIUM SERPL-MCNC: 2 MG/DL — SIGNIFICANT CHANGE UP (ref 1.6–2.6)
MAGNESIUM SERPL-MCNC: 2.2 MG/DL — SIGNIFICANT CHANGE UP (ref 1.6–2.6)
MCHC RBC-ENTMCNC: 29.9 PG — SIGNIFICANT CHANGE UP (ref 27–34)
MCHC RBC-ENTMCNC: 30.3 PG — SIGNIFICANT CHANGE UP (ref 27–34)
MCHC RBC-ENTMCNC: 33.9 G/DL — SIGNIFICANT CHANGE UP (ref 32–36)
MCHC RBC-ENTMCNC: 34 G/DL — SIGNIFICANT CHANGE UP (ref 32–36)
MCV RBC AUTO: 88.2 FL — SIGNIFICANT CHANGE UP (ref 80–100)
MCV RBC AUTO: 89 FL — SIGNIFICANT CHANGE UP (ref 80–100)
PLATELET # BLD AUTO: 184 K/UL — SIGNIFICANT CHANGE UP (ref 150–400)
PLATELET # BLD AUTO: 223 K/UL — SIGNIFICANT CHANGE UP (ref 150–400)
POTASSIUM SERPL-MCNC: 3.8 MMOL/L — SIGNIFICANT CHANGE UP (ref 3.5–5.3)
POTASSIUM SERPL-MCNC: 4 MMOL/L — SIGNIFICANT CHANGE UP (ref 3.5–5.3)
POTASSIUM SERPL-SCNC: 3.8 MMOL/L — SIGNIFICANT CHANGE UP (ref 3.5–5.3)
POTASSIUM SERPL-SCNC: 4 MMOL/L — SIGNIFICANT CHANGE UP (ref 3.5–5.3)
PROT SERPL-MCNC: 6.7 G/DL — SIGNIFICANT CHANGE UP (ref 6–8.3)
PROT SERPL-MCNC: 7.1 G/DL — SIGNIFICANT CHANGE UP (ref 6–8.3)
PROTHROM AB SERPL-ACNC: 18.3 SEC — HIGH (ref 9.8–12.7)
PROTHROM AB SERPL-ACNC: 19.4 SEC — HIGH (ref 9.8–12.7)
RBC # BLD: 3.37 M/UL — LOW (ref 4.2–5.8)
RBC # BLD: 3.38 M/UL — LOW (ref 4.2–5.8)
RBC # FLD: 15.1 % — SIGNIFICANT CHANGE UP (ref 10.3–16.9)
RBC # FLD: 15.9 % — SIGNIFICANT CHANGE UP (ref 10.3–16.9)
SODIUM SERPL-SCNC: 136 MMOL/L — SIGNIFICANT CHANGE UP (ref 135–145)
SODIUM SERPL-SCNC: 138 MMOL/L — SIGNIFICANT CHANGE UP (ref 135–145)
SPECIMEN SOURCE: SIGNIFICANT CHANGE UP
SPECIMEN SOURCE: SIGNIFICANT CHANGE UP
WBC # BLD: 10.7 K/UL — HIGH (ref 3.8–10.5)
WBC # BLD: 11.8 K/UL — HIGH (ref 3.8–10.5)
WBC # FLD AUTO: 10.7 K/UL — HIGH (ref 3.8–10.5)
WBC # FLD AUTO: 11.8 K/UL — HIGH (ref 3.8–10.5)

## 2017-06-07 PROCEDURE — 71010: CPT | Mod: 26

## 2017-06-07 RX ORDER — ACETAMINOPHEN 500 MG
1000 TABLET ORAL ONCE
Qty: 0 | Refills: 0 | Status: COMPLETED | OUTPATIENT
Start: 2017-06-07 | End: 2017-06-07

## 2017-06-07 RX ORDER — LIDOCAINE 4 G/100G
1 CREAM TOPICAL DAILY
Qty: 0 | Refills: 0 | Status: DISCONTINUED | OUTPATIENT
Start: 2017-06-07 | End: 2017-06-20

## 2017-06-07 RX ORDER — POTASSIUM CHLORIDE 20 MEQ
20 PACKET (EA) ORAL ONCE
Qty: 0 | Refills: 0 | Status: COMPLETED | OUTPATIENT
Start: 2017-06-07 | End: 2017-06-07

## 2017-06-07 RX ORDER — HEPARIN SODIUM 5000 [USP'U]/ML
5000 INJECTION INTRAVENOUS; SUBCUTANEOUS EVERY 12 HOURS
Qty: 0 | Refills: 0 | Status: DISCONTINUED | OUTPATIENT
Start: 2017-06-07 | End: 2017-06-11

## 2017-06-07 RX ADMIN — FAMOTIDINE 20 MILLIGRAM(S): 10 INJECTION INTRAVENOUS at 05:42

## 2017-06-07 RX ADMIN — Medication 100 MILLIGRAM(S): at 12:52

## 2017-06-07 RX ADMIN — Medication 50 GRAM(S): at 21:00

## 2017-06-07 RX ADMIN — PIPERACILLIN AND TAZOBACTAM 200 GRAM(S): 4; .5 INJECTION, POWDER, LYOPHILIZED, FOR SOLUTION INTRAVENOUS at 05:42

## 2017-06-07 RX ADMIN — Medication 20 MILLIGRAM(S): at 05:42

## 2017-06-07 RX ADMIN — Medication 100 MILLIEQUIVALENT(S): at 19:00

## 2017-06-07 RX ADMIN — Medication 1 MILLIGRAM(S): at 15:11

## 2017-06-07 RX ADMIN — HEPARIN SODIUM 5000 UNIT(S): 5000 INJECTION INTRAVENOUS; SUBCUTANEOUS at 18:50

## 2017-06-07 RX ADMIN — Medication 100 MILLIEQUIVALENT(S): at 00:06

## 2017-06-07 RX ADMIN — PIPERACILLIN AND TAZOBACTAM 200 GRAM(S): 4; .5 INJECTION, POWDER, LYOPHILIZED, FOR SOLUTION INTRAVENOUS at 12:51

## 2017-06-07 RX ADMIN — Medication 1000 MILLIGRAM(S): at 15:08

## 2017-06-07 RX ADMIN — Medication 3 MILLILITER(S): at 00:46

## 2017-06-07 RX ADMIN — Medication 400 MILLIGRAM(S): at 14:28

## 2017-06-07 RX ADMIN — FAMOTIDINE 20 MILLIGRAM(S): 10 INJECTION INTRAVENOUS at 18:49

## 2017-06-07 RX ADMIN — DEXMEDETOMIDINE HYDROCHLORIDE IN 0.9% SODIUM CHLORIDE 1.67 MICROGRAM(S)/KG/HR: 4 INJECTION INTRAVENOUS at 02:30

## 2017-06-07 RX ADMIN — Medication 3 MILLILITER(S): at 17:36

## 2017-06-07 RX ADMIN — Medication 3 MILLILITER(S): at 12:01

## 2017-06-07 RX ADMIN — Medication 3 MILLILITER(S): at 06:27

## 2017-06-07 NOTE — PROGRESS NOTE ADULT - SUBJECTIVE AND OBJECTIVE BOX
INTERVAL HPI/OVERNIGHT EVENTS:    POD #11 AVR/CABG x2  EF 70%    62yo male - active tobacco use/daily ETOH intake (1 pint vodka and several beers x 45 years), HLD, HTN presented to [facility] with Chest Pain/HEIN    ECHO: EF 65%, mod mitral annular calcification, mod aortic stenosis  Cath: LM 40%, pLAD 80%, pRCA 80%, severe AS.  d/c home but returned to hospital ([place]) 5/22 with severe CP that woke patient from sleep    to OR 5/27 AVR/CABG x 2  agitation/combative in initial post-op course - concern for withdrawl treated  Extubated 5/29 to HFNC  5/31- low grade temps/inc agitation; delirium --> precedex infusion. reintubated for airway protection  6/1 - Bronch  6/3 - noted Abd distention - XRAY c/w ileus - NGT for decompression; TF stopped  CT scan  GI consulted (Dr Barcenas) following - ileus improving    ETT repositioned this am after noting gurgling   direct visualization with glidescope and ETT advanced - Xray confirmed positioning    (+)BM 6/5 - abd exam improved  on precedex - shut off  Psych following for prior withdrawl sxs  Extubated 6/6        PMHx includes but is not limited to:  Aortic valve stenosis  Hyperlipidemia  Smoker  ETOH abuse  HTN         ICU Vital Signs Last 24 Hrs  T(C): 36.6, Max: 37.6 (06-06 @ 20:58)  T(F): 97.8, Max: 99.7 (06-06 @ 20:58)  HR: 92 (58 - 100)  BP: 106/62 (106/62 - 121/66)  BP(mean): 79 (79 - 90)  ABP: 116/60 (102/48 - 152/76)  ABP(mean): 80 (70 - 104)  RR: 20 (16 - 28)  SpO2: 99% (97% - 100%)    Qtts:     I&O's Summary  I & Os for 24h ending 07 Jun 2017 07:00  =============================================  IN: 1369.3 ml / OUT: 3715 ml / NET: -2345.7 ml    I & Os for current day (as of 07 Jun 2017 15:29)  =============================================  IN: 70 ml / OUT: 1195 ml / NET: -1125 ml          Physical Exam    Heart  Lungs  Abd  Ext  Chest  Neuro  Skin    LABS:                        10.2   11.8  )-----------( 223      ( 07 Jun 2017 13:15 )             30.0     06-07    138  |  102  |  14  ----------------------------<  97  3.8   |  21<L>  |  0.60    Ca    9.0      07 Jun 2017 13:15  Phos  3.4     06-06  Mg     2.0     06-07    TPro  7.1  /  Alb  2.9<L>  /  TBili  1.4<H>  /  DBili  x   /  AST  45<H>  /  ALT  23  /  AlkPhos  83  06-07    PT/INR - ( 07 Jun 2017 13:15 )   PT: 19.4 sec;   INR: 1.73          PTT - ( 07 Jun 2017 13:15 )  PTT:32.7 sec    ABG - ( 07 Jun 2017 13:11 )  pH: 7.46  /  pCO2: 30    /  pO2: 155   / HCO3: 21    / Base Excess: -1.8  /  SaO2: 99                  RADIOLOGY & ADDITIONAL STUDIES:    I have spent/provided stated minutes of critical care time to this patient: INTERVAL HPI/OVERNIGHT EVENTS:    POD #11 AVR/CABG x2  EF 70%    60yo male - active tobacco use/daily ETOH intake (1 pint vodka and several beers x 45 years), HLD, HTN presented to [facility] with Chest Pain/HEIN    ECHO: EF 65%, mod mitral annular calcification, mod aortic stenosis  Cath: LM 40%, pLAD 80%, pRCA 80%, severe AS.  d/c home but returned to hospital ([place]) 5/22 with severe CP that woke patient from sleep    to OR 5/27 AVR/CABG x 2  agitation/combative in initial post-op course - concern for withdrawl treated  Extubated 5/29 to HFNC  5/31- low grade temps/inc agitation; delirium --> precedex infusion. reintubated for airway protection  6/1 - Bronch  6/3 - noted Abd distention - XRAY c/w ileus - NGT for decompression; TF stopped  CT scan  GI consulted (Dr Barcenas) following - ileus improving    ETT repositioned this am after noting gurgling   direct visualization with glidescope and ETT advanced - Xray confirmed positioning    (+)BM 6/5 - abd exam improved  on precedex - shut off  Psych following for prior withdrawl sxs  Extubated 6/6  (+)deconditioning - worked with PT today. not able to stand independently  adequate truncal strength to sit in up right posiotn    No acute events reported overnight    PMHx includes but is not limited to:  Aortic valve stenosis  Hyperlipidemia  Smoker  ETOH abuse  HTN     ICU Vital Signs Last 24 Hrs  T(C): 36.6, Max: 37.6 (06-06 @ 20:58)  T(F): 97.8, Max: 99.7 (06-06 @ 20:58)  HR: 92 (58 - 100) sinus  BP: 106/62 (106/62 - 121/66)  BP(mean): 79 (79 - 90)  ABP: 116/60 (102/48 - 152/76)  ABP(mean): 80 (70 - 104)  RR: 20 (16 - 28)  SpO2: 99% (97% - 100%) nasal cannula    Qtts: None    I&O's Summary  I & Os for 24h ending 07 Jun 2017 07:00  =============================================  IN: 1369.3 ml / OUT: 3715 ml / NET: -2345.7 ml    I & Os for current day (as of 07 Jun 2017 15:29)  =============================================  IN: 70 ml / OUT: 1195 ml / NET: -1125 ml    Physical Exam    Heart - regular (-)erub/gallop  Lungs - dec BS bases (-)wheeze/rhonchi  Abd - (+)BS soft (-)r/r/g  Ext - warm - no cyanosis/cyanosis  Neuro - alert and oriented; moving all extremities  Skin - no rash    LABS:                        10.2   11.8  )-----------( 223      ( 07 Jun 2017 13:15 )             30.0     06-07    138  |  102  |  14  ----------------------------<  97  3.8   |  21<L>  |  0.60    Ca    9.0      07 Jun 2017 13:15  Phos  3.4     06-06  Mg     2.0     06-07    TPro  7.1  /  Alb  2.9<L>  /  TBili  1.4<H>  /  DBili  x   /  AST  45<H>  /  ALT  23  /  AlkPhos  83  06-07    PT/INR - ( 07 Jun 2017 13:15 )   PT: 19.4 sec;   INR: 1.73     PTT - ( 07 Jun 2017 13:15 )  PTT:32.7 sec    ABG - ( 07 Jun 2017 13:11 ) 7.46/30/155/99    RADIOLOGY & ADDITIONAL STUDIES: reviewed    Patient now POD #11 AVR/CABG x2 with post-op ileua (resolved) and delirium related to ETOH withdrawls - overall improvement    1. CV - hemodynamically stable  I have spent/provided stated minutes of critical care time to this patient: 60 min INTERVAL HPI/OVERNIGHT EVENTS:    POD #11 AVR/CABG x2  EF 70%    62yo male - active tobacco use/daily ETOH intake (1 pint vodka and several beers x 45 years), HLD, HTN presented to [facility] with Chest Pain/HEIN    ECHO: EF 65%, mod mitral annular calcification, mod aortic stenosis  Cath: LM 40%, pLAD 80%, pRCA 80%, severe AS.  d/c home but returned to hospital ([place]) 5/22 with severe CP that woke patient from sleep    to OR 5/27 AVR/CABG x 2  agitation/combative in initial post-op course - concern for withdrawl treated  Extubated 5/29 to HFNC  5/31- low grade temps/inc agitation; delirium --> precedex infusion. reintubated for airway protection  6/1 - Bronch  6/3 - noted Abd distention - XRAY c/w ileus - NGT for decompression; TF stopped  CT scan  GI consulted (Dr Barcenas) following - ileus improving    ETT repositioned this am after noting gurgling   direct visualization with glidescope and ETT advanced - Xray confirmed positioning    (+)BM 6/5 - abd exam improved  on precedex - shut off  Psych following for prior withdrawl sxs  Extubated 6/6  (+)deconditioning - worked with PT today. not able to stand independently  adequate truncal strength to sit in up right posiotn    No acute events reported overnight    PMHx includes but is not limited to:  Aortic valve stenosis  Hyperlipidemia  Smoker  ETOH abuse  HTN     ICU Vital Signs Last 24 Hrs  T(C): 36.6, Max: 37.6 (06-06 @ 20:58)  T(F): 97.8, Max: 99.7 (06-06 @ 20:58)  HR: 92 (58 - 100) sinus  BP: 106/62 (106/62 - 121/66)  BP(mean): 79 (79 - 90)  ABP: 116/60 (102/48 - 152/76)  ABP(mean): 80 (70 - 104)  RR: 20 (16 - 28)  SpO2: 99% (97% - 100%) nasal cannula    Qtts: None    I&O's Summary  I & Os for 24h ending 07 Jun 2017 07:00  =============================================  IN: 1369.3 ml / OUT: 3715 ml / NET: -2345.7 ml    I & Os for current day (as of 07 Jun 2017 15:29)  =============================================  IN: 70 ml / OUT: 1195 ml / NET: -1125 ml    Physical Exam    Heart - regular (-)erub/gallop  Lungs - dec BS bases (-)wheeze/rhonchi  Abd - (+)BS soft (-)r/r/g  Ext - warm - no cyanosis/cyanosis  Neuro - alert and oriented; moving all extremities  Skin - no rash    LABS:                        10.2   11.8  )-----------( 223      ( 07 Jun 2017 13:15 )             30.0     06-07    138  |  102  |  14  ----------------------------<  97  3.8   |  21<L>  |  0.60    Ca    9.0      07 Jun 2017 13:15  Phos  3.4     06-06  Mg     2.0     06-07    TPro  7.1  /  Alb  2.9<L>  /  TBili  1.4<H>  /  DBili  x   /  AST  45<H>  /  ALT  23  /  AlkPhos  83  06-07    PT/INR - ( 07 Jun 2017 13:15 )   PT: 19.4 sec;   INR: 1.73     PTT - ( 07 Jun 2017 13:15 )  PTT:32.7 sec    ABG - ( 07 Jun 2017 13:11 ) 7.46/30/155/99    RADIOLOGY & ADDITIONAL STUDIES: reviewed    Patient now POD #11 AVR/CABG x2 with post-op ileua (resolved) and delirium related to ETOH withdrawls - overall improvement    1. CV - hemodynamically stable  tele - sinus    2. Pulm -   HFNC/intermittent BIPAP overnight - on nasal cannula today  (+)Deconditioning  aggressive pulm toilet  on Zosyn now D#8 - serial sputum sampling with contamination - d/c now that presumptive course completed  (+)effusion - plan pigtail placement for drainage    3. Psych - ETOH use/abuse  prior withdrawl sxs - on precedex prior  off now with improved mental status  thiamine/folate   psych following     4. GI -   seen by speech and swallow team - recommend Ice chips only for now and will reassess again in am  plan place NGT to initiate nutrition  check prealbumin    start DVT and GI prophylaxis    d/w patient/staff and CTS    I have spent/provided stated minutes of critical care time to this patient: 60 min

## 2017-06-07 NOTE — SWALLOW BEDSIDE ASSESSMENT ADULT - PHARYNGEAL PHASE
Delayed pharyngeal swallow/Sluggish swallow on palpation. Inconsistent cough and wet breath sounds after these PO trials.
Delayed pharyngeal swallow/Cough post oral intake

## 2017-06-07 NOTE — SWALLOW BEDSIDE ASSESSMENT ADULT - SLP GENERAL OBSERVATIONS
Pt was received lethargic in bed. HFNC in place. Pt was oriented to self and place. Voice was judged to be weak. He followed ~50% of 1-step directions given modeling.
Pt is confused and restless

## 2017-06-07 NOTE — SWALLOW BEDSIDE ASSESSMENT ADULT - SWALLOW EVAL: DIAGNOSIS
Pt p/w pharyngeal dysphagia c/b suspected weak swallow and delayed swallow initiation resulting in inconsistent cough and wet breath sounds after PO trials. PO is premature at this time.
Shital-pharyngeal dysphagia

## 2017-06-07 NOTE — PROGRESS NOTE ADULT - SUBJECTIVE AND OBJECTIVE BOX
Chart reviewed and patient interviewed. Patient is a 61 year old  Black male from [facility] current smoker ( 1 PPDx45 years), current every day use of Alcohol ( 1 pint of Vodka daily and several beers daily for 45 years ) and history of HLD and  HTN who was admitted to [facility] in early May 2017 with Chest pain and worsening HEIN. Had Cardiac Cath which showed CAD and Aortic Stenosis. He was discharged home and then seen at [facility] after awakening at home with severe Chest pain on 5/22/17. Patient was seen in Cardiac Clinic on 5/22/17 and was admitted for pre-op work up for possible CABG/AVR. Last drink was a bottle of beer on morning of 5/23/17, the day of admission to [facility].       Patient's wife reports he has gone through acute Alcohol withdrawal in the past when hospitalized and has become acutely agitated requiring sedation and restraint. Overnight patient became acutely agitated secondary to alcohol withdrawal and required sedation and intubation.  Patient proceeded to surgery 5/26 and underwent a CABG and AVR. Patient tolerated surgery well and remained intubated and sedated. Patient reported to have been agitated yesterday secondary to Alcohol Withdrawal and is now receiving Librium 50mg q 6h and agitation is reported to be controlled. Patient was extubated 5/29 which he was tolerating well and able to follow simple commands and  attempting to speak. NGT was removed. He did not have Librium overnight. He then became more anxious consistent with persistent withdrawal. NGT was reinserted and he was restarted on Librium at 25mg via NGT q6h until withdrawal is completed. Patient required reintubation because of compromised pulmonary status and possible pneumonia. Patient continued on sedation as needed. Patient remained intubated and is receiving Precedex for sedation. Patient was extubated yesterday. Is alert and engagable. Wife is at the bedside. Patient to have Chest tube placement for pleural effusion. No sign of ETOH withdrawal. He is not restless or agitated and appears comfortable. Continuing present treatment. Encouragement and support provided.

## 2017-06-08 LAB
ANION GAP SERPL CALC-SCNC: 15 MMOL/L — SIGNIFICANT CHANGE UP (ref 5–17)
ANION GAP SERPL CALC-SCNC: 15 MMOL/L — SIGNIFICANT CHANGE UP (ref 5–17)
APTT BLD: 33.8 SEC — SIGNIFICANT CHANGE UP (ref 27.5–37.4)
APTT BLD: 34.5 SEC — SIGNIFICANT CHANGE UP (ref 27.5–37.4)
BUN SERPL-MCNC: 14 MG/DL — SIGNIFICANT CHANGE UP (ref 7–23)
BUN SERPL-MCNC: 16 MG/DL — SIGNIFICANT CHANGE UP (ref 7–23)
CALCIUM SERPL-MCNC: 8.9 MG/DL — SIGNIFICANT CHANGE UP (ref 8.4–10.5)
CALCIUM SERPL-MCNC: 8.9 MG/DL — SIGNIFICANT CHANGE UP (ref 8.4–10.5)
CHLORIDE SERPL-SCNC: 100 MMOL/L — SIGNIFICANT CHANGE UP (ref 96–108)
CHLORIDE SERPL-SCNC: 101 MMOL/L — SIGNIFICANT CHANGE UP (ref 96–108)
CO2 SERPL-SCNC: 21 MMOL/L — LOW (ref 22–31)
CO2 SERPL-SCNC: 22 MMOL/L — SIGNIFICANT CHANGE UP (ref 22–31)
CREAT SERPL-MCNC: 0.6 MG/DL — SIGNIFICANT CHANGE UP (ref 0.5–1.3)
CREAT SERPL-MCNC: 0.6 MG/DL — SIGNIFICANT CHANGE UP (ref 0.5–1.3)
GAS PNL BLDA: SIGNIFICANT CHANGE UP
GLUCOSE SERPL-MCNC: 127 MG/DL — HIGH (ref 70–99)
GLUCOSE SERPL-MCNC: 93 MG/DL — SIGNIFICANT CHANGE UP (ref 70–99)
HCT VFR BLD CALC: 29 % — LOW (ref 39–50)
HCT VFR BLD CALC: 31.6 % — LOW (ref 39–50)
HGB BLD-MCNC: 10.6 G/DL — LOW (ref 13–17)
HGB BLD-MCNC: 9.9 G/DL — LOW (ref 13–17)
INR BLD: 1.71 — HIGH (ref 0.88–1.16)
INR BLD: 1.73 — HIGH (ref 0.88–1.16)
MAGNESIUM SERPL-MCNC: 2.1 MG/DL — SIGNIFICANT CHANGE UP (ref 1.6–2.6)
MAGNESIUM SERPL-MCNC: 2.3 MG/DL — SIGNIFICANT CHANGE UP (ref 1.6–2.6)
MCHC RBC-ENTMCNC: 29.9 PG — SIGNIFICANT CHANGE UP (ref 27–34)
MCHC RBC-ENTMCNC: 30.5 PG — SIGNIFICANT CHANGE UP (ref 27–34)
MCHC RBC-ENTMCNC: 33.5 G/DL — SIGNIFICANT CHANGE UP (ref 32–36)
MCHC RBC-ENTMCNC: 34.1 G/DL — SIGNIFICANT CHANGE UP (ref 32–36)
MCV RBC AUTO: 89 FL — SIGNIFICANT CHANGE UP (ref 80–100)
MCV RBC AUTO: 89.2 FL — SIGNIFICANT CHANGE UP (ref 80–100)
PHOSPHATE SERPL-MCNC: 2.9 MG/DL — SIGNIFICANT CHANGE UP (ref 2.5–4.5)
PLATELET # BLD AUTO: 239 K/UL — SIGNIFICANT CHANGE UP (ref 150–400)
PLATELET # BLD AUTO: 296 K/UL — SIGNIFICANT CHANGE UP (ref 150–400)
POTASSIUM SERPL-MCNC: 3.6 MMOL/L — SIGNIFICANT CHANGE UP (ref 3.5–5.3)
POTASSIUM SERPL-MCNC: 3.7 MMOL/L — SIGNIFICANT CHANGE UP (ref 3.5–5.3)
POTASSIUM SERPL-SCNC: 3.6 MMOL/L — SIGNIFICANT CHANGE UP (ref 3.5–5.3)
POTASSIUM SERPL-SCNC: 3.7 MMOL/L — SIGNIFICANT CHANGE UP (ref 3.5–5.3)
PREALB SERPL-MCNC: 6 MG/DL — LOW (ref 20–40)
PROTHROM AB SERPL-ACNC: 19.2 SEC — HIGH (ref 9.8–12.7)
PROTHROM AB SERPL-ACNC: 19.4 SEC — HIGH (ref 9.8–12.7)
RBC # BLD: 3.25 M/UL — LOW (ref 4.2–5.8)
RBC # BLD: 3.55 M/UL — LOW (ref 4.2–5.8)
RBC # FLD: 15.5 % — SIGNIFICANT CHANGE UP (ref 10.3–16.9)
RBC # FLD: 15.8 % — SIGNIFICANT CHANGE UP (ref 10.3–16.9)
SODIUM SERPL-SCNC: 137 MMOL/L — SIGNIFICANT CHANGE UP (ref 135–145)
SODIUM SERPL-SCNC: 137 MMOL/L — SIGNIFICANT CHANGE UP (ref 135–145)
WBC # BLD: 13.2 K/UL — HIGH (ref 3.8–10.5)
WBC # BLD: 15.5 K/UL — HIGH (ref 3.8–10.5)
WBC # FLD AUTO: 13.2 K/UL — HIGH (ref 3.8–10.5)
WBC # FLD AUTO: 15.5 K/UL — HIGH (ref 3.8–10.5)

## 2017-06-08 PROCEDURE — 71010: CPT | Mod: 26,77

## 2017-06-08 PROCEDURE — 71010: CPT | Mod: 26

## 2017-06-08 RX ORDER — KETOROLAC TROMETHAMINE 30 MG/ML
15 SYRINGE (ML) INJECTION ONCE
Qty: 0 | Refills: 0 | Status: DISCONTINUED | OUTPATIENT
Start: 2017-06-08 | End: 2017-06-08

## 2017-06-08 RX ORDER — ALBUMIN HUMAN 25 %
250 VIAL (ML) INTRAVENOUS ONCE
Qty: 0 | Refills: 0 | Status: COMPLETED | OUTPATIENT
Start: 2017-06-08 | End: 2017-06-09

## 2017-06-08 RX ORDER — POTASSIUM CHLORIDE 20 MEQ
20 PACKET (EA) ORAL
Qty: 0 | Refills: 0 | Status: COMPLETED | OUTPATIENT
Start: 2017-06-08 | End: 2017-06-08

## 2017-06-08 RX ORDER — METOPROLOL TARTRATE 50 MG
2.5 TABLET ORAL ONCE
Qty: 0 | Refills: 0 | Status: COMPLETED | OUTPATIENT
Start: 2017-06-08 | End: 2017-06-08

## 2017-06-08 RX ORDER — MAGNESIUM SULFATE 500 MG/ML
2 VIAL (ML) INJECTION ONCE
Qty: 0 | Refills: 0 | Status: COMPLETED | OUTPATIENT
Start: 2017-06-08 | End: 2017-06-07

## 2017-06-08 RX ORDER — ALBUMIN HUMAN 25 %
250 VIAL (ML) INTRAVENOUS ONCE
Qty: 0 | Refills: 0 | Status: COMPLETED | OUTPATIENT
Start: 2017-06-08 | End: 2017-06-08

## 2017-06-08 RX ORDER — ACETAMINOPHEN 500 MG
1000 TABLET ORAL ONCE
Qty: 0 | Refills: 0 | Status: COMPLETED | OUTPATIENT
Start: 2017-06-08 | End: 2017-06-08

## 2017-06-08 RX ADMIN — FAMOTIDINE 20 MILLIGRAM(S): 10 INJECTION INTRAVENOUS at 06:09

## 2017-06-08 RX ADMIN — Medication 100 MILLIEQUIVALENT(S): at 04:00

## 2017-06-08 RX ADMIN — Medication 81 MILLIGRAM(S): at 14:03

## 2017-06-08 RX ADMIN — Medication 100 MILLIEQUIVALENT(S): at 21:01

## 2017-06-08 RX ADMIN — Medication 100 MILLIGRAM(S): at 12:04

## 2017-06-08 RX ADMIN — Medication 1 MILLIGRAM(S): at 14:04

## 2017-06-08 RX ADMIN — Medication 400 MILLIGRAM(S): at 12:05

## 2017-06-08 RX ADMIN — Medication 15 MILLIGRAM(S): at 18:45

## 2017-06-08 RX ADMIN — HEPARIN SODIUM 5000 UNIT(S): 5000 INJECTION INTRAVENOUS; SUBCUTANEOUS at 18:00

## 2017-06-08 RX ADMIN — Medication 3 MILLILITER(S): at 17:27

## 2017-06-08 RX ADMIN — ATORVASTATIN CALCIUM 20 MILLIGRAM(S): 80 TABLET, FILM COATED ORAL at 21:02

## 2017-06-08 RX ADMIN — Medication 3 MILLILITER(S): at 23:13

## 2017-06-08 RX ADMIN — Medication 3 MILLILITER(S): at 11:43

## 2017-06-08 RX ADMIN — Medication 3 MILLILITER(S): at 00:31

## 2017-06-08 RX ADMIN — Medication 25 MILLIGRAM(S): at 20:38

## 2017-06-08 RX ADMIN — Medication 100 MILLIEQUIVALENT(S): at 06:30

## 2017-06-08 RX ADMIN — Medication 1000 MILLIGRAM(S): at 13:00

## 2017-06-08 RX ADMIN — Medication 125 MILLILITER(S): at 19:23

## 2017-06-08 RX ADMIN — Medication 100 MILLIEQUIVALENT(S): at 22:00

## 2017-06-08 RX ADMIN — Medication 2.5 MILLIGRAM(S): at 18:00

## 2017-06-08 RX ADMIN — LIDOCAINE 1 PATCH: 4 CREAM TOPICAL at 12:05

## 2017-06-08 RX ADMIN — FAMOTIDINE 20 MILLIGRAM(S): 10 INJECTION INTRAVENOUS at 18:00

## 2017-06-08 RX ADMIN — Medication 15 MILLIGRAM(S): at 18:00

## 2017-06-08 RX ADMIN — Medication 20 MILLIGRAM(S): at 06:10

## 2017-06-08 RX ADMIN — Medication 1 TABLET(S): at 14:03

## 2017-06-08 RX ADMIN — Medication 3 MILLILITER(S): at 05:41

## 2017-06-08 RX ADMIN — HEPARIN SODIUM 5000 UNIT(S): 5000 INJECTION INTRAVENOUS; SUBCUTANEOUS at 06:09

## 2017-06-08 NOTE — PROCEDURE NOTE - NSPROCDETAILS_GEN_ALL_CORE
patient pre-oxygenated, tube inserted, placement confirmed
location identified, feeding tube inserted
nasogastric/connected to suction/placement confirmed by auscultation/gastric secretions aspirated, placement confirmed
ultrasound assessment of fluid (size)/secured in place/500 cc/sterile dressing applied/percutaneous/Seldinger technique

## 2017-06-08 NOTE — PROCEDURE NOTE - NSINFORMCONSENT_GEN_A_CORE
This was an emergent procedure.
Benefits, risks, and possible complications of procedure explained to patient/caregiver who verbalized understanding and gave written consent.
Benefits, risks, and possible complications of procedure explained to patient/caregiver who verbalized understanding and gave written consent.
This was an emergent procedure.

## 2017-06-08 NOTE — PROCEDURE NOTE - NSTOLERANCE_GEN_A_CORE
Patient tolerated procedure well.

## 2017-06-08 NOTE — PROCEDURE NOTE - NSSITEPREP_SKIN_A_CORE
chlorhexidine
Adherence to aseptic technique: hand hygiene prior to donning barriers (gown, gloves), don cap and mask, sterile drape over patient
chlorhexidine/Adherence to aseptic technique: hand hygiene prior to donning barriers (gown, gloves), don cap and mask, sterile drape over patient

## 2017-06-08 NOTE — PROCEDURE NOTE - NSCOMPLICATION_GEN_A_CORE
no complications

## 2017-06-08 NOTE — PROCEDURE NOTE - NSINDICATIONS_GEN_A_CORE
critical illness
respiratory distress/critical patient/airway protection
airway protection
intestinal obstruction
critical patient
feeding tube replacement/feeding difficulties
pleural effusion

## 2017-06-08 NOTE — PROGRESS NOTE ADULT - SUBJECTIVE AND OBJECTIVE BOX
Chart reviewed and patient interviewed. Patient is a 61 year old  Black male from [facility] current smoker ( 1 PPDx45 years), current every day use of Alcohol ( 1 pint of Vodka daily and several beers daily for 45 years ) and history of HLD and  HTN who was admitted to [facility] in early May 2017 with Chest pain and worsening HEIN. Had Cardiac Cath which showed CAD and Aortic Stenosis. He was discharged home and then seen at [facility] after awakening at home with severe Chest pain on 5/22/17. Patient was seen in Cardiac Clinic on 5/22/17 and was admitted for pre-op work up for possible CABG/AVR. Last drink was a bottle of beer on morning of 5/23/17, the day of admission to [facility].       Patient's wife reports he has gone through acute Alcohol withdrawal in the past when hospitalized and has become acutely agitated requiring sedation and restraint. Overnight patient became acutely agitated secondary to alcohol withdrawal and required sedation and intubation.  Patient proceeded to surgery 5/26 and underwent a CABG and AVR. Patient tolerated surgery well and remained intubated and sedated. Patient reported to have been agitated yesterday secondary to Alcohol Withdrawal and is now receiving Librium 50mg q 6h and agitation is reported to be controlled. Patient was extubated 5/29 which he was tolerating well and able to follow simple commands and  attempting to speak. NGT was removed. He did not have Librium overnight. He then became more anxious consistent with persistent withdrawal. NGT was reinserted and he was restarted on Librium at 25mg via NGT q6h until withdrawal is completed. Patient required reintubation because of compromised pulmonary status and possible pneumonia. Patient continued on sedation as needed. Patient remained intubated and is receiving Precedex for sedation. Patient was 6/06. Is alert and engagable. Patient to have Chest tube placement for pleural effusion. No sign of ETOH withdrawal. Was restless earlier today and received Librium 25 mg.  Continuing present treatment. Encouragement and support provided.

## 2017-06-09 LAB
ALBUMIN SERPL ELPH-MCNC: 3.7 G/DL — SIGNIFICANT CHANGE UP (ref 3.3–5)
ALP SERPL-CCNC: 91 U/L — SIGNIFICANT CHANGE UP (ref 40–120)
ALT FLD-CCNC: 25 U/L — SIGNIFICANT CHANGE UP (ref 10–45)
ANION GAP SERPL CALC-SCNC: 14 MMOL/L — SIGNIFICANT CHANGE UP (ref 5–17)
APTT BLD: 33.4 SEC — SIGNIFICANT CHANGE UP (ref 27.5–37.4)
AST SERPL-CCNC: 50 U/L — HIGH (ref 10–40)
BILIRUB SERPL-MCNC: 1.5 MG/DL — HIGH (ref 0.2–1.2)
BUN SERPL-MCNC: 18 MG/DL — SIGNIFICANT CHANGE UP (ref 7–23)
CALCIUM SERPL-MCNC: 9 MG/DL — SIGNIFICANT CHANGE UP (ref 8.4–10.5)
CHLORIDE SERPL-SCNC: 104 MMOL/L — SIGNIFICANT CHANGE UP (ref 96–108)
CO2 SERPL-SCNC: 23 MMOL/L — SIGNIFICANT CHANGE UP (ref 22–31)
CREAT SERPL-MCNC: 0.6 MG/DL — SIGNIFICANT CHANGE UP (ref 0.5–1.3)
GAS PNL BLDA: SIGNIFICANT CHANGE UP
GLUCOSE SERPL-MCNC: 97 MG/DL — SIGNIFICANT CHANGE UP (ref 70–99)
HCT VFR BLD CALC: 26.7 % — LOW (ref 39–50)
HGB BLD-MCNC: 8.8 G/DL — LOW (ref 13–17)
INR BLD: 1.82 — HIGH (ref 0.88–1.16)
LACTATE SERPL-SCNC: 0.9 MMOL/L — SIGNIFICANT CHANGE UP (ref 0.5–2)
MAGNESIUM SERPL-MCNC: 2 MG/DL — SIGNIFICANT CHANGE UP (ref 1.6–2.6)
MCHC RBC-ENTMCNC: 29.5 PG — SIGNIFICANT CHANGE UP (ref 27–34)
MCHC RBC-ENTMCNC: 33 G/DL — SIGNIFICANT CHANGE UP (ref 32–36)
MCV RBC AUTO: 89.6 FL — SIGNIFICANT CHANGE UP (ref 80–100)
PHOSPHATE SERPL-MCNC: 2.6 MG/DL — SIGNIFICANT CHANGE UP (ref 2.5–4.5)
PLATELET # BLD AUTO: 231 K/UL — SIGNIFICANT CHANGE UP (ref 150–400)
POTASSIUM SERPL-MCNC: 4 MMOL/L — SIGNIFICANT CHANGE UP (ref 3.5–5.3)
POTASSIUM SERPL-SCNC: 4 MMOL/L — SIGNIFICANT CHANGE UP (ref 3.5–5.3)
PROT SERPL-MCNC: 6.9 G/DL — SIGNIFICANT CHANGE UP (ref 6–8.3)
PROTHROM AB SERPL-ACNC: 20.4 SEC — HIGH (ref 9.8–12.7)
RBC # BLD: 2.98 M/UL — LOW (ref 4.2–5.8)
RBC # FLD: 16 % — SIGNIFICANT CHANGE UP (ref 10.3–16.9)
SODIUM SERPL-SCNC: 141 MMOL/L — SIGNIFICANT CHANGE UP (ref 135–145)
WBC # BLD: 11.4 K/UL — HIGH (ref 3.8–10.5)
WBC # FLD AUTO: 11.4 K/UL — HIGH (ref 3.8–10.5)

## 2017-06-09 PROCEDURE — 71010: CPT | Mod: 26,77

## 2017-06-09 PROCEDURE — 99291 CRITICAL CARE FIRST HOUR: CPT

## 2017-06-09 PROCEDURE — 71010: CPT | Mod: 26

## 2017-06-09 RX ORDER — ALBUMIN HUMAN 25 %
250 VIAL (ML) INTRAVENOUS ONCE
Qty: 0 | Refills: 0 | Status: COMPLETED | OUTPATIENT
Start: 2017-06-09 | End: 2017-06-09

## 2017-06-09 RX ORDER — PANTOPRAZOLE SODIUM 20 MG/1
40 TABLET, DELAYED RELEASE ORAL
Qty: 0 | Refills: 0 | Status: DISCONTINUED | OUTPATIENT
Start: 2017-06-09 | End: 2017-06-20

## 2017-06-09 RX ORDER — METOPROLOL TARTRATE 50 MG
2.5 TABLET ORAL EVERY 6 HOURS
Qty: 0 | Refills: 0 | Status: DISCONTINUED | OUTPATIENT
Start: 2017-06-09 | End: 2017-06-09

## 2017-06-09 RX ORDER — THIAMINE MONONITRATE (VIT B1) 100 MG
100 TABLET ORAL DAILY
Qty: 0 | Refills: 0 | Status: DISCONTINUED | OUTPATIENT
Start: 2017-06-09 | End: 2017-06-20

## 2017-06-09 RX ORDER — FUROSEMIDE 40 MG
40 TABLET ORAL DAILY
Qty: 0 | Refills: 0 | Status: DISCONTINUED | OUTPATIENT
Start: 2017-06-10 | End: 2017-06-12

## 2017-06-09 RX ORDER — FUROSEMIDE 40 MG
20 TABLET ORAL ONCE
Qty: 0 | Refills: 0 | Status: COMPLETED | OUTPATIENT
Start: 2017-06-09 | End: 2017-06-09

## 2017-06-09 RX ORDER — FOLIC ACID 0.8 MG
1 TABLET ORAL DAILY
Qty: 0 | Refills: 0 | Status: DISCONTINUED | OUTPATIENT
Start: 2017-06-09 | End: 2017-06-20

## 2017-06-09 RX ORDER — POTASSIUM CHLORIDE 20 MEQ
20 PACKET (EA) ORAL DAILY
Qty: 0 | Refills: 0 | Status: DISCONTINUED | OUTPATIENT
Start: 2017-06-10 | End: 2017-06-14

## 2017-06-09 RX ORDER — METOPROLOL TARTRATE 50 MG
12.5 TABLET ORAL EVERY 6 HOURS
Qty: 0 | Refills: 0 | Status: DISCONTINUED | OUTPATIENT
Start: 2017-06-09 | End: 2017-06-11

## 2017-06-09 RX ADMIN — ATORVASTATIN CALCIUM 20 MILLIGRAM(S): 80 TABLET, FILM COATED ORAL at 22:36

## 2017-06-09 RX ADMIN — Medication 100 MILLIGRAM(S): at 13:16

## 2017-06-09 RX ADMIN — Medication 125 MILLILITER(S): at 01:00

## 2017-06-09 RX ADMIN — FAMOTIDINE 20 MILLIGRAM(S): 10 INJECTION INTRAVENOUS at 05:01

## 2017-06-09 RX ADMIN — Medication 125 MILLILITER(S): at 00:16

## 2017-06-09 RX ADMIN — LIDOCAINE 1 PATCH: 4 CREAM TOPICAL at 08:08

## 2017-06-09 RX ADMIN — Medication 2.5 MILLIGRAM(S): at 15:02

## 2017-06-09 RX ADMIN — Medication 12.5 MILLIGRAM(S): at 18:48

## 2017-06-09 RX ADMIN — Medication 20 MILLIGRAM(S): at 05:01

## 2017-06-09 RX ADMIN — HEPARIN SODIUM 5000 UNIT(S): 5000 INJECTION INTRAVENOUS; SUBCUTANEOUS at 18:47

## 2017-06-09 RX ADMIN — Medication 125 MILLILITER(S): at 00:15

## 2017-06-09 RX ADMIN — Medication 81 MILLIGRAM(S): at 15:01

## 2017-06-09 RX ADMIN — Medication 20 MILLIGRAM(S): at 16:00

## 2017-06-09 RX ADMIN — Medication 1 MILLIGRAM(S): at 13:21

## 2017-06-09 RX ADMIN — Medication 1 TABLET(S): at 15:02

## 2017-06-09 RX ADMIN — Medication 3 MILLILITER(S): at 05:11

## 2017-06-09 RX ADMIN — LIDOCAINE 1 PATCH: 4 CREAM TOPICAL at 00:17

## 2017-06-09 RX ADMIN — LIDOCAINE 1 PATCH: 4 CREAM TOPICAL at 19:32

## 2017-06-09 RX ADMIN — HEPARIN SODIUM 5000 UNIT(S): 5000 INJECTION INTRAVENOUS; SUBCUTANEOUS at 05:01

## 2017-06-09 NOTE — PROGRESS NOTE ADULT - SUBJECTIVE AND OBJECTIVE BOX
Chart reviewed and patient interviewed. Patient is a 61 year old  Black male from [facility] current smoker ( 1 PPDx45 years), current every day use of Alcohol ( 1 pint of Vodka daily and several beers daily for 45 years ) and history of HLD and  HTN who was admitted to [facility] in early May 2017 with Chest pain and worsening HEIN. Had Cardiac Cath which showed CAD and Aortic Stenosis. He was discharged home and then seen at [facility] after awakening at home with severe Chest pain on 5/22/17. Patient was seen in Cardiac Clinic on 5/22/17 and was admitted for pre-op work up for possible CABG/AVR. Last drink was a bottle of beer on morning of 5/23/17, the day of admission to [facility].       Patient's wife reports he has gone through acute Alcohol withdrawal in the past when hospitalized and has become acutely agitated requiring sedation and restraint. Overnight patient became acutely agitated secondary to alcohol withdrawal and required sedation and intubation.  Patient proceeded to surgery 5/26 and underwent a CABG and AVR. Patient tolerated surgery well and remained intubated and sedated. Patient reported to have been agitated yesterday secondary to Alcohol Withdrawal and is now receiving Librium 50mg q 6h and agitation is reported to be controlled. Patient was extubated 5/29 which he was tolerating well and able to follow simple commands and  attempting to speak. NGT was removed. He did not have Librium overnight. He then became more anxious consistent with persistent withdrawal. NGT was reinserted and he was restarted on Librium at 25mg via NGT q6h until withdrawal is completed. Patient required reintubation because of compromised pulmonary status and possible pneumonia. Patient continued on sedation as needed. Patient remained intubated and is receiving Precedex for sedation. Patient was extubated 6/06. Is alert and engagable. Patient to have Chest tube placement for pleural effusion. No sign of ETOH withdrawal. Has been up in a recliner chair today and is cooperative with staff..  Continuing present treatment. Encouragement and support provided.

## 2017-06-10 LAB
ALBUMIN SERPL ELPH-MCNC: 3.3 G/DL — SIGNIFICANT CHANGE UP (ref 3.3–5)
ALP SERPL-CCNC: 94 U/L — SIGNIFICANT CHANGE UP (ref 40–120)
ALT FLD-CCNC: 26 U/L — SIGNIFICANT CHANGE UP (ref 10–45)
ANION GAP SERPL CALC-SCNC: 12 MMOL/L — SIGNIFICANT CHANGE UP (ref 5–17)
APTT BLD: 33.4 SEC — SIGNIFICANT CHANGE UP (ref 27.5–37.4)
AST SERPL-CCNC: 45 U/L — HIGH (ref 10–40)
BILIRUB SERPL-MCNC: 1.3 MG/DL — HIGH (ref 0.2–1.2)
BUN SERPL-MCNC: 17 MG/DL — SIGNIFICANT CHANGE UP (ref 7–23)
CALCIUM SERPL-MCNC: 9.2 MG/DL — SIGNIFICANT CHANGE UP (ref 8.4–10.5)
CHLORIDE SERPL-SCNC: 98 MMOL/L — SIGNIFICANT CHANGE UP (ref 96–108)
CO2 SERPL-SCNC: 26 MMOL/L — SIGNIFICANT CHANGE UP (ref 22–31)
CREAT SERPL-MCNC: 0.6 MG/DL — SIGNIFICANT CHANGE UP (ref 0.5–1.3)
GLUCOSE SERPL-MCNC: 92 MG/DL — SIGNIFICANT CHANGE UP (ref 70–99)
HCT VFR BLD CALC: 28 % — LOW (ref 39–50)
HGB BLD-MCNC: 9.4 G/DL — LOW (ref 13–17)
INR BLD: 1.75 — HIGH (ref 0.88–1.16)
MAGNESIUM SERPL-MCNC: 1.8 MG/DL — SIGNIFICANT CHANGE UP (ref 1.6–2.6)
MCHC RBC-ENTMCNC: 30.3 PG — SIGNIFICANT CHANGE UP (ref 27–34)
MCHC RBC-ENTMCNC: 33.6 G/DL — SIGNIFICANT CHANGE UP (ref 32–36)
MCV RBC AUTO: 90.3 FL — SIGNIFICANT CHANGE UP (ref 80–100)
PHOSPHATE SERPL-MCNC: 3.3 MG/DL — SIGNIFICANT CHANGE UP (ref 2.5–4.5)
PLATELET # BLD AUTO: 272 K/UL — SIGNIFICANT CHANGE UP (ref 150–400)
POTASSIUM SERPL-MCNC: 3.5 MMOL/L — SIGNIFICANT CHANGE UP (ref 3.5–5.3)
POTASSIUM SERPL-SCNC: 3.5 MMOL/L — SIGNIFICANT CHANGE UP (ref 3.5–5.3)
PROT SERPL-MCNC: 7.1 G/DL — SIGNIFICANT CHANGE UP (ref 6–8.3)
PROTHROM AB SERPL-ACNC: 19.7 SEC — HIGH (ref 9.8–12.7)
RBC # BLD: 3.1 M/UL — LOW (ref 4.2–5.8)
RBC # FLD: 16.1 % — SIGNIFICANT CHANGE UP (ref 10.3–16.9)
SODIUM SERPL-SCNC: 136 MMOL/L — SIGNIFICANT CHANGE UP (ref 135–145)
WBC # BLD: 13.1 K/UL — HIGH (ref 3.8–10.5)
WBC # FLD AUTO: 13.1 K/UL — HIGH (ref 3.8–10.5)

## 2017-06-10 PROCEDURE — 93010 ELECTROCARDIOGRAM REPORT: CPT

## 2017-06-10 PROCEDURE — 71010: CPT | Mod: 26

## 2017-06-10 RX ORDER — POTASSIUM CHLORIDE 20 MEQ
20 PACKET (EA) ORAL ONCE
Qty: 0 | Refills: 0 | Status: COMPLETED | OUTPATIENT
Start: 2017-06-10 | End: 2017-06-10

## 2017-06-10 RX ORDER — MAGNESIUM OXIDE 400 MG ORAL TABLET 241.3 MG
400 TABLET ORAL ONCE
Qty: 0 | Refills: 0 | Status: COMPLETED | OUTPATIENT
Start: 2017-06-10 | End: 2017-06-10

## 2017-06-10 RX ORDER — POTASSIUM CHLORIDE 20 MEQ
40 PACKET (EA) ORAL ONCE
Qty: 0 | Refills: 0 | Status: COMPLETED | OUTPATIENT
Start: 2017-06-10 | End: 2017-06-10

## 2017-06-10 RX ORDER — ACETAMINOPHEN 500 MG
650 TABLET ORAL EVERY 6 HOURS
Qty: 0 | Refills: 0 | Status: DISCONTINUED | OUTPATIENT
Start: 2017-06-10 | End: 2017-06-20

## 2017-06-10 RX ADMIN — Medication 20 MILLIEQUIVALENT(S): at 07:56

## 2017-06-10 RX ADMIN — Medication 40 MILLIGRAM(S): at 05:14

## 2017-06-10 RX ADMIN — Medication 40 MILLIEQUIVALENT(S): at 07:56

## 2017-06-10 RX ADMIN — HEPARIN SODIUM 5000 UNIT(S): 5000 INJECTION INTRAVENOUS; SUBCUTANEOUS at 17:54

## 2017-06-10 RX ADMIN — Medication 12.5 MILLIGRAM(S): at 05:14

## 2017-06-10 RX ADMIN — LIDOCAINE 1 PATCH: 4 CREAM TOPICAL at 19:21

## 2017-06-10 RX ADMIN — ATORVASTATIN CALCIUM 20 MILLIGRAM(S): 80 TABLET, FILM COATED ORAL at 22:27

## 2017-06-10 RX ADMIN — HEPARIN SODIUM 5000 UNIT(S): 5000 INJECTION INTRAVENOUS; SUBCUTANEOUS at 05:14

## 2017-06-10 RX ADMIN — Medication 12.5 MILLIGRAM(S): at 17:53

## 2017-06-10 RX ADMIN — MAGNESIUM OXIDE 400 MG ORAL TABLET 400 MILLIGRAM(S): 241.3 TABLET ORAL at 07:56

## 2017-06-10 RX ADMIN — Medication 650 MILLIGRAM(S): at 22:26

## 2017-06-10 RX ADMIN — Medication 12.5 MILLIGRAM(S): at 00:13

## 2017-06-10 RX ADMIN — Medication 1 TABLET(S): at 11:38

## 2017-06-10 RX ADMIN — Medication 100 MILLIGRAM(S): at 11:38

## 2017-06-10 RX ADMIN — Medication 1 MILLIGRAM(S): at 11:38

## 2017-06-10 RX ADMIN — Medication 20 MILLIEQUIVALENT(S): at 11:40

## 2017-06-10 RX ADMIN — Medication 81 MILLIGRAM(S): at 11:38

## 2017-06-10 RX ADMIN — Medication 12.5 MILLIGRAM(S): at 11:38

## 2017-06-10 RX ADMIN — PANTOPRAZOLE SODIUM 40 MILLIGRAM(S): 20 TABLET, DELAYED RELEASE ORAL at 07:57

## 2017-06-10 NOTE — PROGRESS NOTE ADULT - SUBJECTIVE AND OBJECTIVE BOX
Pt seen and chart reviewed. Case d/w staff. Pt initially being changed and moaning at times. Pt not sig. responsive to voice at this time, but responds to touch indicating lack of desire/ability to speak at this time. Extubated and reportedly doing better but reportedly not urinating o/n.

## 2017-06-11 LAB
ANION GAP SERPL CALC-SCNC: 13 MMOL/L — SIGNIFICANT CHANGE UP (ref 5–17)
BUN SERPL-MCNC: 13 MG/DL — SIGNIFICANT CHANGE UP (ref 7–23)
CALCIUM SERPL-MCNC: 9.9 MG/DL — SIGNIFICANT CHANGE UP (ref 8.4–10.5)
CHLORIDE SERPL-SCNC: 102 MMOL/L — SIGNIFICANT CHANGE UP (ref 96–108)
CO2 SERPL-SCNC: 22 MMOL/L — SIGNIFICANT CHANGE UP (ref 22–31)
CREAT SERPL-MCNC: 0.6 MG/DL — SIGNIFICANT CHANGE UP (ref 0.5–1.3)
GLUCOSE SERPL-MCNC: 86 MG/DL — SIGNIFICANT CHANGE UP (ref 70–99)
HCT VFR BLD CALC: 37.1 % — LOW (ref 39–50)
HGB BLD-MCNC: 12.2 G/DL — LOW (ref 13–17)
MAGNESIUM SERPL-MCNC: 2 MG/DL — SIGNIFICANT CHANGE UP (ref 1.6–2.6)
MCHC RBC-ENTMCNC: 29.9 PG — SIGNIFICANT CHANGE UP (ref 27–34)
MCHC RBC-ENTMCNC: 32.9 G/DL — SIGNIFICANT CHANGE UP (ref 32–36)
MCV RBC AUTO: 90.9 FL — SIGNIFICANT CHANGE UP (ref 80–100)
PHOSPHATE SERPL-MCNC: 3.5 MG/DL — SIGNIFICANT CHANGE UP (ref 2.5–4.5)
PLATELET # BLD AUTO: 295 K/UL — SIGNIFICANT CHANGE UP (ref 150–400)
POTASSIUM SERPL-MCNC: 4.5 MMOL/L — SIGNIFICANT CHANGE UP (ref 3.5–5.3)
POTASSIUM SERPL-SCNC: 4.5 MMOL/L — SIGNIFICANT CHANGE UP (ref 3.5–5.3)
RBC # BLD: 4.08 M/UL — LOW (ref 4.2–5.8)
RBC # FLD: 16.6 % — SIGNIFICANT CHANGE UP (ref 10.3–16.9)
SODIUM SERPL-SCNC: 137 MMOL/L — SIGNIFICANT CHANGE UP (ref 135–145)
WBC # BLD: 12.9 K/UL — HIGH (ref 3.8–10.5)
WBC # FLD AUTO: 12.9 K/UL — HIGH (ref 3.8–10.5)

## 2017-06-11 PROCEDURE — 71010: CPT | Mod: 26

## 2017-06-11 PROCEDURE — 70450 CT HEAD/BRAIN W/O DYE: CPT | Mod: 26

## 2017-06-11 RX ORDER — NICOTINE POLACRILEX 2 MG
1 GUM BUCCAL DAILY
Qty: 0 | Refills: 0 | Status: DISCONTINUED | OUTPATIENT
Start: 2017-06-11 | End: 2017-06-20

## 2017-06-11 RX ORDER — HEPARIN SODIUM 5000 [USP'U]/ML
5000 INJECTION INTRAVENOUS; SUBCUTANEOUS EVERY 8 HOURS
Qty: 0 | Refills: 0 | Status: DISCONTINUED | OUTPATIENT
Start: 2017-06-11 | End: 2017-06-18

## 2017-06-11 RX ORDER — ALBUMIN HUMAN 25 %
250 VIAL (ML) INTRAVENOUS ONCE
Qty: 0 | Refills: 0 | Status: COMPLETED | OUTPATIENT
Start: 2017-06-11 | End: 2017-06-11

## 2017-06-11 RX ADMIN — Medication 100 MILLIGRAM(S): at 12:23

## 2017-06-11 RX ADMIN — Medication 1 TABLET(S): at 12:24

## 2017-06-11 RX ADMIN — PANTOPRAZOLE SODIUM 40 MILLIGRAM(S): 20 TABLET, DELAYED RELEASE ORAL at 07:32

## 2017-06-11 RX ADMIN — HEPARIN SODIUM 5000 UNIT(S): 5000 INJECTION INTRAVENOUS; SUBCUTANEOUS at 07:33

## 2017-06-11 RX ADMIN — ATORVASTATIN CALCIUM 20 MILLIGRAM(S): 80 TABLET, FILM COATED ORAL at 22:53

## 2017-06-11 RX ADMIN — LIDOCAINE 1 PATCH: 4 CREAM TOPICAL at 22:13

## 2017-06-11 RX ADMIN — Medication 12.5 MILLIGRAM(S): at 12:23

## 2017-06-11 RX ADMIN — Medication 650 MILLIGRAM(S): at 13:00

## 2017-06-11 RX ADMIN — Medication 125 MILLILITER(S): at 13:23

## 2017-06-11 RX ADMIN — Medication 20 MILLIEQUIVALENT(S): at 12:23

## 2017-06-11 RX ADMIN — LIDOCAINE 1 PATCH: 4 CREAM TOPICAL at 07:00

## 2017-06-11 RX ADMIN — Medication 40 MILLIGRAM(S): at 07:33

## 2017-06-11 RX ADMIN — Medication 81 MILLIGRAM(S): at 12:23

## 2017-06-11 RX ADMIN — LIDOCAINE 1 PATCH: 4 CREAM TOPICAL at 10:30

## 2017-06-11 RX ADMIN — Medication 650 MILLIGRAM(S): at 00:00

## 2017-06-11 RX ADMIN — Medication 650 MILLIGRAM(S): at 12:30

## 2017-06-11 RX ADMIN — Medication 125 MILLILITER(S): at 13:37

## 2017-06-11 RX ADMIN — Medication 12.5 MILLIGRAM(S): at 07:32

## 2017-06-11 RX ADMIN — Medication 1 MILLIGRAM(S): at 12:23

## 2017-06-11 RX ADMIN — HEPARIN SODIUM 5000 UNIT(S): 5000 INJECTION INTRAVENOUS; SUBCUTANEOUS at 22:53

## 2017-06-11 RX ADMIN — Medication 12.5 MILLIGRAM(S): at 07:33

## 2017-06-11 NOTE — PROGRESS NOTE ADULT - SUBJECTIVE AND OBJECTIVE BOX
Chart reviewed and patient interviewed. Patient is a 61 year old  Black male from [facility] current smoker ( 1 PPDx45 years), current every day use of Alcohol ( 1 pint of Vodka daily and several beers daily for 45 years ) and history of HLD and  HTN who was admitted to [facility] in early May 2017 with Chest pain and worsening HEIN. Had Cardiac Cath which showed CAD and Aortic Stenosis. He was discharged home and then seen at [facility] after awakening at home with severe Chest pain on 5/22/17. Patient was seen in Cardiac Clinic on 5/22/17 and was admitted for pre-op work up for possible CABG/AVR. Last drink was a bottle of beer on morning of 5/23/17, the day of admission to [facility].       Patient's wife reports he has gone through acute Alcohol withdrawal in the past when hospitalized and has become acutely agitated requiring sedation and restraint. Overnight patient became acutely agitated secondary to alcohol withdrawal and required sedation and intubation.  Patient proceeded to surgery 5/26 and underwent a CABG and AVR. Patient tolerated surgery well and remained intubated and sedated. Patient reported to have been agitated yesterday secondary to Alcohol Withdrawal and is now receiving Librium 50mg q 6h and agitation is reported to be controlled. Patient was extubated 5/29 which he was tolerating well and able to follow simple commands and  attempting to speak. NGT was removed. He did not have Librium overnight. He then became more anxious consistent with persistent withdrawal. NGT was reinserted and he was restarted on Librium at 25mg via NGT q6h until withdrawal is completed. Patient required reintubation because of compromised pulmonary status and possible pneumonia. Patient continued on sedation as needed. Patient remained intubated and is receiving Precedex for sedation. Patient was extubated 6/06. Is alert and engagable. Patient had Chest tube placement for pleural effusion. No sign of ETOH withdrawal. Has been up in a recliner chair and is cooperative with staff. There is no longer evidence of alcohol withdrawal. Continuing present treatment. Encouragement and support provided.

## 2017-06-11 NOTE — PROGRESS NOTE ADULT - SUBJECTIVE AND OBJECTIVE BOX
Patient discussed on morning rounds with Dr. Chou    Operation / Date: : AVR, CABG X2 EF 70%      SUBJECTIVE ASSESSMENT:  61y Male no acute events overnight. Today pt in and out of lucid thoughts. Pt denies headache, dizziness, CP or SOB. Pt was able to state his name, heart problem that lead to his surgery, , that he was in the hospital and the Dr. Wilcox was his surgeon.    Vital Signs Last 24 Hrs  T(C): 36.7, Max: 37.8 (06-10 @ 22:00)  T(F): 98.1, Max: 100 (06-10 @ 22:00)  HR: 72 (68 - 88)  BP: 111/57 (64/38 - 157/76)  BP(mean): 75 (49 - 111)  RR: 18 (18 - 36)  SpO2: 95% (95% - 96%)    I&O's Detail  I & Os for 24h ending 2017 07:00  =============================================  IN:    Oral Fluid: 840 ml    Total IN: 840 ml  ---------------------------------------------  OUT:    Voided: 300 ml    Total OUT: 300 ml  ---------------------------------------------  Total NET: 540 ml    I & Os for current day (as of 2017 16:24)  =============================================  IN:    Albumin 5%  - 250 mL: 500 ml    Oral Fluid: 430 ml    Total IN: 930 ml  ---------------------------------------------  OUT:    Incontinent per Condom Catheter: 1250 ml    Total OUT: 1250 ml  ---------------------------------------------  Total NET: -320 ml      CHEST TUBE:  No.    CORNELIO DRAIN:  No.  EPICARDIAL WIRES: Yes  TIE DOWNS: Yes  MAYERS: No. Condom Cath     PHYSICAL EXAM:    General: NAD, laying comfortably in bed  Neurological: Moving all extremities, no facial droop. Arousable to physical and verbal stimuli. Intermittently drowsy during exam.   Cardiovascular:   Respiratory:  Gastrointestinal:  Extremities:  Incision Sites:    LABS:                        12.2   12.9  )-----------( 295      ( 2017 06:49 )             37.1       COUMADIN:  No    PT/INR - ( 10 Abhinav 2017 03:33 )   PT: 19.7 sec;   INR: 1.75    PTT - ( 10 Abhinav 2017 03:33 )  PTT:33.4 sec        137  |  102  |  13  ----------------------------<  86  4.5   |  22  |  0.60    Ca    9.9      2017 06:49  Phos  3.5     06-11  Mg     2.0         TPro  7.1  /  Alb  3.3  /  TBili  1.3<H>  /  DBili  x   /  AST  45<H>  /  ALT  26  /  AlkPhos  94  06-10      MEDICATIONS  (STANDING):  aspirin enteric coated 81milliGRAM(s) Oral daily  sodium chloride 0.45%. 1000milliLiter(s) IV Continuous <Continuous>  dextrose 5%. 1000milliLiter(s) IV Continuous <Continuous>  dextrose 50% Injectable 12.5Gram(s) IV Push once  dextrose 50% Injectable 25Gram(s) IV Push once  dextrose 50% Injectable 25Gram(s) IV Push once  multivitamin 1Tablet(s) Oral daily  atorvastatin 20milliGRAM(s) Oral at bedtime  heparin  Injectable 5000Unit(s) SubCutaneous every 12 hours  pantoprazole    Tablet 40milliGRAM(s) Oral before breakfast  furosemide    Tablet 40milliGRAM(s) Oral daily  potassium chloride    Tablet ER 20milliEquivalent(s) Oral daily  folic acid 1milliGRAM(s) Oral daily  thiamine 100milliGRAM(s) Oral daily    MEDICATIONS  (PRN):  bisacodyl Suppository 10milliGRAM(s) Rectal daily PRN Constipation  dextrose Gel 1Dose(s) Oral once PRN Blood Glucose LESS THAN 70 milliGRAM(s)/deciliter  glucagon  Injectable 1milliGRAM(s) IntraMuscular once PRN Glucose LESS THAN 70 milligrams/deciliter  lidocaine   Patch 1Patch Transdermal daily PRN CT site pain  acetaminophen   Tablet. 650milliGRAM(s) Oral every 6 hours PRN Mild Pain (1 - 3)    RADIOLOGY & ADDITIONAL TESTS:  CXR 17: Portable examination the chest demonstrates status post sternotomy and   valvular replacement. Left effusion. Underlying infiltrates cannot be   excluded.    A/P:  60 y/o M current smoking (1ppd x 45 years), current every day ETOH use (1 pint vodka and several beers daily x 45 years), HLD, HTN. s/p AVR/CABGx2 on 17.     ICU COURSE:  POD 1, pt. required precedex, librium for alcohol withdrawl and DTs.  CT were d/c'ed on POD 1.  On POD 2, extubated to HFNC.  On POD 3-POD4, pt. had delirium and DTs requiring HFNC and eventual intubation.  On POD 4-POD 8, pt. remained intubated. He spiked a temp., was started on broad spectrum abx.  On POD 9, he was extubated.  On POD 10, he required L pigtail for pleural effusion. In ICU  he passed his S/S and was started on a mechanical soft diet.    Today POD#15, pt to get head CT for continued intermittent lucidity.       Neurovascular: Pain well controlled with tylenol   -h/o EtOH abuse, psychiatric team following appreciate recs   -Intermittent lucidity today- f/u CT Head     Cardiovascular: Hemodynamically stable. HR controlled.  -continue to monitor BP/Hr/Tele   -holding BB due to hypotension today after dose  -continue Lasix for diuresis    Respiratory: post-op c/b left pleural effusion s/p pigtail catheter. Current CXR with possible recurrent pleural effusion continue diuresis   -Encourage C+DB and Use of IS 10x / hr while awake.  -CXR- left pleural effusion    GI: Stable. h/o EtOH abuse   -PPX- protonix   -PO Diet.  -S/S clear pt for diet, continue feeding     Renal / : continue to trend daily labs BUN/Cr 13/0.6  -Monitor renal function.  -Monitor I/O's.    Endocrine:  no h/o DM or thyroid disease  -A1c- 5.2  -TSH- 0.742    Hematologic: continue to trend daily labs. H/H improved from 9.4/28 to 12.2/37.1  -CBC.  -Coagulation Panel.    ID: WBC downtrending from 13 to 12. no acute issues  -Temp- afebrile  -CBC.  -Observe for SIRS/Sepsis Syndrome.    Prophylaxis:  -DVT prophylaxis with 5000 SubQ Heparin q8h.  -SCD's    Disposition:  - Patient discussed on morning rounds with Dr. Chou    Operation / Date: : AVR, CABG X2 EF 70%      SUBJECTIVE ASSESSMENT:  61y Male no acute events overnight. Today pt in and out of lucid thoughts. Pt denies headache, dizziness, CP or SOB. Pt was able to state his name, heart problem that lead to his surgery, , that he was in the hospital and the Dr. Wilcox was his surgeon.    Vital Signs Last 24 Hrs  T(C): 36.7, Max: 37.8 (06-10 @ 22:00)  T(F): 98.1, Max: 100 (06-10 @ 22:00)  HR: 72 (68 - 88)  BP: 111/57 (64/38 - 157/76)  BP(mean): 75 (49 - 111)  RR: 18 (18 - 36)  SpO2: 95% (95% - 96%)    I&O's Detail  I & Os for 24h ending 2017 07:00  =============================================  IN:    Oral Fluid: 840 ml    Total IN: 840 ml  ---------------------------------------------  OUT:    Voided: 300 ml    Total OUT: 300 ml  ---------------------------------------------  Total NET: 540 ml    I & Os for current day (as of 2017 16:24)  =============================================  IN:    Albumin 5%  - 250 mL: 500 ml    Oral Fluid: 430 ml    Total IN: 930 ml  ---------------------------------------------  OUT:    Incontinent per Condom Catheter: 1250 ml    Total OUT: 1250 ml  ---------------------------------------------  Total NET: -320 ml      CHEST TUBE:  No.    CORNELIO DRAIN:  No.  EPICARDIAL WIRES: Yes  TIE DOWNS: Yes  MAYERS: No. Condom Cath     PHYSICAL EXAM:    General: NAD, laying comfortably in bed  Neurological: Moving all extremities, no facial droop. Arousable to physical and verbal stimuli. Intermittently drowsy during exam.   Cardiovascular: RRR, no m/r/g  Respiratory: slightly decreased breath sounds to LLL, all other lung fields CTA   Gastrointestinal: + bowel sounds, NT-ND, soft   Extremities: warm and well perfused no calf tenderness or edema.   Incision Sites: Sternotomy CDI, no erythema or drainage. Left EVH site, CDI, no erythema or drainage     LABS:                        12.2   12.9  )-----------( 295      ( 2017 06:49 )             37.1       COUMADIN:  No    PT/INR - ( 10 Abhinav 2017 03:33 )   PT: 19.7 sec;   INR: 1.75    PTT - ( 10 Abhinav 2017 03:33 )  PTT:33.4 sec    06-    137  |  102  |  13  ----------------------------<  86  4.5   |  22  |  0.60    Ca    9.9      2017 06:49  Phos  3.5     06-11  Mg     2.0     06-11    TPro  7.1  /  Alb  3.3  /  TBili  1.3<H>  /  DBili  x   /  AST  45<H>  /  ALT  26  /  AlkPhos  94  06-10      MEDICATIONS  (STANDING):  aspirin enteric coated 81milliGRAM(s) Oral daily  sodium chloride 0.45%. 1000milliLiter(s) IV Continuous <Continuous>  dextrose 5%. 1000milliLiter(s) IV Continuous <Continuous>  dextrose 50% Injectable 12.5Gram(s) IV Push once  dextrose 50% Injectable 25Gram(s) IV Push once  dextrose 50% Injectable 25Gram(s) IV Push once  multivitamin 1Tablet(s) Oral daily  atorvastatin 20milliGRAM(s) Oral at bedtime  heparin  Injectable 5000Unit(s) SubCutaneous every 12 hours  pantoprazole    Tablet 40milliGRAM(s) Oral before breakfast  furosemide    Tablet 40milliGRAM(s) Oral daily  potassium chloride    Tablet ER 20milliEquivalent(s) Oral daily  folic acid 1milliGRAM(s) Oral daily  thiamine 100milliGRAM(s) Oral daily    MEDICATIONS  (PRN):  bisacodyl Suppository 10milliGRAM(s) Rectal daily PRN Constipation  dextrose Gel 1Dose(s) Oral once PRN Blood Glucose LESS THAN 70 milliGRAM(s)/deciliter  glucagon  Injectable 1milliGRAM(s) IntraMuscular once PRN Glucose LESS THAN 70 milligrams/deciliter  lidocaine   Patch 1Patch Transdermal daily PRN CT site pain  acetaminophen   Tablet. 650milliGRAM(s) Oral every 6 hours PRN Mild Pain (1 - 3)    RADIOLOGY & ADDITIONAL TESTS:  CXR 17: Portable examination the chest demonstrates status post sternotomy and   valvular replacement. Left effusion. Underlying infiltrates cannot be   excluded.    A/P:  60 y/o M current smoking (1ppd x 45 years), current every day ETOH use (1 pint vodka and several beers daily x 45 years), HLD, HTN. s/p AVR/CABGx2 on 17.     ICU COURSE:  POD 1, pt. required precedex, librium for alcohol withdrawl and DTs.  CT were d/c'ed on POD 1.  On POD 2, extubated to HFNC.  On POD 3-POD4, pt. had delirium and DTs requiring HFNC and eventual intubation.  On POD 4-POD 8, pt. remained intubated. He spiked a temp., was started on broad spectrum abx.  On POD 9, he was extubated.  On POD 10, he required L pigtail for pleural effusion. In ICU  he passed his S/S and was started on a mechanical soft diet.    Today POD#15, pt to get head CT for continued intermittent lucidity.       Neurovascular: Pain well controlled with tylenol   -h/o EtOH abuse, psychiatric team following appreciate recs   -Intermittent lucidity today- f/u CT Head     Cardiovascular: Hemodynamically stable. HR controlled.  -continue to monitor BP/Hr/Tele   -holding BB due to hypotension today after dose  -continue Lasix for diuresis    Respiratory: post-op c/b left pleural effusion s/p pigtail catheter. Current CXR with possible recurrent pleural effusion continue diuresis   -Encourage C+DB and Use of IS 10x / hr while awake.  -CXR- left pleural effusion    GI: Stable. h/o EtOH abuse   -PPX- protonix   -PO Diet.  -S/S clear pt for diet, continue feeding     Renal / : continue to trend daily labs BUN/Cr 13/0.6  -Monitor renal function.  -Monitor I/O's.    Endocrine:  no h/o DM or thyroid disease  -A1c- 5.2  -TSH- 0.742    Hematologic: continue to trend daily labs. H/H improved from 9.4/28 to 12.2/37.1  -CBC.  -Coagulation Panel.    ID: WBC downtrending from 13 to 12. no acute issues  -Temp- afebrile  -CBC.  -Observe for SIRS/Sepsis Syndrome.    Prophylaxis:  -DVT prophylaxis with 5000 SubQ Heparin q8h.  -SCD's    Disposition:  -

## 2017-06-12 RX ORDER — METOPROLOL TARTRATE 50 MG
12.5 TABLET ORAL EVERY 12 HOURS
Qty: 0 | Refills: 0 | Status: DISCONTINUED | OUTPATIENT
Start: 2017-06-12 | End: 2017-06-13

## 2017-06-12 RX ORDER — FUROSEMIDE 40 MG
20 TABLET ORAL DAILY
Qty: 0 | Refills: 0 | Status: DISCONTINUED | OUTPATIENT
Start: 2017-06-12 | End: 2017-06-12

## 2017-06-12 RX ADMIN — Medication 1 PATCH: at 13:10

## 2017-06-12 RX ADMIN — Medication 12.5 MILLIGRAM(S): at 14:42

## 2017-06-12 RX ADMIN — Medication 100 MILLIGRAM(S): at 12:53

## 2017-06-12 RX ADMIN — ATORVASTATIN CALCIUM 20 MILLIGRAM(S): 80 TABLET, FILM COATED ORAL at 21:21

## 2017-06-12 RX ADMIN — HEPARIN SODIUM 5000 UNIT(S): 5000 INJECTION INTRAVENOUS; SUBCUTANEOUS at 21:21

## 2017-06-12 RX ADMIN — Medication 20 MILLIEQUIVALENT(S): at 12:53

## 2017-06-12 RX ADMIN — Medication 81 MILLIGRAM(S): at 12:53

## 2017-06-12 RX ADMIN — HEPARIN SODIUM 5000 UNIT(S): 5000 INJECTION INTRAVENOUS; SUBCUTANEOUS at 13:10

## 2017-06-12 RX ADMIN — Medication 1 TABLET(S): at 12:53

## 2017-06-12 RX ADMIN — PANTOPRAZOLE SODIUM 40 MILLIGRAM(S): 20 TABLET, DELAYED RELEASE ORAL at 06:42

## 2017-06-12 RX ADMIN — HEPARIN SODIUM 5000 UNIT(S): 5000 INJECTION INTRAVENOUS; SUBCUTANEOUS at 06:42

## 2017-06-12 RX ADMIN — Medication 1 MILLIGRAM(S): at 12:53

## 2017-06-12 RX ADMIN — Medication 10 MILLIGRAM(S): at 15:59

## 2017-06-12 RX ADMIN — Medication 40 MILLIGRAM(S): at 06:42

## 2017-06-12 NOTE — PROGRESS NOTE ADULT - ASSESSMENT
A/P:  60 y/o M current smoking (1ppd x 45 years), current every day ETOH use (1 pint vodka and several beers daily x 45 years), HLD, HTN. s/p AVR/CABGx2 on 5/27/17.     ICU COURSE:  POD 1, pt. required precedex, librium for alcohol withdrawl and DTs.  CT were d/c'ed on POD 1.  On POD 2, extubated to HFNC.  On POD 3-POD4, pt. had delirium and DTs requiring HFNC and eventual intubation.  On POD 4-POD 8, pt. remained intubated. He spiked a temp., was started on broad spectrum abx.  On POD 9, he was extubated.  On POD 10, he required L pigtail for pleural effusion. In ICU  he passed his S/S and was started on a mechanical soft diet. Patient is now on 9la    CVS:   - HD stable, on ASA, statin  - BB held yesterday for hypotension. restarted today  - PW cut today     Pulm  - on RA, ambulating with assistance     GI;  - c/w GI ppx  - f/u PO diet, added ensure today. promote PO intake    :  - discontinued lasix today, f/u need  - Cr stable    Heme:  - c/w SCD, SQH    Neuro:  - pt with hx of alcohol up until admission. on constant observation   - CT head done 6/11 for intermittent lucidity, negative for any acute findings     Dispo:   - rehab when accepted

## 2017-06-12 NOTE — PROGRESS NOTE ADULT - SUBJECTIVE AND OBJECTIVE BOX
Patient discussed on morning rounds with       Operation / Date: 5/27 AVR, CABG x2     SUBJECTIVE ASSESSMENT:  61y Male seen at bedside. Patient alert and oriented today, able to follow commands with no distress. Ambulates with assistance and has no acute complaints        Vital Signs Last 24 Hrs  T(C): 36.7, Max: 37.2 (06-11 @ 23:56)  T(F): 98, Max: 98.9 (06-11 @ 23:56)  HR: 94 (72 - 96)  BP: 136/72 (111/57 - 138/68)  BP(mean): 101 (75 - 115)  RR: 19 (18 - 26)  SpO2: 93% (92% - 95%)  I&O's Detail  I & Os for 24h ending 12 Jun 2017 07:00  =============================================  IN:    Oral Fluid: 770 ml    Albumin 5%  - 250 mL: 500 ml    Total IN: 1270 ml  ---------------------------------------------  OUT:    Incontinent per Condom Catheter: 1250 ml    Total OUT: 1250 ml  ---------------------------------------------  Total NET: 20 ml    I & Os for current day (as of 12 Jun 2017 14:19)  =============================================  IN:    Oral Fluid: 120 ml    Total IN: 120 ml  ---------------------------------------------  OUT:    Voided: 525 ml    Total OUT: 525 ml  ---------------------------------------------  Total NET: -405 ml      CHEST TUBE:  No  CORNELIO DRAIN:  No.  EPICARDIAL WIRES: No.  TIE DOWNS: No.  MAYERS: No.    PHYSICAL EXAM:    General: NAD, stable     Neurological: A&O x2, no focal deficits    Cardiovascular: RRR, normal S1, S2     Respiratory: CTA b/l    Gastrointestinal: + BS, soft, nontender    Extremities: no edema, EVH site stable with some upper leg ecchymosis     Vascular: + pulses    Incision Sites: sternotomy incision healing well     LABS:                        12.2   12.9  )-----------( 295      ( 11 Jun 2017 06:49 )             37.1       COUMADIN: No        06-11    137  |  102  |  13  ----------------------------<  86  4.5   |  22  |  0.60    Ca    9.9      11 Jun 2017 06:49  Phos  3.5     06-11  Mg     2.0     06-11            MEDICATIONS  (STANDING):  aspirin enteric coated 81milliGRAM(s) Oral daily  sodium chloride 0.45%. 1000milliLiter(s) IV Continuous <Continuous>  dextrose 5%. 1000milliLiter(s) IV Continuous <Continuous>  dextrose 50% Injectable 12.5Gram(s) IV Push once  dextrose 50% Injectable 25Gram(s) IV Push once  dextrose 50% Injectable 25Gram(s) IV Push once  multivitamin 1Tablet(s) Oral daily  atorvastatin 20milliGRAM(s) Oral at bedtime  pantoprazole    Tablet 40milliGRAM(s) Oral before breakfast  furosemide    Tablet 40milliGRAM(s) Oral daily  potassium chloride    Tablet ER 20milliEquivalent(s) Oral daily  folic acid 1milliGRAM(s) Oral daily  thiamine 100milliGRAM(s) Oral daily  heparin  Injectable 5000Unit(s) SubCutaneous every 8 hours  nicotine -  14 mG/24Hr(s) Patch 1patch Transdermal daily    MEDICATIONS  (PRN):  bisacodyl Suppository 10milliGRAM(s) Rectal daily PRN Constipation  dextrose Gel 1Dose(s) Oral once PRN Blood Glucose LESS THAN 70 milliGRAM(s)/deciliter  glucagon  Injectable 1milliGRAM(s) IntraMuscular once PRN Glucose LESS THAN 70 milligrams/deciliter  lidocaine   Patch 1Patch Transdermal daily PRN CT site pain  acetaminophen   Tablet. 650milliGRAM(s) Oral every 6 hours PRN Mild Pain (1 - 3)        RADIOLOGY & ADDITIONAL TESTS:

## 2017-06-13 LAB
ANION GAP SERPL CALC-SCNC: 15 MMOL/L — SIGNIFICANT CHANGE UP (ref 5–17)
BUN SERPL-MCNC: 17 MG/DL — SIGNIFICANT CHANGE UP (ref 7–23)
CALCIUM SERPL-MCNC: 9.2 MG/DL — SIGNIFICANT CHANGE UP (ref 8.4–10.5)
CHLORIDE SERPL-SCNC: 98 MMOL/L — SIGNIFICANT CHANGE UP (ref 96–108)
CO2 SERPL-SCNC: 21 MMOL/L — LOW (ref 22–31)
CREAT SERPL-MCNC: 0.6 MG/DL — SIGNIFICANT CHANGE UP (ref 0.5–1.3)
GLUCOSE SERPL-MCNC: 124 MG/DL — HIGH (ref 70–99)
HCT VFR BLD CALC: 35.2 % — LOW (ref 39–50)
HGB BLD-MCNC: 11.8 G/DL — LOW (ref 13–17)
MAGNESIUM SERPL-MCNC: 1.8 MG/DL — SIGNIFICANT CHANGE UP (ref 1.6–2.6)
MCHC RBC-ENTMCNC: 29.9 PG — SIGNIFICANT CHANGE UP (ref 27–34)
MCHC RBC-ENTMCNC: 33.5 G/DL — SIGNIFICANT CHANGE UP (ref 32–36)
MCV RBC AUTO: 89.1 FL — SIGNIFICANT CHANGE UP (ref 80–100)
PLATELET # BLD AUTO: 256 K/UL — SIGNIFICANT CHANGE UP (ref 150–400)
POTASSIUM SERPL-MCNC: 4.2 MMOL/L — SIGNIFICANT CHANGE UP (ref 3.5–5.3)
POTASSIUM SERPL-SCNC: 4.2 MMOL/L — SIGNIFICANT CHANGE UP (ref 3.5–5.3)
RBC # BLD: 3.95 M/UL — LOW (ref 4.2–5.8)
RBC # FLD: 17 % — HIGH (ref 10.3–16.9)
SODIUM SERPL-SCNC: 134 MMOL/L — LOW (ref 135–145)
WBC # BLD: 11.8 K/UL — HIGH (ref 3.8–10.5)
WBC # FLD AUTO: 11.8 K/UL — HIGH (ref 3.8–10.5)

## 2017-06-13 PROCEDURE — 71010: CPT | Mod: 26

## 2017-06-13 RX ORDER — METOPROLOL TARTRATE 50 MG
25 TABLET ORAL EVERY 8 HOURS
Qty: 0 | Refills: 0 | Status: DISCONTINUED | OUTPATIENT
Start: 2017-06-13 | End: 2017-06-13

## 2017-06-13 RX ORDER — FUROSEMIDE 40 MG
20 TABLET ORAL DAILY
Qty: 0 | Refills: 0 | Status: DISCONTINUED | OUTPATIENT
Start: 2017-06-13 | End: 2017-06-15

## 2017-06-13 RX ORDER — METOPROLOL TARTRATE 50 MG
12.5 TABLET ORAL
Qty: 0 | Refills: 0 | Status: DISCONTINUED | OUTPATIENT
Start: 2017-06-13 | End: 2017-06-20

## 2017-06-13 RX ADMIN — Medication 1 PATCH: at 14:40

## 2017-06-13 RX ADMIN — Medication 1 TABLET(S): at 14:38

## 2017-06-13 RX ADMIN — Medication 12.5 MILLIGRAM(S): at 19:32

## 2017-06-13 RX ADMIN — Medication 100 MILLIGRAM(S): at 14:38

## 2017-06-13 RX ADMIN — HEPARIN SODIUM 5000 UNIT(S): 5000 INJECTION INTRAVENOUS; SUBCUTANEOUS at 05:53

## 2017-06-13 RX ADMIN — HEPARIN SODIUM 5000 UNIT(S): 5000 INJECTION INTRAVENOUS; SUBCUTANEOUS at 14:38

## 2017-06-13 RX ADMIN — Medication 12.5 MILLIGRAM(S): at 05:53

## 2017-06-13 RX ADMIN — Medication 20 MILLIEQUIVALENT(S): at 14:39

## 2017-06-13 RX ADMIN — Medication 81 MILLIGRAM(S): at 14:39

## 2017-06-13 RX ADMIN — Medication 10 MILLIGRAM(S): at 15:12

## 2017-06-13 RX ADMIN — ATORVASTATIN CALCIUM 20 MILLIGRAM(S): 80 TABLET, FILM COATED ORAL at 22:50

## 2017-06-13 RX ADMIN — PANTOPRAZOLE SODIUM 40 MILLIGRAM(S): 20 TABLET, DELAYED RELEASE ORAL at 05:54

## 2017-06-13 RX ADMIN — HEPARIN SODIUM 5000 UNIT(S): 5000 INJECTION INTRAVENOUS; SUBCUTANEOUS at 22:50

## 2017-06-13 RX ADMIN — Medication 1 MILLIGRAM(S): at 14:38

## 2017-06-13 NOTE — PROGRESS NOTE ADULT - SUBJECTIVE AND OBJECTIVE BOX
Patient discussed on morning rounds with Dr. Wilcox     Operation / Date: 5/27/17 CABGx2 EF 70%    SUBJECTIVE ASSESSMENT:    No complaints at present, ambulating in hallway without difficulty.    Vital Signs Last 24 Hrs  T(C): 36.9, Max: 37.3 (06-12 @ 21:15)  T(F): 98.4, Max: 99.2 (06-12 @ 21:15)  HR: 94 (80 - 106)  BP: 131/70 (88/51 - 134/59)  BP(mean): 86 (66 - 94)  RR: 16 (14 - 18)  SpO2: 96% (95% - 96%)  I&O's Detail    I & Os for current day (as of 13 Jun 2017 16:06)  =============================================  IN:    Oral Fluid: 840 ml    Total IN: 840 ml  ---------------------------------------------  OUT:    Voided: 525 ml    Total OUT: 525 ml  ---------------------------------------------  Total NET: 315 ml      CHEST TUBE:  No.    CORNELIO DRAIN:  No.  EPICARDIAL WIRES: No.  TIE DOWNS: No.  MAYERS: No.    PHYSICAL EXAM:    General: NAD     Neurological: confused, requiring frequent reorientation, but following commands    Cardiovascular: S1S2 RRR    Respiratory: CTA b/l     Gastrointestinal: soft NT/ND +BS    Extremities: no edema    Vascular: warm and well perfused    Incision Sites: MSI C/D/I    LABS:                        11.8   11.8  )-----------( 256      ( 13 Jun 2017 05:41 )             35.2       COUMADIN:  No.         06-13    134<L>  |  98  |  17  ----------------------------<  124<H>  4.2   |  21<L>  |  0.60    Ca    9.2      13 Jun 2017 05:41  Mg     1.8     06-13            MEDICATIONS  (STANDING):  aspirin enteric coated 81milliGRAM(s) Oral daily  sodium chloride 0.45%. 1000milliLiter(s) IV Continuous <Continuous>  dextrose 5%. 1000milliLiter(s) IV Continuous <Continuous>  dextrose 50% Injectable 12.5Gram(s) IV Push once  dextrose 50% Injectable 25Gram(s) IV Push once  dextrose 50% Injectable 25Gram(s) IV Push once  multivitamin 1Tablet(s) Oral daily  atorvastatin 20milliGRAM(s) Oral at bedtime  pantoprazole    Tablet 40milliGRAM(s) Oral before breakfast  potassium chloride    Tablet ER 20milliEquivalent(s) Oral daily  folic acid 1milliGRAM(s) Oral daily  thiamine 100milliGRAM(s) Oral daily  heparin  Injectable 5000Unit(s) SubCutaneous every 8 hours  nicotine -  14 mG/24Hr(s) Patch 1patch Transdermal daily  chlordiazePOXIDE 10milliGRAM(s) Oral daily  furosemide    Tablet 20milliGRAM(s) Oral daily  metoprolol 12.5milliGRAM(s) Oral two times a day    MEDICATIONS  (PRN):  bisacodyl Suppository 10milliGRAM(s) Rectal daily PRN Constipation  dextrose Gel 1Dose(s) Oral once PRN Blood Glucose LESS THAN 70 milliGRAM(s)/deciliter  glucagon  Injectable 1milliGRAM(s) IntraMuscular once PRN Glucose LESS THAN 70 milligrams/deciliter  lidocaine   Patch 1Patch Transdermal daily PRN CT site pain  acetaminophen   Tablet. 650milliGRAM(s) Oral every 6 hours PRN Mild Pain (1 - 3)

## 2017-06-13 NOTE — PROGRESS NOTE ADULT - ASSESSMENT
A/P:  60 y/o M current smoking (1ppd x 45 years), current every day ETOH use (1 pint vodka and several beers daily x 45 years), HLD, HTN. s/p AVR/CABGx2 on 5/27/17.     CVS:   - HD stable, on ASA, statin  - BB held for hypotension this morning, resumed at low dose 12.5 q 12    Pulm  - on RA, ambulating with assistance     GI;  - c/w GI ppx  - tolerating PO diet, added ensure today. promote PO intake    :  - discontinued lasix today, f/u need  - Cr stable    Heme:  - c/w SCD, SQH    Neuro:  - pt with hx of alcohol up until admission. on constant observation   - CT head done 6/11 for intermittent lucidity, negative for any acute findings     Dispo:   - rehab when accepted A/P:  62 y/o M current smoking (1ppd x 45 years), current every day ETOH use (1 pint vodka and several beers daily x 45 years), HLD, HTN. s/p AVR/CABGx2 on 5/27/17.     CVS:   - HD stable, on ASA, statin  - BB held for hypotension this morning, resumed at low dose 12.5 q 12    Pulm  - on RA, ambulating with assistance     GI;  - c/w GI ppx  - tolerating PO diet. promote PO intake    :  - gentle diuresis with lasix 20 daily  - Cr stable    Heme:  - c/w SCD, SQH    Neuro:  - pt with hx of alcohol up until admission. on constant observation   - CT head done 6/11 for intermittent lucidity, negative for any acute findings     Dispo:   - rehab when accepted

## 2017-06-14 LAB
ANION GAP SERPL CALC-SCNC: 15 MMOL/L — SIGNIFICANT CHANGE UP (ref 5–17)
APTT BLD: 35.2 SEC — SIGNIFICANT CHANGE UP (ref 27.5–37.4)
BUN SERPL-MCNC: 16 MG/DL — SIGNIFICANT CHANGE UP (ref 7–23)
CALCIUM SERPL-MCNC: 9.4 MG/DL — SIGNIFICANT CHANGE UP (ref 8.4–10.5)
CHLORIDE SERPL-SCNC: 99 MMOL/L — SIGNIFICANT CHANGE UP (ref 96–108)
CO2 SERPL-SCNC: 21 MMOL/L — LOW (ref 22–31)
CREAT SERPL-MCNC: 0.6 MG/DL — SIGNIFICANT CHANGE UP (ref 0.5–1.3)
GLUCOSE SERPL-MCNC: 88 MG/DL — SIGNIFICANT CHANGE UP (ref 70–99)
HCT VFR BLD CALC: 37.2 % — LOW (ref 39–50)
HGB BLD-MCNC: 12.6 G/DL — LOW (ref 13–17)
INR BLD: 1.59 — HIGH (ref 0.88–1.16)
MAGNESIUM SERPL-MCNC: 1.9 MG/DL — SIGNIFICANT CHANGE UP (ref 1.6–2.6)
MCHC RBC-ENTMCNC: 30.7 PG — SIGNIFICANT CHANGE UP (ref 27–34)
MCHC RBC-ENTMCNC: 33.9 G/DL — SIGNIFICANT CHANGE UP (ref 32–36)
MCV RBC AUTO: 90.5 FL — SIGNIFICANT CHANGE UP (ref 80–100)
PLATELET # BLD AUTO: 215 K/UL — SIGNIFICANT CHANGE UP (ref 150–400)
POTASSIUM SERPL-MCNC: 4.4 MMOL/L — SIGNIFICANT CHANGE UP (ref 3.5–5.3)
POTASSIUM SERPL-SCNC: 4.4 MMOL/L — SIGNIFICANT CHANGE UP (ref 3.5–5.3)
PROTHROM AB SERPL-ACNC: 17.8 SEC — HIGH (ref 9.8–12.7)
RBC # BLD: 4.11 M/UL — LOW (ref 4.2–5.8)
RBC # FLD: 17.4 % — HIGH (ref 10.3–16.9)
SODIUM SERPL-SCNC: 135 MMOL/L — SIGNIFICANT CHANGE UP (ref 135–145)
WBC # BLD: 11.9 K/UL — HIGH (ref 3.8–10.5)
WBC # FLD AUTO: 11.9 K/UL — HIGH (ref 3.8–10.5)

## 2017-06-14 RX ORDER — POTASSIUM CHLORIDE 20 MEQ
10 PACKET (EA) ORAL DAILY
Qty: 0 | Refills: 0 | Status: DISCONTINUED | OUTPATIENT
Start: 2017-06-14 | End: 2017-06-15

## 2017-06-14 RX ORDER — POTASSIUM CHLORIDE 20 MEQ
10 PACKET (EA) ORAL DAILY
Qty: 0 | Refills: 0 | Status: DISCONTINUED | OUTPATIENT
Start: 2017-06-14 | End: 2017-06-14

## 2017-06-14 RX ORDER — MAGNESIUM OXIDE 400 MG ORAL TABLET 241.3 MG
800 TABLET ORAL ONCE
Qty: 0 | Refills: 0 | Status: COMPLETED | OUTPATIENT
Start: 2017-06-14 | End: 2017-06-14

## 2017-06-14 RX ADMIN — Medication 12.5 MILLIGRAM(S): at 21:06

## 2017-06-14 RX ADMIN — ATORVASTATIN CALCIUM 20 MILLIGRAM(S): 80 TABLET, FILM COATED ORAL at 21:36

## 2017-06-14 RX ADMIN — Medication 81 MILLIGRAM(S): at 12:52

## 2017-06-14 RX ADMIN — Medication 10 MILLIEQUIVALENT(S): at 15:54

## 2017-06-14 RX ADMIN — Medication 10 MILLIGRAM(S): at 12:53

## 2017-06-14 RX ADMIN — PANTOPRAZOLE SODIUM 40 MILLIGRAM(S): 20 TABLET, DELAYED RELEASE ORAL at 06:42

## 2017-06-14 RX ADMIN — HEPARIN SODIUM 5000 UNIT(S): 5000 INJECTION INTRAVENOUS; SUBCUTANEOUS at 21:30

## 2017-06-14 RX ADMIN — Medication 1 PATCH: at 12:52

## 2017-06-14 RX ADMIN — HEPARIN SODIUM 5000 UNIT(S): 5000 INJECTION INTRAVENOUS; SUBCUTANEOUS at 06:42

## 2017-06-14 RX ADMIN — Medication 650 MILLIGRAM(S): at 21:05

## 2017-06-14 RX ADMIN — MAGNESIUM OXIDE 400 MG ORAL TABLET 800 MILLIGRAM(S): 241.3 TABLET ORAL at 07:59

## 2017-06-14 RX ADMIN — Medication 12.5 MILLIGRAM(S): at 06:45

## 2017-06-14 RX ADMIN — Medication 100 MILLIGRAM(S): at 12:52

## 2017-06-14 RX ADMIN — Medication 650 MILLIGRAM(S): at 22:00

## 2017-06-14 RX ADMIN — Medication 20 MILLIGRAM(S): at 06:42

## 2017-06-14 RX ADMIN — Medication 1 MILLIGRAM(S): at 12:52

## 2017-06-14 RX ADMIN — Medication 1 TABLET(S): at 12:52

## 2017-06-14 RX ADMIN — Medication 1 PATCH: at 14:00

## 2017-06-14 NOTE — PROGRESS NOTE ADULT - SUBJECTIVE AND OBJECTIVE BOX
Patient discussed on morning rounds with Dr. Ram/[place]      Operation / Date:   5/27/17 CABGx2 EF 70%  6/8/17 right VATS decortication evacuation of clot     SUBJECTIVE ASSESSMENT:  Patient is feeling well today.  He feels his breathing has much improved since his second surgery and he continues to improve. + BM this am.  Eating well.  Denies HA, dizziness, CP, SOB, palpitations, N/V/D/C.         Vital Signs Last 24 Hrs  T(C): 36.7, Max: 37.2 (06-14 @ 05:00)  T(F): 98, Max: 98.9 (06-14 @ 05:00)  HR: 84 (76 - 92)  BP: 103/56 (90/45 - 136/63)  BP(mean): 73 (65 - 93)  RR: 14 (12 - 20)  SpO2: 93% (93% - 99%)  I&O's Detail    I & Os for current day (as of 14 Jun 2017 15:44)  =============================================  IN:    Oral Fluid: 240 ml    Total IN: 240 ml  ---------------------------------------------  OUT:    Total OUT: 0 ml  ---------------------------------------------  Total NET: 240 ml      CHEST TUBE:  Yes AIR LEAKS: No. H2O SEAL.   CORNELIO DRAIN:  No.  EPICARDIAL WIRES: No.  TIE DOWNS: Yes  MAYERS: No.    PHYSICAL EXAM:    General: OOB in chair, comfortable, NAD    Neurological: A&O X 3 moves all extremities.  No focal deficits      Cardiovascular: S1S2 RRR No M/G/R    Respiratory: CTA b/l no w/r/r    Gastrointestinal: BS x 4 abdomen soft, NT/ND    Extremities: No LE edema b/l.  No calf tenderness b/l    Vascular: radial and PT pulses 1 + b/l    Incision Sites: MSI and left EVH site healing well, no erythema, no drainage, no tenderness, and no sternal click palpated.     LABS:                        12.6   11.9  )-----------( 215      ( 14 Jun 2017 06:45 )             37.2       COUMADIN:  No.     PT/INR - ( 14 Jun 2017 12:52 )   PT: 17.8 sec;   INR: 1.59          PTT - ( 14 Jun 2017 12:52 )  PTT:35.2 sec    06-14    135  |  99  |  16  ----------------------------<  88  4.4   |  21<L>  |  0.60    Ca    9.4      14 Jun 2017 06:45  Mg     1.9     06-14            MEDICATIONS  (STANDING):  aspirin enteric coated 81milliGRAM(s) Oral daily  sodium chloride 0.45%. 1000milliLiter(s) IV Continuous <Continuous>  multivitamin 1Tablet(s) Oral daily  atorvastatin 20milliGRAM(s) Oral at bedtime  pantoprazole    Tablet 40milliGRAM(s) Oral before breakfast  folic acid 1milliGRAM(s) Oral daily  thiamine 100milliGRAM(s) Oral daily  heparin  Injectable 5000Unit(s) SubCutaneous every 8 hours  nicotine -  14 mG/24Hr(s) Patch 1patch Transdermal daily  chlordiazePOXIDE 10milliGRAM(s) Oral daily  furosemide    Tablet 20milliGRAM(s) Oral daily  metoprolol 12.5milliGRAM(s) Oral two times a day  potassium chloride    Tablet ER 10milliEquivalent(s) Oral daily    MEDICATIONS  (PRN):  bisacodyl Suppository 10milliGRAM(s) Rectal daily PRN Constipation  lidocaine   Patch 1Patch Transdermal daily PRN CT site pain  acetaminophen   Tablet. 650milliGRAM(s) Oral every 6 hours PRN Mild Pain (1 - 3)        RADIOLOGY & ADDITIONAL TESTS:    Xray 6/11 Portable examination the chest demonstrates status post sternotomy and   valvular replacement. Left effusion. Underlying infiltrates cannot be   excluded. Patient discussed on morning rounds with Dr. Ram    Operation / Date:   5/27 AVR, CABG x 2 EF 70%     SUBJECTIVE ASSESSMENT:  Patient is feeling okay and wants to leave the hospital .   Denies HA, dizziness, CP, SOB, palpitations, N/V/D/C.     Vital Signs Last 24 Hrs  T(C): 36.7, Max: 37.2 (06-14 @ 05:00)  T(F): 98, Max: 98.9 (06-14 @ 05:00)  HR: 84 (76 - 92)  BP: 103/56 (90/45 - 136/63)  BP(mean): 73 (65 - 93)  RR: 14 (12 - 20)  SpO2: 93% (93% - 99%)  I&O's Detail    I & Os for current day (as of 14 Jun 2017 15:44)  =============================================  IN:    Oral Fluid: 240 ml    Total IN: 240 ml  ---------------------------------------------  OUT:    Total OUT: 0 ml  ---------------------------------------------  Total NET: 240 ml      CHEST TUBE: No   CORNELIO DRAIN:  No.  EPICARDIAL WIRES: No.  TIE DOWNS: No  MAYERS: No.    PHYSICAL EXAM:    General: OOB in chair, comfortable, NAD    Neurological: A&O X 3 moves all extremities.  No focal deficits      Cardiovascular: S1S2 RRR No M/G/R    Respiratory: CTA b/l no w/r/r    Gastrointestinal: BS x 4 abdomen soft, NT/ND    Extremities: No LE edema b/l.  No calf tenderness b/l    Vascular: radial and PT pulses 1 + b/l    Incision Sites: MSI and left EVH site healing well, no erythema, no drainage, no tenderness, and no sternal click palpated.     LABS:                        12.6   11.9  )-----------( 215      ( 14 Jun 2017 06:45 )             37.2       COUMADIN:  No.     PT/INR - ( 14 Jun 2017 12:52 )   PT: 17.8 sec;   INR: 1.59          PTT - ( 14 Jun 2017 12:52 )  PTT:35.2 sec    06-14    135  |  99  |  16  ----------------------------<  88  4.4   |  21<L>  |  0.60    Ca    9.4      14 Jun 2017 06:45  Mg     1.9     06-14            MEDICATIONS  (STANDING):  aspirin enteric coated 81milliGRAM(s) Oral daily  sodium chloride 0.45%. 1000milliLiter(s) IV Continuous <Continuous>  multivitamin 1Tablet(s) Oral daily  atorvastatin 20milliGRAM(s) Oral at bedtime  pantoprazole    Tablet 40milliGRAM(s) Oral before breakfast  folic acid 1milliGRAM(s) Oral daily  thiamine 100milliGRAM(s) Oral daily  heparin  Injectable 5000Unit(s) SubCutaneous every 8 hours  nicotine -  14 mG/24Hr(s) Patch 1patch Transdermal daily  chlordiazePOXIDE 10milliGRAM(s) Oral daily  furosemide    Tablet 20milliGRAM(s) Oral daily  metoprolol 12.5milliGRAM(s) Oral two times a day  potassium chloride    Tablet ER 10milliEquivalent(s) Oral daily    MEDICATIONS  (PRN):  bisacodyl Suppository 10milliGRAM(s) Rectal daily PRN Constipation  lidocaine   Patch 1Patch Transdermal daily PRN CT site pain  acetaminophen   Tablet. 650milliGRAM(s) Oral every 6 hours PRN Mild Pain (1 - 3)        RADIOLOGY & ADDITIONAL TESTS:    Xray 6/11 Portable examination the chest demonstrates status post sternotomy and   valvular replacement. Left effusion. Underlying infiltrates cannot be   excluded.

## 2017-06-14 NOTE — PROGRESS NOTE ADULT - ASSESSMENT
A/P:  60 y/o M current smoking (1ppd x 45 years), current every day ETOH use (1 pint vodka and several beers daily x 45 years), HLD, HTN. s/p AVR/CABGx2 on 5/27/17.     Neuro/Psych: No signs of alcohol withdrawal.  mental and functional status to continue to improve.   -con't with enhanced supervision as patient fall risk   -Dr. Gonzalez to evaluate again today  -continue with librium 10 mg daily, thiamine, folic acid, and multivitamin    CVS: HD stable  -con't with lopressor 12.5 mg BID.   -con't with asa and statin     Resp: saturating well on room air. small stable left sided pleural effusion   -con't with lasix 20 mg PO daily with K supplementation   -con't IS/ambulation.  C+DB exercises    GI:   -con't pepcid for GI prophylaxis    Renal: No active issues  -con't to monitor I/O's daily while on lasix  -replete electrolytes prn     Heme:   -HSQ for DVT ppx     Endocrine: no active issues     Dispo: [facility] pending ins authorization.

## 2017-06-14 NOTE — PROGRESS NOTE ADULT - SUBJECTIVE AND OBJECTIVE BOX
Chart reviewed and patient interviewed. Patient is a 61 year old  Black male from [facility] current smoker ( 1 PPDx45 years), current every day use of Alcohol ( 1 pint of Vodka daily and several beers daily for 45 years ) and history of HLD and  HTN who was admitted to [facility] in early May 2017 with Chest pain and worsening HEIN. Had Cardiac Cath which showed CAD and Aortic Stenosis. He was discharged home and then seen at [facility] after awakening at home with severe Chest pain on 5/22/17. Patient was seen in Cardiac Clinic on 5/22/17 and was admitted for pre-op work up for possible CABG/AVR. Last drink was a bottle of beer on morning of 5/23/17, the day of admission to [facility].       Patient's wife reports he has gone through acute Alcohol withdrawal in the past when hospitalized and has become acutely agitated requiring sedation and restraint. Overnight patient became acutely agitated secondary to alcohol withdrawal and required sedation and intubation.  Patient proceeded to surgery 5/26 and underwent a CABG and AVR. Patient tolerated surgery well and remained intubated and sedated. Patient reported to have been agitated yesterday secondary to Alcohol Withdrawal and is now receiving Librium 50mg q 6h and agitation is reported to be controlled. Patient was extubated 5/29 which he was tolerating well and able to follow simple commands and  attempting to speak. NGT was removed. He did not have Librium overnight. He then became more anxious consistent with persistent withdrawal. NGT was reinserted and he was restarted on Librium at 25mg via NGT q6h until withdrawal is completed. Patient required reintubation because of compromised pulmonary status and possible pneumonia. Patient continued on sedation as needed. Patient remained intubated and is receiving Precedex for sedation. Patient was extubated 6/06. Is alert and engagable. Patient had Chest tube placement for pleural effusion which is now removed. No sign of ETOH withdrawal. Has been up in a recliner chair and is cooperative with staff and is now walking some There is no longer evidence of alcohol withdrawal. Patient is cooperative and participating with Physical therapy. His sensorium is clearer and he is approaching his baseline. Plans are being made for transfer to Subacute Rehab. Patient is to be started on Librium 10mg po bid for anxiety. Continuing Supportive therapy.

## 2017-06-15 LAB
ANION GAP SERPL CALC-SCNC: 15 MMOL/L — SIGNIFICANT CHANGE UP (ref 5–17)
APTT BLD: 35.9 SEC — SIGNIFICANT CHANGE UP (ref 27.5–37.4)
BUN SERPL-MCNC: 24 MG/DL — HIGH (ref 7–23)
CALCIUM SERPL-MCNC: 9.8 MG/DL — SIGNIFICANT CHANGE UP (ref 8.4–10.5)
CHLORIDE SERPL-SCNC: 98 MMOL/L — SIGNIFICANT CHANGE UP (ref 96–108)
CO2 SERPL-SCNC: 22 MMOL/L — SIGNIFICANT CHANGE UP (ref 22–31)
CREAT SERPL-MCNC: 0.9 MG/DL — SIGNIFICANT CHANGE UP (ref 0.5–1.3)
GLUCOSE SERPL-MCNC: 125 MG/DL — HIGH (ref 70–99)
HCT VFR BLD CALC: 30.8 % — LOW (ref 39–50)
HGB BLD-MCNC: 10.3 G/DL — LOW (ref 13–17)
INR BLD: 1.56 — HIGH (ref 0.88–1.16)
MAGNESIUM SERPL-MCNC: 2 MG/DL — SIGNIFICANT CHANGE UP (ref 1.6–2.6)
MCHC RBC-ENTMCNC: 30.1 PG — SIGNIFICANT CHANGE UP (ref 27–34)
MCHC RBC-ENTMCNC: 33.4 G/DL — SIGNIFICANT CHANGE UP (ref 32–36)
MCV RBC AUTO: 90.1 FL — SIGNIFICANT CHANGE UP (ref 80–100)
PHOSPHATE SERPL-MCNC: 4.4 MG/DL — SIGNIFICANT CHANGE UP (ref 2.5–4.5)
PLATELET # BLD AUTO: 261 K/UL — SIGNIFICANT CHANGE UP (ref 150–400)
POTASSIUM SERPL-MCNC: 4.7 MMOL/L — SIGNIFICANT CHANGE UP (ref 3.5–5.3)
POTASSIUM SERPL-SCNC: 4.7 MMOL/L — SIGNIFICANT CHANGE UP (ref 3.5–5.3)
PROTHROM AB SERPL-ACNC: 17.5 SEC — HIGH (ref 9.8–12.7)
RBC # BLD: 3.42 M/UL — LOW (ref 4.2–5.8)
RBC # FLD: 17.6 % — HIGH (ref 10.3–16.9)
SODIUM SERPL-SCNC: 135 MMOL/L — SIGNIFICANT CHANGE UP (ref 135–145)
WBC # BLD: 11 K/UL — HIGH (ref 3.8–10.5)
WBC # FLD AUTO: 11 K/UL — HIGH (ref 3.8–10.5)

## 2017-06-15 PROCEDURE — 71010: CPT | Mod: 26

## 2017-06-15 RX ADMIN — Medication 1 PATCH: at 14:09

## 2017-06-15 RX ADMIN — HEPARIN SODIUM 5000 UNIT(S): 5000 INJECTION INTRAVENOUS; SUBCUTANEOUS at 06:10

## 2017-06-15 RX ADMIN — Medication 1 MILLIGRAM(S): at 14:09

## 2017-06-15 RX ADMIN — LIDOCAINE 1 PATCH: 4 CREAM TOPICAL at 03:43

## 2017-06-15 RX ADMIN — ATORVASTATIN CALCIUM 20 MILLIGRAM(S): 80 TABLET, FILM COATED ORAL at 22:05

## 2017-06-15 RX ADMIN — Medication 10 MILLIGRAM(S): at 14:09

## 2017-06-15 RX ADMIN — PANTOPRAZOLE SODIUM 40 MILLIGRAM(S): 20 TABLET, DELAYED RELEASE ORAL at 06:10

## 2017-06-15 RX ADMIN — Medication 20 MILLIGRAM(S): at 06:10

## 2017-06-15 RX ADMIN — Medication 1 TABLET(S): at 14:08

## 2017-06-15 RX ADMIN — Medication 650 MILLIGRAM(S): at 04:00

## 2017-06-15 RX ADMIN — Medication 650 MILLIGRAM(S): at 03:43

## 2017-06-15 RX ADMIN — Medication 12.5 MILLIGRAM(S): at 18:55

## 2017-06-15 RX ADMIN — HEPARIN SODIUM 5000 UNIT(S): 5000 INJECTION INTRAVENOUS; SUBCUTANEOUS at 22:05

## 2017-06-15 RX ADMIN — Medication 100 MILLIGRAM(S): at 14:09

## 2017-06-15 RX ADMIN — Medication 81 MILLIGRAM(S): at 14:09

## 2017-06-15 NOTE — PROGRESS NOTE ADULT - ASSESSMENT
Assessment  62 y/o M current smoking (1ppd x 45 years), current every day ETOH use (1 pint vodka and several beers daily x 45 years), HLD, HTN. s/p AVR/CABGx2 on 5/27/17 with gradual improvement in mental status.    Plan    Neurovascular:   No delirium. continue librium, thiamine, folic acid  .    Cardiovascular:   Hemodynamically stable. HR/BP controlled lopressor 12.5mg bid  -ASA. Statin.      Respiratory:   -tolerates room air  -Encourage C+DB and Use of IS 10x / hr while awake.    GI: Stable.  -PPX.  -PO Diet.    Renal / :  -Monitor renal function.  -Monitor I/O's.  -replete lytes prn    Endocrine:    -blood glucose stable    Hematologic:  -H/H stable    ID:  -stable, afebrile    Prophylaxis:  -DVT prophylaxis with 5000 SubQ Heparin q8h.  -SCD's    Disposition:  -insurance authorization pending

## 2017-06-15 NOTE — PROGRESS NOTE ADULT - SUBJECTIVE AND OBJECTIVE BOX
Patient discussed on morning rounds with       Operation / Date:     SUBJECTIVE ASSESSMENT:  61y Male         Vital Signs Last 24 Hrs  T(C): 36.7, Max: 36.7 (06-15 @ 17:45)  T(F): 98, Max: 98 (06-15 @ 17:45)  HR: 86 (78 - 100)  BP: 149/66 (88/51 - 149/66)  BP(mean): 102 (64 - 102)  RR: 15 (14 - 16)  SpO2: 97% (95% - 97%)  I&O's Detail  I & Os for 24h ending 15 Abhinav 2017 07:00  =============================================  IN:    Oral Fluid: 1040 ml    Total IN: 1040 ml  ---------------------------------------------  OUT:    Voided: 775 ml    Total OUT: 775 ml  ---------------------------------------------  Total NET: 265 ml    I & Os for current day (as of 15 Abhinav 2017 20:00)  =============================================  IN:    Oral Fluid: 720 ml    Total IN: 720 ml  ---------------------------------------------  OUT:    Voided: 200 ml    Total OUT: 200 ml  ---------------------------------------------  Total NET: 520 ml      CHEST TUBE:  Yes/No. AIR LEAKS: Yes/No. Suction / H2O SEAL.   CORNELIO DRAIN:  Yes/No.  EPICARDIAL WIRES: Yes/No.  TIE DOWNS: Yes/No.  MAYERS: Yes/No.    PHYSICAL EXAM:    General:     Neurological:    Cardiovascular:    Respiratory:    Gastrointestinal:    Extremities:    Vascular:    Incision Sites:    LABS:                        10.3   11.0  )-----------( 261      ( 15 Abhinav 2017 11:02 )             30.8       COUMADIN:  Yes/No. REASON: .    PT/INR - ( 15 Abhinav 2017 11:02 )   PT: 17.5 sec;   INR: 1.56          PTT - ( 15 Abhinav 2017 11:02 )  PTT:35.9 sec    06-15    135  |  98  |  24<H>  ----------------------------<  125<H>  4.7   |  22  |  0.90    Ca    9.8      15 Abhinav 2017 11:02  Phos  4.4     06-15  Mg     2.0     06-15    MEDICATIONS  (STANDING):  aspirin enteric coated 81milliGRAM(s) Oral daily  sodium chloride 0.45%. 1000milliLiter(s) IV Continuous <Continuous>  multivitamin 1Tablet(s) Oral daily  atorvastatin 20milliGRAM(s) Oral at bedtime  pantoprazole    Tablet 40milliGRAM(s) Oral before breakfast  folic acid 1milliGRAM(s) Oral daily  thiamine 100milliGRAM(s) Oral daily  heparin  Injectable 5000Unit(s) SubCutaneous every 8 hours  nicotine -  14 mG/24Hr(s) Patch 1patch Transdermal daily  chlordiazePOXIDE 10milliGRAM(s) Oral daily  metoprolol 12.5milliGRAM(s) Oral two times a day    MEDICATIONS  (PRN):  bisacodyl Suppository 10milliGRAM(s) Rectal daily PRN Constipation  lidocaine   Patch 1Patch Transdermal daily PRN CT site pain  acetaminophen   Tablet. 650milliGRAM(s) Oral every 6 hours PRN Mild Pain (1 - 3)        RADIOLOGY & ADDITIONAL TESTS: Patient discussed on morning rounds with Dr. Ram     Operation / Date: 5/27 AVR, CABG x 2 EF 70%     SUBJECTIVE ASSESSMENT:  61year old male with encouragement fed himself     Vital Signs Last 24 Hrs  T(C): 36.7, Max: 36.7 (06-15 @ 17:45)  T(F): 98, Max: 98 (06-15 @ 17:45)  HR: 86 (78 - 100)  BP: 149/66 (88/51 - 149/66)  BP(mean): 102 (64 - 102)  RR: 15 (14 - 16)  SpO2: 97% (95% - 97%)  I&O's Detail  I & Os for 24h ending 15 Abhinav 2017 07:00  =============================================  IN:    Oral Fluid: 1040 ml    Total IN: 1040 ml  ---------------------------------------------  OUT:    Voided: 775 ml    Total OUT: 775 ml  ---------------------------------------------  Total NET: 265 ml    I & Os for current day (as of 15 Abhinav 2017 20:00)  =============================================  IN:    Oral Fluid: 720 ml    Total IN: 720 ml  ---------------------------------------------  OUT:    Voided: 200 ml    Total OUT: 200 ml  ---------------------------------------------  Total NET: 520 ml    CHEST TUBE: No   CORNELIO DRAIN:  No.  EPICARDIAL WIRES: No.  TIE DOWNS: No  MAYERS: No.    PHYSICAL EXAM:    General: OOB in chair, comfortable, NAD    Neurological: A&O X 3 moves all extremities.  No focal deficits      Cardiovascular: S1S2 RRR No M/G/R    Respiratory: CTA b/l , no wheeze    Gastrointestinal:positive BS,  soft, NT/ND    Extremities: No LE edema b/l.  No calf tenderness b/l    Vascular: all pulses intact    Incision Sites:left EVH site healing well, sternum stable with no draingage  .   LABS:                        10.3   11.0  )-----------( 261      ( 15 Abhinav 2017 11:02 )             30.8     COUMADIN:No. REASON: no indicated    PT/INR - ( 15 Abhinav 2017 11:02 )   PT: 17.5 sec;   INR: 1.56       PTT - ( 15 Abhinav 2017 11:02 )  PTT:35.9 sec    06-15    135  |  98  |  24<H>  ----------------------------<  125<H>  4.7   |  22  |  0.90    Ca    9.8      15 Abhinav 2017 11:02  Phos  4.4     06-15  Mg     2.0     06-15    MEDICATIONS  (STANDING):  aspirin enteric coated 81milliGRAM(s) Oral daily  sodium chloride 0.45%. 1000milliLiter(s) IV Continuous <Continuous>  multivitamin 1Tablet(s) Oral daily  atorvastatin 20milliGRAM(s) Oral at bedtime  pantoprazole    Tablet 40milliGRAM(s) Oral before breakfast  folic acid 1milliGRAM(s) Oral daily  thiamine 100milliGRAM(s) Oral daily  heparin  Injectable 5000Unit(s) SubCutaneous every 8 hours  nicotine -  14 mG/24Hr(s) Patch 1patch Transdermal daily  chlordiazePOXIDE 10milliGRAM(s) Oral daily  metoprolol 12.5milliGRAM(s) Oral two times a day    MEDICATIONS  (PRN):  bisacodyl Suppository 10milliGRAM(s) Rectal daily PRN Constipation  lidocaine   Patch 1Patch Transdermal daily PRN CT site pain  acetaminophen   Tablet. 650milliGRAM(s) Oral every 6 hours PRN Mild Pain (1 - 3)    RADIOLOGY & ADDITIONAL TESTS:  CXR 6/15/17  Slight decrease in small left pleural effusion.

## 2017-06-16 LAB
ANION GAP SERPL CALC-SCNC: 11 MMOL/L — SIGNIFICANT CHANGE UP (ref 5–17)
BUN SERPL-MCNC: 21 MG/DL — SIGNIFICANT CHANGE UP (ref 7–23)
CALCIUM SERPL-MCNC: 8.9 MG/DL — SIGNIFICANT CHANGE UP (ref 8.4–10.5)
CHLORIDE SERPL-SCNC: 100 MMOL/L — SIGNIFICANT CHANGE UP (ref 96–108)
CO2 SERPL-SCNC: 23 MMOL/L — SIGNIFICANT CHANGE UP (ref 22–31)
CREAT SERPL-MCNC: 0.8 MG/DL — SIGNIFICANT CHANGE UP (ref 0.5–1.3)
GLUCOSE SERPL-MCNC: 100 MG/DL — HIGH (ref 70–99)
HCT VFR BLD CALC: 29 % — LOW (ref 39–50)
HGB BLD-MCNC: 9.5 G/DL — LOW (ref 13–17)
MAGNESIUM SERPL-MCNC: 1.8 MG/DL — SIGNIFICANT CHANGE UP (ref 1.6–2.6)
MCHC RBC-ENTMCNC: 29.5 PG — SIGNIFICANT CHANGE UP (ref 27–34)
MCHC RBC-ENTMCNC: 32.8 G/DL — SIGNIFICANT CHANGE UP (ref 32–36)
MCV RBC AUTO: 90.1 FL — SIGNIFICANT CHANGE UP (ref 80–100)
PHOSPHATE SERPL-MCNC: 3.7 MG/DL — SIGNIFICANT CHANGE UP (ref 2.5–4.5)
PLATELET # BLD AUTO: 240 K/UL — SIGNIFICANT CHANGE UP (ref 150–400)
POTASSIUM SERPL-MCNC: 4.5 MMOL/L — SIGNIFICANT CHANGE UP (ref 3.5–5.3)
POTASSIUM SERPL-SCNC: 4.5 MMOL/L — SIGNIFICANT CHANGE UP (ref 3.5–5.3)
RBC # BLD: 3.22 M/UL — LOW (ref 4.2–5.8)
RBC # FLD: 17 % — HIGH (ref 10.3–16.9)
SODIUM SERPL-SCNC: 134 MMOL/L — LOW (ref 135–145)
WBC # BLD: 10 K/UL — SIGNIFICANT CHANGE UP (ref 3.8–10.5)
WBC # FLD AUTO: 10 K/UL — SIGNIFICANT CHANGE UP (ref 3.8–10.5)

## 2017-06-16 PROCEDURE — 71010: CPT | Mod: 26

## 2017-06-16 RX ORDER — QUETIAPINE FUMARATE 200 MG/1
12.5 TABLET, FILM COATED ORAL AT BEDTIME
Qty: 0 | Refills: 0 | Status: DISCONTINUED | OUTPATIENT
Start: 2017-06-16 | End: 2017-06-20

## 2017-06-16 RX ADMIN — Medication 1 TABLET(S): at 12:37

## 2017-06-16 RX ADMIN — Medication 0.5 MILLIGRAM(S): at 17:00

## 2017-06-16 RX ADMIN — Medication 650 MILLIGRAM(S): at 03:26

## 2017-06-16 RX ADMIN — HEPARIN SODIUM 5000 UNIT(S): 5000 INJECTION INTRAVENOUS; SUBCUTANEOUS at 13:44

## 2017-06-16 RX ADMIN — Medication 1 PATCH: at 16:37

## 2017-06-16 RX ADMIN — QUETIAPINE FUMARATE 12.5 MILLIGRAM(S): 200 TABLET, FILM COATED ORAL at 17:44

## 2017-06-16 RX ADMIN — Medication 12.5 MILLIGRAM(S): at 19:06

## 2017-06-16 RX ADMIN — HEPARIN SODIUM 5000 UNIT(S): 5000 INJECTION INTRAVENOUS; SUBCUTANEOUS at 06:26

## 2017-06-16 RX ADMIN — Medication 12.5 MILLIGRAM(S): at 06:26

## 2017-06-16 RX ADMIN — Medication 10 MILLIGRAM(S): at 12:37

## 2017-06-16 RX ADMIN — Medication 650 MILLIGRAM(S): at 04:15

## 2017-06-16 RX ADMIN — Medication 1 PATCH: at 14:31

## 2017-06-16 RX ADMIN — Medication 100 MILLIGRAM(S): at 12:37

## 2017-06-16 RX ADMIN — PANTOPRAZOLE SODIUM 40 MILLIGRAM(S): 20 TABLET, DELAYED RELEASE ORAL at 06:26

## 2017-06-16 RX ADMIN — Medication 81 MILLIGRAM(S): at 12:37

## 2017-06-16 RX ADMIN — Medication 1 MILLIGRAM(S): at 12:37

## 2017-06-16 NOTE — PROGRESS NOTE ADULT - ASSESSMENT
Assessment  62 y/o M current smoking (1ppd x 45 years), current every day ETOH use (1 pint vodka and several beers daily x 45 years), HLD, HTN. s/p AVR/CABGx2 on 5/27/17 with gradual improvement in mental status.    Plan    Neurovascular:   No delirium. continue librium, thiamine, folic acid  .    Cardiovascular:   Hemodynamically stable. HR/BP controlled lopressor 12.5mg bid  -ASA. Statin.      Respiratory:   -tolerates room air  -Encourage C+DB and Use of IS 10x / hr while awake.    GI: Stable.  -PPX.  -PO Diet.    Renal / :  -Monitor renal function.  -Monitor I/O's.  -replete lytes prn    Endocrine:    -blood glucose stable    Hematologic:  -H/H stable    ID:  -stable, afebrile    Prophylaxis:  -DVT prophylaxis with 5000 SubQ Heparin q8h.  -SCD's    Disposition:  -insurance authorization rejected and appealed yesterday, awaiting response

## 2017-06-16 NOTE — PROGRESS NOTE ADULT - SUBJECTIVE AND OBJECTIVE BOX
Chart reviewed and patient interviewed. Patient is a 61 year old  Black male from [facility] current smoker ( 1 PPDx45 years), current every day use of Alcohol ( 1 pint of Vodka daily and several beers daily for 45 years ) and history of HLD and  HTN who was admitted to [facility] in early May 2017 with Chest pain and worsening HEIN. Had Cardiac Cath which showed CAD and Aortic Stenosis. He was discharged home and then seen at [facility] after awakening at home with severe Chest pain on 5/22/17. Patient was seen in Cardiac Clinic on 5/22/17 and was admitted for pre-op work up for possible CABG/AVR. Last drink was a bottle of beer on morning of 5/23/17, the day of admission to [facility].       Patient's wife reports he has gone through acute Alcohol withdrawal in the past when hospitalized and has become acutely agitated requiring sedation and restraint. Overnight patient became acutely agitated secondary to alcohol withdrawal and required sedation and intubation.  Patient proceeded to surgery 5/26 and underwent a CABG and AVR. Patient tolerated surgery well and remained intubated and sedated. Patient reported to have been agitated yesterday secondary to Alcohol Withdrawal and is now receiving Librium 50mg q 6h and agitation is reported to be controlled. Patient was extubated 5/29 which he was tolerating well and able to follow simple commands and  attempting to speak. NGT was removed. He did not have Librium overnight. He then became more anxious consistent with persistent withdrawal. NGT was reinserted and he was restarted on Librium at 25mg via NGT q6h until withdrawal is completed. Patient required reintubation because of compromised pulmonary status and possible pneumonia. Patient continued on sedation as needed. Patient remained intubated and is receiving Precedex for sedation. Patient was extubated 6/06. Is alert and engagable. Patient had Chest tube placement for pleural effusion which is now removed. No sign of ETOH withdrawal. Has been up in a recliner chair and is cooperative with staff and is now walking some There is no longer evidence of alcohol withdrawal. Patient is cooperative and participating with Physical therapy. His sensorium is clearer and he is approaching his baseline. Patient is restless at times. Plans are being made for transfer to Subacute Rehab. Patient has been started on Librium 10mg po bid for anxiety. Will add Seroquel 12.5 mg po at bedtime.  Continuing Supportive therapy.

## 2017-06-16 NOTE — PROGRESS NOTE ADULT - SUBJECTIVE AND OBJECTIVE BOX
Patient discussed on morning rounds with Dr. Wilcox     Operation / Date: 5/27/17 AVR/CABGx2 EF 70%    SUBJECTIVE ASSESSMENT:    Pt has no complaints at present.  Pain controlled, breathing comfortably and ambulating on room air.      Vital Signs Last 24 Hrs  T(C): 36.2, Max: 36.7 (06-15 @ 17:45)  T(F): 97.1, Max: 98.1 (06-16 @ 09:00)  HR: 84 (78 - 94)  BP: 95/58 (93/54 - 149/66)  BP(mean): 74 (72 - 113)  RR: 14 (14 - 17)  SpO2: 96% (96% - 98%)  I&O's Detail  I & Os for 24h ending 16 Jun 2017 07:00  =============================================  IN:    Oral Fluid: 720 ml    Total IN: 720 ml  ---------------------------------------------  OUT:    Voided: 200 ml    Total OUT: 200 ml  ---------------------------------------------  Total NET: 520 ml    I & Os for current day (as of 16 Jun 2017 14:46)  =============================================  IN:    Oral Fluid: 237 ml    Total IN: 237 ml  ---------------------------------------------  OUT:    Total OUT: 0 ml  ---------------------------------------------  Total NET: 237 ml      CHEST TUBE:  No.    CORNELIO DRAIN:  No.  EPICARDIAL WIRES: No.  TIE DOWNS: No.  MAYERS: No.    PHYSICAL EXAM:    General: NAD    Neurological: calm and cooperative, no focal defecits     Cardiovascular: S1S2 RRR    Respiratory: CTA b/l no W/R/R    Gastrointestinal: soft NT/ND +BS    Extremities: no edema    Vascular: warm and well perfused bilaterally    Incision Sites: MSI C/D/I    LABS:                        9.5    10.0  )-----------( 240      ( 16 Jun 2017 06:08 )             29.0       COUMADIN:  Yes/No. REASON: .    PT/INR - ( 15 Abhinav 2017 11:02 )   PT: 17.5 sec;   INR: 1.56          PTT - ( 15 Abhinav 2017 11:02 )  PTT:35.9 sec    06-16    134<L>  |  100  |  21  ----------------------------<  100<H>  4.5   |  23  |  0.80    Ca    8.9      16 Jun 2017 06:08  Phos  3.7     06-16  Mg     1.8     06-16            MEDICATIONS  (STANDING):  aspirin enteric coated 81milliGRAM(s) Oral daily  sodium chloride 0.45%. 1000milliLiter(s) IV Continuous <Continuous>  multivitamin 1Tablet(s) Oral daily  atorvastatin 20milliGRAM(s) Oral at bedtime  pantoprazole    Tablet 40milliGRAM(s) Oral before breakfast  folic acid 1milliGRAM(s) Oral daily  thiamine 100milliGRAM(s) Oral daily  heparin  Injectable 5000Unit(s) SubCutaneous every 8 hours  nicotine -  14 mG/24Hr(s) Patch 1patch Transdermal daily  chlordiazePOXIDE 10milliGRAM(s) Oral daily  metoprolol 12.5milliGRAM(s) Oral two times a day    MEDICATIONS  (PRN):  bisacodyl Suppository 10milliGRAM(s) Rectal daily PRN Constipation  lidocaine   Patch 1Patch Transdermal daily PRN CT site pain  acetaminophen   Tablet. 650milliGRAM(s) Oral every 6 hours PRN Mild Pain (1 - 3)

## 2017-06-17 RX ADMIN — QUETIAPINE FUMARATE 12.5 MILLIGRAM(S): 200 TABLET, FILM COATED ORAL at 21:51

## 2017-06-17 RX ADMIN — ATORVASTATIN CALCIUM 20 MILLIGRAM(S): 80 TABLET, FILM COATED ORAL at 21:58

## 2017-06-17 RX ADMIN — Medication 10 MILLIGRAM(S): at 13:31

## 2017-06-17 RX ADMIN — Medication 1 PATCH: at 17:57

## 2017-06-17 RX ADMIN — Medication 81 MILLIGRAM(S): at 13:31

## 2017-06-17 RX ADMIN — Medication 1 TABLET(S): at 13:32

## 2017-06-17 RX ADMIN — Medication 1 PATCH: at 13:32

## 2017-06-17 RX ADMIN — Medication 12.5 MILLIGRAM(S): at 07:33

## 2017-06-17 RX ADMIN — HEPARIN SODIUM 5000 UNIT(S): 5000 INJECTION INTRAVENOUS; SUBCUTANEOUS at 21:58

## 2017-06-17 RX ADMIN — Medication 1 MILLIGRAM(S): at 13:31

## 2017-06-17 RX ADMIN — LIDOCAINE 1 PATCH: 4 CREAM TOPICAL at 05:00

## 2017-06-17 RX ADMIN — HEPARIN SODIUM 5000 UNIT(S): 5000 INJECTION INTRAVENOUS; SUBCUTANEOUS at 07:33

## 2017-06-17 RX ADMIN — PANTOPRAZOLE SODIUM 40 MILLIGRAM(S): 20 TABLET, DELAYED RELEASE ORAL at 07:33

## 2017-06-17 RX ADMIN — Medication 100 MILLIGRAM(S): at 13:32

## 2017-06-17 NOTE — PROGRESS NOTE ADULT - SUBJECTIVE AND OBJECTIVE BOX
Patient discussed on morning rounds with Dr. Marquez    Operation / Date: 5/27/17 AVR/CABGx2 EF 70%    SUBJECTIVE ASSESSMENT:    Pt has no complaints at present.  Pain controlled, breathing comfortably and ambulating on room air.        Vital Signs Last 24 Hrs  T(F): 97.7, Max: 98.2 (06-16 @ 18:05)  HR: 78 (62 - 94) SR  BP: 108/65 (91/55 - 131/66)  BP(mean): 85 (73 - 102)  RR: 16 (15 - 16)  SpO2: 97% (95% - 97%) RA  I&O's Detail    I & Os for 24h ending 17 Jun 2017 07:00  =============================================  Total NET: 337 ml    I & Os for current day (as of 17 Jun 2017 16:04)  =============================================  Total NET: 355 ml      CHEST TUBE:  No.    CORNELIO DRAIN:  No.  EPICARDIAL WIRES: No.  TIE DOWNS: No.  MAYERS: No.    PHYSICAL EXAM:    General: NAD    Neurological: calm and cooperative, no focal defecits     Cardiovascular: S1S2 RRR    Respiratory: CTA b/l no W/R/R    Gastrointestinal: soft NT/ND +BS    Extremities: no edema    Vascular: warm and well perfused bilaterally    Incision Sites: MSI C/D/I        LABS 6/16/17:                        9.5    10.0  )-----------( 240                   29.0       134<L>  |  100  |  21  ----------------------------<  100<H>  4.5   |  23  |  0.80    Ca    8.9      Phos  3.7     Mg     1.8     06-16        MEDICATIONS  (STANDING):  aspirin enteric coated 81milliGRAM(s) Oral daily  multivitamin 1Tablet(s) Oral daily  atorvastatin 20milliGRAM(s) Oral at bedtime  pantoprazole    Tablet 40milliGRAM(s) Oral before breakfast  folic acid 1milliGRAM(s) Oral daily  thiamine 100milliGRAM(s) Oral daily  heparin  Injectable 5000Unit(s) SubCutaneous every 8 hours  nicotine -  14 mG/24Hr(s) Patch 1patch Transdermal daily  chlordiazePOXIDE 10milliGRAM(s) Oral daily  metoprolol 12.5milliGRAM(s) Oral two times a day  QUEtiapine 12.5milliGRAM(s) Oral at bedtime    MEDICATIONS  (PRN):  bisacodyl Suppository 10milliGRAM(s) Rectal daily PRN Constipation  lidocaine   Patch 1Patch Transdermal daily PRN CT site pain  acetaminophen   Tablet. 650milliGRAM(s) Oral every 6 hours PRN Mild Pain (1 - 3)        RADIOLOGY & ADDITIONAL TESTS 6/16:    Findings: Again status post valve aortic valve replacement surgery and   CABG. No change small left pleural effusion and atelectasis or infiltrate   left base. Slight thickening right minor fissure.    Impression: No change.

## 2017-06-17 NOTE — PROGRESS NOTE ADULT - ASSESSMENT
62 y/o M current smoking (1ppd x 45 years), current every day ETOH use (1 pint vodka and several beers daily x 45 years), HLD, HTN. s/p AVR/CABGx2 on 5/27/17 with gradual improvement in mental status.    Neurovascular:   No delirium. continue librium, thiamine, folic acid    Cardiovascular:   Hemodynamically stable. HR/BP controlled lopressor 12.5mg bid  -ASA. Statin.      Respiratory:   -tolerates room air  -Encourage C+DB and Use of IS 10x / hr while awake.    GI: Stable.  -PPX.  -PO Diet.    Hematologic:  -H/H stable    ID:  -stable, afebrile    Prophylaxis:  -DVT prophylaxis with 5000 SubQ Heparin q8h.  -SCD's    Disposition:  -Discharge to rehab once accepted

## 2017-06-17 NOTE — PROGRESS NOTE ADULT - SUBJECTIVE AND OBJECTIVE BOX
Chart reviewed and patient interviewed. Patient is a 61 year old  Black male from [facility] current smoker ( 1 PPDx45 years), current every day use of Alcohol ( 1 pint of Vodka daily and several beers daily for 45 years ) and history of HLD and  HTN who was admitted to [facility] in early May 2017 with Chest pain and worsening HEIN. Had Cardiac Cath which showed CAD and Aortic Stenosis. He was discharged home and then seen at [facility] after awakening at home with severe Chest pain on 5/22/17. Patient was seen in Cardiac Clinic on 5/22/17 and was admitted for pre-op work up for possible CABG/AVR. Last drink was a bottle of beer on morning of 5/23/17, the day of admission to [facility].       Patient's wife reports he has gone through acute Alcohol withdrawal in the past when hospitalized and has become acutely agitated requiring sedation and restraint. Overnight patient became acutely agitated secondary to alcohol withdrawal and required sedation and intubation.  Patient proceeded to surgery 5/26 and underwent a CABG and AVR. Patient tolerated surgery well and remained intubated and sedated. Patient reported to have been agitated yesterday secondary to Alcohol Withdrawal and is now receiving Librium 50mg q 6h and agitation is reported to be controlled. Patient was extubated 5/29 which he was tolerating well and able to follow simple commands and  attempting to speak. NGT was removed. He did not have Librium overnight. He then became more anxious consistent with persistent withdrawal. NGT was reinserted and he was restarted on Librium at 25mg via NGT q6h until withdrawal is completed. Patient required reintubation because of compromised pulmonary status and possible pneumonia. Patient continued on sedation as needed. Patient remained intubated and was receiving Precedex for sedation. Patient was extubated 6/06. Is alert and engagable. Patient had Chest tube placement for pleural effusion which is now removed.  Has been up in a recliner chair and is cooperative with staff and is now walking some There is no longer evidence of alcohol withdrawal. Patient is cooperative and participating with Physical therapy. His sensorium is clearer and he is approaching his baseline. Patient is restless at times. Plans are being made for transfer to Subacute Rehab. Patient has been started on Librium 10mg po bid for anxiety. Will add Seroquel 12.5 mg po at bedtime. Today patient's voice is stronger and he is more engagable. He continues to improve. His wife is unable to visit today because she is working.  Continuing Supportive therapy.

## 2017-06-18 LAB
ANION GAP SERPL CALC-SCNC: 14 MMOL/L — SIGNIFICANT CHANGE UP (ref 5–17)
BUN SERPL-MCNC: 19 MG/DL — SIGNIFICANT CHANGE UP (ref 7–23)
CALCIUM SERPL-MCNC: 9.5 MG/DL — SIGNIFICANT CHANGE UP (ref 8.4–10.5)
CHLORIDE SERPL-SCNC: 97 MMOL/L — SIGNIFICANT CHANGE UP (ref 96–108)
CO2 SERPL-SCNC: 25 MMOL/L — SIGNIFICANT CHANGE UP (ref 22–31)
CREAT SERPL-MCNC: 0.7 MG/DL — SIGNIFICANT CHANGE UP (ref 0.5–1.3)
GLUCOSE SERPL-MCNC: 102 MG/DL — HIGH (ref 70–99)
HCT VFR BLD CALC: 29.9 % — LOW (ref 39–50)
HGB BLD-MCNC: 10.1 G/DL — LOW (ref 13–17)
MAGNESIUM SERPL-MCNC: 1.7 MG/DL — SIGNIFICANT CHANGE UP (ref 1.6–2.6)
MCHC RBC-ENTMCNC: 30.7 PG — SIGNIFICANT CHANGE UP (ref 27–34)
MCHC RBC-ENTMCNC: 33.8 G/DL — SIGNIFICANT CHANGE UP (ref 32–36)
MCV RBC AUTO: 90.9 FL — SIGNIFICANT CHANGE UP (ref 80–100)
PLATELET # BLD AUTO: 208 K/UL — SIGNIFICANT CHANGE UP (ref 150–400)
POTASSIUM SERPL-MCNC: 4.1 MMOL/L — SIGNIFICANT CHANGE UP (ref 3.5–5.3)
POTASSIUM SERPL-SCNC: 4.1 MMOL/L — SIGNIFICANT CHANGE UP (ref 3.5–5.3)
RBC # BLD: 3.29 M/UL — LOW (ref 4.2–5.8)
RBC # FLD: 17.7 % — HIGH (ref 10.3–16.9)
SODIUM SERPL-SCNC: 136 MMOL/L — SIGNIFICANT CHANGE UP (ref 135–145)
WBC # BLD: 9.9 K/UL — SIGNIFICANT CHANGE UP (ref 3.8–10.5)
WBC # FLD AUTO: 9.9 K/UL — SIGNIFICANT CHANGE UP (ref 3.8–10.5)

## 2017-06-18 PROCEDURE — 71010: CPT | Mod: 26

## 2017-06-18 RX ORDER — SENNA PLUS 8.6 MG/1
2 TABLET ORAL AT BEDTIME
Qty: 0 | Refills: 0 | Status: DISCONTINUED | OUTPATIENT
Start: 2017-06-18 | End: 2017-06-20

## 2017-06-18 RX ORDER — MAGNESIUM OXIDE 400 MG ORAL TABLET 241.3 MG
400 TABLET ORAL ONCE
Qty: 0 | Refills: 0 | Status: COMPLETED | OUTPATIENT
Start: 2017-06-18 | End: 2017-06-18

## 2017-06-18 RX ORDER — DOCUSATE SODIUM 100 MG
100 CAPSULE ORAL THREE TIMES A DAY
Qty: 0 | Refills: 0 | Status: DISCONTINUED | OUTPATIENT
Start: 2017-06-18 | End: 2017-06-20

## 2017-06-18 RX ADMIN — Medication 1 PATCH: at 13:48

## 2017-06-18 RX ADMIN — Medication 1 PATCH: at 14:00

## 2017-06-18 RX ADMIN — SENNA PLUS 2 TABLET(S): 8.6 TABLET ORAL at 22:05

## 2017-06-18 RX ADMIN — Medication 100 MILLIGRAM(S): at 13:48

## 2017-06-18 RX ADMIN — MAGNESIUM OXIDE 400 MG ORAL TABLET 400 MILLIGRAM(S): 241.3 TABLET ORAL at 13:48

## 2017-06-18 RX ADMIN — Medication 81 MILLIGRAM(S): at 13:48

## 2017-06-18 RX ADMIN — Medication 12.5 MILLIGRAM(S): at 07:28

## 2017-06-18 RX ADMIN — Medication 1 MILLIGRAM(S): at 13:48

## 2017-06-18 RX ADMIN — Medication 100 MILLIGRAM(S): at 22:05

## 2017-06-18 RX ADMIN — PANTOPRAZOLE SODIUM 40 MILLIGRAM(S): 20 TABLET, DELAYED RELEASE ORAL at 07:28

## 2017-06-18 RX ADMIN — QUETIAPINE FUMARATE 12.5 MILLIGRAM(S): 200 TABLET, FILM COATED ORAL at 22:04

## 2017-06-18 RX ADMIN — Medication 12.5 MILLIGRAM(S): at 17:58

## 2017-06-18 RX ADMIN — Medication 10 MILLIGRAM(S): at 13:48

## 2017-06-18 RX ADMIN — HEPARIN SODIUM 5000 UNIT(S): 5000 INJECTION INTRAVENOUS; SUBCUTANEOUS at 07:28

## 2017-06-18 RX ADMIN — Medication 1 TABLET(S): at 13:47

## 2017-06-18 RX ADMIN — ATORVASTATIN CALCIUM 20 MILLIGRAM(S): 80 TABLET, FILM COATED ORAL at 22:04

## 2017-06-18 NOTE — PROGRESS NOTE ADULT - SUBJECTIVE AND OBJECTIVE BOX
Patient discussed on morning rounds with Dr. Marquez     Operation / Date: 5/27/17 AVR, CABG x 2.     SUBJECTIVE ASSESSMENT:  Patient seen this morning at bedside, patient awake but not cooperative with questions or responsive. Denies any pain or difficulty breathing. Has ambulated multiple times this morning and was accompanied by family.     Vital Signs Last 24 Hrs  T(C): 36.6, Max: 37 (06-18 @ 04:40)  T(F): 97.8, Max: 98.6 (06-18 @ 04:40)  HR: 82 (80 - 98)  BP: 105/63 (97/72 - 131/70)  BP(mean): 81 (81 - 95)  RR: 12 (12 - 16)  SpO2: 97% (96% - 98%)  I&O's Detail  I & Os for 24h ending 18 Jun 2017 07:00  =============================================  IN:    Oral Fluid: 700 ml    Total IN: 700 ml  ---------------------------------------------  OUT:    Voided: 225 ml    Total OUT: 225 ml  ---------------------------------------------  Total NET: 475 ml    I & Os for current day (as of 18 Jun 2017 15:40)  =============================================  IN:    Oral Fluid: 480 ml    Total IN: 480 ml  ---------------------------------------------  OUT:    Total OUT: 0 ml  ---------------------------------------------  Total NET: 480 ml      CHEST TUBE:  No.   CORNELIO DRAIN:  No  EPICARDIAL WIRES: No  TIE DOWNS: No  MAYERS: No    PHYSICAL EXAM:    General: Patient lying comfortably in bed, no acute distress     Neurological: Sleeping comfortably, but when awoken alert. Responses to questioning limited. Patient uncooperative with exam     Cardiovascular: S1S2, RRR, no murmurs appreciated on exam     Respiratory: Apices clear to ausculation bilaterally. Posterior lung fields unable to ausculate 2/2 patient lying down and did not want to sit up     Gastrointestinal: Abdomen soft, non tender, non distended     Extremities: warm and well perfused. No edema or calf tenderess     Vascular: Peripheral pulses 2+ bilaterally     Incision Sites: Sternotomy incision C/D/I, minimal surrounding erythema but non tender to palpation. No sternal click or drainage   Left EVH site C/D/I     LABS:                        10.1   9.9   )-----------( 208      ( 18 Jun 2017 05:50 )             29.9       COUMADIN:  No        06-18    136  |  97  |  19  ----------------------------<  102<H>  4.1   |  25  |  0.70    Ca    9.5      18 Jun 2017 05:50  Mg     1.7     06-18    MEDICATIONS  (STANDING):  aspirin enteric coated 81milliGRAM(s) Oral daily  sodium chloride 0.45%. 1000milliLiter(s) IV Continuous <Continuous>  multivitamin 1Tablet(s) Oral daily  atorvastatin 20milliGRAM(s) Oral at bedtime  pantoprazole    Tablet 40milliGRAM(s) Oral before breakfast  folic acid 1milliGRAM(s) Oral daily  thiamine 100milliGRAM(s) Oral daily  nicotine -  14 mG/24Hr(s) Patch 1patch Transdermal daily  chlordiazePOXIDE 10milliGRAM(s) Oral daily  metoprolol 12.5milliGRAM(s) Oral two times a day  QUEtiapine 12.5milliGRAM(s) Oral at bedtime    MEDICATIONS  (PRN):  bisacodyl Suppository 10milliGRAM(s) Rectal daily PRN Constipation  lidocaine   Patch 1Patch Transdermal daily PRN CT site pain  acetaminophen   Tablet. 650milliGRAM(s) Oral every 6 hours PRN Mild Pain (1 - 3)    RADIOLOGY & ADDITIONAL TESTS:  6/18/17 CXR: Left effusion    A/P: 61 year old male PMHx current smoker, current every day ETOH use, HLD, HTN presented with SOB and CP found to have aortic stenosis and CAD now s/p AVR and CABG x2 with Dr. Wilcox on 5/27/17. Post op course complicated by alcohol withdrawal, DTs and multiple re-intubations. Patient was extubated POD#9 and transferred to  on POD#11. No acute events overnight.     Neurovascular: Agitation, post op delirium and DTs from alcohol withdrawal  - Dr. Gonzalez (psych) following, started on seroquil 12.5mg at bedtime. Continue Librium 10mg daily   - continue tylenol PRN for pain   - continue thiamine and folic acid for ETOH abuse   - continue nicotine patch     Cardiovascular: Hemodynamically stable. HR controlled.  - s/p AVR CABG x 2. Continue asa 81mg, atorvastatin 20mg, metoprolol 12.5mg BID   - post op episodes of hypotension. SBP stable today     Respiratory: 02 Sat = 98% on RA.  - CXR small left pleural effusion improved compared to prior exam. F/u CXR in AM   - Encourage ambulation   - Encourage C+DB and Use of IS 10x / hr while awake.    GI: Stable.  - continue soft mechanical diet   - continue protonix 40mg for GI ppx   - continue bowel regimen     Renal / : Cr 0.70, no active issues   -Monitor renal function.  -Monitor I/O's.    Endocrine:  Hgb A1c 5.2, TSH 0.742  - no active issues     Prophylaxis: SCDs, holding SQH due to bleeding/bruisining at injection site     Disposition: Awaiting [facility], medically ready

## 2017-06-18 NOTE — PROGRESS NOTE ADULT - SUBJECTIVE AND OBJECTIVE BOX
Chart reviewed and patient interviewed. Patient is a 61 year old  Black male from [facility] current smoker ( 1 PPDx45 years), current every day use of Alcohol ( 1 pint of Vodka daily and several beers daily for 45 years ) and history of HLD and  HTN who was admitted to [facility] in early May 2017 with Chest pain and worsening HEIN. Had Cardiac Cath which showed CAD and Aortic Stenosis. He was discharged home and then seen at [facility] after awakening at home with severe Chest pain on 5/22/17. Patient was seen in Cardiac Clinic on 5/22/17 and was admitted for pre-op work up for possible CABG/AVR. Last drink was a bottle of beer on morning of 5/23/17, the day of admission to [facility].       Patient's wife reports he has gone through acute Alcohol withdrawal in the past when hospitalized and has become acutely agitated requiring sedation and restraint. Overnight patient became acutely agitated secondary to alcohol withdrawal and required sedation and intubation.  Patient proceeded to surgery 5/26 and underwent a CABG and AVR. Patient tolerated surgery well and remained intubated and sedated. Patient reported to have been agitated yesterday secondary to Alcohol Withdrawal and is now receiving Librium 50mg q 6h and agitation is reported to be controlled. Patient was extubated 5/29 which he was tolerating well and able to follow simple commands and  attempting to speak. NGT was removed. He did not have Librium overnight. He then became more anxious consistent with persistent withdrawal. NGT was reinserted and he was restarted on Librium at 25mg via NGT q6h until withdrawal is completed. Patient required reintubation because of compromised pulmonary status and possible pneumonia. Patient continued on sedation as needed. Patient remained intubated and was receiving Precedex for sedation. Patient was extubated 6/06. Is alert and engagable. Patient had Chest tube placement for pleural effusion which is now removed.  Has been up in a recliner chair and is cooperative with staff and is now walking some There is no longer evidence of alcohol withdrawal. Patient is cooperative and participating with Physical therapy. His sensorium is clearer and he is approaching his baseline. Patient is restless at times. Plans are being made for transfer to Subacute Rehab. Patient has been started on Librium 10mg po bid for anxiety. Will add Seroquel 12.5 mg po at bedtime. Today patient's voice is stronger and he is more engagable. He continues to improve. His wife, daughter, and granddaughter are visiting today, Father"s Day. Patient is eating and says he wants to go home. Continuing present treatment. Encouragement and support provided.

## 2017-06-19 PROCEDURE — 71010: CPT | Mod: 26

## 2017-06-19 RX ADMIN — Medication 1 TABLET(S): at 17:19

## 2017-06-19 RX ADMIN — Medication 1 PATCH: at 15:19

## 2017-06-19 RX ADMIN — SENNA PLUS 2 TABLET(S): 8.6 TABLET ORAL at 22:18

## 2017-06-19 RX ADMIN — ATORVASTATIN CALCIUM 20 MILLIGRAM(S): 80 TABLET, FILM COATED ORAL at 22:18

## 2017-06-19 RX ADMIN — QUETIAPINE FUMARATE 12.5 MILLIGRAM(S): 200 TABLET, FILM COATED ORAL at 22:18

## 2017-06-19 RX ADMIN — Medication 100 MILLIGRAM(S): at 12:37

## 2017-06-19 RX ADMIN — Medication 10 MILLIGRAM(S): at 12:36

## 2017-06-19 RX ADMIN — Medication 650 MILLIGRAM(S): at 02:30

## 2017-06-19 RX ADMIN — Medication 100 MILLIGRAM(S): at 06:32

## 2017-06-19 RX ADMIN — Medication 1 MILLIGRAM(S): at 12:36

## 2017-06-19 RX ADMIN — PANTOPRAZOLE SODIUM 40 MILLIGRAM(S): 20 TABLET, DELAYED RELEASE ORAL at 06:32

## 2017-06-19 RX ADMIN — Medication 650 MILLIGRAM(S): at 13:00

## 2017-06-19 RX ADMIN — Medication 1 PATCH: at 12:36

## 2017-06-19 RX ADMIN — Medication 650 MILLIGRAM(S): at 12:36

## 2017-06-19 RX ADMIN — Medication 12.5 MILLIGRAM(S): at 18:17

## 2017-06-19 RX ADMIN — Medication 100 MILLIGRAM(S): at 17:19

## 2017-06-19 RX ADMIN — Medication 650 MILLIGRAM(S): at 01:17

## 2017-06-19 RX ADMIN — Medication 100 MILLIGRAM(S): at 22:18

## 2017-06-19 RX ADMIN — Medication 81 MILLIGRAM(S): at 12:36

## 2017-06-19 NOTE — PROGRESS NOTE ADULT - SUBJECTIVE AND OBJECTIVE BOX
Chart reviewed and patient interviewed. Patient is a 61 year old  Black male from [facility] current smoker ( 1 PPDx45 years), current every day use of Alcohol ( 1 pint of Vodka daily and several beers daily for 45 years ) and history of HLD and  HTN who was admitted to [facility] in early May 2017 with Chest pain and worsening HEIN. Had Cardiac Cath which showed CAD and Aortic Stenosis. He was discharged home and then seen at [facility] after awakening at home with severe Chest pain on 5/22/17. Patient was seen in Cardiac Clinic on 5/22/17 and was admitted for pre-op work up for possible CABG/AVR. Last drink was a bottle of beer on morning of 5/23/17, the day of admission to [facility].       Patient's wife reports he has gone through acute Alcohol withdrawal in the past when hospitalized and has become acutely agitated requiring sedation and restraint. Overnight patient became acutely agitated secondary to alcohol withdrawal and required sedation and intubation.  Patient proceeded to surgery 5/26 and underwent a CABG and AVR. Patient tolerated surgery well and remained intubated and sedated. Patient reported to have been agitated yesterday secondary to Alcohol Withdrawal and is now receiving Librium 50mg q 6h and agitation is reported to be controlled. Patient was extubated 5/29 which he was tolerating well and able to follow simple commands and  attempting to speak. NGT was removed. He did not have Librium overnight. He then became more anxious consistent with persistent withdrawal. NGT was reinserted and he was restarted on Librium at 25mg via NGT q6h until withdrawal is completed. Patient required reintubation because of compromised pulmonary status and possible pneumonia. Patient continued on sedation as needed. Patient remained intubated and was receiving Precedex for sedation. Patient was extubated 6/06. Is alert and engagable. Patient had Chest tube placement for pleural effusion which is now removed.  Has been up in a recliner chair and is cooperative with staff and is now walking some There is no longer evidence of alcohol withdrawal. Patient is cooperative and participating with Physical therapy. His sensorium is clearer and he is approaching his baseline. Patient is restless at times. Plans are being made for transfer to Subacute Rehab. Patient has been started on Librium 10mg po bid for anxiety.Is receiving Seroquel 12.5 mg po at bedtime. Patient's voice remains stronger and he is more engagable. He continues to improve. His wife, daughter, and granddaughter visited yesterday, Father"s Day which he enjoyed,. Patient is just completing a short walk using a walker. Hopes he can leave the hospital soon. Continuing present treatment. Encouragement and support provided.

## 2017-06-19 NOTE — PROGRESS NOTE ADULT - SUBJECTIVE AND OBJECTIVE BOX
Patient discussed on morning rounds with Dr. Wilcox     Operation / Date: 5/27/17 AVR, CABG x 2.     SUBJECTIVE ASSESSMENT:  Patient seen this afternoon at bedside, states he is very hungry and wants to eat lunch. He denies any complaints at this time including chest pain, shortness of breath or palpitations. Ambulating frequently with assistance from aid.     Vital Signs Last 24 Hrs  T(C): 36.9, Max: 37.3 (06-19 @ 09:51)  T(F): 98.5, Max: 99.2 (06-19 @ 09:51)  HR: 92 (78 - 96)  BP: 119/64 (99/59 - 125/76)  BP(mean): 91 (72 - 100)  RR: 14 (12 - 18)  SpO2: 95% (95% - 98%)  I&O's Detail  I & Os for 24h ending 19 Jun 2017 07:00  =============================================  IN:    Oral Fluid: 1080 ml    Total IN: 1080 ml  ---------------------------------------------  OUT:    Voided: 950 ml    Total OUT: 950 ml  ---------------------------------------------  Total NET: 130 ml    I & Os for current day (as of 19 Jun 2017 14:26)  =============================================  IN:    Oral Fluid: 360 ml    Total IN: 360 ml  ---------------------------------------------  OUT:    Total OUT: 0 ml  ---------------------------------------------  Total NET: 360 ml      CHEST TUBE:  No.   CORNELIO DRAIN:  No  EPICARDIAL WIRES: No  TIE DOWNS: No  MAYERS: No    PHYSICAL EXAM:    General: Patient lying comfortably in bed, no acute distress     Neurological: Sleeping comfortably, but when awoken alert. Responses to questioning limited. Patient more cooperative with exam compared to yesterday.      Cardiovascular: S1S2, RRR, no murmurs appreciated on exam     Respiratory: Apices clear to ausculation bilaterally. Posterior lung fields unable to ausculate 2/2 patient lying down and did not want to sit up     Gastrointestinal: Abdomen soft, non tender, non distended     Extremities: warm and well perfused. No edema or calf tenderness     Vascular: Peripheral pulses 2+ bilaterally     Incision Sites: Sternotomy incision C/D/I, minimal surrounding erythema but non tender to palpation. No sternal click or drainage   Left EVH site C/D/I   Bilateral groins C/D/I     LABS:                        10.1   9.9   )-----------( 208      ( 18 Jun 2017 05:50 )             29.9       COUMADIN:  Yes/No. REASON: .        06-18    136  |  97  |  19  ----------------------------<  102<H>  4.1   |  25  |  0.70    Ca    9.5      18 Jun 2017 05:50  Mg     1.7     06-18        MEDICATIONS  (STANDING):  aspirin enteric coated 81milliGRAM(s) Oral daily  sodium chloride 0.45%. 1000milliLiter(s) IV Continuous <Continuous>  multivitamin 1Tablet(s) Oral daily  atorvastatin 20milliGRAM(s) Oral at bedtime  pantoprazole    Tablet 40milliGRAM(s) Oral before breakfast  folic acid 1milliGRAM(s) Oral daily  thiamine 100milliGRAM(s) Oral daily  nicotine -  14 mG/24Hr(s) Patch 1patch Transdermal daily  chlordiazePOXIDE 10milliGRAM(s) Oral daily  metoprolol 12.5milliGRAM(s) Oral two times a day  QUEtiapine 12.5milliGRAM(s) Oral at bedtime  docusate sodium 100milliGRAM(s) Oral three times a day  senna 2Tablet(s) Oral at bedtime    MEDICATIONS  (PRN):  bisacodyl Suppository 10milliGRAM(s) Rectal daily PRN Constipation  lidocaine   Patch 1Patch Transdermal daily PRN CT site pain  acetaminophen   Tablet. 650milliGRAM(s) Oral every 6 hours PRN Mild Pain (1 - 3)    RADIOLOGY & ADDITIONAL TESTS:  6/19/17 CXR: Patient discussed on morning rounds with Dr. Wilcox     Operation / Date: 5/27/17 AVR, CABG x 2.     SUBJECTIVE ASSESSMENT:  Patient seen this afternoon at bedside, states he is very hungry and wants to eat lunch. He denies any complaints at this time including chest pain, shortness of breath or palpitations. Ambulating frequently with assistance from aid.     Vital Signs Last 24 Hrs  T(C): 36.9, Max: 37.3 (06-19 @ 09:51)  T(F): 98.5, Max: 99.2 (06-19 @ 09:51)  HR: 92 (78 - 96)  BP: 119/64 (99/59 - 125/76)  BP(mean): 91 (72 - 100)  RR: 14 (12 - 18)  SpO2: 95% (95% - 98%)  I&O's Detail  I & Os for 24h ending 19 Jun 2017 07:00  =============================================  IN:    Oral Fluid: 1080 ml    Total IN: 1080 ml  ---------------------------------------------  OUT:    Voided: 950 ml    Total OUT: 950 ml  ---------------------------------------------  Total NET: 130 ml    I & Os for current day (as of 19 Jun 2017 14:26)  =============================================  IN:    Oral Fluid: 360 ml    Total IN: 360 ml  ---------------------------------------------  OUT:    Total OUT: 0 ml  ---------------------------------------------  Total NET: 360 ml      CHEST TUBE:  No.   CORNELIO DRAIN:  No  EPICARDIAL WIRES: No  TIE DOWNS: No  MAYERS: No    PHYSICAL EXAM:    General: Patient lying comfortably in bed, no acute distress     Neurological: Sleeping comfortably, but when awoken alert. Responses to questioning limited. Patient more cooperative with exam compared to yesterday.      Cardiovascular: S1S2, RRR, no murmurs appreciated on exam     Respiratory: Apices clear to ausculation bilaterally. Posterior lung fields unable to ausculate 2/2 patient lying down and did not want to sit up     Gastrointestinal: Abdomen soft, non tender, non distended     Extremities: warm and well perfused. No edema or calf tenderness     Vascular: Peripheral pulses 2+ bilaterally     Incision Sites: Sternotomy incision C/D/I, minimal surrounding erythema but non tender to palpation. No sternal click or drainage   Left EVH site C/D/I   Bilateral groins C/D/I     LABS:                        10.1   9.9   )-----------( 208      ( 18 Jun 2017 05:50 )             29.9       COUMADIN:  Yes/No. REASON: .        06-18    136  |  97  |  19  ----------------------------<  102<H>  4.1   |  25  |  0.70    Ca    9.5      18 Jun 2017 05:50  Mg     1.7     06-18    MEDICATIONS  (STANDING):  aspirin enteric coated 81milliGRAM(s) Oral daily  sodium chloride 0.45%. 1000milliLiter(s) IV Continuous <Continuous>  multivitamin 1Tablet(s) Oral daily  atorvastatin 20milliGRAM(s) Oral at bedtime  pantoprazole    Tablet 40milliGRAM(s) Oral before breakfast  folic acid 1milliGRAM(s) Oral daily  thiamine 100milliGRAM(s) Oral daily  nicotine -  14 mG/24Hr(s) Patch 1patch Transdermal daily  chlordiazePOXIDE 10milliGRAM(s) Oral daily  metoprolol 12.5milliGRAM(s) Oral two times a day  QUEtiapine 12.5milliGRAM(s) Oral at bedtime  docusate sodium 100milliGRAM(s) Oral three times a day  senna 2Tablet(s) Oral at bedtime    MEDICATIONS  (PRN):  bisacodyl Suppository 10milliGRAM(s) Rectal daily PRN Constipation  lidocaine   Patch 1Patch Transdermal daily PRN CT site pain  acetaminophen   Tablet. 650milliGRAM(s) Oral every 6 hours PRN Mild Pain (1 - 3)    RADIOLOGY & ADDITIONAL TESTS:  6/19/17 CXR: Pending official read, small left pleural effusion improved compared to prior study     A/P: 61 year old male PMHx current smoker, current every day ETOH use, HLD, HTN presented with SOB and CP found to have aortic stenosis and CAD now s/p AVR and CABG x2 with Dr. Wilcox on 5/27/17. Post op course complicated by alcohol withdrawal, DTs and multiple re-intubations. Patient was extubated POD#9 and transferred to  on POD#11. No acute events overnight.     Neurovascular: Agitation, post op delirium and DTs from alcohol withdrawal  - Dr. Gonzalez (psych) following, started on seroquil 12.5mg at bedtime. Continue Librium 10mg daily   - continue tylenol PRN for pain   - continue thiamine and folic acid for ETOH abuse   - continue nicotine patch     Cardiovascular: Hemodynamically stable. HR controlled.  - s/p AVR CABG x 2. Continue asa 81mg, atorvastatin 20mg, metoprolol 12.5mg BID   - post op episodes of hypotension. SBP stable today     Respiratory: 02 Sat = 98% on RA.  - CXR small left pleural effusion improved compared to prior exam. F/u CXR in AM   - Encourage ambulation   - Encourage C+DB and Use of IS 10x / hr while awake.    GI: Stable.  - continue soft mechanical diet   - continue protonix 40mg for GI ppx   - continue bowel regimen     Renal / : Cr 0.70, no active issues   -Monitor renal function.  -Monitor I/O's.    Endocrine:  Hgb A1c 5.2, TSH 0.742  - no active issues     Prophylaxis: SCDs, holding SQH due to bleeding/bruisining at injection site     Disposition: Awaiting [facility], medically ready

## 2017-06-20 VITALS
SYSTOLIC BLOOD PRESSURE: 124 MMHG | OXYGEN SATURATION: 98 % | HEART RATE: 90 BPM | DIASTOLIC BLOOD PRESSURE: 70 MMHG | RESPIRATION RATE: 14 BRPM

## 2017-06-20 LAB
ANION GAP SERPL CALC-SCNC: 12 MMOL/L — SIGNIFICANT CHANGE UP (ref 5–17)
BUN SERPL-MCNC: 19 MG/DL — SIGNIFICANT CHANGE UP (ref 7–23)
CALCIUM SERPL-MCNC: 9.9 MG/DL — SIGNIFICANT CHANGE UP (ref 8.4–10.5)
CHLORIDE SERPL-SCNC: 97 MMOL/L — SIGNIFICANT CHANGE UP (ref 96–108)
CO2 SERPL-SCNC: 25 MMOL/L — SIGNIFICANT CHANGE UP (ref 22–31)
CREAT SERPL-MCNC: 0.7 MG/DL — SIGNIFICANT CHANGE UP (ref 0.5–1.3)
GLUCOSE SERPL-MCNC: 92 MG/DL — SIGNIFICANT CHANGE UP (ref 70–99)
HCT VFR BLD CALC: 35.9 % — LOW (ref 39–50)
HGB BLD-MCNC: 11.9 G/DL — LOW (ref 13–17)
MAGNESIUM SERPL-MCNC: 1.7 MG/DL — SIGNIFICANT CHANGE UP (ref 1.6–2.6)
MCHC RBC-ENTMCNC: 30.1 PG — SIGNIFICANT CHANGE UP (ref 27–34)
MCHC RBC-ENTMCNC: 33.1 G/DL — SIGNIFICANT CHANGE UP (ref 32–36)
MCV RBC AUTO: 90.9 FL — SIGNIFICANT CHANGE UP (ref 80–100)
PLATELET # BLD AUTO: 206 K/UL — SIGNIFICANT CHANGE UP (ref 150–400)
POTASSIUM SERPL-MCNC: 4.2 MMOL/L — SIGNIFICANT CHANGE UP (ref 3.5–5.3)
POTASSIUM SERPL-SCNC: 4.2 MMOL/L — SIGNIFICANT CHANGE UP (ref 3.5–5.3)
RBC # BLD: 3.95 M/UL — LOW (ref 4.2–5.8)
RBC # FLD: 17.5 % — HIGH (ref 10.3–16.9)
SODIUM SERPL-SCNC: 134 MMOL/L — LOW (ref 135–145)
WBC # BLD: 10 K/UL — SIGNIFICANT CHANGE UP (ref 3.8–10.5)
WBC # FLD AUTO: 10 K/UL — SIGNIFICANT CHANGE UP (ref 3.8–10.5)

## 2017-06-20 RX ORDER — NICOTINE POLACRILEX 2 MG
1 GUM BUCCAL
Qty: 7 | Refills: 0
Start: 2017-06-20 | End: 2017-06-27

## 2017-06-20 RX ORDER — QUETIAPINE FUMARATE 200 MG/1
0.5 TABLET, FILM COATED ORAL
Qty: 15 | Refills: 0
Start: 2017-06-20 | End: 2017-07-20

## 2017-06-20 RX ORDER — METOPROLOL TARTRATE 50 MG
0.5 TABLET ORAL
Qty: 15 | Refills: 0
Start: 2017-06-20 | End: 2017-07-05

## 2017-06-20 RX ORDER — BACITRACIN ZINC 500 UNIT/G
1 OINTMENT IN PACKET (EA) TOPICAL
Qty: 1 | Refills: 0
Start: 2017-06-20

## 2017-06-20 RX ORDER — ACETAMINOPHEN 500 MG
2 TABLET ORAL
Qty: 0 | Refills: 0 | DISCHARGE
Start: 2017-06-20

## 2017-06-20 RX ORDER — LIDOCAINE 4 G/100G
1 CREAM TOPICAL
Qty: 7 | Refills: 0
Start: 2017-06-20 | End: 2017-06-27

## 2017-06-20 RX ORDER — FOLIC ACID 0.8 MG
1 TABLET ORAL
Qty: 0 | Refills: 0 | COMMUNITY

## 2017-06-20 RX ORDER — DOCUSATE SODIUM 100 MG
1 CAPSULE ORAL
Qty: 0 | Refills: 0 | DISCHARGE
Start: 2017-06-20

## 2017-06-20 RX ORDER — DAPTOMYCIN 500 MG/10ML
400 INJECTION, POWDER, LYOPHILIZED, FOR SOLUTION INTRAVENOUS ONCE
Qty: 0 | Refills: 0 | Status: COMPLETED | OUTPATIENT
Start: 2017-06-20 | End: 2017-06-20

## 2017-06-20 RX ORDER — FOLIC ACID 0.8 MG
1 TABLET ORAL
Qty: 30 | Refills: 0
Start: 2017-06-20 | End: 2017-07-20

## 2017-06-20 RX ORDER — DICLOFENAC SODIUM 75 MG/1
100 TABLET, DELAYED RELEASE ORAL
Qty: 0 | Refills: 0 | COMMUNITY

## 2017-06-20 RX ORDER — CEPHALEXIN 500 MG
1 CAPSULE ORAL
Qty: 40 | Refills: 0
Start: 2017-06-20 | End: 2017-06-30

## 2017-06-20 RX ORDER — PANTOPRAZOLE SODIUM 20 MG/1
1 TABLET, DELAYED RELEASE ORAL
Qty: 30 | Refills: 0
Start: 2017-06-20 | End: 2017-07-20

## 2017-06-20 RX ORDER — THIAMINE MONONITRATE (VIT B1) 100 MG
1 TABLET ORAL
Qty: 30 | Refills: 0
Start: 2017-06-20 | End: 2017-07-20

## 2017-06-20 RX ORDER — ATORVASTATIN CALCIUM 80 MG/1
1 TABLET, FILM COATED ORAL
Qty: 30 | Refills: 0
Start: 2017-06-20 | End: 2017-07-20

## 2017-06-20 RX ADMIN — Medication 1 PATCH: at 13:26

## 2017-06-20 RX ADMIN — Medication 12.5 MILLIGRAM(S): at 06:38

## 2017-06-20 RX ADMIN — Medication 10 MILLIGRAM(S): at 12:51

## 2017-06-20 RX ADMIN — Medication 100 MILLIGRAM(S): at 06:37

## 2017-06-20 RX ADMIN — DAPTOMYCIN 116 MILLIGRAM(S): 500 INJECTION, POWDER, LYOPHILIZED, FOR SOLUTION INTRAVENOUS at 17:51

## 2017-06-20 RX ADMIN — Medication 100 MILLIGRAM(S): at 12:50

## 2017-06-20 RX ADMIN — Medication 1 MILLIGRAM(S): at 12:50

## 2017-06-20 RX ADMIN — Medication 12.5 MILLIGRAM(S): at 17:38

## 2017-06-20 RX ADMIN — PANTOPRAZOLE SODIUM 40 MILLIGRAM(S): 20 TABLET, DELAYED RELEASE ORAL at 06:39

## 2017-06-20 RX ADMIN — Medication 1 PATCH: at 13:27

## 2017-06-20 RX ADMIN — Medication 81 MILLIGRAM(S): at 12:50

## 2017-06-20 RX ADMIN — Medication 1 TABLET(S): at 12:50

## 2017-06-20 RX ADMIN — Medication 100 MILLIGRAM(S): at 13:28

## 2017-06-20 NOTE — PROGRESS NOTE ADULT - PROVIDER SPECIALTY LIST ADULT
CT Surgery
Critical Care
Gastroenterology
Gastroenterology
Psychiatry
CT Surgery
Critical Care

## 2017-06-20 NOTE — PROGRESS NOTE ADULT - SUBJECTIVE AND OBJECTIVE BOX
Chart reviewed and patient interviewed. Patient is a 61 year old  Black male from [facility] current smoker ( 1 PPDx45 years), current every day use of Alcohol ( 1 pint of Vodka daily and several beers daily for 45 years ) and history of HLD and  HTN who was admitted to [facility] in early May 2017 with Chest pain and worsening HEIN. Had Cardiac Cath which showed CAD and Aortic Stenosis. He was discharged home and then seen at [facility] after awakening at home with severe Chest pain on 5/22/17. Patient was seen in Cardiac Clinic on 5/22/17 and was admitted for pre-op work up for possible CABG/AVR. Last drink was a bottle of beer on morning of 5/23/17, the day of admission to [facility].       Patient's wife reports he has gone through acute Alcohol withdrawal in the past when hospitalized and has become acutely agitated requiring sedation and restraint. Overnight patient became acutely agitated secondary to alcohol withdrawal and required sedation and intubation.  Patient proceeded to surgery 5/26 and underwent a CABG and AVR. Patient tolerated surgery well and remained intubated and sedated. Patient reported to have been agitated yesterday secondary to Alcohol Withdrawal and is now receiving Librium 50mg q 6h and agitation is reported to be controlled. Patient was extubated 5/29 which he was tolerating well and able to follow simple commands and  attempting to speak. NGT was removed. He did not have Librium overnight. He then became more anxious consistent with persistent withdrawal. NGT was reinserted and he was restarted on Librium at 25mg via NGT q6h until withdrawal is completed. Patient required reintubation because of compromised pulmonary status and possible pneumonia. Patient continued on sedation as needed. Patient remained intubated and was receiving Precedex for sedation. Patient was extubated 6/06. Is alert and engagable. Patient had Chest tube placement for pleural effusion which is now removed.  Has been up in a recliner chair and is cooperative with staff and is now walking some There is no longer evidence of alcohol withdrawal. Patient is cooperative and participating with Physical therapy. His sensorium is clearer and he is approaching his baseline. Patient is restless at times. Plans were being made for transfer to Subacute Rehab but patient did not qualify and he is being discharged home today and is to receive Home Care. Patient will continue on Librium 10mg po bid for anxiety along with Seroquel 12.5 mg po at bedtime. Patient's voice remains stronger and he is more engagable. He continues to improve. His wife is at the bedside. Patient feels good about returning home. Encouragement and support provided.

## 2017-06-20 NOTE — DISCHARGE NOTE ADULT - MEDICATION SUMMARY - MEDICATIONS TO STOP TAKING
I will STOP taking the medications listed below when I get home from the hospital:    diclofenac  -- 100 milligram(s) by mouth 2 times a day

## 2017-06-20 NOTE — DISCHARGE NOTE ADULT - PLAN OF CARE
-Please follow up with  ___on ___.  The office is located at [facility], [facility], 4th floor. Call us with any questions #[phone].      -Walk daily as tolerated and use your incentive spirometer every hour.    -No driving or strenuous activity/exercise for 6 weeks, or until cleared by your surgeon.     -Gently clean your incisions with anti-bacterial soap and water, pat dry.  You may leave them open to air.    -Call your doctor if you have shortness of breath, chest pain not relieved by pain medication, dizziness, fever >101.5, or increased redness or drainage from incisions. s/p heart surgery -Please follow up with Dr. Wilcox on 6/23/17 at 1:30PM.  The office is located at [facility], [facility], 4th floor. Call us with any questions #[phone].  Prompt #2.   -Please make a follow-up appointment with your primary care provider within 1-2 weeks of discharge.   -Walk daily as tolerated and use your incentive spirometer ten times every hour.  -No driving or strenuous activity/exercise for 6 weeks, or until cleared by your surgeon.  Please do not lift anything greater than ten pounds.   -Please take the full course of Keflex as prescribed. This is an antibiotic that protects against infection. Please keep your right "pinky" finger stitches clean dry and covered. Dr. Wilcox will look at the stitches for possible removal at your follow-up appointment.  -Gently clean your incisions with anti-bacterial soap and water, pat dry.  You may leave them open to air.  -Call your doctor if you have shortness of breath, chest pain not relieved by pain medication, dizziness, fever >101.5, or increased redness or drainage from incisions.

## 2017-06-20 NOTE — DISCHARGE NOTE ADULT - PATIENT PORTAL LINK FT
“You can access the [facility] Patient Portal, offered by [facility], by registering with the following website: [email or web address]”

## 2017-06-20 NOTE — DISCHARGE NOTE ADULT - HOSPITAL COURSE
OR uneventful.  Received 3 PRBC/3 FFP/2 Plt.  On POD 1, pt. required precedex, librium for alcohol withdrawl and DTs.  CT were d/c'ed on POD 1.  On POD 2, extubated to HFNC.  On POD 3-POD 4, pt. had delirium and DTs requiring HFNC and eventual intubation.  On POD 4-POD 8, pt. remained intubated. He spiked a temp., was started on broad spectrum abx.  On pOD 9, he was extubated.  On POD 10, he required L pigtail for pleural effusion. On POD 21, he passed his S/S and was started on a mechanical soft diet. 61 year old male with PMHx current smoking (1ppd x 45 years), current every day ETOH use (1 pink vodka and several beers daily x 45 years), HLD, HTN, who was recently admitted for chest pain workup. Pt. came to [facility] in early May due to chest pain and worsening HEIN, ECHO was done showing EF 65%, moderate mitral annular calcification, moderate aortic stenosis. Cardiac cath then done showing LM 40%, pLAD 80%, pRCA 80%, severe AS. He was discharged home but then presented to [facility] on 5/22 for severe chest pain, which woke him from sleep. Per wife pt. was only taking ASA, vitamins, and valcyclovir at home. Pt. denies SOB, N/V, dizziness, blurred vision. Admits to ETOH problem, shows no interest in quitting permanently. Last drink was 1 beer AM of admission.  Pt seen in clinic on 5/23 and admitted for preop workup prior to possible CABG/AVR.    OR uneventful.  Received 3 PRBC/3 FFP/2 Plt.  On POD 1, pt. required precedex, librium for alcohol withdrawl and DTs.  CT were d/c'ed on POD 1.  On POD 2, extubated to HFNC.  On POD 3-POD 4, pt. had delirium and DTs requiring HFNC and eventual intubation.  On POD 4-POD 8, pt. remained intubated. He spiked a temp., was started on broad spectrum abx.  On pOD 9, he was extubated.  On POD 10, he required L pigtail for pleural effusion. On POD 21, he passed his S/S and was started on a mechanical soft diet. 61 year old male with PMHx current smoking (1ppd x 45 years), current every day ETOH use (1 pink vodka and several beers daily x 45 years), HLD, HTN, who was recently admitted for chest pain workup. Pt. came to [facility] in early May due to chest pain and worsening HEIN, ECHO was done showing EF 65%, moderate mitral annular calcification, moderate aortic stenosis. Cardiac cath then done showing LM 40%, pLAD 80%, pRCA 80%, severe AS. He was discharged home but then presented to [facility] on 5/22 for severe chest pain, which woke him from sleep. Per wife pt. was only taking ASA, vitamins, and valcyclovir at home. Pt. denies SOB, N/V, dizziness, blurred vision. Admits to ETOH problem, shows no interest in quitting permanently. Last drink was 1 beer AM of admission.  Pt seen in clinic on 5/23 and admitted for preop workup prior to possible CABG/AVR. Between 5/23-5/26, the patient completed his preop workup.  Given his history of ETOH abuse, the patient had a CT abd/pelvis which revealed evidence of cirrhosis but no ascites.  The patient was also started on librium, thiamine, folic acid for withdrawl precautions.  On 5/27, the patient went to the OR for AVR/CABG.  The OR course was uneventful.  He received 3 PRBC/3 FFP/2 Plt.  On POD 1, pt. required precedex and librium for alcohol withdrawl and DTs.  His chest tubes were discontinued on POD 1.  On POD 2, he was extubated to HFNC.  On POD 3-POD 4, pt. had delirium and DTs requiring HFNC and eventual reintubation.  On POD 4-POD 8, pt. remained intubated. He spiked a temp., was started on broad spectrum abx.  On pOD 9, he was extubated.  On POD 10, he required L pigtail for pleural effusion. On POD 21, he passed his S/S and was started on a mechanical soft diet. 61 year old male with PMHx current smoking (1ppd x 45 years), current every day ETOH use (1 pink vodka and several beers daily x 45 years), HLD, HTN, who was recently admitted for chest pain workup. Pt. came to [facility] in early May due to chest pain and worsening HEIN, ECHO was done showing EF 65%, moderate mitral annular calcification, moderate aortic stenosis. Cardiac cath then done showing LM 40%, pLAD 80%, pRCA 80%, severe AS. He was discharged home but then presented to [facility] on 5/22 for severe chest pain, which woke him from sleep. Per wife pt. was only taking ASA, vitamins, and valcyclovir at home. Pt. denies SOB, N/V, dizziness, blurred vision. Admits to ETOH problem, shows no interest in quitting permanently. Last drink was 1 beer AM of admission.  Pt seen in clinic on 5/23 and admitted for preop workup prior to possible CABG/AVR. Between 5/23-5/26, the patient completed his preop workup.  Given his history of ETOH abuse, the patient had a CT abd/pelvis which revealed evidence of cirrhosis but no ascites.  The patient was also started on librium, thiamine, folic acid for withdrawl precautions.  On 5/26, the patient went to the OR for AVR/CABG.  The OR course was uneventful.  He received 3 PRBC/3 FFP/2 Plt.  On POD 1, pt. required precedex and librium for alcohol withdrawl and DTs.  His chest tubes were discontinued on POD 1.  On POD 2, he was extubated to HFNC.  On POD 3-POD 4, pt. had delirium and DTs requiring HFNC and eventual reintubation.  On POD 4-POD 8, pt. remained intubated. He spiked a temp., was started on broad spectrum abx.  On pOD 9, he was extubated.  On POD 10, he required L pigtail for pleural effusion. On POD 21, he passed his S/S and was started on a mechanical soft diet. 61 year old male with PMHx current smoking (1ppd x 45 years), current every day ETOH use (1 pink vodka and several beers daily x 45 years), HLD, HTN, who was recently admitted for chest pain workup. Pt. came to [facility] in early May due to chest pain and worsening HEIN, ECHO was done showing EF 65%, moderate mitral annular calcification, moderate aortic stenosis. Cardiac cath then done showing LM 40%, pLAD 80%, pRCA 80%, severe AS. He was discharged home but then presented to [facility] on 5/22 for severe chest pain, which woke him from sleep. Per wife pt. was only taking ASA, vitamins, and valcyclovir at home. Pt. denies SOB, N/V, dizziness, blurred vision. Admits to ETOH problem, shows no interest in quitting permanently. Last drink was 1 beer AM of admission.  Pt seen in clinic on 5/23 and admitted for preop workup prior to possible CABG/AVR. Between 5/23-5/26, the patient completed his preop workup.  Given his history of ETOH abuse, the patient had a CT abd/pelvis which revealed evidence of cirrhosis but no ascites.  The patient was also started on librium, thiamine, folic acid for withdrawl precautions.  On 5/26/17, the patient went to the OR for AVR/CABG.  The OR course was uneventful.  He received 3 PRBC/3 FFP/2 Plt.  On POD 1, pt. required precedex and librium for alcohol withdrawl and DTs.  His chest tubes were discontinued on POD 1.  On POD 2, he was extubated to HFNC.  On POD 3-POD 4, pt. had delirium and DTs requiring HFNC and eventual reintubation.  On POD 4-POD 8, pt. remained intubated. He spiked a temp., was started on broad spectrum abx.  On POD 9, he was extubated.  On POD 10, he required L pigtail for pleural effusion. On POD 19 (6/9/17), he passed his S/S and was started on a mechanical soft diet. Pt has remained stable on 9L post-operatively. Dr. Gonzalez has been following for h/o alcoholism. POD#25, Dr. Wilcox aspirated small superficial sternal wound collection and cultures were sent, no purulent drainage was aspirated. Pt also had a right 5th finger laceration, where stitches were placed at the bedside. POD#25, as per Dr. Wilcox pt stable and ready for discharge home with a 10 days course of antibiotics.

## 2017-06-20 NOTE — DISCHARGE NOTE ADULT - CONDITIONS AT DISCHARGE
Pt and pt's wife d/c home plan share, verbalizing  good understanding. Antibiotic first dose given, via IV, clean and intact, pt remain confused as base line, vss ok at this time. No distress observed.

## 2017-06-20 NOTE — DISCHARGE NOTE ADULT - CARE PLAN
Principal Discharge DX:	Aortic valve stenosis, unspecified etiology  Instructions for follow-up, activity and diet:	-Please follow up with  ___on ___.  The office is located at , [facility], 4th floor. Call us with any questions #[phone].      -Walk daily as tolerated and use your incentive spirometer every hour.    -No driving or strenuous activity/exercise for 6 weeks, or until cleared by your surgeon.     -Gently clean your incisions with anti-bacterial soap and water, pat dry.  You may leave them open to air.    -Call your doctor if you have shortness of breath, chest pain not relieved by pain medication, dizziness, fever >101.5, or increased redness or drainage from incisions. Principal Discharge DX:	Aortic valve stenosis, unspecified etiology  Instructions for follow-up, activity and diet:	-Please follow up with  ___on ___.  The office is located at [facility], [facility], 4th floor. Call us with any questions #[phone].      -Walk daily as tolerated and use your incentive spirometer every hour.    -No driving or strenuous activity/exercise for 6 weeks, or until cleared by your surgeon.     -Gently clean your incisions with anti-bacterial soap and water, pat dry.  You may leave them open to air.    -Call your doctor if you have shortness of breath, chest pain not relieved by pain medication, dizziness, fever >101.5, or increased redness or drainage from incisions. Principal Discharge DX:	Aortic valve stenosis, unspecified etiology  Instructions for follow-up, activity and diet:	-Please follow up with  ___on ___.  The office is located at [facility], [facility], 4th floor. Call us with any questions #[phone].      -Walk daily as tolerated and use your incentive spirometer every hour.    -No driving or strenuous activity/exercise for 6 weeks, or until cleared by your surgeon.     -Gently clean your incisions with anti-bacterial soap and water, pat dry.  You may leave them open to air.    -Call your doctor if you have shortness of breath, chest pain not relieved by pain medication, dizziness, fever >101.5, or increased redness or drainage from incisions. Principal Discharge DX:	Aortic valve stenosis, unspecified etiology  Instructions for follow-up, activity and diet:	-Please follow up with  ___on ___.  The office is located at , [facility], 4th floor. Call us with any questions #[phone].      -Walk daily as tolerated and use your incentive spirometer every hour.    -No driving or strenuous activity/exercise for 6 weeks, or until cleared by your surgeon.     -Gently clean your incisions with anti-bacterial soap and water, pat dry.  You may leave them open to air.    -Call your doctor if you have shortness of breath, chest pain not relieved by pain medication, dizziness, fever >101.5, or increased redness or drainage from incisions. Principal Discharge DX:	Aortic valve stenosis, unspecified etiology  Instructions for follow-up, activity and diet:	-Please follow up with  ___on ___.  The office is located at [facility], [facility], 4th floor. Call us with any questions #[phone].      -Walk daily as tolerated and use your incentive spirometer every hour.    -No driving or strenuous activity/exercise for 6 weeks, or until cleared by your surgeon.     -Gently clean your incisions with anti-bacterial soap and water, pat dry.  You may leave them open to air.    -Call your doctor if you have shortness of breath, chest pain not relieved by pain medication, dizziness, fever >101.5, or increased redness or drainage from incisions. Principal Discharge DX:	Aortic valve stenosis, unspecified etiology  Instructions for follow-up, activity and diet:	-Please follow up with  ___on ___.  The office is located at [facility], [facility], 4th floor. Call us with any questions #[phone].      -Walk daily as tolerated and use your incentive spirometer every hour.    -No driving or strenuous activity/exercise for 6 weeks, or until cleared by your surgeon.     -Gently clean your incisions with anti-bacterial soap and water, pat dry.  You may leave them open to air.    -Call your doctor if you have shortness of breath, chest pain not relieved by pain medication, dizziness, fever >101.5, or increased redness or drainage from incisions. Principal Discharge DX:	Aortic valve stenosis, unspecified etiology  Instructions for follow-up, activity and diet:	-Please follow up with  ___on ___.  The office is located at [facility], [facility], 4th floor. Call us with any questions #[phone].      -Walk daily as tolerated and use your incentive spirometer every hour.    -No driving or strenuous activity/exercise for 6 weeks, or until cleared by your surgeon.     -Gently clean your incisions with anti-bacterial soap and water, pat dry.  You may leave them open to air.    -Call your doctor if you have shortness of breath, chest pain not relieved by pain medication, dizziness, fever >101.5, or increased redness or drainage from incisions. Principal Discharge DX:	Aortic valve stenosis, unspecified etiology  Goal:	s/p heart surgery  Instructions for follow-up, activity and diet:	-Please follow up with Dr. Wilcox on 6/23/17 at 1:30PM.  The office is located at [facility], [facility], 4th floor. Call us with any questions #[phone].  Prompt #2.   -Please make a follow-up appointment with your primary care provider within 1-2 weeks of discharge.   -Walk daily as tolerated and use your incentive spirometer ten times every hour.  -No driving or strenuous activity/exercise for 6 weeks, or until cleared by your surgeon.  Please do not lift anything greater than ten pounds.   -Please take the full course of Keflex as prescribed. This is an antibiotic that protects against infection. Please keep your right "pinky" finger stitches clean dry and covered. Dr. Wilcox will look at the stitches for possible removal at your follow-up appointment.  -Gently clean your incisions with anti-bacterial soap and water, pat dry.  You may leave them open to air.  -Call your doctor if you have shortness of breath, chest pain not relieved by pain medication, dizziness, fever >101.5, or increased redness or drainage from incisions. Principal Discharge DX:	Aortic valve stenosis, unspecified etiology  Goal:	s/p heart surgery  Instructions for follow-up, activity and diet:	-Please follow up with Dr. Wilcox on 6/23/17 at 1:30PM.  The office is located at [facility], [facility], 4th floor. Call us with any questions #[phone].  Prompt #2.   -Please make a follow-up appointment with your primary care provider within 1-2 weeks of discharge.   -Walk daily as tolerated and use your incentive spirometer ten times every hour.  -No driving or strenuous activity/exercise for 6 weeks, or until cleared by your surgeon.  Please do not lift anything greater than ten pounds.   -Please take the full course of Keflex as prescribed. This is an antibiotic that protects against infection. Please keep your right "pinky" finger stitches clean dry and covered. Dr. Wilcox will look at the stitches for possible removal at your follow-up appointment.  -Gently clean your incisions with anti-bacterial soap and water, pat dry.  You may leave them open to air.  -Call your doctor if you have shortness of breath, chest pain not relieved by pain medication, dizziness, fever >101.5, or increased redness or drainage from incisions. Principal Discharge DX:	Aortic valve stenosis, unspecified etiology  Goal:	s/p heart surgery  Instructions for follow-up, activity and diet:	-Please follow up with Dr. Wilcox on 6/23/17 at 1:30PM.  The office is located at [facility], [facility], 4th floor. Call us with any questions #[phone].  Prompt #2.   -Please make a follow-up appointment with your primary care provider within 1-2 weeks of discharge.   -Walk daily as tolerated and use your incentive spirometer ten times every hour.  -No driving or strenuous activity/exercise for 6 weeks, or until cleared by your surgeon.  Please do not lift anything greater than ten pounds.   -Please take the full course of Keflex as prescribed. This is an antibiotic that protects against infection. Please keep your right "pinky" finger stitches clean dry and covered. Dr. Wilcox will look at the stitches for possible removal at your follow-up appointment.  -Gently clean your incisions with anti-bacterial soap and water, pat dry.  You may leave them open to air.  -Call your doctor if you have shortness of breath, chest pain not relieved by pain medication, dizziness, fever >101.5, or increased redness or drainage from incisions.

## 2017-06-20 NOTE — DISCHARGE NOTE ADULT - CARE PROVIDER_API CALL
Juan Wilcox), Cardiovascular Surgery  [address] [address]  Phone: [phone]  Fax: [phone]

## 2017-06-20 NOTE — PROGRESS NOTE ADULT - SUBJECTIVE AND OBJECTIVE BOX
Patient discussed on morning rounds with Dr. Wilcox     Operation / Date:     Surgeon: Dr. Wilcox     Referring Physician:    SUBJECTIVE ASSESSMENT:  61y Male     Hospital Course:    Vital Signs Last 24 Hrs  T(C): 36.3, Max: 37.4 (06-20 @ 00:24)  T(F): 97.4, Max: 99.4 (06-20 @ 00:24)  HR: 90 (76 - 102)  BP: 124/70 (91/52 - 134/67)  BP(mean): 94 (72 - 94)  RR: 14 (14 - 129)  SpO2: 98% (97% - 100%)  I&O's Detail  I & Os for 24h ending 20 Jun 2017 07:00  =============================================  IN:    Oral Fluid: 1320 ml    Total IN: 1320 ml  ---------------------------------------------  OUT:    Voided: 750 ml    Total OUT: 750 ml  ---------------------------------------------  Total NET: 570 ml    I & Os for current day (as of 20 Jun 2017 17:44)  =============================================  IN:    Oral Fluid: 590 ml    Total IN: 590 ml  ---------------------------------------------  OUT:    Total OUT: 0 ml  ---------------------------------------------  Total NET: 590 ml      EPICARDIAL WIRES REMOVED: Yes  TIE DOWNS REMOVED: Yes    PHYSICAL EXAM:    General:     Neurological:    Cardiovascular:    Respiratory:    Gastrointestinal:    Extremities:    Vascular:    Incision Sites:    LABS:                        11.9   10.0  )-----------( 206      ( 20 Jun 2017 06:13 )             35.9       COUMADIN:   No    06-20    134<L>  |  97  |  19  ----------------------------<  92  4.2   |  25  |  0.70    Ca    9.9      20 Jun 2017 06:13  Mg     1.7     06-20      MEDICATIONS  (STANDING):  aspirin enteric coated 81milliGRAM(s) Oral daily  sodium chloride 0.45%. 1000milliLiter(s) IV Continuous <Continuous>  multivitamin 1Tablet(s) Oral daily  atorvastatin 20milliGRAM(s) Oral at bedtime  pantoprazole    Tablet 40milliGRAM(s) Oral before breakfast  folic acid 1milliGRAM(s) Oral daily  thiamine 100milliGRAM(s) Oral daily  nicotine -  14 mG/24Hr(s) Patch 1patch Transdermal daily  metoprolol 12.5milliGRAM(s) Oral two times a day  QUEtiapine 12.5milliGRAM(s) Oral at bedtime  docusate sodium 100milliGRAM(s) Oral three times a day  senna 2Tablet(s) Oral at bedtime  DAPTOmycin IVPB 400milliGRAM(s) IV Intermittent once      Discharge CXR:    Discharge ECHO: Patient discussed on morning rounds with Dr. Wilcox     Operation / Date:     Surgeon: Dr. Wilcox     Referring Physician:    SUBJECTIVE ASSESSMENT:  61y Male     Hospital Course:    Vital Signs Last 24 Hrs  T(C): 36.3, Max: 37.4 (06-20 @ 00:24)  T(F): 97.4, Max: 99.4 (06-20 @ 00:24)  HR: 90 (76 - 102)  BP: 124/70 (91/52 - 134/67)  BP(mean): 94 (72 - 94)  RR: 14 (14 - 129)  SpO2: 98% (97% - 100%)  I&O's Detail  I & Os for 24h ending 20 Jun 2017 07:00  =============================================  IN:    Oral Fluid: 1320 ml    Total IN: 1320 ml  ---------------------------------------------  OUT:    Voided: 750 ml    Total OUT: 750 ml  ---------------------------------------------  Total NET: 570 ml    I & Os for current day (as of 20 Jun 2017 17:44)  =============================================  IN:    Oral Fluid: 590 ml    Total IN: 590 ml  ---------------------------------------------  OUT:    Total OUT: 0 ml  ---------------------------------------------  Total NET: 590 ml      EPICARDIAL WIRES REMOVED: Yes  TIE DOWNS REMOVED: Yes    PHYSICAL EXAM:    General: NAD, sitting upright in bed, appropriately following directions    Neurological: no focal deficits     Cardiovascular: RRR, no m/r/g    Respiratory: decreased breath sounds to the left lower lobe     Gastrointestinal: NT-ND, soft    Extremities: warm and well perfused with no calf tenderness or edema b/l     Incision Sites: Sternotomy and left EVH site CDI, no erythema, edema or ecchymosis     LABS:                        11.9   10.0  )-----------( 206      ( 20 Jun 2017 06:13 )             35.9       COUMADIN:   No    06-20    134<L>  |  97  |  19  ----------------------------<  92  4.2   |  25  |  0.70    Ca    9.9      20 Jun 2017 06:13  Mg     1.7     06-20      MEDICATIONS  (STANDING):  aspirin enteric coated 81milliGRAM(s) Oral daily  sodium chloride 0.45%. 1000milliLiter(s) IV Continuous <Continuous>  multivitamin 1Tablet(s) Oral daily  atorvastatin 20milliGRAM(s) Oral at bedtime  pantoprazole    Tablet 40milliGRAM(s) Oral before breakfast  folic acid 1milliGRAM(s) Oral daily  thiamine 100milliGRAM(s) Oral daily  nicotine -  14 mG/24Hr(s) Patch 1patch Transdermal daily  metoprolol 12.5milliGRAM(s) Oral two times a day  QUEtiapine 12.5milliGRAM(s) Oral at bedtime  docusate sodium 100milliGRAM(s) Oral three times a day  senna 2Tablet(s) Oral at bedtime  DAPTOmycin IVPB 400milliGRAM(s) IV Intermittent once      Discharge CXR 6/19/17: no significant change in a small left pleural effusion   with adjacent consolidation.  The cardiomediastinal silhouette silhouette   is unchanged. Median sternotomy wires are unchanged.    IMPRESSION: No significant interval change.    D/C Instructions: Patient discussed on morning rounds with Dr. Wilcox     Operation / Date: 5/27: AVR, CABG X2 EF 70%    Surgeon: Dr. Wilcox     SUBJECTIVE ASSESSMENT:  61y Male no acute events overnight. Pt noted by nursing to digital skin laceration. Pt denies CP, dizziness or SOB.     Hospital Course:  61 year old male with PMHx current smoking (1ppd x 45 years), current every day ETOH use (1 pink vodka and several beers daily x 45 years), HLD, HTN, who was recently admitted for chest pain workup. Pt. came to [facility] in early May due to chest pain and worsening HEIN, ECHO was done showing EF 65%, moderate mitral annular calcification, moderate aortic stenosis. Cardiac cath then done showing LM 40%, pLAD 80%, pRCA 80%, severe AS. He was discharged home but then presented to [facility] on 5/22 for severe chest pain, which woke him from sleep. Per wife pt. was only taking ASA, vitamins, and valcyclovir at home. Pt. denies SOB, N/V, dizziness, blurred vision. Admits to ETOH problem, shows no interest in quitting permanently. Last drink was 1 beer AM of admission.  Pt seen in clinic on 5/23 and admitted for preop workup prior to possible CABG/AVR. Between 5/23-5/26, the patient completed his preop workup.  Given his history of ETOH abuse, the patient had a CT abd/pelvis which revealed evidence of cirrhosis but no ascites.  The patient was also started on librium, thiamine, folic acid for withdrawl precautions.  On 5/26/17, the patient went to the OR for AVR/CABG.  The OR course was uneventful.  He received 3 PRBC/3 FFP/2 Plt.  On POD 1, pt. required precedex and librium for alcohol withdrawl and DTs.  His chest tubes were discontinued on POD 1.  On POD 2, he was extubated to HFNC.  On POD 3-POD 4, pt. had delirium and DTs requiring HFNC and eventual reintubation.  On POD 4-POD 8, pt. remained intubated. He spiked a temp., was started on broad spectrum abx.  On POD 9, he was extubated.  On POD 10, he required L pigtail for pleural effusion. On POD 19 (6/9/17), he passed his S/S and was started on a mechanical soft diet. Pt has remained stable on 9L post-operatively. Dr. Gonzalez has been following for h/o alcoholism. POD#25, Dr. Wilcox aspirated small superficial sternal wound collection and cultures were sent, no purulent drainage was aspirated. Pt also had a right 5th finger laceration, where stitches were placed at the bedside. POD#25, as per Dr. Wilcox pt stable and ready for discharge home with a 10 days course of antibiotics.       Vital Signs Last 24 Hrs  T(C): 36.3, Max: 37.4 (06-20 @ 00:24)  T(F): 97.4, Max: 99.4 (06-20 @ 00:24)  HR: 90 (76 - 102)  BP: 124/70 (91/52 - 134/67)  BP(mean): 94 (72 - 94)  RR: 14 (14 - 129)  SpO2: 98% (97% - 100%)  I&O's Detail  I & Os for 24h ending 20 Jun 2017 07:00  =============================================  IN:    Oral Fluid: 1320 ml    Total IN: 1320 ml  ---------------------------------------------  OUT:    Voided: 750 ml    Total OUT: 750 ml  ---------------------------------------------  Total NET: 570 ml    I & Os for current day (as of 20 Jun 2017 17:44)  =============================================  IN:    Oral Fluid: 590 ml    Total IN: 590 ml  ---------------------------------------------  OUT:    Total OUT: 0 ml  ---------------------------------------------  Total NET: 590 ml      EPICARDIAL WIRES REMOVED: Yes  TIE DOWNS REMOVED: Yes    PHYSICAL EXAM:    General: NAD, sitting upright in bed, appropriately following directions    Neurological: no focal deficits     Cardiovascular: RRR, no m/r/g    Respiratory: decreased breath sounds to the left lower lobe     Gastrointestinal: NT-ND, soft    Extremities: warm and well perfused with no calf tenderness or edema b/l     Incision Sites: Sternotomy and left EVH site CDI, no erythema, edema or ecchymosis. Right 5th finger stitches in place. CDI, no erythema or drainage.    LABS:                        11.9   10.0  )-----------( 206      ( 20 Jun 2017 06:13 )             35.9       COUMADIN:   No    06-20    134<L>  |  97  |  19  ----------------------------<  92  4.2   |  25  |  0.70    Ca    9.9      20 Jun 2017 06:13  Mg     1.7     06-20      MEDICATIONS  (STANDING):  aspirin enteric coated 81milliGRAM(s) Oral daily  sodium chloride 0.45%. 1000milliLiter(s) IV Continuous <Continuous>  multivitamin 1Tablet(s) Oral daily  atorvastatin 20milliGRAM(s) Oral at bedtime  pantoprazole    Tablet 40milliGRAM(s) Oral before breakfast  folic acid 1milliGRAM(s) Oral daily  thiamine 100milliGRAM(s) Oral daily  nicotine -  14 mG/24Hr(s) Patch 1patch Transdermal daily  metoprolol 12.5milliGRAM(s) Oral two times a day  QUEtiapine 12.5milliGRAM(s) Oral at bedtime  docusate sodium 100milliGRAM(s) Oral three times a day  senna 2Tablet(s) Oral at bedtime  DAPTOmycin IVPB 400milliGRAM(s) IV Intermittent once      Discharge CXR 6/19/17: no significant change in a small left pleural effusion   with adjacent consolidation.  The cardiomediastinal silhouette silhouette   is unchanged. Median sternotomy wires are unchanged.    IMPRESSION: No significant interval change.    D/C Instructions:   -Please follow up with Dr. Wilcox on 6/23/17 at 1:30PM.  The office is located at [address]. Call us with any questions #[phone].  Prompt #2.   -Please make a follow-up appointment with your primary care provider within 1-2 weeks of discharge.   -Walk daily as tolerated and use your incentive spirometer ten times every hour.  -No driving or strenuous activity/exercise for 6 weeks, or until cleared by your surgeon.  Please do not lift anything greater than ten pounds.   -Please take the full course of Keflex as prescribed. This is an antibiotic that protects against infection. Please keep your right "pinky" finger stitches clean dry and covered. Dr. Wilcox will look at the stitches for possible removal at your follow-up appointment.  -Gently clean your incisions with anti-bacterial soap and water, pat dry.  You may leave them open to air.  -Call your doctor if you have shortness of breath, chest pain not relieved by pain medication, dizziness, fever >101.5, or increased redness or drainage from incisions.-Please follow up with  ___on ___.  The office is located at [facility], 4th [facility]. Call us with any questions #[phone].

## 2017-06-20 NOTE — DISCHARGE NOTE ADULT - ADDITIONAL INSTRUCTIONS
Appointment on Friday, July 21st at 12:30PM for a home evaluation with [facility]. Please call [phone] to cancel or change the appointment and have ID, medications, and hospital discharge paperwork with you on the day of the appointment.

## 2017-06-20 NOTE — DISCHARGE NOTE ADULT - MEDICATION SUMMARY - MEDICATIONS TO TAKE
I will START or STAY ON the medications listed below when I get home from the hospital:    acetaminophen 325 mg oral tablet  -- 2 tab(s) by mouth every 6 hours, As needed, Mild Pain (1 - 3)  -- Indication: For pain    Aspirin Enteric Coated 81 mg oral delayed release tablet  -- 1 tab(s) by mouth once a day  -- Indication: For heart    atorvastatin 20 mg oral tablet  -- 1 tab(s) by mouth once a day (at bedtime)  -- Indication: For Cholesterol    QUEtiapine 25 mg oral tablet  -- 0.5 tab(s) by mouth once a day (at bedtime)  -- Indication: For Insomnia    valACYclovir 500 mg oral tablet  -- 1 tab(s) by mouth once a day  -- Indication: For Antiviral    chlordiazePOXIDE 10 mg oral capsule  -- 1 cap(s) by mouth once a day MDD:1  -- Indication: For ETOH abuse    Metoprolol Tartrate 25 mg oral tablet  -- 0.5 tab(s) by mouth 2 times a day  -- It is very important that you take or use this exactly as directed.  Do not skip doses or discontinue unless directed by your doctor.  May cause drowsiness.  Alcohol may intensify this effect.  Use care when operating dangerous machinery.  Some non-prescription drugs may aggravate your condition.  Read all labels carefully.  If a warning appears, check with your doctor before taking.  Take with food or milk.  This drug may impair the ability to drive or operate machinery.  Use care until you become familiar with its effects.    -- Indication: For heart    Keflex 500 mg oral capsule  -- 1 cap(s) by mouth 4 times a day  -- Finish all this medication unless otherwise directed by prescriber.    -- Indication: For skin infection prophylaxis    bacitracin 500 units/g topical ointment  -- Apply on skin to affected area once a day   Dispense one month supply  -- Indication: For skin wound    lidocaine 5% topical film  -- Apply on skin to affected area once a day for 12 hours on and 12 hours off  -- Indication: For pain    docusate sodium 100 mg oral capsule  -- 1 cap(s) by mouth 3 times a day, As Needed for constipation  -- Indication: For Constipation    pantoprazole 40 mg oral delayed release tablet  -- 1 tab(s) by mouth once a day (before a meal)  -- Indication: For Antacid    nicotine 14 mg/24 hr transdermal film, extended release  -- 1 patch by transdermal patch once a day  -- Indication: For smoking cessation    Multiple Vitamins oral tablet  -- 1 tab(s) by mouth once a day  -- Indication: For supplement    Vitamin D2 50,000 intl units (1.25 mg) oral capsule  -- 1 cap(s) by mouth once a week  -- Indication: For supplement    Vitamin B-12 100 mcg oral tablet  -- 1 tab(s) by mouth once a day  -- Indication: For supplement    thiamine 100 mg oral tablet  -- 1 tab(s) by mouth once a day  -- Indication: For supplement    folic acid 1 mg oral tablet  -- 1 tab(s) by mouth once a day  -- Indication: For supplement

## 2017-06-21 PROBLEM — I25.10 DOUBLE VESSEL CORONARY ARTERY DISEASE: Status: RESOLVED | Noted: 2017-05-23 | Resolved: 2017-06-21

## 2017-06-21 PROBLEM — I25.10 CAD (CORONARY ARTERY DISEASE): Status: ACTIVE | Noted: 2017-06-21

## 2017-06-21 PROBLEM — I35.0 AORTIC STENOSIS, SEVERE: Status: RESOLVED | Noted: 2017-05-17 | Resolved: 2017-06-21

## 2017-06-21 LAB
GRAM STN FLD: SIGNIFICANT CHANGE UP
SPECIMEN SOURCE: SIGNIFICANT CHANGE UP

## 2017-06-21 RX ORDER — PANTOPRAZOLE 40 MG/1
40 TABLET, DELAYED RELEASE ORAL DAILY
Qty: 30 | Refills: 0 | Status: ACTIVE | COMMUNITY

## 2017-06-21 RX ORDER — NICOTINE 21 MG/24HR
14 PATCH, TRANSDERMAL 24 HOURS TRANSDERMAL
Refills: 0 | Status: ACTIVE | COMMUNITY

## 2017-06-21 RX ORDER — ACETAMINOPHEN 325 MG/1
325 TABLET ORAL
Refills: 0 | Status: ACTIVE | COMMUNITY

## 2017-06-21 RX ORDER — BACITRACIN ZINC 500 [USP'U]/G
500 OINTMENT TOPICAL
Refills: 0 | Status: ACTIVE | COMMUNITY

## 2017-06-21 RX ORDER — DICLOFENAC SODIUM 100 MG/1
100 TABLET, FILM COATED, EXTENDED RELEASE ORAL
Refills: 0 | Status: COMPLETED | COMMUNITY
End: 2017-06-21

## 2017-06-21 RX ORDER — METOPROLOL TARTRATE 25 MG/1
25 TABLET, FILM COATED ORAL
Qty: 30 | Refills: 3 | Status: ACTIVE | COMMUNITY

## 2017-06-21 RX ORDER — QUETIAPINE 25 MG/1
25 TABLET, FILM COATED ORAL
Refills: 0 | Status: ACTIVE | COMMUNITY

## 2017-06-21 RX ORDER — ATORVASTATIN CALCIUM 20 MG/1
20 TABLET, FILM COATED ORAL
Qty: 30 | Refills: 2 | Status: ACTIVE | COMMUNITY

## 2017-06-22 DIAGNOSIS — I35.0 NONRHEUMATIC AORTIC (VALVE) STENOSIS: ICD-10-CM

## 2017-06-22 DIAGNOSIS — D69.59 OTHER SECONDARY THROMBOCYTOPENIA: ICD-10-CM

## 2017-06-22 DIAGNOSIS — F10.231 ALCOHOL DEPENDENCE WITH WITHDRAWAL DELIRIUM: ICD-10-CM
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
## 2017-06-22 DIAGNOSIS — I25.10 ATHEROSCLEROTIC HEART DISEASE OF NATIVE CORONARY ARTERY WITHOUT ANGINA PECTORIS: ICD-10-CM

## 2017-06-22 DIAGNOSIS — E78.5 HYPERLIPIDEMIA, UNSPECIFIED: ICD-10-CM

## 2017-06-22 DIAGNOSIS — Z79.82 LONG TERM (CURRENT) USE OF ASPIRIN: ICD-10-CM

## 2017-06-22 DIAGNOSIS — K91.3 POSTPROCEDURAL INTESTINAL OBSTRUCTION: ICD-10-CM

## 2017-06-22 DIAGNOSIS — S61.216A LACERATION WITHOUT FOREIGN BODY OF RIGHT LITTLE FINGER WITHOUT DAMAGE TO NAIL, INITIAL ENCOUNTER: ICD-10-CM
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
## 2017-06-22 DIAGNOSIS — Z78.1 PHYSICAL RESTRAINT STATUS: ICD-10-CM

## 2017-06-22 DIAGNOSIS — R06.09 OTHER FORMS OF DYSPNEA: ICD-10-CM

## 2017-06-22 DIAGNOSIS — F17.200 NICOTINE DEPENDENCE, UNSPECIFIED, UNCOMPLICATED: ICD-10-CM
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
## 2017-06-22 DIAGNOSIS — J69.0 PNEUMONITIS DUE TO INHALATION OF FOOD AND VOMIT: ICD-10-CM

## 2017-06-22 DIAGNOSIS — J90 PLEURAL EFFUSION, NOT ELSEWHERE CLASSIFIED: ICD-10-CM

## 2017-06-22 DIAGNOSIS — K56.7 ILEUS, UNSPECIFIED: ICD-10-CM

## 2017-06-22 DIAGNOSIS — F10.239 ALCOHOL DEPENDENCE WITH WITHDRAWAL, UNSPECIFIED: ICD-10-CM

## 2017-06-22 DIAGNOSIS — F41.9 ANXIETY DISORDER, UNSPECIFIED: ICD-10-CM

## 2017-06-22 DIAGNOSIS — R18.8 OTHER ASCITES: ICD-10-CM

## 2017-06-22 DIAGNOSIS — K74.60 UNSPECIFIED CIRRHOSIS OF LIVER: ICD-10-CM

## 2017-06-22 DIAGNOSIS — I50.32 CHRONIC DIASTOLIC (CONGESTIVE) HEART FAILURE: ICD-10-CM

## 2017-06-22 DIAGNOSIS — J96.00 ACUTE RESPIRATORY FAILURE, UNSPECIFIED WHETHER WITH HYPOXIA OR HYPERCAPNIA: ICD-10-CM
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
## 2017-06-22 DIAGNOSIS — I25.118 ATHEROSCLEROTIC HEART DISEASE OF NATIVE CORONARY ARTERY WITH OTHER FORMS OF ANGINA PECTORIS: ICD-10-CM

## 2017-06-22 DIAGNOSIS — I11.0 HYPERTENSIVE HEART DISEASE WITH HEART FAILURE: ICD-10-CM

## 2017-06-22 LAB
CULTURE RESULTS: NO GROWTH — SIGNIFICANT CHANGE UP
SPECIMEN SOURCE: SIGNIFICANT CHANGE UP

## 2017-06-23 ENCOUNTER — APPOINTMENT (OUTPATIENT)
Dept: CARDIOTHORACIC SURGERY | Facility: CLINIC | Age: 62
End: 2017-06-23

## 2017-06-23 VITALS
DIASTOLIC BLOOD PRESSURE: 58 MMHG | TEMPERATURE: 97.1 F | RESPIRATION RATE: 18 BRPM | OXYGEN SATURATION: 98 % | SYSTOLIC BLOOD PRESSURE: 103 MMHG | HEART RATE: 72 BPM

## 2017-06-23 DIAGNOSIS — I25.10 ATHEROSCLEROTIC HEART DISEASE OF NATIVE CORONARY ARTERY W/OUT ANGINA PECTORIS: ICD-10-CM

## 2017-06-23 DIAGNOSIS — I35.0 NONRHEUMATIC AORTIC (VALVE) STENOSIS: ICD-10-CM

## 2017-06-23 RX ORDER — NUT.TX.IMPAIRED DIGESTIVE FXN 0.035-1/ML
LIQUID (ML) ORAL
Qty: 1 | Refills: 3 | Status: ACTIVE | COMMUNITY
Start: 1900-01-01 | End: 1900-01-01

## 2017-06-23 RX ORDER — CEPHALEXIN 500 MG/1
500 CAPSULE ORAL
Qty: 14 | Refills: 0 | Status: ACTIVE | COMMUNITY
Start: 1900-01-01 | End: 1900-01-01

## 2017-06-30 LAB
CULTURE RESULTS: SIGNIFICANT CHANGE UP
SPECIMEN SOURCE: SIGNIFICANT CHANGE UP

## 2017-07-12 ENCOUNTER — FORM ENCOUNTER (OUTPATIENT)
Age: 62
End: 2017-07-12

## 2017-07-13 ENCOUNTER — OUTPATIENT (OUTPATIENT)
Dept: OUTPATIENT SERVICES | Facility: HOSPITAL | Age: 62
LOS: 1 days | End: 2017-07-13
Payer: MEDICAID

## 2017-07-13 ENCOUNTER — APPOINTMENT (OUTPATIENT)
Dept: CARDIOTHORACIC SURGERY | Facility: CLINIC | Age: 62
End: 2017-07-13

## 2017-07-13 VITALS
OXYGEN SATURATION: 98 % | DIASTOLIC BLOOD PRESSURE: 57 MMHG | SYSTOLIC BLOOD PRESSURE: 95 MMHG | TEMPERATURE: 98.2 F | HEART RATE: 96 BPM | RESPIRATION RATE: 19 BRPM

## 2017-07-13 DIAGNOSIS — Z95.1 PRESENCE OF AORTOCORONARY BYPASS GRAFT: ICD-10-CM

## 2017-07-13 DIAGNOSIS — Z09 ENCOUNTER FOR FOLLOW-UP EXAMINATION AFTER COMPLETED TREATMENT FOR CONDITIONS OTHER THAN MALIGNANT NEOPLASM: ICD-10-CM

## 2017-07-13 DIAGNOSIS — Z95.3 PRESENCE OF XENOGENIC HEART VALVE: ICD-10-CM

## 2017-07-13 PROCEDURE — 71046 X-RAY EXAM CHEST 2 VIEWS: CPT

## 2017-07-13 PROCEDURE — 71020: CPT | Mod: 26

## 2017-07-23 PROCEDURE — 97162 PT EVAL MOD COMPLEX 30 MIN: CPT

## 2017-07-23 PROCEDURE — 87070 CULTURE OTHR SPECIMN AEROBIC: CPT

## 2017-07-23 PROCEDURE — 93880 EXTRACRANIAL BILAT STUDY: CPT

## 2017-07-23 PROCEDURE — P9045: CPT

## 2017-07-23 PROCEDURE — 85730 THROMBOPLASTIN TIME PARTIAL: CPT

## 2017-07-23 PROCEDURE — P9035: CPT

## 2017-07-23 PROCEDURE — 82248 BILIRUBIN DIRECT: CPT

## 2017-07-23 PROCEDURE — 86923 COMPATIBILITY TEST ELECTRIC: CPT

## 2017-07-23 PROCEDURE — 74176 CT ABD & PELVIS W/O CONTRAST: CPT

## 2017-07-23 PROCEDURE — 86900 BLOOD TYPING SEROLOGIC ABO: CPT

## 2017-07-23 PROCEDURE — 70450 CT HEAD/BRAIN W/O DYE: CPT

## 2017-07-23 PROCEDURE — 84132 ASSAY OF SERUM POTASSIUM: CPT

## 2017-07-23 PROCEDURE — 97530 THERAPEUTIC ACTIVITIES: CPT

## 2017-07-23 PROCEDURE — 83880 ASSAY OF NATRIURETIC PEPTIDE: CPT

## 2017-07-23 PROCEDURE — 85025 COMPLETE CBC W/AUTO DIFF WBC: CPT

## 2017-07-23 PROCEDURE — 93930 UPPER EXTREMITY STUDY: CPT

## 2017-07-23 PROCEDURE — 85610 PROTHROMBIN TIME: CPT

## 2017-07-23 PROCEDURE — 82803 BLOOD GASES ANY COMBINATION: CPT

## 2017-07-23 PROCEDURE — 86022 PLATELET ANTIBODIES: CPT

## 2017-07-23 PROCEDURE — 36415 COLL VENOUS BLD VENIPUNCTURE: CPT

## 2017-07-23 PROCEDURE — 92610 EVALUATE SWALLOWING FUNCTION: CPT | Mod: GN

## 2017-07-23 PROCEDURE — 86850 RBC ANTIBODY SCREEN: CPT

## 2017-07-23 PROCEDURE — 94640 AIRWAY INHALATION TREATMENT: CPT

## 2017-07-23 PROCEDURE — 80048 BASIC METABOLIC PNL TOTAL CA: CPT

## 2017-07-23 PROCEDURE — 87086 URINE CULTURE/COLONY COUNT: CPT

## 2017-07-23 PROCEDURE — 84295 ASSAY OF SERUM SODIUM: CPT

## 2017-07-23 PROCEDURE — 83036 HEMOGLOBIN GLYCOSYLATED A1C: CPT

## 2017-07-23 PROCEDURE — 88305 TISSUE EXAM BY PATHOLOGIST: CPT

## 2017-07-23 PROCEDURE — 85027 COMPLETE CBC AUTOMATED: CPT

## 2017-07-23 PROCEDURE — 82150 ASSAY OF AMYLASE: CPT

## 2017-07-23 PROCEDURE — 80202 ASSAY OF VANCOMYCIN: CPT

## 2017-07-23 PROCEDURE — 94150 VITAL CAPACITY TEST: CPT

## 2017-07-23 PROCEDURE — C1889: CPT

## 2017-07-23 PROCEDURE — 81001 URINALYSIS AUTO W/SCOPE: CPT

## 2017-07-23 PROCEDURE — 80074 ACUTE HEPATITIS PANEL: CPT

## 2017-07-23 PROCEDURE — 94002 VENT MGMT INPAT INIT DAY: CPT

## 2017-07-23 PROCEDURE — P9017: CPT

## 2017-07-23 PROCEDURE — 97164 PT RE-EVAL EST PLAN CARE: CPT

## 2017-07-23 PROCEDURE — 74018 RADEX ABDOMEN 1 VIEW: CPT

## 2017-07-23 PROCEDURE — 97116 GAIT TRAINING THERAPY: CPT

## 2017-07-23 PROCEDURE — 71250 CT THORAX DX C-: CPT

## 2017-07-23 PROCEDURE — 82330 ASSAY OF CALCIUM: CPT

## 2017-07-23 PROCEDURE — 36430 TRANSFUSION BLD/BLD COMPNT: CPT

## 2017-07-23 PROCEDURE — 76705 ECHO EXAM OF ABDOMEN: CPT

## 2017-07-23 PROCEDURE — 83605 ASSAY OF LACTIC ACID: CPT

## 2017-07-23 PROCEDURE — 86901 BLOOD TYPING SEROLOGIC RH(D): CPT

## 2017-07-23 PROCEDURE — 87389 HIV-1 AG W/HIV-1&-2 AB AG IA: CPT

## 2017-07-23 PROCEDURE — 94003 VENT MGMT INPAT SUBQ DAY: CPT

## 2017-07-23 PROCEDURE — 84443 ASSAY THYROID STIM HORMONE: CPT

## 2017-07-23 PROCEDURE — 84134 ASSAY OF PREALBUMIN: CPT

## 2017-07-23 PROCEDURE — 83690 ASSAY OF LIPASE: CPT

## 2017-07-23 PROCEDURE — 84100 ASSAY OF PHOSPHORUS: CPT

## 2017-07-23 PROCEDURE — 85018 HEMOGLOBIN: CPT

## 2017-07-23 PROCEDURE — 92526 ORAL FUNCTION THERAPY: CPT | Mod: GN

## 2017-07-23 PROCEDURE — P9016: CPT

## 2017-07-23 PROCEDURE — 93005 ELECTROCARDIOGRAM TRACING: CPT

## 2017-07-23 PROCEDURE — 87075 CULTR BACTERIA EXCEPT BLOOD: CPT

## 2017-07-23 PROCEDURE — 83735 ASSAY OF MAGNESIUM: CPT

## 2017-07-23 PROCEDURE — 80053 COMPREHEN METABOLIC PANEL: CPT

## 2017-07-23 PROCEDURE — 80076 HEPATIC FUNCTION PANEL: CPT

## 2017-07-23 PROCEDURE — 71045 X-RAY EXAM CHEST 1 VIEW: CPT

## 2017-07-23 PROCEDURE — 88311 DECALCIFY TISSUE: CPT

## 2017-07-23 PROCEDURE — 87040 BLOOD CULTURE FOR BACTERIA: CPT

## 2017-12-13 NOTE — PROGRESS NOTE ADULT - SUBJECTIVE AND OBJECTIVE BOX
Returned call, spoke with patient.  Called to get an update on whole body bone scan.  Informed will reach out to pre-service on tomorrow to get an update and contact him. Voiced understanding.   Chart reviewed and patient interviewed. Patient is a 61 year old  Black male from [facility] current smoker ( 1 PPDx45 years), current every day use of Alcohol ( 1 pint of Vodka daily and several beers daily for 45 years ) and history of HLD and  HTN who was admitted to [facility] in early May 2017 with Chest pain and worsening HEIN. Had Cardiac Cath which showed CAD and Aortic Stenosis. He was discharged home and then seen at [facility] after awakening at home with severe Chest pain on 5/22/17. Patient was seen in Cardiac Clinic on 5/22/17 and was admitted for pre-op work up for possible CABG/AVR. Last drink was a bottle of beer on morning of 5/23/17, the day of admission to [facility].       Patient's wife reports he has gone through acute Alcohol withdrawal in the past when hospitalized and has become acutely agitated requiring sedation and restraint. Overnight patient became acutely agitated secondary to alcohol withdrawal and required sedation and intubation.  Patient proceeded to surgery this morning and underwent a CABG and AVR. Patient tolerated surgery well and remains intubated and sedated. Continuing present treatment.

## 2018-07-28 PROBLEM — Z78.9 ALCOHOL USE: Status: ACTIVE | Noted: 2017-05-17

## 2019-04-30 NOTE — SWALLOW BEDSIDE ASSESSMENT ADULT - ASR SWALLOW ASPIRATION MONITOR
fever/change of breathing pattern/upper respiratory infection/oral hygiene/gurgly voice/pneumonia/throat clearing/position upright (90Y)/cough normal...

## 2019-08-06 NOTE — PROGRESS NOTE ADULT - SUBJECTIVE AND OBJECTIVE BOX
Resolved   CTICU  CRITICAL  CARE  attending     Hand off received 					   Pertinent clinical, laboratory, radiographic, hemodynamic, echocardiographic, respiratory data, microbiologic data and chart were reviewed and analyzed frequently throughout the course of the day and night  Patient seen and examined with CTS/ SH attending at bedside  Pt is a 61y , Male, HEALTH ISSUES - PROBLEM Dx:      , FAMILY HISTORY:  PAST MEDICAL & SURGICAL HISTORY:  Aortic valve stenosis, unspecified etiology  Hyperlipidemia  Smoker  ETOH abuse  HTN (hypertension)    Patient is a 61y old  Male who presents with a chief complaint of chest pain, admission from office for preop workup (23 May 2017 18:15)      14 system review was unremarkable  acute changes include acute respiratory failure  Vital signs, hemodynamic and respiratory parameters were reviewed from the bedside nursing flowsheet.  ICU Vital Signs Last 24 Hrs  T(C): 37.1, Max: 37.2 (06-01 @ 01:00)  T(F): 98.8, Max: 99 (06-01 @ 01:00)  HR: 80 (80 - 110)  BP: 119/60 (90/50 - 119/60)  BP(mean): 87 (60 - 88)  ABP: 140/62 (86/40 - 140/62)  ABP(mean): 92 (56 - 92)  RR: 16 (12 - 38)  SpO2: 97% (96% - 100%)    Adult Advanced Hemodynamics Last 24 Hrs  CVP(mm Hg): 19 (6 - 20)  CVP(cm H2O): --  CO: --  CI: --  PA: --  PA(mean): --  PCWP: --  SVR: --  SVRI: --  PVR: --  PVRI: --, ABG - ( 01 Jun 2017 13:55 )  pH: 7.45  /  pCO2: 31    /  pO2: 116   / HCO3: 21    / Base Excess: -2.4  /  SaO2: 98                Mode: AC/ CMV (Assist Control/ Continuous Mandatory Ventilation)  RR (machine): 12  TV (machine): 600  FiO2: 50  PEEP: 5  ITime: 1  MAP: 7  PIP: 21    Intake and output was reviewed and the fluid balance was calculated  Daily     Daily   I&O's Summary  I & Os for 24h ending 01 Jun 2017 07:00  =============================================  IN: 3367 ml / OUT: 2705 ml / NET: 662 ml    I & Os for current day (as of 01 Jun 2017 23:15)  =============================================  IN: 1177.8 ml / OUT: 785 ml / NET: 392.8 ml      All lines and drain sites were assessed  Glycemic trend was reviewedCAPILLARY BLOOD GLUCOSE  109 (01 Jun 2017 11:00)    No acute change in mental status  Auscultation of the chest reveals equal bs  Abdomen is soft  Extremities are warm and well perfused  Wounds appear clean and unremarkable  Antibiotics are periop    labs  CBC Full  -  ( 01 Jun 2017 11:57 )  WBC Count : 9.6 K/uL  Hemoglobin : 9.3 g/dL  Hematocrit : 27.3 %  Platelet Count - Automated : 69 K/uL  Mean Cell Volume : 88.6 fL  Mean Cell Hemoglobin : 30.2 pg  Mean Cell Hemoglobin Concentration : 34.1 g/dL  Auto Neutrophil # : x  Auto Lymphocyte # : x  Auto Monocyte # : x  Auto Eosinophil # : x  Auto Basophil # : x  Auto Neutrophil % : x  Auto Lymphocyte % : x  Auto Monocyte % : x  Auto Eosinophil % : x  Auto Basophil % : x    06-01    140  |  108  |  11  ----------------------------<  128<H>  4.2   |  22  |  0.70    Ca    8.3<L>      01 Jun 2017 11:57  Phos  3.1     06-01  Mg     1.9     06-01    TPro  5.9<L>  /  Alb  3.0<L>  /  TBili  1.7<H>  /  DBili  0.7<H>  /  AST  32  /  ALT  20  /  AlkPhos  56  06-01    PT/INR - ( 01 Jun 2017 11:57 )   PT: 19.8 sec;   INR: 1.76          PTT - ( 01 Jun 2017 11:57 )  PTT:33.4 sec  The current medications were reviewed   MEDICATIONS  (STANDING):  aspirin enteric coated 81milliGRAM(s) Oral daily  sodium chloride 0.45%. 1000milliLiter(s) IV Continuous <Continuous>  famotidine Injectable 20milliGRAM(s) IV Push every 12 hours  insulin lispro (HumaLOG) corrective regimen sliding scale  SubCutaneous every 6 hours  dextrose 5%. 1000milliLiter(s) IV Continuous <Continuous>  dextrose 50% Injectable 12.5Gram(s) IV Push once  dextrose 50% Injectable 25Gram(s) IV Push once  dextrose 50% Injectable 25Gram(s) IV Push once  multivitamin 1Tablet(s) Oral daily  thiamine 100milliGRAM(s) Oral daily  folic acid 1milliGRAM(s) Oral daily  acetylcysteine 20% Inhalation 3milliLiter(s) Inhalation every 6 hours  atorvastatin 20milliGRAM(s) Oral at bedtime  metoprolol Injectable 5milliGRAM(s) IV Push every 6 hours  levalbuterol Inhalation 0.63milliGRAM(s) Inhalation every 6 hours  dexmedetomidine Infusion 0.5MICROgram(s)/kG/Hr IV Continuous <Continuous>  piperacillin/tazobactam IVPB. 3.375Gram(s) IV Intermittent every 6 hours  piperacillin/tazobactam IVPB.  IV Intermittent   furosemide   Injectable 20milliGRAM(s) IV Push daily  propofol Infusion 10MICROgram(s)/kG/Min IV Continuous <Continuous>  vancomycin  IVPB 1000milliGRAM(s) IV Intermittent every 12 hours  norepinephrine Infusion 0.01MICROgram(s)/kG/Min IV Continuous <Continuous>  dexmedetomidine Infusion 0.1MICROgram(s)/kG/Hr IV Continuous <Continuous>  vancomycin  IVPB 1500milliGRAM(s) IV Intermittent once    MEDICATIONS  (PRN):  bisacodyl Suppository 10milliGRAM(s) Rectal daily PRN Constipation  dextrose Gel 1Dose(s) Oral once PRN Blood Glucose LESS THAN 70 milliGRAM(s)/deciliter  glucagon  Injectable 1milliGRAM(s) IntraMuscular once PRN Glucose LESS THAN 70 milligrams/deciliter       PROBLEM LIST/ ASSESSMENT:  HEALTH ISSUES - PROBLEM Dx:      ,   Patient is a 61y old  Male who presents with a chief complaint of chest pain, admission from office for preop workup (23 May 2017 18:15)     s/p cabg avr  acute changes include acute respiratory failure    My plan includes :  close hemodynamic, ventilatory and drain monitoring and management per post op routine    Monitor for arrhythmias and monitor parameters for organ perfusion  monitor neurologic status  Head of the bed should remain elevated to 45 deg .   chest PT and IS will be encouraged  monitor adequacy of oxygenation and ventilation and attempt to wean oxygen  Nutritional goals will be met using po eventually , ensure adequate caloric intake and montior the same  Stress ulcer and VTE prophylaxis will be achieved    Glycemic control is satisfactory  Electrolytes have been repleted as necessary and wound care has been carried out. Pain control has been achieved.   agressive physical therapy and early mobility and ambulation goals will be met   The family was updated about the course and plan  CRITICAL CARE TIME SPENT in evaluation and management, reassessments, review and interpretation of labs and x-rays, ventilator and hemodynamic management, formulating a plan and coordinating care: ___90____ MIN.  Time does not include procedural time.  CTICU ATTENDING     					    José Miguel Sandoval MD

## 2020-04-01 NOTE — PROGRESS NOTE ADULT - SUBJECTIVE AND OBJECTIVE BOX
LM for patient to return call re: scheduling an earlier 50 minute VV with Dr. Cadet.    Chart reviewed and patient interviewed. Patient is a 61 year old  Black male from [facility] current smoker ( 1 PPDx45 years), current every day use of Alcohol ( 1 pint of Vodka daily and several beers daily for 45 years ) and history of HLD and  HTN who was admitted to [facility] in early May 2017 with Chest pain and worsening HEIN. Had Cardiac Cath which showed CAD and Aortic Stenosis. He was discharged home and then seen at [facility] after awakening at home with severe Chest pain on 5/22/17. Patient was seen in Cardiac Clinic on 5/22/17 and was admitted for pre-op work up for possible CABG/AVR. Last drink was a bottle of beer on morning of 5/23/17, the day of admission to [facility].       Patient's wife reports he has gone through acute Alcohol withdrawal in the past when hospitalized and has become acutely agitated requiring sedation and restraint. Overnight patient became acutely agitated secondary to alcohol withdrawal and required sedation and intubation.  Patient proceeded to surgery 5/26 and underwent a CABG and AVR. Patient tolerated surgery well and remained intubated and sedated. Patient reported to have been agitated yesterday secondary to Alcohol Withdrawal and is now receiving Librium 50mg q 6h and agitation is reported to be controlled. Patient was extubated 5/29 which he was tolerating well and able to follow simple commands and  attempting to speak. NGT was removed. He did not have Librium overnight. He then became more anxious consistent with persistent withdrawal. NGT was reinserted and he was restarted on Librium at 25mg via NGT q6h until withdrawal is completed. Patient required reintubation because of compromised pulmonary status and possible pneumonia. Patient continued on sedation as needed. Patient remained intubated and is receiving Precedex for sedation. Patient was extubated 6/06. Is alert and engagable. Patient had Chest tube placement for pleural effusion which is now removed. No sign of ETOH withdrawal. Has been up in a recliner chair and is cooperative with staff and is now walking some There is no longer evidence of alcohol withdrawal. Is restless at times saying he has to go home. Plans are being made for transfer to Subacute Rehab. Patient is to be started on Librium 10mg po bid for anxiety. Continuing Supportive therapy.

## 2022-02-24 ENCOUNTER — INPATIENT (INPATIENT)
Facility: HOSPITAL | Age: 67
LOS: 33 days | Discharge: HOME HEALTH | DRG: 270 | End: 2022-03-30
Attending: HOSPITALIST | Admitting: INTERNAL MEDICINE
Payer: MEDICAID

## 2022-02-24 VITALS
DIASTOLIC BLOOD PRESSURE: 75 MMHG | TEMPERATURE: 97 F | WEIGHT: 149.91 LBS | HEART RATE: 85 BPM | RESPIRATION RATE: 18 BRPM | HEIGHT: 67 IN | SYSTOLIC BLOOD PRESSURE: 121 MMHG | OXYGEN SATURATION: 100 %

## 2022-02-24 DIAGNOSIS — I21.4 NON-ST ELEVATION (NSTEMI) MYOCARDIAL INFARCTION: ICD-10-CM

## 2022-02-24 DIAGNOSIS — F17.200 NICOTINE DEPENDENCE, UNSPECIFIED, UNCOMPLICATED: ICD-10-CM

## 2022-02-24 DIAGNOSIS — R55 SYNCOPE AND COLLAPSE: ICD-10-CM

## 2022-02-24 DIAGNOSIS — D64.9 ANEMIA, UNSPECIFIED: ICD-10-CM

## 2022-02-24 DIAGNOSIS — I10 ESSENTIAL (PRIMARY) HYPERTENSION: ICD-10-CM

## 2022-02-24 DIAGNOSIS — E78.5 HYPERLIPIDEMIA, UNSPECIFIED: ICD-10-CM

## 2022-02-24 DIAGNOSIS — F10.10 ALCOHOL ABUSE, UNCOMPLICATED: ICD-10-CM

## 2022-02-24 DIAGNOSIS — I35.0 NONRHEUMATIC AORTIC (VALVE) STENOSIS: ICD-10-CM

## 2022-02-24 LAB
ALBUMIN SERPL ELPH-MCNC: 3.2 G/DL — LOW (ref 3.3–5)
ALBUMIN SERPL ELPH-MCNC: 3.4 G/DL — SIGNIFICANT CHANGE UP (ref 3.3–5)
ALP SERPL-CCNC: 101 U/L — SIGNIFICANT CHANGE UP (ref 40–120)
ALP SERPL-CCNC: 93 U/L — SIGNIFICANT CHANGE UP (ref 40–120)
ALT FLD-CCNC: 21 U/L — SIGNIFICANT CHANGE UP (ref 10–45)
ALT FLD-CCNC: 23 U/L — SIGNIFICANT CHANGE UP (ref 10–45)
ANION GAP SERPL CALC-SCNC: 12 MMOL/L — SIGNIFICANT CHANGE UP (ref 5–17)
APTT BLD: 32.2 SEC — SIGNIFICANT CHANGE UP (ref 27.5–35.5)
AST SERPL-CCNC: 48 U/L — HIGH (ref 10–40)
AST SERPL-CCNC: 51 U/L — HIGH (ref 10–40)
BASOPHILS # BLD AUTO: 0.01 K/UL — SIGNIFICANT CHANGE UP (ref 0–0.2)
BASOPHILS NFR BLD AUTO: 0.1 % — SIGNIFICANT CHANGE UP (ref 0–2)
BILIRUB DIRECT SERPL-MCNC: 0.2 MG/DL — SIGNIFICANT CHANGE UP (ref 0–0.3)
BILIRUB INDIRECT FLD-MCNC: 0.5 MG/DL — SIGNIFICANT CHANGE UP (ref 0.2–1)
BILIRUB SERPL-MCNC: 0.7 MG/DL — SIGNIFICANT CHANGE UP (ref 0.2–1.2)
BILIRUB SERPL-MCNC: 0.8 MG/DL — SIGNIFICANT CHANGE UP (ref 0.2–1.2)
BUN SERPL-MCNC: 10 MG/DL — SIGNIFICANT CHANGE UP (ref 7–23)
CALCIUM SERPL-MCNC: 8.6 MG/DL — SIGNIFICANT CHANGE UP (ref 8.4–10.5)
CHLORIDE SERPL-SCNC: 104 MMOL/L — SIGNIFICANT CHANGE UP (ref 96–108)
CK MB CFR SERPL CALC: 4 NG/ML — SIGNIFICANT CHANGE UP (ref 0–6.7)
CK SERPL-CCNC: 133 U/L — SIGNIFICANT CHANGE UP (ref 30–200)
CO2 SERPL-SCNC: 20 MMOL/L — LOW (ref 22–31)
CREAT SERPL-MCNC: 0.82 MG/DL — SIGNIFICANT CHANGE UP (ref 0.5–1.3)
EOSINOPHIL # BLD AUTO: 0.28 K/UL — SIGNIFICANT CHANGE UP (ref 0–0.5)
EOSINOPHIL NFR BLD AUTO: 3.7 % — SIGNIFICANT CHANGE UP (ref 0–6)
ETHANOL SERPL-MCNC: <10 MG/DL — SIGNIFICANT CHANGE UP (ref 0–10)
GLUCOSE SERPL-MCNC: 77 MG/DL — SIGNIFICANT CHANGE UP (ref 70–99)
HCT VFR BLD CALC: 31.4 % — LOW (ref 39–50)
HGB BLD-MCNC: 9.5 G/DL — LOW (ref 13–17)
IMM GRANULOCYTES NFR BLD AUTO: 0.4 % — SIGNIFICANT CHANGE UP (ref 0–1.5)
INR BLD: 1.18 — HIGH (ref 0.88–1.16)
LYMPHOCYTES # BLD AUTO: 1.96 K/UL — SIGNIFICANT CHANGE UP (ref 1–3.3)
LYMPHOCYTES # BLD AUTO: 25.8 % — SIGNIFICANT CHANGE UP (ref 13–44)
MCHC RBC-ENTMCNC: 27.3 PG — SIGNIFICANT CHANGE UP (ref 27–34)
MCHC RBC-ENTMCNC: 30.3 GM/DL — LOW (ref 32–36)
MCV RBC AUTO: 90.2 FL — SIGNIFICANT CHANGE UP (ref 80–100)
MONOCYTES # BLD AUTO: 0.92 K/UL — HIGH (ref 0–0.9)
MONOCYTES NFR BLD AUTO: 12.1 % — SIGNIFICANT CHANGE UP (ref 2–14)
NEUTROPHILS # BLD AUTO: 4.41 K/UL — SIGNIFICANT CHANGE UP (ref 1.8–7.4)
NEUTROPHILS NFR BLD AUTO: 57.9 % — SIGNIFICANT CHANGE UP (ref 43–77)
NRBC # BLD: 0 /100 WBCS — SIGNIFICANT CHANGE UP (ref 0–0)
PLATELET # BLD AUTO: 149 K/UL — LOW (ref 150–400)
POTASSIUM SERPL-MCNC: 4.3 MMOL/L — SIGNIFICANT CHANGE UP (ref 3.5–5.3)
POTASSIUM SERPL-SCNC: 4.3 MMOL/L — SIGNIFICANT CHANGE UP (ref 3.5–5.3)
PROT SERPL-MCNC: 7.1 G/DL — SIGNIFICANT CHANGE UP (ref 6–8.3)
PROT SERPL-MCNC: 7.9 G/DL — SIGNIFICANT CHANGE UP (ref 6–8.3)
PROTHROM AB SERPL-ACNC: 14.1 SEC — HIGH (ref 10.5–13.4)
RAPID RVP RESULT: SIGNIFICANT CHANGE UP
RBC # BLD: 3.48 M/UL — LOW (ref 4.2–5.8)
RBC # FLD: 15.8 % — HIGH (ref 10.3–14.5)
SARS-COV-2 RNA SPEC QL NAA+PROBE: SIGNIFICANT CHANGE UP
SODIUM SERPL-SCNC: 136 MMOL/L — SIGNIFICANT CHANGE UP (ref 135–145)
TROPONIN T SERPL-MCNC: 0.02 NG/ML — HIGH (ref 0–0.01)
TROPONIN T SERPL-MCNC: 0.02 NG/ML — HIGH (ref 0–0.01)
WBC # BLD: 7.61 K/UL — SIGNIFICANT CHANGE UP (ref 3.8–10.5)
WBC # FLD AUTO: 7.61 K/UL — SIGNIFICANT CHANGE UP (ref 3.8–10.5)

## 2022-02-24 PROCEDURE — 99152 MOD SED SAME PHYS/QHP 5/>YRS: CPT

## 2022-02-24 PROCEDURE — 71045 X-RAY EXAM CHEST 1 VIEW: CPT | Mod: 26

## 2022-02-24 PROCEDURE — 93010 ELECTROCARDIOGRAM REPORT: CPT

## 2022-02-24 PROCEDURE — 99222 1ST HOSP IP/OBS MODERATE 55: CPT

## 2022-02-24 PROCEDURE — 99285 EMERGENCY DEPT VISIT HI MDM: CPT | Mod: 25

## 2022-02-24 PROCEDURE — 70450 CT HEAD/BRAIN W/O DYE: CPT | Mod: 26

## 2022-02-24 PROCEDURE — 93455 CORONARY ART/GRFT ANGIO S&I: CPT | Mod: 26

## 2022-02-24 RX ORDER — ATORVASTATIN CALCIUM 80 MG/1
80 TABLET, FILM COATED ORAL AT BEDTIME
Refills: 0 | Status: DISCONTINUED | OUTPATIENT
Start: 2022-02-24 | End: 2022-03-18

## 2022-02-24 RX ORDER — PREGABALIN 225 MG/1
1 CAPSULE ORAL
Qty: 0 | Refills: 0 | DISCHARGE

## 2022-02-24 RX ORDER — INFLUENZA VIRUS VACCINE 15; 15; 15; 15 UG/.5ML; UG/.5ML; UG/.5ML; UG/.5ML
0.7 SUSPENSION INTRAMUSCULAR ONCE
Refills: 0 | Status: COMPLETED | OUTPATIENT
Start: 2022-02-24 | End: 2022-02-24

## 2022-02-24 RX ORDER — ERGOCALCIFEROL 1.25 MG/1
1 CAPSULE ORAL
Qty: 0 | Refills: 0 | DISCHARGE

## 2022-02-24 RX ORDER — NICOTINE POLACRILEX 2 MG
1 GUM BUCCAL DAILY
Refills: 0 | Status: DISCONTINUED | OUTPATIENT
Start: 2022-02-24 | End: 2022-03-30

## 2022-02-24 RX ORDER — ASPIRIN/CALCIUM CARB/MAGNESIUM 324 MG
1 TABLET ORAL
Qty: 0 | Refills: 0 | DISCHARGE

## 2022-02-24 RX ORDER — SODIUM CHLORIDE 9 MG/ML
500 INJECTION INTRAMUSCULAR; INTRAVENOUS; SUBCUTANEOUS
Refills: 0 | Status: DISCONTINUED | OUTPATIENT
Start: 2022-02-24 | End: 2022-03-06

## 2022-02-24 RX ORDER — VALACYCLOVIR 500 MG/1
1 TABLET, FILM COATED ORAL
Qty: 0 | Refills: 0 | DISCHARGE

## 2022-02-24 RX ORDER — ASPIRIN/CALCIUM CARB/MAGNESIUM 324 MG
243 TABLET ORAL ONCE
Refills: 0 | Status: COMPLETED | OUTPATIENT
Start: 2022-02-24 | End: 2022-02-24

## 2022-02-24 RX ORDER — CLOPIDOGREL BISULFATE 75 MG/1
600 TABLET, FILM COATED ORAL ONCE
Refills: 0 | Status: COMPLETED | OUTPATIENT
Start: 2022-02-24 | End: 2022-02-24

## 2022-02-24 RX ADMIN — ATORVASTATIN CALCIUM 80 MILLIGRAM(S): 80 TABLET, FILM COATED ORAL at 21:31

## 2022-02-24 RX ADMIN — SODIUM CHLORIDE 50 MILLILITER(S): 9 INJECTION INTRAMUSCULAR; INTRAVENOUS; SUBCUTANEOUS at 18:29

## 2022-02-24 RX ADMIN — Medication 243 MILLIGRAM(S): at 15:53

## 2022-02-24 RX ADMIN — CLOPIDOGREL BISULFATE 600 MILLIGRAM(S): 75 TABLET, FILM COATED ORAL at 15:51

## 2022-02-24 NOTE — H&P ADULT - PROBLEM SELECTOR PLAN 1
Patient presenting with 2 episodes nonradiating, 9/10 CP occurring at rest   - Patient w/ hx of CABG 5/2017 -  LIMA to LAD, SVG to PDA;  - Cath 5/2017: distal LM 40%, pLAC 80%, pLcx 80%, pRCA 80%, EF 55%, EDP 6 mmHg, JONEL 0.89cm2 consistent with severe AS, right radial artery and right femoral vein access. Right heart cath revealed RAP 3, RV 22/1 mean 4, PAP 25/7 mean 16, PCWP: 6, Ao/PA sat 92/67, CO/CI 5.2/3.4 consistent with normal RV filling pressures and preserved cardiac output/indices.  - ECHO   - EKG: NSR @ 85 bpm, ST depressions in II, V4, V5, V6   - Troponin: 0.02, trend CE's   - Loaded with Plavix 600 mg PO x 1 and Aspirin 325 mg PO x 1   - Precathed/consented to the schedule   - Mallampati: III, ASA: III  - F/u lipid panel - started on Atorva 80 qhs; f/u a1c   - Tele monitoring

## 2022-02-24 NOTE — H&P ADULT - PROBLEM SELECTOR PLAN 8
Patient without interest to quit drinking etoh or smoking  - Start Nicotine patches     F: 250 cc bolus prior to cath   E: K >4 , MG > 2  N: NPO for cath   Dvt: Waiting for cath   Dispo: pending clinical progression     Case discussed with Dr. Romano

## 2022-02-24 NOTE — ED PROVIDER NOTE - CLINICAL SUMMARY MEDICAL DECISION MAKING FREE TEXT BOX
67 y/o M with PMHx HTN, HLD, CABG w/ defib, chronic smoking and drinking x50y, presents to the ED with chest pain, SOB, dizziness x9h w/ defib shock at start of pain. Consulted cardiology, Dr. Mejia, states patient likely needs cardiac cath but not emergent. Plan for labs, CXR, admit for cath later today. 65 y/o M with PMHx HTN, HLD, CABG w/ defib, chronic smoking and drinking x50y, presents to the ED with chest pain, SOB, dizziness x9h w/ defib shock at start of pain. Consulted cardiology, Dr. Mejia, states patient likely needs cardiac cath but not emergent. Plan for labs, CXR, admit for cath later today. case discussed with cards. will arrange for interrogation of AICD. Pt took ASA 81mg X2 prior to arrival.

## 2022-02-24 NOTE — H&P ADULT - PROBLEM SELECTOR PLAN 7
Patient without interest to quit drinking etoh or smoking  - Start Nicotine patches     F: 250 cc bolus prior to cath   E: K >4 , MG > 2  N: NPO for cath   Dvt: Waiting for cath   Dispo: pending clinical progression     Case discussed with Dr. Romano F/u lipid panel  - Start Atorva 80 (watch LFTs due to liver disease from ETOH)

## 2022-02-24 NOTE — PATIENT PROFILE ADULT - FALL HARM RISK - HARM RISK INTERVENTIONS
Assistance with ambulation/Assistance OOB with selected safe patient handling equipment/Communicate Risk of Fall with Harm to all staff/Monitor for mental status changes/Monitor gait and stability/Reinforce activity limits and safety measures with patient and family/Tailored Fall Risk Interventions/Toileting schedule using arm’s reach rule for commode and bathroom/Use of alarms - bed, chair and/or voice tab/Visual Cue: Yellow wristband and red socks/Bed in lowest position, wheels locked, appropriate side rails in place/Call bell, personal items and telephone in reach/Instruct patient to call for assistance before getting out of bed or chair/Non-slip footwear when patient is out of bed/[place] to call system/Physically safe environment - no spills, clutter or unnecessary equipment/Purposeful Proactive Rounding/Room/bathroom lighting operational, light cord in reach

## 2022-02-24 NOTE — H&P ADULT - NSICDXPASTMEDICALHX_GEN_ALL_CORE_FT
PAST MEDICAL HISTORY:  Aortic valve stenosis, unspecified etiology     ETOH abuse     HTN (hypertension)     Hyperlipidemia     Smoker

## 2022-02-24 NOTE — ED ADULT NURSE NOTE - OBJECTIVE STATEMENT
Pt presenting with L sided intermit CP since 1 am. As per wife pt had syncopal episode on his way to the bathroom at 1 am. States he became diaphoretic and endorsed L sided 10/10 CP. Pt returned to sleep after episode. Denies active CP at this time. Line and labs obtained, placed on CM, EKG done. Cardiology consulted. Crackles heard at lung bases, pt denies n/v/d. No neuro deficits. Pt presenting with L sided intermit CP since 1 am. As per wife pt had syncopal episode on his way to the bathroom at 1 am. States he became diaphoretic and endorsed L sided 10/10 CP. States pacemaker fired. Pt returned to sleep after episode. Denies active CP at this time. Line and labs obtained, placed on CM, EKG done. Cardiology consulted. Crackles heard at lung bases, pt denies n/v/d. No neuro deficits. Pt presenting with L sided intermit CP since 1 am. As per wife pt had syncopal episode on his way to the bathroom at 1 am. States he became diaphoretic and endorsed L sided 10/10 CP. Pt returned to sleep after episode. Denies active CP at this time. Line and labs obtained, placed on CM, EKG done. Cardiology consulted. Crackles heard at lung bases, pt denies n/v/d. No neuro deficits. Endorsing ETOH use, denies hx of withdrawal seizures. No tremors/ fasciculations.

## 2022-02-24 NOTE — ED ADULT NURSE NOTE - CHIEF COMPLAINT QUOTE
Pt c/o chest pain and multiple syncopal episodes, more recent at 1 am with +LOC and head injury. Pt with internal defibrillator and endorses "It went off last night." Pt on ASA 81 mg daily.

## 2022-02-24 NOTE — H&P ADULT - HISTORY OF PRESENT ILLNESS
Patient is a 67 y/o M, current smoker (1ppd x 50 years), current every day ETOH abuse (1 pint vodka and several beers daily x 50 years), with PMHx of HLD, HTN, CABG w/ implanted defib in 2017, who presented to [facility] ED on 2/24/22 with complaints of __/10 chest pain, SOB, and dizziness for the past few days. Patient first experienced chest pain 3 days ago while___ and resolved on its own. Patient began to have another episode of chest pain while ____ about 9H PTA. Patient felt his defbrillator shock him at that time. Patient's wife states that patient has not seen a physician for over 2 years due to insurance issues. Patient denies current chest pain, palpitations, dizziness, diaphoresis, headache, fever, chills, abdominal pain, back pain, n/v/d, recent travel or sick contacts.     In the ED, VS: 97.2, HR: 85 bpm, BP: 121/75 mmHg, RR: 18 breaths/min, spO2: 100% on RA   Labs significant for: Hgb/Hct: 9.5/31.4, AST: 51, Troponin: 0.02. EKG: NSR @ 85 bpm, ST depressions in II, V4, V5, V6. CXR: No cardiopulmonary edema   Patient admitted to cardiology/telemetry for further management of NSTEMI.    Patient is a 67 y/o M, current smoker (1ppd x 50 years), current every day ETOH abuse (1 pint vodka and several beers daily x 50 years), with PMHx of HLD, HTN (not on any medications),  CABG (LIMA to LAD, SVG to PDA in 2017), AS (s/p AVR in 2017), who presented to [facility] ED on 2/24/22 with complaints of 9/10 chest pain with associated diaphoresis that occurred when walking to the bathroom last night around 1 am. Patient states that during this time, he fell and "blacked out" and hit his head. Patient then went back to sleep. Patient states that he had a similiar chest pain 3 days ago that resolved on his own. Patient's wife states that patient has not seen a physician for over 2 years due to insurance issues. Patient denies current chest pain, palpitations, dizziness, diaphoresis, headache, fever, chills, abdominal pain, back pain, n/v/d, recent travel or sick contacts.     In the ED, VS: 97.2, HR: 85 bpm, BP: 121/75 mmHg, RR: 18 breaths/min, spO2: 100% on RA   Labs significant for: Hgb/Hct: 9.5/31.4, AST: 51, Troponin: 0.02. EKG: NSR @ 85 bpm, ST depressions in II, V4, V5, V6. CXR: No cardiopulmonary edema   Patient admitted to cardiology/telemetry for further management of NSTEMI.    Patient is a 65 y/o M, current smoker (1ppd x 50 years), current every day ETOH abuse (1 pint vodka and several beers daily x 50 years), with PMHx of HLD, HTN (not on any medications),  CABG (LIMA to LAD, SVG to PDA in 2017), AS (s/p AVR in 2017), who presented to [facility] ED on 2/24/22 with complaints of 9/10 chest pain with associated diaphoresis that occurred when walking to the bathroom last night around 1 am. Patient states that during this time, he fell and "blacked out" and hit the back of his head. Patient then went back to sleep. Patient states that he had a similiar chest pain 3 days ago that resolved on his own. Patient's wife states that patient has not seen a physician for over 2 years due to insurance issues.  Patient states last drink was 2 shots of vodka last night. Patient denies current chest pain, palpitations, dizziness, diaphoresis, headache, fever, chills, abdominal pain, back pain, n/v/d, recent travel or sick contacts.     In the ED, VS: 97.2, HR: 85 bpm, BP: 121/75 mmHg, RR: 18 breaths/min, spO2: 100% on RA   Labs significant for: Hgb/Hct: 9.5/31.4, AST: 51, Troponin: 0.02. EKG: NSR @ 85 bpm, ST depressions in II, V4, V5, V6. CXR: No cardiopulmonary edema   Patient admitted to cardiology/telemetry for further management of NSTEMI.

## 2022-02-24 NOTE — ED PROVIDER NOTE - MUSCULOSKELETAL, MLM
Spine appears normal, range of motion is not limited, no muscle or joint tenderness. no calf edema or tenderness.

## 2022-02-24 NOTE — H&P ADULT - PROBLEM SELECTOR PLAN 6
F/u lipid panel  - Start Atorva 80 (watch LFTs due to liver disease from ETOH) Hx of HTN, denies use of meds  - BP stable in ED - 121/75   - Will monitor - likely start BB or ACE

## 2022-02-24 NOTE — H&P ADULT - ATTENDING COMMENTS
Initial attending contact date 2/24/22 in cath lab [facility]. See attending addendum to HPI here for initial attending contact documentation.  67 y/o M, current smoker, ETOH abuse, HLD, HTN (not on any medications), hx CABG (LIMA to LAD, SVG to PDA in 2017), AS (s/p AVR in 2017), who p/w CP. Troponin: 0.02x2. EKG: NSR w/ST depressions in II, V4, V5, V6. Patient admitted to cardiology/telemetry for further management of NSTEMI.   Plan for:  NPO for [facility] tonight 2/24  DAPT with ASA/ Clopidogrel load per IC protocol  High intensity statin Atorva 80mg qHS  Obtain TTE for cardiac structural assessment  Nutrition consults for lifestyle modification counselling  Patient to be referred to outpatient cardiac rehab upon discharge for cardiac conditioning  Ira Fox M.D.  Cardiology Attending  60minutes spent on total encounter; more than 50% of the visit was spent counseling and/or coordinating care by the attending physician, with plan of care discussed with the patient and cardiac team. Initial attending contact date 2/24/22 in cath lab [facility]. See attending addendum to HPI here for initial attending contact documentation.  65 y/o M, current smoker, ETOH abuse, HLD, HTN (not on any medications), hx CABG (LIMA to LAD, SVG to PDA in 2017), AS (s/p AVR in 2017), who p/w CP. Troponin: 0.02x2. EKG: NSR w/ST depressions in II, V4, V5, V6. Patient admitted to cardiology/telemetry for further management of NSTEMI.   Plan for:  NPO for [facility] tonight 2/24  DAPT with ASA/ Clopidogrel load per IC protocol  High intensity statin Atorva 80mg qHS  Obtain TTE for cardiac structural assessment  Smoking cessation counselling, NRT rx  Nutrition consults for lifestyle modification counselling  Patient to be referred to outpatient cardiac rehab upon discharge for cardiac conditioning  Ira Fox M.D.  Cardiology Attending  60minutes spent on total encounter; more than 50% of the visit was spent counseling and/or coordinating care by the attending physician, with plan of care discussed with the patient and cardiac team.

## 2022-02-24 NOTE — ED PROVIDER NOTE - NSCAREINITIATED _GEN_ER
History  Chief Complaint   Patient presents with    Foot Pain     Pt reports 10 days ago he jumped off the bed, and landed on a bolt, causing his ankle to twist  States went to [facility] and was told nothing was broken  Pt continuing to  have pain  Pt reports motrin PTA  79-year-old male who presents for evaluation of right foot pain x2 weeks  He states approximately 1 5 weeks ago he fell off a bunk bed and landed on a bolt injuring his right heel  He was seen at [facility], had x-rays of his right knee, right ankle, and right foot which returned negative for acute fracture  He was discharged home with instructions to follow up with podiatrist however did not  He was offered a walking boot but declined  He presents today because he has had persistent pain to his right heel, states he has difficulty walking at times due to pain  His pain is better with rest  His pain is 10/10  He has been taking Motrin with some relief of symptoms, most recently a few hours prior to arrival   He denies any paresthesias, numbness or tingling or wounds  None       History reviewed  No pertinent past medical history  History reviewed  No pertinent surgical history  History reviewed  No pertinent family history  I have reviewed and agree with the history as documented  Social History   Substance Use Topics    Smoking status: Current Every Day Smoker    Smokeless tobacco: Never Used    Alcohol use No        Review of Systems   Constitutional: Negative for chills and fever  Respiratory: Negative for shortness of breath  Cardiovascular: Negative for chest pain, palpitations and leg swelling  Musculoskeletal: Positive for arthralgias and gait problem  Negative for joint swelling  Skin: Negative for color change and wound  Neurological: Negative for weakness and numbness         Physical Exam  ED Triage Vitals [01/26/18 1403]   Temperature Pulse Respirations Blood Pressure SpO2   97 9 °F (36 6 °C) 86 16 130/87 98 %      Temp Source Heart Rate Source Patient Position - Orthostatic VS BP Location FiO2 (%)   Temporal Monitor Sitting Right arm --      Pain Score       Worst Possible Pain           Orthostatic Vital Signs  Vitals:    01/26/18 1403   BP: 130/87   Pulse: 86   Patient Position - Orthostatic VS: Sitting       Physical Exam   Constitutional: He is oriented to person, place, and time  He appears well-developed and well-nourished  No distress  HENT:   Head: Normocephalic and atraumatic  Right Ear: External ear normal    Left Ear: External ear normal    Nose: Nose normal    Mouth/Throat: Oropharynx is clear and moist    Eyes: Conjunctivae and EOM are normal  Pupils are equal, round, and reactive to light  Neck: Normal range of motion  Neck supple  Cardiovascular: Normal rate, regular rhythm and normal heart sounds  Exam reveals no gallop and no friction rub  No murmur heard  Pulmonary/Chest: Effort normal and breath sounds normal  No respiratory distress  He has no wheezes  He has no rales  Musculoskeletal:        Right ankle: He exhibits normal range of motion, no swelling, no ecchymosis, no deformity, no laceration and normal pulse  Achilles tendon normal  Achilles tendon exhibits no pain and no defect  Right foot: There is decreased range of motion, tenderness and bony tenderness  There is no swelling, normal capillary refill, no crepitus, no deformity and no laceration  Feet:    Neurological: He is alert and oriented to person, place, and time  Skin: Skin is warm and dry  Capillary refill takes less than 2 seconds  No rash noted  He is not diaphoretic  Psychiatric: He has a normal mood and affect  Nursing note and vitals reviewed        ED Medications  Medications   acetaminophen (TYLENOL) tablet 650 mg (650 mg Oral Given 1/26/18 1506)       Diagnostic Studies  Results Reviewed     None                 XR heel / calcaneus 2+ views RIGHT   ED Interpretation Waylon Marin(Attending) by Jonathon Roberson PA-C (01/26 4182)   NO FRACTURE      Final Result by Amandeep Gonzalez DO (01/26 3719)      No acute osseous abnormality  Workstation performed: [id number]                    Procedures  Orthopedic Injury  Date/Time: 1/26/2018 4:02 PM  Performed by: Joey Carpenter  Authorized by: Joey Carpenter   Injury location: foot  Location details: right foot  Injury type: soft tissue  Pre-procedure neurovascular assessment: neurovascularly intact  Pre-procedure distal perfusion: normal  Pre-procedure neurological function: normal  Pre-procedure range of motion: normal  Immobilization: postop shoe  Patient tolerance: Patient tolerated the procedure well with no immediate complications             Phone Contacts  ED Phone Contact    ED Course  ED Course                                MDM  Number of Diagnoses or Management Options  Right foot pain: established and worsening  Diagnosis management comments:   45 yo M presents with persistent right foot/heel pain after a fall 2 weeks ago  Seen at [address] [place] [address] ED - Prior records were reviewed  Patient had a negative x-ray of the right knee, ankle and foot at [facility]  He did not f/u with podiatry  Pain worse with walking  Will obtain x-ray calcaneus to rule out fracture, give postop shoe and crutches  Follow up with Podiatry, RTED for worsening  Patient verbalized understanding and agrees with plan         Amount and/or Complexity of Data Reviewed  Tests in the radiology section of CPT®: ordered and reviewed  Decide to obtain previous medical records or to obtain history from someone other than the patient: yes  Review and summarize past medical records: yes  Independent visualization of images, tracings, or specimens: yes    Patient Progress  Patient progress: stable    CritCare Time    Disposition  Final diagnoses:   Right foot pain     Time reflects when diagnosis was documented in both MDM as applicable and the Disposition within this note     Time User Action Codes Description Comment    1/26/2018  3:52 PM Liz Mcadams Add [D93 491] Right foot pain       ED Disposition     ED Disposition Condition Comment    Discharge  Eduardo Nelson discharge to home/self care  Condition at discharge: Good        Follow-up Information     Follow up With Specialties Details Why Contact Info Additional Information    Pallavi Murguia DPM Podiatry Schedule an appointment as soon as possible for a visit [address] [facility] 7       Kingsley Ferrell DPM Podiatry   [facility] [address] Emergency Department Emergency Medicine  If symptoms worsen [address] [facility]  [phone] [place] ED, [address] [facility] [facility] [address]  , [place], [place], [facility]        There are no discharge medications for this patient  No discharge procedures on file      ED Provider  Electronically Signed by           Enrico Freed PA-C  01/26/18 0225

## 2022-02-24 NOTE — H&P ADULT - PROBLEM SELECTOR PLAN 5
Hx of HTN, denies use of meds  - BP stable in ED - 121/75   - Will monitor - likely start BB or ACE Hx of ETOH abuse - drinks 1 pint vodka daily and a few beers  - Last admission in 2017 - patient underwent withdrawal - requiring Libirum 25 mg TID  - Med consult   - F/u ETOH level and urine tox

## 2022-02-24 NOTE — H&P ADULT - ASSESSMENT
Patient is a 65 y/o M, current smoker (1ppd x 50 years), current every day ETOH abuse (1 pint vodka and several beers daily x 50 years), with PMHx of HLD, HTN (not on any medications),  CABG (LIMA to LAD, SVG to PDA in 2017), AS (s/p AVR in 2017), who presented to [facility] ED on 2/24/22 with complaints of 9/10 chest pain with associated diaphoresis that occurred when walking to the bathroom last night around 1 am. Patient states that during this time, he fell and "blacked out" and hit the back of his head. Patient then went back to sleep. Patient states that he had a similiar chest pain 3 days ago that resolved on his own. Patient's wife states that patient has not seen a physician for over 2 years due to insurance issues.  Patient states last drink was 2 shots of vodka last night. Patient's Troponin: 0.02. EKG: NSR @ 85 bpm, ST depressions in II, V4, V5, V6. Patient admitted to cardiology/telemetry for further management of NSTEMI.      Patient is a 65 y/o M, current smoker (1ppd x 50 years), current every day ETOH abuse (1 pint vodka and several beers daily x 50 years), with PMHx of HLD, HTN (not on any medications),  CABG (LIMA to LAD, SVG to PDA in 2017), AS (s/p AVR in 2017), who presented to [facility] ED on 2/24/22 with complaints of 9/10 chest pain with associated diaphoresis that occurred when walking to the bathroom last night around 1 am. Patient states that during this time, he fell and "blacked out" and hit the back of his head. Patient then went back to sleep. Patient states that he had a similiar chest pain 3 days ago that resolved on his own. Patient's wife states that patient has not seen a physician for over 2 years due to insurance issues.  Patient states last drink was 2 shots of vodka last night. Patient's Troponin: 0.02. EKG: NSR @ 85 bpm, ST depressions in II, V4, V5, V6. Patient admitted to cardiology/telemetry for further management of NSTEMI.       Risks & benefits of procedure and alternative therapy have been explained to the patient including but not limited to: allergic reaction, bleeding w/possible need for blood transfusion, infection, renal and vascular compromise, limb damage, arrhythmia, stroke, vessel dissection/perforation, Myocardial infarction, emergent CABG. Informed consent obtained and in chart.

## 2022-02-24 NOTE — H&P ADULT - PROBLEM SELECTOR PLAN 4
Hx of ETOH abuse - drinks 1 pint vodka daily and a few beers  - Last admission in 2017 - patient underwent withdrawal - requiring Libirum 25 mg TID  - Med consult   - F/u ETOH level and urine tox Hgb/Hct: 9.5/31.4  - F/u iron studies   - Will check with IC fellow for load   - f/u fecal occult

## 2022-02-24 NOTE — H&P ADULT - PROBLEM SELECTOR PLAN 3
Patient states that during his episode of CP, had "blacked out" and hit his head  - CT head 2/24/22 negative for acute hemorrhage/ischemia  - F/u ECHO for AS  -

## 2022-02-24 NOTE — ED PROVIDER NOTE - OBJECTIVE STATEMENT
66M w/ PMHx current smoking (1ppd x 50 years), current every day ETOH use (1 pink vodka and several beers daily x 50 years), HLD, HTN, CABG w/ implanted defib in 2017, presents to the ED with chest pain, SOB, dizziness. Patient had episode of pain 3 days ago and resolved, started again at 1AM today (9h PTA), felt defib shock him at that time. Wife notes patient has not seen a physician for over 2 years due to insurance issues. No LE swelling, pain, fever, chills, headache, focal weakness. Last drink 2 shots last night. 66M w/ PMHx current smoking (1ppd x 50 years), current every day ETOH use (1 pink vodka and several beers daily x 50 years), HLD, HTN, CABG w/ implanted defib in 2017, presents to the ED with chest pain, SOB, dizziness. Patient had episode of pain 3 days ago and resolved, started again at 1AM today (9h PTA), felt ? shock to chest. Wife notes patient has not seen a physician for over 2 years due to insurance issues. No LE swelling, pain, fever, chills, headache, focal weakness. Last drink 2 shots last night.

## 2022-02-24 NOTE — H&P ADULT - ATTENDING SUPERVISION STATEMENT
A tooth issue upper L jaw could definitely be an issue. Sinusitis can also cause cheek bone/eye pain but we would expect congestion, runny nose, cough.  I would have mom feel around his teeth on the upper L side and if any discomfort - see their dentist. If ACP

## 2022-02-25 ENCOUNTER — TRANSCRIPTION ENCOUNTER (OUTPATIENT)
Age: 67
End: 2022-02-25

## 2022-02-25 DIAGNOSIS — I20.0 UNSTABLE ANGINA: ICD-10-CM

## 2022-02-25 LAB
A1C WITH ESTIMATED AVERAGE GLUCOSE RESULT: 5.1 % — SIGNIFICANT CHANGE UP (ref 4–5.6)
ALBUMIN SERPL ELPH-MCNC: 3.1 G/DL — LOW (ref 3.3–5)
ALP SERPL-CCNC: 89 U/L — SIGNIFICANT CHANGE UP (ref 40–120)
ALT FLD-CCNC: 20 U/L — SIGNIFICANT CHANGE UP (ref 10–45)
ANION GAP SERPL CALC-SCNC: 9 MMOL/L — SIGNIFICANT CHANGE UP (ref 5–17)
AST SERPL-CCNC: 43 U/L — HIGH (ref 10–40)
BASOPHILS # BLD AUTO: 0.02 K/UL — SIGNIFICANT CHANGE UP (ref 0–0.2)
BASOPHILS NFR BLD AUTO: 0.4 % — SIGNIFICANT CHANGE UP (ref 0–2)
BILIRUB SERPL-MCNC: 1 MG/DL — SIGNIFICANT CHANGE UP (ref 0.2–1.2)
BUN SERPL-MCNC: 11 MG/DL — SIGNIFICANT CHANGE UP (ref 7–23)
CALCIUM SERPL-MCNC: 8.2 MG/DL — LOW (ref 8.4–10.5)
CHLORIDE SERPL-SCNC: 106 MMOL/L — SIGNIFICANT CHANGE UP (ref 96–108)
CHOLEST SERPL-MCNC: 157 MG/DL — SIGNIFICANT CHANGE UP
CO2 SERPL-SCNC: 22 MMOL/L — SIGNIFICANT CHANGE UP (ref 22–31)
CREAT SERPL-MCNC: 0.92 MG/DL — SIGNIFICANT CHANGE UP (ref 0.5–1.3)
EOSINOPHIL # BLD AUTO: 0.19 K/UL — SIGNIFICANT CHANGE UP (ref 0–0.5)
EOSINOPHIL NFR BLD AUTO: 3.4 % — SIGNIFICANT CHANGE UP (ref 0–6)
ESTIMATED AVERAGE GLUCOSE: 100 MG/DL — SIGNIFICANT CHANGE UP (ref 68–114)
FERRITIN SERPL-MCNC: 24 NG/ML — LOW (ref 30–400)
FOLATE SERPL-MCNC: 11 NG/ML — SIGNIFICANT CHANGE UP
GLUCOSE SERPL-MCNC: 106 MG/DL — HIGH (ref 70–99)
HCT VFR BLD CALC: 29.6 % — LOW (ref 39–50)
HCV AB S/CO SERPL IA: 0.04 S/CO — SIGNIFICANT CHANGE UP
HCV AB SERPL-IMP: SIGNIFICANT CHANGE UP
HDLC SERPL-MCNC: 26 MG/DL — LOW
HGB BLD-MCNC: 9.2 G/DL — LOW (ref 13–17)
IMM GRANULOCYTES NFR BLD AUTO: 0.4 % — SIGNIFICANT CHANGE UP (ref 0–1.5)
IRON SATN MFR SERPL: 36 UG/DL — LOW (ref 45–165)
IRON SATN MFR SERPL: 9 % — LOW (ref 16–55)
LIPID PNL WITH DIRECT LDL SERPL: 107 MG/DL — HIGH
LYMPHOCYTES # BLD AUTO: 1.38 K/UL — SIGNIFICANT CHANGE UP (ref 1–3.3)
LYMPHOCYTES # BLD AUTO: 24.7 % — SIGNIFICANT CHANGE UP (ref 13–44)
MAGNESIUM SERPL-MCNC: 2 MG/DL — SIGNIFICANT CHANGE UP (ref 1.6–2.6)
MCHC RBC-ENTMCNC: 28 PG — SIGNIFICANT CHANGE UP (ref 27–34)
MCHC RBC-ENTMCNC: 31.1 GM/DL — LOW (ref 32–36)
MCV RBC AUTO: 90 FL — SIGNIFICANT CHANGE UP (ref 80–100)
MONOCYTES # BLD AUTO: 0.58 K/UL — SIGNIFICANT CHANGE UP (ref 0–0.9)
MONOCYTES NFR BLD AUTO: 10.4 % — SIGNIFICANT CHANGE UP (ref 2–14)
NEUTROPHILS # BLD AUTO: 3.4 K/UL — SIGNIFICANT CHANGE UP (ref 1.8–7.4)
NEUTROPHILS NFR BLD AUTO: 60.7 % — SIGNIFICANT CHANGE UP (ref 43–77)
NON HDL CHOLESTEROL: 131 MG/DL — HIGH
NRBC # BLD: 0 /100 WBCS — SIGNIFICANT CHANGE UP (ref 0–0)
PLATELET # BLD AUTO: 127 K/UL — LOW (ref 150–400)
POTASSIUM SERPL-MCNC: 4.1 MMOL/L — SIGNIFICANT CHANGE UP (ref 3.5–5.3)
POTASSIUM SERPL-SCNC: 4.1 MMOL/L — SIGNIFICANT CHANGE UP (ref 3.5–5.3)
PROT SERPL-MCNC: 7.1 G/DL — SIGNIFICANT CHANGE UP (ref 6–8.3)
RBC # BLD: 3.29 M/UL — LOW (ref 4.2–5.8)
RBC # FLD: 15.5 % — HIGH (ref 10.3–14.5)
SODIUM SERPL-SCNC: 137 MMOL/L — SIGNIFICANT CHANGE UP (ref 135–145)
TIBC SERPL-MCNC: 387 UG/DL — SIGNIFICANT CHANGE UP (ref 220–430)
TRIGL SERPL-MCNC: 119 MG/DL — SIGNIFICANT CHANGE UP
TSH SERPL-MCNC: 0.98 UIU/ML — SIGNIFICANT CHANGE UP (ref 0.27–4.2)
UIBC SERPL-MCNC: 351 UG/DL — SIGNIFICANT CHANGE UP (ref 110–370)
VIT B12 SERPL-MCNC: 358 PG/ML — SIGNIFICANT CHANGE UP (ref 232–1245)
WBC # BLD: 5.59 K/UL — SIGNIFICANT CHANGE UP (ref 3.8–10.5)
WBC # FLD AUTO: 5.59 K/UL — SIGNIFICANT CHANGE UP (ref 3.8–10.5)

## 2022-02-25 PROCEDURE — 99254 IP/OBS CNSLTJ NEW/EST MOD 60: CPT

## 2022-02-25 PROCEDURE — 93306 TTE W/DOPPLER COMPLETE: CPT | Mod: 26

## 2022-02-25 PROCEDURE — 99233 SBSQ HOSP IP/OBS HIGH 50: CPT

## 2022-02-25 RX ORDER — ATORVASTATIN CALCIUM 80 MG/1
1 TABLET, FILM COATED ORAL
Qty: 30 | Refills: 3
Start: 2022-02-25 | End: 2022-06-24

## 2022-02-25 RX ORDER — THIAMINE MONONITRATE (VIT B1) 100 MG
100 TABLET ORAL DAILY
Refills: 0 | Status: COMPLETED | OUTPATIENT
Start: 2022-02-25 | End: 2022-02-28

## 2022-02-25 RX ORDER — ASPIRIN/CALCIUM CARB/MAGNESIUM 324 MG
1 TABLET ORAL
Qty: 30 | Refills: 3
Start: 2022-02-25 | End: 2022-06-24

## 2022-02-25 RX ORDER — FOLIC ACID 0.8 MG
1 TABLET ORAL DAILY
Refills: 0 | Status: DISCONTINUED | OUTPATIENT
Start: 2022-02-25 | End: 2022-03-18

## 2022-02-25 RX ORDER — ASPIRIN/CALCIUM CARB/MAGNESIUM 324 MG
81 TABLET ORAL DAILY
Refills: 0 | Status: DISCONTINUED | OUTPATIENT
Start: 2022-02-26 | End: 2022-03-10

## 2022-02-25 RX ADMIN — Medication 1 MILLIGRAM(S): at 21:10

## 2022-02-25 RX ADMIN — Medication 1 PATCH: at 20:02

## 2022-02-25 RX ADMIN — ATORVASTATIN CALCIUM 80 MILLIGRAM(S): 80 TABLET, FILM COATED ORAL at 21:10

## 2022-02-25 RX ADMIN — Medication 100 MILLIGRAM(S): at 21:10

## 2022-02-25 RX ADMIN — Medication 1 PATCH: at 12:24

## 2022-02-25 RX ADMIN — Medication 50 MILLIGRAM(S): at 21:10

## 2022-02-25 RX ADMIN — Medication 1 TABLET(S): at 21:10

## 2022-02-25 NOTE — DISCHARGE NOTE PROVIDER - NSDCCPCAREPLAN_GEN_ALL_CORE_FT
PRINCIPAL DISCHARGE DIAGNOSIS  Diagnosis: Coronary artery disease  Assessment and Plan of Treatment: You underwent a cardiac catheterization revealing no new siginficant coronary artery disease and you had patent bypass grafts.  no intervention was required.  It is recommended you follow up with your cardiologist for medical management of your pain.   - Please Start taking aspirin 81mg daily  - Please START taking Atorvastatin 80mg daily at bedtime for management of cholesterol and the coronary artery disease  - Follow up with Dr. Beauchamp in 1-2 weeks  You underwent a coronary angiogram and should wait 3 days before returning to ordinary activities. Do not drive for 2 days. Consult your doctor before returning to vigorous activity. You may return to work in 3-5 days. The catheter from your groin was removed and you should remove the dressing in 24 hours. You may shower once the dressing is removed, but avoid baths, hot tubs, or swimming for 5 days to prevent infection. If you notice bleeding from the site, hardening and pain at the site, drainage or redness from the site, coolness/paleness of the extremity, swelling, or fever, please call [phone]      SECONDARY DISCHARGE DIAGNOSES  Diagnosis: Hyperlipidemia  Assessment and Plan of Treatment: Your LDL is not well controlled.  YOur goal is less then 70.  Please start taking atorvastatin 80mg daily at bedtime.    Diagnosis: Counseling on health promotion and disease prevention  Assessment and Plan of Treatment: Our [facility] Cardiovascular Prevention Team from [facility] will contact to you to arrange an outpatient office visit (telehealth or in person). The goal of this service is to optimize medication (blood pressure, cholesterol, diabetes) and lifestyle (diet, exercise, weight management, smoking) regimens to help lower the risk of cardiovascular events. They will work with your cardiologist to help support and guide you after your hospitalization.    Diagnosis: Encounter for cardiac rehabilitation  Assessment and Plan of Treatment: - We have provided you with a prescription for cardiac rehab which is a medically supervised exercise program for your heart and has been shown to improve the quantity and quality of life of people with heart disease like yours.   - You should attend cardiac rehab 3 times per week for 12 weeks.   - We have provided you with a list of nearby facilities.   -Please call your insurance carrier to determine which of these facilities are covered under your plan.   - Please bring this prescription with you to your follow up appointment with your cardiologist who can then further assist you to enroll into a cardiac rehab program.    Diagnosis: Encounter for smoking cessation counseling  Assessment and Plan of Treatment: If you smoke and already have heart and vascular disease, quitting smoking will reduce your risk of sudden death, heart attack, and death from other chronic diseases. Any amount of smoking, even light smoking or occasional smoking, damages the heart and blood vesssels. One of the best ways to reduce your risk of heart disease is to avoid tobacco smoke. No matter how much or how long you`ve smoked, quitting will benefit you.  follow up with your outpatient physician to help with setting up a quit date if you have not already.     PRINCIPAL DISCHARGE DIAGNOSIS  Diagnosis: Aortic stenosis  Assessment and Plan of Treatment:       SECONDARY DISCHARGE DIAGNOSES  Diagnosis: Hyperlipidemia  Assessment and Plan of Treatment: Your LDL is not well controlled.  YOur goal is less then 70.  Please start taking atorvastatin 80mg daily at bedtime.    Diagnosis: Encounter for smoking cessation counseling  Assessment and Plan of Treatment: If you smoke and already have heart and vascular disease, quitting smoking will reduce your risk of sudden death, heart attack, and death from other chronic diseases. Any amount of smoking, even light smoking or occasional smoking, damages the heart and blood vesssels. One of the best ways to reduce your risk of heart disease is to avoid tobacco smoke. No matter how much or how long you`ve smoked, quitting will benefit you.  follow up with your outpatient physician to help with setting up a quit date if you have not already.    Diagnosis: Counseling on health promotion and disease prevention  Assessment and Plan of Treatment: Our [facility] Cardiovascular Prevention Team from [facility] will contact to you to arrange an outpatient office visit (telehealth or in person). The goal of this service is to optimize medication (blood pressure, cholesterol, diabetes) and lifestyle (diet, exercise, weight management, smoking) regimens to help lower the risk of cardiovascular events. They will work with your cardiologist to help support and guide you after your hospitalization.    Diagnosis: Encounter for cardiac rehabilitation  Assessment and Plan of Treatment: - We have provided you with a prescription for cardiac rehab which is a medically supervised exercise program for your heart and has been shown to improve the quantity and quality of life of people with heart disease like yours.   - You should attend cardiac rehab 3 times per week for 12 weeks.   - We have provided you with a list of nearby facilities.   -Please call your insurance carrier to determine which of these facilities are covered under your plan.   - Please bring this prescription with you to your follow up appointment with your cardiologist who can then further assist you to enroll into a cardiac rehab program.     PRINCIPAL DISCHARGE DIAGNOSIS  Diagnosis: Aortic stenosis  Assessment and Plan of Treatment: You have a history or aortic stenosis with a prosthetic valve. You were evaluated by Cardiologists and Cardiac Surgeons who found that your aortic valve was severely narrowed. This is sometimes treated with surgery. However, the Cardiology team determined that you are not currently a candidate for surgery as it would not be safe given your medical condition.  Your LDL is not well controlled.  YOur goal is less then 70.  Please start taking atorvastatin 80mg daily at bedtime.      SECONDARY DISCHARGE DIAGNOSES  Diagnosis: Encounter for smoking cessation counseling  Assessment and Plan of Treatment: If you smoke and already have heart and vascular disease, quitting smoking will reduce your risk of sudden death, heart attack, and death from other chronic diseases. Any amount of smoking, even light smoking or occasional smoking, damages the heart and blood vesssels. One of the best ways to reduce your risk of heart disease is to avoid tobacco smoke. No matter how much or how long you`ve smoked, quitting will benefit you.  follow up with your outpatient physician to help with setting up a quit date if you have not already.    Diagnosis: HCC (hepatocellular carcinoma)  Assessment and Plan of Treatment: On a CT scan you were found to have a lesion of the liver. A triple-phase CT scan of the liver confirmed that this is liver cancer called hepatocellular carcinoma. After this was found the Oncology, Surgical oncology and Intervention Radiology teams were called. The surgical team determined that you are not a good candidate for surgery because of your medical conditions. An MRI of the liver showed________. The interventional radiology team recommended__________.    Diagnosis: Counseling on health promotion and disease prevention  Assessment and Plan of Treatment: Our [facility] Cardiovascular Prevention Team from [facility] will contact to you to arrange an outpatient office visit (telehealth or in person). The goal of this service is to optimize medication (blood pressure, cholesterol, diabetes) and lifestyle (diet, exercise, weight management, smoking) regimens to help lower the risk of cardiovascular events. They will work with your cardiologist to help support and guide you after your hospitalization.    Diagnosis: Encounter for cardiac rehabilitation  Assessment and Plan of Treatment: - We have provided you with a prescription for cardiac rehab which is a medically supervised exercise program for your heart and has been shown to improve the quantity and quality of life of people with heart disease like yours.   - You should attend cardiac rehab 3 times per week for 12 weeks.   - We have provided you with a list of nearby facilities.   -Please call your insurance carrier to determine which of these facilities are covered under your plan.   - Please bring this prescription with you to your follow up appointment with your cardiologist who can then further assist you to enroll into a cardiac rehab program.    Diagnosis: Altered mental state  Assessment and Plan of Treatment: You were found to be lethargic after coming to the hospital and were not at your baseline mental status. Your labwork showed that this was NOT due to your liver functioning or infection.________     PRINCIPAL DISCHARGE DIAGNOSIS  Diagnosis: Aortic stenosis  Assessment and Plan of Treatment: You have a history or aortic stenosis with a prosthetic valve. You were evaluated by Cardiologists and Cardiac Surgeons who found that your aortic valve was severely narrowed. This is sometimes treated with surgery. However, the Cardiology team determined that you are not currently a candidate for surgery as it would not be safe given your medical condition.  Your LDL is not well controlled.  YOur goal is less then 70.  Please start taking atorvastatin 80mg daily at bedtime.      SECONDARY DISCHARGE DIAGNOSES  Diagnosis: Anemia  Assessment and Plan of Treatment: While you were in the hospital your blood levels were low. We checked your hemoglobin levels, a marker of your red blood cells, and they were low many times. While you were here, you received many blood transfusions    Diagnosis: Septic shock  Assessment and Plan of Treatment:     Diagnosis: HCC (hepatocellular carcinoma)  Assessment and Plan of Treatment: On a CT scan you were found to have a lesion of the liver. A triple-phase CT scan of the liver confirmed that this is liver cancer called hepatocellular carcinoma. After this was found the Oncology, Surgical oncology and Intervention Radiology teams were called. The surgical team determined that you are not a good candidate for surgery because of your medical conditions. An MRI of the liver showed________. The interventional radiology team recommended__________.    Diagnosis: Encounter for smoking cessation counseling  Assessment and Plan of Treatment: If you smoke and already have heart and vascular disease, quitting smoking will reduce your risk of sudden death, heart attack, and death from other chronic diseases. Any amount of smoking, even light smoking or occasional smoking, damages the heart and blood vesssels. One of the best ways to reduce your risk of heart disease is to avoid tobacco smoke. No matter how much or how long you`ve smoked, quitting will benefit you.  follow up with your outpatient physician to help with setting up a quit date if you have not already.     PRINCIPAL DISCHARGE DIAGNOSIS  Diagnosis: Aortic stenosis  Assessment and Plan of Treatment: You have a history or aortic stenosis with a prosthetic valve. You were evaluated by Cardiologists and Cardiac Surgeons who found that your aortic valve was severely narrowed. This is sometimes treated with surgery. However, the Cardiology team determined that you are not currently a candidate for surgery as it would not be safe given your medical condition.  The valve being narrow leads to your red blood cells getting destroyed, which we believe is the cause of your anemia.  Your LDL is not well controlled.  YOur goal is less then 70.  Please start taking atorvastatin 80mg daily at bedtime. You should also take the aspirin daily      SECONDARY DISCHARGE DIAGNOSES  Diagnosis: Anemia  Assessment and Plan of Treatment: While you were in the hospital your blood levels were low. We checked your hemoglobin levels, a marker of your red blood cells, and they were low many times. While you were here, you received many blood transfusions. We believe the reason for the anemia is your narrowed aortic valve, as blood goes through the narrow valve it can be destroyed, leading to low amounts of red blood cells. Please continue taking the iron pills, to help your body produce more red blood cells.    Diagnosis: HCC (hepatocellular carcinoma)  Assessment and Plan of Treatment: On a CT scan you were found to have a lesion of the liver. A triple-phase CT scan of the liver confirmed that this is liver cancer called hepatocellular carcinoma. After this was found the Oncology, Surgical oncology and Intervention Radiology teams were called. The surgical team determined that you are not a good candidate for surgery because of your medical conditions. The interventional radiology team recommended something called a Transcatheter Arterial Chemoembolization which is when they can use imaging and a catheter to deliver chemotherapy directly to the tumor in your liver. You tolerated the procedure well. Please follow up with the medical oncology team at [facility] for continued cancer care.    Diagnosis: Septic shock  Assessment and Plan of Treatment: While you were in the hospital you developed a severe infection, that led to you being transferred to the ICU. The infection was caused by multi-drug resistand bacteria called klebsiella. Initially we were treating you with an antibiotic called ertapenem, but your infection did not resolve. We spoke to our infectious disease team and they recommended starting a new antibiotic called meropenem. You were treated with the meropenem for 10 days and we believe this is sufficient to treat your infection.    Diagnosis: Encounter for smoking cessation counseling  Assessment and Plan of Treatment: If you smoke and already have heart and vascular disease, quitting smoking will reduce your risk of sudden death, heart attack, and death from other chronic diseases. Any amount of smoking, even light smoking or occasional smoking, damages the heart and blood vesssels. One of the best ways to reduce your risk of heart disease is to avoid tobacco smoke. No matter how much or how long you`ve smoked, quitting will benefit you.  follow up with your outpatient physician to help with setting up a quit date if you have not already.

## 2022-02-25 NOTE — DIETITIAN INITIAL EVALUATION ADULT. - PERTINENT LABORATORY DATA
Sodium, Serum: 137 mmol/L  Potassium, Serum: 4.1 mmol/L  Chloride, Serum: 106 mmol/L  BUN, Serum: 11 mg/dL  Creatinine, Serum: 0.92 mg/dL  Glucose, Serum: 106 mg/dL (Elevated)  Calcium, Serum: 8.2 mg/dL (Low)  Magnesium, Serum: 2.0 mg/dL    Last HbA1c 5.1% (2/25)    Lipid Profile (2/25)  Cholesterol, Serum: 157 mg/dL  Triglycerides, Serum: 119 mg/dL  HDL Cholesterol, Serum: 26 mg/dL (Low)   LDL Cholesterol Calculated: 107 mg/dL (Elevated)

## 2022-02-25 NOTE — DISCHARGE NOTE PROVIDER - NSDCCPTREATMENT_GEN_ALL_CORE_FT
PRINCIPAL PROCEDURE  Procedure: Computed tomography triple phase study of abdomen without then with contrast  Findings and Treatment: 2.5 cm left hepatic lesion consistent with LI-RADS v 2018 Category: LR-5   Definitely HCC. OPTN Category (if LR-5): 5B.  No locally invasive or metastatic disease. Left hepatic artery variant.   No additional suspicious hepatic lesions. Other incidental comments as   above.

## 2022-02-25 NOTE — CONSULT NOTE ADULT - ASSESSMENT
Assesment:  66y Male         Plan:  Problem 1:      Problem 2:      Problem 3:      Problem 4:    I have reviewed clinical labs tests and reports, radiology tests and reports, as well as old patient medical records, and discussed with the refering physician.     Assesment:  65 y/o M, current smoker (1ppd x 50 years), current every day ETOH abuse (1 pint vodka and several beers daily x 50 years, with history of withdrawals), with PMHx of HLD, HTN (not on any medications), CABG (LIMA to LAD, SVG to PDA in 2017 done by Dr. Wilcox), AS (s/p AVR in 2017), who presented to [facility] ED on 2/24/22 with complaints of 9/10 chest pain with associated diaphoresis that occurred when walking to the bathroom last night around 1 am and syncopal episode. Patient states that during this time, he fell and "blacked out" and hit the back of his head. Patient then went back to sleep. Patient states that he had a similar chest pain 3 days ago that resolved on his own with another syncopal episode. Patient's wife states that patient has not seen a physician for over 2 years due to insurance issues. Patient endorses chest pain with walking 1 block, starting about 1 month ago, diaphoresis and syncopal episodes. He denies sob, palpitations. Patient is an alcoholic, states last drink was 2 shots of vodka night prior to admission. Per patient's wife he has a history of withdrawal from alcohol, including shakes and "acting crazy". Patient also endorses a right sided bottom cracked tooth in the back of his mouth. The patient denies current chest pain, palpitations, dizziness, diaphoresis, headache, fever, chills, abdominal pain, back pain, n/v/d, recent travel or sick contacts. Patient underwent an echo 2/25/22 revealing significant prosthetic stenosis of aortic valve.  Structural heart disease was consulted for severe aortic prosthetic valve stenosis s/p AVR in 2017.   Per Dr. Ashby, patient should be transferred to [facility] onto C[facility]gery service.    Plan:  Problem 1: stenosis of prosthetic aortic valve  - Hx of AS s/p AVR in 2017  - JOSELO   - Structural CT scans  - dental consult for right bottom cracked tooth  - CTS will transfer to our service, [facility]  - TAVR w/u  - discussed with Dr. Ashby    Problem 2: alcohol use disorder with history of withdrawal  - librium per Dr. Ashby (pt requiring librium 25 TID last admission 2017)  - psych consult in am for history of DTs  - CIWA protocol  - f/u utox and Etoh level  - pt expresses no desire to quit drinking    Problem 3: NSTEMI/Hx of CAD  - s/p CABG 2017  - cath 2017: : distal LM 40%, pLAC 80%, pLcx 80%, pRCA 80%, EF 55%, EDP 6 mmHg, JONEL 0.89cm2 consistent with severe AS, right radial artery and right femoral vein access. Right heart cath revealed RAP 3, RV 22/1 mean 4, PAP 25/7 mean 16, PCWP: 6, Ao/PA sat 92/67, CO/CI 5.2/3.4 consistent with normal RV filling pressures and preserved cardiac output/indices  - c/w aspirin, statin    Problem 4: HTN  - pt reports noncompliant with meds  - BP stable 123/56  - consider starting beta blocker or ace    Problem 5: HLD  - started on a statin  - monitor LFTs (w/ ETOH history)    Problem 6: tobacco use disorder  - active smoker  - consider smoking cessation - pt not interested    Discussed with Dr. Ashby      I have reviewed clinical labs tests and reports, radiology tests and reports, as well as old patient medical records, and discussed with the refering physician.

## 2022-02-25 NOTE — DIETITIAN INITIAL EVALUATION ADULT. - OTHER INFO
Patient is a 65 y/o M, current smoker (1ppd x 50 years), current every day ETOH abuse (1 pint vodka and several beers daily x 50 years), with PMHx of HLD, HTN (not on any medications),  CABG (LIMA to LAD, SVG to PDA in 2017), AS (s/p AVR in 2017), who presented to [facility] ED on 2/24/22 with complaints of 9/10 chest pain with associated diaphoresis. Patient admitted to cardiology/telemetry for further management of NSTEMI.     Pt and visitor seen at bedside for initial assessment. Denies nausea/vomiting at this time. Last documented bowel movement 2/24. Confirms NKFA. Pt reports good PO intake at home, however, per visitor pt eats ~1 meal/day at home following no therapeutic diet. Reports usual body weight 147 pounds (relatively consistent with dosing weight 149 pounds). Verbalizes no recent weight loss. No edema documented at this time. No pressure ulcers documented at this time. Right groin surgical incision. Erwin score=21. Labs reviewed 2/25; electrolytes within normal limits at this time.  Observed pt with no overt signs of muscle or fat wasting. Based on ASPEN guidelines, pt does not meet criteria for malnutrition at this time. Discussed current diet order DASH/TLC; provided pt with diet education regarding importance of adequate PO intake, small frequent meals and adequate protein/energy intake; amenable to education. Provided pt and visitor w/ diet education on DASH/TLC diet (handouts left at bedside). Pt reports feeling hungry during hospital stay, able to complete 100% of meals (denies difficulty chewing/swallowing). RD recommended trial of Ensure if pt unable to meet nutritional needs at home (pt requesting coupons for Ensure). Made aware RD remains available. RD to follow up per protocol. See nutrition recommendations below.

## 2022-02-25 NOTE — PROGRESS NOTE ADULT - SUBJECTIVE AND OBJECTIVE BOX
Interventional Cardiology PA Adult Progress Note / Transfer to [facility]    Hospital Course:     Patient is a 65 y/o M, current smoker (1ppd x 50 years), current every day ETOH abuse (1 pint vodka and several beers daily x 50 years), with PMHx of HLD, HTN (not on any medications),  CABG (LIMA to LAD, SVG to PDA in 2017), AS (s/p AVR in 2017), who presented to [facility] ED on 2/24/22 with unstable angina and syncope x2, Trop T 0.02.  EKG: NSR @ 85 bpm, ST depressions in II, V4, V5, V6.  Patient is s/p diagnostic cardiac catheterization 2/24/22: LM revealing no significant disease, pLAD: 80% stenosis, mLAD: 90% stenosis, D1: small, dLCx: 80%, RCA: small, non-dominant, LIMA-LAD: patent, SVG-LPDA: patent via right femoral artery.  He is s/p echo revealing  A bioprosthetic valve noted in the aortic position. The peak transvalvular velocity is 4.90 m/s, the mean transvalvular gradient is 79.00 mmHg, and the LVOT/AV velocity ratio is 0.20. These findings are consistent with significant prosthetic stenosis.  Patient is now transferred to cardiology              Subjective Assessment: Patient seen and examined at bedside, no acute complaints.   	  MEDICATIONS:  chlordiazePOXIDE   Oral   atorvastatin 80 milliGRAM(s) Oral at bedtime  folic acid 1 milliGRAM(s) Oral daily  multivitamin 1 Tablet(s) Oral daily  sodium chloride 0.9%. 500 milliLiter(s) IV Continuous <Continuous>  thiamine 100 milliGRAM(s) Oral daily    [PHYSICAL EXAM:  TELEMETRY:  T(C): 36.2 (02-25-22 @ 18:04), Max: 36.9 (02-25-22 @ 14:03)  HR: 91 (02-25-22 @ 17:49) (75 - 91)  BP: 139/68 (02-25-22 @ 17:49) (96/54 - 139/68)  RR: 17 (02-25-22 @ 17:49) (16 - 20)    I&O's Summary    24 Feb 2022 07:01  -  25 Feb 2022 07:00  --------------------------------------------------------  IN: 50 mL / OUT: 700 mL / NET: -650 mL    25 Feb 2022 07:01  -  25 Feb 2022 19:48  --------------------------------------------------------  IN: 360 mL / OUT: 530 mL / NET: -170 mL                                   Appearance: Normal	  HEENT:   Normal oral mucosa, PERRL, EOMI	  Neck: Supple,- JVD; Carotid Bruit   Cardiovascular: Normal S1 S2, No JVD, No murmurs,   Respiratory: Lungs clear to auscultation/No Rales, Rhonchi, Wheezing	  Gastrointestinal:  Soft, Non-tender, + BS	  Skin: No rashes, No ecchymoses, No cyanosis  Extremities: Normal range of motion, No clubbing, cyanosis or edema  Vascular: Peripheral pulses palpable 2+ bilaterally  Neurologic: Non-focal  Psychiatry: A & O x 3, Mood & affect appropriate    	    LABS:	 	  CARDIAC MARKERS:                                  9.2    5.59  )-----------( 127      ( 25 Feb 2022 06:58 )             29.6     02-25    137  |  106  |  11  ----------------------------<  106<H>  4.1   |  22  |  0.92    Ca    8.2<L>      25 Feb 2022 06:58  Mg     2.0     02-25    TPro  7.1  /  Alb  3.1<L>  /  TBili  1.0  /  DBili  x   /  AST  43<H>  /  ALT  20  /  AlkPhos  89  02-25      TSH: Thyroid Stimulating Hormone, Serum: 0.984 uIU/mL (02-25 @ 06:58)    PT/INR - ( 24 Feb 2022 11:19 )   PT: 14.1 sec;   INR: 1.18          PTT - ( 24 Feb 2022 11:19 )  PTT:32.2 sec    ASSESSMENT/PLAN: 	        DVT ppx:  Dispo:     Interventional Cardiology PA Adult Progress Note / Transfer to [facility]    Hospital Course:     Patient is a 67 y/o M, current smoker (1ppd x 50 years), current every day ETOH abuse (1 pint vodka and several beers daily x 50 years), with PMHx of HLD, HTN (not on any medications),  CABG (LIMA to LAD, SVG to PDA in 2017), AS (s/p AVR in 2017), who presented to [facility] ED on 2/24/22 with unstable angina and syncope x2, Trop T 0.02.  EKG: NSR @ 85 bpm, ST depressions in II, V4, V5, V6.  Patient is s/p diagnostic cardiac catheterization 2/24/22: LM revealing no significant disease, pLAD: 80% stenosis, mLAD: 90% stenosis, D1: small, dLCx: 80%, RCA: small, non-dominant, LIMA-LAD: patent, SVG-LPDA: patent via right femoral artery.  He is s/p echo revealing  A bioprosthetic valve noted in the aortic position. The peak transvalvular velocity is 4.90 m/s, the mean transvalvular gradient is 79.00 mmHg, and the LVOT/AV velocity ratio is 0.20. These findings are consistent with significant prosthetic stenosis.  Patient is now transferred to [facility] for further evaluation.       Subjective Assessment: Patient seen and examined at bedside, no acute complaints.   	  MEDICATIONS:  chlordiazePOXIDE   Oral   atorvastatin 80 milliGRAM(s) Oral at bedtime  folic acid 1 milliGRAM(s) Oral daily  multivitamin 1 Tablet(s) Oral daily  sodium chloride 0.9%. 500 milliLiter(s) IV Continuous <Continuous>  thiamine 100 milliGRAM(s) Oral daily    [PHYSICAL EXAM:  TELEMETRY:  T(C): 36.2 (02-25-22 @ 18:04), Max: 36.9 (02-25-22 @ 14:03)  HR: 91 (02-25-22 @ 17:49) (75 - 91)  BP: 139/68 (02-25-22 @ 17:49) (96/54 - 139/68)  RR: 17 (02-25-22 @ 17:49) (16 - 20)    I&O's Summary    24 Feb 2022 07:01  -  25 Feb 2022 07:00  --------------------------------------------------------  IN: 50 mL / OUT: 700 mL / NET: -650 mL    25 Feb 2022 07:01  -  25 Feb 2022 19:48  --------------------------------------------------------  IN: 360 mL / OUT: 530 mL / NET: -170 mL                                   Appearance: Normal	  HEENT:   Normal oral mucosa, PERRL, EOMI	  Neck: Supple,- JVD; Carotid Bruit   Cardiovascular: Normal S1 S2, No JVD, No murmurs,   Respiratory: Lungs clear to auscultation/No Rales, Rhonchi, Wheezing	  Gastrointestinal:  Soft, Non-tender, + BS	  Skin: No rashes, No ecchymoses, No cyanosis  Extremities: Normal range of motion, No clubbing, cyanosis or edema  Vascular: Peripheral pulses palpable 2+ bilaterally  Neurologic: Non-focal  Psychiatry: A & O x 3, Mood & affect appropriate    	    LABS:	 	  CARDIAC MARKERS:                                  9.2    5.59  )-----------( 127      ( 25 Feb 2022 06:58 )             29.6     02-25    137  |  106  |  11  ----------------------------<  106<H>  4.1   |  22  |  0.92    Ca    8.2<L>      25 Feb 2022 06:58  Mg     2.0     02-25    TPro  7.1  /  Alb  3.1<L>  /  TBili  1.0  /  DBili  x   /  AST  43<H>  /  ALT  20  /  AlkPhos  89  02-25      TSH: Thyroid Stimulating Hormone, Serum: 0.984 uIU/mL (02-25 @ 06:58)    PT/INR - ( 24 Feb 2022 11:19 )   PT: 14.1 sec;   INR: 1.18          PTT - ( 24 Feb 2022 11:19 )  PTT:32.2 sec    ASSESSMENT/PLAN: 	        DVT ppx:  Dispo:

## 2022-02-25 NOTE — DISCHARGE NOTE PROVIDER - NSDCFUADDAPPT_GEN_ALL_CORE_FT
[facility] - Please call [phone] to make an appointment.  Please call [phone] for an appointment at [facility]. If you are unable to get an appointment, please WALK IN for care.

## 2022-02-25 NOTE — DISCHARGE NOTE PROVIDER - CARE PROVIDER_API CALL
Vincenzo Beauchamp  CARDIOVASCULAR DISEASE  [address] [place] [address]  Phone: [phone]  Fax: [phone]  Follow Up Time:    [facility],   [address] [place] [address], [place] [address]  Phone: [phone]  Fax: (   )    -  Follow Up Time:    [facility],   [address] [place] [address]  Phone: [phone]  Fax: (   )    -  Follow Up Time:

## 2022-02-25 NOTE — DISCHARGE NOTE NURSING/CASE MANAGEMENT/SOCIAL WORK - NSDCPEFALRISK_GEN_ALL_CORE
For information on Fall & Injury Prevention, visit: [email or web address] OR  [email or web address] OR  https://www.cdc.gov/steadi/patient.html

## 2022-02-25 NOTE — DIETITIAN INITIAL EVALUATION ADULT. - PROBLEM SELECTOR PLAN 5
Hx of ETOH abuse - drinks 1 pint vodka daily and a few beers  - Last admission in 2017 - patient underwent withdrawal - requiring Libirum 25 mg TID  - Med consult   - F/u ETOH level and urine tox

## 2022-02-25 NOTE — PROGRESS NOTE ADULT - PROBLEM SELECTOR PLAN 4
Hgb/Hct: 9.5/31.4  - F/u iron studies   - Will check with IC fellow for load   - f/u fecal occult Patient without interest to quit drinking etoh or smoking.  Smokes 10cigs per day.   - Start Nicotine patches     Dispo: transfer to [facility]

## 2022-02-25 NOTE — CONSULT NOTE ADULT - SUBJECTIVE AND OBJECTIVE BOX
Surgeon: Dr. Ashby    Requesting Physician: Dr. Romano    HISTORY OF PRESENT ILLNESS   67 y/o M, current smoker (1ppd x 50 years), current every day ETOH abuse (1 pint vodka and several beers daily x 50 years, with history of withdrawals), with PMHx of HLD, HTN (not on any medications), CABG (LIMA to LAD, SVG to PDA in 2017 done by Dr. Wilcox), AS (s/p AVR in 2017), who presented to [facility] ED on 22 with complaints of 9/10 chest pain with associated diaphoresis that occurred when walking to the bathroom last night around 1 am and syncope x2. Patient states that during this time, he fell and "blacked out" and hit the back of his head. Patient then went back to sleep. Patient states that he had a similar chest pain 3 days ago that resolved on his own. Patient's wife states that patient has not seen a physician for over 2 years due to insurance issues. Patient states last drink was 2 shots of vodka last night. Per patient's wife he has a history of withdrawal from alcohol, including shakes and "acting crazy". Patient also endorses a right bottom back cracked tooth Patient denies current chest pain, palpitations, dizziness, diaphoresis, headache, fever, chills, abdominal pain, back pain, n/v/d, recent travel or sick contacts.     PAST MEDICAL & SURGICAL HISTORY:  HTN (hypertension)    ETOH abuse    Smoker    Hyperlipidemia    Aortic valve stenosis, unspecified etiology    No significant past surgical history    MEDICATIONS  (STANDING):  atorvastatin 80 milliGRAM(s) Oral at bedtime  nicotine - 21 mG/24Hr(s) Patch 1 patch Transdermal daily  sodium chloride 0.9%. 500 milliLiter(s) (50 mL/Hr) IV Continuous <Continuous>    MEDICATIONS  (PRN):      Allergies    No Known Allergies    Intolerances    SOCIAL HISTORY:  smokin ppd x 50 years; active smoker  etoh: 1 pint of vodka/ few beers daily; history of DTs  illicit drugs: denies     FAMILY HISTORY:    Review of Systems  CONSTITUTIONAL: Denies fevers / chills, sweats, fatigue, weight loss, weight gain                                       NEURO:  Denies parathesias, seizures, syncope, confusion                                                                                  EYES:  Denies blurry vision, discharge, pain, loss of vision                                                                                    ENMT:  Denies difficulty hearing, vertigo, dysphagia, epistaxis, recent dental work                                       CV:  Denies chest pain, palpitations, HEIN, orthopnea                                                                                           RESPIRATORY:  Denies wWheezing, SOB, cough / sputum, hemoptysis                                                               GI:  Denies nausea, vomiting, diarrhea, constipation, melena                                                                          : Denies hematuria, dysuria, urgency, incontinence                                                                                          MUSKULOSKELETAL:  Denies arthritis, joint swelling, muscle weakness                                                             SKIN/BREAST:  Denies rash, itching, hair loss, masses                                                                                              PSYCH:  Denies depression, anxiety, suicidal ideation                                                                                                HEME/LYMPH:  Denies bruises easily, enlarged lymph nodes, tender lymph nodes                                          ENDOCRINE:  Denies cold intolerance, heat intolerance, polydipsia                                                                      Vital Signs Last 24 Hrs  T(C): 36.2 (2022 18:04), Max: 36.9 (2022 14:03)  T(F): 97.1 (2022 18:04), Max: 98.4 (2022 14:03)  HR: 91 (2022 17:49) (75 - 91)  BP: 139/68 (2022 17:49) (96/54 - 139/68)  BP(mean): 87 (2022 20:15) (87 - 89)  RR: 17 (2022 17:49) (16 - 20)  SpO2: 96% (2022 17:49) (95% - 98%)    Physical Exam  GEN: NAD, looks comfortable  Psych: Mood appropriate  Neuro: A&Ox3. No focal deficits. Moving all extremities.   HEENT: No obvious abnormalities  CV: S1S2, regular, + 4/6 holosystolic murmur appreciated. No carotid bruits. No JVD  Lungs: Clear B/L. No wheezing, rales or rhonchi  ABD: Soft, non-tender, non-distended. +Bowel sounds  EXT: Warm and well perfused. No peripheral edema noted  Musculoskeletal: Moving all extremities with normal ROM, no joint swelling  PV: Pedal pulses palpable                                                          LABS:                        9.2    5.59  )-----------( 127      ( 2022 06:58 )             29.6         137  |  106  |  11  ----------------------------<  106<H>  4.1   |  22  |  0.92    Ca    8.2<L>      2022 06:58  Mg     2.0         TPro  7.1  /  Alb  3.1<L>  /  TBili  1.0  /  DBili  x   /  AST  43<H>  /  ALT  20  /  AlkPhos  89      PT/INR - ( 2022 11:19 )   PT: 14.1 sec;   INR: 1.18          PTT - ( 2022 11:19 )  PTT:32.2 sec    CARDIAC MARKERS ( 2022 13:49 )  x     / 0.02 ng/mL / 133 U/L / x     / 4.0 ng/mL  CARDIAC MARKERS ( 2022 11:19 )  x     / 0.02 ng/mL / x     / x     / x          RADIOLOGY & ADDITIONAL STUDIES:  CAROTID U/S:    CXR:    CT Scan:    EKG:    TTE / JOSELO:    Cardiac Cath: Surgeon: Dr. Ashby    Requesting Physician: Dr. Romano    HISTORY OF PRESENT ILLNESS   67 y/o M, current smoker (1ppd x 50 years), current every day ETOH abuse (1 pint vodka and several beers daily x 50 years, with history of withdrawals), with PMHx of HLD, HTN (not on any medications), CABG (LIMA to LAD, SVG to PDA in 2017 done by Dr. Wilcox), AS (s/p AVR in 2017), who presented to [facility] ED on 22 with complaints of 9/10 chest pain with associated diaphoresis that occurred when walking to the bathroom last night around 1 am and syncopal episode. Patient states that during this time, he fell and "blacked out" and hit the back of his head. Patient then went back to sleep. Patient states that he had a similar chest pain 3 days ago that resolved on his own with another syncopal episode. Patient's wife states that patient has not seen a physician for over 2 years due to insurance issues. Patient endorses chest pain with walking 1 block, starting about 1 month ago, diaphoresis and syncopal episodes. He denies sob, palpitations. Patient is an alcoholic, states last drink was 2 shots of vodka night prior to admission. Per patient's wife he has a history of withdrawal from alcohol, including shakes and "acting crazy". Patient also endorses a right sided bottom cracked tooth in the back of his mouth. The patient denies current chest pain, palpitations, dizziness, diaphoresis, headache, fever, chills, abdominal pain, back pain, n/v/d, recent travel or sick contacts. Patient underwent an echo 22 revealing significant prosthetic stenosis of aortic valve.  Structural heart disease was consulted for severe aortic prosthetic valve stenosis s/p AVR in 2017.   Per Dr. Ashby, patient should be transferred to [facility] onto [facility]gery service.    PAST MEDICAL & SURGICAL HISTORY:  HTN (hypertension)    ETOH abuse    Smoker    Hyperlipidemia    Aortic valve stenosis, unspecified etiology    No significant past surgical history    MEDICATIONS  (STANDING):  atorvastatin 80 milliGRAM(s) Oral at bedtime  nicotine - 21 mG/24Hr(s) Patch 1 patch Transdermal daily  sodium chloride 0.9%. 500 milliLiter(s) (50 mL/Hr) IV Continuous <Continuous>    MEDICATIONS  (PRN):      Allergies    No Known Allergies    Intolerances    SOCIAL HISTORY:  smokin ppd x 50 years; active smoker  etoh: 1 pint of vodka/ few beers daily; history of DTs  illicit drugs: denies     FAMILY HISTORY:    Review of Systems  CONSTITUTIONAL: +sweats, Denies fevers / chills, fatigue, weight loss, weight gain                                       NEURO: +syncope; Denies parathesias, seizures, confusion                                                                                  EYES:  Denies blurry vision, discharge, pain, loss of vision                                                                                    ENMT:  Denies difficulty hearing, vertigo, dysphagia, epistaxis, recent dental work                                       CV: +CP with activity of 1 block, HEIN; Denies  palpitations, orthopnea                                                                                           RESPIRATORY:  Denies wheezing, SOB, cough / sputum, hemoptysis                                                               GI:  Denies nausea, vomiting, diarrhea, constipation, melena                                                                          : Denies hematuria, dysuria, urgency, incontinence                                                                                          MUSKULOSKELETAL:  Denies arthritis, joint swelling, muscle weakness                                                             SKIN/BREAST:  Denies rash, itching, hair loss, masses                                                                                              PSYCH:  Denies depression, anxiety, suicidal ideation                                                                                                HEME/LYMPH:  Denies bruises easily, enlarged lymph nodes, tender lymph nodes                                          ENDOCRINE:  Denies cold intolerance, heat intolerance, polydipsia                                                                      Vital Signs Last 24 Hrs  T(C): 36.2 (2022 18:04), Max: 36.9 (2022 14:03)  T(F): 97.1 (2022 18:04), Max: 98.4 (2022 14:03)  HR: 91 (2022 17:49) (75 - 91)  BP: 139/68 (2022 17:49) (96/54 - 139/68)  BP(mean): 87 (2022 20:15) (87 - 89)  RR: 17 (2022 17:49) (16 - 20)  SpO2: 96% (2022 17:49) (95% - 98%)    Physical Exam  GEN: NAD, looks comfortable  Psych: Mood appropriate  Neuro: A&Ox3.  No focal deficits.  Moving all extremities.   HEENT: right bottom tooth with erythema and edema; No other obvious abnormalities  CV: S1S2, regular, +4/6 holosystolic murmur appreciated.  No carotid bruits.  No JVD  Lungs: Clear B/L.  No wheezing, rales or rhonchi  ABD: Soft, distended, obese, non-tender; +Bowel sounds  EXT: Warm and well perfused.  No peripheral edema noted  Musculoskeletal: Moving all extremities with normal ROM, no joint swelling  PV: Pedal pulses palpable  Incisions: old well healed MSI from 2017 AVR/CABG              LABS:                        9.2    5.59  )-----------( 127      ( 2022 06:58 )             29.6         137  |  106  |  11  ----------------------------<  106<H>  4.1   |  22  |  0.92    Ca    8.2<L>      2022 06:58  Mg     2.0         TPro  7.1  /  Alb  3.1<L>  /  TBili  1.0  /  DBili  x   /  AST  43<H>  /  ALT  20  /  AlkPhos  89      PT/INR - ( 2022 11:19 )   PT: 14.1 sec;   INR: 1.18          PTT - ( 2022 11:19 )  PTT:32.2 sec    CARDIAC MARKERS ( 2022 13:49 )  x     / 0.02 ng/mL / 133 U/L / x     / 4.0 ng/mL  CARDIAC MARKERS ( 2022 11:19 )  x     / 0.02 ng/mL / x     / x     / x          RADIOLOGY & ADDITIONAL STUDIES:    CXR:  < from: Xray Chest 1 View-PORTABLE IMMEDIATE (22 @ 11:42) >    Findings: Patient is status post sternotomy with surgical clips seen   overlying the mediastinum. Questionable small residual pacing leads are   seen overlying the right lower heart. No consolidations. Minimal discoid   right mid lung atelectasis. No pleural effusions or pneumothorax. Heart   size is within normal limits. Lungs and soft tissues are unremarkable.    Impression:  No radiographic evidence of active cardiopulmonary disease.    < end of copied text >      CT Scan:  < from: CT Head No Cont (22 @ 13:09) >    FINDINGS: The CT examination demonstrates generalized volume loss. The   ventricles are stable in size. There is no midline shift or extra axial   collections. The gray white differentiation appears within normal limits.   There is no intracranial hemorrhage or acute transcortical infarct. There   is mild patchy hypodensity within the periventricular white matter which   is nonspecific and may represent the sequela of small vessel ischemic   disease in this patient.    The bony windows demonstrates no fractures. The lenses are now absent   bilaterally. There is polypoid soft tissue within the base of the right   frontal sinus. There is mucosal thickening within scattered ethmoid air   cells bilaterally. The mastoid air cells are well aerated.    The  topogram demonstrates the patient to be status post interval   posterior vertical fusion from the C1 through the C4 levels.    IMPRESSION: No intracranial hemorrhage or acute transcortical infarct.    < end of copied text >      EKG:  < from: 12 Lead ECG (06.10.17 @ 14:09) >    Ventricular Rate 75 BPM    Atrial Rate 75 BPM    P-R Interval 138 ms    QRS Duration 84 ms    Q-T Interval 428 ms    QTC Calculation(Bezet) 477 ms    P Axis 29 degrees    R Axis -1 degrees    T Axis 56 degrees    Diagnosis Line Normal sinus rhythm  Possible Left atrial enlargement  Nonspecific ST and T wave abnormality  Prolonged QT    < end of copied text >      TTE / JOSELO:  < from: TTE Echo Complete w/o Contrast w/ Doppler (22 @ 13:03) >  CONCLUSIONS:     1. Moderate symmetric left ventricular hypertrophy.   2. Normal left ventricular systolic function.   3. Grade II left ventricular diastolic dysfunction.   4. Normal right ventricular size and systolic function.   5. Dilated left atrium.   6. A bioprosthetic valve noted in the aortic position. The peak   transvalvular velocity is 4.90 m/s, the mean transvalvular gradient is   79.00 mmHg, and the LVOT/AV velocity ratio is 0.20. These findings are   consistent with significant prosthetic stenosis.   7. Moderate mitral regurgitation.   8. Pulmonary artery systolic pressure is 34 mmHg.   9. No pericardial effusion.    < end of copied text >

## 2022-02-25 NOTE — PROGRESS NOTE ADULT - PROBLEM SELECTOR PLAN 3
Patient states that during his episode of CP, had "blacked out" and hit his head  - CT head 2/24/22 negative for acute hemorrhage/ischemia  - F/u ECHO for AS  - Hx of ETOH abuse - drinks 1 pint vodka daily and a few beers  - Last admission in 2017 - patient underwent withdrawal - requiring Libirum 25 mg TID  - ETOH level negative  - As per CTS Recs patient initiated on CIWA protocol with Librium taper.

## 2022-02-25 NOTE — PROGRESS NOTE ADULT - PROBLEM SELECTOR PLAN 2
[place] of AS, s/p AVR 5/2017  - f/u ECHO Hx of AS, s/p AVR 5/2017  - Echo 2/25: Moderate symmetric left ventricular hypertrophy, Normal left ventricular systolic function, Grade II left ventricular diastolic dysfunction, Normal right ventricular size and systolic function, Dilated left atrium, A bioprosthetic valve noted in the aortic position. The peak transvalvular velocity is 4.90 m/s, the mean transvalvular gradient is 79.00 mmHg, and the LVOT/AV velocity ratio is 0.20. These findings are consistent with significant prosthetic stenosis, Moderate mitral regurgitation.  - CTS Consulted.  Will transfer to CTS service [facility]

## 2022-02-25 NOTE — PROGRESS NOTE ADULT - ATTENDING COMMENTS
67 y/o M, current smoker, ETOH abuse, HLD, HTN (not on any medications), hx CABG (LIMA to LAD, SVG to PDA in 2017), AS (s/p AVR in 2017), who p/w CP. Troponin: 0.02x2. EKG: NSR w/ST depressions in II, V4, V5, V6. Patient admitted to cardiology/telemetry for further management of NSTEMI. [facility] with no occlusive disease. Echo with severe gradients across bioprosthetic aortic valve c/w severe prosthetic stenosis.  Plan for:  Transfer to [facility] for further evaluation and intervention on stenotic bioAVR  CIWA protocol with librium taper  Cont c ASA, statin  R-M.Juan Antonio Fox M.D.  Cardiology Attending  40minutes spent on total encounter; more than 50% of the visit was spent counseling and/or coordinating care by the attending physician, with plan of care discussed with the patient, Dr Gonsalez, Dr Ashby and cardiac team.

## 2022-02-25 NOTE — PROGRESS NOTE ADULT - ASSESSMENT
Patient is a 65 y/o M, current smoker (1ppd x 50 years), current every day ETOH abuse (1 pint vodka and several beers daily x 50 years), with PMHx of HLD, HTN (not on any medications),  CABG (LIMA to LAD, SVG to PDA in 2017), AS (s/p AVR in 2017), who presented to [facility] ED on 2/24/22 with complaints of 9/10 chest pain with associated diaphoresis that occurred when walking to the bathroom last night around 1 am. Patient states that during this time, he fell and "blacked out" and hit the back of his head. Patient then went back to sleep. Patient states that he had a similiar chest pain 3 days ago that resolved on his own. Patient's wife states that patient has not seen a physician for over 2 years due to insurance issues.  Patient states last drink was 2 shots of vodka last night. Patient's Troponin: 0.02. EKG: NSR @ 85 bpm, ST depressions in II, V4, V5, V6. Patient admitted to cardiology/telemetry for further management of NSTEMI.       Risks & benefits of procedure and alternative therapy have been explained to the patient including but not limited to: allergic reaction, bleeding w/possible need for blood transfusion, infection, renal and vascular compromise, limb damage, arrhythmia, stroke, vessel dissection/perforation, Myocardial infarction, emergent CABG. Informed consent obtained and in chart.

## 2022-02-25 NOTE — DISCHARGE NOTE NURSING/CASE MANAGEMENT/SOCIAL WORK - NSDCFUADDAPPT_GEN_ALL_CORE_FT
[facility] - Please call [phone] to make an appointment.

## 2022-02-25 NOTE — DIETITIAN INITIAL EVALUATION ADULT. - ADD RECOMMEND
1. Continue with current diet order 2. Encourage pt to meet nutritional needs as able 3. Encourage adherence to diet education (reinforce as able) 4. Pain and bowel regimen per team 5. Will continue to assess/honor preferences as able 6. Consider MVI, thiamine and folic acid in setting of h/o Etoh abuse

## 2022-02-25 NOTE — DISCHARGE NOTE PROVIDER - HOSPITAL COURSE
67 y/o M, current smoker (1ppd x 50 years), current every day ETOH abuse (1 pint vodka and several beers daily x 50 years), with PMHx of HLD, HTN (not on any medications),  CABG (LIMA to LAD, SVG to PDA in 2017), AS (s/p AVR in 2017), who presented to [facility] ED on 2/24/22 with c/o unstable angina, w/ elevated troponin and abnormal EKG.      Patient is s/p diagnostic cardiac catheterization 2/24/22: LM revealing no significant disease, pLAD: 80% stenosis, mLAD: 90% stenosis, D1: small, dLCx: 80%, RCA: small, non-dominant, LIMA-LAD: patent, SVG-LPDA: patent.  Right femoral artery sheath pulled w/o complication, no bleeding, hematoma, and pulses intact.  He is s/p echo revealing...    Labs, Vitals, meds reviewed with Dr. Romano and pateint deemed stable for discharge home.   Patient initiated on ASA 81mg QD, and Atorvastatin 80mg QHS.  New prescriptions are E prescribed to pharmacy of choice.  Patient will follow up with Dr. Beauchamp in 1-2 weeks               Cardiac Rehab Indications (STEMI/NSTEMI/ACS/Unstable Angina/CHF/Chronic Stable Angina/Heart Surgery (CABG,Valve)/Post PCI):            *Education on benefits of Cardiac Rehab provided to patient: Yes         *Referral and Prescription Given for Cardiac Rehab: Yes/No.  If No, Why Not?  (see reasons below)         *Pt given list of locations & instructed to contact their insurance company to review list of participating providers. Yes         *Pt instructed to bring Cardiac Rehab prescription with them to Cardiology Follow up appointment for assistance with enrollment: Yes      Statin Prescribed (STEMI/NSTEMI/ACS/UA &/OR Post PCI this admission):  Yes - atorvastatin 80mg QHS    - Smoking cessation counselling was provided on discharge (Y/N)  - Prescribed nicotine replacement therapy on discharge (Y/N)  - Patient (accepted/refused) counselling and pharmoacotherapy  - Document reason for refusal                       66 year old male, current smoker (50 pack year history), current every day ETOH abuse, with hx of withdrawal, and PMHx of HTN, HLD, CAD and Aortic stenosis s/p CABG/AVR with Dr. Wilcox 2017, presented to [facility] ED on 2/24/22 complaining of chest pain. He subsequently underwent cardiac cath revealing patent bypass grafts (LIMA/SVG) and aortic stenosis. TTE confirmed severe prosthetic valve stenosis and Structural heart was consulted. Patient was  transferred to CT surgery for interventional evaluation. Patient was scheduled for TAVR today, however postponed for possible aortic root surgery later in the week. During preop workup, patient found to have 2.4cmx2.4cm liver lesion s/f HCC and liver nodules c/w cirrhosis. Cardiac surgery cancelled with anticipation of GI workup. GI consulted and recommend liver MRI, which is pending. Plan for patient to be discharged home today with close follow up at [facility] primary care.     For known previous CABG and Stents patient discharged on ASA and Lipitor, Patient unable to be discharged on Beta Blocker due to hypotension, SBP  while inpatient.     Patient was seen and examined this morning, care was discussed with Dr. Wilcox and deemed medically appropriate for discharge with outpatient follow up. Patient was hemodynamically stable and afebrile overnight and feels comfortable being discharged home this AM.     Over 35 minutes was spent with the patient and patient daughter reviewing the discharge material including medications, follow up appointments, recovery, concerning symptoms, and how to contact their health care providers if they have questions 66 year old male, current smoker (50 pack year history), current every day ETOH abuse, with hx of withdrawal, and PMHx of HTN, HLD, CAD and Aortic stenosis s/p CABG/AVR with Dr. Wilcox 2017, presented to [facility] ED on 2/24/22 complaining of chest pain. He subsequently underwent cardiac cath revealing patent bypass grafts (LIMA/SVG) and aortic stenosis. TTE confirmed severe prosthetic valve stenosis and Structural heart was consulted. Patient was  transferred to CT surgery for interventional evaluation. Patient was scheduled for TAVR today, however postponed for possible aortic root surgery later in the week. During preop workup, patient found to have 2.4cmx2.4cm liver lesion s/f HCC and liver nodules c/w cirrhosis. Cardiac surgery cancelled with anticipation of GI workup. GI consulted and recommend liver MRI, which is pending. Plan for patient to be discharged home today with close follow up at [facility] primary care.     For known previous CABG and Stents patient discharged on ASA and Lipitor, Patient unable to be discharged on Beta Blocker due to hypotension, SBP  while inpatient.     Patient was seen and examined this morning, care was discussed with Dr. Wilcox and deemed medically appropriate for discharge with outpatient follow up. Patient was hemodynamically stable and afebrile overnight and feels comfortable being discharged home this AM.     Over 35 minutes was spent with the patient and patient daughter reviewing the discharge material including medications, follow up appointments, recovery, concerning symptoms, and how to contact their health care providers if they have questions     **INCOMPLETE 66 year old male, current smoker (50 pack year history), current every day ETOH abuse, with hx of withdrawal, and PMHx of HTN, HLD, CAD and Aortic stenosis s/p CABG/AVR with Dr. Wilcox 2017, presented to [facility] ED on 2/24/22 complaining of chest pain. He subsequently underwent cardiac cath revealing patent bypass grafts (LIMA/SVG) and aortic stenosis. TTE confirmed severe prosthetic valve stenosis and Structural heart was consulted. Patient was  transferred to CT surgery for interventional evaluation. Patient was scheduled for TAVR today, however postponed for possible aortic root surgery later in the week. During preop workup, patient found to have 2.4cmx2.4cm liver lesion s/f HCC and liver nodules c/w cirrhosis. Cardiac surgery cancelled with anticipation of GI workup. GI consulted and recommend liver MRI, which is pending.     For known previous CABG and Stents patient discharged on ASA and Lipitor, Patient unable to be discharged on Beta Blocker due to hypotension, SBP  while inpatient.     **INCOMPLETE    #Discharge: do not delete    67 YO M, current smoker (1ppd x 50 years), current every day ETOH abuse (1 pint vodka and several beers daily x 50 years), with PMHx of HLD, HTN (not on any medications), CABG (LIMA to LAD, SVG to PDA in 2017), AS (s/p AVR in 2017) with recently discovered restenosis, presented to [facility] ED on 2/24/22 with chest pain and episode of LOC, initially admitted to cardiology for NSTEMI with [facility] with nonobstructive disease, then found to have restenosis of bioprosthetic valve for which aortic root surgery was planned, but now deferred given liver lesion c/f HCC. Now he is transferred to medicine for concern for mild hepatic encephalopathy, however unlikely to have HE and is compensated cirrhotic. Now found to have HCC, pending further workup    Hospital course (by problem):   #HCC (hepatocellular carcinoma).  CTAP with 2.4 hypervascular liver mass. i/s/o Liver cirrhosis and EtOH use disorder. triple phase CT confirms HCC  - AFP is 7.2 which is normal  - Oncology consulted  - Surgical Oncology consulted, not surgical candidate  - IR consulted,______________  - abd MRI:__________    #AMS (altered mental status).  AAOx2-3. Unlikely hepatic encephalopathy as no asterixis and is currently compensated. No obvious signs of infection. Neuro exam nonfocal. Possibly 2/2 effects of librium which was previously given to patient.  - monitor  - trend labs.    #ALC (alcoholic liver cirrhosis).  2/2 long standing EtOH abuse. Likely compensated. No ascites. Likely no hepatic encephalopathy. No known EV bleeding.  - GI following  - Meld- NA 10   - no known Varices, but no prior EGD evaluation, he will need it outpatient.    #AS (aortic stenosis).  AS i/s/o prior TAVR in 2017 with Dr Wilcox. JOSELO with peak transvalvular velocity of 4.7, mean gradient 56. symptomatic: exertional CP, episode of syncope  - initially planned for valve in valve TAVR, but then it was decided that because of aortic root anatomy, he would need aortic root open heart surgery  - he was deemed not to be a surgical candidate given poor functional status, liver cirrhosis, prior medication noncompliance which likely contributed to valve restenosis. his recovery after open heart surgically would be extremely difficult if possible. He would NOT be a surgical candidate    #CAD (coronary artery disease).  S/p CABG with Dr Wilcox in 2017  - recent [facility] with nonobstructive disease  - c/w Lipitor 80mg QD  - c/w ASA 81mg.    #HTN (hypertension).  ·  Plan: Off meds. BP well controlled now.    #Anemia. normocytic. Normal B12 and folic acid. Iron studies c/w VINCE, but likely multifactorial - also AOCD given cirrhosis and EtOH use  - c/w ferrous sulfate 325mg every other day.    #History of alcohol use. Long standing EtOH consuption. Not showing any signs of withdrawal at the moment. Last drink prior to admission which is almost 2 weeks ago. No need to monitor CIWA anymore.   - c/w Multivitamin and Folic Acid.      Patient was discharged to: home    New medications:   Changes to old medications:  Medications that were stopped:    Items to follow up as outpatient:    Physical exam at the time of discharge:       #Discharge: DO NOT DELETE    HOSPITAL COURSE:    67 yo M (undocumented/uninsured), current smoker (1 PPD x 50 years), current etOH use (1 pink vodka daily) with PMHx HTN, HLD, CAD (s/p CABG, 2017), AS (s/p AVR, 2017) px to [facility] on 2/24 with chest pain and LOC, admitted to cardiology for NSTEMI (s/p [facility] with non-obstructive disease) with course c/b restenosis of bioprosthetic valve (not surgical candidate). Hospital course further c/b transfer to MICU for AMS s/p IR TRACE of HCC requiring intubation, further c/b septic shock 2/2 ESBL Klebsiella bacteremia (s/p negative Gallium scan, TTE) on meropenem to complete 10d course (last day 3/29).    Problem List/Main Diagnoses:    #AMS  On admission, AMS, now back to baseline AOx3 at time of D/C. Likely multifactorial (hepatic encephalopathy 2/2 etOH/HCC and septic shock with bacteremia)  - MRI: Parenchymal volume loss, advanced for age  - vEEG: Negative   - Management of bacteremia as below    #Hepatic encephalopathy  Now AOx3.  - c/w Rifaximin     #Septic shock 2/2 Klebsiella  Found to have Bcx/Ucx positive for ESBL Klebsiella and Proteus mirabilis, cleared on repeat culture.   - ID consulted during admission with multiple courses of abx (including ertapenem transitioned to meropenem with clinical response)   - Gallium scan: Negative for acute new nidus of infxn  - s/p Meropenem 10d course (last day 3/29)     #Thrombocytopenia  Hospital course c/b declining Plt. Initial concern for HIT given DVT ppx, negative studies so resumed Lovenox. Likely in setting of known HCC.  - Lovenox DVT ppx while admitted     #HCC  CTAP on admission showing 2.4cm hypervascular liver mass. MRI correlating with lesion, consistent with likely HCC. No evidence of locally invasive or metastatic disease evidence.   - s/p IR transarterial chemical embolization   - Heme/Onc consulted during admission, MELD 13  - Surg/Onc consulted during admission, deemed not to be a surgical candidate     #Aortic stenosis  Patient with hx of AS s/p TAVR in 2017 with Dr. Wilcox. On current admission, JOSELO with peak transvalvular velocity of 4.7, mean gradient 56. Initially consulted for valve-in-valve TAVR however ultimately deemed not to be a surgical candidate due to aortic root anatomy. Hospital course c/b anemia likely due to hemolysis in setting of thrombosed valve.    #Anemia  Hospital course c/b normocytic anemia with concern for hemolysis in setting of valvulopathy. Minimal epistaxis, no other concern for active bleed.   - s/p IV iron x3 doses  - Nutritional supplementation with B12, Folate, PO iron  - Transfusion goal >8 in setting of CAD    #CAD  Pt with hx of CAD s/p CABG. Last LHC 1 year prior showing non-obstructive disease.   - ASA + Lipitor     #HTN  Currently off all anti-HTN in setting of hypotension.     #Urinary retention  Hospital course c/b urinary retention requiring Sanchez.  - Sanchez removed, passed TOV    Physical Exam:    Constitutional: WDWN resting comfortably in bed; NAD  Head: NC/AT  Eyes: clear conjunctiva  ENT: MMM  Respiratory: CTA B/L; no W/R/R, no retractions  Cardiac: +S1/S2; RRR; soft systolic murmur RUSB  Gastrointestinal: soft, NT/ND; no rebound or guarding; +BSx4  Extremities: No peripheral edema  Musculoskeletal: Moving all extremities on command   Vascular: 2+ radial, DP/PT pulses B/L  Neurologic: AAOx2-3 #Discharge: DO NOT DELETE    HOSPITAL COURSE:    65 yo M (undocumented/uninsured), current smoker (1 PPD x 50 years), current etOH use (1 pink vodka daily) with PMHx HTN, HLD, CAD (s/p CABG, 2017), AS (s/p AVR, 2017) px to [facility] on 2/24 with chest pain and LOC, admitted to cardiology for NSTEMI (s/p [facility] with non-obstructive disease) with course c/b restenosis of bioprosthetic valve (not surgical candidate). Hospital course further c/b transfer to MICU for AMS s/p IR TRACE of HCC requiring intubation, further c/b septic shock 2/2 ESBL Klebsiella bacteremia (s/p negative Gallium scan, TTE) on meropenem to complete 10d course (last day 3/29).    Problem List/Main Diagnoses:    #AMS  On admission, AMS, now back to baseline AOx3 at time of D/C. Likely multifactorial (hepatic encephalopathy 2/2 etOH/HCC and septic shock with bacteremia)  - MRI: Parenchymal volume loss, advanced for age  - vEEG: Negative   - Management of bacteremia as below    #Hepatic encephalopathy  Now AOx3.  - c/w Rifaximin     #Septic shock 2/2 Klebsiella  Found to have Bcx/Ucx positive for ESBL Klebsiella and Proteus mirabilis, cleared on repeat culture.   - ID consulted during admission with multiple courses of abx (including ertapenem transitioned to meropenem with clinical response)   - Gallium scan: Negative for acute new nidus of infxn  - s/p Meropenem 10d course (last day 3/29)     #Thrombocytopenia  Hospital course c/b declining Plt. Initial concern for HIT given DVT ppx, negative studies so resumed Lovenox. Likely in setting of known HCC.  - Lovenox DVT ppx while admitted     #HCC  CTAP on admission showing 2.4cm hypervascular liver mass. MRI correlating with lesion, consistent with likely HCC. No evidence of locally invasive or metastatic disease evidence.   - s/p IR transarterial chemical embolization   - Heme/Onc consulted during admission, MELD 13  - Surg/Onc consulted during admission, deemed not to be a surgical candidate     #Aortic stenosis  Patient with hx of AS s/p TAVR in 2017 with Dr. Wilcox. On current admission, JOSELO with peak transvalvular velocity of 4.7, mean gradient 56. Initially consulted for valve-in-valve TAVR however ultimately deemed not to be a surgical candidate due to aortic root anatomy. Hospital course c/b anemia likely due to hemolysis in setting of thrombosed valve.    #Anemia  Hospital course c/b normocytic anemia with concern for hemolysis in setting of valvulopathy. Minimal epistaxis, no other concern for active bleed.   - s/p IV iron x3 doses  - Nutritional supplementation with B12, Folate, PO iron  - Transfusion goal >8 in setting of CAD    #CAD  Pt with hx of CAD s/p CABG. Last LHC 1 year prior showing non-obstructive disease.   - ASA + Lipitor     #HTN  Currently off all anti-HTN in setting of hypotension.     #Urinary retention  Hospital course c/b urinary retention requiring Sanchez.  - Sanchez removed, passed TOV    New meds: tamsulosin .4,  lipitor 80, aspirin 81, iron 325, rifaxamin 500 BID, protonix 40 qd, multivitamin, folic acid, b12    Physical Exam:    Constitutional: WDWN resting comfortably in bed; NAD  Head: NC/AT  Eyes: clear conjunctiva  ENT: MMM  Respiratory: CTA B/L; no W/R/R, no retractions  Cardiac: +S1/S2; RRR; soft systolic murmur RUSB  Gastrointestinal: soft, NT/ND; no rebound or guarding; +BSx4  Extremities: No peripheral edema  Musculoskeletal: Moving all extremities on command   Vascular: 2+ radial, DP/PT pulses B/L  Neurologic: AAOx2-3

## 2022-02-25 NOTE — PROGRESS NOTE ADULT - PROBLEM SELECTOR PLAN 1
Patient presenting with 2 episodes nonradiating, 9/10 CP occurring at rest   - Patient w/ hx of CABG 5/2017 -  LIMA to LAD, SVG to PDA;  - Cath 5/2017: distal LM 40%, pLAC 80%, pLcx 80%, pRCA 80%, EF 55%, EDP 6 mmHg, JONEL 0.89cm2 consistent with severe AS, right radial artery and right femoral vein access. Right heart cath revealed RAP 3, RV 22/1 mean 4, PAP 25/7 mean 16, PCWP: 6, Ao/PA sat 92/67, CO/CI 5.2/3.4 consistent with normal RV filling pressures and preserved cardiac output/indices.  - ECHO   - EKG: NSR @ 85 bpm, ST depressions in II, V4, V5, V6   - Troponin: 0.02, trend CE's   - Loaded with Plavix 600 mg PO x 1 and Aspirin 325 mg PO x 1   - Precathed/consented to the schedule   - Mallampati: III, ASA: III  - F/u lipid panel - started on Atorva 80 qhs; f/u a1c   - Tele monitoring presented w/ chest pain and syncope x2.   - s/p diagnostic Cath 2/24/22: LM revealing no significant disease, pLAD: 80% stenosis, mLAD: 90% stenosis, D1: small, dLCx: 80%, RCA: small, non-dominant, LIMA-LAD: patent, SVG-LPDA: patent via right femoral artery - site stable.   - c/w ASA 81mg and Atorvastatin 80mg QHS

## 2022-02-25 NOTE — DISCHARGE NOTE PROVIDER - PROVIDER TOKENS
PROVIDER:[TOKEN:[[id number]]] FREE:[LAST:[[facility]],PHONE:[phone]],FAX:[(   )    -],ADDRESS:[[address] [place] [address]]] FREE:[LAST:[[facility]],PHONE:[phone]],FAX:[(   )    -],ADDRESS:[[address] [place] [address]]]

## 2022-02-25 NOTE — DISCHARGE NOTE PROVIDER - NSDCMRMEDTOKEN_GEN_ALL_CORE_FT
Aspirin Enteric Coated 81 mg oral delayed release tablet: 1 tab(s) orally once a day   atorvastatin 80 mg oral tablet: 1 tab(s) orally once a day (at bedtime)  cardiac rehab: Cardiac Rehab  3x per day for 12 weeks  indication CAD   Aspirin Enteric Coated 81 mg oral delayed release tablet: 1 tab(s) orally once a day   atorvastatin 80 mg oral tablet: 1 tab(s) orally once a day (at bedtime)  cardiac rehab: Cardiac Rehab  3x per day for 12 weeks  indication CAD  folic acid 1 mg oral tablet: 1 tab(s) orally once a day  Multiple Vitamins oral tablet: 1 tab(s) orally once a day   Aspirin Enteric Coated 81 mg oral delayed release tablet: 1 tab(s) orally once a day   atorvastatin 80 mg oral tablet: 1 tab(s) orally once a day (at bedtime)  cyanocobalamin 1000 mcg oral tablet: 1 tab(s) orally every 24 hours  ferrous sulfate 325 mg (65 mg elemental iron) oral tablet: 1 tab(s) orally every other day  folic acid 1 mg oral tablet: 1 tab(s) orally once a day  Multiple Vitamins oral tablet: 1 tab(s) orally once a day  pantoprazole 40 mg oral delayed release tablet: 1 tab(s) orally once a day (before a meal)  rifAXIMin 550 mg oral tablet: 1 tab(s) orally every 12 hours  tamsulosin 0.4 mg oral capsule: 1 cap(s) orally once a day (at bedtime)

## 2022-02-25 NOTE — DISCHARGE NOTE NURSING/CASE MANAGEMENT/SOCIAL WORK - PATIENT PORTAL LINK FT
You can access the FollowMyHealth Patient Portal offered by [facility] by registering at the following website: [email or web address]. By joining [employer]’s FollowMyHealth portal, you will also be able to view your health information using other applications (apps) compatible with our system.

## 2022-02-26 LAB
ALBUMIN SERPL ELPH-MCNC: 3.3 G/DL — SIGNIFICANT CHANGE UP (ref 3.3–5)
ALP SERPL-CCNC: 102 U/L — SIGNIFICANT CHANGE UP (ref 40–120)
ALT FLD-CCNC: 21 U/L — SIGNIFICANT CHANGE UP (ref 10–45)
ANION GAP SERPL CALC-SCNC: 8 MMOL/L — SIGNIFICANT CHANGE UP (ref 5–17)
APTT BLD: 35 SEC — SIGNIFICANT CHANGE UP (ref 27.5–35.5)
AST SERPL-CCNC: 45 U/L — HIGH (ref 10–40)
BILIRUB SERPL-MCNC: 0.8 MG/DL — SIGNIFICANT CHANGE UP (ref 0.2–1.2)
BLD GP AB SCN SERPL QL: NEGATIVE — SIGNIFICANT CHANGE UP
BUN SERPL-MCNC: 13 MG/DL — SIGNIFICANT CHANGE UP (ref 7–23)
CALCIUM SERPL-MCNC: 8.9 MG/DL — SIGNIFICANT CHANGE UP (ref 8.4–10.5)
CHLORIDE SERPL-SCNC: 106 MMOL/L — SIGNIFICANT CHANGE UP (ref 96–108)
CHOLEST SERPL-MCNC: 165 MG/DL — SIGNIFICANT CHANGE UP
CO2 SERPL-SCNC: 21 MMOL/L — LOW (ref 22–31)
CREAT SERPL-MCNC: 0.91 MG/DL — SIGNIFICANT CHANGE UP (ref 0.5–1.3)
CRP SERPL-MCNC: 5.9 MG/L — HIGH (ref 0–4)
ERYTHROCYTE [SEDIMENTATION RATE] IN BLOOD: 42 MM/HR — HIGH
GLUCOSE SERPL-MCNC: 107 MG/DL — HIGH (ref 70–99)
HCT VFR BLD CALC: 31.8 % — LOW (ref 39–50)
HDLC SERPL-MCNC: 27 MG/DL — LOW
HGB BLD-MCNC: 10.1 G/DL — LOW (ref 13–17)
INR BLD: 1.25 — HIGH (ref 0.88–1.16)
LIPID PNL WITH DIRECT LDL SERPL: 118 MG/DL — HIGH
MAGNESIUM SERPL-MCNC: 2 MG/DL — SIGNIFICANT CHANGE UP (ref 1.6–2.6)
MCHC RBC-ENTMCNC: 28.2 PG — SIGNIFICANT CHANGE UP (ref 27–34)
MCHC RBC-ENTMCNC: 31.8 GM/DL — LOW (ref 32–36)
MCV RBC AUTO: 88.8 FL — SIGNIFICANT CHANGE UP (ref 80–100)
NON HDL CHOLESTEROL: 138 MG/DL — HIGH
NRBC # BLD: 0 /100 WBCS — SIGNIFICANT CHANGE UP (ref 0–0)
PLATELET # BLD AUTO: 122 K/UL — LOW (ref 150–400)
POTASSIUM SERPL-MCNC: 4.2 MMOL/L — SIGNIFICANT CHANGE UP (ref 3.5–5.3)
POTASSIUM SERPL-SCNC: 4.2 MMOL/L — SIGNIFICANT CHANGE UP (ref 3.5–5.3)
PROT SERPL-MCNC: 7.4 G/DL — SIGNIFICANT CHANGE UP (ref 6–8.3)
PROTHROM AB SERPL-ACNC: 14.9 SEC — HIGH (ref 10.5–13.4)
RBC # BLD: 3.58 M/UL — LOW (ref 4.2–5.8)
RBC # FLD: 15.4 % — HIGH (ref 10.3–14.5)
RH IG SCN BLD-IMP: POSITIVE — SIGNIFICANT CHANGE UP
SODIUM SERPL-SCNC: 135 MMOL/L — SIGNIFICANT CHANGE UP (ref 135–145)
TRIGL SERPL-MCNC: 99 MG/DL — SIGNIFICANT CHANGE UP
WBC # BLD: 6.06 K/UL — SIGNIFICANT CHANGE UP (ref 3.8–10.5)
WBC # FLD AUTO: 6.06 K/UL — SIGNIFICANT CHANGE UP (ref 3.8–10.5)

## 2022-02-26 PROCEDURE — 99232 SBSQ HOSP IP/OBS MODERATE 35: CPT

## 2022-02-26 PROCEDURE — 71045 X-RAY EXAM CHEST 1 VIEW: CPT | Mod: 26

## 2022-02-26 RX ADMIN — Medication 81 MILLIGRAM(S): at 12:00

## 2022-02-26 RX ADMIN — Medication 50 MILLIGRAM(S): at 03:15

## 2022-02-26 RX ADMIN — Medication 50 MILLIGRAM(S): at 23:07

## 2022-02-26 RX ADMIN — Medication 1 PATCH: at 12:37

## 2022-02-26 RX ADMIN — Medication 50 MILLIGRAM(S): at 16:01

## 2022-02-26 RX ADMIN — ATORVASTATIN CALCIUM 80 MILLIGRAM(S): 80 TABLET, FILM COATED ORAL at 23:07

## 2022-02-26 RX ADMIN — Medication 100 MILLIGRAM(S): at 12:36

## 2022-02-26 RX ADMIN — Medication 1 PATCH: at 05:45

## 2022-02-26 RX ADMIN — Medication 50 MILLIGRAM(S): at 10:36

## 2022-02-26 RX ADMIN — Medication 1 PATCH: at 12:00

## 2022-02-26 RX ADMIN — Medication 1 TABLET(S): at 12:36

## 2022-02-26 RX ADMIN — Medication 1 MILLIGRAM(S): at 12:37

## 2022-02-26 RX ADMIN — Medication 1 PATCH: at 19:09

## 2022-02-26 NOTE — PROGRESS NOTE ADULT - SUBJECTIVE AND OBJECTIVE BOX
Subjective:  - No complaints this morning  - Events/Chart from overnight reviewed    PAST MEDICAL & SURGICAL HISTORY:  HTN (hypertension)    ETOH abuse    Smoker    Hyperlipidemia    Aortic valve stenosis, unspecified etiology    No significant past surgical history        Vital Signs Last 24 Hrs  T(C): 36.3 (26 Feb 2022 10:21), Max: 36.9 (25 Feb 2022 14:03)  T(F): 97.4 (26 Feb 2022 10:21), Max: 98.4 (25 Feb 2022 14:03)  HR: 82 (26 Feb 2022 09:45) (82 - 94)  BP: 140/74 (26 Feb 2022 09:45) (119/79 - 154/70)  BP(mean): 100 (26 Feb 2022 09:45) (100 - 100)  RR: 22 (26 Feb 2022 09:45) (15 - 22)  SpO2: 97% (26 Feb 2022 09:45) (95% - 98%)   I&O's Detail    25 Feb 2022 07:01  -  26 Feb 2022 07:00  --------------------------------------------------------  IN:    Oral Fluid: 895 mL  Total IN: 895 mL    OUT:    Voided (mL): 1370 mL  Total OUT: 1370 mL    Total NET: -475 mL      26 Feb 2022 07:01  -  26 Feb 2022 13:20  --------------------------------------------------------  IN:    Oral Fluid: 240 mL  Total IN: 240 mL    OUT:    Voided (mL): 450 mL  Total OUT: 450 mL    Total NET: -210 mL        Daily     Daily     Physical Exam:   GEN: NAD, AAOx3  HEENT: MMM, no icterus  CV: S1 +ANTHONY with soft S2  Lung: CTAB  Abd: soft NT ND +BS  Ext: no c/c/e, no groin hematoma  Neuro: no focal neuro deficit    MEDICATIONS  (STANDING):  aspirin enteric coated 81 milliGRAM(s) Oral daily  atorvastatin 80 milliGRAM(s) Oral at bedtime  chlordiazePOXIDE 50 milliGRAM(s) Oral every 6 hours  chlordiazePOXIDE 50 milliGRAM(s) Oral every 8 hours  chlordiazePOXIDE   Oral   folic acid 1 milliGRAM(s) Oral daily  multivitamin 1 Tablet(s) Oral daily  nicotine - 21 mG/24Hr(s) Patch 1 patch Transdermal daily  sodium chloride 0.9%. 500 milliLiter(s) (50 mL/Hr) IV Continuous <Continuous>  thiamine 100 milliGRAM(s) Oral daily      LABS:                        10.1   6.06  )-----------( 122      ( 26 Feb 2022 07:39 )             31.8     02-26    135  |  106  |  13  ----------------------------<  107<H>  4.2   |  21<L>  |  0.91    Ca    8.9      26 Feb 2022 07:39  Mg     2.0     02-26    TPro  7.4  /  Alb  3.3  /  TBili  0.8  /  DBili  0.2  /  AST  45<H>  /  ALT  21  /  AlkPhos  102  02-26    CARDIAC MARKERS ( 24 Feb 2022 13:49 )  x     / 0.02 ng/mL / 133 U/L / x     / 4.0 ng/mL      PT/INR - ( 26 Feb 2022 07:39 )   PT: 14.9 sec;   INR: 1.25          PTT - ( 26 Feb 2022 07:39 )  PTT:35.0 sec

## 2022-02-27 LAB
HCT VFR BLD CALC: 37.4 % — LOW (ref 39–50)
HGB BLD-MCNC: 11.4 G/DL — LOW (ref 13–17)
MCHC RBC-ENTMCNC: 27.2 PG — SIGNIFICANT CHANGE UP (ref 27–34)
MCHC RBC-ENTMCNC: 30.5 GM/DL — LOW (ref 32–36)
MCV RBC AUTO: 89.3 FL — SIGNIFICANT CHANGE UP (ref 80–100)
NRBC # BLD: 0 /100 WBCS — SIGNIFICANT CHANGE UP (ref 0–0)
PLATELET # BLD AUTO: 156 K/UL — SIGNIFICANT CHANGE UP (ref 150–400)
RBC # BLD: 4.19 M/UL — LOW (ref 4.2–5.8)
RBC # FLD: 15.4 % — HIGH (ref 10.3–14.5)
WBC # BLD: 9.12 K/UL — SIGNIFICANT CHANGE UP (ref 3.8–10.5)
WBC # FLD AUTO: 9.12 K/UL — SIGNIFICANT CHANGE UP (ref 3.8–10.5)

## 2022-02-27 PROCEDURE — 93880 EXTRACRANIAL BILAT STUDY: CPT | Mod: 26

## 2022-02-27 RX ORDER — PANTOPRAZOLE SODIUM 20 MG/1
40 TABLET, DELAYED RELEASE ORAL DAILY
Refills: 0 | Status: DISCONTINUED | OUTPATIENT
Start: 2022-02-27 | End: 2022-03-14

## 2022-02-27 RX ORDER — HEPARIN SODIUM 5000 [USP'U]/ML
5000 INJECTION INTRAVENOUS; SUBCUTANEOUS EVERY 8 HOURS
Refills: 0 | Status: DISCONTINUED | OUTPATIENT
Start: 2022-02-27 | End: 2022-03-10

## 2022-02-27 RX ADMIN — Medication 50 MILLIGRAM(S): at 06:02

## 2022-02-27 RX ADMIN — Medication 1 PATCH: at 19:30

## 2022-02-27 RX ADMIN — Medication 1 MILLIGRAM(S): at 13:00

## 2022-02-27 RX ADMIN — Medication 1 TABLET(S): at 13:01

## 2022-02-27 RX ADMIN — PANTOPRAZOLE SODIUM 40 MILLIGRAM(S): 20 TABLET, DELAYED RELEASE ORAL at 13:28

## 2022-02-27 RX ADMIN — Medication 100 MILLIGRAM(S): at 13:01

## 2022-02-27 RX ADMIN — Medication 50 MILLIGRAM(S): at 18:20

## 2022-02-27 RX ADMIN — ATORVASTATIN CALCIUM 80 MILLIGRAM(S): 80 TABLET, FILM COATED ORAL at 21:24

## 2022-02-27 RX ADMIN — HEPARIN SODIUM 5000 UNIT(S): 5000 INJECTION INTRAVENOUS; SUBCUTANEOUS at 15:25

## 2022-02-27 RX ADMIN — Medication 1 PATCH: at 06:06

## 2022-02-27 RX ADMIN — HEPARIN SODIUM 5000 UNIT(S): 5000 INJECTION INTRAVENOUS; SUBCUTANEOUS at 21:24

## 2022-02-27 RX ADMIN — Medication 1 PATCH: at 13:00

## 2022-02-27 RX ADMIN — Medication 1 PATCH: at 13:01

## 2022-02-27 RX ADMIN — Medication 81 MILLIGRAM(S): at 13:01

## 2022-02-27 NOTE — PROGRESS NOTE ADULT - SUBJECTIVE AND OBJECTIVE BOX
Patient discussed on morning rounds with Dr. Flores      Operation / Date: AVR/CABG 2017 now with bioprosthetic aortic stenosis     SUBJECTIVE ASSESSMENT:  Patient seen this morning at bedside, doing well and not offering any complaints at this time. Denies any jitteriness or tremors. Denies urge to drink alcohol.     Vital Signs Last 24 Hrs  T(C): 37 (27 Feb 2022 10:24), Max: 37 (27 Feb 2022 10:24)  T(F): 98.6 (27 Feb 2022 10:24), Max: 98.6 (27 Feb 2022 10:24)  HR: 100 (27 Feb 2022 08:00) (82 - 100)  BP: 116/64 (27 Feb 2022 08:00) (92/51 - 134/63)  BP(mean): 82 (27 Feb 2022 08:00) (65 - 91)  RR: 18 (27 Feb 2022 08:00) (13 - 24)  SpO2: 95% (27 Feb 2022 08:00) (95% - 98%)  I&O's Detail    26 Feb 2022 07:01  -  27 Feb 2022 07:00  --------------------------------------------------------  IN:    Oral Fluid: 480 mL  Total IN: 480 mL    OUT:    Voided (mL): 450 mL  Total OUT: 450 mL    Total NET: 30 mL          CHEST TUBE:  No  CORNELIO DRAIN:  No  EPICARDIAL WIRES: No  MAYERS: No    PHYSICAL EXAM:    General: Patient lying comfortably in bed, no acute distress     Neurological: Alert and oriented. No focal neurological deficits     Cardiovascular: S1S2, RRR, + loud systolic murmur appreciated at LUSB     Respiratory: Clear to ausculation bilaterally     Gastrointestinal: Abdomen soft, non tender, non distended     Extremities: Warm and well perfused. No edema or calf tenderness     Vascular: Peripheral pulses 2+ bilaterally     Incision Sites: Previous sternotomy incision well healed. No sternal click     LABS:                        11.4   9.12  )-----------( 156      ( 27 Feb 2022 07:42 )             37.4       COUMADIN:  No    PT/INR - ( 26 Feb 2022 07:39 )   PT: 14.9 sec;   INR: 1.25          PTT - ( 26 Feb 2022 07:39 )  PTT:35.0 sec    02-26    135  |  106  |  13  ----------------------------<  107<H>  4.2   |  21<L>  |  0.91    Ca    8.9      26 Feb 2022 07:39  Mg     2.0     02-26    TPro  7.4  /  Alb  3.3  /  TBili  0.8  /  DBili  0.2  /  AST  45<H>  /  ALT  21  /  AlkPhos  102  02-26          MEDICATIONS  (STANDING):  aspirin enteric coated 81 milliGRAM(s) Oral daily  atorvastatin 80 milliGRAM(s) Oral at bedtime  chlordiazePOXIDE 50 milliGRAM(s) Oral every 8 hours  chlordiazePOXIDE   Oral   folic acid 1 milliGRAM(s) Oral daily  multivitamin 1 Tablet(s) Oral daily  nicotine - 21 mG/24Hr(s) Patch 1 patch Transdermal daily  sodium chloride 0.9%. 500 milliLiter(s) (50 mL/Hr) IV Continuous <Continuous>  thiamine 100 milliGRAM(s) Oral daily    MEDICATIONS  (PRN):        RADIOLOGY & ADDITIONAL TESTS:

## 2022-02-27 NOTE — PROGRESS NOTE ADULT - ASSESSMENT
A/P: 66 year old male, current smoker (50 pack year history), current every day ETOH abuse, with hx of withdrawal, and PMHx of HTN, HLD, CAD and Aortic stenosis s/p CABG/AVR with Dr. Wilcox 2017, presented to [facility] ED on 2/24/22 complaining of chest pain. He subsequently underwent cardiac cath revealing patent bypass grafts (LIMA/SVG) and aortic stenosis. TTE confirmed severe prosthetic valve stenosis and Structural heart was consulted. Patient is now  transferred to CT surgery for interventional evaluation.    Neurovascular: Stable. Pain well controlled with current regimen.  - hx of ETOH abuse and alcohol withdrawal, continue librium taper to prevent DTs   - Continue thiamine and folic acid supplementation     HEENT:   - hx of mechanical fall with multiple chipped teeth on evaluation.   - Dental (Dr. Rhoades) consulted, recommending CT maxillofacial tomorrow Am with structural CT to evaluate for any acute dental infection. Will likely not need any teeth extracted prior to valve surgery since patient does not have any symptoms.     Cardiovascular: Hemodynamically stable. HR controlled.  - Severe bioprosthetic aortic stenosis, evaluation for TAVR valve-in-valve this week pending preoperative workup including CBC, CMP, Coags, Lipid Panel, TSH, Hemoglobin A1c, Pro-BNP, Cardiac Enzymes, Type & Screen x 2, UA, Carotid US, TTE, CXR., EKG, Bedside PFTS   - Plan for JOSELO and Structural CT scans tomorrow AM   - Continue asa 81mg, atorvastatin 80mg qhs   - Consider starting BB if HR/BP can tolerate.   - Hx of CAD s/p CABG. Grafts patent on recent cardiac cath     Respiratory: 02 Sat = 95% on RA.  - CXR 2/26 stable.   - Encourage C+DB and Use of IS 10x / hr while awake.    GI: Stable.  - NPO after midnight for JOSELO and CTA   - Continue protonix 40mg for GI ppx     Renal / :  BUN/Cr 13/0.91. No active issues   - Monitor renal function.  - Monitor I/O's.    Endocrine: hgba1c 5.1, TSH wnl    - no active issues     Prophylaxis:  - DVT prophylaxis with 5000 SubQ Heparin q8h.  - SCD's    Disposition: pending TAVR v-i-v workup

## 2022-02-28 LAB
ANION GAP SERPL CALC-SCNC: 12 MMOL/L — SIGNIFICANT CHANGE UP (ref 5–17)
APTT BLD: 37.9 SEC — HIGH (ref 27.5–35.5)
BUN SERPL-MCNC: 18 MG/DL — SIGNIFICANT CHANGE UP (ref 7–23)
CALCIUM SERPL-MCNC: 9 MG/DL — SIGNIFICANT CHANGE UP (ref 8.4–10.5)
CHLORIDE SERPL-SCNC: 106 MMOL/L — SIGNIFICANT CHANGE UP (ref 96–108)
CO2 SERPL-SCNC: 15 MMOL/L — LOW (ref 22–31)
CREAT SERPL-MCNC: 0.91 MG/DL — SIGNIFICANT CHANGE UP (ref 0.5–1.3)
GLUCOSE BLDC GLUCOMTR-MCNC: 156 MG/DL — HIGH (ref 70–99)
GLUCOSE BLDC GLUCOMTR-MCNC: 81 MG/DL — SIGNIFICANT CHANGE UP (ref 70–99)
GLUCOSE SERPL-MCNC: 105 MG/DL — HIGH (ref 70–99)
HCT VFR BLD CALC: 35.9 % — LOW (ref 39–50)
HGB BLD-MCNC: 11.2 G/DL — LOW (ref 13–17)
INR BLD: 1.17 — HIGH (ref 0.88–1.16)
MAGNESIUM SERPL-MCNC: 1.9 MG/DL — SIGNIFICANT CHANGE UP (ref 1.6–2.6)
MCHC RBC-ENTMCNC: 28 PG — SIGNIFICANT CHANGE UP (ref 27–34)
MCHC RBC-ENTMCNC: 31.2 GM/DL — LOW (ref 32–36)
MCV RBC AUTO: 89.8 FL — SIGNIFICANT CHANGE UP (ref 80–100)
NRBC # BLD: 0 /100 WBCS — SIGNIFICANT CHANGE UP (ref 0–0)
PLATELET # BLD AUTO: 147 K/UL — LOW (ref 150–400)
POTASSIUM SERPL-MCNC: 4.2 MMOL/L — SIGNIFICANT CHANGE UP (ref 3.5–5.3)
POTASSIUM SERPL-SCNC: 4.2 MMOL/L — SIGNIFICANT CHANGE UP (ref 3.5–5.3)
PROTHROM AB SERPL-ACNC: 13.9 SEC — HIGH (ref 10.5–13.4)
RBC # BLD: 4 M/UL — LOW (ref 4.2–5.8)
RBC # FLD: 15.5 % — HIGH (ref 10.3–14.5)
SARS-COV-2 RNA SPEC QL NAA+PROBE: SIGNIFICANT CHANGE UP
SODIUM SERPL-SCNC: 133 MMOL/L — LOW (ref 135–145)
WBC # BLD: 7.03 K/UL — SIGNIFICANT CHANGE UP (ref 3.8–10.5)
WBC # FLD AUTO: 7.03 K/UL — SIGNIFICANT CHANGE UP (ref 3.8–10.5)

## 2022-02-28 PROCEDURE — 93312 ECHO TRANSESOPHAGEAL: CPT | Mod: 26

## 2022-02-28 PROCEDURE — 75572 CT HRT W/3D IMAGE: CPT | Mod: 26

## 2022-02-28 PROCEDURE — 74174 CTA ABD&PLVS W/CONTRAST: CPT | Mod: 26

## 2022-02-28 PROCEDURE — 70486 CT MAXILLOFACIAL W/O DYE: CPT | Mod: 26

## 2022-02-28 RX ORDER — SENNA PLUS 8.6 MG/1
2 TABLET ORAL AT BEDTIME
Refills: 0 | Status: DISCONTINUED | OUTPATIENT
Start: 2022-02-28 | End: 2022-03-06

## 2022-02-28 RX ORDER — CHLORHEXIDINE GLUCONATE 213 G/1000ML
1 SOLUTION TOPICAL ONCE
Refills: 0 | Status: COMPLETED | OUTPATIENT
Start: 2022-02-28 | End: 2022-02-28

## 2022-02-28 RX ORDER — CHLORHEXIDINE GLUCONATE 213 G/1000ML
1 SOLUTION TOPICAL ONCE
Refills: 0 | Status: COMPLETED | OUTPATIENT
Start: 2022-03-01 | End: 2022-03-01

## 2022-02-28 RX ORDER — CHLORHEXIDINE GLUCONATE 213 G/1000ML
10 SOLUTION TOPICAL ONCE
Refills: 0 | Status: COMPLETED | OUTPATIENT
Start: 2022-02-28 | End: 2022-03-01

## 2022-02-28 RX ORDER — ALBUMIN HUMAN 25 %
250 VIAL (ML) INTRAVENOUS ONCE
Refills: 0 | Status: COMPLETED | OUTPATIENT
Start: 2022-02-28 | End: 2022-02-28

## 2022-02-28 RX ORDER — POLYETHYLENE GLYCOL 3350 17 G/17G
17 POWDER, FOR SOLUTION ORAL DAILY
Refills: 0 | Status: DISCONTINUED | OUTPATIENT
Start: 2022-02-28 | End: 2022-03-06

## 2022-02-28 RX ADMIN — Medication 1 PATCH: at 13:00

## 2022-02-28 RX ADMIN — POLYETHYLENE GLYCOL 3350 17 GRAM(S): 17 POWDER, FOR SOLUTION ORAL at 11:34

## 2022-02-28 RX ADMIN — Medication 125 MILLILITER(S): at 13:36

## 2022-02-28 RX ADMIN — Medication 100 MILLIGRAM(S): at 13:36

## 2022-02-28 RX ADMIN — Medication 1 PATCH: at 17:30

## 2022-02-28 RX ADMIN — HEPARIN SODIUM 5000 UNIT(S): 5000 INJECTION INTRAVENOUS; SUBCUTANEOUS at 21:31

## 2022-02-28 RX ADMIN — Medication 50 MILLIGRAM(S): at 06:34

## 2022-02-28 RX ADMIN — PANTOPRAZOLE SODIUM 40 MILLIGRAM(S): 20 TABLET, DELAYED RELEASE ORAL at 11:35

## 2022-02-28 RX ADMIN — Medication 1 MILLIGRAM(S): at 11:35

## 2022-02-28 RX ADMIN — SENNA PLUS 2 TABLET(S): 8.6 TABLET ORAL at 11:35

## 2022-02-28 RX ADMIN — Medication 50 MILLIGRAM(S): at 18:53

## 2022-02-28 RX ADMIN — CHLORHEXIDINE GLUCONATE 1 APPLICATION(S): 213 SOLUTION TOPICAL at 23:32

## 2022-02-28 RX ADMIN — HEPARIN SODIUM 5000 UNIT(S): 5000 INJECTION INTRAVENOUS; SUBCUTANEOUS at 06:34

## 2022-02-28 RX ADMIN — Medication 1 TABLET(S): at 11:35

## 2022-02-28 RX ADMIN — Medication 81 MILLIGRAM(S): at 11:35

## 2022-02-28 RX ADMIN — ATORVASTATIN CALCIUM 80 MILLIGRAM(S): 80 TABLET, FILM COATED ORAL at 21:31

## 2022-02-28 RX ADMIN — SENNA PLUS 2 TABLET(S): 8.6 TABLET ORAL at 21:31

## 2022-02-28 NOTE — CONSULT NOTE ADULT - ASSESSMENT
Pt awaiting valve replacement, OMFS consult to rule out oral infection  Pt without dental complaints  CT scan reviewed: chronic poor dentition, no evidence of acute abscess or infection    Given that patient has no dental complaints and has no acute abscess on CT imaging, would proceed with CT surgery  Recommend peridex rinses  f/u with general dentist as an outpatient    Evaristo Kerr DDS, MD

## 2022-02-28 NOTE — PROGRESS NOTE ADULT - ASSESSMENT
A/P:    66 year old male, current smoker (50 pack year history), current every day ETOH abuse, with hx of withdrawal, and PMHx of HTN, HLD, CAD and Aortic stenosis s/p CABG/AVR with Dr. Wilcox 2017, presented to [facility] ED on 2/24/22 complaining of chest pain. He subsequently underwent cardiac cath revealing patent bypass grafts (LIMA/SVG) and aortic stenosis. TTE confirmed severe prosthetic valve stenosis and Structural heart was consulted. Patient was  transferred to CT surgery for interventional evaluation. Pending valve in valve TAVR 3/1.     Neurovascular:   - No delirium. Pain well controlled with current regimen.  - Hx daily ETOH abuse and alcohol withdrawal: continue librium taper to prevent DTs  - Continue thiamine, folic acid, mutlivitamin     HEENT:  - Hx mechanical fall, multiple chipped teeth  - Dental (Dr. Rhoades) consulted, pending CT maxillofacial to evaluate for acute infection prior to valve surgery     Cardiovascular:   - S/p AVR (2017) with severe aortic prosthetic valve stenosis  - Plan for SHONDA TAVR 3/1: pending JOSELO and CT structural scans   -Hemodynamically stable. HR controlled ()  - Hx of HTN, BP controlled (101//64)  - Continue ASA, atorvastatin 80 mg daily   - Consider BB if BP/ HR allow, currently too hypotensive to start   - Hx CAD s/p CABG: patent grafts on recent cath     Respiratory:   - 02 Sat = 99% on RA.  -Encourage C+DB and Use of IS 10x / hr while awake.  -CXR without effusions, consolidations     GI:   - Stable.  -NPO after MN for OR tomorrow   -Continue GI PPX with protonix  -PO Diet.    Renal / :  - BUN/Cr stable at 18/0.91  -Continue to monitor renal function.  -Monitor I/O's.  - Replete electrolytes PRN    Endocrine:    -A1c 5.1, no known hx DM  -TSH 0.984, no known hx thyroid disease     Hematologic:  -H/H stable at 11.2/35.9 with no obvious signs of bleeding  -Coagulation Panel.    ID:  -Pt remains afebrile with no elevation in WBC or signs of infection  -Continue to monitor CBC  -Observe for SIRS/Sepsis Syndrome.    Prophylaxis:  -DVT prophylaxis with 5000 SubQ Heparin q8h.  -SCD's    Disposition:  -OR tomorrow for Shonda TAVR

## 2022-02-28 NOTE — PROGRESS NOTE ADULT - SUBJECTIVE AND OBJECTIVE BOX
Planned Date of Surgery:  2/28/22                                                                                                           Surgeon: Dr. Wilcox/ Dr. Ashby    Procedure: MAURI TAVR    HPI:  66 year old male, current smoker (50 pack year history), current every day ETOH abuse, with hx of withdrawal, and PMHx of HTN, HLD, CAD and Aortic stenosis s/p CABG/AVR with Dr. Wilcox 2017, presented to [facility] ED on 2/24/22 complaining of chest pain. He subsequently underwent cardiac cath revealing patent bypass grafts (LIMA/SVG) and aortic stenosis. TTE confirmed severe prosthetic valve stenosis and Structural heart was consulted. Patient was  transferred to CT surgery for interventional evaluation.    PAST MEDICAL & SURGICAL HISTORY:  HTN (hypertension)    ETOH abuse    Smoker    Hyperlipidemia    Aortic valve stenosis, unspecified etiology    No significant past surgical history    No Known Allergies    Physical Exam  T(C): 36.3 (02-28-22 @ 13:52), Max: 36.7 (02-27-22 @ 17:25)  HR: 90 (02-28-22 @ 13:35) (86 - 104)  BP: 119/56 (02-28-22 @ 13:35) (89/55 - 125/58)  RR: 20 (02-28-22 @ 13:35) (13 - 25)  SpO2: 97% (02-28-22 @ 13:35) (96% - 99%)  Constitutional: well appearing, NAD, assessed laying comfortably in bed   Neuro: A&OX3, but lethargic with conversation. No tremors.  CV: RRR, +ANTHONY, no rubs/ gallops   Pulmonary: CTA bilateral posterior lung fields, no wheezes, rales, rhonchi   GI: +BS/ NT/Nd  Extremities No peripheral edema or calf tenderness    MEDICATIONS  (STANDING):  aspirin enteric coated 81 milliGRAM(s) Oral daily  atorvastatin 80 milliGRAM(s) Oral at bedtime  chlordiazePOXIDE 50 milliGRAM(s) Oral every 12 hours  chlordiazePOXIDE 50 milliGRAM(s) Oral once  chlordiazePOXIDE   Oral   chlorhexidine 0.12% Liquid 10 milliLiter(s) Swish and Spit once  chlorhexidine 4% Liquid 1 Application(s) Topical once  chlorhexidine 4% Liquid 1 Application(s) Topical once  folic acid 1 milliGRAM(s) Oral daily  heparin   Injectable 5000 Unit(s) SubCutaneous every 8 hours  multivitamin 1 Tablet(s) Oral daily  nicotine - 21 mG/24Hr(s) Patch 1 patch Transdermal daily  pantoprazole    Tablet 40 milliGRAM(s) Oral daily  polyethylene glycol 3350 17 Gram(s) Oral daily  senna 2 Tablet(s) Oral at bedtime  sodium chloride 0.9%. 500 milliLiter(s) (50 mL/Hr) IV Continuous <Continuous>    MEDICATIONS  (PRN):      On Beta Blocker? NO / CONTRAINDICATED Reason___hypotension____________    Labs:                        11.2   7.03  )-----------( 147      ( 28 Feb 2022 06:01 )             35.9     02-28    133<L>  |  106  |  18  ----------------------------<  105<H>  4.2   |  15<L>  |  0.91    Ca    9.0      28 Feb 2022 06:01  Mg     1.9     02-28      PT/INR - ( 28 Feb 2022 06:01 )   PT: 13.9 sec;   INR: 1.17          PTT - ( 28 Feb 2022 06:01 )  PTT:37.9 sec    ABO Interpretation: O (02-26-22 @ 07:41)    Hgb A1C: 5.1    EKG: Completed, in chart    CXR:  < from: Xray Chest 1 View- PORTABLE-Routine (Xray Chest 1 View- PORTABLE-Routine in AM.) (02.26.22 @ 05:23) >  INTERPRETATION:  Clinical History: STEMI    Frontal examination of the chest demonstrates mild cardiomegaly. No acute   infiltrates. Status post sternotomy.    IMPRESSION: No acute infiltrates    < end of copied text >      CT Scans: Pending     Cath Report: 2/24/22: LM revealing no significant disease, pLAD: 80% stenosis, mLAD: 90% stenosis, D1: small, dLCx: 80%, RCA: small, non-dominant, LIMA-LAD: patent, SVG-LPDA: patent    Echo:  < from: TTE Echo Complete w/o Contrast w/ Doppler (02.25.22 @ 13:03) >  CONCLUSIONS:     1. Moderate symmetric left ventricular hypertrophy.   2. Normal left ventricular systolic function.   3. Grade II left ventricular diastolic dysfunction.   4. Normal right ventricular size and systolic function.   5. Dilated left atrium.   6. A bioprosthetic valve noted in the aortic position. The peak   transvalvular velocity is 4.90 m/s, the mean transvalvular gradient is   79.00 mmHg, and the LVOT/AV velocity ratio is 0.20. These findings are   consistent with significant prosthetic stenosis.   7. Moderate mitral regurgitation.   8. Pulmonary artery systolic pressure is 34 mmHg.   9. No pericardial effusion.    < end of copied text >      PFT's: Pending    Carotid Duplex:  < from: US Duplex Carotid Arteries Complete, Bilateral (02.27.22 @ 14:45) >  IMPRESSION:  1.  Significantly limited evaluation of the RIGHT internal carotid artery   obscured by probable severe calcified plaques. Abnormal waveform   concerning for at least moderate carotid stenosis. Correlation with CTA   is recommended for accurate evaluation.  2.  Moderate calcified plaques in the left common and left carotid artery.    < end of copied text >      Consult in Chart?  YES   Consent in Chart? YES   Pre-op Orders Placed? YES   Blood Products Ordered? YES   NPO ordered? YES

## 2022-03-01 ENCOUNTER — APPOINTMENT (OUTPATIENT)
Dept: CARDIOTHORACIC SURGERY | Facility: HOSPITAL | Age: 67
End: 2022-03-01

## 2022-03-01 LAB
APTT BLD: 35.9 SEC — HIGH (ref 27.5–35.5)
HCT VFR BLD CALC: 28.6 % — LOW (ref 39–50)
HGB BLD-MCNC: 9 G/DL — LOW (ref 13–17)
MCHC RBC-ENTMCNC: 28 PG — SIGNIFICANT CHANGE UP (ref 27–34)
MCHC RBC-ENTMCNC: 31.5 GM/DL — LOW (ref 32–36)
MCV RBC AUTO: 89.1 FL — SIGNIFICANT CHANGE UP (ref 80–100)
NRBC # BLD: 0 /100 WBCS — SIGNIFICANT CHANGE UP (ref 0–0)
PLATELET # BLD AUTO: 135 K/UL — LOW (ref 150–400)
RBC # BLD: 3.21 M/UL — LOW (ref 4.2–5.8)
RBC # FLD: 15.4 % — HIGH (ref 10.3–14.5)
WBC # BLD: 7.29 K/UL — SIGNIFICANT CHANGE UP (ref 3.8–10.5)
WBC # FLD AUTO: 7.29 K/UL — SIGNIFICANT CHANGE UP (ref 3.8–10.5)

## 2022-03-01 RX ADMIN — CHLORHEXIDINE GLUCONATE 10 MILLILITER(S): 213 SOLUTION TOPICAL at 05:27

## 2022-03-01 RX ADMIN — CHLORHEXIDINE GLUCONATE 1 APPLICATION(S): 213 SOLUTION TOPICAL at 05:27

## 2022-03-01 RX ADMIN — POLYETHYLENE GLYCOL 3350 17 GRAM(S): 17 POWDER, FOR SOLUTION ORAL at 13:05

## 2022-03-01 RX ADMIN — ATORVASTATIN CALCIUM 80 MILLIGRAM(S): 80 TABLET, FILM COATED ORAL at 22:17

## 2022-03-01 RX ADMIN — HEPARIN SODIUM 5000 UNIT(S): 5000 INJECTION INTRAVENOUS; SUBCUTANEOUS at 22:18

## 2022-03-01 RX ADMIN — PANTOPRAZOLE SODIUM 40 MILLIGRAM(S): 20 TABLET, DELAYED RELEASE ORAL at 13:05

## 2022-03-01 RX ADMIN — Medication 50 MILLIGRAM(S): at 00:15

## 2022-03-01 RX ADMIN — Medication 81 MILLIGRAM(S): at 13:04

## 2022-03-01 RX ADMIN — HEPARIN SODIUM 5000 UNIT(S): 5000 INJECTION INTRAVENOUS; SUBCUTANEOUS at 13:05

## 2022-03-01 RX ADMIN — Medication 1 MILLIGRAM(S): at 13:04

## 2022-03-01 RX ADMIN — SENNA PLUS 2 TABLET(S): 8.6 TABLET ORAL at 22:18

## 2022-03-01 RX ADMIN — Medication 1 TABLET(S): at 13:04

## 2022-03-01 RX ADMIN — Medication 1 PATCH: at 14:39

## 2022-03-01 RX ADMIN — Medication 1 PATCH: at 13:04

## 2022-03-01 RX ADMIN — HEPARIN SODIUM 5000 UNIT(S): 5000 INJECTION INTRAVENOUS; SUBCUTANEOUS at 05:27

## 2022-03-01 NOTE — PROGRESS NOTE ADULT - ASSESSMENT
66 year old male, current smoker (50 pack year history), current every day ETOH abuse, with hx of withdrawal, and PMHx of HTN, HLD, CAD and Aortic stenosis s/p CABG/AVR with Dr. Wilcox 2017, presented to [facility] ED on 2/24/22 complaining of chest pain. He subsequently underwent cardiac cath revealing patent bypass grafts (LIMA/SVG) and aortic stenosis. TTE confirmed severe prosthetic valve stenosis and Structural heart was consulted. Patient was  transferred to CT surgery for interventional evaluation. Patient was scheduled for TAVR today, however postponed for possible aortic root surgery later this week.    Neurovascular:   - No delirium. Pain well controlled with current regimen.  - Hx daily ETOH abuse and alcohol withdrawal: continue librium taper to prevent DTs  - Continue thiamine, folic acid, mutlivitamin     HEENT:  - Hx mechanical fall, multiple chipped teeth  - Dental (Dr. Rhoades) consulted, pending CT maxillofacial to evaluate for acute infection prior to valve surgery     Cardiovascular:   - S/p AVR (2017) with severe aortic prosthetic valve stenosis  - Plan for SHONDA TAVR 3/1 changed: now possible aortic root candidate  -Hemodynamically stable. HR controlled ()  - Hx of HTN, BP controlled (101//64)  - Continue ASA, atorvastatin 80 mg daily   - Consider BB if BP/ HR allow, currently too hypotensive to start   - Hx CAD s/p CABG: patent grafts on recent cath     Respiratory:   - 02 Sat = 99% on RA.  -Encourage C+DB and Use of IS 10x / hr while awake.  -CXR without effusions, consolidations     GI:   -Stable.  -Continue GI PPX with protonix  -PO Diet.    Renal / :  - BUN/Cr stable at 18/0.91  -Continue to monitor renal function.  -Monitor I/O's.  - Replete electrolytes PRN    Endocrine:    -A1c 5.1, no known hx DM  -TSH 0.984, no known hx thyroid disease     Hematologic:  -H/H stable at 9/28.6 with no obvious signs of bleeding  -Coagulation Panel.    ID:  -Pt remains afebrile with no elevation in WBC or signs of infection  -Continue to monitor CBC  -Observe for SIRS/Sepsis Syndrome.    Prophylaxis:  -DVT prophylaxis with 5000 SubQ Heparin q8h.  -SCD's    Disposition:  -OR plan changed from Shonda TAVR to possible aortic root Thursday

## 2022-03-01 NOTE — PROGRESS NOTE ADULT - SUBJECTIVE AND OBJECTIVE BOX
Patient discussed on morning rounds with Dr. Marquez/Dr. Wilcox      Operation / Date: preop aortic root    SUBJECTIVE ASSESSMENT:  66y Male. NAEO. Patient planned for TAVR, however OR plan adjusted to possible aortic root.  Patient denies cp, sob, palpitations, n/v/d/c, fever.    Vital Signs Last 24 Hrs  T(C): 36.6 (01 Mar 2022 14:36), Max: 36.7 (01 Mar 2022 09:15)  T(F): 97.9 (01 Mar 2022 14:36), Max: 98.1 (01 Mar 2022 09:15)  HR: 86 (01 Mar 2022 16:00) (84 - 96)  BP: 115/71 (01 Mar 2022 16:00) (96/56 - 124/67)  BP(mean): 86 (01 Mar 2022 16:00) (70 - 92)  RR: 19 (01 Mar 2022 16:00) (15 - 19)  SpO2: 96% (01 Mar 2022 16:00) (95% - 98%)  I&O's Detail    28 Feb 2022 07:01  -  01 Mar 2022 07:00  --------------------------------------------------------  IN:  Total IN: 0 mL    OUT:    Voided (mL): 350 mL  Total OUT: 350 mL    Total NET: -350 mL      PHYSICAL EXAM:    Constitutional: well appearing, NAD, assessed laying comfortably in bed   Neuro: A&OX3, but lethargic with conversation. No tremors.  CV: RRR, +ANTHONY, no rubs/ gallops   Pulmonary: CTA bilateral posterior lung fields, no wheezes, rales, rhonchi   GI: +BS/ NT/Nd  Extremities No peripheral edema or calf tenderness      LABS:                        9.0    7.29  )-----------( 135      ( 01 Mar 2022 07:21 )             28.6       COUMADIN: No.     PT/INR - ( 28 Feb 2022 06:01 )   PT: 13.9 sec;   INR: 1.17          PTT - ( 01 Mar 2022 12:57 )  PTT:35.9 sec    02-28    133<L>  |  106  |  18  ----------------------------<  105<H>  4.2   |  15<L>  |  0.91    Ca    9.0      28 Feb 2022 06:01  Mg     1.9     02-28    MEDICATIONS  (STANDING):  aspirin enteric coated 81 milliGRAM(s) Oral daily  atorvastatin 80 milliGRAM(s) Oral at bedtime  folic acid 1 milliGRAM(s) Oral daily  heparin   Injectable 5000 Unit(s) SubCutaneous every 8 hours  multivitamin 1 Tablet(s) Oral daily  nicotine - 21 mG/24Hr(s) Patch 1 patch Transdermal daily  pantoprazole    Tablet 40 milliGRAM(s) Oral daily  polyethylene glycol 3350 17 Gram(s) Oral daily  senna 2 Tablet(s) Oral at bedtime  sodium chloride 0.9%. 500 milliLiter(s) (50 mL/Hr) IV Continuous <Continuous>    MEDICATIONS  (PRN):      RADIOLOGY & ADDITIONAL TESTS:  < from: CT Angio Abdomen and Pelvis w/ IV Cont (02.28.22 @ 18:36) >    IMPRESSION:    1. Nodular liver contour, consistent with cirrhosis.  2. A 2.4 cm hypervascular liver mass is suspicious for hepatocellular   carcinoma. Recommend correlation with alpha-fetoprotein levels and MRI of   liver.  3. Severe atherosclerotic disease throughout the chest, abdomen and   pelvis, with large amount of calcified plaque in pelvic arteries   bilaterally and calcified and soft plaque in abdominal aorta. Multiple   stenoses, as described above.  4. Groundglass opacity     < end of copied text >

## 2022-03-02 LAB
AFP-TM SERPL-MCNC: 7.2 NG/ML — SIGNIFICANT CHANGE UP
ALBUMIN SERPL ELPH-MCNC: 3.2 G/DL — LOW (ref 3.3–5)
ALP SERPL-CCNC: 99 U/L — SIGNIFICANT CHANGE UP (ref 40–120)
ALT FLD-CCNC: 31 U/L — SIGNIFICANT CHANGE UP (ref 10–45)
ANION GAP SERPL CALC-SCNC: 8 MMOL/L — SIGNIFICANT CHANGE UP (ref 5–17)
AST SERPL-CCNC: 63 U/L — HIGH (ref 10–40)
BILIRUB SERPL-MCNC: 0.6 MG/DL — SIGNIFICANT CHANGE UP (ref 0.2–1.2)
BUN SERPL-MCNC: 15 MG/DL — SIGNIFICANT CHANGE UP (ref 7–23)
CALCIUM SERPL-MCNC: 8.6 MG/DL — SIGNIFICANT CHANGE UP (ref 8.4–10.5)
CHLORIDE SERPL-SCNC: 109 MMOL/L — HIGH (ref 96–108)
CO2 SERPL-SCNC: 21 MMOL/L — LOW (ref 22–31)
CREAT SERPL-MCNC: 0.83 MG/DL — SIGNIFICANT CHANGE UP (ref 0.5–1.3)
EGFR: 97 ML/MIN/1.73M2 — SIGNIFICANT CHANGE UP
GLUCOSE SERPL-MCNC: 107 MG/DL — HIGH (ref 70–99)
HCT VFR BLD CALC: 29.8 % — LOW (ref 39–50)
HGB BLD-MCNC: 9 G/DL — LOW (ref 13–17)
MAGNESIUM SERPL-MCNC: 2 MG/DL — SIGNIFICANT CHANGE UP (ref 1.6–2.6)
MCHC RBC-ENTMCNC: 27.7 PG — SIGNIFICANT CHANGE UP (ref 27–34)
MCHC RBC-ENTMCNC: 30.2 GM/DL — LOW (ref 32–36)
MCV RBC AUTO: 91.7 FL — SIGNIFICANT CHANGE UP (ref 80–100)
NRBC # BLD: 0 /100 WBCS — SIGNIFICANT CHANGE UP (ref 0–0)
PLATELET # BLD AUTO: 125 K/UL — LOW (ref 150–400)
POTASSIUM SERPL-MCNC: 4.1 MMOL/L — SIGNIFICANT CHANGE UP (ref 3.5–5.3)
POTASSIUM SERPL-SCNC: 4.1 MMOL/L — SIGNIFICANT CHANGE UP (ref 3.5–5.3)
PROT SERPL-MCNC: 7.2 G/DL — SIGNIFICANT CHANGE UP (ref 6–8.3)
RBC # BLD: 3.25 M/UL — LOW (ref 4.2–5.8)
RBC # FLD: 15.5 % — HIGH (ref 10.3–14.5)
SODIUM SERPL-SCNC: 138 MMOL/L — SIGNIFICANT CHANGE UP (ref 135–145)
WBC # BLD: 6.35 K/UL — SIGNIFICANT CHANGE UP (ref 3.8–10.5)
WBC # FLD AUTO: 6.35 K/UL — SIGNIFICANT CHANGE UP (ref 3.8–10.5)

## 2022-03-02 PROCEDURE — 71045 X-RAY EXAM CHEST 1 VIEW: CPT | Mod: 26

## 2022-03-02 PROCEDURE — 99232 SBSQ HOSP IP/OBS MODERATE 35: CPT

## 2022-03-02 RX ADMIN — Medication 1 MILLIGRAM(S): at 12:54

## 2022-03-02 RX ADMIN — POLYETHYLENE GLYCOL 3350 17 GRAM(S): 17 POWDER, FOR SOLUTION ORAL at 12:54

## 2022-03-02 RX ADMIN — ATORVASTATIN CALCIUM 80 MILLIGRAM(S): 80 TABLET, FILM COATED ORAL at 21:00

## 2022-03-02 RX ADMIN — HEPARIN SODIUM 5000 UNIT(S): 5000 INJECTION INTRAVENOUS; SUBCUTANEOUS at 14:50

## 2022-03-02 RX ADMIN — HEPARIN SODIUM 5000 UNIT(S): 5000 INJECTION INTRAVENOUS; SUBCUTANEOUS at 21:00

## 2022-03-02 RX ADMIN — Medication 1 TABLET(S): at 12:54

## 2022-03-02 RX ADMIN — SENNA PLUS 2 TABLET(S): 8.6 TABLET ORAL at 21:00

## 2022-03-02 RX ADMIN — Medication 81 MILLIGRAM(S): at 12:54

## 2022-03-02 RX ADMIN — PANTOPRAZOLE SODIUM 40 MILLIGRAM(S): 20 TABLET, DELAYED RELEASE ORAL at 12:54

## 2022-03-02 RX ADMIN — HEPARIN SODIUM 5000 UNIT(S): 5000 INJECTION INTRAVENOUS; SUBCUTANEOUS at 06:31

## 2022-03-02 RX ADMIN — Medication 1 PATCH: at 12:54

## 2022-03-02 RX ADMIN — Medication 1 PATCH: at 19:21

## 2022-03-02 NOTE — PROGRESS NOTE ADULT - SUBJECTIVE AND OBJECTIVE BOX
Patient discussed on morning rounds with       Operation / Date:     SUBJECTIVE ASSESSMENT:  66y Male         Vital Signs Last 24 Hrs  T(C): 36.6 (02 Mar 2022 09:20), Max: 36.8 (01 Mar 2022 17:13)  T(F): 97.9 (02 Mar 2022 09:20), Max: 98.3 (01 Mar 2022 17:13)  HR: 94 (02 Mar 2022 08:30) (86 - 94)  BP: 103/57 (02 Mar 2022 05:02) (98/57 - 115/71)  BP(mean): 74 (02 Mar 2022 05:02) (73 - 86)  RR: 20 (02 Mar 2022 08:30) (14 - 20)  SpO2: 97% (02 Mar 2022 08:30) (96% - 97%)  I&O's Detail    01 Mar 2022 07:01  -  02 Mar 2022 07:00  --------------------------------------------------------  IN:    Oral Fluid: 120 mL  Total IN: 120 mL    OUT:    Voided (mL): 905 mL  Total OUT: 905 mL    Total NET: -785 mL      02 Mar 2022 07:01  -  02 Mar 2022 12:54  --------------------------------------------------------  IN:    Oral Fluid: 120 mL  Total IN: 120 mL    OUT:  Total OUT: 0 mL    Total NET: 120 mL    CHEST TUBE:  Yes/No. AIR LEAKS: Yes/No. Suction / H2O SEAL.   CORNELIO DRAIN:  Yes/No.  EPICARDIAL WIRES: Yes/No.  TIE DOWNS: Yes/No.  MAYERS: Yes/No.    PHYSICAL EXAM:    General:     Neurological:    Cardiovascular:    Respiratory:    Gastrointestinal:    Extremities:    Vascular:    Incision Sites:    LABS:                        9.0    6.35  )-----------( 125      ( 02 Mar 2022 06:27 )             29.8       COUMADIN:  Yes/No. REASON: .    PTT - ( 01 Mar 2022 12:57 )  PTT:35.9 sec    03-02    138  |  109<H>  |  15  ----------------------------<  107<H>  4.1   |  21<L>  |  0.83    Ca    8.6      02 Mar 2022 06:26  Mg     2.0     03-02    TPro  7.2  /  Alb  3.2<L>  /  TBili  0.6  /  DBili  x   /  AST  63<H>  /  ALT  31  /  AlkPhos  99  03-02          MEDICATIONS  (STANDING):  aspirin enteric coated 81 milliGRAM(s) Oral daily  atorvastatin 80 milliGRAM(s) Oral at bedtime  folic acid 1 milliGRAM(s) Oral daily  heparin   Injectable 5000 Unit(s) SubCutaneous every 8 hours  multivitamin 1 Tablet(s) Oral daily  nicotine - 21 mG/24Hr(s) Patch 1 patch Transdermal daily  pantoprazole    Tablet 40 milliGRAM(s) Oral daily  polyethylene glycol 3350 17 Gram(s) Oral daily  senna 2 Tablet(s) Oral at bedtime  sodium chloride 0.9%. 500 milliLiter(s) (50 mL/Hr) IV Continuous <Continuous>    MEDICATIONS  (PRN):        RADIOLOGY & ADDITIONAL TESTS:     Patient discussed on morning rounds with        Operation / Date:     SUBJECTIVE ASSESSMENT:  66y Male         Vital Signs Last 24 Hrs  T(C): 36.6 (02 Mar 2022 09:20), Max: 36.8 (01 Mar 2022 17:13)  T(F): 97.9 (02 Mar 2022 09:20), Max: 98.3 (01 Mar 2022 17:13)  HR: 94 (02 Mar 2022 08:30) (86 - 94)  BP: 103/57 (02 Mar 2022 05:02) (98/57 - 115/71)  BP(mean): 74 (02 Mar 2022 05:02) (73 - 86)  RR: 20 (02 Mar 2022 08:30) (14 - 20)  SpO2: 97% (02 Mar 2022 08:30) (96% - 97%)  I&O's Detail    01 Mar 2022 07:01  -  02 Mar 2022 07:00  --------------------------------------------------------  IN:    Oral Fluid: 120 mL  Total IN: 120 mL    OUT:    Voided (mL): 905 mL  Total OUT: 905 mL    Total NET: -785 mL      02 Mar 2022 07:01  -  02 Mar 2022 12:54  --------------------------------------------------------  IN:    Oral Fluid: 120 mL  Total IN: 120 mL    OUT:  Total OUT: 0 mL    Total NET: 120 mL    CHEST TUBE:  Yes/No. AIR LEAKS: Yes/No. Suction / H2O SEAL.   CORNELIO DRAIN:  Yes/No.  EPICARDIAL WIRES: Yes/No.  TIE DOWNS: Yes/No.  MAYERS: Yes/No.    PHYSICAL EXAM:    General:     Neurological:    Cardiovascular:    Respiratory:    Gastrointestinal:    Extremities:    Vascular:    Incision Sites:    LABS:                        9.0    6.35  )-----------( 125      ( 02 Mar 2022 06:27 )             29.8       COUMADIN:  Yes/No. REASON: .    PTT - ( 01 Mar 2022 12:57 )  PTT:35.9 sec    03-02    138  |  109<H>  |  15  ----------------------------<  107<H>  4.1   |  21<L>  |  0.83    Ca    8.6      02 Mar 2022 06:26  Mg     2.0     03-02    TPro  7.2  /  Alb  3.2<L>  /  TBili  0.6  /  DBili  x   /  AST  63<H>  /  ALT  31  /  AlkPhos  99  03-02          MEDICATIONS  (STANDING):  aspirin enteric coated 81 milliGRAM(s) Oral daily  atorvastatin 80 milliGRAM(s) Oral at bedtime  folic acid 1 milliGRAM(s) Oral daily  heparin   Injectable 5000 Unit(s) SubCutaneous every 8 hours  multivitamin 1 Tablet(s) Oral daily  nicotine - 21 mG/24Hr(s) Patch 1 patch Transdermal daily  pantoprazole    Tablet 40 milliGRAM(s) Oral daily  polyethylene glycol 3350 17 Gram(s) Oral daily  senna 2 Tablet(s) Oral at bedtime  sodium chloride 0.9%. 500 milliLiter(s) (50 mL/Hr) IV Continuous <Continuous>    MEDICATIONS  (PRN):        RADIOLOGY & ADDITIONAL TESTS:     Patient discussed on morning rounds with Dr. Wilcox      Operation / Date:  preop aortic root    SUBJECTIVE ASSESSMENT:  66y Male seen and examined at bedside. NAEO. Patient denies cp, sob, palpitations, n/v/d/c. Patient waiting for his wife to arrive.     Vital Signs Last 24 Hrs  T(C): 36.6 (02 Mar 2022 09:20), Max: 36.8 (01 Mar 2022 17:13)  T(F): 97.9 (02 Mar 2022 09:20), Max: 98.3 (01 Mar 2022 17:13)  HR: 94 (02 Mar 2022 08:30) (86 - 94)  BP: 103/57 (02 Mar 2022 05:02) (98/57 - 115/71)  BP(mean): 74 (02 Mar 2022 05:02) (73 - 86)  RR: 20 (02 Mar 2022 08:30) (14 - 20)  SpO2: 97% (02 Mar 2022 08:30) (96% - 97%)  I&O's Detail    01 Mar 2022 07:01  -  02 Mar 2022 07:00  --------------------------------------------------------  IN:    Oral Fluid: 120 mL  Total IN: 120 mL    OUT:    Voided (mL): 905 mL  Total OUT: 905 mL    Total NET: -785 mL      02 Mar 2022 07:01  -  02 Mar 2022 12:54  --------------------------------------------------------  IN:    Oral Fluid: 120 mL  Total IN: 120 mL    OUT:  Total OUT: 0 mL    Total NET: 120 mL    PHYSICAL EXAM:  Constitutional: well appearing, NAD, assessed laying comfortably in bed   Neuro: A&OX3, but lethargic with conversation. No tremors.  CV: RRR, +ANTHONY, no rubs/ gallops   Pulmonary: CTA bilateral posterior lung fields, no wheezes, rales, rhonchi   GI: +BS/ NT/Nd  Extremities No peripheral edema or calf tenderness      LABS:                        9.0    6.35  )-----------( 125      ( 02 Mar 2022 06:27 )             29.8       COUMADIN:  No.     PTT - ( 01 Mar 2022 12:57 )  PTT:35.9 sec    03-02    138  |  109<H>  |  15  ----------------------------<  107<H>  4.1   |  21<L>  |  0.83    Ca    8.6      02 Mar 2022 06:26  Mg     2.0     03-02    TPro  7.2  /  Alb  3.2<L>  /  TBili  0.6  /  DBili  x   /  AST  63<H>  /  ALT  31  /  AlkPhos  99  03-02      MEDICATIONS  (STANDING):  aspirin enteric coated 81 milliGRAM(s) Oral daily  atorvastatin 80 milliGRAM(s) Oral at bedtime  folic acid 1 milliGRAM(s) Oral daily  heparin   Injectable 5000 Unit(s) SubCutaneous every 8 hours  multivitamin 1 Tablet(s) Oral daily  nicotine - 21 mG/24Hr(s) Patch 1 patch Transdermal daily  pantoprazole    Tablet 40 milliGRAM(s) Oral daily  polyethylene glycol 3350 17 Gram(s) Oral daily  senna 2 Tablet(s) Oral at bedtime  sodium chloride 0.9%. 500 milliLiter(s) (50 mL/Hr) IV Continuous <Continuous>    MEDICATIONS  (PRN):    RADIOLOGY & ADDITIONAL TESTS:  < from: CT Angio Abdomen and Pelvis w/ IV Cont (02.28.22 @ 18:36) >  FINDINGS:    Cardiovascular: Again post aortic valve replacement. Cardiomegaly.   Moderate amount of calcified plaque thoracic aorta and larger amount of   calcified and soft plaque in abdominal aorta. Severe stenosis proximal   right subclavianartery. Mild stenosis proximal left common carotid   artery. Moderate stenosis left subclavian artery near origin of left   vertebral artery. More severe stenosis distal to origin of left vertebral   artery. Left vertebral artery origin is severely stenotic. Mild stenoses   of the celiac artery and superior mesenteric artery. Severe stenosis of   right renal artery and mild stenosis left renal artery. Inferior   mesenteric artery is patent. Large amount of calcified plaque and pelvic   arteries bilaterally. On the right, there is moderate stenosis of the   common femoral artery, mild stenosis of the external iliac artery, and   mild stenosis of the common iliac artery. On the left, there is moderate   stenosis of the common femoral artery andexternal iliac artery and mild   stenosis of the common iliac artery. No evidence of pulmonary embolism,   aortic aneurysm or dissection.    Lungs: There is a small amount of mucus within the trachea. Bronchial   wall thickening bilaterally. Areas ofgroundglass opacity bilaterally,   greatest in the upper lobes, may represent edema. Mild paraseptal and   centrilobular emphysema. Calcified micronodule right upper lobe.    Pleural effusion: No.    Pericardial effusion: No.    Lymphadenopathy: Subcentimeter calcified lymph nodes in the right hilum   are consistent with prior granulomatous disease. There are also several   mildly enlarged noncalcified mediastinal and bilateral hilar lymph nodes,   the largest in station 7 measuring 1.3 cm.    Liver: Nodular liver contour. 2.4 x 2.4 cm hypervascular focus in segment   two of liver, slightly heterogeneous, with small area of low density   anteriorly. This is suspicious for a mass rather than a flow artifact.    Gallbladder: No radiopaque stones gallbladder.    Spleen:  Mildly enlarged, measuring 13.1 cm in greatest dimension. No   focal lesion.    Pancreas:  Normal.    Adrenal glands:  Normal.    Kidneys: Normal.    Abdominal and pelvic adenopathy:  No lymphadenopathy in abdomen or pelvis.    Ascites: None.    Gastrointestinal tract: Normal.    Pelvic organs: Prostate size within normal limits. Bladder unremarkable.    Soft tissues: Moderate gynecomastia bilaterally. Small fat-containing   left inguinal hernia. Small umbilical hernia containing fat and   nonobstructed transverse colon.    Bones: Sternotomy wires. Degenerative changes of the spine.      IMPRESSION:    1. Nodular liver contour, consistent with cirrhosis.  2. A 2.4 cm hypervascular liver mass is suspicious for hepatocellular   carcinoma. Recommend correlation with alpha-fetoprotein levels and MRI of   liver.  3. Severe atherosclerotic disease throughout the chest, abdomen and   pelvis, with large amount of calcified plaque in pelvic arteries   bilaterally and calcified and soft plaque in abdominal aorta. Multiple   stenoses, as described above.  4. Groundglass opacity in both lungs, probably edema.  5. Mild splenomegaly.      < end of copied text >

## 2022-03-02 NOTE — CONSULT NOTE ADULT - SUBJECTIVE AND OBJECTIVE BOX
HPI:  65 y/o M, current smoker (1ppd x 50 years), current every day ETOH abuse (1 pint vodka and several beers daily x 50 years), with PMHx of HLD, HTN (not on any medications),  CABG (LIMA to LAD, SVG to PDA in 2017), AS (s/p AVR in 2017), who presented to [facility] ED on 2/24/22 with complaints of 9/10 chest pain with associated diaphoresis that occurred when walking to the bathroom last night around 1 am. Patient states that during this time, he fell and "blacked out" and hit the back of his head. Patient then went back to sleep. Patient states that he had a similiar chest pain 3 days ago that resolved on his own. Patient's wife states that patient has not seen a physician for over 2 years due to insurance issues.  Patient states last drink was 2 shots of vodka last night. Patient denies current chest pain, palpitations, dizziness, diaphoresis, headache, fever, chills, abdominal pain, back pain, n/v/d, recent travel or sick contacts.     In the ED, VS: 97.2, HR: 85 bpm, BP: 121/75 mmHg, RR: 18 breaths/min, spO2: 100% on RA   Labs significant for: Hgb/Hct: 9.5/31.4, AST: 51, Troponin: 0.02. EKG: NSR @ 85 bpm, ST depressions in II, V4, V5, V6. CXR: No cardiopulmonary edema   Patient admitted to cardiology/telemetry for further management of NSTEMI.    (24 Feb 2022 11:57)    Allergies    No Known Allergies    Intolerances      MEDICATIONS:  MEDICATIONS  (STANDING):  aspirin enteric coated 81 milliGRAM(s) Oral daily  atorvastatin 80 milliGRAM(s) Oral at bedtime  folic acid 1 milliGRAM(s) Oral daily  heparin   Injectable 5000 Unit(s) SubCutaneous every 8 hours  multivitamin 1 Tablet(s) Oral daily  nicotine - 21 mG/24Hr(s) Patch 1 patch Transdermal daily  pantoprazole    Tablet 40 milliGRAM(s) Oral daily  polyethylene glycol 3350 17 Gram(s) Oral daily  senna 2 Tablet(s) Oral at bedtime  sodium chloride 0.9%. 500 milliLiter(s) (50 mL/Hr) IV Continuous <Continuous>    MEDICATIONS  (PRN):    PAST MEDICAL & SURGICAL HISTORY:  HTN (hypertension)    ETOH abuse    Smoker    Hyperlipidemia    Aortic valve stenosis, unspecified etiology    No significant past surgical history      FAMILY HISTORY:    SOCIAL HISTORY:  [place]ac[place]: [ ] Current, [ ] Former, [ ] Never; Pack Years:  Alcohol:  Illicit Drugs:    REVIEW OF SYSTEMS:  Constitutional: No fever, chills, weight loss, or fatigue  ENMT:  No visual changes; No difficulty hearing, tinnitus, vertigo; No sinus or throat pain  Neck: No pain or stiffness  Respiratory: No cough, wheezing, or hemoptysis; No shortness of breath  Cardiovascular: No chest pain, palpitations, dizziness, orthopnea, PND, or leg swelling  Gastrointestinal: No abdominal or epigastric pain. No  nausea, vomiting, or hematemesis. No diarrhea, constipation, or steatorrhea. No melena or hematochezia  Genitourinary: No dysuria, urinary frequency/hesitancy, or hematuria  Skin: No itching, burning, rashes or lesions   Musculoskeletal: No joint pain or swelling; No muscle, back or extremity pain    Vital Signs Last 24 Hrs  T(C): 36.6 (02 Mar 2022 09:20), Max: 36.8 (01 Mar 2022 17:13)  T(F): 97.9 (02 Mar 2022 09:20), Max: 98.3 (01 Mar 2022 17:13)  HR: 94 (02 Mar 2022 08:30) (86 - 94)  BP: 103/57 (02 Mar 2022 05:02) (98/57 - 115/71)  BP(mean): 74 (02 Mar 2022 05:02) (73 - 86)  RR: 20 (02 Mar 2022 08:30) (14 - 20)  SpO2: 97% (02 Mar 2022 08:30) (96% - 97%)    03-01 @ 07:01  -  03-02 @ 07:00  --------------------------------------------------------  IN: 120 mL / OUT: 905 mL / NET: -785 mL    03-02 @ 07:01  -  03-02 @ 13:25  --------------------------------------------------------  IN: 120 mL / OUT: 0 mL / NET: 120 mL        PHYSICAL EXAM:    General: Well developed; well nourished; in no acute distress  HEENT: MMM, conjunctiva and sclera clear  Gastrointestinal: Soft, non-tender non-distended; Normal bowel sounds; No rebound or guarding  Extremities: Normal range of motion, No clubbing, cyanosis or edema  Neurological: Alert and oriented x3  Skin: Warm and dry. No obvious rash    LABS:                        9.0    6.35  )-----------( 125      ( 02 Mar 2022 06:27 )             29.8     03-02    138  |  109<H>  |  15  ----------------------------<  107<H>  4.1   |  21<L>  |  0.83    Ca    8.6      02 Mar 2022 06:26  Mg     2.0     03-02    TPro  7.2  /  Alb  3.2<L>  /  TBili  0.6  /  DBili  x   /  AST  63<H>  /  ALT  31  /  AlkPhos  99  03-02        RADIOLOGY & ADDITIONAL STUDIES:    HPI:  67 y/o M, current smoker (1ppd x 50 years), current every day ETOH abuse (1 pint vodka and several beers daily x 50 years), with PMHx of HLD, HTN (not on any medications),  CABG (LIMA to LAD, SVG to PDA in 2017), AS (s/p AVR in 2017), who presented to [facility] ED on 2/24/22 with complaints of 9/10 chest pain with associated diaphoresis that occurred when walking to the bathroom last night around 1 am. Patient states that during this time, he fell and "blacked out" and hit the back of his head. Patient then went back to sleep. Patient states that he had a similiar chest pain 3 days ago that resolved on his own. Patient's wife states that patient has not seen a physician for over 2 years due to insurance issues.  Patient states last drink was 2 shots of vodka last night. Patient denies current chest pain, palpitations, dizziness, diaphoresis, headache, fever, chills, abdominal pain, back pain, n/v/d, recent travel or sick contacts.     In the ED, VS: 97.2, HR: 85 bpm, BP: 121/75 mmHg, RR: 18 breaths/min, spO2: 100% on RA   Labs significant for: Hgb/Hct: 9.5/31.4, AST: 51, Troponin: 0.02. EKG: NSR @ 85 bpm, ST depressions in II, V4, V5, V6. CXR: No cardiopulmonary edema   Patient admitted to cardiology/telemetry for further management of NSTEMI.    (24 Feb 2022 11:57)    GI consulted for findings of 2.4 cm hepatic lesion on CT A/P noted during workup for TAVR. GI consulted for recommendations on management of hepatic lesion.    Allergies    No Known Allergies    Intolerances      MEDICATIONS:  MEDICATIONS  (STANDING):  aspirin enteric coated 81 milliGRAM(s) Oral daily  atorvastatin 80 milliGRAM(s) Oral at bedtime  folic acid 1 milliGRAM(s) Oral daily  heparin   Injectable 5000 Unit(s) SubCutaneous every 8 hours  multivitamin 1 Tablet(s) Oral daily  nicotine - 21 mG/24Hr(s) Patch 1 patch Transdermal daily  pantoprazole    Tablet 40 milliGRAM(s) Oral daily  polyethylene glycol 3350 17 Gram(s) Oral daily  senna 2 Tablet(s) Oral at bedtime  sodium chloride 0.9%. 500 milliLiter(s) (50 mL/Hr) IV Continuous <Continuous>    MEDICATIONS  (PRN):    PAST MEDICAL & SURGICAL HISTORY:  HTN (hypertension)    ETOH abuse    Smoker    Hyperlipidemia    Aortic valve stenosis, unspecified etiology    No significant past surgical history      FAMILY HISTORY:    SOCIAL HISTORY:  [place]: [ ] Current, [ ] Former, [ ] Never; Pack Years:  Alcohol:  Illicit Drugs:    REVIEW OF SYSTEMS:  Constitutional: No fever, chills, weight loss, or fatigue  ENMT:  No visual changes; No difficulty hearing, tinnitus, vertigo; No sinus or throat pain  Neck: No pain or stiffness  Respiratory: No cough, wheezing, or hemoptysis; No shortness of breath  Cardiovascular: No chest pain, palpitations, dizziness, orthopnea, PND, or leg swelling  Gastrointestinal: No abdominal or epigastric pain. No  nausea, vomiting, or hematemesis. No diarrhea, constipation, or steatorrhea. No melena or hematochezia  Genitourinary: No dysuria, urinary frequency/hesitancy, or hematuria  Skin: No itching, burning, rashes or lesions   Musculoskeletal: No joint pain or swelling; No muscle, back or extremity pain    Vital Signs Last 24 Hrs  T(C): 36.6 (02 Mar 2022 09:20), Max: 36.8 (01 Mar 2022 17:13)  T(F): 97.9 (02 Mar 2022 09:20), Max: 98.3 (01 Mar 2022 17:13)  HR: 94 (02 Mar 2022 08:30) (86 - 94)  BP: 103/57 (02 Mar 2022 05:02) (98/57 - 115/71)  BP(mean): 74 (02 Mar 2022 05:02) (73 - 86)  RR: 20 (02 Mar 2022 08:30) (14 - 20)  SpO2: 97% (02 Mar 2022 08:30) (96% - 97%)    03-01 @ 07:01  -  03-02 @ 07:00  --------------------------------------------------------  IN: 120 mL / OUT: 905 mL / NET: -785 mL    03-02 @ 07:01  -  03-02 @ 13:25  --------------------------------------------------------  IN: 120 mL / OUT: 0 mL / NET: 120 mL        PHYSICAL EXAM:    General: male, lying in bed; in no acute distress  HEENT: MMM, conjunctiva and sclera clear  Gastrointestinal: Soft, obese, distended abdomen; non-tender; Normal bowel sounds; No rebound or guarding  Extremities: Normal range of motion, No clubbing, cyanosis or edema  Neurological: Alert and oriented x3, no asterixis  Skin: Warm and dry. No obvious rash    LABS:                        9.0    6.35  )-----------( 125      ( 02 Mar 2022 06:27 )             29.8     03-02    138  |  109<H>  |  15  ----------------------------<  107<H>  4.1   |  21<L>  |  0.83    Ca    8.6      02 Mar 2022 06:26  Mg     2.0     03-02    TPro  7.2  /  Alb  3.2<L>  /  TBili  0.6  /  DBili  x   /  AST  63<H>  /  ALT  31  /  AlkPhos  99  03-02        RADIOLOGY & ADDITIONAL STUDIES:

## 2022-03-02 NOTE — CHART NOTE - NSCHARTNOTEFT_GEN_A_CORE
Admitting Diagnosis:   Patient is a 66y old  Male who presents with a chief complaint of Chest Pain (02 Mar 2022 13:24)      PAST MEDICAL & SURGICAL HISTORY:  HTN (hypertension)    ETOH abuse    Smoker    Hyperlipidemia    Aortic valve stenosis, unspecified etiology    No significant past surgical history    Current Nutrition Order: DASH/TLC    PO Intake: Good (%) [ x ]  Fair (50-75%) [   ] Poor (<25%) [   ]    GI Issues: Denies nausea/vomiting at this time. Last documented bowel movement .    Pain: No noted pain at time of RD interview.    Skin Integrity: No edema documented at this time. right groin surgical incision per chart. No pressure injuries documented at this time. Erwin score: 19.     Labs:       138  |  109<H>  |  15  ----------------------------<  107<H>  4.1   |  21<L>  |  0.83    Ca    8.6      02 Mar 2022 06:26  Mg     2.0     -    TPro  7.2  /  Alb  3.2<L>  /  TBili  0.6  /  DBili  x   /  AST  63<H>  /  ALT  31  /  AlkPhos  99  03-02    CAPILLARY BLOOD GLUCOSE          Medications:  MEDICATIONS  (STANDING):  aspirin enteric coated 81 milliGRAM(s) Oral daily  atorvastatin 80 milliGRAM(s) Oral at bedtime  folic acid 1 milliGRAM(s) Oral daily  heparin   Injectable 5000 Unit(s) SubCutaneous every 8 hours  multivitamin 1 Tablet(s) Oral daily  nicotine - 21 mG/24Hr(s) Patch 1 patch Transdermal daily  pantoprazole    Tablet 40 milliGRAM(s) Oral daily  polyethylene glycol 3350 17 Gram(s) Oral daily  senna 2 Tablet(s) Oral at bedtime  sodium chloride 0.9%. 500 milliLiter(s) (50 mL/Hr) IV Continuous <Continuous>    MEDICATIONS  (PRN):    Dosing Anthropometrics:  · Height for BMI (FEET)	5 Feet  · Height for BMI (INCHES)	7 Inch(s)  · Height for BMI (CENTIMETERS)	170.18 Centimeter(s)  · Weight for BMI (lbs)	149 lb  · Weight for BMI (kg)	67.6 kg  · Body Mass Index	23.3  · Ideal Body Weight (lbs)	148  · Ideal Body Weight (kg)	67.1    Weight Change: No new weights documented in EMR. Obtain biweekly weights to assess changes/trends.    Estimated energy needs: Based on Standards of Care pt within % IBW thus actual body weight used for all calculations. Needs adjusted for advanced age.  20-25 kcal/[id number] kcal  1-1.2 g/k.5-81 g  30-35 mL/[id number] mL    Subjective: 66 year old male, current smoker (50 pack year history), current every day ETOH abuse, with hx of withdrawal, and PMHx of HTN, HLD, CAD and Aortic stenosis s/p CABG/AVR with Dr. Wilcox , presented to [facility] ED on 22 complaining of chest pain. He subsequently underwent cardiac cath revealing patent bypass grafts (LIMA/SVG) and aortic stenosis. TTE confirmed severe prosthetic valve stenosis and Structural heart was consulted. Patient was  transferred to CT surgery for interventional evaluation. Patient was scheduled for TAVR, however postponed for possible aortic root surgery later this week.    Pt seen at bedside for follow up assessment- on room air. Labs reviewed 3/2; electrolytes within normal limits at this time. Pt reports good PO intake since last RD assessment, able to complete >75% of tray. Observed breakfast tray 100% completed. RD offered diet education reinforcement however, pt declined at this time. Made aware RD remains available. RD to follow up per protocol. See nutrition recommendations below.     Previous Nutrition Diagnosis: Food & Nutrition Related Knowledge Deficit r/t lack of prior education AEB pt unaware of current dietary restrictions.     Active [ x ]  Resolved [   ]    If resolved, new PES:     Goal: Pt to teach back at least 3 educational points discussed.    Recommendations:  1. Continue with current diet order   2. Encourage pt to meet nutritional needs as able   3. Encourage adherence to diet education (reinforce as able)   4. Pain and bowel regimen per team   5. Will continue to assess/honor preferences as able   6. Consider adding thiamine in setting of h/o Etoh abuse; continue micronutrient supplementation    Education: Declined at this time    Risk Level: High [   ] Moderate [ x  ] Low [   ]

## 2022-03-02 NOTE — PROGRESS NOTE ADULT - ASSESSMENT
66 year old male, current smoker (50 pack year history), current every day ETOH abuse, with hx of withdrawal, and PMHx of HTN, HLD, CAD and Aortic stenosis s/p CABG/AVR with Dr. Wilcox 2017, presented to [facility] ED on 2/24/22 complaining of chest pain. He subsequently underwent cardiac cath revealing patent bypass grafts (LIMA/SVG) and aortic stenosis. TTE confirmed severe prosthetic valve stenosis and Structural heart was consulted. Patient was  transferred to CT surgery for interventional evaluation. Patient was scheduled for TAVR today, however postponed for possible aortic root surgery later in the week. During preop workup, patient found to have 2.4cmx2.4cm liver lesion s/f HCC and liver nodules c/w cirrhosis. Cardiac surgery cancelled with anticipation of GI workup. GI consulted and recommend liver MRI, which is pending.    Neurovascular:   - No delirium. Pain well controlled with current regimen.  - Hx daily ETOH abuse and alcohol withdrawal: continue librium taper to prevent DTs  - Continue thiamine, folic acid, multivitamin     HEENT:  - Hx mechanical fall, multiple chipped teeth  - Dental (Dr. Rhoades) consulted, pending CT maxillofacial to evaluate for acute infection prior to valve surgery     Cardiovascular:   - S/p AVR (2017) with severe aortic prosthetic valve stenosis  - Plan for MAURI TAVR 3/1 changed: now possible aortic root candidate - changed again; no longer a CT surgical candidate  -Hemodynamically stable. HR controlled ()  - Hx of HTN, BP controlled (101//64)  - Continue ASA, atorvastatin 80 mg daily   - Consider BB if BP/ HR allow, currently too hypotensive to start   - Hx CAD s/p CABG: patent grafts on recent cath     Respiratory:   - 02 Sat = 99% on RA.  -Encourage C+DB and Use of IS 10x / hr while awake.  -CXR without effusions, consolidations     GI:   -Stable.  -Continue GI PPX with protonix  -PO Diet.    Renal / :  - BUN/Cr stable at 15/0.83  -Continue to monitor renal function.  -Monitor I/O's.  - Replete electrolytes PRN    Endocrine:    -A1c 5.1, no known hx DM  -TSH 0.984, no known hx thyroid disease     Hematologic:  -H/H stable at 9/29.8 with no obvious signs of bleeding  -Coagulation Panel.    ID:  -Pt remains afebrile with no elevation in WBC or signs of infection  -Continue to monitor CBC  -Observe for SIRS/Sepsis Syndrome.    Prophylaxis:  -DVT prophylaxis with 5000 SubQ Heparin q8h.  -SCD's    Disposition:  -OR plan cancelled 2/2 possible HCC      66 year old male, current smoker (50 pack year history), current every day ETOH abuse, with hx of withdrawal, and PMHx of HTN, HLD, CAD and Aortic stenosis s/p CABG/AVR with Dr. Wilcox 2017, presented to [facility] ED on 2/24/22 complaining of chest pain. He subsequently underwent cardiac cath revealing patent bypass grafts (LIMA/SVG) and aortic stenosis. TTE confirmed severe prosthetic valve stenosis and Structural heart was consulted. Patient was  transferred to CT surgery for interventional evaluation. Patient was scheduled for TAVR today, however postponed for possible aortic root surgery later in the week. During preop workup, patient found to have 2.4cmx2.4cm liver lesion s/f HCC and liver nodules c/w cirrhosis. Cardiac surgery cancelled with anticipation of GI workup. GI consulted and recommend liver MRI, which is pending.    Neurovascular:   - No delirium. Pain well controlled with current regimen.  - Hx daily ETOH abuse and alcohol withdrawal: continue librium taper to prevent DTs  - Continue thiamine, folic acid, multivitamin     HEENT:  - Hx mechanical fall, multiple chipped teeth  - Dental (Dr. Rhoades) consulted, pending CT maxillofacial to evaluate for acute infection prior to valve surgery     Cardiovascular:   - S/p AVR (2017) with severe aortic prosthetic valve stenosis  - Plan for MAURI TAVR 3/1 changed: now possible aortic root candidate - changed again; no longer a CT surgical candidate  -Hemodynamically stable. HR controlled ()  - Hx of HTN, BP controlled (101//64)  - Continue ASA, atorvastatin 80 mg daily   - Consider BB if BP/ HR allow, currently too hypotensive to start   - Hx CAD s/p CABG: patent grafts on recent cath     Respiratory:   - 02 Sat = 99% on RA.  -Encourage C+DB and Use of IS 10x / hr while awake.  -CXR without effusions, consolidations     GI:   -Stable.  -Continue GI PPX with protonix  -PO Diet.    Renal / :  - BUN/Cr stable at 15/0.83  -Continue to monitor renal function.  -Monitor I/O's.  - Replete electrolytes PRN    Endocrine:    -A1c 5.1, no known hx DM  -TSH 0.984, no known hx thyroid disease     Hematologic:  -H/H stable at 9/29.8 with no obvious signs of bleeding  -Coagulation Panel.    ID:  -Pt remains afebrile with no elevation in WBC or signs of infection  -Continue to monitor CBC  -Observe for SIRS/Sepsis Syndrome.    Prophylaxis:  -DVT prophylaxis with 5000 SubQ Heparin q8h.  -SCD's    Disposition:  -OR plan cancelled 2/2 possible HCC   -Attempted to call [facility] systems to arrange an outpatient GI referral (pt uninsured) - still waiting callback

## 2022-03-02 NOTE — CONSULT NOTE ADULT - ASSESSMENT
65 y/o M, current smoker (1ppd x 50 years), current every day ETOH abuse (1 pint vodka and several beers daily x 50 years), with PMHx of HLD, HTN (not on any medications),  CABG (LIMA to LAD, SVG to PDA in 2017), AS (s/p AVR in 2017), who presented to [facility] ED on 2/24/22 with complaints of 9/10 chest pain with associated diaphoresis that occurred when walking to the bathroom last night around 1 am.  65 y/o M, current smoker (1ppd x 50 years), current every day ETOH abuse (1 pint vodka and several beers daily x 50 years), with PMHx of HLD, HTN (not on any medications),  CABG (LIMA to LAD, SVG to PDA in 2017), AS (s/p AVR in 2017), who presented to [facility] ED on 2/24/22 with complaints of 9/10 chest pain with associated diaphoresis with exertion, admitted for NSTEMI, transferred to CTsx service for severe AS, requiring a TAVR procedure; upon workup for TAVR, patient incidentally noted to have a 2.4 cm hypervascular lesion for which GI was consulted.     #Hepatic Lesion 65 y/o M, current smoker (1ppd x 50 years), current every day ETOH abuse (1 pint vodka and several beers daily x 50 years), with PMHx of HLD, HTN (not on any medications),  CABG (LIMA to LAD, SVG to PDA in 2017), AS (s/p AVR in 2017), who presented to [facility] ED on 2/24/22 with complaints of 9/10 chest pain with associated diaphoresis with exertion, admitted for NSTEMI, transferred to CTsx service for severe AS, requiring a TAVR procedure; upon workup for TAVR, patient incidentally noted to have a 2.4 cm hypervascular lesion for which GI was consulted.     #Hepatic Lesion  Patient presented to [facility] ED on 2/24/22 with complaints of 9/10 chest pain with associated diaphoresis with exertion, admitted for NSTEMI, transferred to CTsx service for severe AS, requiring a TAVR procedure. During workup for TAVR, CTA A/P (2/28) revealing a nodular liver contour, c/w cirrhosis, A 2.4 cm hypervascular liver mass suspicious for HCC, as well as splenomegaly. AFP 7.2 (WNL), While AFP noted to be normal, does not preclude risk of possibility of HCC, especially in the setting of what appears to be cirrhosis 2/2 underlying long-standing history of EtOH use, which places him at higher risk.   - Recommend diagnostic imaging with Triple Phase CT or MRI Abd w/w/o contrast    #Cirrhosis, likely compensated (-HE, -ascites, - no known EV bleeding)  MELD-Na: 13 (based on 2/26/22 BW, no AM coags on 3/2/22)  HE: AA0x3, no asterixis  Ascites: None present on CT imaging  HCC: CTA A/P (2/28) with 2.4 cm hypervascular lesion, workup of lesion as noted above  Varices: No prior EGD evaluation, would be warranted, can pursue as an outpatient  - Please obtain Abdominal US with duplex to assess portal venous flow  - Daily CMPs & Coags to calculate MELD-Na  - Nutrition consult  - High protein diet  -  on alcohol cessation    Case discussed with [facility] attending and primary team.     oS Guthrie DO  Gastroenterology Fellow  Pager: [phone] 65 y/o M, current smoker (1ppd x 50 years), current every day ETOH abuse (1 pint vodka and several beers daily x 50 years), with PMHx of HLD, HTN (not on any medications),  CABG (LIMA to LAD, SVG to PDA in 2017), AS (s/p AVR in 2017), who presented to [facility] ED on 2/24/22 with complaints of 9/10 chest pain with associated diaphoresis with exertion, admitted for NSTEMI, transferred to CTsx service for severe AS, requiring a TAVR procedure; upon workup for TAVR, patient incidentally noted to have a 2.4 cm hypervascular lesion for which GI was consulted.     #Hepatic Lesion  Patient presented to [facility] ED on 2/24/22 with complaints of 9/10 chest pain with associated diaphoresis with exertion, admitted for NSTEMI, transferred to CTsx service for severe AS, requiring a TAVR procedure. During workup for TAVR, CTA A/P (2/28) revealing a nodular liver contour, c/w cirrhosis, A 2.4 cm hypervascular liver mass suspicious for HCC, as well as splenomegaly. AFP 7.2 (WNL), While AFP noted to be normal, does not preclude risk of possibility of HCC, especially in the setting of what appears to be cirrhosis 2/2 underlying long-standing history of EtOH use, which places him at higher risk.   - Recommend diagnostic imaging with Triple Phase CT or MRI Abd w/w/o contrast    #Cirrhosis, likely compensated (-HE, -ascites, - no known EV bleeding)  CTA A/P with radiographic findings c/f cirrhosis, including nodular liver contour and splenomegaly, which is suggestive likely portal HTN. Likely in the setting of long-standing history of EtOH abuse.   MELD-Na: 13 (based on 2/26/22 BW, no AM coags on 3/2/22)  HE: AA0x3, no asterixis  Ascites: None present on CT imaging  HCC: CTA A/P (2/28) with 2.4 cm hypervascular lesion, workup of lesion as noted above  Varices: No prior EGD evaluation, would be warranted, can pursue as an outpatient  - Please obtain Abdominal US with duplex to assess portal venous flow  - Daily CMPs & Coags to calculate MELD-Na  - Nutrition consult  - High protein diet  -  on alcohol cessation    Case discussed with [facility] attending and primary team.     So Bergagnini, DO  Gastroenterology Fellow  Pager: [phone]

## 2022-03-03 ENCOUNTER — APPOINTMENT (OUTPATIENT)
Dept: CARDIOTHORACIC SURGERY | Facility: HOSPITAL | Age: 67
End: 2022-03-03

## 2022-03-03 DIAGNOSIS — C22.0 LIVER CELL CARCINOMA: ICD-10-CM

## 2022-03-03 DIAGNOSIS — K74.60 UNSPECIFIED CIRRHOSIS OF LIVER: ICD-10-CM

## 2022-03-03 LAB
ALBUMIN SERPL ELPH-MCNC: 2.8 G/DL — LOW (ref 3.3–5)
ALP SERPL-CCNC: 101 U/L — SIGNIFICANT CHANGE UP (ref 40–120)
ALT FLD-CCNC: 30 U/L — SIGNIFICANT CHANGE UP (ref 10–45)
ANION GAP SERPL CALC-SCNC: 10 MMOL/L — SIGNIFICANT CHANGE UP (ref 5–17)
AST SERPL-CCNC: 62 U/L — HIGH (ref 10–40)
BILIRUB SERPL-MCNC: 0.4 MG/DL — SIGNIFICANT CHANGE UP (ref 0.2–1.2)
BUN SERPL-MCNC: 21 MG/DL — SIGNIFICANT CHANGE UP (ref 7–23)
CALCIUM SERPL-MCNC: 8.8 MG/DL — SIGNIFICANT CHANGE UP (ref 8.4–10.5)
CHLORIDE SERPL-SCNC: 106 MMOL/L — SIGNIFICANT CHANGE UP (ref 96–108)
CO2 SERPL-SCNC: 19 MMOL/L — LOW (ref 22–31)
CREAT SERPL-MCNC: 1.01 MG/DL — SIGNIFICANT CHANGE UP (ref 0.5–1.3)
EGFR: 82 ML/MIN/1.73M2 — SIGNIFICANT CHANGE UP
GLUCOSE SERPL-MCNC: 113 MG/DL — HIGH (ref 70–99)
HCT VFR BLD CALC: 26.7 % — LOW (ref 39–50)
HGB BLD-MCNC: 8.2 G/DL — LOW (ref 13–17)
MAGNESIUM SERPL-MCNC: 1.8 MG/DL — SIGNIFICANT CHANGE UP (ref 1.6–2.6)
MCHC RBC-ENTMCNC: 27.8 PG — SIGNIFICANT CHANGE UP (ref 27–34)
MCHC RBC-ENTMCNC: 30.7 GM/DL — LOW (ref 32–36)
MCV RBC AUTO: 90.5 FL — SIGNIFICANT CHANGE UP (ref 80–100)
NRBC # BLD: 0 /100 WBCS — SIGNIFICANT CHANGE UP (ref 0–0)
PLATELET # BLD AUTO: 125 K/UL — LOW (ref 150–400)
POTASSIUM SERPL-MCNC: 4.1 MMOL/L — SIGNIFICANT CHANGE UP (ref 3.5–5.3)
POTASSIUM SERPL-SCNC: 4.1 MMOL/L — SIGNIFICANT CHANGE UP (ref 3.5–5.3)
PROT SERPL-MCNC: 6.7 G/DL — SIGNIFICANT CHANGE UP (ref 6–8.3)
RBC # BLD: 2.95 M/UL — LOW (ref 4.2–5.8)
RBC # FLD: 15.3 % — HIGH (ref 10.3–14.5)
SODIUM SERPL-SCNC: 135 MMOL/L — SIGNIFICANT CHANGE UP (ref 135–145)
WBC # BLD: 6.98 K/UL — SIGNIFICANT CHANGE UP (ref 3.8–10.5)
WBC # FLD AUTO: 6.98 K/UL — SIGNIFICANT CHANGE UP (ref 3.8–10.5)

## 2022-03-03 PROCEDURE — 71045 X-RAY EXAM CHEST 1 VIEW: CPT | Mod: 26

## 2022-03-03 PROCEDURE — 99255 IP/OBS CONSLTJ NEW/EST HI 80: CPT | Mod: GC

## 2022-03-03 RX ORDER — POLYETHYLENE GLYCOL 3350 17 G/17G
17 POWDER, FOR SOLUTION ORAL ONCE
Refills: 0 | Status: COMPLETED | OUTPATIENT
Start: 2022-03-03 | End: 2022-03-03

## 2022-03-03 RX ORDER — MAGNESIUM OXIDE 400 MG ORAL TABLET 241.3 MG
400 TABLET ORAL
Refills: 0 | Status: COMPLETED | OUTPATIENT
Start: 2022-03-03 | End: 2022-03-03

## 2022-03-03 RX ORDER — POTASSIUM CHLORIDE 20 MEQ
20 PACKET (EA) ORAL ONCE
Refills: 0 | Status: COMPLETED | OUTPATIENT
Start: 2022-03-03 | End: 2022-03-03

## 2022-03-03 RX ADMIN — ATORVASTATIN CALCIUM 80 MILLIGRAM(S): 80 TABLET, FILM COATED ORAL at 21:33

## 2022-03-03 RX ADMIN — MAGNESIUM OXIDE 400 MG ORAL TABLET 400 MILLIGRAM(S): 241.3 TABLET ORAL at 17:23

## 2022-03-03 RX ADMIN — MAGNESIUM OXIDE 400 MG ORAL TABLET 400 MILLIGRAM(S): 241.3 TABLET ORAL at 09:22

## 2022-03-03 RX ADMIN — POLYETHYLENE GLYCOL 3350 17 GRAM(S): 17 POWDER, FOR SOLUTION ORAL at 11:48

## 2022-03-03 RX ADMIN — SENNA PLUS 2 TABLET(S): 8.6 TABLET ORAL at 21:33

## 2022-03-03 RX ADMIN — HEPARIN SODIUM 5000 UNIT(S): 5000 INJECTION INTRAVENOUS; SUBCUTANEOUS at 05:09

## 2022-03-03 RX ADMIN — Medication 1 TABLET(S): at 11:49

## 2022-03-03 RX ADMIN — Medication 1 MILLIGRAM(S): at 11:49

## 2022-03-03 RX ADMIN — Medication 81 MILLIGRAM(S): at 11:49

## 2022-03-03 RX ADMIN — Medication 1 PATCH: at 20:41

## 2022-03-03 RX ADMIN — Medication 1 PATCH: at 06:16

## 2022-03-03 RX ADMIN — HEPARIN SODIUM 5000 UNIT(S): 5000 INJECTION INTRAVENOUS; SUBCUTANEOUS at 21:33

## 2022-03-03 RX ADMIN — HEPARIN SODIUM 5000 UNIT(S): 5000 INJECTION INTRAVENOUS; SUBCUTANEOUS at 15:21

## 2022-03-03 RX ADMIN — POLYETHYLENE GLYCOL 3350 17 GRAM(S): 17 POWDER, FOR SOLUTION ORAL at 05:09

## 2022-03-03 RX ADMIN — Medication 20 MILLIEQUIVALENT(S): at 09:24

## 2022-03-03 RX ADMIN — PANTOPRAZOLE SODIUM 40 MILLIGRAM(S): 20 TABLET, DELAYED RELEASE ORAL at 11:48

## 2022-03-03 RX ADMIN — Medication 1 PATCH: at 11:49

## 2022-03-03 RX ADMIN — MAGNESIUM OXIDE 400 MG ORAL TABLET 400 MILLIGRAM(S): 241.3 TABLET ORAL at 11:48

## 2022-03-03 RX ADMIN — Medication 1 PATCH: at 11:48

## 2022-03-03 NOTE — PROGRESS NOTE ADULT - ASSESSMENT
65 y/o M, current smoker (1ppd x 50 years), current every day ETOH abuse (1 pint vodka and several beers daily x 50 years), with PMHx of HLD, HTN (not on any medications),  CABG (LIMA to LAD, SVG to PDA in 2017), AS (s/p AVR in 2017), who presented to [facility] ED on 2/24/22 with complaints of 9/10 chest pain with associated diaphoresis with exertion, admitted for NSTEMI, transferred to CTsx service for severe AS, requiring a TAVR procedure; upon workup for TAVR, patient incidentally noted to have a 2.4 cm hypervascular lesion for which GI was consulted.     #Hepatic Lesion  Patient presented to [facility] ED on 2/24/22 with complaints of 9/10 chest pain with associated diaphoresis with exertion, admitted for NSTEMI, transferred to CTsx service for severe AS, requiring a TAVR procedure. During workup for TAVR, CTA A/P (2/28) revealing a nodular liver contour, c/w cirrhosis, A 2.4 cm hypervascular liver mass suspicious for HCC, as well as splenomegaly. AFP 7.2 (WNL), While AFP noted to be normal, does not preclude risk of possibility of HCC, especially in the setting of what appears to be cirrhosis 2/2 underlying long-standing history of EtOH use, which places him at higher risk.   - Recommend diagnostic imaging with Triple Phase CT or MRI Abd w/w/o contrast    #Cirrhosis, likely compensated (-HE, -ascites, - no known EV bleeding)  CTA A/P with radiographic findings c/f cirrhosis, including nodular liver contour and splenomegaly, which is suggestive likely portal HTN. Likely in the setting of long-standing history of EtOH abuse.   MELD-Na: 13 (based on 2/26/22 BW, no AM coags on 3/3/22)  HE: AA0x3, no asterixis  Ascites: None present on CT imaging  HCC: CTA A/P (2/28) with 2.4 cm hypervascular lesion, workup of lesion as noted above  Varices: No prior EGD evaluation, would be warranted, can pursue as an outpatient  - Please obtain Abdominal US with duplex to assess portal venous flow  - Daily CMPs & Coags to calculate MELD-Na  - Nutrition consult  - High protein diet  -  on alcohol cessation  - Fresh blood with clots noted in mouth this morning, s/p irrigation/suctioning, noted to originate from gums. Previous day noted to have dried blood in right nare as well. Recommend dental & ENT for evaluation of oropharynx    INCOMPLETE NOTE, PENDING DISCUSSION WITH C ATTENDING    So Guthrie DO  Gastroenterology Fellow  Pager: [phone] 65 y/o M, current smoker (1ppd x 50 years), current every day ETOH abuse (1 pint vodka and several beers daily x 50 years), with PMHx of HLD, HTN (not on any medications),  CABG (LIMA to LAD, SVG to PDA in 2017), AS (s/p AVR in 2017), who presented to [facility] ED on 2/24/22 with complaints of 9/10 chest pain with associated diaphoresis with exertion, admitted for NSTEMI, transferred to CTsx service for severe AS, requiring a TAVR procedure; upon workup for TAVR, patient incidentally noted to have a 2.4 cm hypervascular lesion for which GI was consulted.     #Hepatic Lesion  Patient presented to [facility] ED on 2/24/22 with complaints of 9/10 chest pain with associated diaphoresis with exertion, admitted for NSTEMI, transferred to CTsx service for severe AS, requiring a TAVR procedure. During workup for TAVR, CTA A/P (2/28) revealing a nodular liver contour, c/w cirrhosis, A 2.4 cm hypervascular liver mass suspicious for HCC, as well as splenomegaly. AFP 7.2 (WNL), While AFP noted to be normal, does not preclude risk of possibility of HCC, especially in the setting of what appears to be cirrhosis 2/2 underlying long-standing history of EtOH use, which places him at higher risk.   - Recommend diagnostic imaging with Triple Phase CT or MRI Abd w/w/o contrast  - No longer considered a surgical candidate for TAVR, above workup can be pursued as an outpatient    #Cirrhosis, likely compensated (-HE, -ascites, - no known EV bleeding)  CTA A/P with radiographic findings c/f cirrhosis, including nodular liver contour and splenomegaly, which is suggestive likely portal HTN. Likely in the setting of long-standing history of EtOH abuse.   MELD-Na: 13 (based on 2/26/22 BW, no AM coags on 3/3/22)  HE: AA0x3, no asterixis  Ascites: None present on CT imaging  HCC: CTA A/P (2/28) with 2.4 cm hypervascular lesion, workup of lesion as noted above  Varices: No prior EGD evaluation, would be warranted, can pursue as an outpatient  - Please obtain Abdominal US with duplex to assess portal venous flow  - Daily CMPs & Coags to calculate MELD-Na  - Nutrition consult  - High protein diet  -  on alcohol cessation  - Fresh blood with clots noted in mouth this morning, s/p irrigation/suctioning, noted to originate from gums. Previous day noted to have dried blood in right nare as well. Recommend dental & ENT for evaluation of oropharynx    Case discussed with [facility] attending and primary team.     Thank you for allowing us to participate in the care of this patient.  GI will sign off. Please call back with any questions or concerns.     So Guthrie DO  Gastroenterology Fellow  Pager: [phone]

## 2022-03-03 NOTE — PROGRESS NOTE ADULT - SUBJECTIVE AND OBJECTIVE BOX
GASTROENTEROLOGY PROGRESS NOTE  Patient seen and examined at bedside. No AM coags to calculate MELD-Na score. Awaiting MR Abdomen to assess hepatic lesion.    ROS: Patient reports no complaints of fevers, chills, NS, nausea/vomiting, abdominal pain. No BMs. Reports some coughing overnight but no vomiting. Notes some blood in his mouth but believes this started overnight.     PERTINENT REVIEW OF SYSTEMS:  CONSTITUTIONAL: No weakness, fevers or chills  HEENT: No visual changes; No vertigo or throat pain   GASTROINTESTINAL: As above.  NEUROLOGICAL: No numbness or weakness  SKIN: No itching, burning, rashes, or lesions     Allergies    No Known Allergies    Intolerances      MEDICATIONS:  MEDICATIONS  (STANDING):  aspirin enteric coated 81 milliGRAM(s) Oral daily  atorvastatin 80 milliGRAM(s) Oral at bedtime  folic acid 1 milliGRAM(s) Oral daily  heparin   Injectable 5000 Unit(s) SubCutaneous every 8 hours  magnesium oxide 400 milliGRAM(s) Oral three times a day with meals  multivitamin 1 Tablet(s) Oral daily  nicotine - 21 mG/24Hr(s) Patch 1 patch Transdermal daily  pantoprazole    Tablet 40 milliGRAM(s) Oral daily  polyethylene glycol 3350 17 Gram(s) Oral daily  potassium chloride    Tablet ER 20 milliEquivalent(s) Oral once  senna 2 Tablet(s) Oral at bedtime  sodium chloride 0.9%. 500 milliLiter(s) (50 mL/Hr) IV Continuous <Continuous>    MEDICATIONS  (PRN):    Vital Signs Last 24 Hrs  T(C): 36.8 (03 Mar 2022 04:43), Max: 37.2 (02 Mar 2022 20:09)  T(F): 98.2 (03 Mar 2022 04:43), Max: 99 (02 Mar 2022 20:09)  HR: 91 (03 Mar 2022 04:43) (91 - 97)  BP: 107/58 (03 Mar 2022 04:43) (99/58 - 116/73)  BP(mean): 75 (03 Mar 2022 04:43) (73 - 87)  RR: 16 (03 Mar 2022 04:43) (16 - 20)  SpO2: 97% (03 Mar 2022 04:43) (95% - 98%)    03-02 @ 07:01  -  03-03 @ 07:00  --------------------------------------------------------  IN: 560 mL / OUT: 300 mL / NET: 260 mL      PHYSICAL EXAM:    General: male, lying in bed; in no acute distress  HEENT: MMM, conjunctiva and sclera clear; blood with clot noted in oropharynx, appears to originate from gums after irrigation/suctioning, dried blood in right nare  Gastrointestinal: Soft, obese, distended abdomen; non-tender; Normal bowel sounds; No rebound or guarding  Extremities: Normal range of motion, No clubbing, cyanosis or edema  Neurological: Alert and oriented x3, no asterixis  Skin: Warm and dry. No obvious rash    LABS:                        8.2    6.98  )-----------( 125      ( 03 Mar 2022 07:09 )             26.7     03-03    135  |  106  |  21  ----------------------------<  113<H>  4.1   |  19<L>  |  1.01    Ca    8.8      03 Mar 2022 07:09  Mg     1.8     03-03    TPro  6.7  /  Alb  2.8<L>  /  TBili  0.4  /  DBili  x   /  AST  62<H>  /  ALT  30  /  AlkPhos  101  03-03    PTT - ( 01 Mar 2022 12:57 )  PTT:35.9 sec                  RADIOLOGY & ADDITIONAL STUDIES:  Reviewed

## 2022-03-03 NOTE — PROGRESS NOTE ADULT - ASSESSMENT
66 year old male, current smoker (50 pack year history), current every day ETOH abuse, with hx of withdrawal, and PMHx of HTN, HLD, CAD and Aortic stenosis s/p CABG/AVR with Dr. Wilcox 2017, presented to [facility] ED on 2/24/22 complaining of chest pain. He subsequently underwent cardiac cath revealing patent bypass grafts (LIMA/SVG) and aortic stenosis. TTE confirmed severe prosthetic valve stenosis and Structural heart was consulted. Patient was  transferred to CT surgery for interventional evaluation. Patient was scheduled for TAVR today, however postponed for possible aortic root surgery later in the week. During preop workup, patient found to have 2.4cmx2.4cm liver lesion s/f HCC and liver nodules c/w cirrhosis. Cardiac surgery cancelled with anticipation of GI workup. GI consulted and recommend liver MRI, which is pending.    Neurovascular:   - No delirium. Pain well controlled with current regimen.  - Hx daily ETOH abuse and alcohol withdrawal: continue librium taper to prevent DTs  - Continue thiamine, folic acid, multivitamin     HEENT:  - Hx mechanical fall, multiple chipped teeth  - Dental (Dr. Rhoades) consulted, pending CT maxillofacial to evaluate for acute infection prior to valve surgery     Cardiovascular:   - S/p AVR (2017) with severe aortic prosthetic valve stenosis  - Plan for MAURI TAVR 3/1 changed: now possible aortic root candidate - changed again; no longer a CT surgical candidate  -Hemodynamically stable. HR controlled ()  - Hx of HTN, BP controlled (101//64)  - Continue ASA, atorvastatin 80 mg daily   - Consider BB if BP/ HR allow, currently too hypotensive to start   - Hx CAD s/p CABG: patent grafts on recent cath     Respiratory:   - 02 Sat = 99% on RA.  -Encourage C+DB and Use of IS 10x / hr while awake.  -CXR without effusions, consolidations     GI:   -Stable.  -Continue GI PPX with protonix  -PO Diet.    Renal / :  - BUN/Cr stable at 21/1.01  -Continue to monitor renal function.  -Monitor I/O's.  - Replete electrolytes PRN    Endocrine:    -A1c 5.1, no known hx DM  -TSH 0.984, no known hx thyroid disease     Hematologic:  -H/H stable at 8.2/26.7 with no obvious signs of bleeding  -Coagulation Panel.    ID:  -Pt remains afebrile with no elevation in WBC or signs of infection 6.98  -Continue to monitor CBC  -Observe for SIRS/Sepsis Syndrome.    Prophylaxis:  -DVT prophylaxis with 5000 SubQ Heparin q8h.  -SCD's    Disposition:  -OR plan cancelled 2/2 possible HCC   -Attempted to call [facility] systems to arrange an outpatient GI referral (pt uninsured) - still waiting callback  -Pt needs to be discharged with follow up at [facility] outpatient

## 2022-03-03 NOTE — PROGRESS NOTE ADULT - SUBJECTIVE AND OBJECTIVE BOX
Patient discussed on morning rounds with Dr. Wilcox    Operation / Date: preop aortic stenosis    SUBJECTIVE ASSESSMENT:  66y Male. NAEO. Patient seen by medicine team, deemed all workup can be done outpatient. Patient's wife went to ED today so cannot take him home, will plan for discharge tomorrow. Patient remains stable, lethargic, wants to leave.    Vital Signs Last 24 Hrs  T(C): 37 (03 Mar 2022 17:29), Max: 37.2 (02 Mar 2022 20:09)  T(F): 98.6 (03 Mar 2022 17:29), Max: 99 (02 Mar 2022 20:09)  HR: 87 (03 Mar 2022 16:01) (87 - 97)  BP: 99/54 (03 Mar 2022 16:01) (99/54 - 107/58)  BP(mean): 77 (03 Mar 2022 11:52) (73 - 77)  RR: 19 (03 Mar 2022 16:01) (16 - 23)  SpO2: 98% (03 Mar 2022 16:01) (95% - 98%)  I&O's Detail    02 Mar 2022 07:01  -  03 Mar 2022 07:00  --------------------------------------------------------  IN:    Oral Fluid: 560 mL  Total IN: 560 mL    OUT:    Voided (mL): 300 mL  Total OUT: 300 mL    Total NET: 260 mL      03 Mar 2022 07:01  -  03 Mar 2022 18:02  --------------------------------------------------------  IN:    Oral Fluid: 240 mL  Total IN: 240 mL    OUT:  Total OUT: 0 mL    Total NET: 240 mL      PHYSICAL EXAM:  Constitutional: well appearing, NAD, assessed laying comfortably in bed   Neuro: A&OX3, but lethargic with conversation. No tremors.  CV: RRR, +ANTHONY, no rubs/ gallops   Pulmonary: CTA bilateral posterior lung fields, no wheezes, rales, rhonchi   GI: +BS/ NT/Nd  Extremities No peripheral edema or calf tenderness      LABS:                        8.2    6.98  )-----------( 125      ( 03 Mar 2022 07:09 )             26.7       COUMADIN: No.     03-03    135  |  106  |  21  ----------------------------<  113<H>  4.1   |  19<L>  |  1.01    Ca    8.8      03 Mar 2022 07:09  Mg     1.8     03-03    TPro  6.7  /  Alb  2.8<L>  /  TBili  0.4  /  DBili  x   /  AST  62<H>  /  ALT  30  /  AlkPhos  101  03-03    MEDICATIONS  (STANDING):  aspirin enteric coated 81 milliGRAM(s) Oral daily  atorvastatin 80 milliGRAM(s) Oral at bedtime  folic acid 1 milliGRAM(s) Oral daily  heparin   Injectable 5000 Unit(s) SubCutaneous every 8 hours  multivitamin 1 Tablet(s) Oral daily  nicotine - 21 mG/24Hr(s) Patch 1 patch Transdermal daily  pantoprazole    Tablet 40 milliGRAM(s) Oral daily  polyethylene glycol 3350 17 Gram(s) Oral daily  senna 2 Tablet(s) Oral at bedtime  sodium chloride 0.9%. 500 milliLiter(s) (50 mL/Hr) IV Continuous <Continuous>    MEDICATIONS  (PRN):        RADIOLOGY & ADDITIONAL TESTS:  < from: Xray Chest 1 View- PORTABLE-Routine (Xray Chest 1 View- PORTABLE-Routine in AM.) (03.02.22 @ 05:07) >    INTERPRETATION:  TECHNIQUE: Single portable view of the chest.    COMPARISON:  2/26/2022    CLINICAL HISTORY: preop    FINDINGS:    Single frontal view of the chest demonstrates the lungs to be clear. The   cardiomediastinal silhouette is enlarged. Mediastinal sternotomy wires.   No acute osseous abnormalities. Overlying EKG leads and wires are noted.   Low lung volumes. Consider follow-up as clinically warranted.    IMPRESSION: No acute cardiopulmonary disease process. Cardiomegaly.    < end of copied text >     Patient discussed on morning rounds with Dr. Wilcox    Operation / Date: preop aortic stenosis    SUBJECTIVE ASSESSMENT:  66y Male. NAEO. Patient seen by medicine team, deemed all workup can be done outpatient. Patient's wife went to ED today so cannot take him home, will plan for discharge tomorrow. Patient remains stable, lethargic, wants to leave.    Vital Signs Last 24 Hrs  T(C): 37 (03 Mar 2022 17:29), Max: 37.2 (02 Mar 2022 20:09)  T(F): 98.6 (03 Mar 2022 17:29), Max: 99 (02 Mar 2022 20:09)  HR: 87 (03 Mar 2022 16:01) (87 - 97)  BP: 99/54 (03 Mar 2022 16:01) (99/54 - 107/58)  BP(mean): 77 (03 Mar 2022 11:52) (73 - 77)  RR: 19 (03 Mar 2022 16:01) (16 - 23)  SpO2: 98% (03 Mar 2022 16:01) (95% - 98%)  I&O's Detail    02 Mar 2022 07:01  -  03 Mar 2022 07:00  --------------------------------------------------------  IN:    Oral Fluid: 560 mL  Total IN: 560 mL    OUT:    Voided (mL): 300 mL  Total OUT: 300 mL    Total NET: 260 mL      03 Mar 2022 07:01  -  03 Mar 2022 18:02  --------------------------------------------------------  IN:    Oral Fluid: 240 mL  Total IN: 240 mL    OUT:  Total OUT: 0 mL    Total NET: 240 mL      PHYSICAL EXAM:  Constitutional: well appearing, NAD, assessed laying comfortably in bed   Neuro: A&OX3, but lethargic with conversation. No tremors.  CV: RRR, +ANTHONY, no rubs/ gallops   Pulmonary: CTA bilateral posterior lung fields, no wheezes, rales, rhonchi   GI: +BS/ NT/Nd  Extremities No peripheral edema or calf tenderness      LABS:                        8.2    6.98  )-----------( 125      ( 03 Mar 2022 07:09 )             26.7       COUMADIN: No.     03-03    135  |  106  |  21  ----------------------------<  113<H>  4.1   |  19<L>  |  1.01    Ca    8.8      03 Mar 2022 07:09  Mg     1.8     03-03    TPro  6.7  /  Alb  2.8<L>  /  TBili  0.4  /  DBili  x   /  AST  62<H>  /  ALT  30  /  AlkPhos  101  03-03    MEDICATIONS  (STANDING):  aspirin enteric coated 81 milliGRAM(s) Oral daily  atorvastatin 80 milliGRAM(s) Oral at bedtime  folic acid 1 milliGRAM(s) Oral daily  heparin   Injectable 5000 Unit(s) SubCutaneous every 8 hours  multivitamin 1 Tablet(s) Oral daily  nicotine - 21 mG/24Hr(s) Patch 1 patch Transdermal daily  pantoprazole    Tablet 40 milliGRAM(s) Oral daily  polyethylene glycol 3350 17 Gram(s) Oral daily  senna 2 Tablet(s) Oral at bedtime  sodium chloride 0.9%. 500 milliLiter(s) (50 mL/Hr) IV Continuous <Continuous>    MEDICATIONS  (PRN):        RADIOLOGY & ADDITIONAL TESTS:  < from: Xray Chest 1 View- PORTABLE-Routine (Xray Chest 1 View- PORTABLE-Routine in AM.) (03.02.22 @ 05:07) >    INTERPRETATION:  TECHNIQUE: Single portable view of the chest.      COMPARISON:  2/26/2022    CLINICAL HISTORY: preop    FINDINGS:    Single frontal view of the chest demonstrates the lungs to be clear. The   cardiomediastinal silhouette is enlarged. Mediastinal sternotomy wires.   No acute osseous abnormalities. Overlying EKG leads and wires are noted.   Low lung volumes. Consider follow-up as clinically warranted.    IMPRESSION: No acute cardiopulmonary disease process. Cardiomegaly.    < end of copied text >

## 2022-03-03 NOTE — CONSULT NOTE ADULT - ASSESSMENT
67 y/o M, current smoker (1ppd x 50 years), current every day ETOH abuse (1 pint vodka and several beers daily x 50 years), with PMHx of HLD, HTN (not on any medications),  CABG, AS (s/p AVR in 2017), who presented to [facility] ED on 2/24/22 with chest pain, admitted for NSTEMI, transferred to CTsx service for severe AS, requiring a TAVR procedure; upon workup for TAVR, patient incidentally noted to have a 2.4 cm hypervascular lesion - medicine consulted for same.     #Cirrhosis   new Cirrhosis (likely alcoholic) seen on CT abdomen with concern for underlying HCC (see more below)   - AAOx1-2 on exam with mild asterixis, concerning for some element of hepatic encephalopathy; possible dementia component to mental status as well (per the daughter, gets lost frequently and needs frequent reorientation in the home)   - GI team following  Recommend:   - consider checking ammonia level given encephalopathy and asterixis   - pending above, would consider starting lactulose if elevated vs outpatient follow up for likely underlying dementia if wnl     #R/O HCC   Discussed extensively with daughter and patient at bedside; given financial constraints and insurance status, would be reasonable to ensure that his care is within one facility that would not financially shackle him or his family   - live in [place] [facility] hospital and have easy access to that institution   - MRI per CTS team, but if DC ready, would recommend to have done at Critical access hospital so that all of his care and studies are in one system   - if patient is remaining inpatient, recommend palliative care consultation for emotional support and possible outpatient follow up/establishment     Discussed with attending and primary team

## 2022-03-03 NOTE — CONSULT NOTE ADULT - SUBJECTIVE AND OBJECTIVE BOX
Patient is a 66y old  Male who presents with a chief complaint of Chest Pain (03 Mar 2022 08:12)    HPI:  Patient is a 65 y/o M, current smoker (1ppd x 50 years), current every day ETOH abuse (1 pint vodka and several beers daily x 50 years), with PMHx of HLD, HTN (not on any medications),  CABG (LIMA to LAD, SVG to PDA in 2017), AS (s/p AVR in 2017), who presented to [facility] ED on 2/24/22 with complaints of 9/10 chest pain with associated diaphoresis that occurred when walking to the bathroom last night around 1 am. Patient states that during this time, he fell and "blacked out" and hit the back of his head. Patient then went back to sleep. Patient states that he had a similiar chest pain 3 days ago that resolved on his own. Patient's wife states that patient has not seen a physician for over 2 years due to insurance issues.  Patient states last drink was 2 shots of vodka last night. Patient denies current chest pain, palpitations, dizziness, diaphoresis, headache, fever, chills, abdominal pain, back pain, n/v/d, recent travel or sick contacts.     In the ED, VS: 97.2, HR: 85 bpm, BP: 121/75 mmHg, RR: 18 breaths/min, spO2: 100% on RA   Labs significant for: Hgb/Hct: 9.5/31.4, AST: 51, Troponin: 0.02. EKG: NSR @ 85 bpm, ST depressions in II, V4, V5, V6. CXR: No cardiopulmonary edema   Patient admitted to cardiology/telemetry for further management of NSTEMI.    (24 Feb 2022 11:57)    Medicine consulted for new diagnosis of suspected HCC and further possible workup. Seen and examined at bedside with daughter tiffanie. Concerned about news in regards to cirrhsois and new suspected HCC. Also concerned about access to care given insurance status. All questions answered and discussed with both patient and daughter.     FAMILY HISTORY:    PAST MEDICAL & SURGICAL HISTORY:  HTN (hypertension)    ETOH abuse    Smoker    Hyperlipidemia    Aortic valve stenosis, unspecified etiology    No significant past surgical history        SOCIAL HISTORY:  Tobacco use:  EtOH use:  Illicit drug use:    REVIEW OF SYSTEMS: see HPI    MEDICATIONS:  MEDICATIONS  (STANDING):  aspirin enteric coated 81 milliGRAM(s) Oral daily  atorvastatin 80 milliGRAM(s) Oral at bedtime  folic acid 1 milliGRAM(s) Oral daily  heparin   Injectable 5000 Unit(s) SubCutaneous every 8 hours  magnesium oxide 400 milliGRAM(s) Oral three times a day with meals  multivitamin 1 Tablet(s) Oral daily  nicotine - 21 mG/24Hr(s) Patch 1 patch Transdermal daily  pantoprazole    Tablet 40 milliGRAM(s) Oral daily  polyethylene glycol 3350 17 Gram(s) Oral daily  senna 2 Tablet(s) Oral at bedtime  sodium chloride 0.9%. 500 milliLiter(s) (50 mL/Hr) IV Continuous <Continuous>    MEDICATIONS  (PRN):      ALLERGIES:  Allergies    No Known Allergies    Intolerances        VITAL SIGNS:  Vital Signs Last 24 Hrs  T(C): 36.7 (03 Mar 2022 09:05), Max: 37.2 (02 Mar 2022 20:09)  T(F): 98 (03 Mar 2022 09:05), Max: 99 (02 Mar 2022 20:09)  HR: 93 (03 Mar 2022 11:52) (90 - 97)  BP: 103/58 (03 Mar 2022 11:52) (99/56 - 114/70)  BP(mean): 77 (03 Mar 2022 11:52) (73 - 77)  RR: 18 (03 Mar 2022 11:52) (16 - 23)  SpO2: 97% (03 Mar 2022 11:52) (95% - 98%)    03-02-22 @ 07:01  -  03-03-22 @ 07:00  --------------------------------------------------------  IN:    Oral Fluid: 560 mL  Total IN: 560 mL    OUT:    Voided (mL): 300 mL  Total OUT: 300 mL    Total NET: 260 mL      03-03-22 @ 07:01  -  03-03-22 @ 14:44  --------------------------------------------------------  IN:    Oral Fluid: 240 mL  Total IN: 240 mL    OUT:  Total OUT: 0 mL    Total NET: 240 mL          PHYSICAL EXAM:  Constitutional: chronically ill appearing, resting comfortably in bed; NAD  Head: NC/AT  Eyes: PERRL, EOMI, anicteric sclera  ENT: mild epistaxis; uvula midline, no oropharyngeal erythema or exudates; MMM  Neck: supple; no JVD or thyromegaly  Respiratory: CTA B/L; no W/R/R, no retractions  Cardiac: +S1/S2; RRR; no M/R/G; PMI non-displaced  Gastrointestinal: abdomen soft, NT/ND; no rebound or guarding; +BSx4; +fluid wave  Genitourinary: normal external genitalia  Back: spine midline, no bony tenderness or step-offs; no CVAT B/L  Extremities: WWP, no clubbing or cyanosis; no peripheral edema  Musculoskeletal: NROM x4; no joint swelling, tenderness or erythema  Vascular: 2+ radial, femoral, DP/PT pulses B/L  Dermatologic: skin warm, dry and intact; no rashes, wounds, or scars  Lymphatic: no submandibular or cervical LAD  Neurologic: AAOx1-2 (self only, location reports [facility] and believes it is January 1986); +asterixis with arms extended; CNII-XII grossly intact; no focal deficits  Psychiatric: affect and characteristics of appearance, verbalizations, behaviors are appropriate    LABS:                        8.2    6.98  )-----------( 125      ( 03 Mar 2022 07:09 )             26.7     03-03    135  |  106  |  21  ----------------------------<  113<H>  4.1   |  19<L>  |  1.01    Ca    8.8      03 Mar 2022 07:09  Mg     1.8     03-03    TPro  6.7  /  Alb  2.8<L>  /  TBili  0.4  /  DBili  x   /  AST  62<H>  /  ALT  30  /  AlkPhos  101  03-03            CAPILLARY BLOOD GLUCOSE              RADIOLOGY & ADDITIONAL TESTS: Reviewed.

## 2022-03-04 LAB
BILIRUB SERPL-MCNC: 0.6 MG/DL — SIGNIFICANT CHANGE UP (ref 0.2–1.2)
CREAT SERPL-MCNC: 0.85 MG/DL — SIGNIFICANT CHANGE UP (ref 0.5–1.3)
EGFR: 96 ML/MIN/1.73M2 — SIGNIFICANT CHANGE UP
INR BLD: 1.27 — HIGH (ref 0.88–1.16)
MELD SCORE WITH DIALYSIS: 22 POINTS — SIGNIFICANT CHANGE UP
MELD SCORE WITHOUT DIALYSIS: 9 POINTS — SIGNIFICANT CHANGE UP
PROTHROM AB SERPL-ACNC: 15.2 SEC — HIGH (ref 10.5–13.4)
SODIUM SERPL-SCNC: 137 MMOL/L — SIGNIFICANT CHANGE UP (ref 135–145)

## 2022-03-04 RX ORDER — INFLUENZA VIRUS VACCINE 15; 15; 15; 15 UG/.5ML; UG/.5ML; UG/.5ML; UG/.5ML
0.7 SUSPENSION INTRAMUSCULAR ONCE
Refills: 0 | Status: COMPLETED | OUTPATIENT
Start: 2022-03-04 | End: 2022-03-04

## 2022-03-04 RX ORDER — FOLIC ACID 0.8 MG
1 TABLET ORAL
Qty: 0 | Refills: 0 | DISCHARGE
Start: 2022-03-04

## 2022-03-04 RX ADMIN — SENNA PLUS 2 TABLET(S): 8.6 TABLET ORAL at 21:29

## 2022-03-04 RX ADMIN — PANTOPRAZOLE SODIUM 40 MILLIGRAM(S): 20 TABLET, DELAYED RELEASE ORAL at 12:47

## 2022-03-04 RX ADMIN — ATORVASTATIN CALCIUM 80 MILLIGRAM(S): 80 TABLET, FILM COATED ORAL at 21:29

## 2022-03-04 RX ADMIN — Medication 1 MILLIGRAM(S): at 12:47

## 2022-03-04 RX ADMIN — HEPARIN SODIUM 5000 UNIT(S): 5000 INJECTION INTRAVENOUS; SUBCUTANEOUS at 13:35

## 2022-03-04 RX ADMIN — POLYETHYLENE GLYCOL 3350 17 GRAM(S): 17 POWDER, FOR SOLUTION ORAL at 12:47

## 2022-03-04 RX ADMIN — HEPARIN SODIUM 5000 UNIT(S): 5000 INJECTION INTRAVENOUS; SUBCUTANEOUS at 21:29

## 2022-03-04 RX ADMIN — Medication 1 PATCH: at 18:31

## 2022-03-04 RX ADMIN — Medication 1 PATCH: at 12:46

## 2022-03-04 RX ADMIN — Medication 1 PATCH: at 06:31

## 2022-03-04 RX ADMIN — Medication 1 TABLET(S): at 12:47

## 2022-03-04 RX ADMIN — Medication 81 MILLIGRAM(S): at 12:47

## 2022-03-04 RX ADMIN — HEPARIN SODIUM 5000 UNIT(S): 5000 INJECTION INTRAVENOUS; SUBCUTANEOUS at 05:23

## 2022-03-04 RX ADMIN — Medication 1 PATCH: at 12:48

## 2022-03-04 RX ADMIN — INFLUENZA VIRUS VACCINE 0.7 MILLILITER(S): 15; 15; 15; 15 SUSPENSION INTRAMUSCULAR at 13:36

## 2022-03-04 NOTE — PHYSICAL THERAPY INITIAL EVALUATION ADULT - NAME OF CLINICIAN
Problem: Patient Care Overview  Goal: Plan of Care/Patient Progress Review  Outcome: No Change  Pt transfer from ICU to station 33 via wheel chair. VSS, tele SR. Denies pain. Will continue to monitor.        GINNY Ni

## 2022-03-04 NOTE — PHYSICAL THERAPY INITIAL EVALUATION ADULT - PERTINENT HX OF CURRENT PROBLEM, REHAB EVAL
Pt is 67 yo male presented to [facility] ED on 2/24/22 complaining of chest pain. He subsequently underwent cardiac cath revealing patent bypass grafts (LIMA/SVG) and aortic stenosis. TTE confirmed severe prosthetic valve stenosis and Structural heart was consulted. Patient was  transferred to CT surgery for interventional evaluation. Patient was scheduled for TAVR today, however postponed for possible aortic root surgery later in the week.

## 2022-03-04 NOTE — PHYSICAL THERAPY INITIAL EVALUATION ADULT - GENERAL OBSERVATIONS, REHAB EVAL
Pt received OOB in chair +hep lock +tele +rogers. PT received consent to treat from GINNY Torres. Pt was left as received with call bell in reach, VSS, and in NAD.

## 2022-03-04 NOTE — PROGRESS NOTE ADULT - SUBJECTIVE AND OBJECTIVE BOX
Patient discussed on morning rounds with Dr. Wilcox    Operation / Date: No operation completed    Surgeon: Brenden    Referring Physician: Dr. Beauchamp    SUBJECTIVE ASSESSMENT AND HOSPITAL COURSE:  66 year old male, current smoker (50 pack year history), current every day ETOH abuse, with hx of withdrawal, and PMHx of HTN, HLD, CAD and Aortic stenosis s/p CABG/AVR with Dr. Wilcox 2017, presented to [facility] ED on 2/24/22 complaining of chest pain. He subsequently underwent cardiac cath revealing patent bypass grafts (LIMA/SVG) and aortic stenosis. TTE confirmed severe prosthetic valve stenosis and Structural heart was consulted. Patient was  transferred to CT surgery for interventional evaluation. Patient was scheduled for TAVR today, however postponed for possible aortic root surgery later in the week. During preop workup, patient found to have 2.4cmx2.4cm liver lesion s/f HCC and liver nodules c/w cirrhosis. Cardiac surgery cancelled with anticipation of GI workup. GI consulted and recommend liver MRI, which is pending. Plan for patient to be discharged home today with close follow up at [facility] primary care.     For known previous CABG and Stents patient discharged on ASA and Lipitor, Patient unable to be discharged on Beta Blocker due to hypotension, SBP  while inpatient.     Patient was seen and examined this morning, care was discussed with Dr. Wilcox and deemed medically appropriate for discharge with outpatient follow up. Patient was hemodynamically stable and afebrile overnight and feels comfortable being discharged home this AM.     Over 35 minutes was spent with the patient and patient daughter reviewing the discharge material including medications, follow up appointments, recovery, concerning symptoms, and how to contact their health care providers if they have questions           Vital Signs Last 24 Hrs  T(C): 36.4 (04 Mar 2022 09:12), Max: 37 (03 Mar 2022 17:29)  T(F): 97.6 (04 Mar 2022 09:12), Max: 98.6 (03 Mar 2022 17:29)  HR: 89 (04 Mar 2022 08:09) (86 - 93)  BP: 104/52 (04 Mar 2022 08:09) (95/55 - 105/52)  BP(mean): 75 (04 Mar 2022 08:09) (75 - 77)  RR: 18 (04 Mar 2022 08:09) (18 - 19)  SpO2: 98% (04 Mar 2022 08:09) (95% - 98%)    EPICARDIAL WIRES REMOVED: N/A  TIE DOWNS REMOVED: N/A.    PHYSICAL EXAM:  Neuro: A+O x 3, non-focal, speech clear and intact  HEENT: PERRL, EOMI, oral mucosa pink and moist  Neck: supple, no JVD  CV: regular rate, regular rhythm, +S1S2, no murmurs or rub  Pulm/chest: lung sounds CTA and equal bilaterally, no accessory muscle use noted  Abd: soft, NT, ND, +BS  Ext: OAK[place] x 4, no C/C/E  Skin: warm, well perfused, no rashes     LABS:                        8.2    6.98  )-----------( 125      ( 03 Mar 2022 07:09 )             26.7         PT/INR - ( 04 Mar 2022 07:49 )   PT: 15.2 sec;   INR: 1.27         03-04    137  |  x   |  x   ----------------------------<  x   x    |  x   |  0.85    Ca    8.8      03 Mar 2022 07:09  Mg     1.8     03-03    TPro  x   /  Alb  x   /  TBili  0.6  /  DBili  x   /  AST  x   /  ALT  x   /  AlkPhos  x   03-04     Patient discussed on morning rounds with Dr. Wilcox      Operation / Date: preop aortic stenosis; deemed not surgical candidate    SUBJECTIVE ASSESSMENT:  66y Male seen and examined at bedside. MACEY. Patient seen by medicine team, deemed all workup can be done outpatient. Patient's wife went to ED today so cannot take him home, will plan for discharge tomorrow. Patient remains stable, lethargic, wants to leave.  Patient having difficulty walking today, feeling weak.    Vital Signs Last 24 Hrs  T(C): 36.9 (04 Mar 2022 17:45), Max: 36.9 (04 Mar 2022 17:45)  T(F): 98.5 (04 Mar 2022 17:45), Max: 98.5 (04 Mar 2022 17:45)  HR: 89 (04 Mar 2022 16:30) (86 - 112)  BP: 101/56 (04 Mar 2022 16:30) (95/55 - 134/61)  BP(mean): 73 (04 Mar 2022 16:30) (73 - 88)  RR: 18 (04 Mar 2022 16:30) (18 - 19)  SpO2: 98% (04 Mar 2022 16:30) (95% - 100%)  I&O's Detail    03 Mar 2022 07:01  -  04 Mar 2022 07:00  --------------------------------------------------------  IN:    Oral Fluid: 780 mL  Total IN: 780 mL    OUT:    Voided (mL): 450 mL  Total OUT: 450 mL    Total NET: 330 mL      04 Mar 2022 07:01  -  04 Mar 2022 19:34  --------------------------------------------------------  IN:    Oral Fluid: 850 mL  Total IN: 850 mL    OUT:    Voided (mL): 700 mL  Total OUT: 700 mL    Total NET: 150 mL    Physical Exam:  Constitutional: well appearing, NAD, assessed laying comfortably in bed   Neuro: A&OX3, but lethargic with conversation. No tremors.  CV: RRR, +ANTHONY, no rubs/ gallops   Pulmonary: CTA bilateral posterior lung fields, no wheezes, rales, rhonchi   GI: +BS/ NT/Nd  Extremities No peripheral edema or calf tenderness      LABS:                        8.2    6.98  )-----------( 125      ( 03 Mar 2022 07:09 )             26.7       COUMADIN:  No.      PT/INR - ( 04 Mar 2022 07:49 )   PT: 15.2 sec;   INR: 1.27          03-04    137  |  x   |  x   ----------------------------<  x   x    |  x   |  0.85    Ca    8.8      03 Mar 2022 07:09  Mg     1.8     03-03    TPro  x   /  Alb  x   /  TBili  0.6  /  DBili  x   /  AST  x   /  ALT  x   /  AlkPhos  x   03-04      MEDICATIONS  (STANDING):  aspirin enteric coated 81 milliGRAM(s) Oral daily  atorvastatin 80 milliGRAM(s) Oral at bedtime  folic acid 1 milliGRAM(s) Oral daily  heparin   Injectable 5000 Unit(s) SubCutaneous every 8 hours  multivitamin 1 Tablet(s) Oral daily  nicotine - 21 mG/24Hr(s) Patch 1 patch Transdermal daily  pantoprazole    Tablet 40 milliGRAM(s) Oral daily  polyethylene glycol 3350 17 Gram(s) Oral daily  senna 2 Tablet(s) Oral at bedtime  sodium chloride 0.9%. 500 milliLiter(s) (50 mL/Hr) IV Continuous <Continuous>    MEDICATIONS  (PRN):        RADIOLOGY & ADDITIONAL TESTS:  < from: Xray Chest 1 View- PORTABLE-Routine (Xray Chest 1 View- PORTABLE-Routine in AM.) (03.02.22 @ 05:07) >    FINDINGS:    Single frontal view of the chest demonstrates the lungs to be clear. The   cardiomediastinal silhouette is enlarged. Mediastinal sternotomy wires.   No acute osseous abnormalities. Overlying EKG leads and wires are noted.   Low lung volumes. Consider follow-up as clinically warranted.    IMPRESSION: No acute cardiopulmonary disease process. Cardiomegaly.    < end of copied text >

## 2022-03-04 NOTE — PROGRESS NOTE ADULT - ASSESSMENT
Med Reconciliation:  Medication Reconciliation Status	Admission Reconciliation is Completed  Discharge Reconciliation is Completed  	  Discharge Medications	Aspirin Enteric Coated 81 mg oral delayed release tablet: 1 tab(s) orally once a day   atorvastatin 80 mg oral tablet: 1 tab(s) orally once a day (at bedtime)  cardiac rehab: Cardiac Rehab  3x per day for 12 weeks  indication CAD  folic acid 1 mg oral tablet: 1 tab(s) orally once a day  Multiple Vitamins oral tablet: 1 tab(s) orally once a day  	  ,  ,     Care Plan/Procedures:  Discharge Diagnoses, Assessment and Plan of Treatment	PRINCIPAL DISCHARGE DIAGNOSIS  Diagnosis: Aortic stenosis  Assessment and Plan of Treatment:       SECONDARY DISCHARGE DIAGNOSES  Diagnosis: Hyperlipidemia  Assessment and Plan of Treatment: Your LDL is not well controlled.  YOur goal is less then 70.  Please start taking atorvastatin 80mg daily at bedtime.    Diagnosis: Encounter for smoking cessation counseling  Assessment and Plan of Treatment: If you smoke and already have heart and vascular disease, quitting smoking will reduce your risk of sudden death, heart attack, and death from other chronic diseases. Any amount of smoking, even light smoking or occasional smoking, damages the heart and blood vesssels. One of the best ways to reduce your risk of heart disease is to avoid tobacco smoke. No matter how much or how long you`ve smoked, quitting will benefit you.  follow up with your outpatient physician to help with setting up a quit date if you have not already.    Diagnosis: Counseling on health promotion and disease prevention  Assessment and Plan of Treatment: Our [facility] Cardiovascular Prevention Team from [facility] will contact to you to arrange an outpatient office visit (telehealth or in person). The goal of this service is to optimize medication (blood pressure, cholesterol, diabetes) and lifestyle (diet, exercise, weight management, smoking) regimens to help lower the risk of cardiovascular events. They will work with your cardiologist to help support and guide you after your hospitalization.    Diagnosis: Encounter for cardiac rehabilitation  Assessment and Plan of Treatment: - We have provided you with a prescription for cardiac rehab which is a medically supervised exercise program for your heart and has been shown to improve the quantity and quality of life of people with heart disease like yours.   - You should attend cardiac rehab 3 times per week for 12 weeks.   - We have provided you with a list of nearby facilities.   -Please call your insurance carrier to determine which of these facilities are covered under your plan.   - Please bring this prescription with you to your follow up appointment with your cardiologist who can then further assist you to enroll into a cardiac rehab program.  	  Goal(s)	To get better and follow your care plan as instructed.     Follow Up:  Care Providers for Follow up (PCP/Outpatient Provider)	Vincezno Beauchamp  CARDIOVASCULAR DISEASE  [address] [place] [address]  Phone: [phone]  Fax: [phone]  Follow Up Time:  Patient's Scheduled Appointments	TERESA ARCHIBALD ; 03/14/2022 ; NPP CT Surg [address]  Additional Scheduled Appointments	[facility] - Please call [phone] to make an appointment.   Discharge Diet	DASH Diet  Activity	No heavy lifting/straining, Follow Instructions Provided by your Surgical Team   66 year old male, current smoker (50 pack year history), current every day ETOH abuse, with hx of withdrawal, and PMHx of HTN, HLD, CAD and Aortic stenosis s/p CABG/AVR with Dr. Wilcox 2017, presented to [facility] ED on 2/24/22 complaining of chest pain. He subsequently underwent cardiac cath revealing patent bypass grafts (LIMA/SVG) and aortic stenosis. TTE confirmed severe prosthetic valve stenosis and Structural heart was consulted. Patient was  transferred to CT surgery for interventional evaluation. Patient was scheduled for TAVR today, however postponed for possible aortic root surgery later in the week. During preop workup, patient found to have 2.4cmx2.4cm liver lesion s/f HCC and liver nodules c/w cirrhosis. Cardiac surgery cancelled with anticipation of GI workup. GI consulted and recommend liver MRI, which is pending. Patient stable for discharge from cardiac surgery standpoint. Patient weak today, PT consulted and recommending BRENDA. Patient does not qualify for BRENDA because patient is not insured. Medicine consulted for management of patient, but refused transfer. Patient currently a disposition issue, plan to be determined.    Neurovascular:   - No delirium. Pain well controlled with current regimen.  - Hx daily ETOH abuse and alcohol withdrawal: continue librium taper to prevent DTs  - Continue thiamine, folic acid, multivitamin     HEENT:  - Hx mechanical fall, multiple chipped teeth  - Dental (Dr. Rhoades) consulted, pending CT maxillofacial to evaluate for acute infection prior to valve surgery     Cardiovascular:   - S/p AVR (2017) with severe aortic prosthetic valve stenosis  - Plan for MAURI TAVR 3/1 changed: now possible aortic root candidate - changed again; no longer a CT surgical candidate  -Hemodynamically stable. HR controlled ()  - Hx of HTN, BP controlled (101//64)  - Continue ASA, atorvastatin 80 mg daily   - Consider BB if BP/ HR allow, currently too hypotensive to start   - Hx CAD s/p CABG: patent grafts on recent cath     Respiratory:   - 02 Sat = 99% on RA.  -Encourage C+DB and Use of IS 10x / hr while awake.  -CXR without effusions, consolidations     GI:   -Stable.  -Continue GI PPX with protonix  -PO Diet.    Renal / :  - BUN/Cr stable at 9/0.85  -Continue to monitor renal function.  -Monitor I/O's.  - Replete electrolytes PRN    Endocrine:    -A1c 5.1, no known hx DM  -TSH 0.984, no known hx thyroid disease     Hematologic:  -H/H stable at 8.2/26.7 with no obvious signs of bleeding (lab holiday today)  -Coagulation Panel.    ID:  -Pt remains afebrile with no elevation in WBC or signs of infection 6.98  -Continue to monitor CBC  -Observe for SIRS/Sepsis Syndrome.    Prophylaxis:  -DVT prophylaxis with 5000 SubQ Heparin q8h.  -SCD's    Disposition:  -OR plan cancelled 2/2 possible HCC   -Attempted to call [facility] to arrange an outpatient GI referral (pt uninsured) - still waiting callback  -Pt needs to be discharged with follow up at [facility] outpatient  -PT meganal recommending [facility] - pt cannot go to [facility] without insurance - trying to detremine plan for disposition

## 2022-03-04 NOTE — PHYSICAL THERAPY INITIAL EVALUATION ADULT - ADDITIONAL COMMENTS
Bed: 45  Expected date:   Expected time:   Means of arrival:   Comments:  TRIAGE  
Detail Level: Zone
Detail Level: Generalized
Pt states he lives with wife with one flight of stairs (~12). Pt uses [facility] for amb at time and was independent with ADL's PTA.
Include Location In Plan?: No

## 2022-03-05 LAB
ALBUMIN SERPL ELPH-MCNC: 3 G/DL — LOW (ref 3.3–5)
ALP SERPL-CCNC: 103 U/L — SIGNIFICANT CHANGE UP (ref 40–120)
ALT FLD-CCNC: 32 U/L — SIGNIFICANT CHANGE UP (ref 10–45)
ANION GAP SERPL CALC-SCNC: 11 MMOL/L — SIGNIFICANT CHANGE UP (ref 5–17)
AST SERPL-CCNC: 57 U/L — HIGH (ref 10–40)
BILIRUB SERPL-MCNC: 0.6 MG/DL — SIGNIFICANT CHANGE UP (ref 0.2–1.2)
BUN SERPL-MCNC: 19 MG/DL — SIGNIFICANT CHANGE UP (ref 7–23)
CALCIUM SERPL-MCNC: 8.8 MG/DL — SIGNIFICANT CHANGE UP (ref 8.4–10.5)
CHLORIDE SERPL-SCNC: 105 MMOL/L — SIGNIFICANT CHANGE UP (ref 96–108)
CO2 SERPL-SCNC: 19 MMOL/L — LOW (ref 22–31)
CREAT SERPL-MCNC: 0.93 MG/DL — SIGNIFICANT CHANGE UP (ref 0.5–1.3)
EGFR: 91 ML/MIN/1.73M2 — SIGNIFICANT CHANGE UP
GLUCOSE SERPL-MCNC: 100 MG/DL — HIGH (ref 70–99)
HCT VFR BLD CALC: 23.6 % — LOW (ref 39–50)
HGB BLD-MCNC: 7.3 G/DL — LOW (ref 13–17)
MAGNESIUM SERPL-MCNC: 1.9 MG/DL — SIGNIFICANT CHANGE UP (ref 1.6–2.6)
MCHC RBC-ENTMCNC: 27 PG — SIGNIFICANT CHANGE UP (ref 27–34)
MCHC RBC-ENTMCNC: 30.9 GM/DL — LOW (ref 32–36)
MCV RBC AUTO: 87.4 FL — SIGNIFICANT CHANGE UP (ref 80–100)
NRBC # BLD: 0 /100 WBCS — SIGNIFICANT CHANGE UP (ref 0–0)
PLATELET # BLD AUTO: 128 K/UL — LOW (ref 150–400)
POTASSIUM SERPL-MCNC: 4 MMOL/L — SIGNIFICANT CHANGE UP (ref 3.5–5.3)
POTASSIUM SERPL-SCNC: 4 MMOL/L — SIGNIFICANT CHANGE UP (ref 3.5–5.3)
PROT SERPL-MCNC: 6.8 G/DL — SIGNIFICANT CHANGE UP (ref 6–8.3)
RBC # BLD: 2.7 M/UL — LOW (ref 4.2–5.8)
RBC # FLD: 15.4 % — HIGH (ref 10.3–14.5)
SODIUM SERPL-SCNC: 135 MMOL/L — SIGNIFICANT CHANGE UP (ref 135–145)
WBC # BLD: 7.61 K/UL — SIGNIFICANT CHANGE UP (ref 3.8–10.5)
WBC # FLD AUTO: 7.61 K/UL — SIGNIFICANT CHANGE UP (ref 3.8–10.5)

## 2022-03-05 PROCEDURE — 71045 X-RAY EXAM CHEST 1 VIEW: CPT | Mod: 26

## 2022-03-05 RX ORDER — SODIUM CHLORIDE 0.65 %
1 AEROSOL, SPRAY (ML) NASAL
Refills: 0 | Status: DISCONTINUED | OUTPATIENT
Start: 2022-03-05 | End: 2022-03-30

## 2022-03-05 RX ADMIN — POLYETHYLENE GLYCOL 3350 17 GRAM(S): 17 POWDER, FOR SOLUTION ORAL at 12:34

## 2022-03-05 RX ADMIN — Medication 1 DROP(S): at 18:46

## 2022-03-05 RX ADMIN — Medication 1 TABLET(S): at 12:33

## 2022-03-05 RX ADMIN — HEPARIN SODIUM 5000 UNIT(S): 5000 INJECTION INTRAVENOUS; SUBCUTANEOUS at 13:02

## 2022-03-05 RX ADMIN — SENNA PLUS 2 TABLET(S): 8.6 TABLET ORAL at 21:11

## 2022-03-05 RX ADMIN — Medication 1 PATCH: at 05:39

## 2022-03-05 RX ADMIN — PANTOPRAZOLE SODIUM 40 MILLIGRAM(S): 20 TABLET, DELAYED RELEASE ORAL at 12:34

## 2022-03-05 RX ADMIN — HEPARIN SODIUM 5000 UNIT(S): 5000 INJECTION INTRAVENOUS; SUBCUTANEOUS at 21:10

## 2022-03-05 RX ADMIN — Medication 81 MILLIGRAM(S): at 12:33

## 2022-03-05 RX ADMIN — ATORVASTATIN CALCIUM 80 MILLIGRAM(S): 80 TABLET, FILM COATED ORAL at 21:11

## 2022-03-05 RX ADMIN — Medication 1 PATCH: at 19:01

## 2022-03-05 RX ADMIN — HEPARIN SODIUM 5000 UNIT(S): 5000 INJECTION INTRAVENOUS; SUBCUTANEOUS at 05:39

## 2022-03-05 RX ADMIN — Medication 1 MILLIGRAM(S): at 12:34

## 2022-03-05 RX ADMIN — Medication 1 SPRAY(S): at 13:01

## 2022-03-05 RX ADMIN — Medication 1 PATCH: at 12:33

## 2022-03-05 RX ADMIN — Medication 1 PATCH: at 12:09

## 2022-03-05 NOTE — PROGRESS NOTE ADULT - ASSESSMENT
66 year old male, current smoker (50 pack year history), current every day ETOH abuse, with hx of withdrawal, and PMHx of HTN, HLD, CAD and Aortic stenosis s/p CABG/AVR with Dr. Wilcox 2017, presented to [facility] ED on 2/24/22 complaining of chest pain. He subsequently underwent cardiac cath revealing patent bypass grafts (LIMA/SVG) and aortic stenosis. TTE confirmed severe prosthetic valve stenosis and Structural heart was consulted. Patient was  transferred to CT surgery for interventional evaluation. Patient was scheduled for TAVR today, however postponed for possible aortic root surgery later in the week. During preop workup, patient found to have 2.4cmx2.4cm liver lesion s/f HCC and liver nodules c/w cirrhosis. Cardiac surgery cancelled with anticipation of GI workup. GI consulted and recommend liver MRI, which is pending. Patient stable for discharge from cardiac surgery standpoint. Patient weak today, PT consulted and recommending BRENDA. Patient does not qualify for BRENDA because patient is not insured. Medicine consulted for management of patient, but refused transfer. Patient currently a disposition issue, plan to be determined.    Neurovascular:   - No delirium. Pain well controlled with current regimen.  - Hx daily ETOH abuse and alcohol withdrawal: continue librium taper to prevent DTs  - Continue thiamine, folic acid, multivitamin     HEENT:  - Hx mechanical fall, multiple chipped teeth  - Dental (Dr. Rhoades) consulted, pending CT maxillofacial to evaluate for acute infection prior to valve surgery     Cardiovascular:   - S/p AVR (2017) with severe aortic prosthetic valve stenosis  - originally planned for MAURI TAVR on 3/1/22 however deemed no longer a surgical candidate  - Hx of HTN, BP controlled   - Continue ASA, atorvastatin 80 mg daily   - Consider BB if BP allows    - Hx CAD s/p CABG: patent grafts on recent cath     Respiratory:   - 02 Sat = 99% on RA.  -Encourage C+DB and Use of IS 10x / hr while awake.  -CXR stable    GI: During preop workup, patient found to have 2.4cmx2.4cm liver lesion concern for HCC and liver nodules c/w cirrhosis.  - GI following - rec triple phase CT or MRI abd w/w/o contrast     - can be done as OP.     - abdominal US with duplex to assess portal venous flow     - ammonia level  - consider palliative care consult  -Continue GI PPX with protonix  -PO Diet.    Renal / :  - BUN/Cr stable at 19/0.93  -Continue to monitor renal function.  -Monitor I/O's.  - Replete electrolytes PRN    Endocrine:    -A1c 5.1, no known hx DM  -TSH 0.984, no known hx thyroid disease     Hematologic:  -H/H stable at 7.3/23.6 with no obvious signs of bleeding  -Coagulation Panel.    ID:  -Pt remains afebrile with no elevation in WBC or signs of infection 7.61  -Continue to monitor CBC  -Observe for SIRS/Sepsis Syndrome.    Prophylaxis:  -DVT prophylaxis with 5000 SubQ Heparin q8h.  -SCD's    Disposition:  -[facility] referral (pt uninsured) - still waiting callback  -Pt needs to be discharged with follow up at [facility] outpatient  -PT cecil recommending [facility] - pt cannot go to [facility] without insurance - trying to detremine plan for disposition  not a surgical candidate.

## 2022-03-05 NOTE — PROGRESS NOTE ADULT - SUBJECTIVE AND OBJECTIVE BOX
Patient discussed on morning rounds with       Operation / Date:     SUBJECTIVE ASSESSMENT:  66y Male         Vital Signs Last 24 Hrs  T(C): 36.6 (05 Mar 2022 13:50), Max: 37.3 (04 Mar 2022 20:21)  T(F): 97.8 (05 Mar 2022 13:50), Max: 99.2 (04 Mar 2022 20:21)  HR: 90 (05 Mar 2022 16:09) (86 - 92)  BP: 105/59 (05 Mar 2022 16:09) (98/57 - 109/60)  BP(mean): 76 (05 Mar 2022 16:09) (73 - 78)  RR: 17 (05 Mar 2022 16:09) (15 - 18)  SpO2: 99% (05 Mar 2022 16:09) (95% - 99%)  I&O's Detail    04 Mar 2022 07:01  -  05 Mar 2022 07:00  --------------------------------------------------------  IN:    Oral Fluid: 950 mL  Total IN: 950 mL    OUT:    Incontinent per Condom Catheter (mL): 201 mL    Voided (mL): 1050 mL  Total OUT: 1251 mL    Total NET: -301 mL      05 Mar 2022 07:01  -  05 Mar 2022 16:39  --------------------------------------------------------  IN:    Oral Fluid: 328 mL  Total IN: 328 mL    OUT:    Voided (mL): 300 mL  Total OUT: 300 mL    Total NET: 28 mL          CHEST TUBE:  Yes/No. AIR LEAKS: Yes/No. Suction / H2O SEAL.   CORNELIO DRAIN:  Yes/No.  EPICARDIAL WIRES: Yes/No.  TIE DOWNS: Yes/No.  MAYERS: Yes/No.    PHYSICAL EXAM:    General:     Neurological:    Cardiovascular:    Respiratory:    Gastrointestinal:    Extremities:    Vascular:    Incision Sites:    LABS:                        7.3    7.61  )-----------( 128      ( 05 Mar 2022 07:17 )             23.6       COUMADIN:  Yes/No. REASON: .    PT/INR - ( 04 Mar 2022 07:49 )   PT: 15.2 sec;   INR: 1.27              03-05    135  |  105  |  19  ----------------------------<  100<H>  4.0   |  19<L>  |  0.93    Ca    8.8      05 Mar 2022 07:17  Mg     1.9     03-05    TPro  6.8  /  Alb  3.0<L>  /  TBili  0.6  /  DBili  x   /  AST  57<H>  /  ALT  32  /  AlkPhos  103  03-05          MEDICATIONS  (STANDING):  aspirin enteric coated 81 milliGRAM(s) Oral daily  atorvastatin 80 milliGRAM(s) Oral at bedtime  folic acid 1 milliGRAM(s) Oral daily  heparin   Injectable 5000 Unit(s) SubCutaneous every 8 hours  multivitamin 1 Tablet(s) Oral daily  nicotine - 21 mG/24Hr(s) Patch 1 patch Transdermal daily  pantoprazole    Tablet 40 milliGRAM(s) Oral daily  polyethylene glycol 3350 17 Gram(s) Oral daily  senna 2 Tablet(s) Oral at bedtime  sodium chloride 0.9%. 500 milliLiter(s) (50 mL/Hr) IV Continuous <Continuous>    MEDICATIONS  (PRN):  sodium chloride 0.65% Nasal 1 Spray(s) Both Nostrils four times a day PRN Nasal Congestion        RADIOLOGY & ADDITIONAL TESTS:     Patient discussed on morning rounds with Dr. Brenden enriquez aortic stenosis - not a surgical candidate.     SUBJECTIVE ASSESSMENT:  66y Male seen at bedside. Offering no acute complaints. Had a small episode of epistaxis this AM which resolved. Denies chest pain or SOB.     Vital Signs Last 24 Hrs  T(C): 36.6 (05 Mar 2022 13:50), Max: 37.3 (04 Mar 2022 20:21)  T(F): 97.8 (05 Mar 2022 13:50), Max: 99.2 (04 Mar 2022 20:21)  HR: 90 (05 Mar 2022 16:09) (86 - 92)  BP: 105/59 (05 Mar 2022 16:09) (98/57 - 109/60)  BP(mean): 76 (05 Mar 2022 16:09) (73 - 78)  RR: 17 (05 Mar 2022 16:09) (15 - 18)  SpO2: 99% (05 Mar 2022 16:09) (95% - 99%)  I&O's Detail    04 Mar 2022 07:01  -  05 Mar 2022 07:00  --------------------------------------------------------  IN:    Oral Fluid: 950 mL  Total IN: 950 mL    OUT:    Incontinent per Condom Catheter (mL): 201 mL    Voided (mL): 1050 mL  Total OUT: 1251 mL    Total NET: -301 mL      05 Mar 2022 07:01  -  05 Mar 2022 16:39  --------------------------------------------------------  IN:    Oral Fluid: 328 mL  Total IN: 328 mL    OUT:    Voided (mL): 300 mL  Total OUT: 300 mL    Total NET: 28 mL      PHYSICAL EXAM:  General: Patient lying comfortably in bed, no acute distress   Neurological: Alert and oriented. No focal neurological deficits   Cardiovascular: S1S2, RRR, +murmur heard at RUSB  Respiratory: Clear to ausculation bilaterally, no wheezes, rales or rhonchi  Gastrointestinal: Abdomen soft, non tender, non distended   Extremities: Warm and well perfused. No peripheral edema or calf tenderness bilaterally  Vascular: Peripheral pulses palpable bilaterally  Incisions: old healed MSI, no signs of infection.     LABS:                        7.3    7.61  )-----------( 128      ( 05 Mar 2022 07:17 )             23.6       COUMADIN:  No.   PT/INR - ( 04 Mar 2022 07:49 )   PT: 15.2 sec;   INR: 1.27       03-05    135  |  105  |  19  ----------------------------<  100<H>  4.0   |  19<L>  |  0.93    Ca    8.8      05 Mar 2022 07:17  Mg     1.9     03-05    TPro  6.8  /  Alb  3.0<L>  /  TBili  0.6  /  DBili  x   /  AST  57<H>  /  ALT  32  /  AlkPhos  103  03-05      MEDICATIONS  (STANDING):  aspirin enteric coated 81 milliGRAM(s) Oral daily  atorvastatin 80 milliGRAM(s) Oral at bedtime  folic acid 1 milliGRAM(s) Oral daily  heparin   Injectable 5000 Unit(s) SubCutaneous every 8 hours  multivitamin 1 Tablet(s) Oral daily  nicotine - 21 mG/24Hr(s) Patch 1 patch Transdermal daily  pantoprazole    Tablet 40 milliGRAM(s) Oral daily  polyethylene glycol 3350 17 Gram(s) Oral daily  senna 2 Tablet(s) Oral at bedtime  sodium chloride 0.9%. 500 milliLiter(s) (50 mL/Hr) IV Continuous <Continuous>    MEDICATIONS  (PRN):  sodium chloride 0.65% Nasal 1 Spray(s) Both Nostrils four times a day PRN Nasal Congestion        RADIOLOGY & ADDITIONAL TESTS:  x< from: Xray Chest 1 View- PORTABLE-Routine (Xray Chest 1 View- PORTABLE-Routine in AM.) (03.05.22 @ 06:24) >    ACC: [id number] EXAM:  XR CHEST PORTABLE ROUTINE 1V                          PROCEDURE DATE:  03/05/2022          INTERPRETATION:  Clinical History: Discharge. STEMI    Frontal examination of the chest demonstrates limited inspiration. The   heart iswithin normal limits in transverse diameter. Status post   sternotomy and valvular replacement. Left effusion.    IMPRESSION: Limited inspiration. Left effusion    --- End of Report ---          < end of copied text >  < from: JOSELO Echo Doppler w/ Cont (02.28.22 @ 15:49) >  CONCLUSIONS:     1. Normal left and right ventricular size and systolic function.   2. Mild symmetric left ventricular hypertrophy.   3. Dilated left atrium.   4. No LA/RA/DINH/RAA thrombus seen.   5. A bioprosthetic valve noted and appears well-seated. The peak   transvalvular velocity is 4.70 m/sec. The peak transaortic gradient is   88.36 mmHg. The mean transaortic gradientis 56.00 mmHg. There is no   evidence of aortic regurgitation.   6. The mitral valve is moderately thickened. Mitral annular   calcification noted. There is mild mitral regurgitation.   7. There is moderate non-mobile plaque seen in the visualized portion of   the descending aorta. There is moderate non-mobile plaque seen in the   visualized portion of the aortic arch.   8. No pericardial effusion is seen.    < end of copied text >

## 2022-03-06 LAB
ALBUMIN SERPL ELPH-MCNC: 2.8 G/DL — LOW (ref 3.3–5)
ALP SERPL-CCNC: 91 U/L — SIGNIFICANT CHANGE UP (ref 40–120)
ALT FLD-CCNC: 36 U/L — SIGNIFICANT CHANGE UP (ref 10–45)
AMMONIA BLD-MCNC: 123 UMOL/L — HIGH (ref 11–55)
ANION GAP SERPL CALC-SCNC: 8 MMOL/L — SIGNIFICANT CHANGE UP (ref 5–17)
AST SERPL-CCNC: 63 U/L — HIGH (ref 10–40)
BILIRUB SERPL-MCNC: 0.6 MG/DL — SIGNIFICANT CHANGE UP (ref 0.2–1.2)
BUN SERPL-MCNC: 16 MG/DL — SIGNIFICANT CHANGE UP (ref 7–23)
CALCIUM SERPL-MCNC: 8.5 MG/DL — SIGNIFICANT CHANGE UP (ref 8.4–10.5)
CHLORIDE SERPL-SCNC: 107 MMOL/L — SIGNIFICANT CHANGE UP (ref 96–108)
CO2 SERPL-SCNC: 21 MMOL/L — LOW (ref 22–31)
CREAT SERPL-MCNC: 0.87 MG/DL — SIGNIFICANT CHANGE UP (ref 0.5–1.3)
EGFR: 95 ML/MIN/1.73M2 — SIGNIFICANT CHANGE UP
GLUCOSE BLDC GLUCOMTR-MCNC: 100 MG/DL — HIGH (ref 70–99)
GLUCOSE SERPL-MCNC: 104 MG/DL — HIGH (ref 70–99)
HCT VFR BLD CALC: 25 % — LOW (ref 39–50)
HGB BLD-MCNC: 7.9 G/DL — LOW (ref 13–17)
MAGNESIUM SERPL-MCNC: 2.2 MG/DL — SIGNIFICANT CHANGE UP (ref 1.6–2.6)
MCHC RBC-ENTMCNC: 27.1 PG — SIGNIFICANT CHANGE UP (ref 27–34)
MCHC RBC-ENTMCNC: 31.6 GM/DL — LOW (ref 32–36)
MCV RBC AUTO: 85.9 FL — SIGNIFICANT CHANGE UP (ref 80–100)
NRBC # BLD: 0 /100 WBCS — SIGNIFICANT CHANGE UP (ref 0–0)
PLATELET # BLD AUTO: 128 K/UL — LOW (ref 150–400)
POTASSIUM SERPL-MCNC: 4 MMOL/L — SIGNIFICANT CHANGE UP (ref 3.5–5.3)
POTASSIUM SERPL-SCNC: 4 MMOL/L — SIGNIFICANT CHANGE UP (ref 3.5–5.3)
PROT SERPL-MCNC: 7.1 G/DL — SIGNIFICANT CHANGE UP (ref 6–8.3)
RBC # BLD: 2.91 M/UL — LOW (ref 4.2–5.8)
RBC # FLD: 15.1 % — HIGH (ref 10.3–14.5)
SODIUM SERPL-SCNC: 136 MMOL/L — SIGNIFICANT CHANGE UP (ref 135–145)
WBC # BLD: 6.69 K/UL — SIGNIFICANT CHANGE UP (ref 3.8–10.5)
WBC # FLD AUTO: 6.69 K/UL — SIGNIFICANT CHANGE UP (ref 3.8–10.5)

## 2022-03-06 PROCEDURE — 74177 CT ABD & PELVIS W/CONTRAST: CPT | Mod: 26

## 2022-03-06 PROCEDURE — 99233 SBSQ HOSP IP/OBS HIGH 50: CPT

## 2022-03-06 PROCEDURE — 93975 VASCULAR STUDY: CPT | Mod: 26

## 2022-03-06 PROCEDURE — 99232 SBSQ HOSP IP/OBS MODERATE 35: CPT

## 2022-03-06 PROCEDURE — 71045 X-RAY EXAM CHEST 1 VIEW: CPT | Mod: 26

## 2022-03-06 RX ORDER — LACTULOSE 10 G/15ML
20 SOLUTION ORAL EVERY 12 HOURS
Refills: 0 | Status: DISCONTINUED | OUTPATIENT
Start: 2022-03-06 | End: 2022-03-06

## 2022-03-06 RX ORDER — LACTULOSE 10 G/15ML
200 SOLUTION ORAL ONCE
Refills: 0 | Status: COMPLETED | OUTPATIENT
Start: 2022-03-06 | End: 2022-03-06

## 2022-03-06 RX ORDER — FERROUS SULFATE 325(65) MG
325 TABLET ORAL
Refills: 0 | Status: DISCONTINUED | OUTPATIENT
Start: 2022-03-06 | End: 2022-03-18

## 2022-03-06 RX ORDER — LACTULOSE 10 G/15ML
20 SOLUTION ORAL THREE TIMES A DAY
Refills: 0 | Status: DISCONTINUED | OUTPATIENT
Start: 2022-03-06 | End: 2022-03-10

## 2022-03-06 RX ADMIN — Medication 1 TABLET(S): at 13:31

## 2022-03-06 RX ADMIN — Medication 1 PATCH: at 18:21

## 2022-03-06 RX ADMIN — LACTULOSE 20 GRAM(S): 10 SOLUTION ORAL at 21:51

## 2022-03-06 RX ADMIN — LACTULOSE 200 GRAM(S): 10 SOLUTION ORAL at 13:30

## 2022-03-06 RX ADMIN — POLYETHYLENE GLYCOL 3350 17 GRAM(S): 17 POWDER, FOR SOLUTION ORAL at 13:30

## 2022-03-06 RX ADMIN — PANTOPRAZOLE SODIUM 40 MILLIGRAM(S): 20 TABLET, DELAYED RELEASE ORAL at 07:42

## 2022-03-06 RX ADMIN — Medication 1 DROP(S): at 18:06

## 2022-03-06 RX ADMIN — Medication 1 MILLIGRAM(S): at 13:31

## 2022-03-06 RX ADMIN — Medication 1 PATCH: at 13:31

## 2022-03-06 RX ADMIN — LACTULOSE 20 GRAM(S): 10 SOLUTION ORAL at 15:59

## 2022-03-06 RX ADMIN — Medication 1 DROP(S): at 07:46

## 2022-03-06 RX ADMIN — Medication 1 PATCH: at 07:39

## 2022-03-06 RX ADMIN — ATORVASTATIN CALCIUM 80 MILLIGRAM(S): 80 TABLET, FILM COATED ORAL at 21:51

## 2022-03-06 RX ADMIN — HEPARIN SODIUM 5000 UNIT(S): 5000 INJECTION INTRAVENOUS; SUBCUTANEOUS at 07:42

## 2022-03-06 RX ADMIN — HEPARIN SODIUM 5000 UNIT(S): 5000 INJECTION INTRAVENOUS; SUBCUTANEOUS at 21:51

## 2022-03-06 RX ADMIN — HEPARIN SODIUM 5000 UNIT(S): 5000 INJECTION INTRAVENOUS; SUBCUTANEOUS at 13:30

## 2022-03-06 RX ADMIN — Medication 1 PATCH: at 13:23

## 2022-03-06 RX ADMIN — Medication 1 SPRAY(S): at 18:08

## 2022-03-06 NOTE — PROGRESS NOTE ADULT - ATTENDING COMMENTS
A little atypical for HE only.   Would agree with d/c librium.   Can trial lactulose x 24hrs, if no benefit, then d/c

## 2022-03-06 NOTE — PROGRESS NOTE ADULT - SUBJECTIVE AND OBJECTIVE BOX
Patient discussed on morning rounds with Dr. Wilcox    Bioprosthetic aortic stenosis    SUBJECTIVE ASSESSMENT:  66y Male seen at bedside. Not offering any acute complaints at this time. Slightly lethargic this AM but arousable. NGT was placed without issue. Patient understands why he needs the NGT. Not complaining of abdominal pain, nausea or vomiting. Denies chest pain or SOB, palpitations.     Vital Signs Last 24 Hrs  T(C): 36.3 (06 Mar 2022 05:36), Max: 36.9 (05 Mar 2022 17:58)  T(F): 97.3 (06 Mar 2022 05:36), Max: 98.4 (05 Mar 2022 17:58)  HR: 87 (06 Mar 2022 08:35) (87 - 93)  BP: 94/57 (06 Mar 2022 08:35) (91/55 - 109/60)  BP(mean): 71 (06 Mar 2022 08:35) (67 - 78)  RR: 18 (06 Mar 2022 08:35) (16 - 18)  SpO2: 95% (06 Mar 2022 08:35) (95% - 99%)  I&O's Detail    05 Mar 2022 07:01  -  06 Mar 2022 07:00  --------------------------------------------------------  IN:    Oral Fluid: 568 mL  Total IN: 568 mL    OUT:    Incontinent per Condom Catheter (mL): 700 mL    Voided (mL): 300 mL  Total OUT: 1000 mL    Total NET: -432 mL    PHYSICAL EXAM:  General: Patient lying comfortably in bed, no acute distress   Neurological: Alert and oriented. No focal neurological deficits   Cardiovascular: S1S2, RRR, +murmur heard at RUSB  Respiratory: Clear to ausculation bilaterally, no wheezes, rales or rhonchi  Gastrointestinal: Abdomen soft, non tender, non distended, +bowel sounds  Extremities: Warm and well perfused. No peripheral edema or calf tenderness bilaterally  Vascular: Peripheral pulses palpable bilaterally  Incisions: old healed MSI, no signs of infection.     LABS:                        7.9    6.69  )-----------( 128      ( 06 Mar 2022 08:28 )             25.0       COUMADIN:  No.      03-06    136  |  107  |  16  ----------------------------<  104<H>  4.0   |  21<L>  |  0.87    Ca    8.5      06 Mar 2022 08:28  Mg     2.2     03-06    TPro  7.1  /  Alb  2.8<L>  /  TBili  0.6  /  DBili  x   /  AST  63<H>  /  ALT  36  /  AlkPhos  91  03-06    MEDICATIONS  (STANDING):  artificial tears (preservative free) Ophthalmic Solution 1 Drop(s) Both EYES two times a day  aspirin enteric coated 81 milliGRAM(s) Oral daily  atorvastatin 80 milliGRAM(s) Oral at bedtime  folic acid 1 milliGRAM(s) Oral daily  heparin   Injectable 5000 Unit(s) SubCutaneous every 8 hours  lactulose Syrup 20 Gram(s) Oral every 12 hours  multivitamin 1 Tablet(s) Oral daily  nicotine - 21 mG/24Hr(s) Patch 1 patch Transdermal daily  pantoprazole    Tablet 40 milliGRAM(s) Oral daily  polyethylene glycol 3350 17 Gram(s) Oral daily  rifAXIMin 550 milliGRAM(s) Oral two times a day  senna 2 Tablet(s) Oral at bedtime  sodium chloride 0.9%. 500 milliLiter(s) (50 mL/Hr) IV Continuous <Continuous>    MEDICATIONS  (PRN):  sodium chloride 0.65% Nasal 1 Spray(s) Both Nostrils four times a day PRN Nasal Congestion        RADIOLOGY & ADDITIONAL TESTS:  < from: Xray Chest 1 View-PORTABLE IMMEDIATE (Xray Chest 1 View-PORTABLE IMMEDIATE .) (03.06.22 @ 11:30) >    ACC: [id number] EXAM:  XR CHEST PORTABLE IMMED 1V                          PROCEDURE DATE:  03/06/2022          INTERPRETATION:  Clinical History: NG tube    Frontal examination of the chest demonstrates nasoenteric tube overlying   course of esophagus and left upper abdomen. Status post sternotomy and   valvular replacement. No acute infiltrates. Mild degenerative changes   thoracic spine.    IMPRESSION: No acute infiltrates. Nasoenteric tube in satisfactory   position    --- End of Report ---    < end of copied text >  < from: JOSELO Echo Doppler w/ Cont (02.28.22 @ 15:49) >  CONCLUSIONS:     1. Normal left and right ventricular size and systolic function.   2. Mild symmetric left ventricular hypertrophy.   3. Dilated left atrium.   4. No LA/RA/DINH/RAA thrombus seen.   5. A bioprosthetic valve noted and appears well-seated. The peak   transvalvular velocity is 4.70 m/sec. The peak transaortic gradient is   88.36 mmHg. The mean transaortic gradientis 56.00 mmHg. There is no   evidence of aortic regurgitation.   6. The mitral valve is moderately thickened. Mitral annular   calcification noted. There is mild mitral regurgitation.   7. There is moderate non-mobile plaque seen in the visualized portion of   the descending aorta. There is moderate non-mobile plaque seen in the   visualized portion of the aortic arch.   8. No pericardial effusion is seen.      < end of copied text >    < from: CT Maxillofacial No Cont (02.28.22 @ 16:41) >  IMPRESSION: Periodontal disease with dental caries as detailed above.   Inflammatory sinus disease.    --- End of Report ---    < end of copied text >

## 2022-03-06 NOTE — PROGRESS NOTE ADULT - SUBJECTIVE AND OBJECTIVE BOX
GASTROENTEROLOGY PROGRESS NOTE  Patient seen and examined at bedside.  GI re-consulted for AMS, pt reportedly more drowsy this am.   Noted to have elevated ammonia 123 as well as a nose bleed  Continues on librium  Pt AO to person, place, president, understands why he is hospitalized    PERTINENT REVIEW OF SYSTEMS:  CONSTITUTIONAL: No weakness, fevers or chills  HEENT: No visual changes; No vertigo or throat pain   GASTROINTESTINAL: As above.  NEUROLOGICAL: No numbness or weakness  SKIN: No itching, burning, rashes, or lesions     Allergies    No Known Allergies    Intolerances      MEDICATIONS:  MEDICATIONS  (STANDING):  artificial tears (preservative free) Ophthalmic Solution 1 Drop(s) Both EYES two times a day  aspirin enteric coated 81 milliGRAM(s) Oral daily  atorvastatin 80 milliGRAM(s) Oral at bedtime  folic acid 1 milliGRAM(s) Oral daily  heparin   Injectable 5000 Unit(s) SubCutaneous every 8 hours  lactulose Retention Enema 200 Gram(s) Rectal once  multivitamin 1 Tablet(s) Oral daily  nicotine - 21 mG/24Hr(s) Patch 1 patch Transdermal daily  pantoprazole    Tablet 40 milliGRAM(s) Oral daily  polyethylene glycol 3350 17 Gram(s) Oral daily  senna 2 Tablet(s) Oral at bedtime  sodium chloride 0.9%. 500 milliLiter(s) (50 mL/Hr) IV Continuous <Continuous>    MEDICATIONS  (PRN):  sodium chloride 0.65% Nasal 1 Spray(s) Both Nostrils four times a day PRN Nasal Congestion    Vital Signs Last 24 Hrs  T(C): 36.3 (06 Mar 2022 05:36), Max: 36.9 (05 Mar 2022 17:58)  T(F): 97.3 (06 Mar 2022 05:36), Max: 98.4 (05 Mar 2022 17:58)  HR: 90 (06 Mar 2022 12:20) (87 - 93)  BP: 121/72 (06 Mar 2022 12:20) (91/55 - 121/72)  BP(mean): 86 (06 Mar 2022 12:20) (67 - 86)  RR: 18 (06 Mar 2022 08:35) (16 - 18)  SpO2: 98% (06 Mar 2022 12:20) (95% - 99%)    03-05 @ 07:01  -  03-06 @ 07:00  --------------------------------------------------------  IN: 568 mL / OUT: 1000 mL / NET: -432 mL      PHYSICAL EXAM:    General: in no acute distress  HEENT: MMM, conjunctiva and sclera clear, NGT in place in L nostril, dried blood in R nostril  Gastrointestinal: Soft non-tender non-distended; No rebound or guarding  Skin: Warm and dry. No obvious rash  Neuro: Neg asterixis, AO x person, place, president- does not know year    LABS:                        7.9    6.69  )-----------( 128      ( 06 Mar 2022 08:28 )             25.0     03-06    136  |  107  |  16  ----------------------------<  104<H>  4.0   |  21<L>  |  0.87    Ca    8.5      06 Mar 2022 08:28  Mg     2.2     03-06    TPro  7.1  /  Alb  2.8<L>  /  TBili  0.6  /  DBili  x   /  AST  63<H>  /  ALT  36  /  AlkPhos  91  03-06                      RADIOLOGY & ADDITIONAL STUDIES:  Reviewed

## 2022-03-06 NOTE — CONSULT NOTE ADULT - SUBJECTIVE AND OBJECTIVE BOX
INCOMPLETE NOTE    MEDICINE CONSULT NOTE    65 y/o M, current smoker (1ppd x 50 years), current every day ETOH abuse (1 pint vodka and several beers daily x 50 years), with PMHx of HLD, HTN (not on any medications),  CABG (LIMA to LAD, SVG to PDA in 2017), AS (s/p AVR in 2017) presented to [facility] ED on 2/24/22 with chest pain with associated diaphoresis, and episode of LOC. He was admitted to to cardiology for NSTEMI, and underwent LHC, which showed no occlusive disease. 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 He was initially planned for TAVR, then aortic root surgery, but in interim he was found to liver cirrhosis and 2.4cm x 2.4cm liver lesion with concern for HCC, therefore cardiac surgery was cancelled pending further oncology work-up. He was seen by medicine consult on 3/3 with request for transfer to medicine - at that time it was deemed that further work-up should happen outpatient.    Since then patient reportedly with worsening mental status. Patient for last days is 2 person assist and appeared drowsy, however, reportedly AAOx3. There was a concern for hepatic encephalopathy given increase in ammonia level (123). NG tube was placed, and he was started on Lactulose and Rifaximine. At that point medicine is reconsulted for possible transfer.      VS: HR 90, /82, O2 sat 98% on RA, RR 18    Labs: Hb 7.9 (stable) with MCV of 86. . INR 1.27. Sodium 136, K 4.0. Crea/BUN 0.87/16. Ammonia level 123.    Imaging:     Xray chest - no acute pathologies.     U/S abdomen 3/6 Cirrhosis, 2.9 cm heterogenous lesion left lobe c/f hepatoma    CT A/P 2/28 A 2.4 cm hypervascular liver mass is suspicious for hepatocellular carcinoma. Recommend correlation with alpha-fetoprotein levels and MRI of liver. Cirrhosis. GGO in both lungs - "probably edema".    JOSELO:  Normal left and right ventricular size and systolic function.  A bioprosthetic valve noted and appears well-seated. The peak transvalvular velocity is 4.70 m/sec. The peak transaortic gradient is 88.36 mmHg. The mean transaortic gradient is 56.00 mmHg. There is no evidence of aortic regurgitation.               INCOMPLETE NOTE    MEDICINE CONSULT NOTE    65 y/o M, current smoker (1ppd x 50 years), current every day ETOH abuse (1 pint vodka and several beers daily x 50 years), with PMHx of HLD, HTN (not on any medications),  CABG (LIMA to LAD, SVG to PDA in 2017), AS (s/p AVR in 2017) presented to [facility] ED on 2/24/22 with chest pain with associated diaphoresis, and episode of LOC. He was admitted to to cardiology for NSTEMI, and underwent LHC, which showed no occlusive disease. 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 He was initially planned for TAVR, then aortic root surgery, but in interim he was found to liver cirrhosis and 2.4cm x 2.4cm liver lesion with concern for HCC, therefore cardiac surgery was cancelled pending further oncology work-up. He was seen by medicine consult on 3/3 with request for transfer to medicine - at that time it was deemed that further work-up should happen outpatient.  He was supposed to be dced with follow-up in [facility], but in interim he was seen by PT on 3/3 and BRENDA was recommended     Since then patient reportedly with worsening mental status - for last days "he is 2 person assist, appears drowsy, and has asterixis", however still AAOx3. There was a concern for hepatic encephalopathy given increase in ammonia level (123). NG tube was placed, and he was started on Lactulose and Rifaximine. At that point medicine is reconsulted for possible transfer.    Upon my assessment, patient is alert and oriented x 2-3 (February 23rd 2022, [facility], himself). He does not have any complaints. When asked, he states that "this is a good moment to stop drinking". No asterixis is noted. NG tube is already out as he had nasal bleed. He does not have any difficulties swallowing - when I entered the room he was eating chocolate bar and drinking water.       VS: HR 90, /82, O2 sat 98% on RA, RR 18    Labs: Hb 7.9 (stable) with MCV of 86. . INR 1.27. Sodium 136, K 4.0. Crea/BUN 0.87/16. Ammonia level 123.    Imaging:     Xray chest - no acute pathologies.     U/S abdomen 3/6 Cirrhosis, 2.9 cm heterogenous lesion left lobe c/f hepatoma    CT A/P 2/28 A 2.4 cm hypervascular liver mass is suspicious for hepatocellular carcinoma. Recommend correlation with alpha-fetoprotein levels and MRI of liver. Cirrhosis. GGO in both lungs - "probably edema".    JOSELO:  Normal left and right ventricular size and systolic function.  A bioprosthetic valve noted and appears well-seated. The peak transvalvular velocity is 4.70 m/sec. The peak transaortic gradient is 88.36 mmHg. The mean transaortic gradient is 56.00 mmHg. There is no evidence of aortic regurgitation.       Physical Exam:   Gen: Middle aged male in NAD, sitting up in bed and easting chocolate bar  Neuro: AAOx2-3 (February 23rd 2022, [facility], himself), grossly no deficits, NO asterixis noted  Abd: somewhat distended but soft, nontender, obese, BS+ in 4Q  Skin: scattered teleangiectasias, mild palmar erythema. Midline sternal surgical scar  Heart: RRR, louds systolic murmur mostly heard at R II ICS  Lungs: CTAB, no signs of increased WOB           MEDICINE CONSULT NOTE    65 y/o M, current smoker (1ppd x 50 years), current every day ETOH abuse (1 pint vodka and several beers daily x 50 years), with PMHx of HLD, HTN (not on any medications),  CABG (LIMA to LAD, SVG to PDA in 2017), AS (s/p AVR in 2017) presented to [facility] ED on 2/24/22 with chest pain with associated diaphoresis, and episode of LOC. He was admitted to to cardiology for NSTEMI, and underwent LHC, which showed no occlusive disease. 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 He was initially planned for TAVR, then aortic root surgery, but in interim he was found to liver cirrhosis and 2.4cm x 2.4cm liver lesion with concern for HCC, therefore cardiac surgery was cancelled pending further oncology work-up. He was seen by medicine consult on 3/3 with request for transfer to medicine - at that time it was deemed that further work-up should happen outpatient.  He was supposed to be dced with follow-up in [facility], but in interim he was seen by PT on 3/3 and BRENDA was recommended     Since then patient reportedly with worsening mental status - for last days "he is 2 person assist, appears drowsy, and has asterixis", however still AAOx3. There was a concern for hepatic encephalopathy given increase in ammonia level (123). NG tube was placed, and he was started on Lactulose and Rifaximine. At that point medicine is reconsulted for possible transfer.    Upon my assessment, patient is alert and oriented x 2-3 (February 23rd 2022, [facility], himself). He does not have any complaints. When asked, he states that "this is a good moment to stop drinking". No asterixis is noted. NG tube is already out as he had nasal bleed. He does not have any difficulties swallowing - when I entered the room he was eating chocolate bar and drinking water.       VS: HR 90, /82, O2 sat 98% on RA, RR 18    Labs: Hb 7.9 (stable) with MCV of 86. . INR 1.27. Sodium 136, K 4.0. Crea/BUN 0.87/16. Ammonia level 123.    Imaging:     Xray chest - no acute pathologies.     U/S abdomen 3/6 Cirrhosis, 2.9 cm heterogenous lesion left lobe c/f hepatoma    CT A/P 2/28 A 2.4 cm hypervascular liver mass is suspicious for hepatocellular carcinoma. Recommend correlation with alpha-fetoprotein levels and MRI of liver. Cirrhosis. GGO in both lungs - "probably edema".    JOSELO:  Normal left and right ventricular size and systolic function.  A bioprosthetic valve noted and appears well-seated. The peak transvalvular velocity is 4.70 m/sec. The peak transaortic gradient is 88.36 mmHg. The mean transaortic gradient is 56.00 mmHg. There is no evidence of aortic regurgitation.       Physical Exam:   Gen: Middle aged male in NAD, sitting up in bed and easting chocolate bar  Neuro: AAOx2-3 (February 23rd 2022, [facility], himself), grossly no deficits, NO asterixis noted  Abd: somewhat distended but soft, nontender, obese, BS+ in 4Q  Skin: scattered teleangiectasias, mild palmar erythema. Midline sternal surgical scar  Heart: RRR, louds systolic murmur mostly heard at R II ICS  Lungs: CTAB, no signs of increased WOB           MEDICINE CONSULT NOTE    67 y/o M, current smoker (1ppd x 50 years), current every day ETOH abuse (1 pint vodka and several beers daily x 50 years), with PMHx of HLD, HTN (not on any medications),  CABG (LIMA to LAD, SVG to PDA in 2017), AS (s/p AVR in 2017) presented to [facility] ED on 2/24/22 with chest pain with associated diaphoresis, and episode of LOC. He was admitted to to cardiology for NSTEMI, and underwent LHC, which showed no occlusive disease. 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 He was initially planned for TAVR, then aortic root surgery, but in interim he was found to liver cirrhosis and 2.4cm x 2.4cm liver lesion with concern for HCC. Initially he was planned for TAVR, but subsequently decision was made that because of complicated aortic root anatomy he would need open heart surgery. It was decided that he is not a surgical candidate given poor functional status, liver cirrhosis, prior noncompliance with medications.     He was seen by medicine consult on 3/3 with request for transfer to medicine - at that time it was deemed that further work-up should happen outpatient.  He was supposed to be dced with follow-up in [facility] hospital, but in interim he was seen by PT on 3/3 and BRENDA was recommended, but he did not have insurance.    Since 3/3 patient reportedly with worsening mental status - for last days "he is 2 person assist, appears drowsy, and has asterixis", however still AAOx3. There was a concern for hepatic encephalopathy given increase in ammonia level (123). NG tube was placed, and he was started on Lactulose and Rifaximine. At that point medicine is reconsulted for possible transfer.    Upon my assessment, patient is alert and oriented x 2-3 (February 23rd 2022, [facility], himself). He does not have any complaints. When asked, he states that "this is a good moment to stop drinking". No asterixis is noted. NG tube is already out as he had nasal bleed. He does not have any difficulties swallowing - when I entered the room he was eating chocolate bar and drinking water.       VS: HR 90, /82, O2 sat 98% on RA, RR 18    Labs: Hb 7.9 (stable) with MCV of 86. . INR 1.27. Sodium 136, K 4.0. Crea/BUN 0.87/16. Ammonia level 123.    Imaging:     Xray chest - no acute pathologies.     U/S abdomen 3/6 Cirrhosis, 2.9 cm heterogenous lesion left lobe c/f hepatoma    CT A/P 2/28 A 2.4 cm hypervascular liver mass is suspicious for hepatocellular carcinoma. Recommend correlation with alpha-fetoprotein levels and MRI of liver. Cirrhosis. GGO in both lungs - "probably edema".    JOSELO:  Normal left and right ventricular size and systolic function.  A bioprosthetic valve noted and appears well-seated. The peak transvalvular velocity is 4.70 m/sec. The peak transaortic gradient is 88.36 mmHg. The mean transaortic gradient is 56.00 mmHg. There is no evidence of aortic regurgitation.       Physical Exam:   Gen: Middle aged male in NAD, sitting up in bed and easting chocolate bar  Neuro: AAOx2-3 (February 23rd 2022, [facility], himself), grossly no deficits, NO asterixis noted  Abd: somewhat distended but soft, nontender, obese, BS+ in 4Q  Skin: scattered teleangiectasias, mild palmar erythema. Midline sternal surgical scar  Heart: RRR, louds systolic murmur mostly heard at R II ICS  Lungs: CTAB, no signs of increased WOB           MEDICINE CONSULT NOTE    65 y/o M, current smoker (1ppd x 50 years), current every day ETOH abuse (1 pint vodka and several beers daily x 50 years), with PMHx of AS (s/p AVR in 2017) with recently discovered restenosis, CAD (s/p CABG LIMA to LAD, SVG to PDA in 2017), HLD, HTN (not on any medications) presented to [facility] ED on 2/24/22 with chest pain with associated diaphoresis, and episode of LOC. He was admitted to to cardiology for NSTEMI, and underwent LHC, which showed no occlusive disease. 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 He was initially planned for TAVR, then aortic root surgery, but in interim he was found to liver cirrhosis and 2.4cm x 2.4cm liver lesion with concern for HCC. Initially he was planned for TAVR, but subsequently decision was made that because of complicated aortic root anatomy he would need open heart surgery. It was decided that he is not a surgical candidate given poor functional status, liver cirrhosis, prior noncompliance with medications.     He was seen by medicine consult on 3/3 with request for transfer to medicine - at that time it was deemed that further work-up should happen outpatient.  He was supposed to be dced with follow-up in [facility] hospital, but in interim he was seen by PT on 3/3 and BRENDA was recommended, but he did not have insurance.    Since 3/3 patient reportedly with worsening mental status - for last days "he is 2 person assist, appears drowsy, and has asterixis", however still AAOx3. There was a concern for hepatic encephalopathy given increase in ammonia level (123). NG tube was placed, and he was started on Lactulose and Rifaximine. At that point medicine is reconsulted for possible transfer.    Upon my assessment, patient is alert and oriented x 2-3 (February 23rd 2022, [facility], himself). He does not have any complaints. When asked, he states that "this is a good moment to stop drinking". No asterixis is noted. NG tube is already out as he had nasal bleed. He does not have any difficulties swallowing - when I entered the room he was eating chocolate bar and drinking water.       VS: HR 90, /82, O2 sat 98% on RA, RR 18    Labs: Hb 7.9 (stable) with MCV of 86. . INR 1.27. Sodium 136, K 4.0. Crea/BUN 0.87/16. Ammonia level 123.    Imaging:     Xray chest - no acute pathologies.     U/S abdomen 3/6 Cirrhosis, 2.9 cm heterogenous lesion left lobe c/f hepatoma    CT A/P 2/28 A 2.4 cm hypervascular liver mass is suspicious for hepatocellular carcinoma. Recommend correlation with alpha-fetoprotein levels and MRI of liver. Cirrhosis. GGO in both lungs - "probably edema".    JOSELO:  Normal left and right ventricular size and systolic function.  A bioprosthetic valve noted and appears well-seated. The peak transvalvular velocity is 4.70 m/sec. The peak transaortic gradient is 88.36 mmHg. The mean transaortic gradient is 56.00 mmHg. There is no evidence of aortic regurgitation.       Physical Exam:   Gen: Middle aged male in NAD, sitting up in bed and easting chocolate bar  Neuro: AAOx2-3 (February 23rd 2022, [facility], himself), grossly no deficits, NO asterixis noted  Abd: somewhat distended but soft, nontender, obese, BS+ in 4Q  Skin: scattered teleangiectasias, mild palmar erythema. Midline sternal surgical scar  Heart: RRR, louds systolic murmur mostly heard at R II ICS  Lungs: CTAB, no signs of increased WOB           MEDICINE CONSULT AND TRANSFER NOTE    67 y/o M, current smoker (1ppd x 50 years), current every day ETOH abuse (1 pint vodka and several beers daily x 50 years), with PMHx of AS (s/p AVR in 2017) with recently discovered restenosis, CAD (s/p CABG LIMA to LAD, SVG to PDA in 2017), HLD, HTN (not on any medications) presented to [facility] ED on 2/24/22 with chest pain with associated diaphoresis, and episode of LOC. He was admitted to to cardiology for NSTEMI, and underwent LHC, which showed no occlusive disease. 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 He was initially planned for TAVR, then aortic root surgery, but in interim he was found to liver cirrhosis and 2.4cm x 2.4cm liver lesion with concern for HCC. Initially he was planned for TAVR, but subsequently decision was made that because of complicated aortic root anatomy he would need open heart surgery. It was decided that he is not a surgical candidate given poor functional status, liver cirrhosis, prior noncompliance with medications.     He was seen by medicine consult on 3/3 with request for transfer to medicine - at that time it was deemed that further work-up should happen outpatient.  He was supposed to be dced with follow-up in [facility] hospital, but in interim he was seen by PT on 3/3 and BRENDA was recommended, but he did not have insurance.    Since 3/3 patient reportedly with worsening mental status - for last days "he is 2 person assist, appears drowsy, and has asterixis", however still AAOx3. There was a concern for hepatic encephalopathy given increase in ammonia level (123). NG tube was placed, and he was started on Lactulose and Rifaximine. At that point medicine is reconsulted for possible transfer.    Upon my assessment, patient is alert and oriented x 2-3 (February 23rd 2022, [facility], himself). He does not have any complaints. When asked, he states that "this is a good moment to stop drinking". No asterixis is noted. NG tube is already out as he had nasal bleed. He does not have any difficulties swallowing - when I entered the room he was eating chocolate bar and drinking water.       VS: HR 90, /82, O2 sat 98% on RA, RR 18    Labs: Hb 7.9 (stable) with MCV of 86. . INR 1.27. Sodium 136, K 4.0. Crea/BUN 0.87/16. Ammonia level 123.    Imaging:     Xray chest - no acute pathologies.     U/S abdomen 3/6 Cirrhosis, 2.9 cm heterogenous lesion left lobe c/f hepatoma    CT A/P 2/28 A 2.4 cm hypervascular liver mass is suspicious for hepatocellular carcinoma. Recommend correlation with alpha-fetoprotein levels and MRI of liver. Cirrhosis. GGO in both lungs - "probably edema".    JOSELO:  Normal left and right ventricular size and systolic function.  A bioprosthetic valve noted and appears well-seated. The peak transvalvular velocity is 4.70 m/sec. The peak transaortic gradient is 88.36 mmHg. The mean transaortic gradient is 56.00 mmHg. There is no evidence of aortic regurgitation.     Physical Exam:   Gen: Middle aged male in NAD, sitting up in bed and easting chocolate bar  Neuro: AAOx2-3 (February 23rd 2022, [facility], himself), grossly no deficits, NO asterixis noted  Abd: somewhat distended but soft, nontender, obese, BS+ in 4Q  Skin: scattered teleangiectasias, mild palmar erythema. Midline sternal surgical scar  Heart: RRR, louds systolic murmur mostly heard at R II ICS  Lungs: CTAB, no signs of increased WOB

## 2022-03-06 NOTE — PROGRESS NOTE ADULT - ASSESSMENT
66 year old male, current smoker (50 pack year history), current every day ETOH abuse, with hx of withdrawal, and PMHx of HTN, HLD, CAD and Aortic stenosis s/p CABG/AVR with Dr. Wilcox 2017, presented to [facility] ED on 2/24/22 complaining of chest pain. He subsequently underwent cardiac cath revealing patent bypass grafts (LIMA/SVG) and aortic stenosis. TTE confirmed severe prosthetic valve stenosis and Structural heart was consulted. Patient was transferred to CT surgery for evaluation. Upon workup, patient found to need surgical AVR +/- aortic root repair, however preoperative workup revealed liver lesion with concern for HCC c/w cirrhosis and alcohol abuse. GI consulted for further evaluation. Recommending ammonia level and US abdomen with duplex to assess portal venous flow, and MR abdomen vs triple phase CT. The ammonia level was elevated, concern for hepatic encephalopathy, an NGT was placed and lactulose and rifaximin was initiated. Medicine team is following, plan for transfer to medicine service as patient has been deemed no longer a surgical candidate, awaiting recs.     Neurovascular:   - Hx daily ETOH abuse  - Continue thiamine, folic acid, multivitamin   - lethargic but arousable, A&Ox2    HEENT:  - Hx mechanical fall, multiple chipped teeth  - Dental (Dr. Rhoades) consulted,  CT maxillofacial performed  - periodontal disease with dental caries, inflammatory sinus dz    Cardiovascular:   - S/p CABG/AVR in 2017 with Dr. Wilcox, returns to [facility] with severe prosthetic aortic valve stenosis  - originally planned for MAURI TAVR versus SAVR/aortic root, however deemed no longer a surgical candidate  - Hx of HTN, BP controlled   - Continue ASA, atorvastatin 80 mg daily   - Consider BB if BP allows    - Hx CAD s/p CABG: patent grafts on recent cath     Respiratory:   - 02 Sat = 99% on RA.  -Encourage C+DB and Use of IS 10x / hr while awake.  -CXR stable    GI: During preop workup, patient found to have 2.4cmx2.4cm liver lesion concern for HCC and liver nodules c/w cirrhosis.  - GI following - rec triple phase CT or MRI abd w/w/o contrast     - can be done as OP.     - abdominal US with duplex to assess portal venous flow - ordered     - ammonia level 123         - discussed with GI - and an NGT was placed this AM.               - Lactulose started via NGT - plan to continue current regimen until patient has 3 BMs. Also started Rifaximin.      - patient NPO for now, will allow for lactulose to start working - otherwise seen at bedside and swallowing without difficulty. RN aware for strict aspiration precautions.   - consider palliative care consult  -Continue GI PPX with protonix    Renal / :  - BUN/Cr stable at 16/0.87  -Continue to monitor renal function.  -Monitor I/O's.  - Replete electrolytes PRN    Endocrine:    -A1c 5.1, no known hx DM  -TSH 0.984, no known hx thyroid disease     Hematologic:  -H/H stable at 7.9/25 with no obvious signs of bleeding    ID:  -Pt remains afebrile with no elevation in WBC or signs of infection 6.69  -Continue to monitor CBC  -Observe for SIRS/Sepsis Syndrome.    Prophylaxis:  -DVT prophylaxis with 5000 SubQ Heparin q8h.  -SCD's    Disposition:  - transfer to medicine service, patient is no longer a CT surgical candidate and needs to have continued evaluation/workup for hepatic lesion and hepatic encephalopathy  - PT and OT consulted.

## 2022-03-06 NOTE — CONSULT NOTE ADULT - ASSESSMENT
65 YO M, current smoker (1ppd x 50 years), current every day ETOH abuse (1 pint vodka and several beers daily x 50 years), with PMHx of HLD, HTN (not on any medications), CABG (LIMA to LAD, SVG to PDA in 2017), AS (s/p AVR in 2017) with recently discovered restenosis, presented to [facility] ED on 2/24/22 with chest pain and episode of LOC, admitted to cardiology for NSTEMI with [facility] with nonobstructive disease, then found to have restenosis of bioprosthetic valve for which aortic root surgery was planned, but now deferred given liver lesion c/f HCC. Today medicine is consulted for transfer given concern for hepatic encephalopathy.    # Concern for hepatic encephalopathy      #Liver Cirrhosis 2/2 long standing EtOH abuse - possibly mildly decompensated given above ?HE. No ascites. No known EV bleeding.  - MELD-Na  - no known Varices, but no prior EGD evaluation, he will need it outpatient    # Liver lesion i/s/o Liver cirrhosis and EtOH use disorder   - CTAP with 2.4 hypervascular liver mass  - AFP is 7.2 which is normal, however, it still does not exclude HCC given his history  - seen by GI, recommended triple phase CT or MRI Abd w/w/o contrast, which did not happen yet with plan to do it outpatient            # EtOH Use Disorder    # Severe AS    # CAD     # PPX measures       65 YO M, current smoker (1ppd x 50 years), current every day ETOH abuse (1 pint vodka and several beers daily x 50 years), with PMHx of HLD, HTN (not on any medications), CABG (LIMA to LAD, SVG to PDA in 2017), AS (s/p AVR in 2017) with recently discovered restenosis, presented to [facility] ED on 2/24/22 with chest pain and episode of LOC, admitted to cardiology for NSTEMI with [facility] with nonobstructive disease, then found to have restenosis of bioprosthetic valve for which aortic root surgery was planned, but now deferred given liver lesion c/f HCC. Today medicine is consulted for transfer given concern for hepatic encephalopathy.      # Concern for hepatic encephalopathy  - noted by primary team to be somewhat more lethargic. Writer does not patient from prior, but drowsiness is not noted by me. He is alert and oriented x 3  - his possibly decreased mental status today might be secondary to mild HE, but he was also getting Librium untill recently, which might have build up i/s/o poor clearence given his liver function, and that might be an alternative diagnosis, which is favored by GI as per my conversation with GI today  - it is reasonable to start Lactulose and Rifaximine and titrate to 3BMs a day. It does NOT need to be given through NG tube, patient is tolerating PO well      #Liver Cirrhosis 2/2 long standing EtOH abuse - possibly mildly decompensated given above ?HE. No ascites. No known EV bleeding.  - MELD-Na  - no known Varices, but no prior EGD evaluation, he will need it outpatient      # Liver lesion i/s/o Liver cirrhosis and EtOH use disorder   - CTAP with 2.4 hypervascular liver mass  - AFP is 7.2 which is normal, however, it still does not exclude HCC given his history  - seen by GI, recommended triple phase CT or MRI Abd w/w/o contrast, which did not happen yet  - in terms of oncology work-up - for now he will need above imaging and that might rule out HCC and he might not need further work-up      # Severe AS i/s/o prior TAVR  - JOSELO with peak transvalvular velocity of 4.7, mean gradient 56  - initially planned for valve in  TAVR, but then it was decided that because of aortic root anatomy, he would need aortic root open heart surgery  - he was deemed not to be a surgical candidate given poor functional status, liver cirrhosis, prior medication noncompliance. Writer discussed it today with Dr Wilcox - his recovery after open heart surgically would be extremely difficult if possible.     # EtOH Use Disorder      # CAD     # PPX measures  DVT ppx: Heparin SQ  Diet: High protein  Code: Full           67 YO M, current smoker (1ppd x 50 years), current every day ETOH abuse (1 pint vodka and several beers daily x 50 years), with PMHx of HLD, HTN (not on any medications), CABG (LIMA to LAD, SVG to PDA in 2017), AS (s/p AVR in 2017) with recently discovered restenosis, presented to [facility] ED on 2/24/22 with chest pain and episode of LOC, admitted to cardiology for NSTEMI with [facility] with nonobstructive disease, then found to have restenosis of bioprosthetic valve for which aortic root surgery was planned, but now deferred given liver lesion c/f HCC. Today medicine is consulted for transfer given concern for hepatic encephalopathy.    # Concern for hepatic encephalopathy  - noted by primary team to be somewhat more lethargic. Writer does not patient from prior, but drowsiness is not noted by me. He is alert and oriented x 3  - his possibly decreased mental status today might be secondary to mild HE, but he was also getting Librium untill recently, which might have build up i/s/o poor clearence given his liver function, and that might be an alternative diagnosis, which is favored by GI as per my conversation with GI today  - it is reasonable to start Lactulose and Rifaximine and titrate to 3BMs a day. It does NOT need to be given through NG tube, patient is tolerating PO well    #Liver Cirrhosis 2/2 long standing EtOH abuse - possibly mildly decompensated given above ?HE. No ascites. No known EV bleeding.  - Meld- NA 13 (based on 2/26/22 BW, no AM coags today)  - no known Varices, but no prior EGD evaluation, he will need it outpatient    # Liver lesion i/s/o Liver cirrhosis and EtOH use disorder   - CTAP with 2.4 hypervascular liver mass  - AFP is 7.2 which is normal, however, it still does not exclude HCC given his history  - seen by GI, recommended triple phase CT or MRI Abd w/w/o contrast, which did not happen yet  - in terms of oncology work-up - for now he will need above imaging and that might rule out HCC and he might not need further work-up  - will get triple phase CT    # Severe AS i/s/o prior TAVR in 2017 with Dr Wilcox  - JOSELO with peak transvalvular velocity of 4.7, mean gradient 56  - initially planned for valve in  TAVR, but then it was decided that because of aortic root anatomy, he would need aortic root open heart surgery  - he was deemed not to be a surgical candidate given poor functional status, liver cirrhosis, prior medication noncompliance. Writer discussed it today with Dr Wilcox - his recovery after open heart surgically would be extremely difficult if possible  - will get palliative care consult     # EtOH Use Disorder. Long standing EtOH consuption. Not showing any signs of withdrawal at the moment. Last drink prior to admission which is almost 2 weeks ago. No need to monitor CIWA anymore.     # CAD. S/p CABG with Dr Wilcox in 2017  - recent [facility] with nonobstructive disease  - c/w Lipitor 80mg QD  - c/w ASA 81mg    #HTN. Off meds. BP well controlled now.    # Anemia, normocytic  - will send B12, folate, iron studies  - likely multifactorial AOCD given cirrhosis, folate deficiency given EtOH abuse      # PPX measures  DVT ppx: Heparin SQ  Diet: DASH  Fluids: None  Code: Full  Dispo: Pending discussion with attending            67 YO M, current smoker (1ppd x 50 years), current every day ETOH abuse (1 pint vodka and several beers daily x 50 years), with PMHx of HLD, HTN (not on any medications), CABG (LIMA to LAD, SVG to PDA in 2017), AS (s/p AVR in 2017) with recently discovered restenosis, presented to [facility] ED on 2/24/22 with chest pain and episode of LOC, admitted to cardiology for NSTEMI with [facility] with nonobstructive disease, then found to have restenosis of bioprosthetic valve for which aortic root surgery was planned, but now deferred given liver lesion c/f HCC. Today medicine is consulted for transfer given concern for hepatic encephalopathy.    # Concern for hepatic encephalopathy  - noted by primary team to be somewhat more lethargic. Writer does not patient from prior, but drowsiness is not noted by me. He is alert and oriented x 3. Of note, it was documented already on 3/4 that patient was "lethargic", so unclear how acute current decrease in mental status is   - his possibly decreased mental status today might be secondary to mild HE, but he was also getting Librium untill recently, which might have build up i/s/o poor clearence given his liver function, and that might be an alternative diagnosis, which is favored by GI as per my conversation with GI today  - it is reasonable to start Lactulose and Rifaximine and titrate to 3BMs a day. It does NOT need to be given through NG tube, patient is tolerating PO well    #Liver Cirrhosis 2/2 long standing EtOH abuse - possibly mildly decompensated given above ?HE. No ascites. No known EV bleeding.  - Meld- NA 13 (based on 2/26/22 BW, no AM coags today)  - no known Varices, but no prior EGD evaluation, he will need it outpatient    # Liver lesion i/s/o Liver cirrhosis and EtOH use disorder   - CTAP with 2.4 hypervascular liver mass  - AFP is 7.2 which is normal, however, it still does not exclude HCC given his history  - seen by GI, recommended triple phase CT or MRI Abd w/w/o contrast, which did not happen yet  - in terms of oncology work-up - for now he will need above imaging and that might rule out HCC and he might not need further work-up  - will get triple phase CT    # Severe AS i/s/o prior TAVR in 2017 with Dr Wilcox  - JOSELO with peak transvalvular velocity of 4.7, mean gradient 56  - initially planned for valve in  TAVR, but then it was decided that because of aortic root anatomy, he would need aortic root open heart surgery  - he was deemed not to be a surgical candidate given poor functional status, liver cirrhosis, prior medication noncompliance. Writer discussed it today with Dr Wilcox - his recovery after open heart surgically would be extremely difficult if possible  - will get palliative care consult     # EtOH Use Disorder. Long standing EtOH consuption. Not showing any signs of withdrawal at the moment. Last drink prior to admission which is almost 2 weeks ago. No need to monitor CIWA anymore.     # CAD. S/p CABG with Dr Wilcox in 2017  - recent [facility] with nonobstructive disease  - c/w Lipitor 80mg QD  - c/w ASA 81mg    #HTN. Off meds. BP well controlled now.    # Anemia, normocytic  - will send B12, folate, iron studies  - likely multifactorial AOCD given cirrhosis, folate deficiency given EtOH abuse      # PPX measures  DVT ppx: Heparin SQ  Diet: DASH  Fluids: None  Code: Full  Dispo: Pending discussion with attending            67 YO M, current smoker (1ppd x 50 years), current every day ETOH abuse (1 pint vodka and several beers daily x 50 years), with PMHx of HLD, HTN (not on any medications), CABG (LIMA to LAD, SVG to PDA in 2017), AS (s/p AVR in 2017) with recently discovered restenosis, presented to [facility] ED on 2/24/22 with chest pain and episode of LOC, admitted to cardiology for NSTEMI with [facility] with nonobstructive disease, then found to have restenosis of bioprosthetic valve for which aortic root surgery was planned, but now deferred given liver lesion c/f HCC. Today medicine is consulted for transfer given concern for hepatic encephalopathy.    # Concern for hepatic encephalopathy  - noted by primary team to be somewhat more lethargic. Writer does not patient from prior, but drowsiness is not noted by me. He is alert and oriented x 3. Of note, it was documented already on 3/4 that patient was "lethargic", so unclear how acute current decrease in mental status is   - his possibly decreased mental status today might be secondary to mild HE, but he was also getting Librium untill recently, which might have build up i/s/o poor clearence given his liver function, and that might be an alternative diagnosis, which is favored by GI as per my conversation with GI today  - it is reasonable to start Lactulose and Rifaximine and titrate to 3BMs a day. It does NOT need to be given through NG tube, patient is tolerating PO well    #Liver Cirrhosis 2/2 long standing EtOH abuse - possibly mildly decompensated given above ?HE. No ascites. No known EV bleeding.  - Meld- NA 13 (based on 2/26/22 BW, no AM coags today)  - no known Varices, but no prior EGD evaluation, he will need it outpatient    # Liver lesion i/s/o Liver cirrhosis and EtOH use disorder   - CTAP with 2.4 hypervascular liver mass  - AFP is 7.2 which is normal, however, it still does not exclude HCC given his history  - seen by GI, recommended triple phase CT or MRI Abd w/w/o contrast, which did not happen yet  - in terms of oncology work-up - for now he will need above imaging and that might rule out HCC and he might not need further work-up  - will get triple phase CT    # Severe symptomatic AS i/s/o prior TAVR in 2017 with Dr Wilcox  - JOSELO with peak transvalvular velocity of 4.7, mean gradient 56  - symptomatic: exertional CP, episode of syncope  - initially planned for valve in valve TAVR, but then it was decided that because of aortic root anatomy, he would need aortic root open heart surgery  - he was deemed not to be a surgical candidate given poor functional status, liver cirrhosis, prior medication noncompliance. Writer discussed it today with Dr Wilcox - his recovery after open heart surgically would be extremely difficult if possible. He would NOT be a surgical candidate even if oncology work-up is negative.  - will get palliative care consult     # EtOH Use Disorder. Long standing EtOH consuption. Not showing any signs of withdrawal at the moment. Last drink prior to admission which is almost 2 weeks ago. No need to monitor CIWA anymore.     # CAD. S/p CABG with Dr Wilcox in 2017  - recent [facility] with nonobstructive disease  - c/w Lipitor 80mg QD  - c/w ASA 81mg    #HTN. Off meds. BP well controlled now.    # Anemia, normocytic  - will send B12, folate, iron studies  - likely multifactorial AOCD given cirrhosis, folate deficiency given EtOH abuse      # PPX measures  DVT ppx: Heparin SQ  Diet: DASH  Fluids: None  Code: Full  Dispo: Pending discussion with attending            67 YO M, current smoker (1ppd x 50 years), current every day ETOH abuse (1 pint vodka and several beers daily x 50 years), with PMHx of HLD, HTN (not on any medications), CABG (LIMA to LAD, SVG to PDA in 2017), AS (s/p AVR in 2017) with recently discovered restenosis, presented to [facility] ED on 2/24/22 with chest pain and episode of LOC, admitted to cardiology for NSTEMI with [facility] with nonobstructive disease, then found to have restenosis of bioprosthetic valve for which aortic root surgery was planned, but now deferred given liver lesion c/f HCC. Today he is transferred to medicine for concern for mild hepatic encephalopathy.      # Concern for hepatic encephalopathy  - noted by primary team to be somewhat more lethargic. Writer does not know patient from prior, but drowsiness is not appreciated by me. He is alert and oriented x 3. Of note, it was documented already on 3/4 that patient was "lethargic", so unclear how acute current decrease in mental status is  - his possibly decreased mental status today might be secondary to mild HE, but he was also getting Librium until recently, which might have build up i/s/o poor clearance given his liver function, and that might be an alternative diagnosis, which is also favored by GI as per my conversation with GI today  - it is reasonable to start Lactulose and Rifaximine and titrate to 3BMs a day. It does NOT need to be given through NG tube, patient is tolerating PO well  - start Lactulose 20g TID  - start Rifaximine 550mg BID    #Liver Cirrhosis 2/2 long standing EtOH abuse - possibly mildly decompensated given above ?HE. No ascites. No known EV bleeding.  - Meld- NA 13 (based on 2/26/22 BW, no AM coags today)  - no known Varices, but no prior EGD evaluation, he will need it outpatient    # Liver lesion i/s/o Liver cirrhosis and EtOH use disorder   - CTAP with 2.4 hypervascular liver mass  - AFP is 7.2 which is normal, however, it still does not exclude HCC given his history  - seen by GI, recommended triple phase CT or MRI Abd w/w/o contrast, which did not happen yet  - in terms of oncology work-up - for now he will need above imaging and that might rule out HCC and he might not need further work-up  - will get triple phase CT    # Severe symptomatic AS i/s/o prior TAVR in 2017 with Dr Wilcox  - JOSELO with peak transvalvular velocity of 4.7, mean gradient 56  - symptomatic: exertional CP, episode of syncope  - initially planned for valve in valve TAVR, but then it was decided that because of aortic root anatomy, he would need aortic root open heart surgery  - he was deemed not to be a surgical candidate given poor functional status, liver cirrhosis, prior medication noncompliance which likely contributed to valve restenosis.  Writer discussed it today with Dr Wilcox - his recovery after open heart surgically would be extremely difficult if possible. He would NOT be a surgical candidate even if oncology work-up is negative.  - will get palliative care consult     # EtOH Use Disorder. Long standing EtOH consuption. Not showing any signs of withdrawal at the moment. Last drink prior to admission which is almost 2 weeks ago. No need to monitor CIWA anymore.   - c/w Multivitamin and Folic Acid    # CAD. S/p CABG with Dr Wilcox in 2017  - recent [facility] with nonobstructive disease  - c/w Lipitor 80mg QD  - c/w ASA 81mg    #HTN. Off meds. BP well controlled now.    # Anemia, normocytic  - will send B12, folate, iron studies  - likely multifactorial AOCD given cirrhosis, folate deficiency given EtOH abuse      # PPX measures  DVT ppx: Heparin SQ  Diet: DASH  Fluids: None  Code: Full  Dispo: Regional Medicine Floor       67 YO M, current smoker (1ppd x 50 years), current every day ETOH abuse (1 pint vodka and several beers daily x 50 years), with PMHx of HLD, HTN (not on any medications), CABG (LIMA to LAD, SVG to PDA in 2017), AS (s/p AVR in 2017) with recently discovered restenosis, presented to [facility] ED on 2/24/22 with chest pain and episode of LOC, admitted to cardiology for NSTEMI with [facility] with nonobstructive disease, then found to have restenosis of bioprosthetic valve for which aortic root surgery was planned, but now deferred given liver lesion c/f HCC. Today he is transferred to medicine for concern for mild hepatic encephalopathy.      # Concern for hepatic encephalopathy  - noted by primary team to be somewhat more lethargic. Writer does not know patient from prior, but drowsiness is not appreciated by me. He is alert and oriented x 3. Of note, it was documented already on 3/4 that patient was "lethargic", so unclear how acute current decrease in mental status is  - his possibly decreased mental status today might be secondary to mild HE, but he was also getting Librium until recently, which might have build up i/s/o poor clearance given his liver function, and that might be a more likely alternative diagnosis, which is also favored by GI as per my conversation with GI today  - it is reasonable to start Lactulose and titrate to 3BMs a day. It does NOT need to be given through NG tube, patient is tolerating PO well. GI also recommending Rifaximin   - start Lactulose 20g TID  - start Rifaximin 550mg BID    #Liver Cirrhosis 2/2 long standing EtOH abuse - possibly mildly decompensated given above ?HE. No ascites. No known EV bleeding.  - Meld- NA 13 (based on 2/26/22 BW, no AM coags today)  - no known Varices, but no prior EGD evaluation, he will need it outpatient    # Liver lesion i/s/o Liver cirrhosis and EtOH use disorder   - CTAP with 2.4 hypervascular liver mass  - AFP is 7.2 which is normal, however, it still does not exclude HCC given his history  - seen by GI, recommended triple phase CT or MRI Abd w/w/o contrast, which did not happen yet  - in terms of oncology work-up - for now he will need above imaging and that might rule out HCC and he might not need further work-up  - will get triple phase CT    # Severe symptomatic AS i/s/o prior TAVR in 2017 with Dr Wilcox  - JOSELO with peak transvalvular velocity of 4.7, mean gradient 56  - symptomatic: exertional CP, episode of syncope  - initially planned for valve in valve TAVR, but then it was decided that because of aortic root anatomy, he would need aortic root open heart surgery  - he was deemed not to be a surgical candidate given poor functional status, liver cirrhosis, prior medication noncompliance which likely contributed to valve restenosis.  Writer discussed it today with Dr Wilcox - his recovery after open heart surgically would be extremely difficult if possible. He would NOT be a surgical candidate even if oncology work-up is negative.  - will get palliative care consult     # EtOH Use Disorder. Long standing EtOH consuption. Not showing any signs of withdrawal at the moment. Last drink prior to admission which is almost 2 weeks ago. No need to monitor CIWA anymore.   - c/w Multivitamin and Folic Acid    # CAD. S/p CABG with Dr Wilcox in 2017  - recent [facility] with nonobstructive disease  - c/w Lipitor 80mg QD  - c/w ASA 81mg    #HTN. Off meds. BP well controlled now.    # Anemia, normocytic  - will send B12, folate, iron studies  - likely multifactorial AOCD given cirrhosis, folate deficiency given EtOH abuse      # PPX measures  DVT ppx: Heparin SQ  Diet: DASH  Fluids: None  Code: Full  Dispo: Regional Medicine Floor. Patient recommended for BRENDA by PT, but does not have insurance, therefore needs to continue with PT inpatient.       65 YO M, current smoker (1ppd x 50 years), current every day ETOH abuse (1 pint vodka and several beers daily x 50 years), with PMHx of HLD, HTN (not on any medications), CABG (LIMA to LAD, SVG to PDA in 2017), AS (s/p AVR in 2017) with recently discovered restenosis, presented to [facility] ED on 2/24/22 with chest pain and episode of LOC, admitted to cardiology for NSTEMI with [facility] with nonobstructive disease, then found to have restenosis of bioprosthetic valve for which aortic root surgery was planned, but now deferred given liver lesion c/f HCC. Today he is transferred to medicine for concern for mild hepatic encephalopathy.      # Concern for hepatic encephalopathy  - noted by primary team to be somewhat more lethargic. Writer does not know patient from prior, but drowsiness is not appreciated by me. He is alert and oriented x 3. Of note, it was documented already on 3/4 that patient was "lethargic", so unclear how acute current decrease in mental status is  - his possibly decreased mental status today might be secondary to mild HE, but he was also getting Librium until recently, which might have build up i/s/o poor clearance given his liver function, and that might be a more likely alternative diagnosis, which is also favored by GI as per my conversation with GI today  - it is reasonable to start Lactulose and titrate to 3BMs a day. It does NOT need to be given through NG tube, patient is tolerating PO well. GI also recommending Rifaximin   - start Lactulose 20g TID  - start Rifaximin 550mg BID    #Liver Cirrhosis 2/2 long standing EtOH abuse - possibly mildly decompensated given above ?HE. No ascites. No known EV bleeding.  - Meld- NA 13 (based on 2/26/22 BW, no AM coags today)  - no known Varices, but no prior EGD evaluation, he will need it outpatient    # Liver lesion i/s/o Liver cirrhosis and EtOH use disorder   - CTAP with 2.4 hypervascular liver mass  - AFP is 7.2 which is normal, however, it still does not exclude HCC given his history  - seen by GI, recommended triple phase CT or MRI Abd w/w/o contrast, which did not happen yet  - in terms of oncology work-up - for now he will need above imaging and that might rule out HCC and he might not need further work-up  - will get triple phase CT    # Severe symptomatic AS i/s/o prior TAVR in 2017 with Dr Wilcox  - JOSELO with peak transvalvular velocity of 4.7, mean gradient 56  - symptomatic: exertional CP, episode of syncope  - initially planned for valve in valve TAVR, but then it was decided that because of aortic root anatomy, he would need aortic root open heart surgery  - he was deemed not to be a surgical candidate given poor functional status, liver cirrhosis, prior medication noncompliance which likely contributed to valve restenosis.  Writer discussed it today with Dr Wilcox - his recovery after open heart surgically would be extremely difficult if possible. He would NOT be a surgical candidate even if oncology work-up is negative.  - will get palliative care consult     # EtOH Use Disorder. Long standing EtOH consuption. Not showing any signs of withdrawal at the moment. Last drink prior to admission which is almost 2 weeks ago. No need to monitor CIWA anymore.   - c/w Multivitamin and Folic Acid    # CAD. S/p CABG with Dr Wilcox in 2017  - recent C with nonobstructive disease  - c/w Lipitor 80mg QD  - c/w ASA 81mg    #HTN. Off meds. BP well controlled now.    # Anemia, normocytic. Normal B12 and folic acid. Iron studies c/w VINCE, but likely multifactorial - also ACOCD given cirrhosis and EtOH use  - would need scope but might happen outpatient  - start ferrous sulfate 325mg every other day      # PPX measures  DVT ppx: Heparin SQ  Diet: DASH  Fluids: None  Code: Full  Dispo: Regional Medicine Floor. Patient recommended for BRENDA by PT, but does not have insurance, therefore needs to continue with PT inpatient.

## 2022-03-06 NOTE — PROGRESS NOTE ADULT - ASSESSMENT
65 y/o M, current smoker (1ppd x 50 years), current every day ETOH abuse (1 pint vodka and several beers daily x 50 years), with PMHx of HLD, HTN (not on any medications),  CABG (LIMA to LAD, SVG to PDA in 2017), AS (s/p AVR in 2017), who presented to [facility] ED on 2/24/22 with complaints of 9/10 chest pain with associated diaphoresis with exertion, admitted for NSTEMI, transferred to CTsx service for severe AS, requiring a TAVR procedure; upon workup for TAVR, patient incidentally noted to have a 2.4 cm hypervascular lesion for which GI was consulted.     #Hepatic Lesion  Patient presented to [facility] ED on 2/24/22 with complaints of 9/10 chest pain with associated diaphoresis with exertion, admitted for NSTEMI, transferred to CTsx service for severe AS, requiring a TAVR procedure. During workup for TAVR, CTA A/P (2/28) revealing a nodular liver contour, c/w cirrhosis, A 2.4 cm hypervascular liver mass suspicious for HCC, as well as splenomegaly. AFP 7.2 (WNL), While AFP noted to be normal, does not preclude risk of possibility of HCC, especially in the setting of what appears to be cirrhosis 2/2 underlying long-standing history of EtOH use, which places him at higher risk.   - Recommend diagnostic imaging with Triple Phase CT or MRI Abd w/w/o contrast  - No longer considered a surgical candidate for TAVR, above workup can be pursued as an outpatient    #Cirrhosis, likely compensated (-HE, -ascites, - no known EV bleeding)  CTA A/P with radiographic findings c/f cirrhosis, including nodular liver contour and splenomegaly, which is suggestive likely portal HTN. Likely in the setting of long-standing history of EtOH abuse.   MELD-Na: 13 (based on 2/26/22 BW, no AM coags on 3/3/22)  HE: AA0x3, no asterixis  Ascites: None present on CT imaging  HCC: CTA A/P (2/28) with 2.4 cm hypervascular lesion, workup of lesion as noted above  Varices: No prior EGD evaluation, would be warranted, can pursue as an outpatient  - Please obtain Abdominal US with duplex to assess portal venous flow  - Daily CMPs & Coags to calculate MELD-Na  - Nutrition consult  - High protein diet  -  on alcohol cessation  - Fresh blood with clots noted in mouth this morning, s/p irrigation/suctioning, noted to originate from gums. Previous day noted to have dried blood in right nare as well. Recommend dental & ENT for evaluation of oropharynx    #AMS  Unclear etiology for AMS this am with increased lethargy. Ddx includes HE, infection, librium, delirium. The elevated ammonia lever to 123 could be due to his nose bleed, which has been ongoing since yesterday. He does not have Asterixis on exam and is A&O with understanding of where he is and why he is hospitalized. If HE, unclear the inciting event to cause decompensation. Additionally, RUQ US today w/o ascites. Most likely this is build up of librium that is being poorly metabolized due to his liver function.  - Stop librium  - Can continue to treat HE with lactulose, titrate to 2-3 BM/ day + rifaxamin for now  - Infectious w/u: UA, blood cx, cxr

## 2022-03-07 DIAGNOSIS — I10 ESSENTIAL (PRIMARY) HYPERTENSION: ICD-10-CM

## 2022-03-07 DIAGNOSIS — K70.30 ALCOHOLIC CIRRHOSIS OF LIVER WITHOUT ASCITES: ICD-10-CM

## 2022-03-07 DIAGNOSIS — R41.82 ALTERED MENTAL STATUS, UNSPECIFIED: ICD-10-CM

## 2022-03-07 DIAGNOSIS — Z29.9 ENCOUNTER FOR PROPHYLACTIC MEASURES, UNSPECIFIED: ICD-10-CM

## 2022-03-07 DIAGNOSIS — Z87.898 PERSONAL HISTORY OF OTHER SPECIFIED CONDITIONS: ICD-10-CM

## 2022-03-07 DIAGNOSIS — D64.9 ANEMIA, UNSPECIFIED: ICD-10-CM

## 2022-03-07 DIAGNOSIS — I25.10 ATHEROSCLEROTIC HEART DISEASE OF NATIVE CORONARY ARTERY WITHOUT ANGINA PECTORIS: ICD-10-CM

## 2022-03-07 DIAGNOSIS — F17.200 NICOTINE DEPENDENCE, UNSPECIFIED, UNCOMPLICATED: ICD-10-CM

## 2022-03-07 DIAGNOSIS — K76.9 LIVER DISEASE, UNSPECIFIED: ICD-10-CM

## 2022-03-07 DIAGNOSIS — K72.90 HEPATIC FAILURE, UNSPECIFIED WITHOUT COMA: ICD-10-CM

## 2022-03-07 LAB
ALBUMIN SERPL ELPH-MCNC: 2.9 G/DL — LOW (ref 3.3–5)
ALP SERPL-CCNC: 105 U/L — SIGNIFICANT CHANGE UP (ref 40–120)
ALT FLD-CCNC: 43 U/L — SIGNIFICANT CHANGE UP (ref 10–45)
ANION GAP SERPL CALC-SCNC: 13 MMOL/L — SIGNIFICANT CHANGE UP (ref 5–17)
APPEARANCE UR: ABNORMAL
APTT BLD: 62.1 SEC — HIGH (ref 27.5–35.5)
AST SERPL-CCNC: 74 U/L — HIGH (ref 10–40)
BACTERIA # UR AUTO: ABNORMAL /HPF
BASOPHILS # BLD AUTO: 0.02 K/UL — SIGNIFICANT CHANGE UP (ref 0–0.2)
BASOPHILS NFR BLD AUTO: 0.3 % — SIGNIFICANT CHANGE UP (ref 0–2)
BILIRUB SERPL-MCNC: 0.7 MG/DL — SIGNIFICANT CHANGE UP (ref 0.2–1.2)
BILIRUB UR-MCNC: NEGATIVE — SIGNIFICANT CHANGE UP
BLD GP AB SCN SERPL QL: NEGATIVE — SIGNIFICANT CHANGE UP
BUN SERPL-MCNC: 13 MG/DL — SIGNIFICANT CHANGE UP (ref 7–23)
CALCIUM SERPL-MCNC: 9.2 MG/DL — SIGNIFICANT CHANGE UP (ref 8.4–10.5)
CHLORIDE SERPL-SCNC: 107 MMOL/L — SIGNIFICANT CHANGE UP (ref 96–108)
CO2 SERPL-SCNC: 19 MMOL/L — LOW (ref 22–31)
COLOR SPEC: YELLOW — SIGNIFICANT CHANGE UP
CREAT SERPL-MCNC: 0.82 MG/DL — SIGNIFICANT CHANGE UP (ref 0.5–1.3)
DIFF PNL FLD: NEGATIVE — SIGNIFICANT CHANGE UP
EGFR: 97 ML/MIN/1.73M2 — SIGNIFICANT CHANGE UP
EOSINOPHIL # BLD AUTO: 0.25 K/UL — SIGNIFICANT CHANGE UP (ref 0–0.5)
EOSINOPHIL NFR BLD AUTO: 3.7 % — SIGNIFICANT CHANGE UP (ref 0–6)
EPI CELLS # UR: SIGNIFICANT CHANGE UP /HPF (ref 0–5)
GLUCOSE SERPL-MCNC: 103 MG/DL — HIGH (ref 70–99)
GLUCOSE UR QL: NEGATIVE — SIGNIFICANT CHANGE UP
HCT VFR BLD CALC: 27.5 % — LOW (ref 39–50)
HGB BLD-MCNC: 8.3 G/DL — LOW (ref 13–17)
IMM GRANULOCYTES NFR BLD AUTO: 0.3 % — SIGNIFICANT CHANGE UP (ref 0–1.5)
INR BLD: 1.27 — HIGH (ref 0.88–1.16)
KETONES UR-MCNC: NEGATIVE — SIGNIFICANT CHANGE UP
LEUKOCYTE ESTERASE UR-ACNC: ABNORMAL
LYMPHOCYTES # BLD AUTO: 1.59 K/UL — SIGNIFICANT CHANGE UP (ref 1–3.3)
LYMPHOCYTES # BLD AUTO: 23.2 % — SIGNIFICANT CHANGE UP (ref 13–44)
MAGNESIUM SERPL-MCNC: 2 MG/DL — SIGNIFICANT CHANGE UP (ref 1.6–2.6)
MCHC RBC-ENTMCNC: 26.7 PG — LOW (ref 27–34)
MCHC RBC-ENTMCNC: 30.2 GM/DL — LOW (ref 32–36)
MCV RBC AUTO: 88.4 FL — SIGNIFICANT CHANGE UP (ref 80–100)
MONOCYTES # BLD AUTO: 0.9 K/UL — SIGNIFICANT CHANGE UP (ref 0–0.9)
MONOCYTES NFR BLD AUTO: 13.2 % — SIGNIFICANT CHANGE UP (ref 2–14)
NEUTROPHILS # BLD AUTO: 4.06 K/UL — SIGNIFICANT CHANGE UP (ref 1.8–7.4)
NEUTROPHILS NFR BLD AUTO: 59.3 % — SIGNIFICANT CHANGE UP (ref 43–77)
NITRITE UR-MCNC: POSITIVE
NRBC # BLD: 0 /100 WBCS — SIGNIFICANT CHANGE UP (ref 0–0)
PH UR: >=9 — SIGNIFICANT CHANGE UP (ref 5–8)
PHOSPHATE SERPL-MCNC: 3.5 MG/DL — SIGNIFICANT CHANGE UP (ref 2.5–4.5)
PLATELET # BLD AUTO: 147 K/UL — LOW (ref 150–400)
POTASSIUM SERPL-MCNC: 3.9 MMOL/L — SIGNIFICANT CHANGE UP (ref 3.5–5.3)
POTASSIUM SERPL-SCNC: 3.9 MMOL/L — SIGNIFICANT CHANGE UP (ref 3.5–5.3)
PROT SERPL-MCNC: 7.7 G/DL — SIGNIFICANT CHANGE UP (ref 6–8.3)
PROT UR-MCNC: 100 MG/DL
PROTHROM AB SERPL-ACNC: 15.2 SEC — HIGH (ref 10.5–13.4)
RBC # BLD: 3.11 M/UL — LOW (ref 4.2–5.8)
RBC # FLD: 15.4 % — HIGH (ref 10.3–14.5)
RBC CASTS # UR COMP ASSIST: < 5 /HPF — SIGNIFICANT CHANGE UP
RH IG SCN BLD-IMP: POSITIVE — SIGNIFICANT CHANGE UP
SODIUM SERPL-SCNC: 139 MMOL/L — SIGNIFICANT CHANGE UP (ref 135–145)
SP GR SPEC: <=1.005 — SIGNIFICANT CHANGE UP (ref 1–1.03)
TRI-PHOS CRY UR QL COMP ASSIST: ABNORMAL /HPF
UROBILINOGEN FLD QL: 4 E.U./DL
WBC # BLD: 6.84 K/UL — SIGNIFICANT CHANGE UP (ref 3.8–10.5)
WBC # FLD AUTO: 6.84 K/UL — SIGNIFICANT CHANGE UP (ref 3.8–10.5)
WBC UR QL: < 5 /HPF — SIGNIFICANT CHANGE UP

## 2022-03-07 PROCEDURE — 99233 SBSQ HOSP IP/OBS HIGH 50: CPT | Mod: GC

## 2022-03-07 PROCEDURE — 99232 SBSQ HOSP IP/OBS MODERATE 35: CPT | Mod: GC

## 2022-03-07 PROCEDURE — 99254 IP/OBS CNSLTJ NEW/EST MOD 60: CPT

## 2022-03-07 RX ORDER — POTASSIUM CHLORIDE 20 MEQ
20 PACKET (EA) ORAL ONCE
Refills: 0 | Status: COMPLETED | OUTPATIENT
Start: 2022-03-07 | End: 2022-03-07

## 2022-03-07 RX ADMIN — HEPARIN SODIUM 5000 UNIT(S): 5000 INJECTION INTRAVENOUS; SUBCUTANEOUS at 13:43

## 2022-03-07 RX ADMIN — PANTOPRAZOLE SODIUM 40 MILLIGRAM(S): 20 TABLET, DELAYED RELEASE ORAL at 06:03

## 2022-03-07 RX ADMIN — LACTULOSE 20 GRAM(S): 10 SOLUTION ORAL at 13:42

## 2022-03-07 RX ADMIN — Medication 1 PATCH: at 06:07

## 2022-03-07 RX ADMIN — Medication 81 MILLIGRAM(S): at 12:33

## 2022-03-07 RX ADMIN — ATORVASTATIN CALCIUM 80 MILLIGRAM(S): 80 TABLET, FILM COATED ORAL at 22:00

## 2022-03-07 RX ADMIN — Medication 1 DROP(S): at 17:18

## 2022-03-07 RX ADMIN — HEPARIN SODIUM 5000 UNIT(S): 5000 INJECTION INTRAVENOUS; SUBCUTANEOUS at 22:01

## 2022-03-07 RX ADMIN — Medication 1 TABLET(S): at 12:33

## 2022-03-07 RX ADMIN — Medication 20 MILLIEQUIVALENT(S): at 09:38

## 2022-03-07 RX ADMIN — Medication 325 MILLIGRAM(S): at 06:00

## 2022-03-07 RX ADMIN — Medication 1 PATCH: at 12:33

## 2022-03-07 RX ADMIN — Medication 1 PATCH: at 12:00

## 2022-03-07 RX ADMIN — HEPARIN SODIUM 5000 UNIT(S): 5000 INJECTION INTRAVENOUS; SUBCUTANEOUS at 05:58

## 2022-03-07 RX ADMIN — LACTULOSE 20 GRAM(S): 10 SOLUTION ORAL at 22:02

## 2022-03-07 RX ADMIN — Medication 1 PATCH: at 18:44

## 2022-03-07 RX ADMIN — Medication 1 MILLIGRAM(S): at 12:33

## 2022-03-07 RX ADMIN — LACTULOSE 20 GRAM(S): 10 SOLUTION ORAL at 05:58

## 2022-03-07 RX ADMIN — Medication 1 DROP(S): at 05:58

## 2022-03-07 NOTE — OCCUPATIONAL THERAPY INITIAL EVALUATION ADULT - MODIFIED CLINICAL TEST OF SENSORY INTEGRATION IN BALANCE TEST
Patient functionally ambulated 15 x 2 feet with RW and Min A x 1 from bed<>bathroom. Patient noted with decreased environmental awareness, weight shifting, dynamic standing balance requiring mod verbal cues for positioning, safety and visual scanning throughout.

## 2022-03-07 NOTE — PROGRESS NOTE ADULT - PROBLEM SELECTOR PLAN 8
Long standing EtOH consuption. Not showing any signs of withdrawal at the moment. Last drink prior to admission which is almost 2 weeks ago. No need to monitor CIWA anymore.   - c/w Multivitamin and Folic Acid

## 2022-03-07 NOTE — OCCUPATIONAL THERAPY INITIAL EVALUATION ADULT - MD ORDER
65 y/o M, current smoker (1ppd x 50 years), current every day ETOH abuse (1 pint vodka and several beers daily x 50 years), with PMHx of HLD, HTN (not on any medications),  CABG (LIMA to LAD, SVG to PDA in 2017), AS (s/p AVR in 2017), who presented to [facility] ED on 2/24/22 with complaints of 9/10 chest pain, found to have NSTEMI.

## 2022-03-07 NOTE — PROGRESS NOTE ADULT - PROBLEM SELECTOR PLAN 9
F: as appropriate  E: replenish as appropriate  N: high protein diet    VTE Prophylaxis: heparin TID subq  GI: protonix PO qd  C: Full Code  D:

## 2022-03-07 NOTE — PROGRESS NOTE ADULT - PROBLEM SELECTOR PLAN 1
stable; cont. mgmt per GI; low-salt diet; not on diuretics; will need EGD as outpatient to screen for varices; no e/o ascites or portal vein thrombosis; monitor LFTs and coags; Pt. will need outpatient Hepatology f/u

## 2022-03-07 NOTE — PROGRESS NOTE ADULT - PROBLEM SELECTOR PLAN 6
no e/o withdrawal; monitor CIWAs; cont. thiamine, folate, MVI; SBIRT SW to provide cessation resources

## 2022-03-07 NOTE — OCCUPATIONAL THERAPY INITIAL EVALUATION ADULT - ASSISTIVE DEVICE FOR TRANSFER: STAND/SIT, REHAB EVAL
Patient in room PCU 3028I. I have received report from JUAN C FIGUEROA and had the opportunity to 
ask questions and assume patient care. rolling walker

## 2022-03-07 NOTE — PROGRESS NOTE ADULT - PROBLEM SELECTOR PLAN 3
2/2 long standing EtOH abuse. Likely compensated. No ascites. Likely no hepatic encephalopathy. No known EV bleeding.  - GI following  - Meld- NA 10 today  - no known Varices, but no prior EGD evaluation, he will need it outpatient

## 2022-03-07 NOTE — OCCUPATIONAL THERAPY INITIAL EVALUATION ADULT - RANGE OF MOTION EXAMINATION, UPPER EXTREMITY
Biopsy Method: 15 blade
Medical Necessity Clause: This procedure was medically necessary because the lesion that was treated was:
Billing Type: [employer]
X Size Of Lesion In Cm (Optional): 0
Size Of Margin In Cm (Margins Are Not Added To Billing Dimensions): 0.1
Render Post-Care Instructions In Note?: no
Was A Bandage Applied: Yes
Consent was obtained from the patient. The risks and benefits to therapy were discussed in detail. Specifically, the risks of infection, scarring, bleeding, prolonged wound healing, incomplete removal, allergy to anesthesia, nerve injury and recurrence were addressed. Prior to the procedure, the treatment site was clearly identified and confirmed by the patient. All components of Universal Protocol/PAUSE Rule completed.
Detail Level: Detailed
Lab: [facility]
Hemostasis: Aluminum Chloride and Electrocautery
Medical Necessity Information: It is in your best interest to select a reason for this procedure from the list below. All of these items fulfill various CMS LCD requirements except the new and changing color options.
Size Of Lesion In Cm (Required): 0.6
Anesthesia Type: 1% lidocaine without epinephrine
Wound Care: Petrolatum
Path Notes (To The Dermatopathologist): Please check margins
Post-Care Instructions: I reviewed with the patient in detail post-care instructions. Patient is to keep the biopsy site dry overnight, and then apply bacitracin twice daily until healed. Patient may apply hydrogen peroxide soaks to remove any crusting.
Notification Instructions: Patient will be notified of biopsy results. However, patient instructed to call the office if not contacted within 2 weeks.
bilateral UE Active ROM was WFL  (within functional limits)

## 2022-03-07 NOTE — OCCUPATIONAL THERAPY INITIAL EVALUATION ADULT - DIAGNOSIS, OT EVAL
Patient brought to [facility] with complaints of chest pain, presents with decreased overall strength, balance, activity tolerance, alertness, initiation impacting independence with functional activities and mobility.

## 2022-03-07 NOTE — PROGRESS NOTE ADULT - PROBLEM SELECTOR PLAN 5
S/p CABG with Dr Wilcox in 2017  - recent [facility] with nonobstructive disease  - c/w Lipitor 80mg QD  - c/w ASA 81mg

## 2022-03-07 NOTE — PROGRESS NOTE ADULT - PROBLEM SELECTOR PLAN 1
CTAP with 2.4 hypervascular liver mass. i/s/o Liver cirrhosis and EtOH use disorder. triple phase CT confirms HCC  - AFP is 7.2 which is normal  - Oncology consulted  - Surgical Oncology consulted

## 2022-03-07 NOTE — OCCUPATIONAL THERAPY INITIAL EVALUATION ADULT - ADDITIONAL COMMENTS
Patient reports living in a private home with his wife with 1 KRISTIN and 13 steps to the second floor where the bedroom is. Patient utilized a SC for functional mobility indoors and in the community and was independent with all ADL's. Patient is R hand dominant.

## 2022-03-07 NOTE — PROGRESS NOTE ADULT - ASSESSMENT
65 y/o M, current smoker (1ppd x 50 years), current every day ETOH abuse (1 pint vodka and several beers daily x 50 years), with PMHx of HLD, HTN (not on any medications),  CABG (LIMA to LAD, SVG to PDA in 2017), AS (s/p AVR in 2017), who presented to [facility] ED on 2/24/22 with complaints of 9/10 chest pain with associated diaphoresis with exertion, admitted for NSTEMI, transferred to CTsx service for severe AS, requiring a TAVR procedure; upon workup for TAVR, patient incidentally noted to have a 2.4 cm hypervascular lesion for which GI was consulted, CT A/P concerning for HCC. Now Gi re-consulted for AMS.     #Hepatic Lesion  Patient presented to [facility] ED on 2/24/22 with complaints of 9/10 chest pain with associated diaphoresis with exertion, admitted for NSTEMI, transferred to CTsx service for severe AS, requiring a TAVR procedure. During workup for TAVR, CTA A/P (2/28) revealing a nodular liver contour, c/w cirrhosis, A 2.4 cm hypervascular liver mass suspicious for HCC, as well as splenomegaly. AFP 7.2 (WNL), While AFP noted to be normal, does not preclude risk of possibility of HCC, especially in the setting of what appears to be cirrhosis 2/2 underlying long-standing history of EtOH use, which places him at higher risk.   - CT A/P (3/6) revealing 2.5 cm left hepatic lesion consistent with LI-RADS v 2018 Category: LR-5 Definitely HCC. OPTN Category (if LR-5): 5B. No locally invasive or metastatic disease. Left hepatic artery variant. No additional suspicious hepatic lesions.  - In light of active EtOH use, would not qualify for liver transplant evaluation   - Recommend Heme/Onc evaluation   - Recommend Surgical Onc evaluation     #Cirrhosis, likely compensated (-HE, -ascites, - no known EV bleeding)  CTA A/P with radiographic findings c/f cirrhosis, including nodular liver contour and splenomegaly, which is suggestive likely portal HTN. Likely in the setting of long-standing history of EtOH abuse.   MELD-Na: 10 (based on 3/7/22 BW)  HE: AA0x2 (not oriented to year), no asterixis (based on 3/7/22 examination)  Ascites: None present on CT imaging  HCC: CTA A/P (2/28) with 2.4 cm hypervascular lesion. CT A/P (3/6), lesion c/w HCC  Varices: No prior EGD evaluation, would be warranted, can pursue as an outpatient  - Abdominal US with duplex (3/6) The portal veins, hepatic veins and hepatic arteries are patent with normal directionality of flow. Cirrhosis. Since 2/28/2022, again a 2.9 cm heterogeneous lesion is present within the left lobe of the liver (see CT findings as noted above, appears to be c/w HCC).  - Daily CMPs & Coags to calculate MELD-Na  - Nutrition consult  - High protein diet  -  on alcohol cessation  - c/w Rifaximin 500 mg BID & lactulose, titrate to 2-3 BMs/day    #AMS  Unclear etiology for AMS this am with increased lethargy. Ddx includes HE, infection, librium, delirium. The elevated ammonia lever to 123 could be due to his nose bleed, which has been ongoing since 3/5. He does not have Asterixis on exam and is A&O with understanding of where he is and why he is hospitalized. If HE, unclear the inciting event to cause decompensation. Additionally, RUQ US today w/o ascites. Most likely this is build up of librium that is being poorly metabolized due to his liver function.  - Continue to hold librium  - c/w Rifaximin 500 mg BID & lactulose, titrate to 2-3 BMs/day  - CXR (3/6/22) negative for infiltrates  - UA with no pyuria however +bacteria, + nitrite, trace LE, abxs as per primary team. If with no improvement in MS, consider initiation of abxs    Case discussed with [facility] attending and primary team.     So Guthrie DO  Gastroenterology Fellow  Pager: [phone]

## 2022-03-07 NOTE — CONSULT NOTE ADULT - SUBJECTIVE AND OBJECTIVE BOX
MEDICAL ONCOLOGY CONSULT NOTE  65 y/o M, current smoker (1ppd x 50 years), current every day ETOH abuse (1 pint vodka and several beers daily x 50 years), with PMHx of HLD, HTN (not on any medications),  CABG (LIMA to LAD, SVG to PDA in 2017), AS (s/p AVR in 2017), who presented to [facility] ED on 2/24/22 with complaints of 9/10 chest pain, found to have NSTEMI.  On CT imaging found to have 2.5 cm HCC. Medical oncology consulted for recommendations.    Hospital course summary:  Patient admitted for NSTEMI work up. TTE confirmed severe prosthetic valve stenosis and Structural heart was consulted. Patient was transferred to CT surgery for evaluation. Upon workup, patient found to need surgical AVR +/- aortic root repair, however preoperative workup revealed liver lesion with concern for HCC c/w cirrhosis and alcohol abuse. GI consulted for further evaluation. Recommending ammonia level and US abdomen with duplex to assess portal venous flow, and MR abdomen vs triple phase CT. The ammonia level was elevated, concern for hepatic encephalopathy, an NGT was placed and lactulose and rifaximin was initiated. Patient deemed to not be surgical candidate for AVR+ root repair.      Unable to appreciate much history from patient due to encephalopathy, he is sleepy, but arousable and oriented x4.  Unable to assess performance status due to current mental status.  Child-North score is B7    3/6/22 Triple phase CTAP:  2.5 cm left hepatic lesion consistent with LI-RADS v 2018 Category: LR-5   Definitely HCC. OPTN Category (if LR-5): 5B.    No locally invasive or metastatic disease. Left hepatic artery variant.   No additional suspicious hepatic lesions. Other incidental comments as   above.    2/28/22 CT angio AP:  1. Nodular liver contour, consistent with cirrhosis.  2. A 2.4 cm hypervascular liver mass is suspicious for hepatocellular   carcinoma. Recommend correlation with alpha-fetoprotein levels and MRI of   liver.  3. Severe atherosclerotic disease throughout the chest, abdomen and   pelvis, with large amount of calcified plaque in pelvic arteries   bilaterally and calcified and soft plaque in abdominal aorta. Multiple   stenoses, as described above.  4. Groundglass opacity in both lungs, probably edema.  5. Mild splenomegaly.          MEDICATIONS  (STANDING):  artificial tears (preservative free) Ophthalmic Solution 1 Drop(s) Both EYES two times a day  aspirin enteric coated 81 milliGRAM(s) Oral daily  atorvastatin 80 milliGRAM(s) Oral at bedtime  ferrous    sulfate 325 milliGRAM(s) Oral <User Schedule>  folic acid 1 milliGRAM(s) Oral daily  heparin   Injectable 5000 Unit(s) SubCutaneous every 8 hours  lactulose Syrup 20 Gram(s) Oral three times a day  multivitamin 1 Tablet(s) Oral daily  nicotine - 21 mG/24Hr(s) Patch 1 patch Transdermal daily  pantoprazole    Tablet 40 milliGRAM(s) Oral daily  rifAXIMin 550 milliGRAM(s) Oral two times a day    MEDICATIONS  (PRN):  sodium chloride 0.65% Nasal 1 Spray(s) Both Nostrils four times a day PRN Nasal Congestion    Vital Signs Last 24 Hrs  T(C): 36.7 (03-07-22 @ 10:06), Max: 36.7 (03-06-22 @ 16:54)  T(F): 98 (03-07-22 @ 10:06), Max: 98 (03-06-22 @ 16:54)  HR: 87 (03-07-22 @ 10:06) (74 - 93)  BP: 103/70 (03-07-22 @ 10:06) (97/64 - 120/78)  BP(mean): 79 (03-06-22 @ 13:55) (79 - 79)  RR: 18 (03-07-22 @ 10:06) (17 - 20)  SpO2: 97% (03-07-22 @ 10:06) (97% - 99%)    PHYSICAL EXAM:  Constitutional: NAD, somnolent  HEENT: PERRLA, EOMI, Normal Hearing, scleral icterus  Neck: No LAD, No JVD  Back: Normal spine flexure, No CVA tenderness  Respiratory: CTAB  Cardiovascular: S1 and S2, RRR, no M/G/R  Gastrointestinal: BS+, distended, ascites present  Extremities: No peripheral edema  Vascular: 2+ peripheral pulses  Neurological: A/O x 4, somnolent, no asterixis        CBC Full  -  ( 07 Mar 2022 07:47 )  WBC Count : 6.84 K/uL  RBC Count : 3.11 M/uL  Hemoglobin : 8.3 g/dL  Hematocrit : 27.5 %  Platelet Count - Automated : 147 K/uL  Mean Cell Volume : 88.4 fl  Mean Cell Hemoglobin : 26.7 pg  Mean Cell Hemoglobin Concentration : 30.2 gm/dL  Auto Neutrophil # : 4.06 K/uL  Auto Lymphocyte # : 1.59 K/uL  Auto Monocyte # : 0.90 K/uL  Auto Eosinophil # : 0.25 K/uL  Auto Basophil # : 0.02 K/uL  Auto Neutrophil % : 59.3 %  Auto Lymphocyte % : 23.2 %  Auto Monocyte % : 13.2 %  Auto Eosinophil % : 3.7 %  Auto Basophil % : 0.3 %      INR: 1.27 (03-07-22 @ 07:48)  Activated Partial Thromboplastin Time: 62.1 sec (03-07-22 @ 07:48)      Ferritin, Serum: 24 ng/mL (02-25 @ 06:58)  Iron Total, Serum: 36 ug/dL (02-25 @ 06:58)  Iron - Total Binding Capacity.: 387 ug/dL (02-25 @ 06:58)    03-07    139  |  107  |  13  ----------------------------<  103<H>  3.9   |  19<L>  |  0.82    Ca    9.2      07 Mar 2022 07:47  Phos  3.5     03-07  Mg     2.0     03-07    TPro  7.7  /  Alb  2.9<L>  /  TBili  0.7  /  DBili  x   /  AST  74<H>  /  ALT  43  /  AlkPhos  105  03-07    Pathology:   MEDICAL ONCOLOGY CONSULT NOTE  65 y/o M, current smoker (1ppd x 50 years), current every day ETOH abuse (1 pint vodka and several beers daily x 50 years), with PMHx of HLD, HTN (not on any medications),  CABG (LIMA to LAD, SVG to PDA in 2017), AS (s/p AVR in 2017), who presented to [facility] ED on 2/24/22 with complaints of 9/10 chest pain, found to have NSTEMI.  On CT imaging found to have 2.5 cm HCC. Medical oncology consulted for recommendations.    Hospital course summary:  Patient admitted for NSTEMI work up. TTE confirmed severe prosthetic valve stenosis and Structural heart was consulted. Patient was transferred to CT surgery for evaluation. Upon workup, patient found to need surgical AVR +/- aortic root repair, however preoperative workup revealed liver lesion with concern for HCC c/w cirrhosis and alcohol abuse. GI consulted for further evaluation. Recommending ammonia level and US abdomen with duplex to assess portal venous flow, and MR abdomen vs triple phase CT. The ammonia level was elevated, concern for hepatic encephalopathy, an NGT was placed and lactulose and rifaximin was initiated. Patient deemed to not be surgical candidate for AVR+ root repair.      Unable to appreciate much history from patient due to encephalopathy, he is sleepy, but arousable and oriented x4.  Unable to assess performance status due to current mental status.  Child-Nroth score is B7    3/6/22 Triple phase CTAP:  2.5 cm left hepatic lesion consistent with LI-RADS v 2018 Category: LR-5   Definitely HCC. OPTN Category (if LR-5): 5B.    No locally invasive or metastatic disease. Left hepatic artery variant.   No additional suspicious hepatic lesions. Other incidental comments as   above.    2/28/22 CT angio AP:  1. Nodular liver contour, consistent with cirrhosis.  2. A 2.4 cm hypervascular liver mass is suspicious for hepatocellular   carcinoma. Recommend correlation with alpha-fetoprotein levels and MRI of   liver.  3. Severe atherosclerotic disease throughout the chest, abdomen and   pelvis, with large amount of calcified plaque in pelvic arteries   bilaterally and calcified and soft plaque in abdominal aorta. Multiple   stenoses, as described above.  4. Groundglass opacity in both lungs, probably edema.  5. Mild splenomegaly.          MEDICATIONS  (STANDING):  artificial tears (preservative free) Ophthalmic Solution 1 Drop(s) Both EYES two times a day  aspirin enteric coated 81 milliGRAM(s) Oral daily  atorvastatin 80 milliGRAM(s) Oral at bedtime  ferrous    sulfate 325 milliGRAM(s) Oral <User Schedule>  folic acid 1 milliGRAM(s) Oral daily  heparin   Injectable 5000 Unit(s) SubCutaneous every 8 hours  lactulose Syrup 20 Gram(s) Oral three times a day  multivitamin 1 Tablet(s) Oral daily  nicotine - 21 mG/24Hr(s) Patch 1 patch Transdermal daily  pantoprazole    Tablet 40 milliGRAM(s) Oral daily  rifAXIMin 550 milliGRAM(s) Oral two times a day    MEDICATIONS  (PRN):  sodium chloride 0.65% Nasal 1 Spray(s) Both Nostrils four times a day PRN Nasal Congestion    Vital Signs Last 24 Hrs  T(C): 36.7 (03-07-22 @ 10:06), Max: 36.7 (03-06-22 @ 16:54)  T(F): 98 (03-07-22 @ 10:06), Max: 98 (03-06-22 @ 16:54)  HR: 87 (03-07-22 @ 10:06) (74 - 93)  BP: 103/70 (03-07-22 @ 10:06) (97/64 - 120/78)  BP(mean): 79 (03-06-22 @ 13:55) (79 - 79)  RR: 18 (03-07-22 @ 10:06) (17 - 20)  SpO2: 97% (03-07-22 @ 10:06) (97% - 99%)    PHYSICAL EXAM:  Constitutional: NAD, somnolent  HEENT: PERRLA, EOMI, Normal Hearing, scleral icterus  Neck: No LAD, No JVD  Back: Normal spine flexure, No CVA tenderness  Respiratory: CTAB  Cardiovascular: S1 and S2, RRR, no M/G/R  Gastrointestinal: BS+, distended, ascites present  Extremities: No peripheral edema  Vascular: 2+ peripheral pulses  Neurological: A/O x 4, somnolent, no asterixis        CBC Full  -  ( 07 Mar 2022 07:47 )  WBC Count : 6.84 K/uL  RBC Count : 3.11 M/uL  Hemoglobin : 8.3 g/dL  Hematocrit : 27.5 %  Platelet Count - Automated : 147 K/uL  Mean Cell Volume : 88.4 fl  Mean Cell Hemoglobin : 26.7 pg  Mean Cell Hemoglobin Concentration : 30.2 gm/dL  Auto Neutrophil # : 4.06 K/uL  Auto Lymphocyte # : 1.59 K/uL  Auto Monocyte # : 0.90 K/uL  Auto Eosinophil # : 0.25 K/uL  Auto Basophil # : 0.02 K/uL  Auto Neutrophil % : 59.3 %  Auto Lymphocyte % : 23.2 %  Auto Monocyte % : 13.2 %  Auto Eosinophil % : 3.7 %  Auto Basophil % : 0.3 %      INR: 1.27 (03-07-22 @ 07:48)  Activated Partial Thromboplastin Time: 62.1 sec (03-07-22 @ 07:48)      Ferritin, Serum: 24 ng/mL (02-25 @ 06:58)  Iron Total, Serum: 36 ug/dL (02-25 @ 06:58)  Iron - Total Binding Capacity.: 387 ug/dL (02-25 @ 06:58)    03-07    139  |  107  |  13  ----------------------------<  103<H>  3.9   |  19<L>  |  0.82    Ca    9.2      07 Mar 2022 07:47  Phos  3.5     03-07  Mg     2.0     03-07    TPro  7.7  /  Alb  2.9<L>  /  TBili  0.7  /  DBili  x   /  AST  74<H>  /  ALT  43  /  AlkPhos  105  03-07

## 2022-03-07 NOTE — PROGRESS NOTE ADULT - PROBLEM SELECTOR PLAN 2
AAOx2 (person and place). Unlikely hepatic encephalopathy as no asterixis and is currently compensated. No obvious signs of infection. Neuro exam nonfocal. Possibly 2/2 effects of librium which was previously given to patient.  - monitor  - trend labs

## 2022-03-07 NOTE — OCCUPATIONAL THERAPY INITIAL EVALUATION ADULT - GENERAL OBSERVATIONS, REHAB EVAL
Patient received semisupine in bed +heplock IV, room air, NAD. Patient A&Ox3 agreeable and tolerated session fairly.

## 2022-03-07 NOTE — PROGRESS NOTE ADULT - PROBLEM SELECTOR PLAN 4
AS i/s/o prior TAVR in 2017 with Dr Wilcox. JOSELO with peak transvalvular velocity of 4.7, mean gradient 56. symptomatic: exertional CP, episode of syncope  - initially planned for valve in valve TAVR, but then it was decided that because of aortic root anatomy, he would need aortic root open heart surgery  - he was deemed not to be a surgical candidate given poor functional status, liver cirrhosis, prior medication noncompliance which likely contributed to valve restenosis. his recovery after open heart surgically would be extremely difficult if possible. He would NOT be a surgical candidate even if oncology work-up is negative.

## 2022-03-07 NOTE — CONSULT NOTE ADULT - ASSESSMENT
Recommendation:   - Patient is not a good surgical candidate   - Consider IR consultation for ablation vs TACE (transarterial chemoembolization)  Patient is a 65 y/o M, current smoker (1ppd x 50 years), current every day ETOH abuse (1 pint vodka and several beers daily x 50 years), with PMHx of HLD, HTN (not on any medications),  CABG (LIMA to LAD, SVG to PDA in 2017), AS (s/p AVR in 2017), who presented to [facility] ED on 2/24/22 NSTEMI with C with nonobstructive disease, then found to have restenosis of bioprosthetic valve for which aortic root surgery was planned. However patient was found not fit for surgery due to poor functional status, noncompliance to medication, alcoholism, cirrhosis, hepatic encephalopathy and liver lesion found in CT scan. Newly diagnosed with HCC and cirrhosis. CT shows 2.5 cm left hepatic lesion consistent with LI-RADS v 2018 Category: LR-5 Definitely HCC. OPTN Category (if LR-5): 5B. No locally invasive or metastatic disease. Left hepatic artery variant. No additional suspicious hepatic lesions. Border line splenomegaly. Abdominal US The portal veins, hepatic veins and hepatic arteries are patent with normal directionality of flow. Cirrhosis.     Patient continues to have altered mental status. Primary team aware and management with lactulose and rifaximin. Holding librium.   Abdomen without peritoneal signs. LFTs     Recommendation:   - Patient is not a good surgical candidate  - Consider IR consultation for ablation vs TACE (transarterial chemoembolization)  Patient is a 65 y/o M, current smoker (1ppd x 50 years), current every day ETOH abuse (1 pint vodka and several beers daily x 50 years), with PMHx of HLD, HTN (not on any medications),  CABG (LIMA to LAD, SVG to PDA in 2017), AS (s/p AVR in 2017), who presented to [facility] ED on 2/24/22 NSTEMI with C with nonobstructive disease, then found to have restenosis of bioprosthetic valve for which aortic root surgery was planned. However patient was found not fit for surgery due to poor functional status, noncompliance to medication, alcoholism, cirrhosis, hepatic encephalopathy and liver lesion found in CT scan. Newly diagnosed with HCC and cirrhosis. CT shows 2.5 cm left hepatic lesion consistent with LI-RADS v 2018 Category: LR-5 Definitely HCC. OPTN Category (if LR-5): 5B. No locally invasive or metastatic disease. Left hepatic artery variant. No additional suspicious hepatic lesions. Border line splenomegaly. Abdominal US The portal veins, hepatic veins and hepatic arteries are patent with normal directionality of flow. Cirrhosis.     Patient continues to have altered mental status. Primary team aware and management with lactulose and rifaximin. Holding librium.   Abdomen without peritoneal signs. LFTs within normal limites.     Recommendation:   - Patient is not a good surgical candidate  - Consider IR consultation for ablation vs TACE (transarterial chemoembolization)   - Team 1c General surgery Dr. Hebert will continue to follow    Patient discussed with attending and chief resident

## 2022-03-07 NOTE — PROGRESS NOTE ADULT - PROBLEM SELECTOR PLAN 3
as seen on triple-phase CT; Heme-Onc and Surg-Onc consulted, will arrange outpatient f/u for further work-up and mgmt

## 2022-03-07 NOTE — PROGRESS NOTE ADULT - SUBJECTIVE AND OBJECTIVE BOX
GASTROENTEROLOGY PROGRESS NOTE  Patient seen and examined at bedside. MELD-Na based on 3/7/22 BW. CT A/P (3/6) revealing 2.5 cm left hepatic lesion consistent with LI-RADS v 2018 Category: LR-5 Definitely HCC. OPTN Category (if LR-5): 5B. No locally invasive or metastatic disease. Left hepatic artery variant. No additional suspicious hepatic lesions.    ROS: Patient with no complaints at this time, denies any fevers, chills, NS, abdominal pain, nausea/vomiting. Denies any BMs overnight.     PERTINENT REVIEW OF SYSTEMS:  CONSTITUTIONAL: No weakness, fevers or chills  HEENT: No visual changes; No vertigo or throat pain   GASTROINTESTINAL: As above.  NEUROLOGICAL: No numbness or weakness  SKIN: No itching, burning, rashes, or lesions     Allergies    No Known Allergies    Intolerances      MEDICATIONS:  MEDICATIONS  (STANDING):  artificial tears (preservative free) Ophthalmic Solution 1 Drop(s) Both EYES two times a day  aspirin enteric coated 81 milliGRAM(s) Oral daily  atorvastatin 80 milliGRAM(s) Oral at bedtime  ferrous    sulfate 325 milliGRAM(s) Oral <User Schedule>  folic acid 1 milliGRAM(s) Oral daily  heparin   Injectable 5000 Unit(s) SubCutaneous every 8 hours  lactulose Syrup 20 Gram(s) Oral three times a day  multivitamin 1 Tablet(s) Oral daily  nicotine - 21 mG/24Hr(s) Patch 1 patch Transdermal daily  pantoprazole    Tablet 40 milliGRAM(s) Oral daily  rifAXIMin 550 milliGRAM(s) Oral two times a day    MEDICATIONS  (PRN):  sodium chloride 0.65% Nasal 1 Spray(s) Both Nostrils four times a day PRN Nasal Congestion    Vital Signs Last 24 Hrs  T(C): 36.7 (07 Mar 2022 10:06), Max: 36.7 (06 Mar 2022 16:54)  T(F): 98 (07 Mar 2022 10:06), Max: 98 (06 Mar 2022 16:54)  HR: 87 (07 Mar 2022 10:06) (74 - 93)  BP: 103/70 (07 Mar 2022 10:06) (97/64 - 120/78)  BP(mean): 79 (06 Mar 2022 13:55) (79 - 79)  RR: 18 (07 Mar 2022 10:06) (17 - 20)  SpO2: 97% (07 Mar 2022 10:06) (97% - 99%)    03-06 @ 07:01  -  03-07 @ 07:00  --------------------------------------------------------  IN: 0 mL / OUT: 775 mL / NET: -775 mL      PHYSICAL EXAM:    General: male, lying in bed; in no acute distress  HEENT: MMM, conjunctiva and sclera clear  Gastrointestinal: Soft, obese, distended abdomen; non-tender; Normal bowel sounds; No rebound or guarding  Extremities: Normal range of motion, No clubbing, cyanosis or edema  Neurological: Alert and oriented x2 (not oriented to year), no asterixis  Skin: Warm and dry. No obvious rash    LABS:                        8.3    6.84  )-----------( 147      ( 07 Mar 2022 07:47 )             27.5     03-07    139  |  107  |  13  ----------------------------<  103<H>  3.9   |  19<L>  |  0.82    Ca    9.2      07 Mar 2022 07:47  Phos  3.5     03-07  Mg     2.0     03-07    TPro  7.7  /  Alb  2.9<L>  /  TBili  0.7  /  DBili  x   /  AST  74<H>  /  ALT  43  /  AlkPhos  105  03-07    PT/INR - ( 07 Mar 2022 07:48 )   PT: 15.2 sec;   INR: 1.27          PTT - ( 07 Mar 2022 07:48 )  PTT:62.1 sec      Urinalysis Basic - ( 07 Mar 2022 09:51 )    Color: Yellow / Appearance: Cloudy / SG: <=1.005 / pH: x  Gluc: x / Ketone: NEGATIVE  / Bili: Negative / Urobili: 4.0 E.U./dL   Blood: x / Protein: 100 mg/dL / Nitrite: POSITIVE   Leuk Esterase: Trace / RBC: < 5 /HPF / WBC < 5 /HPF   Sq Epi: x / Non Sq Epi: 0-5 /HPF / Bacteria: Many /HPF                RADIOLOGY & ADDITIONAL STUDIES:  Reviewed

## 2022-03-07 NOTE — PROGRESS NOTE ADULT - SUBJECTIVE AND OBJECTIVE BOX
incomplete  INTERVAL EVENTS: REHANA    SUBJECTIVE / INTERVAL HPI: Patient seen and examined at bedside.     ROS: negative unless otherwise stated above.    VITAL SIGNS:  Vital Signs Last 24 Hrs  T(C): 36.4 (07 Mar 2022 06:07), Max: 36.7 (06 Mar 2022 16:54)  T(F): 97.5 (07 Mar 2022 06:07), Max: 98 (06 Mar 2022 16:54)  HR: 74 (07 Mar 2022 06:07) (74 - 93)  BP: 99/68 (07 Mar 2022 06:07) (94/57 - 121/72)  BP(mean): 79 (06 Mar 2022 13:55) (71 - 86)  RR: 17 (07 Mar 2022 06:07) (17 - 20)  SpO2: 98% (07 Mar 2022 06:07) (95% - 99%)      03-06-22 @ 07:01  -  03-07-22 @ 07:00  --------------------------------------------------------  IN: 0 mL / OUT: 775 mL / NET: -775 mL        PHYSICAL EXAM:    General: NAD  HEENT: MMM  Neck: supple  Cardiovascular: +S1/S2; RRR  Respiratory: CTA B/L; no W/R/R  Gastrointestinal: soft, NT/ND  Extremities: WWP; no edema, clubbing or cyanosis  Vascular: 2+ radial, DP/PT pulses B/L  Neurological: AAOx3; no focal deficits    MEDICATIONS:  MEDICATIONS  (STANDING):  artificial tears (preservative free) Ophthalmic Solution 1 Drop(s) Both EYES two times a day  aspirin enteric coated 81 milliGRAM(s) Oral daily  atorvastatin 80 milliGRAM(s) Oral at bedtime  ferrous    sulfate 325 milliGRAM(s) Oral <User Schedule>  folic acid 1 milliGRAM(s) Oral daily  heparin   Injectable 5000 Unit(s) SubCutaneous every 8 hours  lactulose Syrup 20 Gram(s) Oral three times a day  multivitamin 1 Tablet(s) Oral daily  nicotine - 21 mG/24Hr(s) Patch 1 patch Transdermal daily  pantoprazole    Tablet 40 milliGRAM(s) Oral daily  potassium chloride   Powder 20 milliEquivalent(s) Oral once  rifAXIMin 550 milliGRAM(s) Oral two times a day    MEDICATIONS  (PRN):  sodium chloride 0.65% Nasal 1 Spray(s) Both Nostrils four times a day PRN Nasal Congestion      ALLERGIES:  Allergies    No Known Allergies    Intolerances        LABS:                        8.3    6.84  )-----------( 147      ( 07 Mar 2022 07:47 )             27.5     03-07    139  |  107  |  13  ----------------------------<  103<H>  3.9   |  19<L>  |  0.82    Ca    9.2      07 Mar 2022 07:47  Phos  3.5     03-07  Mg     2.0     03-07    TPro  7.7  /  Alb  2.9<L>  /  TBili  0.7  /  DBili  x   /  AST  74<H>  /  ALT  43  /  AlkPhos  105  03-07    PT/INR - ( 07 Mar 2022 07:48 )   PT: 15.2 sec;   INR: 1.27          PTT - ( 07 Mar 2022 07:48 )  PTT:62.1 sec    CAPILLARY BLOOD GLUCOSE      POCT Blood Glucose.: 100 mg/dL (06 Mar 2022 12:00)      RADIOLOGY & ADDITIONAL TESTS: Reviewed. INTERVAL EVENTS: REHANA    SUBJECTIVE / INTERVAL HPI: Patient seen and examined at bedside. Reports feeling "fine." Denies fever, chills, abd pain, SOB, CP.    ROS: negative unless otherwise stated above.    VITAL SIGNS:  Vital Signs Last 24 Hrs  T(C): 36.4 (07 Mar 2022 06:07), Max: 36.7 (06 Mar 2022 16:54)  T(F): 97.5 (07 Mar 2022 06:07), Max: 98 (06 Mar 2022 16:54)  HR: 74 (07 Mar 2022 06:07) (74 - 93)  BP: 99/68 (07 Mar 2022 06:07) (94/57 - 121/72)  BP(mean): 79 (06 Mar 2022 13:55) (71 - 86)  RR: 17 (07 Mar 2022 06:07) (17 - 20)  SpO2: 98% (07 Mar 2022 06:07) (95% - 99%)      03-06-22 @ 07:01  -  03-07-22 @ 07:00  --------------------------------------------------------  IN: 0 mL / OUT: 775 mL / NET: -775 mL        PHYSICAL EXAM:    General: NAD, laying in bed  HEENT: MMM, icteric, EOMI, sluggish pupils  Neck: supple, normal thyroid  Cardiovascular: RRR, systolic murmur loudest at RUSB  Respiratory: CTA B/L; no W/R/R  Gastrointestinal: soft, NT/ND  Uro: condom cath  Extremities: WWP; no edema, clubbing or cyanosis  Vascular: 2+ radial, DP pulses B/L  Neurological: AAOx2-3 (person, place); no asterixis, no focal deficits    MEDICATIONS:  MEDICATIONS  (STANDING):  artificial tears (preservative free) Ophthalmic Solution 1 Drop(s) Both EYES two times a day  aspirin enteric coated 81 milliGRAM(s) Oral daily  atorvastatin 80 milliGRAM(s) Oral at bedtime  ferrous    sulfate 325 milliGRAM(s) Oral <User Schedule>  folic acid 1 milliGRAM(s) Oral daily  heparin   Injectable 5000 Unit(s) SubCutaneous every 8 hours  lactulose Syrup 20 Gram(s) Oral three times a day  multivitamin 1 Tablet(s) Oral daily  nicotine - 21 mG/24Hr(s) Patch 1 patch Transdermal daily  pantoprazole    Tablet 40 milliGRAM(s) Oral daily  potassium chloride   Powder 20 milliEquivalent(s) Oral once  rifAXIMin 550 milliGRAM(s) Oral two times a day    MEDICATIONS  (PRN):  sodium chloride 0.65% Nasal 1 Spray(s) Both Nostrils four times a day PRN Nasal Congestion      ALLERGIES:  Allergies    No Known Allergies    Intolerances        LABS:                        8.3    6.84  )-----------( 147      ( 07 Mar 2022 07:47 )             27.5     03-07    139  |  107  |  13  ----------------------------<  103<H>  3.9   |  19<L>  |  0.82    Ca    9.2      07 Mar 2022 07:47  Phos  3.5     03-07  Mg     2.0     03-07    TPro  7.7  /  Alb  2.9<L>  /  TBili  0.7  /  DBili  x   /  AST  74<H>  /  ALT  43  /  AlkPhos  105  03-07    PT/INR - ( 07 Mar 2022 07:48 )   PT: 15.2 sec;   INR: 1.27          PTT - ( 07 Mar 2022 07:48 )  PTT:62.1 sec    CAPILLARY BLOOD GLUCOSE      POCT Blood Glucose.: 100 mg/dL (06 Mar 2022 12:00)      RADIOLOGY & ADDITIONAL TESTS: Reviewed.

## 2022-03-07 NOTE — PROGRESS NOTE ADULT - SUBJECTIVE AND OBJECTIVE BOX
Patient is a 66y old  Male who presents with a chief complaint of Chest Pain (07 Mar 2022 15:25)      INTERVAL HPI/OVERNIGHT EVENTS:    Pt. seen and examined earlier today  Pt. c/o debility but otherwise had no complaints  CT findings d/w Pt., he verbalized understanding of HCC diagnosis  Pt. denies F/C, CP, SOB, abdominal pain  Tolerating PO  +BM x2, per report    Review of Systems: 12 point review of systems otherwise negative    MEDICATIONS  (STANDING):  artificial tears (preservative free) Ophthalmic Solution 1 Drop(s) Both EYES two times a day  aspirin enteric coated 81 milliGRAM(s) Oral daily  atorvastatin 80 milliGRAM(s) Oral at bedtime  ferrous    sulfate 325 milliGRAM(s) Oral <User Schedule>  folic acid 1 milliGRAM(s) Oral daily  heparin   Injectable 5000 Unit(s) SubCutaneous every 8 hours  lactulose Syrup 20 Gram(s) Oral three times a day  multivitamin 1 Tablet(s) Oral daily  nicotine - 21 mG/24Hr(s) Patch 1 patch Transdermal daily  pantoprazole    Tablet 40 milliGRAM(s) Oral daily  rifAXIMin 550 milliGRAM(s) Oral two times a day    MEDICATIONS  (PRN):  sodium chloride 0.65% Nasal 1 Spray(s) Both Nostrils four times a day PRN Nasal Congestion      Allergies    No Known Allergies    Intolerances          Vital Signs Last 24 Hrs  T(C): 36.7 (07 Mar 2022 10:06), Max: 36.7 (06 Mar 2022 16:54)  T(F): 98 (07 Mar 2022 10:06), Max: 98 (06 Mar 2022 16:54)  HR: 87 (07 Mar 2022 10:06) (74 - 87)  BP: 103/70 (07 Mar 2022 10:06) (97/64 - 120/78)  BP(mean): --  RR: 18 (07 Mar 2022 10:06) (17 - 18)  SpO2: 97% (07 Mar 2022 10:06) (97% - 98%)  CAPILLARY BLOOD GLUCOSE          03-06 @ 07:01  -  03-07 @ 07:00  --------------------------------------------------------  IN: 0 mL / OUT: 775 mL / NET: -775 mL        Physical Exam:  (earlier today)  Daily     Daily   General:  chronically-ill but comfortable-appearing in NAD  HEENT: MMM, anicteric  CV:  RRR, no JVD, +systolic murmur  Lungs:  CTA B/L  Abdomen:  soft NT protuberant  Extremities:  no edema B/L LE +clubbing  Skin:  WWP, no jaundice  :  +condom cath  Neuro:  AAOx2-3, forgetful, easily oriented, no asterixis    LABS:                        8.3    6.84  )-----------( 147      ( 07 Mar 2022 07:47 )             27.5     03-07    139  |  107  |  13  ----------------------------<  103<H>  3.9   |  19<L>  |  0.82    Ca    9.2      07 Mar 2022 07:47  Phos  3.5     03-07  Mg     2.0     03-07    TPro  7.7  /  Alb  2.9<L>  /  TBili  0.7  /  DBili  x   /  AST  74<H>  /  ALT  43  /  AlkPhos  105  03-07    PT/INR - ( 07 Mar 2022 07:48 )   PT: 15.2 sec;   INR: 1.27          PTT - ( 07 Mar 2022 07:48 )  PTT:62.1 sec  Urinalysis Basic - ( 07 Mar 2022 09:51 )    Color: Yellow / Appearance: Cloudy / SG: <=1.005 / pH: x  Gluc: x / Ketone: NEGATIVE  / Bili: Negative / Urobili: 4.0 E.U./dL   Blood: x / Protein: 100 mg/dL / Nitrite: POSITIVE   Leuk Esterase: Trace / RBC: < 5 /HPF / WBC < 5 /HPF   Sq Epi: x / Non Sq Epi: 0-5 /HPF / Bacteria: Many /HPF

## 2022-03-07 NOTE — CONSULT NOTE ADULT - ASSESSMENT
67 y/o M, current smoker (1ppd x 50 years), current every day ETOH abuse (1 pint vodka and several beers daily x 50 years), with PMHx of HLD, HTN (not on any medications),  CABG (LIMA to LAD, SVG to PDA in 2017), AS (s/p AVR in 2017), who presented to [facility] ED on 2/24/22 with complaints of 9/10 chest pain, found to have NSTEMI.  On CT imaging found to have 2.5 cm HCC. Medical oncology consulted for recommendations.    Hospital course summary:  Patient admitted for NSTEMI work up. TTE confirmed severe prosthetic valve stenosis and Structural heart was consulted. Patient was transferred to CT surgery for evaluation. Upon workup, patient found to need surgical AVR +/- aortic root repair, however preoperative workup revealed liver lesion with concern for HCC c/w cirrhosis and alcohol abuse. GI consulted for further evaluation. Recommending ammonia level and US abdomen with duplex to assess portal venous flow, and MR abdomen vs triple phase CT. The ammonia level was elevated, concern for hepatic encephalopathy, an NGT was placed and lactulose and rifaximin was initiated. Patient deemed to not be surgical candidate for AVR+ root repair.      Unable to appreciate much history from patient due to encephalopathy, he is sleepy, but arousable and oriented x4.  Unable to assess performance status due to current mental status.  Child-North score is B7    3/6/22 Triple phase CTAP:  2.5 cm left hepatic lesion consistent with LI-RADS v 2018 Category: LR-5   Definitely HCC. OPTN Category (if LR-5): 5B.    No locally invasive or metastatic disease. Left hepatic artery variant.   No additional suspicious hepatic lesions. Other incidental comments as   above.    2/28/22 CT angio AP:  1. Nodular liver contour, consistent with cirrhosis.  2. A 2.4 cm hypervascular liver mass is suspicious for hepatocellular   carcinoma. Recommend correlation with alpha-fetoprotein levels and MRI of   liver.  3. Severe atherosclerotic disease throughout the chest, abdomen and   pelvis, with large amount of calcified plaque in pelvic arteries   bilaterally and calcified and soft plaque in abdominal aorta. Multiple   stenoses, as described above.  4. Groundglass opacity in both lungs, probably edema.  5. Mild splenomegaly.      HCC 65 y/o M, current smoker (1ppd x 50 years), current every day ETOH abuse (1 pint vodka and several beers daily x 50 years), with PMHx of HLD, HTN (not on any medications),  CABG (LIMA to LAD, SVG to PDA in 2017), AS (s/p AVR in 2017), who presented to [facility] ED on 2/24/22 with complaints of 9/10 chest pain, found to have NSTEMI.  On CT imaging found to have 2.5 cm HCC. Medical oncology consulted for recommendations.    Hospital course summary:  Patient admitted for NSTEMI work up. TTE confirmed severe prosthetic valve stenosis and Structural heart was consulted. Patient was transferred to CT surgery for evaluation. Upon workup, patient found to need surgical AVR +/- aortic root repair, however preoperative workup revealed liver lesion with concern for HCC c/w cirrhosis and alcohol abuse. GI consulted for further evaluation. Recommending ammonia level and US abdomen with duplex to assess portal venous flow, and MR abdomen vs triple phase CT. The ammonia level was elevated, concern for hepatic encephalopathy, an NGT was placed and lactulose and rifaximin was initiated. Patient deemed to not be surgical candidate for AVR+ root repair.      Unable to appreciate much history from patient due to encephalopathy, he is sleepy, but arousable and oriented x4.  Unable to assess performance status due to current mental status.  Child-North score is B7    3/6/22 Triple phase CTAP:  2.5 cm left hepatic lesion consistent with LI-RADS v 2018 Category: LR-5   Definitely HCC. OPTN Category (if LR-5): 5B.    No locally invasive or metastatic disease. Left hepatic artery variant.   No additional suspicious hepatic lesions. Other incidental comments as   above.    2/28/22 CT angio AP:  1. Nodular liver contour, consistent with cirrhosis.  2. A 2.4 cm hypervascular liver mass is suspicious for hepatocellular   carcinoma. Recommend correlation with alpha-fetoprotein levels and MRI of   liver.  3. Severe atherosclerotic disease throughout the chest, abdomen and   pelvis, with large amount of calcified plaque in pelvic arteries   bilaterally and calcified and soft plaque in abdominal aorta. Multiple   stenoses, as described above.  4. Groundglass opacity in both lungs, probably edema.  5. Mild splenomegaly.      HCC  Child North score B7  AFP is 7  Portal HTN, no known varices (did not have EGD)  MELD score 13  Early stage disease    Recommend surgical oncology consultation for possible resection or ablation  Patient not candidate for transplant due to active etOH use    D/w .     65 y/o M, current smoker (1ppd x 50 years), current every day ETOH abuse (1 pint vodka and several beers daily x 50 years), with PMHx of HLD, HTN (not on any medications),  CABG (LIMA to LAD, SVG to PDA in 2017), AS (s/p AVR in 2017), who presented to [facility] ED on 2/24/22 with complaints of 9/10 chest pain, found to have NSTEMI.  On CT imaging found to have 2.5 cm HCC. Medical oncology consulted for recommendations.    Hospital course summary:  Patient admitted for NSTEMI work up. TTE confirmed severe prosthetic valve stenosis and Structural heart was consulted. Patient was transferred to CT surgery for evaluation. Upon workup, patient found to need surgical AVR +/- aortic root repair, however preoperative workup revealed liver lesion with concern for HCC c/w cirrhosis and alcohol abuse. GI consulted for further evaluation. Recommending ammonia level and US abdomen with duplex to assess portal venous flow, and MR abdomen vs triple phase CT. The ammonia level was elevated, concern for hepatic encephalopathy, an NGT was placed and lactulose and rifaximin was initiated. Patient deemed to not be surgical candidate for AVR+ root repair.      Unable to appreciate much history from patient due to encephalopathy, he is sleepy, but arousable and oriented x4.  Unable to assess performance status due to current mental status.  Child-North score is B7    3/6/22 Triple phase CTAP:  2.5 cm left hepatic lesion consistent with LI-RADS v 2018 Category: LR-5   Definitely HCC. OPTN Category (if LR-5): 5B.    No locally invasive or metastatic disease. Left hepatic artery variant.   No additional suspicious hepatic lesions. Other incidental comments as   above.    2/28/22 CT angio AP:  1. Nodular liver contour, consistent with cirrhosis.  2. A 2.4 cm hypervascular liver mass is suspicious for hepatocellular   carcinoma. Recommend correlation with alpha-fetoprotein levels and MRI of   liver.  3. Severe atherosclerotic disease throughout the chest, abdomen and   pelvis, with large amount of calcified plaque in pelvic arteries   bilaterally and calcified and soft plaque in abdominal aorta. Multiple   stenoses, as described above.  4. Groundglass opacity in both lungs, probably edema.  5. Mild splenomegaly.      HCC  Child North score B7  AFP is 7  Portal HTN, no known varices (did not have EGD)  MELD score 13  Early stage disease    Recommend surgical oncology consultation for possible resection or ablation    D/w .

## 2022-03-07 NOTE — OCCUPATIONAL THERAPY INITIAL EVALUATION ADULT - MANUAL MUSCLE TESTING RESULTS, REHAB EVAL
BUE/BLE at least 3+/5 except R shoulder flexion 3-/5 based on functional mobility assessment against gravity

## 2022-03-07 NOTE — PROGRESS NOTE ADULT - ASSESSMENT
67 YO M, current smoker (1ppd x 50 years), current every day ETOH abuse (1 pint vodka and several beers daily x 50 years), with PMHx of HLD, HTN (not on any medications), CABG (LIMA to LAD, SVG to PDA in 2017), AS (s/p AVR in 2017) with recently discovered restenosis, presented to [facility] ED on 2/24/22 with chest pain and episode of LOC, admitted to cardiology for NSTEMI with [facility] [place] with nonobstructive disease, then found to have restenosis of bioprosthetic valve for which aortic root surgery was planned, but now deferred given liver lesion c/f HCC. Now he is transferred to medicine for concern for mild hepatic encephalopathy. 67 YO M, current smoker (1ppd x 50 years), current every day ETOH abuse (1 pint vodka and several beers daily x 50 years), with PMHx of HLD, HTN (not on any medications), CABG (LIMA to LAD, SVG to PDA in 2017), AS (s/p AVR in 2017) with recently discovered restenosis, presented to [facility] ED on 2/24/22 with chest pain and episode of LOC, initially admitted to cardiology for NSTEMI with [facility] with nonobstructive disease, then found to have restenosis of bioprosthetic valve for which aortic root surgery was planned, but now deferred given liver lesion c/f HCC. Now he is transferred to medicine for concern for mild hepatic encephalopathy, however unlikely to have HE and is compensated cirrhotic. Now found to have HCC, pending further workup.

## 2022-03-07 NOTE — PROGRESS NOTE ADULT - PROBLEM SELECTOR PLAN 7
normocytic. Normal B12 and folic acid. Iron studies c/w VINCE, but likely multifactorial - also AOCD given cirrhosis and EtOH use  - c/w ferrous sulfate 325mg every other day

## 2022-03-07 NOTE — OCCUPATIONAL THERAPY INITIAL EVALUATION ADULT - LEVEL OF INDEPENDENCE: GROOMING, OT EVAL
Patient performed standing brushing teeth/washing face at sinkside with SBA- patient noted with leaning on LUE on tabletop requiring extra support 2/2 overall weakness/stand-by assist

## 2022-03-08 LAB
ANION GAP SERPL CALC-SCNC: 8 MMOL/L — SIGNIFICANT CHANGE UP (ref 5–17)
APTT BLD: 47.5 SEC — HIGH (ref 27.5–35.5)
BUN SERPL-MCNC: 20 MG/DL — SIGNIFICANT CHANGE UP (ref 7–23)
CALCIUM SERPL-MCNC: 9.2 MG/DL — SIGNIFICANT CHANGE UP (ref 8.4–10.5)
CHLORIDE SERPL-SCNC: 106 MMOL/L — SIGNIFICANT CHANGE UP (ref 96–108)
CO2 SERPL-SCNC: 23 MMOL/L — SIGNIFICANT CHANGE UP (ref 22–31)
CREAT SERPL-MCNC: 0.86 MG/DL — SIGNIFICANT CHANGE UP (ref 0.5–1.3)
EGFR: 96 ML/MIN/1.73M2 — SIGNIFICANT CHANGE UP
GLUCOSE SERPL-MCNC: 131 MG/DL — HIGH (ref 70–99)
HCT VFR BLD CALC: 23.4 % — LOW (ref 39–50)
HGB BLD-MCNC: 7.3 G/DL — LOW (ref 13–17)
INR BLD: 1.29 — HIGH (ref 0.88–1.16)
MAGNESIUM SERPL-MCNC: 2 MG/DL — SIGNIFICANT CHANGE UP (ref 1.6–2.6)
MCHC RBC-ENTMCNC: 27.7 PG — SIGNIFICANT CHANGE UP (ref 27–34)
MCHC RBC-ENTMCNC: 31.2 GM/DL — LOW (ref 32–36)
MCV RBC AUTO: 88.6 FL — SIGNIFICANT CHANGE UP (ref 80–100)
NRBC # BLD: 0 /100 WBCS — SIGNIFICANT CHANGE UP (ref 0–0)
PHOSPHATE SERPL-MCNC: 3.2 MG/DL — SIGNIFICANT CHANGE UP (ref 2.5–4.5)
PLATELET # BLD AUTO: 142 K/UL — LOW (ref 150–400)
POTASSIUM SERPL-MCNC: 4.1 MMOL/L — SIGNIFICANT CHANGE UP (ref 3.5–5.3)
POTASSIUM SERPL-SCNC: 4.1 MMOL/L — SIGNIFICANT CHANGE UP (ref 3.5–5.3)
PROTHROM AB SERPL-ACNC: 15.4 SEC — HIGH (ref 10.5–13.4)
RBC # BLD: 2.64 M/UL — LOW (ref 4.2–5.8)
RBC # FLD: 15.4 % — HIGH (ref 10.3–14.5)
SODIUM SERPL-SCNC: 137 MMOL/L — SIGNIFICANT CHANGE UP (ref 135–145)
WBC # BLD: 8.28 K/UL — SIGNIFICANT CHANGE UP (ref 3.8–10.5)
WBC # FLD AUTO: 8.28 K/UL — SIGNIFICANT CHANGE UP (ref 3.8–10.5)

## 2022-03-08 PROCEDURE — 99232 SBSQ HOSP IP/OBS MODERATE 35: CPT

## 2022-03-08 PROCEDURE — 99233 SBSQ HOSP IP/OBS HIGH 50: CPT | Mod: GC

## 2022-03-08 RX ORDER — THIAMINE MONONITRATE (VIT B1) 100 MG
500 TABLET ORAL EVERY 8 HOURS
Refills: 0 | Status: COMPLETED | OUTPATIENT
Start: 2022-03-08 | End: 2022-03-10

## 2022-03-08 RX ADMIN — Medication 1 PATCH: at 13:16

## 2022-03-08 RX ADMIN — HEPARIN SODIUM 5000 UNIT(S): 5000 INJECTION INTRAVENOUS; SUBCUTANEOUS at 21:33

## 2022-03-08 RX ADMIN — Medication 1 PATCH: at 21:52

## 2022-03-08 RX ADMIN — Medication 1 PATCH: at 13:19

## 2022-03-08 RX ADMIN — Medication 1 DROP(S): at 05:38

## 2022-03-08 RX ADMIN — Medication 1 DROP(S): at 18:48

## 2022-03-08 RX ADMIN — LACTULOSE 20 GRAM(S): 10 SOLUTION ORAL at 05:37

## 2022-03-08 RX ADMIN — HEPARIN SODIUM 5000 UNIT(S): 5000 INJECTION INTRAVENOUS; SUBCUTANEOUS at 13:16

## 2022-03-08 RX ADMIN — Medication 1 PATCH: at 06:15

## 2022-03-08 RX ADMIN — HEPARIN SODIUM 5000 UNIT(S): 5000 INJECTION INTRAVENOUS; SUBCUTANEOUS at 05:37

## 2022-03-08 RX ADMIN — Medication 1 MILLIGRAM(S): at 13:16

## 2022-03-08 RX ADMIN — ATORVASTATIN CALCIUM 80 MILLIGRAM(S): 80 TABLET, FILM COATED ORAL at 21:34

## 2022-03-08 RX ADMIN — Medication 105 MILLIGRAM(S): at 21:33

## 2022-03-08 RX ADMIN — Medication 81 MILLIGRAM(S): at 13:16

## 2022-03-08 RX ADMIN — Medication 1 TABLET(S): at 13:16

## 2022-03-08 RX ADMIN — PANTOPRAZOLE SODIUM 40 MILLIGRAM(S): 20 TABLET, DELAYED RELEASE ORAL at 06:59

## 2022-03-08 NOTE — PROGRESS NOTE ADULT - PROBLEM SELECTOR PLAN 3
as seen on triple-phase CT; Heme-Onc and Surg-Onc following, plan for MRI and possible chemoembolization; Palliative Care consulted, GOC discussions ongoing

## 2022-03-08 NOTE — PROGRESS NOTE ADULT - SUBJECTIVE AND OBJECTIVE BOX
incomplete   INTERVAL EVENTS: REHANA    SUBJECTIVE / INTERVAL HPI: Patient seen and examined at bedside. Does not report any complaints but limited historian.    ROS: negative unless otherwise stated above.    VITAL SIGNS:  Vital Signs Last 24 Hrs  T(C): 36.7 (08 Mar 2022 20:27), Max: 36.7 (08 Mar 2022 05:05)  T(F): 98.1 (08 Mar 2022 20:27), Max: 98.1 (08 Mar 2022 20:27)  HR: 96 (08 Mar 2022 20:27) (95 - 108)  BP: 105/70 (08 Mar 2022 20:27) (98/58 - 112/75)  BP(mean): --  RR: 18 (08 Mar 2022 20:27) (17 - 18)  SpO2: 97% (08 Mar 2022 20:27) (96% - 99%)      03-07-22 @ 07:01  -  03-08-22 @ 07:00  --------------------------------------------------------  IN: 0 mL / OUT: 525 mL / NET: -525 mL    03-08-22 @ 07:01  -  03-08-22 @ 21:32  --------------------------------------------------------  IN: 0 mL / OUT: 400 mL / NET: -400 mL        PHYSICAL EXAM:    General: NAD, sleeping comfortably, arousable to voice.  HEENT: MMM, LEIGH (sluggish), dirty sclera  Neck: supple  Cardiovascular: tachycardic, regular  Respiratory: CTA B/L anteriorly; no W/R/R  Gastrointestinal: soft, NT/ND, tympanic  Extremities: moving all extremities  Neurological: AAOx2-3; not participatory, symmetric face    MEDICATIONS:  MEDICATIONS  (STANDING):  artificial tears (preservative free) Ophthalmic Solution 1 Drop(s) Both EYES two times a day  aspirin enteric coated 81 milliGRAM(s) Oral daily  atorvastatin 80 milliGRAM(s) Oral at bedtime  ferrous    sulfate 325 milliGRAM(s) Oral <User Schedule>  folic acid 1 milliGRAM(s) Oral daily  heparin   Injectable 5000 Unit(s) SubCutaneous every 8 hours  lactulose Syrup 20 Gram(s) Oral three times a day  multivitamin 1 Tablet(s) Oral daily  nicotine - 21 mG/24Hr(s) Patch 1 patch Transdermal daily  pantoprazole    Tablet 40 milliGRAM(s) Oral daily  rifAXIMin 550 milliGRAM(s) Oral two times a day  thiamine IVPB 500 milliGRAM(s) IV Intermittent every 8 hours    MEDICATIONS  (PRN):  sodium chloride 0.65% Nasal 1 Spray(s) Both Nostrils four times a day PRN Nasal Congestion      ALLERGIES:  Allergies    No Known Allergies    Intolerances        LABS:                        7.3    8.28  )-----------( 142      ( 08 Mar 2022 08:00 )             23.4     03-08    137  |  106  |  20  ----------------------------<  131<H>  4.1   |  23  |  0.86    Ca    9.2      08 Mar 2022 08:00  Phos  3.2     03-08  Mg     2.0     03-08    TPro  7.7  /  Alb  2.9<L>  /  TBili  0.7  /  DBili  x   /  AST  74<H>  /  ALT  43  /  AlkPhos  105  03-07    PT/INR - ( 08 Mar 2022 08:00 )   PT: 15.4 sec;   INR: 1.29          PTT - ( 08 Mar 2022 08:00 )  PTT:47.5 sec  Urinalysis Basic - ( 07 Mar 2022 09:51 )    Color: Yellow / Appearance: Cloudy / SG: <=1.005 / pH: x  Gluc: x / Ketone: NEGATIVE  / Bili: Negative / Urobili: 4.0 E.U./dL   Blood: x / Protein: 100 mg/dL / Nitrite: POSITIVE   Leuk Esterase: Trace / RBC: < 5 /HPF / WBC < 5 /HPF   Sq Epi: x / Non Sq Epi: 0-5 /HPF / Bacteria: Many /HPF      CAPILLARY BLOOD GLUCOSE          RADIOLOGY & ADDITIONAL TESTS: Reviewed.

## 2022-03-08 NOTE — PROGRESS NOTE ADULT - PROBLEM SELECTOR PLAN 2
AAOx2 (person and place). Unlikely hepatic encephalopathy as no asterixis and is currently compensated. No obvious signs of infection. Neuro exam nonfocal. Possibly 2/2 effects of librium which was previously given to patient. As per wife, pt is not normally lethargic at home.  - monitor  - trend labs

## 2022-03-08 NOTE — PROGRESS NOTE ADULT - PROBLEM SELECTOR PLAN 3
2/2 long standing EtOH abuse. Likely compensated. No ascites. Likely no hepatic encephalopathy. No known EV bleeding.  - GI following  - Meld- NA 10  - no known Varices, but no prior EGD evaluation, he will need it outpatient

## 2022-03-08 NOTE — PROGRESS NOTE ADULT - ASSESSMENT
65 y/o M, current smoker (1ppd x 50 years), current every day ETOH abuse (1 pint vodka and several beers daily x 50 years), with PMHx of HLD, HTN (not on any medications),  CABG (LIMA to LAD, SVG to PDA in 2017), AS (s/p AVR in 2017), who presented to [facility] ED on 2/24/22 NSTEMI with LHC with nonobstructive disease, then found to have restenosis of bioprosthetic valve for which aortic root surgery was planned. Now w/ newly diagnosed 2.5cm left hepatic mass concerning for HCC found on CT AP. Clinically stable     No acute surgical intervention at this time  Recommend IR evaluation for TACE and ablation  Rest of care per primary team  Team 1c will continue to follow

## 2022-03-08 NOTE — PROGRESS NOTE ADULT - SUBJECTIVE AND OBJECTIVE BOX
SUBJECTIVE: Examined at the bedside in the am, lethargic and altered. No acute complaints of diarrhea, nausea, emesis, cp, sob.     aspirin enteric coated 81 milliGRAM(s) Oral daily  heparin   Injectable 5000 Unit(s) SubCutaneous every 8 hours  rifAXIMin 550 milliGRAM(s) Oral two times a day      Vital Signs Last 24 Hrs  T(C): 36.7 (08 Mar 2022 05:05), Max: 36.8 (07 Mar 2022 20:43)  T(F): 98 (08 Mar 2022 05:05), Max: 98.2 (07 Mar 2022 20:43)  HR: 95 (08 Mar 2022 07:08) (82 - 104)  BP: 112/75 (08 Mar 2022 05:05) (97/64 - 112/75)  BP(mean): --  RR: 18 (08 Mar 2022 05:05) (17 - 18)  SpO2: 99% (08 Mar 2022 05:05) (96% - 99%)  I&O's Detail    07 Mar 2022 07:01  -  08 Mar 2022 07:00  --------------------------------------------------------  IN:  Total IN: 0 mL    OUT:    Incontinent per Condom Catheter (mL): 525 mL  Total OUT: 525 mL    Total NET: -525 mL          Physical Exam  General: NAD, resting comfortably in bed  C/V: NSR  Pulm: Nonlabored breathing, no respiratory distress  Abd: softly distended, non tender, no rebound or guarding  Extrem: WWP, no edema,  Neuro: A/O x 1, CNs II-XII grossly intact, no focal deficits, normal sensation      LABS:                        7.3    8.28  )-----------( 142      ( 08 Mar 2022 08:00 )             23.4     03-07    139  |  107  |  13  ----------------------------<  103<H>  3.9   |  19<L>  |  0.82    Ca    9.2      07 Mar 2022 07:47  Phos  3.5     03-07  Mg     2.0     03-07    TPro  7.7  /  Alb  2.9<L>  /  TBili  0.7  /  DBili  x   /  AST  74<H>  /  ALT  43  /  AlkPhos  105  03-07    PT/INR - ( 08 Mar 2022 08:00 )   PT: 15.4 sec;   INR: 1.29          PTT - ( 08 Mar 2022 08:00 )  PTT:47.5 sec  Urinalysis Basic - ( 07 Mar 2022 09:51 )    Color: Yellow / Appearance: Cloudy / SG: <=1.005 / pH: x  Gluc: x / Ketone: NEGATIVE  / Bili: Negative / Urobili: 4.0 E.U./dL   Blood: x / Protein: 100 mg/dL / Nitrite: POSITIVE   Leuk Esterase: Trace / RBC: < 5 /HPF / WBC < 5 /HPF   Sq Epi: x / Non Sq Epi: 0-5 /HPF / Bacteria: Many /HPF        RADIOLOGY & ADDITIONAL STUDIES:  < from: CT Abdomen and Pelvis w/ IV Cont (03.06.22 @ 15:33) >  IMPRESSION:    2.5 cm left hepatic lesion consistent with LI-RADS v 2018 Category: LR-5   Definitely HCC. OPTN Category (if LR-5): 5B.    No locally invasive or metastatic disease. Left hepatic artery variant.   No additional suspicious hepatic lesions. Other incidental comments as   above.    < end of copied text >

## 2022-03-08 NOTE — PROGRESS NOTE ADULT - PROBLEM SELECTOR PLAN 5
S/p CABG with Dr Wilcox in 2017  - recent [facility] with nonobstructive disease  - c/w Lipitor 80mg QD  - c/w ASA 81mg

## 2022-03-08 NOTE — PROGRESS NOTE ADULT - PROBLEM SELECTOR PLAN 9
F: as appropriate  E: replenish as appropriate  N: high protein diet    VTE Prophylaxis: heparin TID subq  GI: protonix PO qd  C: Full Code  D: home when cancer workup completed

## 2022-03-08 NOTE — PROGRESS NOTE ADULT - ASSESSMENT
67 YO M, current smoker (1ppd x 50 years), current every day ETOH abuse (1 pint vodka and several beers daily x 50 years), with PMHx of HLD, HTN (not on any medications), CABG (LIMA to LAD, SVG to PDA in 2017), AS (s/p AVR in 2017) with recently discovered restenosis, presented to [facility] ED on 2/24/22 with chest pain and episode of LOC, initially admitted to cardiology for NSTEMI with [facility] with nonobstructive disease, then found to have restenosis of bioprosthetic valve for which aortic root surgery was planned, but now deferred given liver lesion c/f HCC. Now he is transferred to medicine for concern for mild hepatic encephalopathy, however unlikely to have HE and is compensated cirrhotic. Now found to have HCC, pending further workup.

## 2022-03-08 NOTE — PROGRESS NOTE ADULT - SUBJECTIVE AND OBJECTIVE BOX
Patient is a 66y old  Male who presents with a chief complaint of Chest Pain (08 Mar 2022 08:21)      INTERVAL HPI/OVERNIGHT EVENTS:    Pt. seen and examined earlier today  Pt. c/o generalized weakness but otherwise no complaints  Pt. denies F/C, CP, SOB, abdominal pain    Review of Systems: 12 point review of systems otherwise negative    MEDICATIONS  (STANDING):  artificial tears (preservative free) Ophthalmic Solution 1 Drop(s) Both EYES two times a day  aspirin enteric coated 81 milliGRAM(s) Oral daily  atorvastatin 80 milliGRAM(s) Oral at bedtime  ferrous    sulfate 325 milliGRAM(s) Oral <User Schedule>  folic acid 1 milliGRAM(s) Oral daily  heparin   Injectable 5000 Unit(s) SubCutaneous every 8 hours  lactulose Syrup 20 Gram(s) Oral three times a day  multivitamin 1 Tablet(s) Oral daily  nicotine - 21 mG/24Hr(s) Patch 1 patch Transdermal daily  pantoprazole    Tablet 40 milliGRAM(s) Oral daily  rifAXIMin 550 milliGRAM(s) Oral two times a day    MEDICATIONS  (PRN):  sodium chloride 0.65% Nasal 1 Spray(s) Both Nostrils four times a day PRN Nasal Congestion      Allergies    No Known Allergies    Intolerances          Vital Signs Last 24 Hrs  T(C): 36.3 (08 Mar 2022 10:53), Max: 36.8 (07 Mar 2022 20:43)  T(F): 97.4 (08 Mar 2022 10:53), Max: 98.2 (07 Mar 2022 20:43)  HR: 108 (08 Mar 2022 10:53) (87 - 108)  BP: 98/58 (08 Mar 2022 10:53) (98/58 - 112/75)  BP(mean): --  RR: 18 (08 Mar 2022 10:53) (17 - 18)  SpO2: 98% (08 Mar 2022 10:53) (96% - 99%)  CAPILLARY BLOOD GLUCOSE          03-07 @ 07:01  -  03-08 @ 07:00  --------------------------------------------------------  IN: 0 mL / OUT: 525 mL / NET: -525 mL    03-08 @ 07:01  -  03-08 @ 13:52  --------------------------------------------------------  IN: 0 mL / OUT: 400 mL / NET: -400 mL        Physical Exam:  (earlier today)  Daily     Daily   General:  chronically-ill but comfortable-appearing in NAD  HEENT: MMM, anicteric  CV:  RRR, no JVD, +systolic murmur  Lungs:  CTA B/L  Abdomen:  soft NT protuberant, no fluid wave  Extremities:  no edema B/L LE +clubbing  Skin:  WWP, no jaundice  :  +condom cath  Neuro:  AAOx2-3, forgetful, easily oriented, no asterixis    LABS:                        7.3    8.28  )-----------( 142      ( 08 Mar 2022 08:00 )             23.4     03-08    137  |  106  |  20  ----------------------------<  131<H>  4.1   |  23  |  0.86    Ca    9.2      08 Mar 2022 08:00  Phos  3.2     03-08  Mg     2.0     03-08    TPro  7.7  /  Alb  2.9<L>  /  TBili  0.7  /  DBili  x   /  AST  74<H>  /  ALT  43  /  AlkPhos  105  03-07    PT/INR - ( 08 Mar 2022 08:00 )   PT: 15.4 sec;   INR: 1.29          PTT - ( 08 Mar 2022 08:00 )  PTT:47.5 sec  Urinalysis Basic - ( 07 Mar 2022 09:51 )    Color: Yellow / Appearance: Cloudy / SG: <=1.005 / pH: x  Gluc: x / Ketone: NEGATIVE  / Bili: Negative / Urobili: 4.0 E.U./dL   Blood: x / Protein: 100 mg/dL / Nitrite: POSITIVE   Leuk Esterase: Trace / RBC: < 5 /HPF / WBC < 5 /HPF   Sq Epi: x / Non Sq Epi: 0-5 /HPF / Bacteria: Many /HPF

## 2022-03-08 NOTE — PROGRESS NOTE ADULT - PROBLEM SELECTOR PLAN 1
CTAP with 2.4 hypervascular liver mass. i/s/o Liver cirrhosis and EtOH use disorder. triple phase CT confirms HCC  - AFP is 7.2 which is normal  - Oncology consulted  - Surgical Oncology consulted, no surgical intervention  - IR consulted  - f/u abd MRI to verify number of lesions in liver

## 2022-03-09 LAB
ALBUMIN SERPL ELPH-MCNC: 2.8 G/DL — LOW (ref 3.3–5)
ALP SERPL-CCNC: 91 U/L — SIGNIFICANT CHANGE UP (ref 40–120)
ALT FLD-CCNC: 39 U/L — SIGNIFICANT CHANGE UP (ref 10–45)
AMPHET UR-MCNC: NEGATIVE — SIGNIFICANT CHANGE UP
ANION GAP SERPL CALC-SCNC: 11 MMOL/L — SIGNIFICANT CHANGE UP (ref 5–17)
AST SERPL-CCNC: 61 U/L — HIGH (ref 10–40)
BARBITURATES UR SCN-MCNC: NEGATIVE — SIGNIFICANT CHANGE UP
BENZODIAZ UR-MCNC: POSITIVE
BILIRUB DIRECT SERPL-MCNC: <0.2 MG/DL — SIGNIFICANT CHANGE UP (ref 0–0.3)
BILIRUB INDIRECT FLD-MCNC: SIGNIFICANT CHANGE UP MG/DL (ref 0.2–1)
BILIRUB SERPL-MCNC: 0.5 MG/DL — SIGNIFICANT CHANGE UP (ref 0.2–1.2)
BUN SERPL-MCNC: 20 MG/DL — SIGNIFICANT CHANGE UP (ref 7–23)
CALCIUM SERPL-MCNC: 9.4 MG/DL — SIGNIFICANT CHANGE UP (ref 8.4–10.5)
CHLORIDE SERPL-SCNC: 106 MMOL/L — SIGNIFICANT CHANGE UP (ref 96–108)
CO2 SERPL-SCNC: 23 MMOL/L — SIGNIFICANT CHANGE UP (ref 22–31)
COCAINE METAB.OTHER UR-MCNC: NEGATIVE — SIGNIFICANT CHANGE UP
CREAT SERPL-MCNC: 0.78 MG/DL — SIGNIFICANT CHANGE UP (ref 0.5–1.3)
EGFR: 98 ML/MIN/1.73M2 — SIGNIFICANT CHANGE UP
GLUCOSE SERPL-MCNC: 126 MG/DL — HIGH (ref 70–99)
HCT VFR BLD CALC: 22.5 % — LOW (ref 39–50)
HCT VFR BLD CALC: 22.6 % — LOW (ref 39–50)
HCT VFR BLD CALC: 23.2 % — LOW (ref 39–50)
HGB BLD-MCNC: 6.9 G/DL — CRITICAL LOW (ref 13–17)
HGB BLD-MCNC: 7 G/DL — CRITICAL LOW (ref 13–17)
HGB BLD-MCNC: 7.1 G/DL — LOW (ref 13–17)
MCHC RBC-ENTMCNC: 26.7 PG — LOW (ref 27–34)
MCHC RBC-ENTMCNC: 26.8 PG — LOW (ref 27–34)
MCHC RBC-ENTMCNC: 27.1 PG — SIGNIFICANT CHANGE UP (ref 27–34)
MCHC RBC-ENTMCNC: 30.6 GM/DL — LOW (ref 32–36)
MCHC RBC-ENTMCNC: 30.7 GM/DL — LOW (ref 32–36)
MCHC RBC-ENTMCNC: 31 GM/DL — LOW (ref 32–36)
MCV RBC AUTO: 87.2 FL — SIGNIFICANT CHANGE UP (ref 80–100)
MCV RBC AUTO: 87.5 FL — SIGNIFICANT CHANGE UP (ref 80–100)
MCV RBC AUTO: 87.6 FL — SIGNIFICANT CHANGE UP (ref 80–100)
METHADONE UR-MCNC: NEGATIVE — SIGNIFICANT CHANGE UP
NRBC # BLD: 0 /100 WBCS — SIGNIFICANT CHANGE UP (ref 0–0)
OPIATES UR-MCNC: NEGATIVE — SIGNIFICANT CHANGE UP
PCP SPEC-MCNC: SIGNIFICANT CHANGE UP
PCP UR-MCNC: NEGATIVE — SIGNIFICANT CHANGE UP
PLATELET # BLD AUTO: 146 K/UL — LOW (ref 150–400)
PLATELET # BLD AUTO: 151 K/UL — SIGNIFICANT CHANGE UP (ref 150–400)
PLATELET # BLD AUTO: 157 K/UL — SIGNIFICANT CHANGE UP (ref 150–400)
POTASSIUM SERPL-MCNC: 4 MMOL/L — SIGNIFICANT CHANGE UP (ref 3.5–5.3)
POTASSIUM SERPL-SCNC: 4 MMOL/L — SIGNIFICANT CHANGE UP (ref 3.5–5.3)
PROT SERPL-MCNC: 6.8 G/DL — SIGNIFICANT CHANGE UP (ref 6–8.3)
RBC # BLD: 2.58 M/UL — LOW (ref 4.2–5.8)
RBC # BLD: 2.58 M/UL — LOW (ref 4.2–5.8)
RBC # BLD: 2.65 M/UL — LOW (ref 4.2–5.8)
RBC # FLD: 15.6 % — HIGH (ref 10.3–14.5)
RBC # FLD: 15.8 % — HIGH (ref 10.3–14.5)
RBC # FLD: 15.9 % — HIGH (ref 10.3–14.5)
SARS-COV-2 RNA SPEC QL NAA+PROBE: NEGATIVE — SIGNIFICANT CHANGE UP
SODIUM SERPL-SCNC: 140 MMOL/L — SIGNIFICANT CHANGE UP (ref 135–145)
THC UR QL: NEGATIVE — SIGNIFICANT CHANGE UP
WBC # BLD: 8.65 K/UL — SIGNIFICANT CHANGE UP (ref 3.8–10.5)
WBC # BLD: 8.79 K/UL — SIGNIFICANT CHANGE UP (ref 3.8–10.5)
WBC # BLD: 9.17 K/UL — SIGNIFICANT CHANGE UP (ref 3.8–10.5)
WBC # FLD AUTO: 8.65 K/UL — SIGNIFICANT CHANGE UP (ref 3.8–10.5)
WBC # FLD AUTO: 8.79 K/UL — SIGNIFICANT CHANGE UP (ref 3.8–10.5)
WBC # FLD AUTO: 9.17 K/UL — SIGNIFICANT CHANGE UP (ref 3.8–10.5)

## 2022-03-09 PROCEDURE — 99232 SBSQ HOSP IP/OBS MODERATE 35: CPT

## 2022-03-09 PROCEDURE — 74183 MRI ABD W/O CNTR FLWD CNTR: CPT | Mod: 26

## 2022-03-09 PROCEDURE — 99233 SBSQ HOSP IP/OBS HIGH 50: CPT | Mod: GC

## 2022-03-09 PROCEDURE — 99223 1ST HOSP IP/OBS HIGH 75: CPT

## 2022-03-09 PROCEDURE — 99232 SBSQ HOSP IP/OBS MODERATE 35: CPT | Mod: GC

## 2022-03-09 PROCEDURE — 99358 PROLONG SERVICE W/O CONTACT: CPT

## 2022-03-09 PROCEDURE — 99253 IP/OBS CNSLTJ NEW/EST LOW 45: CPT

## 2022-03-09 RX ORDER — OXYMETAZOLINE HYDROCHLORIDE 0.5 MG/ML
1 SPRAY NASAL
Refills: 0 | Status: DISCONTINUED | OUTPATIENT
Start: 2022-03-09 | End: 2022-03-14

## 2022-03-09 RX ORDER — MUPIROCIN 20 MG/G
1 OINTMENT TOPICAL
Refills: 0 | Status: DISCONTINUED | OUTPATIENT
Start: 2022-03-09 | End: 2022-03-17

## 2022-03-09 RX ADMIN — Medication 105 MILLIGRAM(S): at 15:09

## 2022-03-09 RX ADMIN — Medication 1 PATCH: at 19:46

## 2022-03-09 RX ADMIN — Medication 105 MILLIGRAM(S): at 06:46

## 2022-03-09 RX ADMIN — Medication 1 DROP(S): at 06:48

## 2022-03-09 RX ADMIN — LACTULOSE 20 GRAM(S): 10 SOLUTION ORAL at 21:16

## 2022-03-09 RX ADMIN — Medication 325 MILLIGRAM(S): at 06:47

## 2022-03-09 RX ADMIN — HEPARIN SODIUM 5000 UNIT(S): 5000 INJECTION INTRAVENOUS; SUBCUTANEOUS at 21:15

## 2022-03-09 RX ADMIN — Medication 1 TABLET(S): at 13:54

## 2022-03-09 RX ADMIN — Medication 1 PATCH: at 06:44

## 2022-03-09 RX ADMIN — MUPIROCIN 1 APPLICATION(S): 20 OINTMENT TOPICAL at 19:27

## 2022-03-09 RX ADMIN — LACTULOSE 20 GRAM(S): 10 SOLUTION ORAL at 06:48

## 2022-03-09 RX ADMIN — HEPARIN SODIUM 5000 UNIT(S): 5000 INJECTION INTRAVENOUS; SUBCUTANEOUS at 14:38

## 2022-03-09 RX ADMIN — PANTOPRAZOLE SODIUM 40 MILLIGRAM(S): 20 TABLET, DELAYED RELEASE ORAL at 06:47

## 2022-03-09 RX ADMIN — Medication 1 PATCH: at 13:54

## 2022-03-09 RX ADMIN — HEPARIN SODIUM 5000 UNIT(S): 5000 INJECTION INTRAVENOUS; SUBCUTANEOUS at 06:47

## 2022-03-09 RX ADMIN — Medication 105 MILLIGRAM(S): at 23:12

## 2022-03-09 RX ADMIN — Medication 1 PATCH: at 13:30

## 2022-03-09 RX ADMIN — Medication 81 MILLIGRAM(S): at 13:52

## 2022-03-09 RX ADMIN — Medication 1 DROP(S): at 19:17

## 2022-03-09 RX ADMIN — LACTULOSE 20 GRAM(S): 10 SOLUTION ORAL at 14:37

## 2022-03-09 RX ADMIN — ATORVASTATIN CALCIUM 80 MILLIGRAM(S): 80 TABLET, FILM COATED ORAL at 21:16

## 2022-03-09 RX ADMIN — Medication 1 MILLIGRAM(S): at 13:53

## 2022-03-09 NOTE — PROGRESS NOTE ADULT - SUBJECTIVE AND OBJECTIVE BOX
Patient is a 66y old  Male who presents with a chief complaint of Chest Pain (09 Mar 2022 15:08)      INTERVAL HPI/OVERNIGHT EVENTS:    Pt. seen and examined earlier today  Pt. c/o generalized weakness  No new complaints  +BM x2, non-bloody, per report  Pt. reportedly had episodes of epistaxis the past few days that self-resolved  No F/C    Review of Systems: 12 point review of systems otherwise negative    MEDICATIONS  (STANDING):  artificial tears (preservative free) Ophthalmic Solution 1 Drop(s) Both EYES two times a day  aspirin enteric coated 81 milliGRAM(s) Oral daily  atorvastatin 80 milliGRAM(s) Oral at bedtime  ferrous    sulfate 325 milliGRAM(s) Oral <User Schedule>  folic acid 1 milliGRAM(s) Oral daily  heparin   Injectable 5000 Unit(s) SubCutaneous every 8 hours  lactulose Syrup 20 Gram(s) Oral three times a day  multivitamin 1 Tablet(s) Oral daily  mupirocin 2% Nasal 1 Application(s) Both Nostrils two times a day  nicotine - 21 mG/24Hr(s) Patch 1 patch Transdermal daily  pantoprazole    Tablet 40 milliGRAM(s) Oral daily  rifAXIMin 550 milliGRAM(s) Oral two times a day  thiamine IVPB 500 milliGRAM(s) IV Intermittent every 8 hours    MEDICATIONS  (PRN):  oxymetazoline 0.05% Nasal Spray 1 Spray(s) Both Nostrils two times a day PRN Nasal Congestion  sodium chloride 0.65% Nasal 1 Spray(s) Both Nostrils four times a day PRN Nasal Congestion      Allergies    No Known Allergies    Intolerances          Vital Signs Last 24 Hrs  T(C): 36.3 (09 Mar 2022 14:50), Max: 36.7 (08 Mar 2022 20:27)  T(F): 97.4 (09 Mar 2022 14:50), Max: 98.1 (08 Mar 2022 20:27)  HR: 87 (09 Mar 2022 14:50) (87 - 103)  BP: 95/63 (09 Mar 2022 14:50) (93/62 - 105/70)  BP(mean): --  RR: 16 (09 Mar 2022 14:50) (16 - 18)  SpO2: 97% (09 Mar 2022 14:50) (96% - 100%)  CAPILLARY BLOOD GLUCOSE          03-08 @ 07:01  -  03-09 @ 07:00  --------------------------------------------------------  IN: 0 mL / OUT: 400 mL / NET: -400 mL    03-09 @ 07:01  - 03-09 @ 15:24  --------------------------------------------------------  IN: 100 mL / OUT: 650 mL / NET: -550 mL        Physical Exam:  (earlier today)  Daily     Daily   General:  chronically-ill but comfortable-appearing in NAD  HEENT: MMM, anicteric  CV:  RRR, no JVD, +systolic murmur  Lungs:  CTA B/L  Abdomen:  soft NT protuberant, no fluid wave  Extremities:  no edema B/L LE +clubbing  Skin:  WWP, no jaundice  :  +condom cath  Neuro:  AAOx2-3, forgetful, easily oriented, no asterixis    LABS:                        6.9    8.79  )-----------( 146      ( 09 Mar 2022 08:20 )             22.5     03-09    140  |  106  |  20  ----------------------------<  126<H>  4.0   |  23  |  0.78    Ca    9.4      09 Mar 2022 08:20  Phos  3.2     03-08  Mg     2.0     03-08    TPro  6.8  /  Alb  2.8<L>  /  TBili  0.5  /  DBili  <0.2  /  AST  61<H>  /  ALT  39  /  AlkPhos  91  03-09    PT/INR - ( 08 Mar 2022 08:00 )   PT: 15.4 sec;   INR: 1.29          PTT - ( 08 Mar 2022 08:00 )  PTT:47.5 sec

## 2022-03-09 NOTE — PROGRESS NOTE ADULT - SUBJECTIVE AND OBJECTIVE BOX
INTERVAL EVENTS: REHANA    SUBJECTIVE / INTERVAL HPI: Patient seen and examined at bedside. Denies headache, chest pain, shortness of breath, nausea, vomiting, diarrhea, hematemesis or hematochezia. States that he had two formed bowel movements yesterday. Limited historian.    ROS: negative unless otherwise stated above.    Vital Signs Last 24 Hrs  T(C): 36.3 (09 Mar 2022 09:09), Max: 36.7 (08 Mar 2022 20:27)  T(F): 97.3 (09 Mar 2022 09:09), Max: 98.1 (08 Mar 2022 20:27)  HR: 94 (09 Mar 2022 09:09) (94 - 103)  BP: 93/63 (09 Mar 2022 09:09) (93/62 - 105/70)  BP(mean): --  RR: 16 (09 Mar 2022 09:09) (16 - 18)  SpO2: 100% (09 Mar 2022 09:09) (96% - 100%)      I&O's Summary    08 Mar 2022 07:01  -  09 Mar 2022 07:00  --------------------------------------------------------  IN: 0 mL / OUT: 400 mL / NET: -400 mL            PHYSICAL EXAM:    General: NAD, sleeping comfortably, arousable to voice.  HEENT: MMM, LEIGH (sluggish), dirty sclera  Neck: supple  Cardiovascular: regular rate and rhythm. Loud systolic murmur best heard at right 2nd ICS   Respiratory: CTA B/L anteriorly; no W/R/R  Gastrointestinal: soft, NT, mildly distended, tympanic  Extremities: moving all extremities  Neurological: AAOx2-3; symmetric face. No asterixis. Poor short-term memory. Limited cooperation.     MEDICATIONS  (STANDING):  artificial tears (preservative free) Ophthalmic Solution 1 Drop(s) Both EYES two times a day  aspirin enteric coated 81 milliGRAM(s) Oral daily  atorvastatin 80 milliGRAM(s) Oral at bedtime  ferrous    sulfate 325 milliGRAM(s) Oral <User Schedule>  folic acid 1 milliGRAM(s) Oral daily  heparin   Injectable 5000 Unit(s) SubCutaneous every 8 hours  lactulose Syrup 20 Gram(s) Oral three times a day  multivitamin 1 Tablet(s) Oral daily  nicotine - 21 mG/24Hr(s) Patch 1 patch Transdermal daily  pantoprazole    Tablet 40 milliGRAM(s) Oral daily  rifAXIMin 550 milliGRAM(s) Oral two times a day  thiamine IVPB 500 milliGRAM(s) IV Intermittent every 8 hours      MEDICATIONS  (PRN):  sodium chloride 0.65% Nasal 1 Spray(s) Both Nostrils four times a day PRN Nasal Congestion      ALLERGIES:  Allergies    No Known Allergies    Intolerances        LABS:                         6.9    8.79  )-----------( 146      ( 09 Mar 2022 08:20 )             22.5     03-09    140  |  106  |  20  ----------------------------<  126<H>  4.0   |  23  |  0.78    Ca    9.4      09 Mar 2022 08:20  Phos  3.2     03-08  Mg     2.0     03-08    TPro  6.8  /  Alb  2.8<L>  /  TBili  0.5  /  DBili  <0.2  /  AST  61<H>  /  ALT  39  /  AlkPhos  91  03-09      PTT - ( 08 Mar 2022 08:00 )  PTT:47.5 sec  PT/INR - ( 08 Mar 2022 08:00 )   PT: 15.4 sec;   INR: 1.29          Urinalysis Basic - ( 07 Mar 2022 09:51 )    Color: Yellow / Appearance: Cloudy / SG: <=1.005 / pH: x  Gluc: x / Ketone: NEGATIVE  / Bili: Negative / Urobili: 4.0 E.U./dL   Blood: x / Protein: 100 mg/dL / Nitrite: POSITIVE   Leuk Esterase: Trace / RBC: < 5 /HPF / WBC < 5 /HPF   Sq Epi: x / Non Sq Epi: 0-5 /HPF / Bacteria: Many /HPF      CAPILLARY BLOOD GLUCOSE          RADIOLOGY & ADDITIONAL TESTS: Reviewed.

## 2022-03-09 NOTE — CONSULT NOTE ADULT - ASSESSMENT
ASSESSMENT/PLAN:    IMPRESSION: TERESA ARCHIBALD  is a 66y Male PMH current every day ETOH abuse (1 pint vodka and several beers daily x 50 years), with PMHx of HLD, HTN (not on any medications), CABG (LIMA to LAD, SVG to PDA in 2017), AS (s/p AVR in 2017) with recently discovered restenosis,  initially admitted to cardiology for NSTEMI with [facility] with nonobstructive disease, then found to have restenosis of bioprosthetic valve for which aortic root surgery was planned. Now deferred given liver lesion, now found to have HCC, pending further workup. Inpatient found to have intermittent episodes of epistaxis that are self resolved.    RECOMMENDATIONS:  - Recommend investigating additional sources for drop in hemoglobin   - Recommend nasal saline spray q6hrs and PRN and Mupirocin to nares BID to prevent drying and rebleeding.   -If patient requires oxygen, recommend humidified oxygen via facemask.  - If patient rebleeds: Spray Afrin (oxymetazoline) liberally in both nares, sit patient upright with slight flexion of neck (to decrease aspiration risk) and apply constant external nasal compression on soft part of nose for 15 minutes (set a timer, do not release pressure to check if bleeding has stopped until 15 minutes is complete). If Epistaxis fails to remit, please contact ENT team  - Patient discussed with attending who agrees with the plan.     Thank you for the kind referral and for allowing me to share in the care of TERESA ARCHIBALD If you have any questions, please do not hesitate to contact me.     Sincerely,  Rosio Coronel PA-C  03-09-22 @ 12:31

## 2022-03-09 NOTE — PROGRESS NOTE ADULT - PROBLEM SELECTOR PLAN 2
AAOx2 (person and place). Unlikely hepatic encephalopathy as no asterixis and is currently compensated. No obvious signs of infection. Neuro exam nonfocal. Possibly related to chronic alcohol use, Wernicke's or depression. As per wife, pt is not normally lethargic at home.  - monitor mental status  - High dose thiamine x 3 days   - trend labs

## 2022-03-09 NOTE — PROGRESS NOTE ADULT - ASSESSMENT
67 YO M, current smoker (1ppd x 50 years), current every day ETOH abuse (1 pint vodka and several beers daily x 50 years), with PMHx of HLD, HTN (not on any medications), CABG (LIMA to LAD, SVG to PDA in 2017), AS (s/p AVR in 2017) with recently discovered restenosis, presented to [facility] ED on 2/24/22 with chest pain and episode of LOC, initially admitted to cardiology for NSTEMI with [facility] with nonobstructive disease, then found to have restenosis of bioprosthetic valve for which aortic root surgery was planned, but now deferred given liver lesion c/f HCC. Now he is transferred to medicine for concern for mild hepatic encephalopathy, however unlikely to have HE and is compensated cirrhotic. Now found to have HCC, pending further workup.

## 2022-03-09 NOTE — CONSULT NOTE ADULT - SUBJECTIVE AND OBJECTIVE BOX
OTOLARYNGOLOGY (ENT) CONSULTATION NOTE    PATIENT: TERESA ARCHIBALD     MRN: [id number]       : [birth year]  DATE OF ADMISSION:22  DATE OF SERVICE: 22 @ 12:31    CHIEF COMPLAINT: epistaxis     HISTORY OF PRESENT ILLNESS:  TERESA ARCHIBALD  is a 66y Male current smoker (1ppd x 50 years), current every day ETOH abuse (1 pint vodka and several beers daily x 50 years), with PMHx of HLD, HTN (not on any medications), CABG (LIMA to LAD, SVG to PDA in 2017), AS (s/p AVR in 2017) with recently discovered restenosis, presented to [facility] ED on 22 with chest pain and episode of LOC, initially admitted to cardiology for NSTEMI with [facility] with nonobstructive disease, then found to have restenosis of bioprosthetic valve for which aortic root surgery was planned, but now deferred given liver lesion c/f HCC. Now he is transferred to medicine for concern for mild hepatic encephalopathy, however unlikely to have HE and is compensated cirrhotic. Now found to have HCC, pending further workup. Patient reported to have intermittent episodes of epistaxis that are self resolved. Patient denies history of epistaxis requiring nasal packing or cauterization.  Denies any trauma to the nose. ENT consulted for epistaxis recommendations.       PAST MEDICAL HISTORY:  HTN (hypertension)  ETOH abuse  Smoker  Hyperlipidemia  Aortic valve stenosis, unspecified etiology      CURRENT MEDICATIONS   artificial tears (preservative free) Ophthalmic Solution 1 Drop(s) Both EYES two times a day  aspirin enteric coated 81 milliGRAM(s) Oral daily  atorvastatin 80 milliGRAM(s) Oral at bedtime  ferrous    sulfate 325 milliGRAM(s) Oral <User Schedule>  folic acid 1 milliGRAM(s) Oral daily  heparin   Injectable 5000 Unit(s) SubCutaneous every 8 hours  lactulose Syrup 20 Gram(s) Oral three times a day  multivitamin 1 Tablet(s) Oral daily  mupirocin 2% Nasal 1 Application(s) Both Nostrils two times a day  nicotine - 21 mG/24Hr(s) Patch 1 patch Transdermal daily  oxymetazoline 0.05% Nasal Spray 1 Spray(s) Both Nostrils two times a day PRN  pantoprazole    Tablet 40 milliGRAM(s) Oral daily  rifAXIMin 550 milliGRAM(s) Oral two times a day  sodium chloride 0.65% Nasal 1 Spray(s) Both Nostrils four times a day PRN  thiamine IVPB 500 milliGRAM(s) IV Intermittent every 8 hours      HOME MEDICATIONS:  folic acid 1 mg oral tablet: 1 tab(s) orally once a day  Multiple Vitamins oral tablet: 1 tab(s) orally once a day    ALLERGIES:  No Known Allergies      FAMILY HISTORY No reported history     SURGICAL HISTORY:  No significant past surgical history      REVIEW OF SYSTEMS: limited due to AMS    PHYSICAL EXAMINATION:    The pertinent positive and negative examination findings were:  gen: patient laying in bed, poor historian   Nares: Right nasal cavity dried blood, no active bleed, applied mupirocin ointment and surgicel to anterior nasal septum, left nasal cavity mild crusting, applied mupirocin   Oral: Tongue soft and flat, posterior pharynx clear, no evidence of bleeding, uvula midline  neck: Full ROM, soft and supple  Resp: regular respiration on room air, no stridor or wheezing     Vital Signs:  T(C): 36.3 (22 @ 09:09), Max: 36.7 (22 @ 20:27)  HR: 94 (22 @ 09:09) (94 - 103)  BP: 93/63 (22 @ 09:09) (93/62 - 105/70)  RR: 16 (22 @ 09:09) (16 - 18)  SpO2: 100% (22 @ 09:09) (96% - 100%)      IMAGING:  n/a        Labs:                  6.9    8.79  )-----------( 146      ( 09 Mar 2022 08:20 )             22.5        140  |  106  |  20  ----------------------------<  126<H>  4.0   |  23  |  0.78    Ca    9.4      09 Mar 2022 08:20  Phos  3.2       Mg     2.0         TPro  6.8  /  Alb  2.8<L>  /  TBili  0.5  /  DBili  <0.2  /  AST  61<H>  /  ALT  39  /  AlkPhos  91     PT/INR - ( 08 Mar 2022 08:00 )   PT: 15.4 sec;   INR: 1.29          PTT - ( 08 Mar 2022 08:00 )  PTT:47.5 [id number]

## 2022-03-09 NOTE — PROGRESS NOTE ADULT - PROBLEM SELECTOR PLAN 8
Long standing EtOH consumption. Not showing any signs of withdrawal at the moment. Last drink prior to admission which is almost 2 weeks ago. No need to monitor CIWA anymore.   - c/w Multivitamin and Folic Acid

## 2022-03-09 NOTE — CONSULT NOTE ADULT - PROBLEM SELECTOR RECOMMENDATION 7
.  Complex medical decision making / symptom management in the setting of advanced illness.    Will continue to follow for ongoing GOC discussion / titration of palliative regimen.   Emotional support provided, questions answered.  Active Psychosocial Referrals:  [x]Social Work/Case management []PT/OT []Chaplaincy []Hospice  []Patient/Family Support []Holistic RN []Massage Therapy []Ethics  Coping: [] well [] with difficulty [x] poor coping [] unable to assess  Support system: [] strong [] adequate [x] inadequate    For new or uncontrolled symptoms, please call Palliative Care at [phone]. The service is available 24/7 (including nights & weekends) to provide symptom management recommendations over the phone as appropriate

## 2022-03-09 NOTE — PROGRESS NOTE ADULT - PROBLEM SELECTOR PLAN 7
normocytic. Normal B12 and folic acid. Iron studies c/w VINCE, but likely multifactorial - also AOCD given cirrhosis and EtOH use  - c/w ferrous sulfate 325mg every other day  - Hgb 6.9 on 3/9, plan to transfuse 1 unit PRBC  - No obvious source of bleeding. No melena or hematemesis.

## 2022-03-09 NOTE — PROGRESS NOTE ADULT - PROBLEM SELECTOR PLAN 1
unclear etiology, possibly due to acute blood loss due to epistaxis episodes, which self-resolved; ENT consulted, f/u recs; no e/o acute GI blood loss, but will monitor; VSS at baseline; will transfuse 1U PRBCs today; monitor CBC, goal Hb > 7

## 2022-03-09 NOTE — PROGRESS NOTE ADULT - PROBLEM SELECTOR PLAN 5
S/p CABG with Dr Wilcox in 2017  - recent [facility] with nonobstructive disease  - c/w Lipitor 80mg QD  - c/w ASA 81mg

## 2022-03-09 NOTE — PROGRESS NOTE ADULT - SUBJECTIVE AND OBJECTIVE BOX
SUBJECTIVE: Examined at the bedside in the am, continues to be lethargic and altered. No acute complaints of diarrhea, nausea, emesis, cp, sob, lightheadedness.     aspirin enteric coated 81 milliGRAM(s) Oral daily  heparin   Injectable 5000 Unit(s) SubCutaneous every 8 hours  rifAXIMin 550 milliGRAM(s) Oral two times a day      Vital Signs Last 24 Hrs  T(C): 36.3 (09 Mar 2022 14:50), Max: 36.7 (08 Mar 2022 20:27)  T(F): 97.4 (09 Mar 2022 14:50), Max: 98.1 (08 Mar 2022 20:27)  HR: 87 (09 Mar 2022 14:50) (87 - 103)  BP: 95/63 (09 Mar 2022 14:50) (93/62 - 105/70)  BP(mean): --  RR: 16 (09 Mar 2022 14:50) (16 - 18)  SpO2: 97% (09 Mar 2022 14:50) (96% - 100%)  I&O's Detail    08 Mar 2022 07:01  -  09 Mar 2022 07:00  --------------------------------------------------------  IN:  Total IN: 0 mL    OUT:    Incontinent per Condom Catheter (mL): 400 mL  Total OUT: 400 mL    Total NET: -400 mL      09 Mar 2022 07:01  -  09 Mar 2022 15:35  --------------------------------------------------------  IN:    IV PiggyBack: 100 mL  Total IN: 100 mL    OUT:    Incontinent per Condom Catheter (mL): 650 mL  Total OUT: 650 mL    Total NET: -550 mL          Physical Exam  General: NAD, resting comfortably in bed  C/V: NSR  Pulm: Nonlabored breathing, no respiratory distress  Abd: soft, distended, non tender, no rebound or guarding  Extrem: WWP, no edema,  Neuro: A/O x 3, CNs II-XII grossly intact, no focal deficits, normal sensation  Pulses: palpable distal pulses    LABS:                        6.9    8.79  )-----------( 146      ( 09 Mar 2022 08:20 )             22.5     03-09    140  |  106  |  20  ----------------------------<  126<H>  4.0   |  23  |  0.78    Ca    9.4      09 Mar 2022 08:20  Phos  3.2     03-08  Mg     2.0     03-08    TPro  6.8  /  Alb  2.8<L>  /  TBili  0.5  /  DBili  <0.2  /  AST  61<H>  /  ALT  39  /  AlkPhos  91  03-09    PT/INR - ( 08 Mar 2022 08:00 )   PT: 15.4 sec;   INR: 1.29          PTT - ( 08 Mar 2022 08:00 )  PTT:47.5 sec      RADIOLOGY & ADDITIONAL STUDIES:  < from: CT Abdomen and Pelvis w/ IV Cont (03.06.22 @ 15:33) >    IMPRESSION:    2.5 cm left hepatic lesion consistent with LI-RADS v 2018 Category: LR-5   Definitely HCC. OPTN Category (if LR-5): 5B.    No locally invasive or metastatic disease. Left hepatic artery variant.   No additional suspicious hepatic lesions. Other incidental comments as   above.        < end of copied text >

## 2022-03-09 NOTE — CONSULT NOTE ADULT - PROBLEM SELECTOR RECOMMENDATION 4
.  Newly diagnosed  -pending Abd MRI to guide treatment  -localized disease, tentative plan for TACE + ablation  -wife is amenable with pursuing treatment if offered

## 2022-03-09 NOTE — PROGRESS NOTE ADULT - ASSESSMENT
65 y/o M, current smoker (1ppd x 50 years), current every day ETOH abuse (1 pint vodka and several beers daily x 50 years), with PMHx of HLD, HTN (not on any medications),  CABG (LIMA to LAD, SVG to PDA in 2017), AS (s/p AVR in 2017), who presented to [facility] ED on 2/24/22 with complaints of 9/10 chest pain with associated diaphoresis with exertion, admitted for NSTEMI, transferred to CTsx service for severe AS, requiring a TAVR procedure; upon workup for TAVR, patient incidentally noted to have a 2.4 cm hypervascular lesion for which GI was consulted, CT A/P concerning for HCC. Now Gi re-consulted for AMS.     #Hepatic Lesion  Patient presented to [facility] ED on 2/24/22 with complaints of 9/10 chest pain with associated diaphoresis with exertion, admitted for NSTEMI, transferred to CTsx service for severe AS, requiring a TAVR procedure. During workup for TAVR, CTA A/P (2/28) revealing a nodular liver contour, c/w cirrhosis, A 2.4 cm hypervascular liver mass suspicious for HCC, as well as splenomegaly. AFP 7.2 (WNL), While AFP noted to be normal, does not preclude risk of possibility of HCC, especially in the setting of what appears to be cirrhosis 2/2 underlying long-standing history of EtOH use, which places him at higher risk.   - CT A/P (3/6) revealing 2.5 cm left hepatic lesion consistent with LI-RADS v 2018 Category: LR-5 Definitely HCC. OPTN Category (if LR-5): 5B. No locally invasive or metastatic disease. Left hepatic artery variant. No additional suspicious hepatic lesions.  - In light of active EtOH use, would not qualify for liver transplant evaluation   - Not a surgical candidate as per surgical onc team  - Appreciate ongoing Heme/Onc recs  - Awaiting MRI Abdomen to further workup hepatic lesion and if an ideal candidate for TACE or ablation    #Cirrhosis, likely compensated (-HE, -ascites, - no known EV bleeding)  CTA A/P with radiographic findings c/f cirrhosis, including nodular liver contour and splenomegaly, which is suggestive likely portal HTN. Likely in the setting of long-standing history of EtOH abuse.   MELD-Na: 10 (based on 3/7/22 BW, no AM coags on 3/9)  HE: [id number] (less conversive on exam today), no asterixis   Ascites: None present on CT imaging  HCC: CTA A/P (2/28) with 2.4 cm hypervascular lesion. CT A/P (3/6), lesion c/w HCC  Varices: No prior EGD evaluation, would be warranted, can pursue as an outpatient  - Abdominal US with duplex (3/6) The portal veins, hepatic veins and hepatic arteries are patent with normal directionality of flow. Cirrhosis. Since 2/28/2022, again a 2.9 cm heterogeneous lesion is present within the left lobe of the liver (see CT findings as noted above, appears to be c/w HCC). Awaiting MRI as per IR  - Daily CMPs & Coags to calculate MELD-Na  - Nutrition consult  - High protein diet  -  on alcohol cessation  - c/w Rifaximin 500 mg BID & lactulose, titrate to 2-3 BMs/day    #AMS  Unclear etiology for AMS this am with increased lethargy. Ddx includes HE, infection, librium, delirium. The elevated ammonia lever to 123 could be due to his nose bleed, which has been ongoing since 3/5. He does not have Asterixis on exam and is A&O with understanding of where he is and why he is hospitalized. If HE, unclear the inciting event to cause decompensation. Additionally, RUQ US today w/o ascites. Most likely this is build up of librium that is being poorly metabolized due to his liver function.  - Continue to hold librium  - c/w Rifaximin 500 mg BID & lactulose, titrate to 2-3 BMs/day  - CXR (3/6/22) negative for infiltrates  - UA with no pyuria however +bacteria, + nitrite, trace LE, abxs as per primary team. If with no improvement in MS, consider initiation of abxs    #Anemia   With slow downtrend in H/H, Hgb 6.9. With dried blood on right nare, noted week prior to have blood from right nare along with leslie blood in oropharynx, currently with no blood in oropharynx on today's exam. With no overt bleeding including melena, hematochezia, hematemesis, coffee ground emesis.   - Monitor H/H  - Transfusion goal as per primary team  - Consider ENT evaluation for persistent nose bleeding     [place] [facility] [place].     So Guthrie DO  Gastroenterology Fellow  Pager: [phone]

## 2022-03-09 NOTE — PROGRESS NOTE ADULT - SUBJECTIVE AND OBJECTIVE BOX
GASTROENTEROLOGY PROGRESS NOTE  Patient seen and examined at bedside. Hgb further downtrending to 6.9 this AM, down from 7.3.    ROS: not conversive this morning    PERTINENT REVIEW OF SYSTEMS:  CONSTITUTIONAL: No weakness, fevers or chills  HEENT: No visual changes; No vertigo or throat pain   GASTROINTESTINAL: As above.  NEUROLOGICAL: No numbness or weakness  SKIN: No itching, burning, rashes, or lesions     Allergies    No Known Allergies    Intolerances      MEDICATIONS:  MEDICATIONS  (STANDING):  artificial tears (preservative free) Ophthalmic Solution 1 Drop(s) Both EYES two times a day  aspirin enteric coated 81 milliGRAM(s) Oral daily  atorvastatin 80 milliGRAM(s) Oral at bedtime  ferrous    sulfate 325 milliGRAM(s) Oral <User Schedule>  folic acid 1 milliGRAM(s) Oral daily  heparin   Injectable 5000 Unit(s) SubCutaneous every 8 hours  lactulose Syrup 20 Gram(s) Oral three times a day  multivitamin 1 Tablet(s) Oral daily  mupirocin 2% Nasal 1 Application(s) Both Nostrils two times a day  nicotine - 21 mG/24Hr(s) Patch 1 patch Transdermal daily  pantoprazole    Tablet 40 milliGRAM(s) Oral daily  rifAXIMin 550 milliGRAM(s) Oral two times a day  thiamine IVPB 500 milliGRAM(s) IV Intermittent every 8 hours    MEDICATIONS  (PRN):  oxymetazoline 0.05% Nasal Spray 1 Spray(s) Both Nostrils two times a day PRN Nasal Congestion  sodium chloride 0.65% Nasal 1 Spray(s) Both Nostrils four times a day PRN Nasal Congestion    Vital Signs Last 24 Hrs  T(C): 36.3 (09 Mar 2022 09:09), Max: 36.7 (08 Mar 2022 20:27)  T(F): 97.3 (09 Mar 2022 09:09), Max: 98.1 (08 Mar 2022 20:27)  HR: 94 (09 Mar 2022 09:09) (94 - 103)  BP: 93/63 (09 Mar 2022 09:09) (93/62 - 105/70)  BP(mean): --  RR: 16 (09 Mar 2022 09:09) (16 - 18)  SpO2: 100% (09 Mar 2022 09:09) (96% - 100%)    03-08 @ 07:01  -  03-09 @ 07:00  --------------------------------------------------------  IN: 0 mL / OUT: 400 mL / NET: -400 mL    03-09 @ 07:01 - 03-09 @ 13:15  --------------------------------------------------------  IN: 0 mL / OUT: 650 mL / NET: -650 mL      PHYSICAL EXAM:    General: male, lying in bed; in no acute distress, not conversive  HEENT: MMM, conjunctiva and sclera clear  Gastrointestinal: Soft, obese, distended abdomen; non-tender; Normal bowel sounds; No rebound or guarding  Extremities: Normal range of motion, No clubbing, cyanosis or edema  Neurological: Alert and oriented x1-2, no asterixis  Skin: Warm and dry. No obvious rash    LABS:                        6.9    8.79  )-----------( 146      ( 09 Mar 2022 08:20 )             22.5     03-09    140  |  106  |  20  ----------------------------<  126<H>  4.0   |  23  |  0.78    Ca    9.4      09 Mar 2022 08:20  Phos  3.2     03-08  Mg     2.0     03-08    TPro  6.8  /  Alb  2.8<L>  /  TBili  0.5  /  DBili  <0.2  /  AST  61<H>  /  ALT  39  /  AlkPhos  91  03-09    PT/INR - ( 08 Mar 2022 08:00 )   PT: 15.4 sec;   INR: 1.29          PTT - ( 08 Mar 2022 08:00 )  PTT:47.5 sec                  RADIOLOGY & ADDITIONAL STUDIES:  Reviewed

## 2022-03-09 NOTE — CONSULT NOTE ADULT - PROBLEM SELECTOR RECOMMENDATION 5
.  Could be a hospice qualifying diagnosis  -limited life expectancy could be supported by HCC and active EtOH use  -not eligible for hospice given lack of insurance benefit

## 2022-03-09 NOTE — CONSULT NOTE ADULT - PROBLEM SELECTOR RECOMMENDATION 2
.  PPSV = 50%, requires assistance for most ADLs  -PT Eval recommending [facility] but no insurance coverage  -c/w supportive care, OOB, encourage movement

## 2022-03-09 NOTE — CONSULT NOTE ADULT - SUBJECTIVE AND OBJECTIVE BOX
[facility] Geriatrics and Palliative Care  Min Ram, Palliative Care Attending  Contact Info: Call [phone] (HEAL Line) or message on Microsoft Teams (Min Ram)    HPI:  Patient is a 67 y/o M, current smoker (1ppd x 50 years), current every day ETOH abuse (1 pint vodka and several beers daily x 50 years), with PMHx of HLD, HTN (not on any medications),  CABG (LIMA to LAD, SVG to PDA in 2017), AS (s/p AVR in 2017), who presented to [facility] ED on 2/24/22 with complaints of 9/10 chest pain with associated diaphoresis that occurred when walking to the bathroom last night around 1 am. Patient states that during this time, he fell and "blacked out" and hit the back of his head. Patient then went back to sleep. Patient states that he had a similiar chest pain 3 days ago that resolved on his own. Patient's wife states that patient has not seen a physician for over 2 years due to insurance issues.  Patient states last drink was 2 shots of vodka last night. Patient denies current chest pain, palpitations, dizziness, diaphoresis, headache, fever, chills, abdominal pain, back pain, n/v/d, recent travel or sick contacts.     In the ED, VS: 97.2, HR: 85 bpm, BP: 121/75 mmHg, RR: 18 breaths/min, spO2: 100% on RA   Labs significant for: Hgb/Hct: 9.5/31.4, AST: 51, Troponin: 0.02. EKG: NSR @ 85 bpm, ST depressions in II, V4, V5, V6. CXR: No cardiopulmonary edema   Patient admitted to cardiology/telemetry for further management of NSTEMI.    (24 Feb 2022 11:57)    PERTINENT PM/SXH:   HTN (hypertension)    ETOH abuse    Smoker    Hyperlipidemia    Aortic valve stenosis, unspecified etiology      No significant past surgical history      FAMILY HISTORY:   Non-contributory in first degree relatives according to chart  Unable to obtain due to patient's encephalopathy    ITEMS NOT CHECKED ARE NOT PRESENT  SOCIAL HISTORY:   Significant other/partner:  []  Children:  []   Substance hx:  []   Tobacco hx:  []   Alcohol hx: []   Home Opioid hx:  [] I-Stop Reference No:  - no active Rx's / see chart note  Living Situation: []Home  []Long term care  []Rehab []Other  Sabianism/Spiritual practice: ; Role of organized Yarsanism [ ] important [ ] some [ ] unable to assess  Coping: [ ] well [ ] with difficulty [ ] poor coping [ ] unable to assess  Support system: [ ] strong [ ] adequate [ ] inadequate    ADVANCE DIRECTIVES:    []MOLST  []Living Will  DECISION MAKER(s):  [] Health Care Proxy(s)  [] Surrogate(s)  [] Guardian           Name(s)/Phone Number(s):     BASELINE (I)ADLs (prior to admission):  [place]: []Total  [] Moderate []Dependent    ALLERGIES:  No Known Allergies    MEDICATIONS  (STANDING):  artificial tears (preservative free) Ophthalmic Solution 1 Drop(s) Both EYES two times a day  aspirin enteric coated 81 milliGRAM(s) Oral daily  atorvastatin 80 milliGRAM(s) Oral at bedtime  ferrous    sulfate 325 milliGRAM(s) Oral <User Schedule>  folic acid 1 milliGRAM(s) Oral daily  heparin   Injectable 5000 Unit(s) SubCutaneous every 8 hours  lactulose Syrup 20 Gram(s) Oral three times a day  multivitamin 1 Tablet(s) Oral daily  mupirocin 2% Nasal 1 Application(s) Both Nostrils two times a day  nicotine - 21 mG/24Hr(s) Patch 1 patch Transdermal daily  pantoprazole    Tablet 40 milliGRAM(s) Oral daily  rifAXIMin 550 milliGRAM(s) Oral two times a day  thiamine IVPB 500 milliGRAM(s) IV Intermittent every 8 hours    MEDICATIONS  (PRN):  oxymetazoline 0.05% Nasal Spray 1 Spray(s) Both Nostrils two times a day PRN Nasal Congestion  sodium chloride 0.65% Nasal 1 Spray(s) Both Nostrils four times a day PRN Nasal Congestion    Analgesic Use (Scheduled and PRNs) for past 24 hours:    nicotine - 21 mG/24Hr(s) Patch   1 Patch Transdermal (03-09-22 @ 13:54)      PRESENT SYMPTOMS/REVIEW OF SYSTEMS: []Unable to obtain due to poor mentation/encephalopathy  Source if other than patient:  []Family   []Team     Pain: [ ] yes [ ] no  QOL Impact -   Location -                    Aggravating Factors -  Quality -  Radiation -  Timing -  Severity (0-10 scale) -   Minimal Acceptable Level (0-10 scale) -    CPOT Score:  (Pain Assessment Scale for Critically Ill)    PAIN AD Score:  (Nonverbal Pain Assessment Scale)    Dyspnea:                           []Mild  []Moderate []Severe  Anxiety:                             []Mild []Moderate []Severe  Fatigue:                             []Mild []Moderate []Severe  Nausea:                             []Mild []Moderate []Severe  Loss of Appetite:              []Mild []Moderate []Severe  Constipation:                    []Mild []Moderate []Severe    Other Symptoms:  [x]All Other Review Of Systems Negative     Palliative Performance Status Version 2:  %  (Functional Assessment Tool)    PHYSICAL EXAM:  GENERAL:  []Alert  []Oriented x   []Lethargic  []Cachexia  []Unarousable  []Verbal  []Non-Verbal  Behavioral:   []Anxiety  []Delirium []Agitation []Cooperative  HEENT:  []Normal   []Dry mouth   []ET Tube/Trach  []Oral lesions  PULMONARY:   []Clear []Tachypnea  []Audible excessive secretions   []Rhonchi        []Right []Left []Bilateral  []Crackles        []Right []Left []Bilateral  []Wheezing     []Right []Left []Bilateral  CARDIOVASCULAR:    []Regular []Irregular []Tachy  []Dominick []Murmur []Other  GASTROINTESTINAL:  []Soft  []Distended   []+BS  []Non tender []Tender  []PEG []OGT/ NGT  Last BM:  GENITOURINARY:  []Normal [] Incontinent   []Oliguria/Anuria   []Sanchez  MUSCULOSKELETAL:   []Normal   []Weakness  []Bed/Wheelchair bound []Edema  NEUROLOGIC:   []No focal deficits  []Cognitive impairment  []Dysphagia []Dysarthria []Paresis []Encephalopathic   SKIN:   []Normal   []Pressure ulcer(s)  []Rash    CRITICAL CARE:  [ ]Shock Present  [ ]Septic [ ]Cardiogenic [ ]Neurologic [ ]Hypovolemic  [ ]Vasopressors [ ]Inotropes   [ ]Respiratory failure present [ ]Mechanical Ventilation [ ]Non-invasive ventilatory support [ ]High-Flow  [ ]Acute  [ ]Chronic [ ]Hypoxic  [ ]Hypercarbic  [ ]Other organ failure    Vital Signs Last 24 Hrs  T(C): 36.3 (09 Mar 2022 14:50), Max: 36.7 (08 Mar 2022 20:27)  T(F): 97.4 (09 Mar 2022 14:50), Max: 98.1 (08 Mar 2022 20:27)  HR: 87 (09 Mar 2022 14:50) (87 - 103)  BP: 95/63 (09 Mar 2022 14:50) (93/62 - 105/70)  BP(mean): --  RR: 16 (09 Mar 2022 14:50) (16 - 18)  SpO2: 97% (09 Mar 2022 14:50) (96% - 100%)    LABS:                        6.9    8.79  )-----------( 146      ( 09 Mar 2022 08:20 )             22.5   03-09    140  |  106  |  20  ----------------------------<  126<H>  4.0   |  23  |  0.78    Ca    9.4      09 Mar 2022 08:20  Phos  3.2     03-08  Mg     2.0     03-08    TPro  6.8  /  Alb  2.8<L>  /  TBili  0.5  /  DBili  <0.2  /  AST  61<H>  /  ALT  39  /  AlkPhos  91  03-09    RADIOLOGY & ADDITIONAL STUDIES:      PROTEIN CALORIE MALNUTRITION PRESENT: [ ]mild [ ]moderate [ ]severe [ ]underweight [ ]morbid obesity  []PPSV2 < or = to 30% []significant weight loss  []poor nutritional intake []catabolic state []anasarca     Artificial Nutrition []     REFERRALS:  [x]Social Work  []Case management []PT/OT []Chaplaincy  []Hospice  []Patient/Family Support []Massage Therapy    DISCUSSION OF CASE: Family - to obtain additional history and to provide emotional support; ( ) -     Care Coordination/Goals of Care Document:                                          Progress Notes    PROGRESS NOTE  Date & Time of Note   2022-03-09 15:08    Notes    Notes: Case discussed in 3 pm IDR, as per team, pt's hgb 6.9 and received 1  unit PRBCs. Ent consulted for epistaxis. CM remains available.       Electronically signed by:  Daniel Dunn  Electronically signed on:  2022-03-09  15:09           PALLIATIVE MEDICINE COORDINATION OF CARE DOCUMENTATION: [x] Inpatient Consult  Non-Face-to-Face prolonged service provided that relates to (face-to-face) care that has or will occur and ongoing patient management, including one or more of the following: - Reviewed documentation from other physicians and other health care professional services - Reviewed medical records and diagnostic / radiology study results - Coordination with patient's support system  ************************************************************************  MEDICATION REVIEW:  - See Medication List Above    ISTOP REFERENCE: [id number]  - no active Rx's  - PRN usage: NO PRN'S  ------------------------------------------------------------------------  COORDINATION OF CARE:  - Palliative Care consulted for: GOC  - Patient (to be) assessed:  - Patient previously seen by Palliative Care service: NO    ADVANCE CARE PLANNING  - Code status: Full Code  - MOLST reviewed in chart: NONE; None found on Alpha  - HCP/Surrogate:   - [facility] documents: NONE found on [place]  - HCP/Living will/Other Advanced Directives in Alpha: NONE found on Alpha  ------------------------------------------------------------------------  CARE PROVIDER DOCUMENTATION:  - SW/CM notes: Remains medically active    PLAN OF CARE  - Known Admissions in Past Year:  - Current Admit Date:  - LOS:  - LACE score:   - Current Dispo Plan: TO BE DETERMINED    02-24-22 (13d)  ------------------------------------------------------------------------  - Time Spent/Chart reviewed: 31 Minutes [including time used to gather, review and transfer data]  - Start:  - End:    Prolonged services rendered, as part of this patient's care provided by Palliative Medicine, include: i. chart review for provider and ancillary service documentation, ii. pertinent diagnostics including laboratory and imaging studies, iii. medication review including PRN use, iv. admission history including previous palliative care encounters and GOC notes, v. advance care planning documents including HCP and MOLST forms in Alpha. Part of Palliative Medicine extended evaluation and management also involves coordination of care with our IDT, the primary and consulting teams, and unit CM/SW and Hospice if eligible. Recommendations based on the information gathered and discussed are outlined in the A/P of Palliative notes. [facility] Geriatrics and Palliative Care  Min Ram, Palliative Care Attending  Contact Info: Call [phone] (HEAL Line) or message on Microsoft Teams (Min Ram)    HPI:  Patient is a 67 y/o M, current smoker (1ppd x 50 years), current every day ETOH abuse (1 pint vodka and several beers daily x 50 years), with PMHx of HLD, HTN (not on any medications),  CABG (LIMA to LAD, SVG to PDA in 2017), AS (s/p AVR in 2017), who presented to [facility] ED on 2/24/22 with complaints of 9/10 chest pain with associated diaphoresis that occurred when walking to the bathroom last night around 1 am. Patient states that during this time, he fell and "blacked out" and hit the back of his head. Patient then went back to sleep. Patient states that he had a similiar chest pain 3 days ago that resolved on his own. Patient's wife states that patient has not seen a physician for over 2 years due to insurance issues.  Patient states last drink was 2 shots of vodka last night. Patient denies current chest pain, palpitations, dizziness, diaphoresis, headache, fever, chills, abdominal pain, back pain, n/v/d, recent travel or sick contacts.  (24 Feb 2022 11:57)    PERTINENT PM/SXH:   HTN (hypertension)  ETOH abuse  Smoker  Hyperlipidemia  Aortic valve stenosis, unspecified etiology  No significant past surgical history    FAMILY HISTORY:  Non-contributory in first degree relatives according to chart  Unable to obtain due to patient's encephalopathy    ITEMS NOT CHECKED ARE NOT PRESENT  SOCIAL HISTORY:   Significant other/partner:  [x]  Children:  [x]   Substance hx:  []   Tobacco hx:  [x]   Alcohol hx: [x]   Home Opioid hx:  [] I-Stop Reference No: [id number]  - no active Rx's  Living Situation: [x]Home  []Long term care  []Rehab []Other  Latter-day/Spiritual practice: ; Role of organized Religious [ ] important [ ] some [x] unable to assess  Coping: [ ] well [ ] with difficulty [x] poor coping [ ] unable to assess  Support system: [ ] strong [ ] adequate [x] inadequate    ADVANCE DIRECTIVES:    []MOLST  []Living Will  DECISION MAKER(s):  [] Health Care Proxy(s)  [x] Surrogate(s)  [] Guardian           Name(s)/Phone Number(s): Shahana Ingram, [phone]    BASELINE (I)ADLs (prior to admission):  [place]: []Total  [x] Moderate []Dependent    ALLERGIES:  No Known Allergies    MEDICATIONS  (STANDING):  artificial tears (preservative free) Ophthalmic Solution 1 Drop(s) Both EYES two times a day  aspirin enteric coated 81 milliGRAM(s) Oral daily  atorvastatin 80 milliGRAM(s) Oral at bedtime  ferrous    sulfate 325 milliGRAM(s) Oral <User Schedule>  folic acid 1 milliGRAM(s) Oral daily  heparin   Injectable 5000 Unit(s) SubCutaneous every 8 hours  lactulose Syrup 20 Gram(s) Oral three times a day  multivitamin 1 Tablet(s) Oral daily  mupirocin 2% Nasal 1 Application(s) Both Nostrils two times a day  nicotine - 21 mG/24Hr(s) Patch 1 patch Transdermal daily  pantoprazole    Tablet 40 milliGRAM(s) Oral daily  rifAXIMin 550 milliGRAM(s) Oral two times a day  thiamine IVPB 500 milliGRAM(s) IV Intermittent every 8 hours    MEDICATIONS  (PRN):  oxymetazoline 0.05% Nasal Spray 1 Spray(s) Both Nostrils two times a day PRN Nasal Congestion  sodium chloride 0.65% Nasal 1 Spray(s) Both Nostrils four times a day PRN Nasal Congestion    Analgesic Use (Scheduled and PRNs) for past 24 hours:  nicotine - 21 mG/24Hr(s) Patch   1 Patch Transdermal (03-09-22 @ 13:54)    PRESENT SYMPTOMS/REVIEW OF SYSTEMS: []Unable to obtain due to poor mentation/encephalopathy  Source if other than patient:  []Family   []Team     Pain: [ ] yes [x] no  QOL Impact -   Location -                    Aggravating Factors -  Quality -  Radiation -  Timing -  Severity (0-10 scale) -   Minimal Acceptable Level (0-10 scale) -    Dyspnea:                           []Mild  []Moderate []Severe  Anxiety:                             []Mild []Moderate []Severe  Fatigue:                             []Mild []Moderate [x]Severe  Nausea:                             []Mild []Moderate []Severe  Loss of Appetite:              []Mild []Moderate []Severe  Constipation:                    []Mild []Moderate []Severe    Other Symptoms:  [x]All Other Review Of Systems Negative     Palliative Performance Status Version 2:  50%  (Functional Assessment Tool)    PHYSICAL EXAM:  GENERAL:  []Alert  []Oriented x   [x]Lethargic  []Cachexia  []Unarousable  [x]Verbal  []Non-Verbal  Behavioral:   []Anxiety  [x]Delirium []Agitation []Cooperative  HEENT:  []Normal   [x]Dry mouth   []ET Tube/Trach  []Oral lesions  PULMONARY:   [x]Clear []Tachypnea  []Audible excessive secretions   []Rhonchi        []Right []Left []Bilateral  []Crackles        []Right []Left []Bilateral  []Wheezing     []Right []Left []Bilateral  CARDIOVASCULAR:    [x]Regular []Irregular []Tachy  []Dominick []Murmur []Other  GASTROINTESTINAL:  [x]Soft  [x]Distended   [x]+BS  [x]Non tender []Tender  []PEG []OGT/ NGT  Last BM: 3/9  GENITOURINARY:  [x]Normal [] Incontinent   []Oliguria/Anuria   []Sanchez  MUSCULOSKELETAL:   []Normal   [x]Weakness  []Bed/Wheelchair bound []Edema  NEUROLOGIC:   []No focal deficits  []Cognitive impairment  []Dysphagia []Dysarthria []Paresis [x]Encephalopathic   SKIN:   [x]Normal   []Pressure ulcer(s)  []Rash    Vital Signs Last 24 Hrs  T(C): 36.3 (09 Mar 2022 14:50), Max: 36.7 (08 Mar 2022 20:27)  T(F): 97.4 (09 Mar 2022 14:50), Max: 98.1 (08 Mar 2022 20:27)  HR: 87 (09 Mar 2022 14:50) (87 - 103)  BP: 95/63 (09 Mar 2022 14:50) (93/62 - 105/70)  BP(mean): --  RR: 16 (09 Mar 2022 14:50) (16 - 18)  SpO2: 97% (09 Mar 2022 14:50) (96% - 100%)    LABS:                      6.9    8.79  )-----------( 146      ( 09 Mar 2022 08:20 )             22.5     140  |  106  |  20  ----------------------------<  126<H>  4.0   |  23  |  0.78    Ca    9.4      09 Mar 2022 08:20  Phos  3.2     03-08  Mg     2.0     03-08  TPro  6.8  /  Alb  2.8<L>  /  TBili  0.5  /  DBili  <0.2  /  AST  61<H>  /  ALT  39  /  AlkPhos  91  03-09    RADIOLOGY & ADDITIONAL STUDIES:  < from: CT Abdomen and Pelvis w/ IV Cont (03.06.22 @ 15:33) >  2.5 cm left hepatic lesion consistent with LI-RADS v 2018 Category: LR-5 Definitely HCC. OPTN Category (if LR-5): 5B. No locally invasive or metastatic disease. Left hepatic artery variant. No additional suspicious hepatic lesions.    PROTEIN CALORIE MALNUTRITION PRESENT: [ ]mild [ ]moderate [ ]severe [ ]underweight [ ]morbid obesity  []PPSV2 < or = to 30% []significant weight loss  []poor nutritional intake []catabolic state []anasarca     Artificial Nutrition []     REFERRALS:  [x]Social Work  [x]Case management [x]PT/OT []Chaplaincy  []Hospice  []Patient/Family Support []Massage Therapy    DISCUSSION OF CASE: Wife - to obtain additional history and to provide emotional support    Care Coordination/Goals of Care Document:   PALLIATIVE MEDICINE COORDINATION OF CARE DOCUMENTATION: [x] Inpatient Consult  Non-Face-to-Face prolonged service provided that relates to (face-to-face) care that has or will occur and ongoing patient management, including one or more of the following: - Reviewed documentation from other physicians and other health care professional services - Reviewed medical records and diagnostic / radiology study results - Coordination with patient's support system  ************************************************************************  MEDICATION REVIEW:  - See Medication List Above    ISTOP REFERENCE: [id number]  - no active Rx's  - PRN usage: NO PRN'S  ------------------------------------------------------------------------  COORDINATION OF CARE:  - Palliative Care consulted for: GOC  - Patient (to be) assessed: 3/9/2022  - Patient previously seen by Palliative Care service: NO    ADVANCE CARE PLANNING  - Code status: Full Code  - MOLST reviewed in chart: NONE; None found on Alpha  - HCP/Surrogate: Shahana Ingram, [phone]  - [facility] documents: NONE found on [place]  - HCP/Living will/Other Advanced Directives in Alpha: NONE found on Alpha  ------------------------------------------------------------------------  CARE PROVIDER DOCUMENTATION:  - KIRSTIN/JANIYA notes: Remains medically active  - Surgery notes: No acute surgical intervention at this time  - IR notes: plan for TACE and ablation    PLAN OF CARE  - Known Admissions in Past Year: 0  - Current Admit Date: 2/24/22  - LOS: 13  - LACE score: 13  - Current Dispo Plan: TO BE DETERMINED  ------------------------------------------------------------------------  - Time Spent/Chart reviewed: 31 Minutes [including time used to gather, review and transfer data]  - Start: 9:00AM  - End: 9:31AM    Prolonged services rendered, as part of this patient's care provided by Palliative Medicine, include: i. chart review for provider and ancillary service documentation, ii. pertinent diagnostics including laboratory and imaging studies, iii. medication review including PRN use, iv. admission history including previous palliative care encounters and [facility] notes, v. advance care planning documents including HCP and MOLST forms in Alpha. Part of Palliative Medicine extended evaluation and management also involves coordination of care with our IDT, the primary and consulting teams, and unit CM/SW and Hospice if eligible. Recommendations based on the information gathered and discussed are outlined in the A/P of Palliative notes. [facility] Geriatrics and Palliative Care  Min Ram, Palliative Care Attending  Contact Info: Call [phone] (HEAL Line) or message on Microsoft Teams (Min Ram)    HPI:  Patient is a 65 y/o M, current smoker (1ppd x 50 years), current every day ETOH abuse (1 pint vodka and several beers daily x 50 years), with PMHx of HLD, HTN (not on any medications),  CABG (LIMA to LAD, SVG to PDA in 2017), AS (s/p AVR in 2017), who presented to [facility] ED on 2/24/22 with complaints of 9/10 chest pain with associated diaphoresis that occurred when walking to the bathroom last night around 1 am. Patient states that during this time, he fell and "blacked out" and hit the back of his head. Patient then went back to sleep. Patient states that he had a similiar chest pain 3 days ago that resolved on his own. Patient's wife states that patient has not seen a physician for over 2 years due to insurance issues.  Patient states last drink was 2 shots of vodka last night. Patient denies current chest pain, palpitations, dizziness, diaphoresis, headache, fever, chills, abdominal pain, back pain, n/v/d, recent travel or sick contacts.  (24 Feb 2022 11:57)    Patient seen and examined at bedside. Lethargic but arousable. Denies pain, SOB, nausea. Knows he is in the hospital  and is aware of his cardiac problems, does not recall the extent of his liver issues. States his wife should participate in his medical decision making. Further collateral and history obtained from wife. She recounted the patient's history with alcohol and is aware of his multiple medical issues. Discussed the potential treatments being discussed for his newly diagnosed HCC. Further details of discussion noted below.    PERTINENT PM/SXH:   HTN (hypertension)  ETOH abuse  Smoker  Hyperlipidemia  Aortic valve stenosis, unspecified etiology  No significant past surgical history    FAMILY HISTORY:  Non-contributory in first degree relatives according to chart  Unable to obtain due to patient's encephalopathy    ITEMS NOT CHECKED ARE NOT PRESENT  SOCIAL HISTORY:   Significant other/partner:  [x]  Children:  [x]   Substance hx:  []   Tobacco hx:  [x]   Alcohol hx: [x]   Home Opioid hx:  [] I-Stop Reference No: [id number]  - no active Rx's  Living Situation: [x]Home  []Long term care  []Rehab []Other  Mormon/Spiritual practice: ; Role of organized Caodaism [ ] important [ ] some [x] unable to assess  Coping: [ ] well [ ] with difficulty [x] poor coping [ ] unable to assess  Support system: [ ] strong [ ] adequate [x] inadequate    ADVANCE DIRECTIVES:    []MOLST  []Living Will  DECISION MAKER(s):  [] Health Care Proxy(s)  [x] Surrogate(s)  [] Guardian           Name(s)/Phone Number(s): Shahana Ingram, [phone]    BASELINE (I)ADLs (prior to admission):  [place]: []Total  [x] Moderate []Dependent    ALLERGIES:  No Known Allergies    MEDICATIONS  (STANDING):  artificial tears (preservative free) Ophthalmic Solution 1 Drop(s) Both EYES two times a day  aspirin enteric coated 81 milliGRAM(s) Oral daily  atorvastatin 80 milliGRAM(s) Oral at bedtime  ferrous    sulfate 325 milliGRAM(s) Oral <User Schedule>  folic acid 1 milliGRAM(s) Oral daily  heparin   Injectable 5000 Unit(s) SubCutaneous every 8 hours  lactulose Syrup 20 Gram(s) Oral three times a day  multivitamin 1 Tablet(s) Oral daily  mupirocin 2% Nasal 1 Application(s) Both Nostrils two times a day  nicotine - 21 mG/24Hr(s) Patch 1 patch Transdermal daily  pantoprazole    Tablet 40 milliGRAM(s) Oral daily  rifAXIMin 550 milliGRAM(s) Oral two times a day  thiamine IVPB 500 milliGRAM(s) IV Intermittent every 8 hours    MEDICATIONS  (PRN):  oxymetazoline 0.05% Nasal Spray 1 Spray(s) Both Nostrils two times a day PRN Nasal Congestion  sodium chloride 0.65% Nasal 1 Spray(s) Both Nostrils four times a day PRN Nasal Congestion    Analgesic Use (Scheduled and PRNs) for past 24 hours:  nicotine - 21 mG/24Hr(s) Patch   1 Patch Transdermal (03-09-22 @ 13:54)    PRESENT SYMPTOMS/REVIEW OF SYSTEMS: []Unable to obtain due to poor mentation/encephalopathy  Source if other than patient:  []Family   []Team     Pain: [ ] yes [x] no  QOL Impact -   Location -                    Aggravating Factors -  Quality -  Radiation -  Timing -  Severity (0-10 scale) -   Minimal Acceptable Level (0-10 scale) -    Dyspnea:                           []Mild  []Moderate []Severe  Anxiety:                             []Mild []Moderate []Severe  Fatigue:                             []Mild []Moderate [x]Severe  Nausea:                             []Mild []Moderate []Severe  Loss of Appetite:              []Mild []Moderate []Severe  Constipation:                    []Mild []Moderate []Severe    Other Symptoms:  [x]All Other Review Of Systems Negative     Palliative Performance Status Version 2:  50%  (Functional Assessment Tool)    PHYSICAL EXAM:  GENERAL:  []Alert  []Oriented x   [x]Lethargic  []Cachexia  []Unarousable  [x]Verbal  []Non-Verbal  Behavioral:   []Anxiety  [x]Delirium []Agitation []Cooperative  HEENT:  []Normal   [x]Dry mouth   []ET Tube/Trach  []Oral lesions  PULMONARY:   [x]Clear []Tachypnea  []Audible excessive secretions   []Rhonchi        []Right []Left []Bilateral  []Crackles        []Right []Left []Bilateral  []Wheezing     []Right []Left []Bilateral  CARDIOVASCULAR:    [x]Regular []Irregular []Tachy  []Dominick []Murmur []Other  GASTROINTESTINAL:  [x]Soft  [x]Distended   [x]+BS  [x]Non tender []Tender  []PEG []OGT/ NGT  Last BM: 3/9  GENITOURINARY:  [x]Normal [] Incontinent   []Oliguria/Anuria   []Sanchez  MUSCULOSKELETAL:   []Normal   [x]Weakness  []Bed/Wheelchair bound []Edema  NEUROLOGIC:   []No focal deficits  []Cognitive impairment  []Dysphagia []Dysarthria []Paresis [x]Encephalopathic   SKIN:   [x]Normal   []Pressure ulcer(s)  []Rash    Vital Signs Last 24 Hrs  T(C): 36.3 (09 Mar 2022 14:50), Max: 36.7 (08 Mar 2022 20:27)  T(F): 97.4 (09 Mar 2022 14:50), Max: 98.1 (08 Mar 2022 20:27)  HR: 87 (09 Mar 2022 14:50) (87 - 103)  BP: 95/63 (09 Mar 2022 14:50) (93/62 - 105/70)  BP(mean): --  RR: 16 (09 Mar 2022 14:50) (16 - 18)  SpO2: 97% (09 Mar 2022 14:50) (96% - 100%)    LABS:                      6.9    8.79  )-----------( 146      ( 09 Mar 2022 08:20 )             22.5     140  |  106  |  20  ----------------------------<  126<H>  4.0   |  23  |  0.78    Ca    9.4      09 Mar 2022 08:20  Phos  3.2     03-08  Mg     2.0     03-08  TPro  6.8  /  Alb  2.8<L>  /  TBili  0.5  /  DBili  <0.2  /  AST  61<H>  /  ALT  39  /  AlkPhos  91  03-09    RADIOLOGY & ADDITIONAL STUDIES:  < from: CT Abdomen and Pelvis w/ IV Cont (03.06.22 @ 15:33) >  2.5 cm left hepatic lesion consistent with LI-RADS v 2018 Category: LR-5 Definitely HCC. OPTN Category (if LR-5): 5B. No locally invasive or metastatic disease. Left hepatic artery variant. No additional suspicious hepatic lesions.    REFERRALS:  [x]Social Work  [x]Case management [x]PT/OT []Chaplaincy  []Hospice  []Patient/Family Support []Massage Therapy    DISCUSSION OF CASE: Wife - to obtain additional history and to provide emotional support    Care Coordination/Goals of Care Document:   PALLIATIVE MEDICINE COORDINATION OF CARE DOCUMENTATION: [x] Inpatient Consult  Non-Face-to-Face prolonged service provided that relates to (face-to-face) care that has or will occur and ongoing patient management, including one or more of the following: - Reviewed documentation from other physicians and other health care professional services - Reviewed medical records and diagnostic / radiology study results - Coordination with patient's support system  ************************************************************************  MEDICATION REVIEW:  - See Medication List Above    ISTOP REFERENCE: [id number]  - no active Rx's  - PRN usage: NO PRN'S  ------------------------------------------------------------------------  COORDINATION OF CARE:  - Palliative Care consulted for: [facility]  - Patient (to be) assessed: 3/9/2022  - Patient previously seen by Palliative Care service: NO    ADVANCE CARE PLANNING  - Code status: Full Code  - MOLST reviewed in chart: NONE; None found on Alpha  - HCP/Surrogate: Shahana Juan Jose, [phone]  - [facility] documents: NONE found on [place]  - HCP/Living will/Other Advanced Directives in Alpha: NONE found on Alpha  ------------------------------------------------------------------------  CARE PROVIDER DOCUMENTATION:  - KIRSTIN/JANIYA notes: Remains medically active  - Surgery notes: No acute surgical intervention at this time  - IR notes: plan for TACE and ablation    PLAN OF CARE  - Known Admissions in Past Year: 0  - Current Admit Date: 2/24/22  - LOS: 13  - LACE score: 13  - Current Dispo Plan: TO BE DETERMINED  ------------------------------------------------------------------------  - Time Spent/Chart reviewed: 31 Minutes [including time used to gather, review and transfer data]  - Start: 9:00AM  - End: 9:31AM    Prolonged services rendered, as part of this patient's care provided by Palliative Medicine, include: i. chart review for provider and ancillary service documentation, ii. pertinent diagnostics including laboratory and imaging studies, iii. medication review including PRN use, iv. admission history including previous palliative care encounters and GOC notes, v. advance care planning documents including HCP and MOLST forms in Alpha. Part of Palliative Medicine extended evaluation and management also involves coordination of care with our IDT, the primary and consulting teams, and unit CM/SW and Hospice if eligible. Recommendations based on the information gathered and discussed are outlined in the A/P of Palliative notes.

## 2022-03-09 NOTE — CONSULT NOTE ADULT - PROBLEM SELECTOR RECOMMENDATION 9
.  In the setting of multiple comorbidities and hepatic disease  -c/w Lactulose and Rifaxmin for HSE

## 2022-03-09 NOTE — PROGRESS NOTE ADULT - ASSESSMENT
67 y/o M, current smoker (1ppd x 50 years), current every day ETOH abuse (1 pint vodka and several beers daily x 50 years), with PMHx of HLD, HTN (not on any medications),  CABG (LIMA to LAD, SVG to PDA in 2017), AS (s/p AVR in 2017), who presented to [facility] ED on 2/24/22 NSTEMI with LHC with nonobstructive disease, then found to have restenosis of bioprosthetic valve for which aortic root surgery was planned. Now w/ newly diagnosed 2.5cm left hepatic mass concerning for HCC found on CT AP. Pending IR procedure. Am Hgb 6.9. Clinically stable.    No acute surgical intervention at this time  Pending IR procedure for TACE and ablation  Rest of care per primary team  Team 1c will continue to follow

## 2022-03-09 NOTE — CONSULT NOTE ADULT - PROBLEM SELECTOR RECOMMENDATION 6
.  Patient is Full Code  -discussed patient's medical course and interval developments, she is more understanding of the severity of patient's illnesses  -she is amenable to pursuing any offered medical interventions  -if patient continues to decompensate, the only "hospice" option would be [facility] for [facility] care but currently not within [facility]

## 2022-03-09 NOTE — CONSULT NOTE ADULT - ASSESSMENT
Full Note to Follow    ·	patient denies any acute symptoms, not able to participate in complex medical decision making but states he wants to do what is necessary to "get better"  ·	will reach out to patient's wife for support and GOC discussion Full Note to Follow    ·	patient denies any acute symptoms, not able to participate in complex medical decision making but states he wants to do what is necessary to "get better"  ·	discussed with wife: reviewed hospital course and recent developments, at this time she would be accepting of all indicated and offered medical interventions  ·	patient has no hospice benefit since he is uninsured; only option would be [facility] but not within [facility] anyway 65yo M with Polysubstance Use Disorder (Nicotine, EtOH), HTN, CAD, and AS p/w chest pain in the setting of aortic valve stenosis and found to have cirrhosis c/b newly diagnosed HCC. Palliative consulted for complex medical decision making in the setting of advanced illness.    ·	patient denies any acute symptoms, not able to participate in complex medical decision making but states he wants to do what is necessary to "get better"  ·	discussed with wife: reviewed hospital course and recent developments, at this time she would be accepting of all indicated and offered medical interventions  ·	patient has no hospice benefit since he is uninsured; only option would be [facility] but not within [facility] anyway

## 2022-03-09 NOTE — CHART NOTE - NSCHARTNOTEFT_GEN_A_CORE
Admitting Diagnosis:   Patient is a 66y old  Male who presents with a chief complaint of Chest Pain (09 Mar 2022 12:31)    PAST MEDICAL & SURGICAL HISTORY:  HTN (hypertension)    ETOH abuse    Smoker    Hyperlipidemia    Aortic valve stenosis, unspecified etiology    No significant past surgical history    Current Nutrition Order:  DASH + Ensure Enlive TID (1050kcal/60gpro)     PO Intake: Good (%) [   ]  Fair (50-75%) [   ] Poor (<25%) [ x ]    GI Issues:   Pt denies N/V/D/C at present  Last BM 3/7    Pain:  No pain noted    Skin Integrity:  No edema noted  +surgical incision    Labs:       140  |  106  |  20  ----------------------------<  126<H>  4.0   |  23  |  0.78    Ca    9.4      09 Mar 2022 08:20  Phos  3.2     03-08  Mg     2.0     03-08    TPro  6.8  /  Alb  2.8<L>  /  TBili  0.5  /  DBili  <0.2  /  AST  61<H>  /  ALT  39  /  AlkPhos  91  03-09    CAPILLARY BLOOD GLUCOSE    Medications:  MEDICATIONS  (STANDING):  artificial tears (preservative free) Ophthalmic Solution 1 Drop(s) Both EYES two times a day  aspirin enteric coated 81 milliGRAM(s) Oral daily  atorvastatin 80 milliGRAM(s) Oral at bedtime  ferrous    sulfate 325 milliGRAM(s) Oral <User Schedule>  folic acid 1 milliGRAM(s) Oral daily  heparin   Injectable 5000 Unit(s) SubCutaneous every 8 hours  lactulose Syrup 20 Gram(s) Oral three times a day  multivitamin 1 Tablet(s) Oral daily  mupirocin 2% Nasal 1 Application(s) Both Nostrils two times a day  nicotine - 21 mG/24Hr(s) Patch 1 patch Transdermal daily  pantoprazole    Tablet 40 milliGRAM(s) Oral daily  rifAXIMin 550 milliGRAM(s) Oral two times a day  thiamine IVPB 500 milliGRAM(s) IV Intermittent every 8 hours    MEDICATIONS  (PRN):  oxymetazoline 0.05% Nasal Spray 1 Spray(s) Both Nostrils two times a day PRN Nasal Congestion  sodium chloride 0.65% Nasal 1 Spray(s) Both Nostrils four times a day PRN Nasal Congestion    Dosing Anthropometrics:  · Height for BMI (FEET)	5 Feet  · Height for BMI (INCHES)	7 Inch(s)  · Height for BMI (CENTIMETERS)	170.18 Centimeter(s)  · Weight for BMI (lbs)	149 lb  · Weight for BMI (kg)	67.6 kg  · Body Mass Index	23.3  · Ideal Body Weight (lbs)	148  · Ideal Body Weight (kg)	67.1    Weight Change: No new weights documented in EMR. Obtain biweekly weights to assess changes/trends.    Estimated energy needs: Based on Standards of Care pt within % IBW thus actual body weight used for all calculations. Needs adjusted for advanced age.  20-25 kcal/k2811-8610 kcal  1-1.2 g/k.5-81 g  30-35 mL/k7916-1427 mL    Subjective:   65 YO M, current smoker (1ppd x 50 years), current every day ETOH abuse (1 pint vodka and several beers daily x 50 years), with PMHx of HLD, HTN (not on any medications), CABG (LIMA to LAD, SVG to PDA in 2017), AS (s/p AVR in 2017) with recently discovered restenosis, presented to [facility] ED on 22 with chest pain and episode of LOC, initially admitted to cardiology for NSTEMI with [facility] with nonobstructive disease, then found to have restenosis of bioprosthetic valve for which aortic root surgery was planned, but now deferred given liver lesion c/f HCC. Now he is transferred to medicine for concern for mild hepatic encephalopathy, however unlikely to have HE and is compensated cirrhotic. Now found to have HCC, pending further workup.      Pt seen in room, resting in bed. Pt reports good/fair PO intake during hospital stay however states only having banana for breakfast this AM. Continues on DASH diet +protein shakes added to supplement PO intake. RD encouraged intake as tolerated, pt expressed understanding. RD to follow up per protocol. See nutrition recommendations below.     Previous Nutrition Diagnosis: Food & Nutrition Related Knowledge Deficit r/t lack of prior education AEB pt unaware of current dietary restrictions.     Active [ x ]  Resolved [   ]    If resolved, new PES:     Goal: Pt to teach back at least 3 educational points discussed.    Recommendations:  1. Continue with current diet order   2. Encourage pt to meet nutritional needs as able   3. Encourage adherence to diet education (reinforce as able)   4. Pain and bowel regimen per team   5. Will continue to assess/honor preferences as able   6. Continue thiamine     Education: Discussed current diet and encouraged PO intake    Risk Level: High [   ] Moderate [ x  ] Low [   ].

## 2022-03-10 DIAGNOSIS — G93.41 METABOLIC ENCEPHALOPATHY: ICD-10-CM

## 2022-03-10 DIAGNOSIS — R53.81 OTHER MALAISE: ICD-10-CM

## 2022-03-10 DIAGNOSIS — Z51.5 ENCOUNTER FOR PALLIATIVE CARE: ICD-10-CM

## 2022-03-10 DIAGNOSIS — K74.60 UNSPECIFIED CIRRHOSIS OF LIVER: ICD-10-CM

## 2022-03-10 DIAGNOSIS — Z71.89 OTHER SPECIFIED COUNSELING: ICD-10-CM

## 2022-03-10 LAB
ALBUMIN SERPL ELPH-MCNC: 2.6 G/DL — LOW (ref 3.3–5)
ALP SERPL-CCNC: 98 U/L — SIGNIFICANT CHANGE UP (ref 40–120)
ALT FLD-CCNC: 37 U/L — SIGNIFICANT CHANGE UP (ref 10–45)
ANION GAP SERPL CALC-SCNC: 10 MMOL/L — SIGNIFICANT CHANGE UP (ref 5–17)
ANISOCYTOSIS BLD QL: SLIGHT — SIGNIFICANT CHANGE UP
APTT BLD: 36.5 SEC — HIGH (ref 27.5–35.5)
AST SERPL-CCNC: 55 U/L — HIGH (ref 10–40)
BASOPHILS # BLD AUTO: 0.02 K/UL — SIGNIFICANT CHANGE UP (ref 0–0.2)
BASOPHILS NFR BLD AUTO: 0.2 % — SIGNIFICANT CHANGE UP (ref 0–2)
BILIRUB SERPL-MCNC: 0.4 MG/DL — SIGNIFICANT CHANGE UP (ref 0.2–1.2)
BUN SERPL-MCNC: 18 MG/DL — SIGNIFICANT CHANGE UP (ref 7–23)
CALCIUM SERPL-MCNC: 8.9 MG/DL — SIGNIFICANT CHANGE UP (ref 8.4–10.5)
CHLORIDE SERPL-SCNC: 105 MMOL/L — SIGNIFICANT CHANGE UP (ref 96–108)
CO2 SERPL-SCNC: 22 MMOL/L — SIGNIFICANT CHANGE UP (ref 22–31)
CREAT SERPL-MCNC: 0.83 MG/DL — SIGNIFICANT CHANGE UP (ref 0.5–1.3)
DACRYOCYTES BLD QL SMEAR: SLIGHT — SIGNIFICANT CHANGE UP
EGFR: 97 ML/MIN/1.73M2 — SIGNIFICANT CHANGE UP
EOSINOPHIL # BLD AUTO: 0.43 K/UL — SIGNIFICANT CHANGE UP (ref 0–0.5)
EOSINOPHIL NFR BLD AUTO: 5.1 % — SIGNIFICANT CHANGE UP (ref 0–6)
EOSINOPHIL NFR BLD AUTO: 8 % — HIGH (ref 0–6)
GLUCOSE SERPL-MCNC: 123 MG/DL — HIGH (ref 70–99)
HAPTOGLOB SERPL-MCNC: <10 MG/DL — LOW (ref 34–200)
HCT VFR BLD CALC: 21.4 % — LOW (ref 39–50)
HCT VFR BLD CALC: 24.3 % — LOW (ref 39–50)
HGB BLD-MCNC: 6.6 G/DL — CRITICAL LOW (ref 13–17)
HGB BLD-MCNC: 8 G/DL — LOW (ref 13–17)
IMM GRANULOCYTES NFR BLD AUTO: 0.4 % — SIGNIFICANT CHANGE UP (ref 0–1.5)
INR BLD: 1.25 — HIGH (ref 0.88–1.16)
LDH SERPL L TO P-CCNC: 382 U/L — HIGH (ref 50–242)
LYMPHOCYTES # BLD AUTO: 18 % — SIGNIFICANT CHANGE UP (ref 13–44)
LYMPHOCYTES # BLD AUTO: 2.12 K/UL — SIGNIFICANT CHANGE UP (ref 1–3.3)
LYMPHOCYTES # BLD AUTO: 25.1 % — SIGNIFICANT CHANGE UP (ref 13–44)
MACROCYTES BLD QL: SLIGHT — SIGNIFICANT CHANGE UP
MANUAL SMEAR VERIFICATION: SIGNIFICANT CHANGE UP
MCHC RBC-ENTMCNC: 27 PG — SIGNIFICANT CHANGE UP (ref 27–34)
MCHC RBC-ENTMCNC: 29.1 PG — SIGNIFICANT CHANGE UP (ref 27–34)
MCHC RBC-ENTMCNC: 30.8 GM/DL — LOW (ref 32–36)
MCHC RBC-ENTMCNC: 32.9 GM/DL — SIGNIFICANT CHANGE UP (ref 32–36)
MCV RBC AUTO: 87.7 FL — SIGNIFICANT CHANGE UP (ref 80–100)
MCV RBC AUTO: 88.4 FL — SIGNIFICANT CHANGE UP (ref 80–100)
MICROCYTES BLD QL: SLIGHT — SIGNIFICANT CHANGE UP
MONOCYTES # BLD AUTO: 0.97 K/UL — HIGH (ref 0–0.9)
MONOCYTES NFR BLD AUTO: 11.5 % — SIGNIFICANT CHANGE UP (ref 2–14)
MONOCYTES NFR BLD AUTO: 5 % — SIGNIFICANT CHANGE UP (ref 2–14)
NEUTROPHILS # BLD AUTO: 4.87 K/UL — SIGNIFICANT CHANGE UP (ref 1.8–7.4)
NEUTROPHILS NFR BLD AUTO: 57.7 % — SIGNIFICANT CHANGE UP (ref 43–77)
NEUTROPHILS NFR BLD AUTO: 69 % — SIGNIFICANT CHANGE UP (ref 43–77)
NRBC # BLD: 0 /100 WBCS — SIGNIFICANT CHANGE UP (ref 0–0)
NRBC # BLD: 0 /100 WBCS — SIGNIFICANT CHANGE UP (ref 0–0)
PLAT MORPH BLD: NORMAL — SIGNIFICANT CHANGE UP
PLATELET # BLD AUTO: 151 K/UL — SIGNIFICANT CHANGE UP (ref 150–400)
PLATELET # BLD AUTO: 157 K/UL — SIGNIFICANT CHANGE UP (ref 150–400)
POIKILOCYTOSIS BLD QL AUTO: SLIGHT — SIGNIFICANT CHANGE UP
POLYCHROMASIA BLD QL SMEAR: SLIGHT — SIGNIFICANT CHANGE UP
POTASSIUM SERPL-MCNC: 3.9 MMOL/L — SIGNIFICANT CHANGE UP (ref 3.5–5.3)
POTASSIUM SERPL-SCNC: 3.9 MMOL/L — SIGNIFICANT CHANGE UP (ref 3.5–5.3)
PROT SERPL-MCNC: 6.6 G/DL — SIGNIFICANT CHANGE UP (ref 6–8.3)
PROTHROM AB SERPL-ACNC: 14.9 SEC — HIGH (ref 10.5–13.4)
RBC # BLD: 2.44 M/UL — LOW (ref 4.2–5.8)
RBC # BLD: 2.75 M/UL — LOW (ref 4.2–5.8)
RBC # FLD: 15.9 % — HIGH (ref 10.3–14.5)
RBC # FLD: 16 % — HIGH (ref 10.3–14.5)
RBC BLD AUTO: ABNORMAL
RETICS #: 92.4 K/UL — SIGNIFICANT CHANGE UP (ref 25–125)
RETICS/RBC NFR: 3.7 % — HIGH (ref 0.5–2.5)
SCHISTOCYTES BLD QL AUTO: SLIGHT — SIGNIFICANT CHANGE UP
SODIUM SERPL-SCNC: 137 MMOL/L — SIGNIFICANT CHANGE UP (ref 135–145)
TARGETS BLD QL SMEAR: SLIGHT — SIGNIFICANT CHANGE UP
WBC # BLD: 8.4 K/UL — SIGNIFICANT CHANGE UP (ref 3.8–10.5)
WBC # BLD: 8.72 K/UL — SIGNIFICANT CHANGE UP (ref 3.8–10.5)
WBC # FLD AUTO: 8.4 K/UL — SIGNIFICANT CHANGE UP (ref 3.8–10.5)
WBC # FLD AUTO: 8.72 K/UL — SIGNIFICANT CHANGE UP (ref 3.8–10.5)

## 2022-03-10 PROCEDURE — 99232 SBSQ HOSP IP/OBS MODERATE 35: CPT | Mod: GC

## 2022-03-10 PROCEDURE — 99233 SBSQ HOSP IP/OBS HIGH 50: CPT | Mod: GC

## 2022-03-10 PROCEDURE — 99233 SBSQ HOSP IP/OBS HIGH 50: CPT

## 2022-03-10 PROCEDURE — 99232 SBSQ HOSP IP/OBS MODERATE 35: CPT

## 2022-03-10 RX ORDER — LACTULOSE 10 G/15ML
25 SOLUTION ORAL EVERY 8 HOURS
Refills: 0 | Status: DISCONTINUED | OUTPATIENT
Start: 2022-03-10 | End: 2022-03-11

## 2022-03-10 RX ORDER — PHYTONADIONE (VIT K1) 5 MG
2.5 TABLET ORAL ONCE
Refills: 0 | Status: DISCONTINUED | OUTPATIENT
Start: 2022-03-10 | End: 2022-03-10

## 2022-03-10 RX ORDER — PHYTONADIONE (VIT K1) 5 MG
2.5 TABLET ORAL ONCE
Refills: 0 | Status: COMPLETED | OUTPATIENT
Start: 2022-03-10 | End: 2022-03-10

## 2022-03-10 RX ADMIN — LACTULOSE 20 GRAM(S): 10 SOLUTION ORAL at 06:19

## 2022-03-10 RX ADMIN — Medication 1 PATCH: at 17:39

## 2022-03-10 RX ADMIN — Medication 1 PATCH: at 11:38

## 2022-03-10 RX ADMIN — Medication 1 TABLET(S): at 11:39

## 2022-03-10 RX ADMIN — ATORVASTATIN CALCIUM 80 MILLIGRAM(S): 80 TABLET, FILM COATED ORAL at 21:15

## 2022-03-10 RX ADMIN — Medication 105 MILLIGRAM(S): at 06:19

## 2022-03-10 RX ADMIN — Medication 2.5 MILLIGRAM(S): at 22:43

## 2022-03-10 RX ADMIN — HEPARIN SODIUM 5000 UNIT(S): 5000 INJECTION INTRAVENOUS; SUBCUTANEOUS at 06:19

## 2022-03-10 RX ADMIN — MUPIROCIN 1 APPLICATION(S): 20 OINTMENT TOPICAL at 06:21

## 2022-03-10 RX ADMIN — Medication 1 DROP(S): at 17:41

## 2022-03-10 RX ADMIN — Medication 1 MILLIGRAM(S): at 11:39

## 2022-03-10 RX ADMIN — LACTULOSE 20 GRAM(S): 10 SOLUTION ORAL at 13:50

## 2022-03-10 RX ADMIN — Medication 1 PATCH: at 11:40

## 2022-03-10 RX ADMIN — MUPIROCIN 1 APPLICATION(S): 20 OINTMENT TOPICAL at 17:42

## 2022-03-10 RX ADMIN — Medication 105 MILLIGRAM(S): at 13:50

## 2022-03-10 RX ADMIN — Medication 1 PATCH: at 06:21

## 2022-03-10 RX ADMIN — LACTULOSE 25 GRAM(S): 10 SOLUTION ORAL at 21:15

## 2022-03-10 RX ADMIN — Medication 1 DROP(S): at 06:20

## 2022-03-10 RX ADMIN — OXYMETAZOLINE HYDROCHLORIDE 1 SPRAY(S): 0.5 SPRAY NASAL at 09:45

## 2022-03-10 RX ADMIN — PANTOPRAZOLE SODIUM 40 MILLIGRAM(S): 20 TABLET, DELAYED RELEASE ORAL at 06:19

## 2022-03-10 NOTE — PROGRESS NOTE ADULT - SUBJECTIVE AND OBJECTIVE BOX
GASTROENTEROLOGY PROGRESS NOTE  Patient seen and examined at bedside. MELD-Na 11 (based on 3/10/22 BW)    ROS:     PERTINENT REVIEW OF SYSTEMS:  CONSTITUTIONAL: No weakness, fevers or chills  HEENT: No visual changes; No vertigo or throat pain   GASTROINTESTINAL: As above.  NEUROLOGICAL: No numbness or weakness  SKIN: No itching, burning, rashes, or lesions     Allergies    No Known Allergies    Intolerances      MEDICATIONS:  MEDICATIONS  (STANDING):  artificial tears (preservative free) Ophthalmic Solution 1 Drop(s) Both EYES two times a day  aspirin enteric coated 81 milliGRAM(s) Oral daily  atorvastatin 80 milliGRAM(s) Oral at bedtime  ferrous    sulfate 325 milliGRAM(s) Oral <User Schedule>  folic acid 1 milliGRAM(s) Oral daily  heparin   Injectable 5000 Unit(s) SubCutaneous every 8 hours  lactulose Syrup 20 Gram(s) Oral three times a day  multivitamin 1 Tablet(s) Oral daily  mupirocin 2% Nasal 1 Application(s) Both Nostrils two times a day  nicotine - 21 mG/24Hr(s) Patch 1 patch Transdermal daily  pantoprazole    Tablet 40 milliGRAM(s) Oral daily  rifAXIMin 550 milliGRAM(s) Oral two times a day  thiamine IVPB 500 milliGRAM(s) IV Intermittent every 8 hours    MEDICATIONS  (PRN):  oxymetazoline 0.05% Nasal Spray 1 Spray(s) Both Nostrils two times a day PRN Nasal Congestion  sodium chloride 0.65% Nasal 1 Spray(s) Both Nostrils four times a day PRN Nasal Congestion    Vital Signs Last 24 Hrs  T(C): 36.5 (10 Mar 2022 04:49), Max: 36.9 (09 Mar 2022 20:21)  T(F): 97.7 (10 Mar 2022 04:49), Max: 98.5 (09 Mar 2022 20:21)  HR: 95 (10 Mar 2022 04:49) (87 - 95)  BP: 94/60 (10 Mar 2022 04:49) (93/63 - 109/72)  BP(mean): --  RR: 17 (10 Mar 2022 04:49) (16 - 17)  SpO2: 96% (10 Mar 2022 04:49) (96% - 100%)    03-09 @ 07:01  -  03-10 @ 07:00  --------------------------------------------------------  IN: 100 mL / OUT: 650 mL / NET: -550 mL      PHYSICAL EXAM:    General: in no acute distress  HEENT: MMM, conjunctiva and sclera clear  Gastrointestinal: Soft non-tender non-distended; No rebound or guarding  Skin: Warm and dry. No obvious rash    LABS:                        6.6    8.40  )-----------( 157      ( 10 Mar 2022 07:26 )             21.4     03-10    137  |  105  |  18  ----------------------------<  123<H>  3.9   |  22  |  0.83    Ca    8.9      10 Mar 2022 07:26    TPro  6.6  /  Alb  2.6<L>  /  TBili  0.4  /  DBili  x   /  AST  55<H>  /  ALT  37  /  AlkPhos  98  03-10    PT/INR - ( 10 Mar 2022 07:26 )   PT: 14.9 sec;   INR: 1.25          PTT - ( 10 Mar 2022 07:26 )  PTT:36.5 sec                  RADIOLOGY & ADDITIONAL STUDIES:  Reviewed GASTROENTEROLOGY PROGRESS NOTE  Patient seen and examined at bedside. MELD-Na 11 (based on 3/10/22 BW). Utox + benzo.     ROS: limited, not participating on exam.     PERTINENT REVIEW OF SYSTEMS:  CONSTITUTIONAL: No weakness, fevers or chills  HEENT: No visual changes; No vertigo or throat pain   GASTROINTESTINAL: As above.  NEUROLOGICAL: No numbness or weakness  SKIN: No itching, burning, rashes, or lesions     Allergies    No Known Allergies    Intolerances      MEDICATIONS:  MEDICATIONS  (STANDING):  artificial tears (preservative free) Ophthalmic Solution 1 Drop(s) Both EYES two times a day  aspirin enteric coated 81 milliGRAM(s) Oral daily  atorvastatin 80 milliGRAM(s) Oral at bedtime  ferrous    sulfate 325 milliGRAM(s) Oral <User Schedule>  folic acid 1 milliGRAM(s) Oral daily  heparin   Injectable 5000 Unit(s) SubCutaneous every 8 hours  lactulose Syrup 20 Gram(s) Oral three times a day  multivitamin 1 Tablet(s) Oral daily  mupirocin 2% Nasal 1 Application(s) Both Nostrils two times a day  nicotine - 21 mG/24Hr(s) Patch 1 patch Transdermal daily  pantoprazole    Tablet 40 milliGRAM(s) Oral daily  rifAXIMin 550 milliGRAM(s) Oral two times a day  thiamine IVPB 500 milliGRAM(s) IV Intermittent every 8 hours    MEDICATIONS  (PRN):  oxymetazoline 0.05% Nasal Spray 1 Spray(s) Both Nostrils two times a day PRN Nasal Congestion  sodium chloride 0.65% Nasal 1 Spray(s) Both Nostrils four times a day PRN Nasal Congestion    Vital Signs Last 24 Hrs  T(C): 36.5 (10 Mar 2022 04:49), Max: 36.9 (09 Mar 2022 20:21)  T(F): 97.7 (10 Mar 2022 04:49), Max: 98.5 (09 Mar 2022 20:21)  HR: 95 (10 Mar 2022 04:49) (87 - 95)  BP: 94/60 (10 Mar 2022 04:49) (93/63 - 109/72)  BP(mean): --  RR: 17 (10 Mar 2022 04:49) (16 - 17)  SpO2: 96% (10 Mar 2022 04:49) (96% - 100%)    03-09 @ 07:01  -  03-10 @ 07:00  --------------------------------------------------------  IN: 100 mL / OUT: 650 mL / NET: -550 mL      PHYSICAL EXAM:    General: male, lying in bed; in no acute distress, not conversive  HEENT: MMM, conjunctiva and sclera clear  Gastrointestinal: Soft, obese, distended abdomen; non-tender; Normal bowel sounds; No rebound or guarding  Extremities: Normal range of motion, No clubbing, cyanosis or edema  Neurological: Alert and oriented x1-2, no asterixis  Skin: Warm and dry. No obvious rash      LABS:                        6.6    8.40  )-----------( 157      ( 10 Mar 2022 07:26 )             21.4     03-10    137  |  105  |  18  ----------------------------<  123<H>  3.9   |  22  |  0.83    Ca    8.9      10 Mar 2022 07:26    TPro  6.6  /  Alb  2.6<L>  /  TBili  0.4  /  DBili  x   /  AST  55<H>  /  ALT  37  /  AlkPhos  98  03-10    PT/INR - ( 10 Mar 2022 07:26 )   PT: 14.9 sec;   INR: 1.25          PTT - ( 10 Mar 2022 07:26 )  PTT:36.5 sec                  RADIOLOGY & ADDITIONAL STUDIES:  Reviewed

## 2022-03-10 NOTE — PROGRESS NOTE ADULT - SUBJECTIVE AND OBJECTIVE BOX
OVERNIGHT EVENTS: naeo    SUBJECTIVE / INTERVAL HPI: Patient seen and examined at bedside. Denies any complaints      PHYSICAL EXAM:  General: NAD, arousable to voice, able to follow simple commands  HEENT: MMM, LEIGH (sluggish), dirty sclera  Neck: supple  Cardiovascular: regular rate and rhythm. Loud systolic murmur best heard at right 2nd ICS   Respiratory: CTA B/L anteriorly; no W/R/R  Gastrointestinal: soft, NT, mildly distended, tympanic  Extremities: moving all extremities  Neurological: AAOx2-3; symmetric face. No asterixis. Poor short-term memory. Limited cooperation.     VITAL SIGNS:  Vital Signs Last 24 Hrs  T(C): 36.9 (10 Mar 2022 10:15), Max: 36.9 (09 Mar 2022 20:21)  T(F): 98.5 (10 Mar 2022 10:15), Max: 98.5 (09 Mar 2022 20:21)  HR: 92 (10 Mar 2022 10:15) (87 - 95)  BP: 100/67 (10 Mar 2022 10:15) (94/60 - 109/72)  BP(mean): --  RR: 18 (10 Mar 2022 10:15) (16 - 18)  SpO2: 98% (10 Mar 2022 10:15) (96% - 98%)      MEDICATIONS:  MEDICATIONS  (STANDING):  artificial tears (preservative free) Ophthalmic Solution 1 Drop(s) Both EYES two times a day  atorvastatin 80 milliGRAM(s) Oral at bedtime  ferrous    sulfate 325 milliGRAM(s) Oral <User Schedule>  folic acid 1 milliGRAM(s) Oral daily  lactulose Syrup 20 Gram(s) Oral three times a day  multivitamin 1 Tablet(s) Oral daily  mupirocin 2% Nasal 1 Application(s) Both Nostrils two times a day  nicotine - 21 mG/24Hr(s) Patch 1 patch Transdermal daily  pantoprazole    Tablet 40 milliGRAM(s) Oral daily  rifAXIMin 550 milliGRAM(s) Oral two times a day  thiamine IVPB 500 milliGRAM(s) IV Intermittent every 8 hours    MEDICATIONS  (PRN):  oxymetazoline 0.05% Nasal Spray 1 Spray(s) Both Nostrils two times a day PRN Nasal Congestion  sodium chloride 0.65% Nasal 1 Spray(s) Both Nostrils four times a day PRN Nasal Congestion      ALLERGIES:  Allergies    No Known Allergies    Intolerances        LABS:                        6.6    8.40  )-----------( 157      ( 10 Mar 2022 07:26 )             21.4     03-10    137  |  105  |  18  ----------------------------<  123<H>  3.9   |  22  |  0.83    Ca    8.9      10 Mar 2022 07:26    TPro  6.6  /  Alb  2.6<L>  /  TBili  0.4  /  DBili  x   /  AST  55<H>  /  ALT  37  /  AlkPhos  98  03-10    PT/INR - ( 10 Mar 2022 07:26 )   PT: 14.9 sec;   INR: 1.25          PTT - ( 10 Mar 2022 07:26 )  PTT:36.5 sec    CAPILLARY BLOOD GLUCOSE          RADIOLOGY & ADDITIONAL TESTS: Reviewed. OVERNIGHT EVENTS: naeo    SUBJECTIVE / INTERVAL HPI: Patient seen and examined at bedside. Denies any complaints      PHYSICAL EXAM:  General: NAD, arousable to voice, able to follow simple commands  HEENT: MMM, LEIGH (sluggish), dirty sclera  Neck: supple  Cardiovascular: regular rate and rhythm. Loud systolic murmur best heard at right 2nd ICS   Respiratory: CTA B/L anteriorly; no W/R/R  Gastrointestinal: soft, NT, mildly distended, tympanic  Rectal: brown stool noted, no BRB or melena  Extremities: moving all extremities  Neurological: AAOx2-3; symmetric face. No asterixis. Poor short-term memory. Limited cooperation.     VITAL SIGNS:  Vital Signs Last 24 Hrs  T(C): 36.9 (10 Mar 2022 10:15), Max: 36.9 (09 Mar 2022 20:21)  T(F): 98.5 (10 Mar 2022 10:15), Max: 98.5 (09 Mar 2022 20:21)  HR: 92 (10 Mar 2022 10:15) (87 - 95)  BP: 100/67 (10 Mar 2022 10:15) (94/60 - 109/72)  BP(mean): --  RR: 18 (10 Mar 2022 10:15) (16 - 18)  SpO2: 98% (10 Mar 2022 10:15) (96% - 98%)      MEDICATIONS:  MEDICATIONS  (STANDING):  artificial tears (preservative free) Ophthalmic Solution 1 Drop(s) Both EYES two times a day  atorvastatin 80 milliGRAM(s) Oral at bedtime  ferrous    sulfate 325 milliGRAM(s) Oral <User Schedule>  folic acid 1 milliGRAM(s) Oral daily  lactulose Syrup 20 Gram(s) Oral three times a day  multivitamin 1 Tablet(s) Oral daily  mupirocin 2% Nasal 1 Application(s) Both Nostrils two times a day  nicotine - 21 mG/24Hr(s) Patch 1 patch Transdermal daily  pantoprazole    Tablet 40 milliGRAM(s) Oral daily  rifAXIMin 550 milliGRAM(s) Oral two times a day  thiamine IVPB 500 milliGRAM(s) IV Intermittent every 8 hours    MEDICATIONS  (PRN):  oxymetazoline 0.05% Nasal Spray 1 Spray(s) Both Nostrils two times a day PRN Nasal Congestion  sodium chloride 0.65% Nasal 1 Spray(s) Both Nostrils four times a day PRN Nasal Congestion      ALLERGIES:  Allergies    No Known Allergies    Intolerances        LABS:                        6.6    8.40  )-----------( 157      ( 10 Mar 2022 07:26 )             21.4     03-10    137  |  105  |  18  ----------------------------<  123<H>  3.9   |  22  |  0.83    Ca    8.9      10 Mar 2022 07:26    TPro  6.6  /  Alb  2.6<L>  /  TBili  0.4  /  DBili  x   /  AST  55<H>  /  ALT  37  /  AlkPhos  98  03-10    PT/INR - ( 10 Mar 2022 07:26 )   PT: 14.9 sec;   INR: 1.25          PTT - ( 10 Mar 2022 07:26 )  PTT:36.5 sec    CAPILLARY BLOOD GLUCOSE          RADIOLOGY & ADDITIONAL TESTS: Reviewed.

## 2022-03-10 NOTE — PROGRESS NOTE ADULT - PROBLEM SELECTOR PLAN 1
CTAP with 2.4 hypervascular liver mass. i/s/o Liver cirrhosis and EtOH use disorder. triple phase CT confirms HCC  - AFP is 7.2 which is normal  - MRI abdomen: 2.5 cm left hepatic lesion consistent with LI-RADS v 2018 Category: LR-5 Definitely HCC. OPTN Category (if LR-5): 5B. No locally invasive or metastatic disease. Left hepatic artery variant. No additional suspicious hepatic lesions.   - follow IR reccs for TACE  - Surg/onc following NTD

## 2022-03-10 NOTE — PROGRESS NOTE ADULT - ASSESSMENT
65 y/o M, current smoker (1ppd x 50 years), current every day ETOH abuse (1 pint vodka and several beers daily x 50 years), with PMHx of HLD, HTN (not on any medications),  CABG (LIMA to LAD, SVG to PDA in 2017), AS (s/p AVR in 2017), who presented to [facility] ED on 2/24/22 NSTEMI with LHC with nonobstructive disease, then found to have restenosis of bioprosthetic valve for which aortic root surgery was planned. Now w/ newly diagnosed 2.5cm left hepatic mass concerning for HCC found on CT AP. Pending IR procedure. Am Hgb 6.6. Clinically stable.    No acute surgical intervention at this time  Pending IR procedure for TACE and ablation  Rest of care per primary team  Team 1c will continue to follow

## 2022-03-10 NOTE — PROGRESS NOTE ADULT - ASSESSMENT
65 y/o M, current smoker (1ppd x 50 years), current every day ETOH abuse (1 pint vodka and several beers daily x 50 years), with PMHx of HLD, HTN (not on any medications),  CABG (LIMA to LAD, SVG to PDA in 2017), AS (s/p AVR in 2017), who presented to [facility] ED on 2/24/22 with complaints of 9/10 chest pain with associated diaphoresis with exertion, admitted for NSTEMI, transferred to CTsx service for severe AS, requiring a TAVR procedure; upon workup for TAVR, patient incidentally noted to have a 2.4 cm hypervascular lesion for which GI was consulted, CT A/P concerning for HCC. Now Gi re-consulted for AMS.     #Hepatic Lesion  Patient presented to [facility] ED on 2/24/22 with complaints of 9/10 chest pain with associated diaphoresis with exertion, admitted for NSTEMI, transferred to CTsx service for severe AS, requiring a TAVR procedure. During workup for TAVR, CTA A/P (2/28) revealing a nodular liver contour, c/w cirrhosis, A 2.4 cm hypervascular liver mass suspicious for HCC, as well as splenomegaly. AFP 7.2 (WNL), While AFP noted to be normal, does not preclude risk of possibility of HCC, especially in the setting of what appears to be cirrhosis 2/2 underlying long-standing history of EtOH use, which places him at higher risk.   - CT A/P (3/6) revealing 2.5 cm left hepatic lesion consistent with LI-RADS v 2018 Category: LR-5 Definitely HCC. OPTN Category (if LR-5): 5B. No locally invasive or metastatic disease. Left hepatic artery variant. No additional suspicious hepatic lesions.  - In light of active EtOH use, would not qualify for liver transplant evaluation   - Not a surgical candidate as per surgical onc team  - Appreciate ongoing Heme/Onc recs  - Awaiting MRI Abdomen to further workup hepatic lesion and if an ideal candidate for TACE or ablation    #Cirrhosis, likely compensated (-HE, -ascites, - no known EV bleeding)  CTA A/P with radiographic findings c/f cirrhosis, including nodular liver contour and splenomegaly, which is suggestive likely portal HTN. Likely in the setting of long-standing history of EtOH abuse.   MELD-Na: 10 (based on 3/7/22 BW, no AM coags on 3/9)  HE: [id number] (less conversive on exam today), no asterixis   Ascites: None present on CT imaging  HCC: CTA A/P (2/28) with 2.4 cm hypervascular lesion. CT A/P (3/6), lesion c/w HCC  Varices: No prior EGD evaluation, would be warranted, can pursue as an outpatient  - Abdominal US with duplex (3/6) The portal veins, hepatic veins and hepatic arteries are patent with normal directionality of flow. Cirrhosis. Since 2/28/2022, again a 2.9 cm heterogeneous lesion is present within the left lobe of the liver (see CT findings as noted above, appears to be c/w HCC). Awaiting MRI as per IR  - Daily CMPs & Coags to calculate MELD-Na  - Nutrition consult  - High protein diet  -  on alcohol cessation  - c/w Rifaximin 500 mg BID & lactulose, titrate to 2-3 BMs/day    #AMS  Unclear etiology for AMS this am with increased lethargy. Ddx includes HE, infection, librium, delirium. The elevated ammonia lever to 123 could be due to his nose bleed, which has been ongoing since 3/5. He does not have Asterixis on exam and is A&O with understanding of where he is and why he is hospitalized. If HE, unclear the inciting event to cause decompensation. Additionally, RUQ US today w/o ascites. Most likely this is build up of librium that is being poorly metabolized due to his liver function.  - Continue to hold librium  - c/w Rifaximin 500 mg BID & lactulose, titrate to 2-3 BMs/day  - CXR (3/6/22) negative for infiltrates  - UA with no pyuria however +bacteria, + nitrite, trace LE, abxs as per primary team. If with no improvement in MS, consider initiation of abxs    #Anemia   With slow downtrend in H/H, Hgb 6.9. With dried blood on right nare, noted week prior to have blood from right nare along with leslie blood in oropharynx, currently with no blood in oropharynx on today's exam. With no overt bleeding including melena, hematochezia, hematemesis, coffee ground emesis.   - Monitor H/H  - Transfusion goal as per primary team  - Consider ENT evaluation for persistent nose bleeding     [place] [facility] [place].     So Guthrie DO  Gastroenterology Fellow  Pager: [phone] 65 y/o M, current smoker (1ppd x 50 years), current every day ETOH abuse (1 pint vodka and several beers daily x 50 years), with PMHx of HLD, HTN (not on any medications),  CABG (LIMA to LAD, SVG to PDA in 2017), AS (s/p AVR in 2017), who presented to [facility] ED on 2/24/22 with complaints of 9/10 chest pain with associated diaphoresis with exertion, admitted for NSTEMI, transferred to CTsx service for severe AS, requiring a TAVR procedure; upon workup for TAVR, patient incidentally noted to have a 2.4 cm hypervascular lesion for which GI was consulted, CT A/P concerning for HCC. Now Gi re-consulted for AMS.     #Hepatic Lesion  Patient presented to [facility] ED on 2/24/22 with complaints of 9/10 chest pain with associated diaphoresis with exertion, admitted for NSTEMI, transferred to CTsx service for severe AS, requiring a TAVR procedure. During workup for TAVR, CTA A/P (2/28) revealing a nodular liver contour, c/w cirrhosis, A 2.4 cm hypervascular liver mass suspicious for HCC, as well as splenomegaly. AFP 7.2 (WNL), While AFP noted to be normal, does not preclude risk of possibility of HCC, especially in the setting of what appears to be cirrhosis 2/2 underlying long-standing history of EtOH use, which places him at higher risk.   - CT A/P (3/6) revealing 2.5 cm left hepatic lesion consistent with LI-RADS v 2018 Category: LR-5 Definitely HCC. OPTN Category (if LR-5): 5B. No locally invasive or metastatic disease. Left hepatic artery variant. No additional suspicious hepatic lesions.  - In light of active EtOH use, would not qualify for liver transplant evaluation   - Not a surgical candidate as per surgical onc team  - Appreciate ongoing Heme/Onc recs  - MR Abdomen (3/9) confirming HCC. Per IR, will consider for TACE, tentatively for Monday pending Hgb >8    #Cirrhosis, likely compensated (-HE, -ascites, - no known EV bleeding)  CTA A/P with radiographic findings c/f cirrhosis, including nodular liver contour and splenomegaly, which is suggestive likely portal HTN. Likely in the setting of long-standing history of EtOH abuse.   MELD-Na: 11 (based on 3/10/22 BW)  HE: [id number] (less conversive on exam today), no asterixis   Ascites: None present on CT imaging  HCC: CTA A/P (2/28) with 2.4 cm hypervascular lesion. CT A/P (3/6), lesion c/w HCC  Varices: No prior EGD evaluation, would be warranted, can pursue as an outpatient  - Abdominal US with duplex (3/6) The portal veins, hepatic veins and hepatic arteries are patent with normal directionality of flow. Cirrhosis. Since 2/28/2022, again a 2.9 cm heterogeneous lesion is present within the left lobe of the liver (see CT findings as noted above, appears to be c/w HCC). Awaiting MRI as per IR  - Daily CMPs & Coags to calculate MELD-Na  - Nutrition consult  - High protein diet  -  on alcohol cessation  - c/w Rifaximin 500 mg BID & lactulose, titrate to 2-3 BMs/day      #AMS  Unclear etiology for AMS this am with increased lethargy. Ddx includes HE, infection, librium, delirium. The elevated ammonia lever to 123 could be due to his nose bleed, which has been ongoing since 3/5. He does not have Asterixis on exam and is A&O with understanding of where he is and why he is hospitalized. If HE, unclear the inciting event to cause decompensation. Additionally, RUQ US today w/o ascites. Most likely this is build up of librium that is being poorly metabolized due to his liver function.  - Continue to hold librium  - c/w Rifaximin 500 mg BID & lactulose, titrate to 2-3 BMs/day  - CXR (3/6/22) negative for infiltrates  - UA with no pyuria however +bacteria, + nitrite, trace LE, abxs as per primary team. If with no improvement in MS, consider initiation of abxs  -Consider CT Head to further evaluate worsening AMS    #Anemia   With slow downtrend in H/H, Hgb 6.9, s/p 1 u pRBC however with extravasation. Downtrended again to 6.6 today (3/10). With dried blood on right nare, noted week prior to have blood from right nare along with leslie blood in oropharynx, currently with no blood in oropharynx on today's exam. With no overt bleeding including melena, hematochezia, hematemesis, coffee ground emesis.   - Monitor H/H  - Transfusion goal >8  - Appreciate ongoing recs from ENT  - In light of underlying cirrhosis, with evidence of thrombocytopenia and splenomegaly, suggestive of e/o portal HTN, would warrant endoscopic evaluation to further assess for EV/PHG  - NPO after MN, except for medications with sips of water  - Obtain AM coags (3/11)  - COVID PCR up-to-date (3/9)    Case discussed with [facility] [place] attending and primary team.     So Guthrie DO  Gastroenterology Fellow  Pager: [phone] 67 y/o M, current smoker (1ppd x 50 years), current every day ETOH abuse (1 pint vodka and several beers daily x 50 years), with PMHx of HLD, HTN (not on any medications),  CABG (LIMA to LAD, SVG to PDA in 2017), AS (s/p AVR in 2017), who presented to [facility] ED on 2/24/22 with complaints of 9/10 chest pain with associated diaphoresis with exertion, admitted for NSTEMI, transferred to CTsx service for severe AS, requiring a TAVR procedure; upon workup for TAVR, patient incidentally noted to have a 2.4 cm hypervascular lesion for which GI was consulted, CT A/P concerning for HCC. Now Gi re-consulted for AMS.     #Hepatic Lesion  Patient presented to [facility] ED on 2/24/22 with complaints of 9/10 chest pain with associated diaphoresis with exertion, admitted for NSTEMI, transferred to CTsx service for severe AS, requiring a TAVR procedure. During workup for TAVR, CTA A/P (2/28) revealing a nodular liver contour, c/w cirrhosis, A 2.4 cm hypervascular liver mass suspicious for HCC, as well as splenomegaly. AFP 7.2 (WNL), While AFP noted to be normal, does not preclude risk of possibility of HCC, especially in the setting of what appears to be cirrhosis 2/2 underlying long-standing history of EtOH use, which places him at higher risk.   - CT A/P (3/6) revealing 2.5 cm left hepatic lesion consistent with LI-RADS v 2018 Category: LR-5 Definitely HCC. OPTN Category (if LR-5): 5B. No locally invasive or metastatic disease. Left hepatic artery variant. No additional suspicious hepatic lesions.  - In light of active EtOH use, would not qualify for liver transplant evaluation   - Not a surgical candidate as per surgical onc team  - Appreciate ongoing Heme/Onc recs  - MR Abdomen (3/9) confirming HCC. Per IR, will consider for TACE, tentatively for Monday pending Hgb >8    #Cirrhosis, likely compensated (-HE, -ascites, - no known EV bleeding)  CTA A/P with radiographic findings c/f cirrhosis, including nodular liver contour and splenomegaly, which is suggestive likely portal HTN. Likely in the setting of long-standing history of EtOH abuse.   MELD-Na: 11 (based on 3/10/22 BW)  HE: [id number] (less conversive on exam today), no asterixis   Ascites: None present on CT imaging  HCC: CTA A/P (2/28) with 2.4 cm hypervascular lesion. CT A/P (3/6), lesion c/w HCC  Varices: No prior EGD evaluation, would be warranted, can pursue as an outpatient  - Abdominal US with duplex (3/6) The portal veins, hepatic veins and hepatic arteries are patent with normal directionality of flow. Cirrhosis. Since 2/28/2022, again a 2.9 cm heterogeneous lesion is present within the left lobe of the liver (see CT findings as noted above, appears to be c/w HCC). Awaiting MRI as per IR  - Daily CMPs & Coags to calculate MELD-Na  - Nutrition consult  - High protein diet  -  on alcohol cessation  - c/w Rifaximin 500 mg BID & lactulose, titrate to 2-3 BMs/day      #AMS  Unclear etiology for AMS this am with increased lethargy. Ddx includes HE, infection, librium, delirium. The elevated ammonia lever to 123 could be due to his nose bleed, which has been ongoing since 3/5. He does not have Asterixis on exam and is A&O with understanding of where he is and why he is hospitalized. If HE, unclear the inciting event to cause decompensation. Additionally, RUQ US today w/o ascites. Most likely this is build up of librium that is being poorly metabolized due to his liver function.  - Continue to hold librium  - c/w Rifaximin 500 mg BID & lactulose, titrate to 2-3 BMs/day  - CXR (3/6/22) negative for infiltrates  - UA with no pyuria however +bacteria, + nitrite, trace LE, abxs as per primary team. If with no improvement in MS, consider initiation of abxs  -Consider CT Head to further evaluate worsening AMS    #Anemia   With slow downtrend in H/H, Hgb 6.9, s/p 1 u pRBC however with extravasation. Downtrended again to 6.6 today (3/10). With dried blood on right nare, noted week prior to have blood from right nare along with leslie blood in oropharynx, currently with no blood in oropharynx on today's exam. With no overt bleeding including melena, hematochezia, hematemesis, coffee ground emesis.   - LDH/Haptoglobin c/w hemolysis, likely in the setting of severe AS  - Monitor H/H  - Transfusion goal >8  - Appreciate ongoing recs from ENT  - In light of underlying cirrhosis, with evidence of thrombocytopenia and splenomegaly, suggestive of e/o portal HTN, would warrant endoscopic evaluation to further assess for EV/PHG  - NPO after MN, except for medications with sips of water  - Obtain AM coags (3/11)  - COVID PCR up-to-date (3/9)    Case discussed with c attending and primary team.     So Guthrie DO  Gastroenterology Fellow  Pager: [phone]

## 2022-03-10 NOTE — PROGRESS NOTE ADULT - ASSESSMENT
65 YO M, current smoker (1ppd x 50 years), current every day ETOH abuse (1 pint vodka and several beers daily x 50 years), with PMHx of HLD, HTN (not on any medications), CABG (LIMA to LAD, SVG to PDA in 2017), AS (s/p AVR in 2017) with recently discovered restenosis, presented to [facility] ED on 2/24/22 with chest pain and episode of LOC, initially admitted to cardiology for NSTEMI with [facility] with nonobstructive disease, then found to have restenosis of bioprosthetic valve for which aortic root surgery was planned, but now deferred given liver lesion c/f HCC. Now he is transferred to medicine for concern for mild hepatic encephalopathy, however unlikely to have HE and is compensated cirrhotic. Now found to have HCC, pending further workup.

## 2022-03-10 NOTE — PROGRESS NOTE ADULT - ASSESSMENT
67 y/o M, current smoker (1ppd x 50 years), current every day ETOH abuse (1 pint vodka and several beers daily x 50 years), with PMHx of HLD, HTN (not on any medications),  CABG (LIMA to LAD, SVG to PDA in 2017), AS (s/p AVR in 2017), who presented to [facility] ED on 2/24/22 with complaints of 9/10 chest pain, found to have NSTEMI.  On CT imaging found to have 2.5 cm HCC. Medical oncology consulted for recommendations.    Hospital course summary:  Patient admitted for NSTEMI work up. TTE confirmed severe prosthetic valve stenosis and Structural heart was consulted. Patient was transferred to CT surgery for evaluation. Upon workup, patient found to need surgical AVR +/- aortic root repair, however preoperative workup revealed liver lesion with concern for HCC c/w cirrhosis and alcohol abuse. GI consulted for further evaluation. Recommending ammonia level and US abdomen with duplex to assess portal venous flow, and MR abdomen vs triple phase CT. The ammonia level was elevated, concern for hepatic encephalopathy, an NGT was placed and lactulose and rifaximin was initiated. Patient deemed to not be surgical candidate for AVR+ root repair.      Unable to appreciate much history from patient due to encephalopathy, he is sleepy, but arousable and oriented x4.  Unable to assess performance status due to current mental status.  Child-North score is B7    3/6/22 Triple phase CTAP:  2.5 cm left hepatic lesion consistent with LI-RADS v 2018 Category: LR-5   Definitely HCC. OPTN Category (if LR-5): 5B.    No locally invasive or metastatic disease. Left hepatic artery variant.   No additional suspicious hepatic lesions. Other incidental comments as   above.    2/28/22 CT angio AP:  1. Nodular liver contour, consistent with cirrhosis.  2. A 2.4 cm hypervascular liver mass is suspicious for hepatocellular   carcinoma. Recommend correlation with alpha-fetoprotein levels and MRI of   liver.  3. Severe atherosclerotic disease throughout the chest, abdomen and   pelvis, with large amount of calcified plaque in pelvic arteries   bilaterally and calcified and soft plaque in abdominal aorta. Multiple   stenoses, as described above.  4. Groundglass opacity in both lungs, probably edema.  5. Mild splenomegaly.    MR Abdomen w/wo IV Cont (03.09.22 @ 23:11)   2.5 cm left hepatic lesion consistent with LI-RADS v 2018 Category: LR-5   Definitely HCC. OPTN Category (if LR-5): 5B.  No locally invasive or metastaticdisease. Left hepatic artery variant.   No additional suspicious hepatic lesions.      HCC  Child North score B7  AFP is 7  Portal HTN, no known varices (did not have EGD)  MELD score 13  Early stage disease  not surgical candidate per surg onc  IR following, plan for possible Y90  Notable episodes of epistaxis, now with acute blood loss anemia requiring blood transfusions  Transfuse for Hb<7  Gi following, appreciate recs    D/w .     65 y/o M, current smoker (1ppd x 50 years), current every day ETOH abuse (1 pint vodka and several beers daily x 50 years), with PMHx of HLD, HTN (not on any medications),  CABG (LIMA to LAD, SVG to PDA in 2017), AS (s/p AVR in 2017), who presented to [facility] ED on 2/24/22 with complaints of 9/10 chest pain, found to have NSTEMI.  On CT imaging found to have 2.5 cm HCC. Medical oncology consulted for recommendations.    Hospital course summary:  Patient admitted for NSTEMI work up. TTE confirmed severe prosthetic valve stenosis and Structural heart was consulted. Patient was transferred to CT surgery for evaluation. Upon workup, patient found to need surgical AVR +/- aortic root repair, however preoperative workup revealed liver lesion with concern for HCC c/w cirrhosis and alcohol abuse. GI consulted for further evaluation. Recommending ammonia level and US abdomen with duplex to assess portal venous flow, and MR abdomen vs triple phase CT. The ammonia level was elevated, concern for hepatic encephalopathy, an NGT was placed and lactulose and rifaximin was initiated. Patient deemed to not be surgical candidate for AVR+ root repair.      Unable to appreciate much history from patient due to encephalopathy, he is sleepy, but arousable and oriented x4.  Unable to assess performance status due to current mental status.  Child-North score is B7    3/6/22 Triple phase CTAP:  2.5 cm left hepatic lesion consistent with LI-RADS v 2018 Category: LR-5   Definitely HCC. OPTN Category (if LR-5): 5B.    No locally invasive or metastatic disease. Left hepatic artery variant.   No additional suspicious hepatic lesions. Other incidental comments as   above.    2/28/22 CT angio AP:  1. Nodular liver contour, consistent with cirrhosis.  2. A 2.4 cm hypervascular liver mass is suspicious for hepatocellular   carcinoma. Recommend correlation with alpha-fetoprotein levels and MRI of   liver.  3. Severe atherosclerotic disease throughout the chest, abdomen and   pelvis, with large amount of calcified plaque in pelvic arteries   bilaterally and calcified and soft plaque in abdominal aorta. Multiple   stenoses, as described above.  4. Groundglass opacity in both lungs, probably edema.  5. Mild splenomegaly.    MR Abdomen w/wo IV Cont (03.09.22 @ 23:11)   2.5 cm left hepatic lesion consistent with LI-RADS v 2018 Category: LR-5   Definitely HCC. OPTN Category (if LR-5): 5B.  No locally invasive or metastaticdisease. Left hepatic artery variant.   No additional suspicious hepatic lesions.      HCC  Child North score B7  AFP is 7  Portal HTN, no known varices (did not have EGD)  MELD score 13  Early stage disease  not surgical candidate per surg onc  IR following, plan for possible Y90  Notable episodes of epistaxis, now with acute blood loss anemia requiring blood transfusions  Transfuse for Hb<7  Gi following, appreciate recs  Check peripheral smear    D/w .     65 y/o M, current smoker (1ppd x 50 years), current every day ETOH abuse (1 pint vodka and several beers daily x 50 years), with PMHx of HLD, HTN (not on any medications),  CABG (LIMA to LAD, SVG to PDA in 2017), AS (s/p AVR in 2017), who presented to [facility] ED on 2/24/22 with complaints of 9/10 chest pain, found to have NSTEMI.  On CT imaging found to have 2.5 cm HCC. Medical oncology consulted for recommendations.    Hospital course summary:  Patient admitted for NSTEMI work up. TTE confirmed severe prosthetic valve stenosis and Structural heart was consulted. Patient was transferred to CT surgery for evaluation. Upon workup, patient found to need surgical AVR +/- aortic root repair, however preoperative workup revealed liver lesion with concern for HCC c/w cirrhosis and alcohol abuse. GI consulted for further evaluation. Recommending ammonia level and US abdomen with duplex to assess portal venous flow, and MR abdomen vs triple phase CT. The ammonia level was elevated, concern for hepatic encephalopathy, an NGT was placed and lactulose and rifaximin was initiated. Patient deemed to not be surgical candidate for AVR+ root repair.      Unable to appreciate much history from patient due to encephalopathy, he is sleepy, but arousable and oriented x4.  Unable to assess performance status due to current mental status.  Child-North score is B7    3/6/22 Triple phase CTAP:  2.5 cm left hepatic lesion consistent with LI-RADS v 2018 Category: LR-5   Definitely HCC. OPTN Category (if LR-5): 5B.    No locally invasive or metastatic disease. Left hepatic artery variant.   No additional suspicious hepatic lesions. Other incidental comments as   above.    2/28/22 CT angio AP:  1. Nodular liver contour, consistent with cirrhosis.  2. A 2.4 cm hypervascular liver mass is suspicious for hepatocellular   carcinoma. Recommend correlation with alpha-fetoprotein levels and MRI of   liver.  3. Severe atherosclerotic disease throughout the chest, abdomen and   pelvis, with large amount of calcified plaque in pelvic arteries   bilaterally and calcified and soft plaque in abdominal aorta. Multiple   stenoses, as described above.  4. Groundglass opacity in both lungs, probably edema.  5. Mild splenomegaly.    MR Abdomen w/wo IV Cont (03.09.22 @ 23:11)   2.5 cm left hepatic lesion consistent with LI-RADS v 2018 Category: LR-5   Definitely HCC. OPTN Category (if LR-5): 5B.  No locally invasive or metastaticdisease. Left hepatic artery variant.   No additional suspicious hepatic lesions.      HCC  Child North score B7  AFP is 7  Portal HTN, no known varices (did not have EGD)  MELD score 13  Early stage disease  not surgical candidate per surg onc  IR following, plan for possible Y90 vs chemoembolization  Notable episodes of epistaxis, now with acute blood loss anemia requiring blood transfusions  Transfuse for Hb<7  Gi following, appreciate recs  Check peripheral smear    D/w .

## 2022-03-10 NOTE — PROGRESS NOTE ADULT - PROBLEM SELECTOR PLAN 3
AAOx2 (person and place). Unlikely hepatic encephalopathy as no asterixis and is currently compensated. No obvious signs of infection. Neuro exam nonfocal. Possibly related to chronic alcohol use, Wernicke's or depression. As per wife, pt is not normally lethargic at home.  - monitor mental status  - High dose thiamine x 3 days - last day today  - trend labs

## 2022-03-10 NOTE — PROGRESS NOTE ADULT - PROBLEM SELECTOR PLAN 1
unclear etiology, possibly due to acute blood loss due to epistaxis episodes, which self-resolved; appreciate ENT recs; no e/o acute GI blood loss, but will monitor; VSS at baseline; Hb did not respond to transfusion yesterday, but PIV infiltrated; will transfuse additional 1U PRBCs today; monitor CBC, goal Hb > 7; will also check hemolysis labs and f/u Heme-Onc recs

## 2022-03-10 NOTE — PROGRESS NOTE ADULT - SUBJECTIVE AND OBJECTIVE BOX
INTERVAL HPI/OVERNIGHT EVENTS: AM Hgb 6.9, received 1 unit however infiltrated. MRI perfomed demonstrates 2.5 left hepatic mass c/w HCC.      SUBJECTIVE: Examined in the am. Lethargic and moderately altered. Denies nausea, emesis, cp, sob. No acute complaints.     rifAXIMin 550 milliGRAM(s) Oral two times a day      Vital Signs Last 24 Hrs  T(C): 36.5 (10 Mar 2022 04:49), Max: 36.9 (09 Mar 2022 20:21)  T(F): 97.7 (10 Mar 2022 04:49), Max: 98.5 (09 Mar 2022 20:21)  HR: 95 (10 Mar 2022 04:49) (87 - 95)  BP: 94/60 (10 Mar 2022 04:49) (94/60 - 109/72)  BP(mean): --  RR: 17 (10 Mar 2022 04:49) (16 - 17)  SpO2: 96% (10 Mar 2022 04:49) (96% - 97%)  I&O's Detail    09 Mar 2022 07:01  -  10 Mar 2022 07:00  --------------------------------------------------------  IN:    IV PiggyBack: 100 mL  Total IN: 100 mL    OUT:    Incontinent per Condom Catheter (mL): 650 mL  Total OUT: 650 mL    Total NET: -550 mL          Physical Exam  General: NAD, resting comfortably in bed  C/V: NSR  Pulm: Nonlabored breathing, no respiratory distress  Abd: soft, moderately distended, non tender.   Extrem: WWP, no edema,  Neuro: A/O x 3, CNs II-XII grossly intact, no focal deficits, normal sensation  Pulses: palpable distal pulses    LABS:                        6.6    8.40  )-----------( 157      ( 10 Mar 2022 07:26 )             21.4     03-10    137  |  105  |  18  ----------------------------<  123<H>  3.9   |  22  |  0.83    Ca    8.9      10 Mar 2022 07:26    TPro  6.6  /  Alb  2.6<L>  /  TBili  0.4  /  DBili  x   /  AST  55<H>  /  ALT  37  /  AlkPhos  98  03-10    PT/INR - ( 10 Mar 2022 07:26 )   PT: 14.9 sec;   INR: 1.25          PTT - ( 10 Mar 2022 07:26 )  PTT:36.5 sec      RADIOLOGY & ADDITIONAL STUDIES:

## 2022-03-10 NOTE — PROGRESS NOTE ADULT - PROBLEM SELECTOR PLAN 2
normocytic. Normal B12 and folic acid. Iron studies c/w VINCE, but likely multifactorial - also AOCD given cirrhosis and EtOH use. Downtrending since admission 9-11-> 6.6. Clotted blood noted in nares and mouth. Brown stool on rectal exam. Unlikely RP bleed as would be seen on MR abdomen. Anemia likely i/s/o blood loss from liver mass    - Given 1 unit PRBC  - f/u 4PM CBC  - c/w ferrous sulfate 325mg every other day  - heme/onc, GI following

## 2022-03-10 NOTE — PROGRESS NOTE ADULT - SUBJECTIVE AND OBJECTIVE BOX
INTERVAL HPI/OVERNIGHT EVENTS:  Pt was seen and examined at the bedside. He was observed to be lying down comfortably.         VITAL SIGNS:  T(F): 97.7 (03-10-22 @ 04:49)  HR: 95 (03-10-22 @ 04:49)  BP: 94/60 (03-10-22 @ 04:49)  RR: 17 (03-10-22 @ 04:49)  SpO2: 96% (03-10-22 @ 04:49)  Wt(kg): --  I&O's Summary    09 Mar 2022 07:01  -  10 Mar 2022 07:00  --------------------------------------------------------  IN: 100 mL / OUT: 650 mL / NET: -550 mL      PHYSICAL EXAM:  Constitutional: NAD, well-groomed, well-developed  HEENT: PERRLA, EOMI, Normal Hearing, MMM  Neck: No LAD, No JVD  Back: Normal spine flexure, No CVA tenderness  Respiratory: CTAB  Cardiovascular: S1 and S2, RRR, no M/G/R  Gastrointestinal: BS+, soft, NT/ND  Extremities: No peripheral edema  Vascular: 2+ peripheral pulses  Neurological: A/O x 3, no focal deficits  Psychiatric: Normal mood, normal affect  Musculoskeletal: 5/5 strength b/l upper and lower extremities  Skin: No rashes        MEDICATIONS  (STANDING):  artificial tears (preservative free) Ophthalmic Solution 1 Drop(s) Both EYES two times a day  aspirin enteric coated 81 milliGRAM(s) Oral daily  atorvastatin 80 milliGRAM(s) Oral at bedtime  ferrous    sulfate 325 milliGRAM(s) Oral <User Schedule>  folic acid 1 milliGRAM(s) Oral daily  heparin   Injectable 5000 Unit(s) SubCutaneous every 8 hours  lactulose Syrup 20 Gram(s) Oral three times a day  multivitamin 1 Tablet(s) Oral daily  mupirocin 2% Nasal 1 Application(s) Both Nostrils two times a day  nicotine - 21 mG/24Hr(s) Patch 1 patch Transdermal daily  pantoprazole    Tablet 40 milliGRAM(s) Oral daily  rifAXIMin 550 milliGRAM(s) Oral two times a day  thiamine IVPB 500 milliGRAM(s) IV Intermittent every 8 hours    MEDICATIONS  (PRN):  oxymetazoline 0.05% Nasal Spray 1 Spray(s) Both Nostrils two times a day PRN Nasal Congestion  sodium chloride 0.65% Nasal 1 Spray(s) Both Nostrils four times a day PRN Nasal Congestion      Allergies    No Known Allergies    Intolerances        LABS:  CBC Full  -  ( 10 Mar 2022 07:26 )  WBC Count : 8.40 K/uL  RBC Count : 2.44 M/uL  Hemoglobin : 6.6 g/dL  Hematocrit : 21.4 %  Platelet Count - Automated : 157 K/uL  Mean Cell Volume : 87.7 fl  Mean Cell Hemoglobin : 27.0 pg  Mean Cell Hemoglobin Concentration : 30.8 gm/dL  Auto Neutrophil # : x  Auto Lymphocyte # : x  Auto Monocyte # : x  Auto Eosinophil # : x  Auto Basophil # : x  Auto Neutrophil % : x  Auto Lymphocyte % : x  Auto Monocyte % : x  Auto Eosinophil % : x  Auto Basophil % : x    03-10    137  |  105  |  18  ----------------------------<  123<H>  3.9   |  22  |  0.83    Ca    8.9      10 Mar 2022 07:26    TPro  6.6  /  Alb  2.6<L>  /  TBili  0.4  /  DBili  x   /  AST  55<H>  /  ALT  37  /  AlkPhos  98  03-10    PT/INR - ( 10 Mar 2022 07:26 )   PT: 14.9 sec;   INR: 1.25          PTT - ( 10 Mar 2022 07:26 )  PTT:36.5 sec      Ferritin, Serum: 24 ng/mL (02-25 @ 06:58)  Iron Total, Serum: 36 ug/dL (02-25 @ 06:58)  Iron - Total Binding Capacity.: 387 ug/dL (02-25 @ 06:58)    INR: 1.25 (03-10-22 @ 07:26)  Activated Partial Thromboplastin Time: 36.5 sec (03-10-22 @ 07:26)  Activated Partial Thromboplastin Time: 47.5 sec (03-08-22 @ 08:00)  INR: 1.29 (03-08-22 @ 08:00)      RADIOLOGY & ADDITIONAL TESTS: Reviewed.           INTERVAL HPI/OVERNIGHT EVENTS:  Pt was seen and examined at the bedside. He was observed to be lying down comfortably.         VITAL SIGNS:  T(F): 97.7 (03-10-22 @ 04:49)  HR: 95 (03-10-22 @ 04:49)  BP: 94/60 (03-10-22 @ 04:49)  RR: 17 (03-10-22 @ 04:49)  SpO2: 96% (03-10-22 @ 04:49)  Wt(kg): --  I&O's Summary    09 Mar 2022 07:01  -  10 Mar 2022 07:00  --------------------------------------------------------  IN: 100 mL / OUT: 650 mL / NET: -550 mL      PHYSICAL EXAM:  Constitutional: NAD, somnolent  HEENT: PERRLA, EOMI, Normal Hearing, MMM  Neck: No LAD, No JVD  Back: Normal spine flexure, No CVA tenderness  Respiratory: CTAB  Cardiovascular: S1 and S2, RRR, no M/G/R  Gastrointestinal: BS+, soft, NT/ND  Extremities: No peripheral edema  Vascular: 2+ peripheral pulses  Neurological: A/O x 2, no focal deficits          MEDICATIONS  (STANDING):  artificial tears (preservative free) Ophthalmic Solution 1 Drop(s) Both EYES two times a day  aspirin enteric coated 81 milliGRAM(s) Oral daily  atorvastatin 80 milliGRAM(s) Oral at bedtime  ferrous    sulfate 325 milliGRAM(s) Oral <User Schedule>  folic acid 1 milliGRAM(s) Oral daily  heparin   Injectable 5000 Unit(s) SubCutaneous every 8 hours  lactulose Syrup 20 Gram(s) Oral three times a day  multivitamin 1 Tablet(s) Oral daily  mupirocin 2% Nasal 1 Application(s) Both Nostrils two times a day  nicotine - 21 mG/24Hr(s) Patch 1 patch Transdermal daily  pantoprazole    Tablet 40 milliGRAM(s) Oral daily  rifAXIMin 550 milliGRAM(s) Oral two times a day  thiamine IVPB 500 milliGRAM(s) IV Intermittent every 8 hours    MEDICATIONS  (PRN):  oxymetazoline 0.05% Nasal Spray 1 Spray(s) Both Nostrils two times a day PRN Nasal Congestion  sodium chloride 0.65% Nasal 1 Spray(s) Both Nostrils four times a day PRN Nasal Congestion      Allergies    No Known Allergies    Intolerances        LABS:  CBC Full  -  ( 10 Mar 2022 07:26 )  WBC Count : 8.40 K/uL  RBC Count : 2.44 M/uL  Hemoglobin : 6.6 g/dL  Hematocrit : 21.4 %  Platelet Count - Automated : 157 K/uL  Mean Cell Volume : 87.7 fl  Mean Cell Hemoglobin : 27.0 pg  Mean Cell Hemoglobin Concentration : 30.8 gm/dL  Auto Neutrophil # : x  Auto Lymphocyte # : x  Auto Monocyte # : x  Auto Eosinophil # : x  Auto Basophil # : x  Auto Neutrophil % : x  Auto Lymphocyte % : x  Auto Monocyte % : x  Auto Eosinophil % : x  Auto Basophil % : x    03-10    137  |  105  |  18  ----------------------------<  123<H>  3.9   |  22  |  0.83    Ca    8.9      10 Mar 2022 07:26    TPro  6.6  /  Alb  2.6<L>  /  TBili  0.4  /  DBili  x   /  AST  55<H>  /  ALT  37  /  AlkPhos  98  03-10    PT/INR - ( 10 Mar 2022 07:26 )   PT: 14.9 sec;   INR: 1.25          PTT - ( 10 Mar 2022 07:26 )  PTT:36.5 sec      Ferritin, Serum: 24 ng/mL (02-25 @ 06:58)  Iron Total, Serum: 36 ug/dL (02-25 @ 06:58)  Iron - Total Binding Capacity.: 387 ug/dL (02-25 @ 06:58)    INR: 1.25 (03-10-22 @ 07:26)  Activated Partial Thromboplastin Time: 36.5 sec (03-10-22 @ 07:26)  Activated Partial Thromboplastin Time: 47.5 sec (03-08-22 @ 08:00)  INR: 1.29 (03-08-22 @ 08:00)      RADIOLOGY & ADDITIONAL TESTS: Reviewed.

## 2022-03-10 NOTE — PROGRESS NOTE ADULT - PROBLEM SELECTOR PLAN 6
S/p CABG with Dr Wilcox in 2017  - recent [facility] with nonobstructive disease  - c/w Lipitor 80mg QD  - c/w ASA 81mg

## 2022-03-10 NOTE — PROGRESS NOTE ADULT - SUBJECTIVE AND OBJECTIVE BOX
Patient is a 66y old  Male who presents with a chief complaint of Chest Pain (10 Mar 2022 13:11)      INTERVAL HPI/OVERNIGHT EVENTS:    Pt. seen and examined earlier today  Pt.'s wife present at bedside  Pt. had no complaints  +BM x1, non-bloody, per report; No further episodes of epistaxis, per report  Imaging findings d/w Pt.'s wife; she verbalized understanding re: cirrhosis and HCC diagnosis; she also verbalized understanding that Pt. may not be a candidate for any surgical intervention due to his cardiac disease, and that prognosis is likely poor    Review of Systems: 12 point review of systems otherwise negative    MEDICATIONS  (STANDING):  artificial tears (preservative free) Ophthalmic Solution 1 Drop(s) Both EYES two times a day  atorvastatin 80 milliGRAM(s) Oral at bedtime  ferrous    sulfate 325 milliGRAM(s) Oral <User Schedule>  folic acid 1 milliGRAM(s) Oral daily  lactulose Syrup 20 Gram(s) Oral three times a day  multivitamin 1 Tablet(s) Oral daily  mupirocin 2% Nasal 1 Application(s) Both Nostrils two times a day  nicotine - 21 mG/24Hr(s) Patch 1 patch Transdermal daily  pantoprazole    Tablet 40 milliGRAM(s) Oral daily  rifAXIMin 550 milliGRAM(s) Oral two times a day    MEDICATIONS  (PRN):  oxymetazoline 0.05% Nasal Spray 1 Spray(s) Both Nostrils two times a day PRN Nasal Congestion  sodium chloride 0.65% Nasal 1 Spray(s) Both Nostrils four times a day PRN Nasal Congestion      Allergies    No Known Allergies    Intolerances          Vital Signs Last 24 Hrs  T(C): 36.7 (10 Mar 2022 13:41), Max: 36.9 (09 Mar 2022 20:21)  T(F): 98.1 (10 Mar 2022 13:41), Max: 98.5 (09 Mar 2022 20:21)  HR: 100 (10 Mar 2022 13:41) (92 - 100)  BP: 110/70 (10 Mar 2022 13:41) (94/60 - 110/70)  BP(mean): --  RR: 18 (10 Mar 2022 13:41) (16 - 18)  SpO2: 98% (10 Mar 2022 13:41) (96% - 98%)  CAPILLARY BLOOD GLUCOSE          03-09 @ 07:01  -  03-10 @ 07:00  --------------------------------------------------------  IN: 100 mL / OUT: 650 mL / NET: -550 mL        Physical Exam:  (earlier today)  Daily     Daily   General:  chronically-ill but comfortable-appearing in NAD  HEENT: MMM, anicteric  CV:  RRR, no JVD, +systolic murmur  Lungs:  CTA B/L  Abdomen:  soft NT protuberant, no fluid wave  Extremities:  no edema B/L LE +clubbing  Skin:  WWP, no jaundice  :  +condom cath  Neuro:  AAOx2-3, forgetful, lethargic, no asterixis    LABS:                        6.6    8.40  )-----------( 157      ( 10 Mar 2022 07:26 )             21.4     03-10    137  |  105  |  18  ----------------------------<  123<H>  3.9   |  22  |  0.83    Ca    8.9      10 Mar 2022 07:26    TPro  6.6  /  Alb  2.6<L>  /  TBili  0.4  /  DBili  x   /  AST  55<H>  /  ALT  37  /  AlkPhos  98  03-10    PT/INR - ( 10 Mar 2022 07:26 )   PT: 14.9 sec;   INR: 1.25          PTT - ( 10 Mar 2022 07:26 )  PTT:36.5 sec

## 2022-03-10 NOTE — PROGRESS NOTE ADULT - ATTENDING COMMENTS
Wife at bedside feeding patient, he is awake but confused. Has epistaxis/anemia. HCC- is localized, plan is for IR to do TACE/ablation

## 2022-03-10 NOTE — PROGRESS NOTE ADULT - PROBLEM SELECTOR PLAN 4
as seen on triple-phase CT and MRI; Heme-Onc and Surg-Onc following; Pt. not a surgical candidate; IR to assess candidacy for chemoembolization; Palliative Care consulted as well, GOC discussions ongoing

## 2022-03-11 ENCOUNTER — FORM ENCOUNTER (OUTPATIENT)
Age: 67
End: 2022-03-11

## 2022-03-11 DIAGNOSIS — G93.40 ENCEPHALOPATHY, UNSPECIFIED: ICD-10-CM

## 2022-03-11 LAB
ALBUMIN SERPL ELPH-MCNC: 2.6 G/DL — LOW (ref 3.3–5)
ALP SERPL-CCNC: 96 U/L — SIGNIFICANT CHANGE UP (ref 40–120)
ALT FLD-CCNC: 33 U/L — SIGNIFICANT CHANGE UP (ref 10–45)
ANION GAP SERPL CALC-SCNC: 8 MMOL/L — SIGNIFICANT CHANGE UP (ref 5–17)
APTT BLD: 35.3 SEC — SIGNIFICANT CHANGE UP (ref 27.5–35.5)
AST SERPL-CCNC: 54 U/L — HIGH (ref 10–40)
BASE EXCESS BLDA CALC-SCNC: -1 MMOL/L — SIGNIFICANT CHANGE UP (ref -2–3)
BASOPHILS # BLD AUTO: 0.02 K/UL — SIGNIFICANT CHANGE UP (ref 0–0.2)
BASOPHILS NFR BLD AUTO: 0.2 % — SIGNIFICANT CHANGE UP (ref 0–2)
BILIRUB DIRECT SERPL-MCNC: 0.2 MG/DL — SIGNIFICANT CHANGE UP (ref 0–0.3)
BILIRUB INDIRECT FLD-MCNC: 0.4 MG/DL — SIGNIFICANT CHANGE UP (ref 0.2–1)
BILIRUB SERPL-MCNC: 0.6 MG/DL — SIGNIFICANT CHANGE UP (ref 0.2–1.2)
BILIRUB SERPL-MCNC: 0.6 MG/DL — SIGNIFICANT CHANGE UP (ref 0.2–1.2)
BLD GP AB SCN SERPL QL: NEGATIVE — SIGNIFICANT CHANGE UP
BUN SERPL-MCNC: 20 MG/DL — SIGNIFICANT CHANGE UP (ref 7–23)
CALCIUM SERPL-MCNC: 9.1 MG/DL — SIGNIFICANT CHANGE UP (ref 8.4–10.5)
CHLORIDE SERPL-SCNC: 107 MMOL/L — SIGNIFICANT CHANGE UP (ref 96–108)
CO2 BLDA-SCNC: 23 MMOL/L — SIGNIFICANT CHANGE UP (ref 19–24)
CO2 SERPL-SCNC: 22 MMOL/L — SIGNIFICANT CHANGE UP (ref 22–31)
CREAT SERPL-MCNC: 0.83 MG/DL — SIGNIFICANT CHANGE UP (ref 0.5–1.3)
EGFR: 97 ML/MIN/1.73M2 — SIGNIFICANT CHANGE UP
EOSINOPHIL # BLD AUTO: 0.35 K/UL — SIGNIFICANT CHANGE UP (ref 0–0.5)
EOSINOPHIL NFR BLD AUTO: 3.9 % — SIGNIFICANT CHANGE UP (ref 0–6)
GLUCOSE SERPL-MCNC: 98 MG/DL — SIGNIFICANT CHANGE UP (ref 70–99)
HCO3 BLDA-SCNC: 22 MMOL/L — SIGNIFICANT CHANGE UP (ref 21–28)
HCT VFR BLD CALC: 24.3 % — LOW (ref 39–50)
HCT VFR BLD CALC: 27.5 % — LOW (ref 39–50)
HGB BLD-MCNC: 7.6 G/DL — LOW (ref 13–17)
HGB BLD-MCNC: 8.5 G/DL — LOW (ref 13–17)
IMM GRANULOCYTES NFR BLD AUTO: 0.3 % — SIGNIFICANT CHANGE UP (ref 0–1.5)
INR BLD: 1.27 — HIGH (ref 0.88–1.16)
LYMPHOCYTES # BLD AUTO: 2.02 K/UL — SIGNIFICANT CHANGE UP (ref 1–3.3)
LYMPHOCYTES # BLD AUTO: 22.6 % — SIGNIFICANT CHANGE UP (ref 13–44)
MAGNESIUM SERPL-MCNC: 1.8 MG/DL — SIGNIFICANT CHANGE UP (ref 1.6–2.6)
MCHC RBC-ENTMCNC: 27.8 PG — SIGNIFICANT CHANGE UP (ref 27–34)
MCHC RBC-ENTMCNC: 28.4 PG — SIGNIFICANT CHANGE UP (ref 27–34)
MCHC RBC-ENTMCNC: 30.9 GM/DL — LOW (ref 32–36)
MCHC RBC-ENTMCNC: 31.3 GM/DL — LOW (ref 32–36)
MCV RBC AUTO: 89.9 FL — SIGNIFICANT CHANGE UP (ref 80–100)
MCV RBC AUTO: 90.7 FL — SIGNIFICANT CHANGE UP (ref 80–100)
MONOCYTES # BLD AUTO: 0.98 K/UL — HIGH (ref 0–0.9)
MONOCYTES NFR BLD AUTO: 11 % — SIGNIFICANT CHANGE UP (ref 2–14)
NEUTROPHILS # BLD AUTO: 5.53 K/UL — SIGNIFICANT CHANGE UP (ref 1.8–7.4)
NEUTROPHILS NFR BLD AUTO: 62 % — SIGNIFICANT CHANGE UP (ref 43–77)
NRBC # BLD: 0 /100 WBCS — SIGNIFICANT CHANGE UP (ref 0–0)
NRBC # BLD: 0 /100 WBCS — SIGNIFICANT CHANGE UP (ref 0–0)
PCO2 BLDA: 32 MMHG — LOW (ref 35–48)
PH BLDA: 7.45 — SIGNIFICANT CHANGE UP (ref 7.35–7.45)
PHOSPHATE SERPL-MCNC: 3.7 MG/DL — SIGNIFICANT CHANGE UP (ref 2.5–4.5)
PLATELET # BLD AUTO: 141 K/UL — LOW (ref 150–400)
PLATELET # BLD AUTO: 147 K/UL — LOW (ref 150–400)
PO2 BLDA: 84 MMHG — SIGNIFICANT CHANGE UP (ref 83–108)
POTASSIUM SERPL-MCNC: 4 MMOL/L — SIGNIFICANT CHANGE UP (ref 3.5–5.3)
POTASSIUM SERPL-SCNC: 4 MMOL/L — SIGNIFICANT CHANGE UP (ref 3.5–5.3)
PROT SERPL-MCNC: 6.8 G/DL — SIGNIFICANT CHANGE UP (ref 6–8.3)
PROTHROM AB SERPL-ACNC: 15.2 SEC — HIGH (ref 10.5–13.4)
RBC # BLD: 2.68 M/UL — LOW (ref 4.2–5.8)
RBC # BLD: 2.68 M/UL — LOW (ref 4.2–5.8)
RBC # BLD: 3.06 M/UL — LOW (ref 4.2–5.8)
RBC # FLD: 16.4 % — HIGH (ref 10.3–14.5)
RBC # FLD: 16.5 % — HIGH (ref 10.3–14.5)
RETICS #: 123 K/UL — SIGNIFICANT CHANGE UP (ref 25–125)
RETICS/RBC NFR: 4.6 % — HIGH (ref 0.5–2.5)
RH IG SCN BLD-IMP: POSITIVE — SIGNIFICANT CHANGE UP
SAO2 % BLDA: 96.8 % — SIGNIFICANT CHANGE UP (ref 94–98)
SODIUM SERPL-SCNC: 137 MMOL/L — SIGNIFICANT CHANGE UP (ref 135–145)
TSH SERPL-MCNC: 1.17 UIU/ML — SIGNIFICANT CHANGE UP (ref 0.27–4.2)
WBC # BLD: 8.56 K/UL — SIGNIFICANT CHANGE UP (ref 3.8–10.5)
WBC # BLD: 8.93 K/UL — SIGNIFICANT CHANGE UP (ref 3.8–10.5)
WBC # FLD AUTO: 8.56 K/UL — SIGNIFICANT CHANGE UP (ref 3.8–10.5)
WBC # FLD AUTO: 8.93 K/UL — SIGNIFICANT CHANGE UP (ref 3.8–10.5)

## 2022-03-11 PROCEDURE — 99232 SBSQ HOSP IP/OBS MODERATE 35: CPT | Mod: GC

## 2022-03-11 PROCEDURE — 99232 SBSQ HOSP IP/OBS MODERATE 35: CPT

## 2022-03-11 PROCEDURE — 70450 CT HEAD/BRAIN W/O DYE: CPT | Mod: 26

## 2022-03-11 PROCEDURE — 99253 IP/OBS CNSLTJ NEW/EST LOW 45: CPT

## 2022-03-11 PROCEDURE — 99233 SBSQ HOSP IP/OBS HIGH 50: CPT | Mod: GC

## 2022-03-11 RX ORDER — HEPARIN SODIUM 5000 [USP'U]/ML
5000 INJECTION INTRAVENOUS; SUBCUTANEOUS EVERY 8 HOURS
Refills: 0 | Status: DISCONTINUED | OUTPATIENT
Start: 2022-03-11 | End: 2022-03-13

## 2022-03-11 RX ORDER — LACTULOSE 10 G/15ML
30 SOLUTION ORAL THREE TIMES A DAY
Refills: 0 | Status: DISCONTINUED | OUTPATIENT
Start: 2022-03-11 | End: 2022-03-14

## 2022-03-11 RX ORDER — MAGNESIUM SULFATE 500 MG/ML
2 VIAL (ML) INJECTION ONCE
Refills: 0 | Status: COMPLETED | OUTPATIENT
Start: 2022-03-11 | End: 2022-03-11

## 2022-03-11 RX ORDER — IRON SUCROSE 20 MG/ML
200 INJECTION, SOLUTION INTRAVENOUS EVERY 24 HOURS
Refills: 0 | Status: COMPLETED | OUTPATIENT
Start: 2022-03-11 | End: 2022-03-13

## 2022-03-11 RX ORDER — ASPIRIN/CALCIUM CARB/MAGNESIUM 324 MG
81 TABLET ORAL DAILY
Refills: 0 | Status: DISCONTINUED | OUTPATIENT
Start: 2022-03-11 | End: 2022-03-15

## 2022-03-11 RX ORDER — PREGABALIN 225 MG/1
1000 CAPSULE ORAL DAILY
Refills: 0 | Status: DISCONTINUED | OUTPATIENT
Start: 2022-03-11 | End: 2022-03-18

## 2022-03-11 RX ORDER — LACTULOSE 10 G/15ML
50 SOLUTION ORAL THREE TIMES A DAY
Refills: 0 | Status: DISCONTINUED | OUTPATIENT
Start: 2022-03-11 | End: 2022-03-11

## 2022-03-11 RX ADMIN — Medication 25 GRAM(S): at 10:17

## 2022-03-11 RX ADMIN — LACTULOSE 25 GRAM(S): 10 SOLUTION ORAL at 06:28

## 2022-03-11 RX ADMIN — MUPIROCIN 1 APPLICATION(S): 20 OINTMENT TOPICAL at 17:33

## 2022-03-11 RX ADMIN — Medication 1 DROP(S): at 17:32

## 2022-03-11 RX ADMIN — MUPIROCIN 1 APPLICATION(S): 20 OINTMENT TOPICAL at 06:53

## 2022-03-11 RX ADMIN — PANTOPRAZOLE SODIUM 40 MILLIGRAM(S): 20 TABLET, DELAYED RELEASE ORAL at 06:28

## 2022-03-11 RX ADMIN — Medication 1 PATCH: at 05:51

## 2022-03-11 RX ADMIN — LACTULOSE 30 GRAM(S): 10 SOLUTION ORAL at 22:04

## 2022-03-11 RX ADMIN — Medication 1 PATCH: at 11:05

## 2022-03-11 RX ADMIN — Medication 325 MILLIGRAM(S): at 06:28

## 2022-03-11 RX ADMIN — IRON SUCROSE 110 MILLIGRAM(S): 20 INJECTION, SOLUTION INTRAVENOUS at 13:33

## 2022-03-11 RX ADMIN — HEPARIN SODIUM 5000 UNIT(S): 5000 INJECTION INTRAVENOUS; SUBCUTANEOUS at 13:35

## 2022-03-11 RX ADMIN — HEPARIN SODIUM 5000 UNIT(S): 5000 INJECTION INTRAVENOUS; SUBCUTANEOUS at 22:04

## 2022-03-11 RX ADMIN — Medication 1 PATCH: at 11:08

## 2022-03-11 RX ADMIN — Medication 1 PATCH: at 18:04

## 2022-03-11 RX ADMIN — Medication 1 DROP(S): at 06:29

## 2022-03-11 RX ADMIN — ATORVASTATIN CALCIUM 80 MILLIGRAM(S): 80 TABLET, FILM COATED ORAL at 22:04

## 2022-03-11 NOTE — PROGRESS NOTE ADULT - PROBLEM SELECTOR PLAN 8
Long standing EtOH consumption. Not showing any signs of withdrawal at the moment. Last drink prior to admission which is almost 2 weeks ago. No need to monitor CIWA anymore.   - c/w Multivitamin and Folic Acid, and B12

## 2022-03-11 NOTE — PROGRESS NOTE ADULT - SUBJECTIVE AND OBJECTIVE BOX
Patient is a 66y old  Male who presents with a chief complaint of Chest Pain (11 Mar 2022 14:54)      INTERVAL HPI/OVERNIGHT EVENTS:    Pt. seen and examined earlier today  Pt. more lethargic today, not participating in interview  No complaints elicited  No bleeding sx, per report    Review of Systems: 12 point review of systems otherwise negative    MEDICATIONS  (STANDING):  artificial tears (preservative free) Ophthalmic Solution 1 Drop(s) Both EYES two times a day  aspirin enteric coated 81 milliGRAM(s) Oral daily  atorvastatin 80 milliGRAM(s) Oral at bedtime  cyanocobalamin 1000 MICROGram(s) Oral daily  ferrous    sulfate 325 milliGRAM(s) Oral <User Schedule>  folic acid 1 milliGRAM(s) Oral daily  heparin   Injectable 5000 Unit(s) SubCutaneous every 8 hours  iron sucrose IVPB 200 milliGRAM(s) IV Intermittent every 24 hours  lactulose Syrup 25 Gram(s) Oral every 8 hours  multivitamin 1 Tablet(s) Oral daily  mupirocin 2% Nasal 1 Application(s) Both Nostrils two times a day  nicotine - 21 mG/24Hr(s) Patch 1 patch Transdermal daily  pantoprazole    Tablet 40 milliGRAM(s) Oral daily  rifAXIMin 550 milliGRAM(s) Oral two times a day    MEDICATIONS  (PRN):  oxymetazoline 0.05% Nasal Spray 1 Spray(s) Both Nostrils two times a day PRN Nasal Congestion  sodium chloride 0.65% Nasal 1 Spray(s) Both Nostrils four times a day PRN Nasal Congestion      Allergies    No Known Allergies    Intolerances          Vital Signs Last 24 Hrs  T(C): 36.9 (11 Mar 2022 08:30), Max: 36.9 (11 Mar 2022 08:30)  T(F): 98.4 (11 Mar 2022 08:30), Max: 98.4 (11 Mar 2022 08:30)  HR: 88 (11 Mar 2022 08:30) (71 - 90)  BP: 95/60 (11 Mar 2022 08:30) (95/60 - 114/73)  BP(mean): --  RR: 18 (11 Mar 2022 08:30) (18 - 18)  SpO2: 98% (11 Mar 2022 08:30) (97% - 99%)  CAPILLARY BLOOD GLUCOSE            Physical Exam:  (earlier today)  Daily     Daily   General:  chronically-ill but comfortable-appearing in NAD  HEENT: MMM, anicteric  CV:  RRR, no JVD, +systolic murmur  Lungs:  CTA B/L anteriorly  Abdomen:  soft NT minimally distended, no fluid wave  Extremities:  no edema B/L LE +clubbing  Skin:  WWP, no jaundice  :  +condom cath  Neuro:  lethargic     LABS:                        7.6    8.93  )-----------( 147      ( 11 Mar 2022 07:17 )             24.3     03-11    137  |  107  |  20  ----------------------------<  98  4.0   |  22  |  0.83    Ca    9.1      11 Mar 2022 07:18  Phos  3.7     03-11  Mg     1.8     03-11    TPro  6.8  /  Alb  2.6<L>  /  TBili  0.6  /  DBili  0.2  /  AST  54<H>  /  ALT  33  /  AlkPhos  96  03-11    PT/INR - ( 11 Mar 2022 07:18 )   PT: 15.2 sec;   INR: 1.27          PTT - ( 11 Mar 2022 07:18 )  PTT:35.3 sec

## 2022-03-11 NOTE — PROGRESS NOTE ADULT - SUBJECTIVE AND OBJECTIVE BOX
SUBJECTIVE: Examined at the bedside in the am. Lethargy unchanged. Denies nausea, emesis, cp, sob. no acute complaints.     aspirin enteric coated 81 milliGRAM(s) Oral daily  heparin   Injectable 5000 Unit(s) SubCutaneous every 8 hours  rifAXIMin 550 milliGRAM(s) Oral two times a day      Vital Signs Last 24 Hrs  T(C): 36.9 (11 Mar 2022 08:30), Max: 36.9 (11 Mar 2022 08:30)  T(F): 98.4 (11 Mar 2022 08:30), Max: 98.4 (11 Mar 2022 08:30)  HR: 88 (11 Mar 2022 08:30) (71 - 90)  BP: 95/60 (11 Mar 2022 08:30) (95/60 - 114/73)  BP(mean): --  RR: 18 (11 Mar 2022 08:30) (18 - 18)  SpO2: 98% (11 Mar 2022 08:30) (97% - 99%)  I&O's Detail      Physical Exam  General: NAD, resting comfortably in bed  C/V: NSR  Pulm: Nonlabored breathing, no respiratory distress  Abd: soft, moderately distended, non tender.   Extrem: WWP, no edema,  Neuro: A/O x 3, CNs II-XII grossly intact, no focal deficits, normal sensation      LABS:                        7.6    8.93  )-----------( 147      ( 11 Mar 2022 07:17 )             24.3     03-11    137  |  107  |  20  ----------------------------<  98  4.0   |  22  |  0.83    Ca    9.1      11 Mar 2022 07:18  Phos  3.7     03-11  Mg     1.8     03-11    TPro  6.8  /  Alb  2.6<L>  /  TBili  0.6  /  DBili  0.2  /  AST  54<H>  /  ALT  33  /  AlkPhos  96  03-11    PT/INR - ( 11 Mar 2022 07:18 )   PT: 15.2 sec;   INR: 1.27          PTT - ( 11 Mar 2022 07:18 )  PTT:35.3 sec      RADIOLOGY & ADDITIONAL STUDIES:  < from: CT Head No Cont (03.11.22 @ 12:32) >  FINDINGS:    There is no evidence of acute infarction, intracranial hemorrhage or mass   lesion.    There is hypoattenuation of the subcortical and periventricular white   matter, which is nonspecific finding, butmost likely represents sequela   of chronic microvascular ischemic disease. There are atherosclerotic   calcifications of the cavernous and supraclinoid internal carotid   arteries bilaterally comment also the bilateral intradural vertebral   arteries. There is prominence of the cortical sulci related to underlying   brain parenchymal volume loss.    There is no evidence of hydrocephalus. There are no extra-axial fluid   collections.    The patient is status post bilateral lens replacement. The imaged   portions of the paranasal sinuses are aerated. The mastoid air cells are   clear. The visualized soft tissues and osseous structures appear   unremarkable.      IMPRESSION:    No acute intracranial findings.    --- End of Report ---    < end of copied text >        < from: MR Abdomen w/wo IV Cont (03.09.22 @ 23:11) >  BILE DUCTS: Normal caliber.  GALLBLADDER: Within normal limits.  SPLEEN: Borderline splenomegaly.  PANCREAS: Within normal limits.  ADRENALS: Within normal limits.  KIDNEYS/URETERS within normal limits.  BLADDER: Within normal limits.  REPRODUCTIVE ORGANS: No pelvic masses. Thickened and hyperenhancing right   levator muscle.    BOWEL: No bowel obstruction.  PERITONEUM: No ascites. Nonspecific right pericolic fat stranding.  VESSELS: Left hepatic artery arising from the left gastric artery.   Accessory left hepatic artery arising from the distal common hepatic   artery. Moderate stenosis SMA origin. Extensive arterial atherosclerotic   calcifications. Upper abdominal and mesenteric varices.  RETROPERITONEUM/LYMPH NODES: No lymphadenopathy.  ABDOMINAL WALL: Within normal limits.  BONES: Within normal limits.    IMPRESSION:    2.5 cm left hepatic lesion consistent with LI-RADS v 2018 Category: LR-5   Definitely HCC. OPTN Category (if LR-5): 5B.    No locally invasive or metastaticdisease. Left hepatic artery variant.   No additional suspicious hepatic lesions.    < end of copied text >

## 2022-03-11 NOTE — PROGRESS NOTE ADULT - PROBLEM SELECTOR PLAN 3
unclear etiology, possibly due to acute blood loss due to epistaxis episodes, which self-resolved; appreciate ENT recs; no e/o acute GI blood loss, but will monitor, may need EGD; VSS at baseline; Hb improved s/p 1U PRBC transfusion 3/10; will also give IV iron, given low ferritin; appears to also be a component of hemolysis, possibly due to AVR, cont. work-up and mgmt per Heme-Onc; monitor CBC, goal Hb > 7

## 2022-03-11 NOTE — PROGRESS NOTE ADULT - PROBLEM SELECTOR PLAN 3
AAOx2 (person and place). Mental status imrpoves through the day. Unlikely hepatic encephalopathy as no asterixis and is currently compensated. No obvious signs of infection. Neuro exam nonfocal. Possibly related to chronic alcohol use, Wernicke's or depression i/s/o HCC diagnosis. As per wife, pt is not normally lethargic at home. completed course of high dose thiamine, last librium dose on 03/01.    - Will get CTH to r/o interval change  - consider neuro consult if CTH negative  - monitor mental status

## 2022-03-11 NOTE — PROGRESS NOTE ADULT - SUBJECTIVE AND OBJECTIVE BOX
OVERNIGHT EVENTS: naeo    SUBJECTIVE / INTERVAL HPI: Patient seen and examined at bedside. Denies any pain, unable to further participate      PHYSICAL EXAM:  General: NAD, arousable to voice, able to follow simple commands  HEENT: MMM, LEIGH (sluggish), dirty sclera  Neck: supple  Cardiovascular: regular rate and rhythm. Loud systolic murmur best heard at right 2nd ICS   Respiratory: CTA B/L anteriorly; no W/R/R  Gastrointestinal: soft, NT, mildly distended, tympanic  Rectal: brown stool noted, no BRB or melena  Extremities: moving all extremities  Neurological: AAOx2-3; symmetric face. No asterixis. Poor short-term memory. Limited cooperation.     VITAL SIGNS:  Vital Signs Last 24 Hrs  T(C): 36.9 (11 Mar 2022 08:30), Max: 36.9 (11 Mar 2022 08:30)  T(F): 98.4 (11 Mar 2022 08:30), Max: 98.4 (11 Mar 2022 08:30)  HR: 88 (11 Mar 2022 08:30) (71 - 100)  BP: 95/60 (11 Mar 2022 08:30) (95/60 - 114/73)  BP(mean): --  RR: 18 (11 Mar 2022 08:30) (18 - 18)  SpO2: 98% (11 Mar 2022 08:30) (97% - 99%)      MEDICATIONS:  MEDICATIONS  (STANDING):  artificial tears (preservative free) Ophthalmic Solution 1 Drop(s) Both EYES two times a day  atorvastatin 80 milliGRAM(s) Oral at bedtime  cyanocobalamin 1000 MICROGram(s) Oral daily  ferrous    sulfate 325 milliGRAM(s) Oral <User Schedule>  folic acid 1 milliGRAM(s) Oral daily  iron sucrose IVPB 200 milliGRAM(s) IV Intermittent every 24 hours  lactulose Syrup 25 Gram(s) Oral every 8 hours  multivitamin 1 Tablet(s) Oral daily  mupirocin 2% Nasal 1 Application(s) Both Nostrils two times a day  nicotine - 21 mG/24Hr(s) Patch 1 patch Transdermal daily  pantoprazole    Tablet 40 milliGRAM(s) Oral daily  rifAXIMin 550 milliGRAM(s) Oral two times a day    MEDICATIONS  (PRN):  oxymetazoline 0.05% Nasal Spray 1 Spray(s) Both Nostrils two times a day PRN Nasal Congestion  sodium chloride 0.65% Nasal 1 Spray(s) Both Nostrils four times a day PRN Nasal Congestion      ALLERGIES:  Allergies    No Known Allergies    Intolerances        LABS:                        7.6    8.93  )-----------( 147      ( 11 Mar 2022 07:17 )             24.3     03-11    137  |  107  |  20  ----------------------------<  98  4.0   |  22  |  0.83    Ca    9.1      11 Mar 2022 07:18  Phos  3.7     03-11  Mg     1.8     03-11    TPro  6.8  /  Alb  2.6<L>  /  TBili  0.6  /  DBili  0.2  /  AST  54<H>  /  ALT  33  /  AlkPhos  96  03-11    PT/INR - ( 11 Mar 2022 07:18 )   PT: 15.2 sec;   INR: 1.27          PTT - ( 11 Mar 2022 07:18 )  PTT:35.3 sec    CAPILLARY BLOOD GLUCOSE          RADIOLOGY & ADDITIONAL TESTS: Reviewed. OVERNIGHT EVENTS: naeo    SUBJECTIVE / INTERVAL HPI: Patient seen and examined at bedside. Denies any pain, unable to further participate      PHYSICAL EXAM:  General: NAD, arousable to voice, able to follow simple commands  HEENT: MMM, LEIGH (sluggish), dirty sclera, no clots visualized in nares or oral mucosa  Neck: supple  Cardiovascular: regular rate and rhythm. Loud systolic murmur best heard at right 2nd ICS   Respiratory: CTA B/L anteriorly; no W/R/R  Gastrointestinal: soft, NT, mildly distended, tympanic  Extremities: moving all extremities  Neurological: AAOx2-3; symmetric face. No asterixis. Poor short-term memory. Limited cooperation.     VITAL SIGNS:  Vital Signs Last 24 Hrs  T(C): 36.9 (11 Mar 2022 08:30), Max: 36.9 (11 Mar 2022 08:30)  T(F): 98.4 (11 Mar 2022 08:30), Max: 98.4 (11 Mar 2022 08:30)  HR: 88 (11 Mar 2022 08:30) (71 - 100)  BP: 95/60 (11 Mar 2022 08:30) (95/60 - 114/73)  BP(mean): --  RR: 18 (11 Mar 2022 08:30) (18 - 18)  SpO2: 98% (11 Mar 2022 08:30) (97% - 99%)      MEDICATIONS:  MEDICATIONS  (STANDING):  artificial tears (preservative free) Ophthalmic Solution 1 Drop(s) Both EYES two times a day  atorvastatin 80 milliGRAM(s) Oral at bedtime  cyanocobalamin 1000 MICROGram(s) Oral daily  ferrous    sulfate 325 milliGRAM(s) Oral <User Schedule>  folic acid 1 milliGRAM(s) Oral daily  iron sucrose IVPB 200 milliGRAM(s) IV Intermittent every 24 hours  lactulose Syrup 25 Gram(s) Oral every 8 hours  multivitamin 1 Tablet(s) Oral daily  mupirocin 2% Nasal 1 Application(s) Both Nostrils two times a day  nicotine - 21 mG/24Hr(s) Patch 1 patch Transdermal daily  pantoprazole    Tablet 40 milliGRAM(s) Oral daily  rifAXIMin 550 milliGRAM(s) Oral two times a day    MEDICATIONS  (PRN):  oxymetazoline 0.05% Nasal Spray 1 Spray(s) Both Nostrils two times a day PRN Nasal Congestion  sodium chloride 0.65% Nasal 1 Spray(s) Both Nostrils four times a day PRN Nasal Congestion      ALLERGIES:  Allergies    No Known Allergies    Intolerances        LABS:                        7.6    8.93  )-----------( 147      ( 11 Mar 2022 07:17 )             24.3     03-11    137  |  107  |  20  ----------------------------<  98  4.0   |  22  |  0.83    Ca    9.1      11 Mar 2022 07:18  Phos  3.7     03-11  Mg     1.8     03-11    TPro  6.8  /  Alb  2.6<L>  /  TBili  0.6  /  DBili  0.2  /  AST  54<H>  /  ALT  33  /  AlkPhos  96  03-11    PT/INR - ( 11 Mar 2022 07:18 )   PT: 15.2 sec;   INR: 1.27          PTT - ( 11 Mar 2022 07:18 )  PTT:35.3 sec    CAPILLARY BLOOD GLUCOSE          RADIOLOGY & ADDITIONAL TESTS: Reviewed.

## 2022-03-11 NOTE — PROGRESS NOTE ADULT - PROBLEM SELECTOR PLAN 4
as seen on triple-phase CT and MRI; Heme-Onc and IR following; Pt. not a surgical candidate, per Surg-Onc; IR to perform transarterial chemoembolization Monday, if Hb stable (Pt. not a candidate for ablation); Palliative Care consulted as well, GOC discussions ongoing w/ Pt. and his wife

## 2022-03-11 NOTE — PROGRESS NOTE ADULT - PROBLEM SELECTOR PLAN 4
2/2 long standing EtOH abuse. Likely compensated. No ascites. Likely no hepatic encephalopathy. No known EV bleeding.  - GI following  - no known Varices, but no prior EGD evaluation, he will need it outpatient, although has significant risk factors

## 2022-03-11 NOTE — PROGRESS NOTE ADULT - PROBLEM SELECTOR PLAN 6
S/p CABG with Dr Wilcox in 2017  - recent [facility] with nonobstructive disease  - c/w Lipitor 80mg QD  - c/w ASA 81mg

## 2022-03-11 NOTE — PROGRESS NOTE ADULT - PROBLEM SELECTOR PLAN 2
low hapto, high ldh suggestive of hemolysis likely i/s/o aortic valve that requires replacement. hgb 7.6 today. GI unable to do scope i/s/o risk factors. MRI abdomen w/o evidence of bleed from tumor.    - Given 1 unit PRBC  - f/u 4PM CBC  - IV iron 200 X 3 doses per heme  - started b12 1mg Qd, c/w folate 1mg qd  - will resume DVT PPx and ASA 81 given low likelihood of active bleed  - heme/onc, GI following low hapto, high ldh suggestive of hemolysis likely i/s/o aortic valve that requires replacement. hgb 7.6 today. GI unable to do scope i/s/o risk factors. MRI abdomen w/o evidence of bleed from tumor.    - Given 1 unit PRBC  - f/u 4PM CBC  - IV iron 200 X 3 doses per heme  - started b12 1mg Qd, c/w folate 1mg qd  - will resume DVT PPx and ASA 81 given low likelihood of active bleed  - Transfusion goal > 8  - heme/onc, GI, IR following

## 2022-03-11 NOTE — PROGRESS NOTE ADULT - SUBJECTIVE AND OBJECTIVE BOX
GASTROENTEROLOGY PROGRESS NOTE  Patient seen and examined at bedside. Hgb downtrended to 7.6 this AM (down from 8.0). Hemolysis labs positive on 3/10    ROS: More conversive today, answering some questions, limited ROS but with no major complaints.     PERTINENT REVIEW OF SYSTEMS:  CONSTITUTIONAL: No weakness, fevers or chills  HEENT: No visual changes; No vertigo or throat pain   GASTROINTESTINAL: As above.  NEUROLOGICAL: No numbness or weakness  SKIN: No itching, burning, rashes, or lesions     Allergies    No Known Allergies    Intolerances      MEDICATIONS:  MEDICATIONS  (STANDING):  artificial tears (preservative free) Ophthalmic Solution 1 Drop(s) Both EYES two times a day  aspirin enteric coated 81 milliGRAM(s) Oral daily  atorvastatin 80 milliGRAM(s) Oral at bedtime  cyanocobalamin 1000 MICROGram(s) Oral daily  ferrous    sulfate 325 milliGRAM(s) Oral <User Schedule>  folic acid 1 milliGRAM(s) Oral daily  heparin   Injectable 5000 Unit(s) SubCutaneous every 8 hours  iron sucrose IVPB 200 milliGRAM(s) IV Intermittent every 24 hours  lactulose Syrup 25 Gram(s) Oral every 8 hours  multivitamin 1 Tablet(s) Oral daily  mupirocin 2% Nasal 1 Application(s) Both Nostrils two times a day  nicotine - 21 mG/24Hr(s) Patch 1 patch Transdermal daily  pantoprazole    Tablet 40 milliGRAM(s) Oral daily  rifAXIMin 550 milliGRAM(s) Oral two times a day    MEDICATIONS  (PRN):  oxymetazoline 0.05% Nasal Spray 1 Spray(s) Both Nostrils two times a day PRN Nasal Congestion  sodium chloride 0.65% Nasal 1 Spray(s) Both Nostrils four times a day PRN Nasal Congestion    Vital Signs Last 24 Hrs  T(C): 36.9 (11 Mar 2022 08:30), Max: 36.9 (11 Mar 2022 08:30)  T(F): 98.4 (11 Mar 2022 08:30), Max: 98.4 (11 Mar 2022 08:30)  HR: 88 (11 Mar 2022 08:30) (71 - 90)  BP: 95/60 (11 Mar 2022 08:30) (95/60 - 114/73)  BP(mean): --  RR: 18 (11 Mar 2022 08:30) (18 - 18)  SpO2: 98% (11 Mar 2022 08:30) (97% - 99%)    PHYSICAL EXAM:    General: male, lying in bed; in no acute distress, more conversive today  HEENT: MMM, conjunctiva and sclera clear  Gastrointestinal: Soft, obese, distended abdomen; non-tender; Normal bowel sounds; No rebound or guarding  Extremities: Normal range of motion, No clubbing, cyanosis or edema  Neurological: Alert and oriented x2, no asterixis  Skin: Warm and dry. No obvious rash    LABS:                        7.6    8.93  )-----------( 147      ( 11 Mar 2022 07:17 )             24.3     03-11    137  |  107  |  20  ----------------------------<  98  4.0   |  22  |  0.83    Ca    9.1      11 Mar 2022 07:18  Phos  3.7     03-11  Mg     1.8     03-11    TPro  6.8  /  Alb  2.6<L>  /  TBili  0.6  /  DBili  0.2  /  AST  54<H>  /  ALT  33  /  AlkPhos  96  03-11    PT/INR - ( 11 Mar 2022 07:18 )   PT: 15.2 sec;   INR: 1.27          PTT - ( 11 Mar 2022 07:18 )  PTT:35.3 sec                  RADIOLOGY & ADDITIONAL STUDIES:  Reviewed

## 2022-03-11 NOTE — PROGRESS NOTE ADULT - ASSESSMENT
65 y/o M, current smoker (1ppd x 50 years), current every day ETOH abuse (1 pint vodka and several beers daily x 50 years), with PMHx of HLD, HTN (not on any medications),  CABG (LIMA to LAD, SVG to PDA in 2017), AS (s/p AVR in 2017), who presented to [facility] ED on 2/24/22 NSTEMI with LHC with nonobstructive disease, then found to have restenosis of bioprosthetic valve for which aortic root surgery was planned. Now w/ newly diagnosed 2.5cm left hepatic mass concerning for HCC found on CT AP. Pending IR procedure. Am Hgb 7.6 after receiving unit. Clinically stable.     No acute surgical intervention at this time  Planned EGD, will follow up  Pending IR procedure for TACE and ablation  Rest of care per primary team  Team 1c will continue to follow

## 2022-03-11 NOTE — PROGRESS NOTE ADULT - ASSESSMENT
67 YO M, current smoker (1ppd x 50 years), current every day ETOH abuse (1 pint vodka and several beers daily x 50 years), with PMHx of HLD, HTN (not on any medications), CABG (LIMA to LAD, SVG to PDA in 2017), AS (s/p AVR in 2017) with recently discovered restenosis, presented to [facility] ED on 2/24/22 with chest pain and episode of LOC, initially admitted to cardiology for NSTEMI with [facility] with nonobstructive disease, then found to have restenosis of bioprosthetic valve for which aortic root surgery was planned, but now deferred given liver lesion c/f HCC. Now he is transferred to medicine for concern for mild hepatic encephalopathy, however unlikely to have HE and is compensated cirrhotic. Now found to have HCC, pending further workup.

## 2022-03-11 NOTE — CONSULT NOTE ADULT - SUBJECTIVE AND OBJECTIVE BOX
65 yo M w alcoholic cirrhosis, current drinker, (1 pint vodka and several beers daily x 50 years), admitted to [facility] with chest pain, found to have NSTEMI. Subsequent work-up found 2.5 cm HCC in segment 2/3. Also with history of CABG (LIMA to LAD, SVG to PDA in 2017) and AS (s/p AVR in 2017). AS found to have significantly worsened, initially planned for operative revision, however deemed not a surgical candidate. IR consulted for HCC management.    CT, MR reviewed. solitary Li-Rads 5 lesion in segment 2/3.  Meld 13  CP B7  AFP 7

## 2022-03-11 NOTE — CONSULT NOTE ADULT - ASSESSMENT
67 yo w extensive cardiac history, including severe aortic stenosis, found to have 2.5 cm segment 2/3 HCC. Given size and location, would be good ablation candidate. However, patient is poor candidate for general aesthesia, required for ablation. Instead, will manage with transarterial chemoembolization. This can be performed with minimal to no sedation. Procedure was discussed with patient's wife, including the risks and benefits. She consented to the procedure. Planned for Monday 3/11.

## 2022-03-11 NOTE — PROGRESS NOTE ADULT - PROBLEM SELECTOR PLAN 2
more lethargic today; CTH obtained, no interval change; cont. lactulose and rifaximin for likely hepatic encephalopathy; Pt. received Librium earlier in hospitalization, Utox remains positive, will avoid further benzos; no apparent improvement on IV thiamine; Neurology consulted, may need additional work-up

## 2022-03-11 NOTE — CONSULT NOTE ADULT - SUBJECTIVE AND OBJECTIVE BOX
NEUROLOGY INITIAL CONSULT NOTE    CHIEF COMPLAINT:      HPI:  Patient is a 66 yr old man, current smoker (1ppd x 50 years), current every day ETOH abuse (1 pint vodka and several beers daily x 50 years), with PMH HLD, HTN (not on any medications),  CABG (LIMA to LAD, SVG to PDA in 2017), AS (s/p AVR in 2017), who presented to [facility] ED on 2/24/22 with complaints of 9/10 chest pain with associated diaphoresis that occurred when walking to the bathroom. He stated that during this time, he fell and "blacked out" and hit the back of his head. He then went back to sleep. Patient states that he had a similiar chest pain a few days prior to the admission which resolved on its own. Patient's wife states that patient has not seen a physician for over 2 years due to insurance issues. He was admitted to cardiology/telemetry on 2/24 for further management of NSTEMI.     Pt underwent C which showed no occlusive disease. TTE showed severe gradients across bioprosthetic valve, therefore he was transferred to CT surgery for concern for severe prosthetic stenosis. He was initially planned for TAVR, then aortic root surgery, but in interim he was found to liver cirrhosis and 2.4cm x 2.4cm liver lesion with concern for HCC. Initially he was planned for TAVR, but subsequently decision was made that because of complicated aortic root anatomy he would need open heart surgery. It was decided that he is not a surgical candidate given poor functional status, liver cirrhosis, prior noncompliance with medications. He was seen by medicine consult on 3/3 with request for transfer to medicine - at that time it was deemed that further work-up should happen outpatient.  He was supposed to be dced with follow-up in [facility], but in interim he was seen by PT on 3/3 and BRENDA was recommended.    Since 3/3 patient reportedly with worsening mental status - for last days "he is 2 person assist, appears drowsy, and has asterixis", however still AAOx3. There was a concern for hepatic encephalopathy given increase in ammonia level (123). NG tube was placed, and he was started on Lactulose and Rifaximine. Neurology consulted 3/11 for persistent AMS from baseline.    On exam 3/11, pt denies any recent changes in his baseline. Per wife at bedside, pt has appeared different for 2 weeks, during which time she has noted slowed speech, inability to stand up independently and dizziness when he does so, general weakness. Mental status has been waxing and waning. Pt denies being sad about anything. She notes that last time he was in the hospital during his bypass procedure in 2017, he appeared like this as well post alcohol withdrawal. Years ago, when he was withdrawing from alcohol he became aggressive to the point of requiring restraints, but this time much more calm. He has been hospitalized 5-6 times for alcohol withdrawal. Last drink 1 day prior to hospitalization. Denies current nicotine cravings    Wife notes that pt has had falls at home, sometimes he coughs and faints, no body shakes, wakes up ~1 mn later and feels fine; during these episodes he has no bowel/bladder incontinence and no tongue biting. He has never had a stroke.     ROS:  General: no fevers, chills, sweats  CV: no chest pain or palpitations  Pulm: no SOB; (+) occasional cough with mucous  GI: no abd pain, no n/v/d/c; no blood in stools  : no dysuria or hematuria  MSK: no arthralgias or myaglias  Neuro: no headaches, tremors, tingling, or LOC    PAST MEDICAL HISTORY:  - HTN (hypertension)  - HLD  - CAD  - Aortic valve stenosis, unspecified etiology  - ETOH abuse  - HCC (diagnosed 3/2022)    PAST SURGICAL HISTORY:  - appendectomy  - CABG (2017)  - AVR (2017)  - neck surgery 2018 after falling down stairs  - L heart cath 2/24/22     MEDICATIONS  (STANDING):  artificial tears (preservative free) Ophthalmic Solution 1 Drop(s) Both EYES two times a day  aspirin enteric coated 81 milliGRAM(s) Oral daily  atorvastatin 80 milliGRAM(s) Oral at bedtime  cyanocobalamin 1000 MICROGram(s) Oral daily  ferrous    sulfate 325 milliGRAM(s) Oral <User Schedule>  folic acid 1 milliGRAM(s) Oral daily  heparin   Injectable 5000 Unit(s) SubCutaneous every 8 hours  iron sucrose IVPB 200 milliGRAM(s) IV Intermittent every 24 hours  lactulose Syrup 30 Gram(s) Oral three times a day  multivitamin 1 Tablet(s) Oral daily  mupirocin 2% Nasal 1 Application(s) Both Nostrils two times a day  nicotine - 21 mG/24Hr(s) Patch 1 patch Transdermal daily  pantoprazole    Tablet 40 milliGRAM(s) Oral daily  rifAXIMin 550 milliGRAM(s) Oral two times a day    MEDICATIONS  (PRN):  oxymetazoline 0.05% Nasal Spray 1 Spray(s) Both Nostrils two times a day PRN Nasal Congestion  sodium chloride 0.65% Nasal 1 Spray(s) Both Nostrils four times a day PRN Nasal Congestion    MEDS (home):  - per admission med rec, though wife notes pt has been refusing medications at home  - no supplements or herbals    Allergies  - No Known Allergies to food or medications    FAMILY HISTORY:  - pt and wife disagree about whether pt has a nephew/niece with seizures     SOCIAL HISTORY:  - history of alcohol abuse, last drink 1 day before hospitalization; drinks 1 quart of vodka and 1 beer daily  - current smoker 8-10 cigarettes/day  - per chart, denied illicit drugs on admission    VITAL SIGNS:  Vital Signs Last 24 Hrs  T(C): 36.7 (11 Mar 2022 17:11), Max: 36.9 (11 Mar 2022 08:30)  T(F): 98.1 (11 Mar 2022 17:11), Max: 98.4 (11 Mar 2022 08:30)  HR: 85 (11 Mar 2022 17:11) (84 - 90)  BP: 188/71 (11 Mar 2022 17:11) (95/60 - 188/71)  BP(mean): --  RR: 18 (11 Mar 2022 17:11) (18 - 18)  SpO2: 94% (11 Mar 2022 17:11) (94% - 98%)    PHYSICAL EXAMINATION:  NEURO:  Cognitive: A&O to self, [facility], January 28, Armaan Leong; speaks slowly; naming intact  CN: impaired tracking as unable to move eyes and follow finger when prompted; CN II-XII otherwise grossly intact   Strength: 3/5 arms with R arm drift when raises hands; 5/5 hips, 3/5 legs, 3/5 dorsiflexion, 4/5 plantarflexion  Sensation: intact in arms and legs  Coordination: FNF impaired - slow, overreaches, though somewhat equivocal 2/2 poor motivation; (+) dysdiadokinesia  Reflexes: 3+ brisk patellar reflexes, 1+ triceps bilaterally; downgoing L babinski, no movement on R sole stimulation  Other: no asterixis noted      LABS:                        8.5    8.56  )-----------( 141      ( 11 Mar 2022 18:55 )             27.5     03-11    137  |  107  |  20  ----------------------------<  98  4.0   |  22  |  0.83    Ca    9.1      11 Mar 2022 07:18  Phos  3.7     03-11  Mg     1.8     03-11    TPro  6.8  /  Alb  2.6<L>  /  TBili  0.6  /  DBili  0.2  /  AST  54<H>  /  ALT  33  /  AlkPhos  96  03-11    PT/INR - ( 11 Mar 2022 07:18 )   PT: 15.2 sec;   INR: 1.27          PTT - ( 11 Mar 2022 07:18 )  PTT:35.3 sec      RADIOLOGY & ADDITIONAL STUDIES:      IMPRESSION & RECOMMENDATIONS:  66M, current smoker (1ppd x 50 years), current every day ETOH abuse (1 pint vodka and several beers daily x 50 years), PMH HLD, HTN,  CABG (2017), AS (s/p AVR in 2017), presented to [facility] 2/24/22 with exertional chest pain, diaphoresis, and fall with black out and head trauma, admitted for NSTEMI.     Initially planned for TAVR, then aortic root surgery, but in interim found to have liver cirrhosis and 2.4cm x 2.4cm liver lesion with concern for HCC. Procedure avoided given poor functional status, liver cirrhosis, prior noncompliance with medications. Since 3/3 pt reportedly with worsening mental status - for last days "he is 2 person assist, appears drowsy, and has asterixis", however still AAOx3. There was a concern for hepatic encephalopathy given increase in ammonia level (123). Started on Lactulose and Rifaximine. Neurology consulted 3/11 for persistent AMS from baseline.    On exam 3/11, pt denies any recent changes in his baseline. Per wife at bedside, pt has appeared different for 2 weeks, during which time she has noted slowed speech, inability to stand up independently and dizziness when he does so, general weakness. Mental status has been waxing and waning. Pt denies being sad about anything. She notes that last time he was in the hospital during his bypass procedure in 2017, he appeared like this as well post alcohol withdrawal. Years ago, when he was withdrawing from alcohol he became aggressive to the point of requiring restraints, but this time much more calm. He has been hospitalized 5-6 times for alcohol withdrawal. Last drink 1 day prior to hospitalization. Denies current nicotine cravings. Wife notes that pt has had falls at home, sometimes he coughs and faints, no body shakes, wakes up ~1 mn later and feels fine; during these episodes he has no bowel/bladder incontinence and no tongue biting. He has never had a stroke.     - neuro exam notable for impaired orientation to date (1/28), slowed speech, impaired eye tracking, 3/5 arm strength bl with R arm drift when raises hands; impaired coordination (FNF, dysdiadokinesia).   - labs notable for elevated ammonia level (3/6) to 123 [11-55]; electrolytes WNL; TSH WNL; ABG (3/11) unremarkable  - CTH no acute intracranial findings    Multiple possible etiologies of pts AMS and neuro deficits. Most obvious culprit is his liver disease, with newly discovered HCC, elevated ammonia level 3/11. Also to be considered is delayed reaction to alcohol withdrawal given pt's heavy drinking history and history of similar episodes in the past after abstaining from alcohol, though pt is post librium taper and recent CIWAs have been only 1. No leukocytosis or recent fevers to suggest infection. Also possibly hospital related delirium.     Recommend:   - MRI brain without contrast  - EEG  - repeat ammonia level  - bladder scan to rule out urinary retention which could contribute to AMS in elderly patient  - would repeat UA and if still positive would consider treating for UTI   - avoid sedating medications  - continue lactulose, rifaximin to minimize potential for hepatic encephalopathy  - rest of treatment per primary team    Neurology will continue to follow.      NEUROLOGY INITIAL CONSULT NOTE    CHIEF COMPLAINT:      HPI:  Patient is a 66 yr old man, current smoker (1ppd x 50 years), current every day ETOH abuse (1 pint vodka and several beers daily x 50 years), with PMH HLD, HTN (not on any medications),  CABG (LIMA to LAD, SVG to PDA in 2017), AS (s/p AVR in 2017), who presented to [facility] ED on 2/24/22 with complaints of 9/10 chest pain with associated diaphoresis that occurred when walking to the bathroom. He stated that during this time, he fell and "blacked out" and hit the back of his head. He then went back to sleep. Patient states that he had a similiar chest pain a few days prior to the admission which resolved on its own. Patient's wife states that patient has not seen a physician for over 2 years due to insurance issues. He was admitted to cardiology/telemetry on 2/24 for further management of NSTEMI.     Pt underwent C which showed no occlusive disease. TTE showed severe gradients across bioprosthetic valve, therefore he was transferred to CT surgery for concern for severe prosthetic stenosis. He was initially planned for TAVR, then aortic root surgery, but in interim he was found to liver cirrhosis and 2.4cm x 2.4cm liver lesion with concern for HCC. Initially he was planned for TAVR, but subsequently decision was made that because of complicated aortic root anatomy he would need open heart surgery. It was decided that he is not a surgical candidate given poor functional status, liver cirrhosis, prior noncompliance with medications. He was seen by medicine consult on 3/3 with request for transfer to medicine - at that time it was deemed that further work-up should happen outpatient.  He was supposed to be dced with follow-up in [facility], but in interim he was seen by PT on 3/3 and BRENDA was recommended.    Since 3/3 patient reportedly with worsening mental status - for last days "he is 2 person assist, appears drowsy, and has asterixis", however still AAOx3. There was a concern for hepatic encephalopathy given increase in ammonia level (123). NG tube was placed, and he was started on Lactulose and Rifaximine. Neurology consulted 3/11 for persistent AMS from baseline.    On exam 3/11, pt denies any recent changes in his baseline. Per wife at bedside, pt has appeared different for 2 weeks, during which time she has noted slowed speech, inability to stand up independently and dizziness when he does so, general weakness. Mental status has been waxing and waning. Pt denies being sad about anything. She notes that last time he was in the hospital during his bypass procedure in 2017, he appeared like this as well post alcohol withdrawal. Years ago, when he was withdrawing from alcohol he became aggressive to the point of requiring restraints, but this time much more calm. He has been hospitalized 5-6 times for alcohol withdrawal. Last drink 1 day prior to hospitalization. Denies current nicotine cravings    Wife notes that pt has had falls at home, sometimes he coughs and faints, no body shakes, wakes up ~1 mn later and feels fine; during these episodes he has no bowel/bladder incontinence and no tongue biting. He has never had a stroke.     ROS:  General: no fevers, chills, sweats  CV: no chest pain or palpitations  Pulm: no SOB; (+) occasional cough with mucous  GI: no abd pain, no n/v/d/c; no blood in stools  : no dysuria or hematuria  MSK: no arthralgias or myaglias  Neuro: no headaches, tremors, tingling, or LOC    PAST MEDICAL HISTORY:  - HTN (hypertension)  - HLD  - CAD  - Aortic valve stenosis, unspecified etiology  - ETOH abuse  - HCC (diagnosed 3/2022)    PAST SURGICAL HISTORY:  - appendectomy  - CABG (2017)  - AVR (2017)  - neck surgery 2018 after falling down stairs  - L heart cath 2/24/22     MEDICATIONS  (STANDING):  artificial tears (preservative free) Ophthalmic Solution 1 Drop(s) Both EYES two times a day  aspirin enteric coated 81 milliGRAM(s) Oral daily  atorvastatin 80 milliGRAM(s) Oral at bedtime  cyanocobalamin 1000 MICROGram(s) Oral daily  ferrous    sulfate 325 milliGRAM(s) Oral <User Schedule>  folic acid 1 milliGRAM(s) Oral daily  heparin   Injectable 5000 Unit(s) SubCutaneous every 8 hours  iron sucrose IVPB 200 milliGRAM(s) IV Intermittent every 24 hours  lactulose Syrup 30 Gram(s) Oral three times a day  multivitamin 1 Tablet(s) Oral daily  mupirocin 2% Nasal 1 Application(s) Both Nostrils two times a day  nicotine - 21 mG/24Hr(s) Patch 1 patch Transdermal daily  pantoprazole    Tablet 40 milliGRAM(s) Oral daily  rifAXIMin 550 milliGRAM(s) Oral two times a day    MEDICATIONS  (PRN):  oxymetazoline 0.05% Nasal Spray 1 Spray(s) Both Nostrils two times a day PRN Nasal Congestion  sodium chloride 0.65% Nasal 1 Spray(s) Both Nostrils four times a day PRN Nasal Congestion    MEDS (home):  - per admission med rec, though wife notes pt has been refusing medications at home  - no supplements or herbals    Allergies  - No Known Allergies to food or medications    FAMILY HISTORY:  - pt and wife disagree about whether pt has a nephew/niece with seizures     SOCIAL HISTORY:  - history of alcohol abuse, last drink 1 day before hospitalization; drinks 1 quart of vodka and 1 beer daily  - current smoker 8-10 cigarettes/day  - per chart, denied illicit drugs on admission    VITAL SIGNS:  Vital Signs Last 24 Hrs  T(C): 36.7 (11 Mar 2022 17:11), Max: 36.9 (11 Mar 2022 08:30)  T(F): 98.1 (11 Mar 2022 17:11), Max: 98.4 (11 Mar 2022 08:30)  HR: 85 (11 Mar 2022 17:11) (84 - 90)  BP: 188/71 (11 Mar 2022 17:11) (95/60 - 188/71)  BP(mean): --  RR: 18 (11 Mar 2022 17:11) (18 - 18)  SpO2: 94% (11 Mar 2022 17:11) (94% - 98%)    PHYSICAL EXAMINATION:  NEURO:  Cognitive: A&O to self, [facility], January 28, Armaan Leong; speaks slowly; naming intact, follows simple commands  CN: eomi; CN II-XII otherwise grossly intact   Strength: good strength throughout, lifts arms asymmetrically but reports that this is due to remote RUE fracture. Strength on confrontation is symmetric and appears full, though exam limited by comprehension.  Sensation: intact in arms and legs  Coordination: FNF impaired - slow, overreaches, though somewhat equivocal 2/2 poor motivation; (+) dysdiadokinesia  Reflexes: 3+ brisk patellar reflexes, 1+ triceps bilaterally; downgoing L babinski, no movement on R sole stimulation  Other: no asterixis noted      LABS:                        8.5    8.56  )-----------( 141      ( 11 Mar 2022 18:55 )             27.5     03-11    137  |  107  |  20  ----------------------------<  98  4.0   |  22  |  0.83    Ca    9.1      11 Mar 2022 07:18  Phos  3.7     03-11  Mg     1.8     03-11    TPro  6.8  /  Alb  2.6<L>  /  TBili  0.6  /  DBili  0.2  /  AST  54<H>  /  ALT  33  /  AlkPhos  96  03-11    PT/INR - ( 11 Mar 2022 07:18 )   PT: 15.2 sec;   INR: 1.27          PTT - ( 11 Mar 2022 07:18 )  PTT:35.3 sec      RADIOLOGY & ADDITIONAL STUDIES:      IMPRESSION & RECOMMENDATIONS:  66M, current smoker (1ppd x 50 years), current every day ETOH abuse (1 pint vodka and several beers daily x 50 years), PMH HLD, HTN,  CABG (2017), AS (s/p AVR in 2017), presented to [facility] 2/24/22 with exertional chest pain, diaphoresis, and fall with black out and head trauma, admitted for NSTEMI.     Initially planned for TAVR, then aortic root surgery, but in interim found to have liver cirrhosis and 2.4cm x 2.4cm liver lesion with concern for HCC. Procedure avoided given poor functional status, liver cirrhosis, prior noncompliance with medications. Since 3/3 pt reportedly with worsening mental status - for last days "he is 2 person assist, appears drowsy, and has asterixis", however still AAOx3. There was a concern for hepatic encephalopathy given increase in ammonia level (123). Started on Lactulose and Rifaximine. Neurology consulted 3/11 for persistent AMS from baseline.    On exam 3/11, pt denies any recent changes in his baseline. Per wife at bedside, pt has appeared different for 2 weeks, during which time she has noted slowed speech, inability to stand up independently and dizziness when he does so, general weakness. Mental status has been waxing and waning. Pt denies being sad about anything. She notes that last time he was in the hospital during his bypass procedure in 2017, he appeared like this as well post alcohol withdrawal. Years ago, when he was withdrawing from alcohol he became aggressive to the point of requiring restraints, but this time much more calm. He has been hospitalized 5-6 times for alcohol withdrawal. Last drink 1 day prior to hospitalization. Denies current nicotine cravings. Wife notes that pt has had falls at home, sometimes he coughs and faints, no body shakes, wakes up ~1 mn later and feels fine; during these episodes he has no bowel/bladder incontinence and no tongue biting. He has never had a stroke.     - neuro exam notable for impaired orientation to date (1/28), slowed speech, impaired eye tracking, 3/5 arm strength bl with R arm drift when raises hands; impaired coordination (FNF, dysdiadokinesia).   - labs notable for elevated ammonia level (3/6) to 123 [11-55]; electrolytes WNL; TSH WNL; ABG (3/11) unremarkable  - CTH no acute intracranial findings    Multiple possible etiologies of pts AMS and neuro deficits. Most obvious culprit is his liver disease, with newly discovered HCC, elevated ammonia level 3/11. Also to be considered is delayed reaction to alcohol withdrawal given pt's heavy drinking history and history of similar episodes in the past after abstaining from alcohol, though pt is post librium taper and recent CIWAs have been only 1. No leukocytosis or recent fevers to suggest infection. Also possibly hospital related delirium.     Recommend:   - MRI brain without contrast  - EEG  - would repeat UA, ?UCx and if still positive would consider treating for UTI   - avoid sedating medications  - continue treatment of hepatic encephalopathy as per primary team    Neurology will continue to follow.

## 2022-03-11 NOTE — PROGRESS NOTE ADULT - ASSESSMENT
67 y/o M, current smoker (1ppd x 50 years), current every day ETOH abuse (1 pint vodka and several beers daily x 50 years), with PMHx of HLD, HTN (not on any medications),  CABG (LIMA to LAD, SVG to PDA in 2017), AS (s/p AVR in 2017), who presented to [facility] ED on 2/24/22 with complaints of 9/10 chest pain with associated diaphoresis with exertion, admitted for NSTEMI, transferred to CTsx service for severe AS, requiring a TAVR procedure; upon workup for TAVR, patient incidentally noted to have a 2.4 cm hypervascular lesion for which GI was consulted, CT A/P concerning for HCC. Now Gi re-consulted for AMS.     #Hepatic Lesion  Patient presented to [facility] ED on 2/24/22 with complaints of 9/10 chest pain with associated diaphoresis with exertion, admitted for NSTEMI, transferred to CTsx service for severe AS, requiring a TAVR procedure. During workup for TAVR, CTA A/P (2/28) revealing a nodular liver contour, c/w cirrhosis, A 2.4 cm hypervascular liver mass suspicious for HCC, as well as splenomegaly. AFP 7.2 (WNL), While AFP noted to be normal, does not preclude risk of possibility of HCC, especially in the setting of what appears to be cirrhosis 2/2 underlying long-standing history of EtOH use, which places him at higher risk.   - CT A/P (3/6) revealing 2.5 cm left hepatic lesion consistent with LI-RADS v 2018 Category: LR-5 Definitely HCC. OPTN Category (if LR-5): 5B. No locally invasive or metastatic disease. Left hepatic artery variant. No additional suspicious hepatic lesions.  - In light of active EtOH use, would not qualify for liver transplant evaluation   - Not a surgical candidate as per surgical onc team  - Appreciate ongoing Heme/Onc recs  - MR Abdomen (3/9) confirming HCC. Per IR, will consider for TACE, tentatively for Monday pending Hgb >8    #Cirrhosis, likely compensated (-HE, -ascites, - no known EV bleeding)  CTA A/P with radiographic findings c/f cirrhosis, including nodular liver contour and splenomegaly, which is suggestive likely portal HTN. Likely in the setting of long-standing history of EtOH abuse.   MELD-Na: 11 (based on 3/11/22 BW)  HE: UQ0w1-3 (less conversive on exam today), no asterixis   Ascites: None present on CT imaging  HCC: CTA A/P (2/28) with 2.4 cm hypervascular lesion. CT A/P (3/6), lesion c/w HCC  Varices: No prior EGD evaluation, would be warranted, can pursue as an outpatient  - Abdominal US with duplex (3/6) The portal veins, hepatic veins and hepatic arteries are patent with normal directionality of flow. Cirrhosis. Since 2/28/2022, again a 2.9 cm heterogeneous lesion is present within the left lobe of the liver (see CT findings as noted above, appears to be c/w HCC). MRI confirming HCC  - Daily CMPs & Coags to calculate MELD-Na  - Nutrition consult  - High protein diet  -  on alcohol cessation  - c/w Rifaximin 500 mg BID & lactulose, titrate to 2-3 BMs/day    #AMS  Unclear etiology for AMS this am with increased lethargy. Ddx includes HE, infection, librium, delirium. The elevated ammonia lever to 123 could be due to his nose bleed, which has been ongoing since 3/5. He does not have Asterixis on exam and is A&O with understanding of where he is and why he is hospitalized. If HE, unclear the inciting event to cause decompensation. Additionally, RUQ US today w/o ascites. Most likely this is build up of librium that is being poorly metabolized due to his liver function.  - Continue to hold librium  - c/w Rifaximin 500 mg BID & lactulose, titrate to 2-3 BMs/day  - CXR (3/6/22) negative for infiltrates  - UA with no pyuria however +bacteria, + nitrite, trace LE, abxs as per primary team. If with no improvement in MS, consider initiation of abxs  - CT Head (3/10) negative    #Anemia   With slow downtrend in H/H, Hgb 6.9, s/p 1 u pRBC however with extravasation. Downtrended again to 6.6 today (3/10). With dried blood on right nare, noted week prior to have blood from right nare along with leslie blood in oropharynx, currently with no blood in oropharynx on today's exam. With no overt bleeding including melena, hematochezia, hematemesis, coffee ground emesis.   - LDH/Haptoglobin c/w hemolysis, likely in the setting of severe AS  - Monitor H/H  - Transfusion goal >8  - Appreciate ongoing recs from ENT  - In light of underlying cirrhosis, with evidence of thrombocytopenia and splenomegaly, suggestive of e/o portal HTN, would warrant endoscopic evaluation to further assess for EV/PHG however given recent NSTEMI, higher risk for endoscopic evaluation at this time. Still remains without any e/o overt GI bleeding including melena, hematemesis, hematochezia. Hemolysis labs positive yesterday, suspect 2/2 severe AS.     Case discussed with c attending and primary team.     So Guthrie DO  Gastroenterology Fellow  Pager: [phone]

## 2022-03-12 DIAGNOSIS — R33.9 RETENTION OF URINE, UNSPECIFIED: ICD-10-CM

## 2022-03-12 LAB
ALBUMIN SERPL ELPH-MCNC: 2.6 G/DL — LOW (ref 3.3–5)
ALP SERPL-CCNC: 91 U/L — SIGNIFICANT CHANGE UP (ref 40–120)
ALT FLD-CCNC: 34 U/L — SIGNIFICANT CHANGE UP (ref 10–45)
AMMONIA BLD-MCNC: 97 UMOL/L — HIGH (ref 11–55)
ANION GAP SERPL CALC-SCNC: 10 MMOL/L — SIGNIFICANT CHANGE UP (ref 5–17)
AST SERPL-CCNC: 55 U/L — HIGH (ref 10–40)
BASOPHILS # BLD AUTO: 0.02 K/UL — SIGNIFICANT CHANGE UP (ref 0–0.2)
BASOPHILS NFR BLD AUTO: 0.2 % — SIGNIFICANT CHANGE UP (ref 0–2)
BILIRUB SERPL-MCNC: 0.7 MG/DL — SIGNIFICANT CHANGE UP (ref 0.2–1.2)
BUN SERPL-MCNC: 16 MG/DL — SIGNIFICANT CHANGE UP (ref 7–23)
CALCIUM SERPL-MCNC: 8.4 MG/DL — SIGNIFICANT CHANGE UP (ref 8.4–10.5)
CHLORIDE SERPL-SCNC: 107 MMOL/L — SIGNIFICANT CHANGE UP (ref 96–108)
CO2 SERPL-SCNC: 19 MMOL/L — LOW (ref 22–31)
CREAT SERPL-MCNC: 0.87 MG/DL — SIGNIFICANT CHANGE UP (ref 0.5–1.3)
EGFR: 95 ML/MIN/1.73M2 — SIGNIFICANT CHANGE UP
EOSINOPHIL # BLD AUTO: 0.45 K/UL — SIGNIFICANT CHANGE UP (ref 0–0.5)
EOSINOPHIL NFR BLD AUTO: 4.5 % — SIGNIFICANT CHANGE UP (ref 0–6)
GLUCOSE SERPL-MCNC: 96 MG/DL — SIGNIFICANT CHANGE UP (ref 70–99)
HCT VFR BLD CALC: 28.1 % — LOW (ref 39–50)
HGB BLD-MCNC: 8.6 G/DL — LOW (ref 13–17)
IMM GRANULOCYTES NFR BLD AUTO: 0.5 % — SIGNIFICANT CHANGE UP (ref 0–1.5)
LYMPHOCYTES # BLD AUTO: 2.11 K/UL — SIGNIFICANT CHANGE UP (ref 1–3.3)
LYMPHOCYTES # BLD AUTO: 21 % — SIGNIFICANT CHANGE UP (ref 13–44)
MAGNESIUM SERPL-MCNC: 1.9 MG/DL — SIGNIFICANT CHANGE UP (ref 1.6–2.6)
MCHC RBC-ENTMCNC: 27.6 PG — SIGNIFICANT CHANGE UP (ref 27–34)
MCHC RBC-ENTMCNC: 30.6 GM/DL — LOW (ref 32–36)
MCV RBC AUTO: 90.1 FL — SIGNIFICANT CHANGE UP (ref 80–100)
MONOCYTES # BLD AUTO: 0.96 K/UL — HIGH (ref 0–0.9)
MONOCYTES NFR BLD AUTO: 9.6 % — SIGNIFICANT CHANGE UP (ref 2–14)
NEUTROPHILS # BLD AUTO: 6.44 K/UL — SIGNIFICANT CHANGE UP (ref 1.8–7.4)
NEUTROPHILS NFR BLD AUTO: 64.2 % — SIGNIFICANT CHANGE UP (ref 43–77)
NRBC # BLD: 0 /100 WBCS — SIGNIFICANT CHANGE UP (ref 0–0)
PHOSPHATE SERPL-MCNC: 3.4 MG/DL — SIGNIFICANT CHANGE UP (ref 2.5–4.5)
PLATELET # BLD AUTO: 146 K/UL — LOW (ref 150–400)
POTASSIUM SERPL-MCNC: 3.9 MMOL/L — SIGNIFICANT CHANGE UP (ref 3.5–5.3)
POTASSIUM SERPL-SCNC: 3.9 MMOL/L — SIGNIFICANT CHANGE UP (ref 3.5–5.3)
PROT SERPL-MCNC: 6.8 G/DL — SIGNIFICANT CHANGE UP (ref 6–8.3)
RBC # BLD: 3.12 M/UL — LOW (ref 4.2–5.8)
RBC # FLD: 17 % — HIGH (ref 10.3–14.5)
SODIUM SERPL-SCNC: 136 MMOL/L — SIGNIFICANT CHANGE UP (ref 135–145)
WBC # BLD: 10.03 K/UL — SIGNIFICANT CHANGE UP (ref 3.8–10.5)
WBC # FLD AUTO: 10.03 K/UL — SIGNIFICANT CHANGE UP (ref 3.8–10.5)

## 2022-03-12 PROCEDURE — 99223 1ST HOSP IP/OBS HIGH 75: CPT

## 2022-03-12 PROCEDURE — 70551 MRI BRAIN STEM W/O DYE: CPT | Mod: 26

## 2022-03-12 PROCEDURE — 99233 SBSQ HOSP IP/OBS HIGH 50: CPT | Mod: GC

## 2022-03-12 PROCEDURE — 95718 EEG PHYS/QHP 2-12 HR W/VEEG: CPT

## 2022-03-12 RX ORDER — TAMSULOSIN HYDROCHLORIDE 0.4 MG/1
0.4 CAPSULE ORAL AT BEDTIME
Refills: 0 | Status: DISCONTINUED | OUTPATIENT
Start: 2022-03-12 | End: 2022-03-18

## 2022-03-12 RX ADMIN — Medication 1 DROP(S): at 18:12

## 2022-03-12 RX ADMIN — HEPARIN SODIUM 5000 UNIT(S): 5000 INJECTION INTRAVENOUS; SUBCUTANEOUS at 05:49

## 2022-03-12 RX ADMIN — LACTULOSE 30 GRAM(S): 10 SOLUTION ORAL at 13:03

## 2022-03-12 RX ADMIN — ATORVASTATIN CALCIUM 80 MILLIGRAM(S): 80 TABLET, FILM COATED ORAL at 21:54

## 2022-03-12 RX ADMIN — TAMSULOSIN HYDROCHLORIDE 0.4 MILLIGRAM(S): 0.4 CAPSULE ORAL at 21:53

## 2022-03-12 RX ADMIN — IRON SUCROSE 110 MILLIGRAM(S): 20 INJECTION, SOLUTION INTRAVENOUS at 13:03

## 2022-03-12 RX ADMIN — Medication 1 PATCH: at 11:00

## 2022-03-12 RX ADMIN — PANTOPRAZOLE SODIUM 40 MILLIGRAM(S): 20 TABLET, DELAYED RELEASE ORAL at 07:03

## 2022-03-12 RX ADMIN — LACTULOSE 30 GRAM(S): 10 SOLUTION ORAL at 21:53

## 2022-03-12 RX ADMIN — HEPARIN SODIUM 5000 UNIT(S): 5000 INJECTION INTRAVENOUS; SUBCUTANEOUS at 21:54

## 2022-03-12 RX ADMIN — Medication 1 PATCH: at 11:23

## 2022-03-12 RX ADMIN — Medication 1 PATCH: at 19:06

## 2022-03-12 RX ADMIN — HEPARIN SODIUM 5000 UNIT(S): 5000 INJECTION INTRAVENOUS; SUBCUTANEOUS at 13:03

## 2022-03-12 RX ADMIN — Medication 1 TABLET(S): at 11:23

## 2022-03-12 RX ADMIN — LACTULOSE 30 GRAM(S): 10 SOLUTION ORAL at 05:49

## 2022-03-12 RX ADMIN — Medication 1 PATCH: at 07:08

## 2022-03-12 RX ADMIN — PREGABALIN 1000 MICROGRAM(S): 225 CAPSULE ORAL at 11:23

## 2022-03-12 RX ADMIN — Medication 81 MILLIGRAM(S): at 11:23

## 2022-03-12 RX ADMIN — Medication 1 DROP(S): at 05:50

## 2022-03-12 RX ADMIN — MUPIROCIN 1 APPLICATION(S): 20 OINTMENT TOPICAL at 05:50

## 2022-03-12 RX ADMIN — MUPIROCIN 1 APPLICATION(S): 20 OINTMENT TOPICAL at 18:12

## 2022-03-12 RX ADMIN — Medication 1 MILLIGRAM(S): at 11:23

## 2022-03-12 NOTE — PROGRESS NOTE ADULT - SUBJECTIVE AND OBJECTIVE BOX
Patient is a 66y old  Male who presents with a chief complaint of Chest Pain (11 Mar 2022 20:17)      INTERVAL HPI/OVERNIGHT EVENTS:    Pt. seen and examined earlier today  Pt. more awake and animated today; however, speech difficult to understand  No complaints elicited    Review of Systems: 12 point review of systems otherwise negative    MEDICATIONS  (STANDING):  artificial tears (preservative free) Ophthalmic Solution 1 Drop(s) Both EYES two times a day  aspirin enteric coated 81 milliGRAM(s) Oral daily  atorvastatin 80 milliGRAM(s) Oral at bedtime  cyanocobalamin 1000 MICROGram(s) Oral daily  ferrous    sulfate 325 milliGRAM(s) Oral <User Schedule>  folic acid 1 milliGRAM(s) Oral daily  heparin   Injectable 5000 Unit(s) SubCutaneous every 8 hours  iron sucrose IVPB 200 milliGRAM(s) IV Intermittent every 24 hours  lactulose Syrup 30 Gram(s) Oral three times a day  multivitamin 1 Tablet(s) Oral daily  mupirocin 2% Nasal 1 Application(s) Both Nostrils two times a day  nicotine - 21 mG/24Hr(s) Patch 1 patch Transdermal daily  pantoprazole    Tablet 40 milliGRAM(s) Oral daily  rifAXIMin 550 milliGRAM(s) Oral two times a day  tamsulosin 0.4 milliGRAM(s) Oral at bedtime    MEDICATIONS  (PRN):  oxymetazoline 0.05% Nasal Spray 1 Spray(s) Both Nostrils two times a day PRN Nasal Congestion  sodium chloride 0.65% Nasal 1 Spray(s) Both Nostrils four times a day PRN Nasal Congestion      Allergies    No Known Allergies    Intolerances          Vital Signs Last 24 Hrs  T(C): 36.6 (12 Mar 2022 15:45), Max: 36.9 (12 Mar 2022 04:45)  T(F): 97.9 (12 Mar 2022 15:45), Max: 98.5 (12 Mar 2022 04:45)  HR: 90 (12 Mar 2022 15:45) (83 - 90)  BP: 106/64 (12 Mar 2022 15:45) (101/62 - 188/71)  BP(mean): --  RR: 17 (12 Mar 2022 15:45) (17 - 18)  SpO2: 98% (12 Mar 2022 15:45) (94% - 98%)  CAPILLARY BLOOD GLUCOSE          03-11 @ 07:01  -  03-12 @ 07:00  --------------------------------------------------------  IN: 0 mL / OUT: 450 mL / NET: -450 mL    03-12 @ 06:01  - 03-12 @ 16:25  --------------------------------------------------------  IN: 0 mL / OUT: 400 mL / NET: -400 mL        Physical Exam:  (earlier today)  Daily     Daily   General:  chronically-ill but comfortable-appearing in NAD  HEENT: MMM, anicteric  CV:  RRR, no JVD, +systolic murmur  Lungs:  CTA B/L anteriorly  Abdomen:  soft NT minimally distended, no fluid wave  Extremities:  no edema B/L LE +clubbing  Skin:  WWP, no jaundice  :  +condom cath  Neuro:  A&Ox2(?), garbled speech, but following commands, no asterixis    LABS:                        8.6    10.03 )-----------( 146      ( 12 Mar 2022 06:44 )             28.1     03-12    136  |  107  |  16  ----------------------------<  96  3.9   |  19<L>  |  0.87    Ca    8.4      12 Mar 2022 06:45  Phos  3.4     03-12  Mg     1.9     03-12    TPro  6.8  /  Alb  2.6<L>  /  TBili  0.7  /  DBili  x   /  AST  55<H>  /  ALT  34  /  AlkPhos  91  03-12    PT/INR - ( 11 Mar 2022 07:18 )   PT: 15.2 sec;   INR: 1.27          PTT - ( 11 Mar 2022 07:18 )  PTT:35.3 sec

## 2022-03-12 NOTE — PROGRESS NOTE ADULT - PROBLEM SELECTOR PLAN 1
more awake and animated today, but speech more garbled; CTH obtained, no e/o CVA; cont. lactulose and rifaximin for likely hepatic encephalopathy; Pt. received Librium earlier in hospitalization, Utox remains positive, will avoid further benzos; no apparent improvement w/ IV thiamine; will check MRI brain and EEG, per Neuro recs

## 2022-03-12 NOTE — PROGRESS NOTE ADULT - PROBLEM SELECTOR PLAN 9
likely due to prolonged hospitalization and immobilization; cont. serial bladder scans; trial of Flomax; lactulose, as above; neurologic work-up, as above

## 2022-03-12 NOTE — PROGRESS NOTE ADULT - PROBLEM SELECTOR PLAN 7
no e/o withdrawal; cont. thiamine, folate, MVI; SBIRT SW to provide cessation resources ,[email or web address]

## 2022-03-12 NOTE — PROGRESS NOTE ADULT - ASSESSMENT
This is a 65 yo M, current smoker (1ppd x 50 years), current every day ETOH abuse (1 pint vodka and several beers daily x 50 years), PMH HLD, HTN, CABG (2017), AS (s/p AVR in 2017) who presented to [facility] 2/24/22 with exertional chest pain, diaphoresis, and fall admitted for NSTEMI work-up. Neurology was consulted for worsening mental status.    Plan:   - CT Head reviewed, showed mild chronic microvascular ischemia but negative for any acute changes  - MRI brain without contrast reviewed, showed mild chronic microvascular ischemia and brain parenchymal loss advanced for age  - Recommend EEG, will f/u results  - Recommend UCx and if still positive would consider treating for UTI given UA results  - Continue treatment of hepatic encephalopathy as per primary team  - Avoid sedating medications

## 2022-03-12 NOTE — PROGRESS NOTE ADULT - PROBLEM SELECTOR PLAN 3
unclear etiology, possibly due to acute blood loss due to epistaxis episodes, which self-resolved; appreciate ENT recs; no e/o acute GI blood loss, but will monitor, may need EGD; VSS at baseline; Hb improved and stable s/p 1U PRBC transfusion 3/10; cont. IV iron, given low ferritin; appears to also be a component of hemolysis, possibly due to AVR, cont. work-up and mgmt per Heme-Onc; monitor CBC, goal Hb > 7-8

## 2022-03-13 LAB
ANION GAP SERPL CALC-SCNC: 11 MMOL/L — SIGNIFICANT CHANGE UP (ref 5–17)
BUN SERPL-MCNC: 16 MG/DL — SIGNIFICANT CHANGE UP (ref 7–23)
CALCIUM SERPL-MCNC: 8.5 MG/DL — SIGNIFICANT CHANGE UP (ref 8.4–10.5)
CHLORIDE SERPL-SCNC: 106 MMOL/L — SIGNIFICANT CHANGE UP (ref 96–108)
CO2 SERPL-SCNC: 21 MMOL/L — LOW (ref 22–31)
CREAT SERPL-MCNC: 0.87 MG/DL — SIGNIFICANT CHANGE UP (ref 0.5–1.3)
DAT POLY-SP REAG RBC QL: NEGATIVE — SIGNIFICANT CHANGE UP
EGFR: 95 ML/MIN/1.73M2 — SIGNIFICANT CHANGE UP
GLUCOSE SERPL-MCNC: 95 MG/DL — SIGNIFICANT CHANGE UP (ref 70–99)
HCT VFR BLD CALC: 27.5 % — LOW (ref 39–50)
HGB BLD-MCNC: 8.5 G/DL — LOW (ref 13–17)
MAGNESIUM SERPL-MCNC: 2 MG/DL — SIGNIFICANT CHANGE UP (ref 1.6–2.6)
MCHC RBC-ENTMCNC: 27.7 PG — SIGNIFICANT CHANGE UP (ref 27–34)
MCHC RBC-ENTMCNC: 30.9 GM/DL — LOW (ref 32–36)
MCV RBC AUTO: 89.6 FL — SIGNIFICANT CHANGE UP (ref 80–100)
NRBC # BLD: 0 /100 WBCS — SIGNIFICANT CHANGE UP (ref 0–0)
PHOSPHATE SERPL-MCNC: 3.7 MG/DL — SIGNIFICANT CHANGE UP (ref 2.5–4.5)
PLATELET # BLD AUTO: 151 K/UL — SIGNIFICANT CHANGE UP (ref 150–400)
POTASSIUM SERPL-MCNC: 3.8 MMOL/L — SIGNIFICANT CHANGE UP (ref 3.5–5.3)
POTASSIUM SERPL-SCNC: 3.8 MMOL/L — SIGNIFICANT CHANGE UP (ref 3.5–5.3)
RBC # BLD: 3.07 M/UL — LOW (ref 4.2–5.8)
RBC # FLD: 17.2 % — HIGH (ref 10.3–14.5)
SARS-COV-2 RNA SPEC QL NAA+PROBE: NEGATIVE — SIGNIFICANT CHANGE UP
SODIUM SERPL-SCNC: 138 MMOL/L — SIGNIFICANT CHANGE UP (ref 135–145)
WBC # BLD: 10.53 K/UL — HIGH (ref 3.8–10.5)
WBC # FLD AUTO: 10.53 K/UL — HIGH (ref 3.8–10.5)

## 2022-03-13 PROCEDURE — 99233 SBSQ HOSP IP/OBS HIGH 50: CPT | Mod: GC

## 2022-03-13 PROCEDURE — 95720 EEG PHY/QHP EA INCR W/VEEG: CPT

## 2022-03-13 PROCEDURE — 99232 SBSQ HOSP IP/OBS MODERATE 35: CPT

## 2022-03-13 RX ORDER — POTASSIUM CHLORIDE 20 MEQ
20 PACKET (EA) ORAL ONCE
Refills: 0 | Status: COMPLETED | OUTPATIENT
Start: 2022-03-13 | End: 2022-03-13

## 2022-03-13 RX ADMIN — HEPARIN SODIUM 5000 UNIT(S): 5000 INJECTION INTRAVENOUS; SUBCUTANEOUS at 06:39

## 2022-03-13 RX ADMIN — LACTULOSE 30 GRAM(S): 10 SOLUTION ORAL at 06:38

## 2022-03-13 RX ADMIN — Medication 1 PATCH: at 18:37

## 2022-03-13 RX ADMIN — Medication 20 MILLIEQUIVALENT(S): at 07:56

## 2022-03-13 RX ADMIN — LACTULOSE 30 GRAM(S): 10 SOLUTION ORAL at 13:27

## 2022-03-13 RX ADMIN — IRON SUCROSE 110 MILLIGRAM(S): 20 INJECTION, SOLUTION INTRAVENOUS at 12:42

## 2022-03-13 RX ADMIN — TAMSULOSIN HYDROCHLORIDE 0.4 MILLIGRAM(S): 0.4 CAPSULE ORAL at 22:19

## 2022-03-13 RX ADMIN — PANTOPRAZOLE SODIUM 40 MILLIGRAM(S): 20 TABLET, DELAYED RELEASE ORAL at 06:38

## 2022-03-13 RX ADMIN — PREGABALIN 1000 MICROGRAM(S): 225 CAPSULE ORAL at 12:42

## 2022-03-13 RX ADMIN — MUPIROCIN 1 APPLICATION(S): 20 OINTMENT TOPICAL at 06:39

## 2022-03-13 RX ADMIN — LACTULOSE 30 GRAM(S): 10 SOLUTION ORAL at 22:19

## 2022-03-13 RX ADMIN — Medication 1 TABLET(S): at 12:41

## 2022-03-13 RX ADMIN — Medication 81 MILLIGRAM(S): at 12:41

## 2022-03-13 RX ADMIN — ATORVASTATIN CALCIUM 80 MILLIGRAM(S): 80 TABLET, FILM COATED ORAL at 22:19

## 2022-03-13 RX ADMIN — HEPARIN SODIUM 5000 UNIT(S): 5000 INJECTION INTRAVENOUS; SUBCUTANEOUS at 13:27

## 2022-03-13 RX ADMIN — Medication 1 PATCH: at 12:41

## 2022-03-13 RX ADMIN — Medication 1 MILLIGRAM(S): at 12:41

## 2022-03-13 RX ADMIN — Medication 1 DROP(S): at 17:36

## 2022-03-13 RX ADMIN — Medication 1 DROP(S): at 06:38

## 2022-03-13 RX ADMIN — MUPIROCIN 1 APPLICATION(S): 20 OINTMENT TOPICAL at 17:36

## 2022-03-13 NOTE — PROGRESS NOTE ADULT - NSPROGADDITIONALINFOA_GEN_ALL_CORE
DVT ppx: holding HSQ, pending improved/stable Hb
DVT ppx: resume HSQ, will monitor for bleeding sx
DVT ppx: HSQ, monitor for bleeding sx
DVT ppx: HSQ, monitor for bleeding sx
DVT ppx: HSQ

## 2022-03-13 NOTE — PROGRESS NOTE ADULT - PROBLEM SELECTOR PLAN 1
improving; MRI shows "mild chronic microvascular ischemic disease" and "brain parenchymal volume loss, advanced for patient's age," likely due to chronic EtOH use; cont. lactulose and rifaximin for likely hepatic encephalopathy (goal 2-3 BMs/day); Pt. received Librium earlier in hospitalization, Utox remains positive, will avoid further benzos; UA < 5 WBCs rules-out UTI; Neuro following, will check EEG, per recs

## 2022-03-13 NOTE — PROGRESS NOTE ADULT - PROBLEM SELECTOR PLAN 3
AAOx2 (person and place). Mental status imrpoves through the day. Unlikely hepatic encephalopathy as no asterixis and is currently compensated. No obvious signs of infection. Neuro exam nonfocal. Possibly related to chronic alcohol use, Wernicke's or depression i/s/o HCC diagnosis. As per wife, pt is not normally lethargic at home. completed course of high dose thiamine, last librium dose on 03/01. CTH and MRI w/op acute abnormality.    - f/u VEEG  - monitor BMs - none yesterday - i/s/o HE  - monitor mental status  - Neuro following

## 2022-03-13 NOTE — PROGRESS NOTE ADULT - PROBLEM SELECTOR PLAN 4
as seen on triple-phase CT and MRI; Heme-Onc and IR following; Pt. not a surgical candidate, per Surg-Onc; IR to perform transarterial chemoembolization tomorrow, if Hb stable (Pt. not a candidate for ablation); Palliative Care following as well, GOC discussions ongoing w/ Pt. and his wife

## 2022-03-13 NOTE — PROGRESS NOTE ADULT - PROBLEM SELECTOR PLAN 1
CTAP with 2.4 hypervascular liver mass. i/s/o Liver cirrhosis and EtOH use disorder. triple phase CT confirms HCC  - AFP is 7.2 which is normal  - MRI abdomen: 2.5 cm left hepatic lesion consistent with LI-RADS v 2018 Category: LR-5 Definitely HCC. OPTN Category (if LR-5): 5B. No locally invasive or metastatic disease. Left hepatic artery variant. No additional suspicious hepatic lesions.   - TACE on monday  - Surg/onc following NTD  - NPO at [facility]

## 2022-03-13 NOTE — PROGRESS NOTE ADULT - SUBJECTIVE AND OBJECTIVE BOX
OVERNIGHT EVENTS: naeo    SUBJECTIVE / INTERVAL HPI: Patient seen and examined at bedside. Denies any complaints, wants to go home. No BMs for 2 days      PHYSICAL EXAM:  General: NAD, arousable to voice, able to follow simple commands  HEENT: MMM, LEIGH (sluggish), dirty sclera, no clots visualized in nares or oral mucosa  Neck: supple  Cardiovascular: regular rate and rhythm. Loud systolic murmur best heard at right 2nd ICS   Respiratory: CTA B/L anteriorly; no W/R/R  Gastrointestinal: soft, NT, distended, tympanic  Extremities: moving all extremities  Neurological: AAOx2-3; symmetric face. No asterixis. Poor short-term memory. Limited cooperation.     VITAL SIGNS:  Vital Signs Last 24 Hrs  T(C): 36.4 (13 Mar 2022 08:36), Max: 36.9 (12 Mar 2022 09:31)  T(F): 97.5 (13 Mar 2022 08:36), Max: 98.5 (12 Mar 2022 09:31)  HR: 99 (13 Mar 2022 08:36) (86 - 102)  BP: 116/66 (13 Mar 2022 08:36) (104/70 - 116/66)  BP(mean): --  RR: 17 (13 Mar 2022 08:36) (17 - 18)  SpO2: 98% (13 Mar 2022 08:36) (94% - 98%)      MEDICATIONS:  MEDICATIONS  (STANDING):  artificial tears (preservative free) Ophthalmic Solution 1 Drop(s) Both EYES two times a day  aspirin enteric coated 81 milliGRAM(s) Oral daily  atorvastatin 80 milliGRAM(s) Oral at bedtime  cyanocobalamin 1000 MICROGram(s) Oral daily  ferrous    sulfate 325 milliGRAM(s) Oral <User Schedule>  folic acid 1 milliGRAM(s) Oral daily  heparin   Injectable 5000 Unit(s) SubCutaneous every 8 hours  iron sucrose IVPB 200 milliGRAM(s) IV Intermittent every 24 hours  lactulose Syrup 30 Gram(s) Oral three times a day  multivitamin 1 Tablet(s) Oral daily  mupirocin 2% Nasal 1 Application(s) Both Nostrils two times a day  nicotine - 21 mG/24Hr(s) Patch 1 patch Transdermal daily  pantoprazole    Tablet 40 milliGRAM(s) Oral daily  rifAXIMin 550 milliGRAM(s) Oral two times a day  tamsulosin 0.4 milliGRAM(s) Oral at bedtime    MEDICATIONS  (PRN):  oxymetazoline 0.05% Nasal Spray 1 Spray(s) Both Nostrils two times a day PRN Nasal Congestion  sodium chloride 0.65% Nasal 1 Spray(s) Both Nostrils four times a day PRN Nasal Congestion      ALLERGIES:  Allergies    No Known Allergies    Intolerances        LABS:                        8.5    10.53 )-----------( 151      ( 13 Mar 2022 06:23 )             27.5     03-13    138  |  106  |  16  ----------------------------<  95  3.8   |  21<L>  |  0.87    Ca    8.5      13 Mar 2022 06:23  Phos  3.7     03-13  Mg     2.0     03-13    TPro  6.8  /  Alb  2.6<L>  /  TBili  0.7  /  DBili  x   /  AST  55<H>  /  ALT  34  /  AlkPhos  91  03-12        CAPILLARY BLOOD GLUCOSE          RADIOLOGY & ADDITIONAL TESTS: Reviewed.

## 2022-03-13 NOTE — PROGRESS NOTE ADULT - SUBJECTIVE AND OBJECTIVE BOX
*** NOTE IN PROGRESS ***    INTERVAL HPI/OVERNIGHT EVENTS:    SUBJECTIVE: Patient seen and examined at bedside.    OBJECTIVE:    VITAL SIGNS:  ICU Vital Signs Last 24 Hrs  T(C): 36.4 (13 Mar 2022 08:36), Max: 36.9 (12 Mar 2022 20:55)  T(F): 97.5 (13 Mar 2022 08:36), Max: 98.4 (12 Mar 2022 20:55)  HR: 99 (13 Mar 2022 08:36) (90 - 102)  BP: 116/66 (13 Mar 2022 08:36) (104/70 - 116/66)  BP(mean): --  ABP: --  ABP(mean): --  RR: 17 (13 Mar 2022 08:36) (17 - 18)  SpO2: 98% (13 Mar 2022 08:36) (97% - 98%)        03-12 @ 06:01  -  03-13 @ 07:00  --------------------------------------------------------  IN: 0 mL / OUT: 400 mL / NET: -400 mL      CAPILLARY BLOOD GLUCOSE          PHYSICAL EXAM:    General: NAD  HEENT: NC/AT; PERRL, clear conjunctiva  Neck: supple  Respiratory: CTA b/l  Cardiovascular: +S1/S2; RRR  Abdomen: soft, NT/ND; +BS x4  Extremities: WWP, 2+ peripheral pulses b/l; no LE edema  Skin: normal color and turgor; no rash  Neurological:     MEDICATIONS:  MEDICATIONS  (STANDING):  artificial tears (preservative free) Ophthalmic Solution 1 Drop(s) Both EYES two times a day  aspirin enteric coated 81 milliGRAM(s) Oral daily  atorvastatin 80 milliGRAM(s) Oral at bedtime  cyanocobalamin 1000 MICROGram(s) Oral daily  ferrous    sulfate 325 milliGRAM(s) Oral <User Schedule>  folic acid 1 milliGRAM(s) Oral daily  heparin   Injectable 5000 Unit(s) SubCutaneous every 8 hours  lactulose Syrup 30 Gram(s) Oral three times a day  multivitamin 1 Tablet(s) Oral daily  mupirocin 2% Nasal 1 Application(s) Both Nostrils two times a day  nicotine - 21 mG/24Hr(s) Patch 1 patch Transdermal daily  pantoprazole    Tablet 40 milliGRAM(s) Oral daily  rifAXIMin 550 milliGRAM(s) Oral two times a day  tamsulosin 0.4 milliGRAM(s) Oral at bedtime    MEDICATIONS  (PRN):  oxymetazoline 0.05% Nasal Spray 1 Spray(s) Both Nostrils two times a day PRN Nasal Congestion  sodium chloride 0.65% Nasal 1 Spray(s) Both Nostrils four times a day PRN Nasal Congestion      ALLERGIES:  Allergies    No Known Allergies    Intolerances        LABS:                        8.5    10.53 )-----------( 151      ( 13 Mar 2022 06:23 )             27.5     03-13    138  |  106  |  16  ----------------------------<  95  3.8   |  21<L>  |  0.87    Ca    8.5      13 Mar 2022 06:23  Phos  3.7     03-13  Mg     2.0     03-13    TPro  6.8  /  Alb  2.6<L>  /  TBili  0.7  /  DBili  x   /  AST  55<H>  /  ALT  34  /  AlkPhos  91  03-12                    RADIOLOGY & ADDITIONAL TESTS: Reviewed.   Neurology Progress Note    INTERVAL HPI/OVERNIGHT EVENTS: No acute events overnight, went for MRI, now on vEEG    SUBJECTIVE: Patient seen and examined at bedside. Denies fever, headache, nausea, vomiting, chest pain, shortness of breath, abdominal pain, changes in bowel movements or urination.     OBJECTIVE:    VITAL SIGNS:  ICU Vital Signs Last 24 Hrs  T(C): 36.4 (13 Mar 2022 08:36), Max: 36.9 (12 Mar 2022 20:55)  T(F): 97.5 (13 Mar 2022 08:36), Max: 98.4 (12 Mar 2022 20:55)  HR: 99 (13 Mar 2022 08:36) (90 - 102)  BP: 116/66 (13 Mar 2022 08:36) (104/70 - 116/66)  BP(mean): --  ABP: --  ABP(mean): --  RR: 17 (13 Mar 2022 08:36) (17 - 18)  SpO2: 98% (13 Mar 2022 08:36) (97% - 98%)        03-12 @ 06:01  -  03-13 @ 07:00  --------------------------------------------------------  IN: 0 mL / OUT: 400 mL / NET: -400 mL      CAPILLARY BLOOD GLUCOSE          PHYSICAL EXAM:  General: NAD, answering questions  HEENT: MMM, LEIGH (sluggish)  Neck: supple  Cardiovascular: regular rate and rhythm. Systolic murmur  Respiratory: CTAB  Gastrointestinal: soft, NT, distended, tympanic  Extremities: moving all extremities  Neurological: AAOx2-3; speaks slowly and difficult to understand at times; naming intact, follows simple commands  Motor: 3/5 in RUE, 4-/5 strength LUE/LLE/RLE, lifts arms asymmetrically though and R drift noted  Sensation: Intact to light touch in B/L UE and LE    MEDICATIONS:  MEDICATIONS  (STANDING):  artificial tears (preservative free) Ophthalmic Solution 1 Drop(s) Both EYES two times a day  aspirin enteric coated 81 milliGRAM(s) Oral daily  atorvastatin 80 milliGRAM(s) Oral at bedtime  cyanocobalamin 1000 MICROGram(s) Oral daily  ferrous    sulfate 325 milliGRAM(s) Oral <User Schedule>  folic acid 1 milliGRAM(s) Oral daily  heparin   Injectable 5000 Unit(s) SubCutaneous every 8 hours  lactulose Syrup 30 Gram(s) Oral three times a day  multivitamin 1 Tablet(s) Oral daily  mupirocin 2% Nasal 1 Application(s) Both Nostrils two times a day  nicotine - 21 mG/24Hr(s) Patch 1 patch Transdermal daily  pantoprazole    Tablet 40 milliGRAM(s) Oral daily  rifAXIMin 550 milliGRAM(s) Oral two times a day  tamsulosin 0.4 milliGRAM(s) Oral at bedtime    MEDICATIONS  (PRN):  oxymetazoline 0.05% Nasal Spray 1 Spray(s) Both Nostrils two times a day PRN Nasal Congestion  sodium chloride 0.65% Nasal 1 Spray(s) Both Nostrils four times a day PRN Nasal Congestion      ALLERGIES:  Allergies    No Known Allergies    Intolerances        LABS:                        8.5    10.53 )-----------( 151      ( 13 Mar 2022 06:23 )             27.5     03-13    138  |  106  |  16  ----------------------------<  95  3.8   |  21<L>  |  0.87    Ca    8.5      13 Mar 2022 06:23  Phos  3.7     03-13  Mg     2.0     03-13    TPro  6.8  /  Alb  2.6<L>  /  TBili  0.7  /  DBili  x   /  AST  55<H>  /  ALT  34  /  AlkPhos  91  03-12      RADIOLOGY & ADDITIONAL TESTS: Reviewed.      CT Head No Cont (03.11.22 @ 12:32) >  FINDINGS:    There is no evidence of acute infarction, intracranial hemorrhage or mass   lesion.    There is hypoattenuation of the subcortical and periventricular white   matter, which is nonspecific finding, butmost likely represents sequela   of chronic microvascular ischemic disease. There are atherosclerotic   calcifications of the cavernous and supraclinoid internal carotid   arteries bilaterally comment also the bilateral intradural vertebral   arteries. There is prominence of the cortical sulci related to underlying   brain parenchymal volume loss.    There is no evidence of hydrocephalus. There are no extra-axial fluid   collections.    The patient is status post bilateral lens replacement. The imaged   portions of the paranasal sinuses are aerated. The mastoid air cells are   clear. The visualized soft tissues and osseous structures appear   unremarkable.      IMPRESSION:    No acute intracranial findings.      MR Head No Cont (03.12.22 @ 16:41) >  FINDINGS:    There are scattered T2/FLAIR hyperintense changes in the periventricular   subcortical white matter, nonspecific finding, but most likely   representing sequelae of mild chronic microvascular ischemic disease.    There is cortical sulcal prominence related to underlying brain   parenchymal volume loss, which appears advanced for patient's age.    No acute infarction, intracranial hemorrhage or mass.    There is no evidence of hydrocephalus. There are no extra-axial fluid   collections. The skull base flow voids appear patent.    The patient is status post bilateral lens replacement. There are   scattered mucosal palmitate changes of the ethmoid air cells and mild.   The mastoid air cells are clear. The visualized soft tissues and osseous   structures appear unremarkable.    IMPRESSION:    Mild chronic microvascular ischemic disease.    Brain parenchymal volume loss, advanced for patient's age.

## 2022-03-13 NOTE — PROGRESS NOTE ADULT - SUBJECTIVE AND OBJECTIVE BOX
Patient is a 66y old  Male who presents with a chief complaint of Chest Pain (13 Mar 2022 07:47)      INTERVAL HPI/OVERNIGHT EVENTS:    Pt. seen and examined earlier today  Pt. more awake, alert, and attentive today; speech more intelligible  Pt. had no complaints  No BM yet today, per report    Review of Systems: 12 point review of systems otherwise negative    MEDICATIONS  (STANDING):  artificial tears (preservative free) Ophthalmic Solution 1 Drop(s) Both EYES two times a day  aspirin enteric coated 81 milliGRAM(s) Oral daily  atorvastatin 80 milliGRAM(s) Oral at bedtime  cyanocobalamin 1000 MICROGram(s) Oral daily  ferrous    sulfate 325 milliGRAM(s) Oral <User Schedule>  folic acid 1 milliGRAM(s) Oral daily  heparin   Injectable 5000 Unit(s) SubCutaneous every 8 hours  iron sucrose IVPB 200 milliGRAM(s) IV Intermittent every 24 hours  lactulose Syrup 30 Gram(s) Oral three times a day  multivitamin 1 Tablet(s) Oral daily  mupirocin 2% Nasal 1 Application(s) Both Nostrils two times a day  nicotine - 21 mG/24Hr(s) Patch 1 patch Transdermal daily  pantoprazole    Tablet 40 milliGRAM(s) Oral daily  rifAXIMin 550 milliGRAM(s) Oral two times a day  tamsulosin 0.4 milliGRAM(s) Oral at bedtime    MEDICATIONS  (PRN):  oxymetazoline 0.05% Nasal Spray 1 Spray(s) Both Nostrils two times a day PRN Nasal Congestion  sodium chloride 0.65% Nasal 1 Spray(s) Both Nostrils four times a day PRN Nasal Congestion      Allergies    No Known Allergies    Intolerances          Vital Signs Last 24 Hrs  T(C): 36.4 (13 Mar 2022 08:36), Max: 36.9 (12 Mar 2022 20:55)  T(F): 97.5 (13 Mar 2022 08:36), Max: 98.4 (12 Mar 2022 20:55)  HR: 99 (13 Mar 2022 08:36) (90 - 102)  BP: 116/66 (13 Mar 2022 08:36) (104/70 - 116/66)  BP(mean): --  RR: 17 (13 Mar 2022 08:36) (17 - 18)  SpO2: 98% (13 Mar 2022 08:36) (97% - 98%)  CAPILLARY BLOOD GLUCOSE          03-12 @ 06:01  -  03-13 @ 07:00  --------------------------------------------------------  IN: 0 mL / OUT: 400 mL / NET: -400 mL        Physical Exam:  (earlier today)  Daily     Daily   General:  chronically-ill but comfortable-appearing in NAD  HEENT: MMM, anicteric +EEG  CV:  RRR, no JVD, +systolic murmur  Lungs:  CTA B/L anteriorly  Abdomen:  soft NT minimally distended, no fluid wave  Extremities:  no edema B/L LE  Skin:  WWP, no jaundice  :  +condom cath  Neuro:  A&Ox2-3, forgetful, easily oriented  +mittens    LABS:                        8.5    10.53 )-----------( 151      ( 13 Mar 2022 06:23 )             27.5     03-13    138  |  106  |  16  ----------------------------<  95  3.8   |  21<L>  |  0.87    Ca    8.5      13 Mar 2022 06:23  Phos  3.7     03-13  Mg     2.0     03-13    TPro  6.8  /  Alb  2.6<L>  /  TBili  0.7  /  DBili  x   /  AST  55<H>  /  ALT  34  /  AlkPhos  91  03-12

## 2022-03-13 NOTE — PROGRESS NOTE ADULT - ASSESSMENT
67 yo M, current smoker (1ppd x 50 years), current every day ETOH abuse (1 pint vodka and several beers daily x 50 years), PMH HLD, HTN, CABG (2017), AS (s/p AVR in 2017) who presented to [facility] 2/24/22 with exertional chest pain, diaphoresis, and fall admitted for NSTEMI work-up. Neurology was consulted for worsening mental status.    Plan:   - CT Head reviewed, showed mild chronic microvascular ischemia but negative for any acute changes  - MRI brain without contrast reviewed: Mild chronic microvascular ischemic disease. Brain parenchymal volume loss, advanced for patient's age.  - f/u vEEG monitoring  - Recommend UCx and if still positive would consider treating for UTI given UA results  - Continue treatment of hepatic encephalopathy as per primary team  - Avoid sedating medications    Discussed with Attending Physician Dr. Rock. Recommendations are considered final after attending attestation.

## 2022-03-13 NOTE — PROGRESS NOTE ADULT - ASSESSMENT
65 YO M, current smoker (1ppd x 50 years), current every day ETOH abuse (1 pint vodka and several beers daily x 50 years), with PMHx of HLD, HTN (not on any medications), CABG (LIMA to LAD, SVG to PDA in 2017), AS (s/p AVR in 2017) with recently discovered restenosis, presented to [facility] ED on 2/24/22 with chest pain and episode of LOC, initially admitted to cardiology for NSTEMI with [facility] with nonobstructive disease, then found to have restenosis of bioprosthetic valve for which aortic root surgery was planned, but now deferred given liver lesion c/f HCC. Now he is transferred to medicine for concern for mild hepatic encephalopathy, however unlikely to have HE and is compensated cirrhotic. Now found to have HCC, pending further workup.

## 2022-03-13 NOTE — EEG REPORT - NS EEG TEXT BOX
Day 1: 3/12/2022 @ 7:09:59 PM to next morning @ 07:00 am  Background:  , with predominantly delta and theta frequencies.  Symmetry:    Posterior Dominant Rhythm:  6-7 hz symmetric, poorly modulated  Organization: .  Voltage:    Variability: . 		Reactivity: .  N2 sleep: .  Spontaneous Activity:    Periodic/rhythmic activity:  .  Events:  No electrographic seizures or significant clinical events.  Provocations:  Hyperventilation and Photic stimulation: .    Daily Summary:    Moderate diffuse nonspecific cerebral dysfunction.  No epileptiform abnormalities were recorded.      Nile More MD  Attending Neurologist, [facility]

## 2022-03-13 NOTE — PROGRESS NOTE ADULT - PROBLEM SELECTOR PLAN 9
F: as appropriate  E: replenish as appropriate  N: high protein diet  VTE Prophylaxis: heparin TID subq - hold for procedure  GI: protonix PO qd  C: Full Code  D: home when cancer workup completed

## 2022-03-13 NOTE — PROGRESS NOTE ADULT - PROBLEM SELECTOR PLAN 2
2/2 hemolysis. Hgb 8.5 today    - IV iron 200 X 3 doses per heme (last day 3/13)  - c/w b12 1mg Qd, c/w folate 1mg qd  - c/w DVT PPx and ASA 81 given low likelihood of active bleed - hold for procedure  - Transfusion goal > 8  - heme/onc, GI, IR following

## 2022-03-13 NOTE — PROGRESS NOTE ADULT - PROBLEM SELECTOR PLAN 6
S/p CABG with Dr Wilcox in 2017  - recent [facility] with nonobstructive disease  - c/w Lipitor 80mg QD  - c/w ASA 81mg - hold for procedure

## 2022-03-14 ENCOUNTER — APPOINTMENT (OUTPATIENT)
Dept: CARDIOTHORACIC SURGERY | Facility: CLINIC | Age: 67
End: 2022-03-14

## 2022-03-14 LAB
ALBUMIN SERPL ELPH-MCNC: 2.7 G/DL — LOW (ref 3.3–5)
ALBUMIN SERPL ELPH-MCNC: 2.8 G/DL — LOW (ref 3.3–5)
ALP SERPL-CCNC: 87 U/L — SIGNIFICANT CHANGE UP (ref 40–120)
ALP SERPL-CCNC: 96 U/L — SIGNIFICANT CHANGE UP (ref 40–120)
ALT FLD-CCNC: 29 U/L — SIGNIFICANT CHANGE UP (ref 10–45)
ALT FLD-CCNC: 30 U/L — SIGNIFICANT CHANGE UP (ref 10–45)
AMMONIA BLD-MCNC: 85 UMOL/L — HIGH (ref 11–55)
ANION GAP SERPL CALC-SCNC: 12 MMOL/L — SIGNIFICANT CHANGE UP (ref 5–17)
ANION GAP SERPL CALC-SCNC: 15 MMOL/L — SIGNIFICANT CHANGE UP (ref 5–17)
APPEARANCE UR: CLEAR — SIGNIFICANT CHANGE UP
APTT BLD: 35.3 SEC — SIGNIFICANT CHANGE UP (ref 27.5–35.5)
APTT BLD: 38.6 SEC — HIGH (ref 27.5–35.5)
APTT BLD: 42.8 SEC — HIGH (ref 27.5–35.5)
AST SERPL-CCNC: 57 U/L — HIGH (ref 10–40)
AST SERPL-CCNC: 58 U/L — HIGH (ref 10–40)
BACTERIA # UR AUTO: PRESENT /HPF
BASE EXCESS BLDA CALC-SCNC: -2 MMOL/L — SIGNIFICANT CHANGE UP (ref -2–3)
BASE EXCESS BLDA CALC-SCNC: -3.2 MMOL/L — LOW (ref -2–3)
BASOPHILS # BLD AUTO: 0.01 K/UL — SIGNIFICANT CHANGE UP (ref 0–0.2)
BASOPHILS # BLD AUTO: 0.02 K/UL — SIGNIFICANT CHANGE UP (ref 0–0.2)
BASOPHILS # BLD AUTO: 0.02 K/UL — SIGNIFICANT CHANGE UP (ref 0–0.2)
BASOPHILS NFR BLD AUTO: 0.1 % — SIGNIFICANT CHANGE UP (ref 0–2)
BILIRUB SERPL-MCNC: 0.8 MG/DL — SIGNIFICANT CHANGE UP (ref 0.2–1.2)
BILIRUB SERPL-MCNC: 1 MG/DL — SIGNIFICANT CHANGE UP (ref 0.2–1.2)
BILIRUB UR-MCNC: NEGATIVE — SIGNIFICANT CHANGE UP
BLD GP AB SCN SERPL QL: NEGATIVE — SIGNIFICANT CHANGE UP
BUN SERPL-MCNC: 15 MG/DL — SIGNIFICANT CHANGE UP (ref 7–23)
BUN SERPL-MCNC: 17 MG/DL — SIGNIFICANT CHANGE UP (ref 7–23)
CALCIUM SERPL-MCNC: 8.2 MG/DL — LOW (ref 8.4–10.5)
CALCIUM SERPL-MCNC: 8.5 MG/DL — SIGNIFICANT CHANGE UP (ref 8.4–10.5)
CHLORIDE SERPL-SCNC: 104 MMOL/L — SIGNIFICANT CHANGE UP (ref 96–108)
CHLORIDE SERPL-SCNC: 107 MMOL/L — SIGNIFICANT CHANGE UP (ref 96–108)
CK MB CFR SERPL CALC: 2.4 NG/ML — SIGNIFICANT CHANGE UP (ref 0–6.7)
CK SERPL-CCNC: 65 U/L — SIGNIFICANT CHANGE UP (ref 30–200)
CO2 BLDA-SCNC: 21 MMOL/L — SIGNIFICANT CHANGE UP (ref 19–24)
CO2 BLDA-SCNC: 21 MMOL/L — SIGNIFICANT CHANGE UP (ref 19–24)
CO2 SERPL-SCNC: 18 MMOL/L — LOW (ref 22–31)
CO2 SERPL-SCNC: 19 MMOL/L — LOW (ref 22–31)
COLOR SPEC: YELLOW — SIGNIFICANT CHANGE UP
CREAT SERPL-MCNC: 0.73 MG/DL — SIGNIFICANT CHANGE UP (ref 0.5–1.3)
CREAT SERPL-MCNC: 0.8 MG/DL — SIGNIFICANT CHANGE UP (ref 0.5–1.3)
DIFF PNL FLD: ABNORMAL
EGFR: 100 ML/MIN/1.73M2 — SIGNIFICANT CHANGE UP
EGFR: 98 ML/MIN/1.73M2 — SIGNIFICANT CHANGE UP
EOSINOPHIL # BLD AUTO: 0.07 K/UL — SIGNIFICANT CHANGE UP (ref 0–0.5)
EOSINOPHIL # BLD AUTO: 0.08 K/UL — SIGNIFICANT CHANGE UP (ref 0–0.5)
EOSINOPHIL # BLD AUTO: 0.1 K/UL — SIGNIFICANT CHANGE UP (ref 0–0.5)
EOSINOPHIL NFR BLD AUTO: 0.4 % — SIGNIFICANT CHANGE UP (ref 0–6)
EOSINOPHIL NFR BLD AUTO: 0.5 % — SIGNIFICANT CHANGE UP (ref 0–6)
EOSINOPHIL NFR BLD AUTO: 0.7 % — SIGNIFICANT CHANGE UP (ref 0–6)
EPI CELLS # UR: SIGNIFICANT CHANGE UP /HPF (ref 0–5)
GLUCOSE BLDC GLUCOMTR-MCNC: 140 MG/DL — HIGH (ref 70–99)
GLUCOSE SERPL-MCNC: 120 MG/DL — HIGH (ref 70–99)
GLUCOSE SERPL-MCNC: 127 MG/DL — HIGH (ref 70–99)
GLUCOSE UR QL: NEGATIVE — SIGNIFICANT CHANGE UP
HCO3 BLDA-SCNC: 20 MMOL/L — LOW (ref 21–28)
HCO3 BLDA-SCNC: 20 MMOL/L — LOW (ref 21–28)
HCT VFR BLD CALC: 24.2 % — LOW (ref 39–50)
HCT VFR BLD CALC: 25.9 % — LOW (ref 39–50)
HCT VFR BLD CALC: 26.2 % — LOW (ref 39–50)
HGB BLD-MCNC: 7.7 G/DL — LOW (ref 13–17)
HGB BLD-MCNC: 8 G/DL — LOW (ref 13–17)
HGB BLD-MCNC: 8.1 G/DL — LOW (ref 13–17)
IMM GRANULOCYTES NFR BLD AUTO: 0.4 % — SIGNIFICANT CHANGE UP (ref 0–1.5)
IMM GRANULOCYTES NFR BLD AUTO: 0.6 % — SIGNIFICANT CHANGE UP (ref 0–1.5)
IMM GRANULOCYTES NFR BLD AUTO: 0.6 % — SIGNIFICANT CHANGE UP (ref 0–1.5)
INR BLD: 1.36 — HIGH (ref 0.88–1.16)
INR BLD: 1.37 — HIGH (ref 0.88–1.16)
INR BLD: 1.44 — HIGH (ref 0.88–1.16)
KETONES UR-MCNC: 15 MG/DL
LACTATE SERPL-SCNC: 2.6 MMOL/L — HIGH (ref 0.5–2)
LACTATE SERPL-SCNC: 4.9 MMOL/L — CRITICAL HIGH (ref 0.5–2)
LEUKOCYTE ESTERASE UR-ACNC: ABNORMAL
LYMPHOCYTES # BLD AUTO: 1.12 K/UL — SIGNIFICANT CHANGE UP (ref 1–3.3)
LYMPHOCYTES # BLD AUTO: 1.18 K/UL — SIGNIFICANT CHANGE UP (ref 1–3.3)
LYMPHOCYTES # BLD AUTO: 1.77 K/UL — SIGNIFICANT CHANGE UP (ref 1–3.3)
LYMPHOCYTES # BLD AUTO: 12.4 % — LOW (ref 13–44)
LYMPHOCYTES # BLD AUTO: 5.8 % — LOW (ref 13–44)
LYMPHOCYTES # BLD AUTO: 7.5 % — LOW (ref 13–44)
MAGNESIUM SERPL-MCNC: 1.7 MG/DL — SIGNIFICANT CHANGE UP (ref 1.6–2.6)
MAGNESIUM SERPL-MCNC: 1.9 MG/DL — SIGNIFICANT CHANGE UP (ref 1.6–2.6)
MCHC RBC-ENTMCNC: 28.4 PG — SIGNIFICANT CHANGE UP (ref 27–34)
MCHC RBC-ENTMCNC: 28.6 PG — SIGNIFICANT CHANGE UP (ref 27–34)
MCHC RBC-ENTMCNC: 29.3 PG — SIGNIFICANT CHANGE UP (ref 27–34)
MCHC RBC-ENTMCNC: 30.5 GM/DL — LOW (ref 32–36)
MCHC RBC-ENTMCNC: 31.3 GM/DL — LOW (ref 32–36)
MCHC RBC-ENTMCNC: 31.8 GM/DL — LOW (ref 32–36)
MCV RBC AUTO: 90.9 FL — SIGNIFICANT CHANGE UP (ref 80–100)
MCV RBC AUTO: 92 FL — SIGNIFICANT CHANGE UP (ref 80–100)
MCV RBC AUTO: 93.6 FL — SIGNIFICANT CHANGE UP (ref 80–100)
MONOCYTES # BLD AUTO: 1.01 K/UL — HIGH (ref 0–0.9)
MONOCYTES # BLD AUTO: 1.12 K/UL — HIGH (ref 0–0.9)
MONOCYTES # BLD AUTO: 1.42 K/UL — HIGH (ref 0–0.9)
MONOCYTES NFR BLD AUTO: 6.7 % — SIGNIFICANT CHANGE UP (ref 2–14)
MONOCYTES NFR BLD AUTO: 7 % — SIGNIFICANT CHANGE UP (ref 2–14)
MONOCYTES NFR BLD AUTO: 7.8 % — SIGNIFICANT CHANGE UP (ref 2–14)
MRSA PCR RESULT.: NEGATIVE — SIGNIFICANT CHANGE UP
NEUTROPHILS # BLD AUTO: 11.28 K/UL — HIGH (ref 1.8–7.4)
NEUTROPHILS # BLD AUTO: 12.63 K/UL — HIGH (ref 1.8–7.4)
NEUTROPHILS # BLD AUTO: 17.58 K/UL — HIGH (ref 1.8–7.4)
NEUTROPHILS NFR BLD AUTO: 78.6 % — HIGH (ref 43–77)
NEUTROPHILS NFR BLD AUTO: 84.4 % — HIGH (ref 43–77)
NEUTROPHILS NFR BLD AUTO: 86.3 % — HIGH (ref 43–77)
NITRITE UR-MCNC: NEGATIVE — SIGNIFICANT CHANGE UP
NRBC # BLD: 0 /100 WBCS — SIGNIFICANT CHANGE UP (ref 0–0)
PCO2 BLDA: 27 MMHG — LOW (ref 35–48)
PCO2 BLDA: 29 MMHG — LOW (ref 35–48)
PH BLDA: 7.44 — SIGNIFICANT CHANGE UP (ref 7.35–7.45)
PH BLDA: 7.48 — HIGH (ref 7.35–7.45)
PH UR: >=9 — SIGNIFICANT CHANGE UP (ref 5–8)
PHOSPHATE SERPL-MCNC: 3 MG/DL — SIGNIFICANT CHANGE UP (ref 2.5–4.5)
PHOSPHATE SERPL-MCNC: 4 MG/DL — SIGNIFICANT CHANGE UP (ref 2.5–4.5)
PLATELET # BLD AUTO: 145 K/UL — LOW (ref 150–400)
PLATELET # BLD AUTO: 153 K/UL — SIGNIFICANT CHANGE UP (ref 150–400)
PLATELET # BLD AUTO: 171 K/UL — SIGNIFICANT CHANGE UP (ref 150–400)
PO2 BLDA: 127 MMHG — HIGH (ref 83–108)
PO2 BLDA: 290 MMHG — HIGH (ref 83–108)
POTASSIUM SERPL-MCNC: 4 MMOL/L — SIGNIFICANT CHANGE UP (ref 3.5–5.3)
POTASSIUM SERPL-MCNC: 4.4 MMOL/L — SIGNIFICANT CHANGE UP (ref 3.5–5.3)
POTASSIUM SERPL-SCNC: 4 MMOL/L — SIGNIFICANT CHANGE UP (ref 3.5–5.3)
POTASSIUM SERPL-SCNC: 4.4 MMOL/L — SIGNIFICANT CHANGE UP (ref 3.5–5.3)
PROT SERPL-MCNC: 6.9 G/DL — SIGNIFICANT CHANGE UP (ref 6–8.3)
PROT SERPL-MCNC: 7 G/DL — SIGNIFICANT CHANGE UP (ref 6–8.3)
PROT UR-MCNC: 100 MG/DL
PROTHROM AB SERPL-ACNC: 16.2 SEC — HIGH (ref 10.5–13.4)
PROTHROM AB SERPL-ACNC: 16.4 SEC — HIGH (ref 10.5–13.4)
PROTHROM AB SERPL-ACNC: 17.2 SEC — HIGH (ref 10.5–13.4)
RBC # BLD: 2.63 M/UL — LOW (ref 4.2–5.8)
RBC # BLD: 2.8 M/UL — LOW (ref 4.2–5.8)
RBC # BLD: 2.85 M/UL — LOW (ref 4.2–5.8)
RBC # FLD: 18.4 % — HIGH (ref 10.3–14.5)
RBC # FLD: 19.1 % — HIGH (ref 10.3–14.5)
RBC # FLD: 19.4 % — HIGH (ref 10.3–14.5)
RBC CASTS # UR COMP ASSIST: < 5 /HPF — SIGNIFICANT CHANGE UP
RH IG SCN BLD-IMP: POSITIVE — SIGNIFICANT CHANGE UP
S AUREUS DNA NOSE QL NAA+PROBE: NEGATIVE — SIGNIFICANT CHANGE UP
SAO2 % BLDA: 99.5 % — HIGH (ref 94–98)
SAO2 % BLDA: 99.5 % — HIGH (ref 94–98)
SODIUM SERPL-SCNC: 137 MMOL/L — SIGNIFICANT CHANGE UP (ref 135–145)
SODIUM SERPL-SCNC: 138 MMOL/L — SIGNIFICANT CHANGE UP (ref 135–145)
SP GR SPEC: 1.01 — SIGNIFICANT CHANGE UP (ref 1–1.03)
TROPONIN T SERPL-MCNC: 0.01 NG/ML — SIGNIFICANT CHANGE UP (ref 0–0.01)
UROBILINOGEN FLD QL: 1 E.U./DL — SIGNIFICANT CHANGE UP
WBC # BLD: 14.33 K/UL — HIGH (ref 3.8–10.5)
WBC # BLD: 14.97 K/UL — HIGH (ref 3.8–10.5)
WBC # BLD: 20.36 K/UL — HIGH (ref 3.8–10.5)
WBC # FLD AUTO: 14.33 K/UL — HIGH (ref 3.8–10.5)
WBC # FLD AUTO: 14.97 K/UL — HIGH (ref 3.8–10.5)
WBC # FLD AUTO: 20.36 K/UL — HIGH (ref 3.8–10.5)
WBC UR QL: ABNORMAL /HPF

## 2022-03-14 PROCEDURE — 36600 WITHDRAWAL OF ARTERIAL BLOOD: CPT

## 2022-03-14 PROCEDURE — 36000 PLACE NEEDLE IN VEIN: CPT

## 2022-03-14 PROCEDURE — 76937 US GUIDE VASCULAR ACCESS: CPT | Mod: 26

## 2022-03-14 PROCEDURE — 99233 SBSQ HOSP IP/OBS HIGH 50: CPT

## 2022-03-14 PROCEDURE — 93010 ELECTROCARDIOGRAM REPORT: CPT

## 2022-03-14 PROCEDURE — 99291 CRITICAL CARE FIRST HOUR: CPT

## 2022-03-14 PROCEDURE — 95720 EEG PHY/QHP EA INCR W/VEEG: CPT

## 2022-03-14 PROCEDURE — 36248 INS CATH ABD/L-EXT ART ADDL: CPT

## 2022-03-14 PROCEDURE — 71045 X-RAY EXAM CHEST 1 VIEW: CPT | Mod: 26

## 2022-03-14 PROCEDURE — 36247 INS CATH ABD/L-EXT ART 3RD: CPT

## 2022-03-14 PROCEDURE — 37243 VASC EMBOLIZE/OCCLUDE ORGAN: CPT

## 2022-03-14 PROCEDURE — 36245 INS CATH ABD/L-EXT ART 1ST: CPT | Mod: 59

## 2022-03-14 PROCEDURE — 76380 CAT SCAN FOLLOW-UP STUDY: CPT | Mod: 26,59

## 2022-03-14 RX ORDER — CEFTRIAXONE 500 MG/1
1000 INJECTION, POWDER, FOR SOLUTION INTRAMUSCULAR; INTRAVENOUS EVERY 24 HOURS
Refills: 0 | Status: DISCONTINUED | OUTPATIENT
Start: 2022-03-14 | End: 2022-03-14

## 2022-03-14 RX ORDER — MAGNESIUM SULFATE 500 MG/ML
2 VIAL (ML) INJECTION ONCE
Refills: 0 | Status: COMPLETED | OUTPATIENT
Start: 2022-03-14 | End: 2022-03-14

## 2022-03-14 RX ORDER — LACTULOSE 10 G/15ML
30 SOLUTION ORAL THREE TIMES A DAY
Refills: 0 | Status: DISCONTINUED | OUTPATIENT
Start: 2022-03-14 | End: 2022-03-18

## 2022-03-14 RX ORDER — MIDAZOLAM HYDROCHLORIDE 1 MG/ML
2 INJECTION, SOLUTION INTRAMUSCULAR; INTRAVENOUS ONCE
Refills: 0 | Status: DISCONTINUED | OUTPATIENT
Start: 2022-03-14 | End: 2022-03-14

## 2022-03-14 RX ORDER — CEFTRIAXONE 500 MG/1
2000 INJECTION, POWDER, FOR SOLUTION INTRAMUSCULAR; INTRAVENOUS EVERY 24 HOURS
Refills: 0 | Status: DISCONTINUED | OUTPATIENT
Start: 2022-03-14 | End: 2022-03-15

## 2022-03-14 RX ORDER — PANTOPRAZOLE SODIUM 20 MG/1
40 TABLET, DELAYED RELEASE ORAL EVERY 12 HOURS
Refills: 0 | Status: DISCONTINUED | OUTPATIENT
Start: 2022-03-14 | End: 2022-03-16

## 2022-03-14 RX ORDER — NOREPINEPHRINE BITARTRATE/D5W 8 MG/250ML
0.05 PLASTIC BAG, INJECTION (ML) INTRAVENOUS
Qty: 16 | Refills: 0 | Status: DISCONTINUED | OUTPATIENT
Start: 2022-03-14 | End: 2022-03-14

## 2022-03-14 RX ORDER — ACETAMINOPHEN 500 MG
1000 TABLET ORAL ONCE
Refills: 0 | Status: COMPLETED | OUTPATIENT
Start: 2022-03-14 | End: 2022-03-14

## 2022-03-14 RX ORDER — PHENYLEPHRINE HYDROCHLORIDE 10 MG/ML
0.1 INJECTION INTRAVENOUS
Qty: 40 | Refills: 0 | Status: DISCONTINUED | OUTPATIENT
Start: 2022-03-14 | End: 2022-03-16

## 2022-03-14 RX ORDER — NOREPINEPHRINE BITARTRATE/D5W 8 MG/250ML
0.05 PLASTIC BAG, INJECTION (ML) INTRAVENOUS
Qty: 8 | Refills: 0 | Status: DISCONTINUED | OUTPATIENT
Start: 2022-03-14 | End: 2022-03-14

## 2022-03-14 RX ORDER — VANCOMYCIN HCL 1 G
1000 VIAL (EA) INTRAVENOUS EVERY 12 HOURS
Refills: 0 | Status: DISCONTINUED | OUTPATIENT
Start: 2022-03-14 | End: 2022-03-14

## 2022-03-14 RX ORDER — OXYMETAZOLINE HYDROCHLORIDE 0.5 MG/ML
2 SPRAY NASAL
Refills: 0 | Status: DISCONTINUED | OUTPATIENT
Start: 2022-03-14 | End: 2022-03-17

## 2022-03-14 RX ORDER — DOXORUBICIN HYDROCHLORIDE 2 MG/ML
50 INJECTION, SOLUTION INTRAVENOUS ONCE
Refills: 0 | Status: DISCONTINUED | OUTPATIENT
Start: 2022-03-14 | End: 2022-03-25

## 2022-03-14 RX ORDER — ACETAMINOPHEN 500 MG
1000 TABLET ORAL ONCE
Refills: 0 | Status: DISCONTINUED | OUTPATIENT
Start: 2022-03-14 | End: 2022-03-14

## 2022-03-14 RX ORDER — VANCOMYCIN HCL 1 G
1000 VIAL (EA) INTRAVENOUS EVERY 12 HOURS
Refills: 0 | Status: DISCONTINUED | OUTPATIENT
Start: 2022-03-15 | End: 2022-03-15

## 2022-03-14 RX ORDER — ACETAMINOPHEN 500 MG
650 TABLET ORAL EVERY 6 HOURS
Refills: 0 | Status: DISCONTINUED | OUTPATIENT
Start: 2022-03-14 | End: 2022-03-14

## 2022-03-14 RX ORDER — DEXMEDETOMIDINE HYDROCHLORIDE IN 0.9% SODIUM CHLORIDE 4 UG/ML
0.2 INJECTION INTRAVENOUS
Qty: 400 | Refills: 0 | Status: DISCONTINUED | OUTPATIENT
Start: 2022-03-14 | End: 2022-03-15

## 2022-03-14 RX ORDER — CHLORHEXIDINE GLUCONATE 213 G/1000ML
15 SOLUTION TOPICAL EVERY 12 HOURS
Refills: 0 | Status: DISCONTINUED | OUTPATIENT
Start: 2022-03-14 | End: 2022-03-17

## 2022-03-14 RX ADMIN — Medication 1 PATCH: at 19:27

## 2022-03-14 RX ADMIN — MUPIROCIN 1 APPLICATION(S): 20 OINTMENT TOPICAL at 06:00

## 2022-03-14 RX ADMIN — PHENYLEPHRINE HYDROCHLORIDE 2.55 MICROGRAM(S)/KG/MIN: 10 INJECTION INTRAVENOUS at 16:57

## 2022-03-14 RX ADMIN — DEXMEDETOMIDINE HYDROCHLORIDE IN 0.9% SODIUM CHLORIDE 3.4 MICROGRAM(S)/KG/HR: 4 INJECTION INTRAVENOUS at 14:33

## 2022-03-14 RX ADMIN — ATORVASTATIN CALCIUM 80 MILLIGRAM(S): 80 TABLET, FILM COATED ORAL at 21:31

## 2022-03-14 RX ADMIN — Medication 25 GRAM(S): at 15:21

## 2022-03-14 RX ADMIN — Medication 1 PATCH: at 06:47

## 2022-03-14 RX ADMIN — MUPIROCIN 1 APPLICATION(S): 20 OINTMENT TOPICAL at 21:32

## 2022-03-14 RX ADMIN — TAMSULOSIN HYDROCHLORIDE 0.4 MILLIGRAM(S): 0.4 CAPSULE ORAL at 21:31

## 2022-03-14 RX ADMIN — Medication 1 DROP(S): at 06:01

## 2022-03-14 RX ADMIN — OXYMETAZOLINE HYDROCHLORIDE 2 SPRAY(S): 0.5 SPRAY NASAL at 06:01

## 2022-03-14 RX ADMIN — CEFTRIAXONE 100 MILLIGRAM(S): 500 INJECTION, POWDER, FOR SOLUTION INTRAMUSCULAR; INTRAVENOUS at 15:47

## 2022-03-14 RX ADMIN — OXYMETAZOLINE HYDROCHLORIDE 2 SPRAY(S): 0.5 SPRAY NASAL at 21:31

## 2022-03-14 RX ADMIN — PANTOPRAZOLE SODIUM 40 MILLIGRAM(S): 20 TABLET, DELAYED RELEASE ORAL at 19:33

## 2022-03-14 RX ADMIN — LACTULOSE 30 GRAM(S): 10 SOLUTION ORAL at 21:30

## 2022-03-14 RX ADMIN — LACTULOSE 30 GRAM(S): 10 SOLUTION ORAL at 06:00

## 2022-03-14 RX ADMIN — Medication 400 MILLIGRAM(S): at 15:32

## 2022-03-14 RX ADMIN — Medication 325 MILLIGRAM(S): at 06:01

## 2022-03-14 RX ADMIN — Medication 1 DROP(S): at 21:31

## 2022-03-14 RX ADMIN — Medication 6.38 MICROGRAM(S)/KG/MIN: at 16:21

## 2022-03-14 RX ADMIN — Medication 400 MILLIGRAM(S): at 22:59

## 2022-03-14 RX ADMIN — Medication 250 MILLIGRAM(S): at 15:16

## 2022-03-14 RX ADMIN — PANTOPRAZOLE SODIUM 40 MILLIGRAM(S): 20 TABLET, DELAYED RELEASE ORAL at 06:47

## 2022-03-14 RX ADMIN — Medication 1000 MILLIGRAM(S): at 23:24

## 2022-03-14 RX ADMIN — Medication 1000 MILLIGRAM(S): at 15:47

## 2022-03-14 NOTE — EEG REPORT - NS EEG TEXT BOX
[facility] Department of Neurology  Inpatient Continuous video-Electroencephalography Report    Acquisition Details:  Electroencephalography was acquired using a minimum of 21 channels on an [employer] Neu[employer] system v 8.5.1 with electrode placement according to the standard International 10-20 system following ACNS (American Clinical Neurophysiology Society) guidelines for Long-Term Video EEG monitoring.  Anterior temporal T1 and T2 electrodes were utilized whenever possible.   The [employer] automated spike & seizure detections were all reviewed in detail, in addition to extensive portions of raw EEG.    Daily Updates (from 7:00 AM until 7:00 AM):  Day 2: 3/13/2022 – 3/14/2022  Pertinent medications: none  Awake Background:  continuous, with predominantly theta > alpha frequencies.  Symmetry:  No persistent asymmetries of voltage or frequency.  Organization: Rudimentary anterior-posterior gradient.  Posterior Dominant Rhythm: 8 Hz fragmented and poorly regulated  Voltage:  Normal (20+ uV)  Variability: Yes. 		Reactivity: Yes.  N2 sleep: Symmetric, synchronous spindles and K complexes.  Spontaneous Activity:  No epileptiform discharges.  Periodic/rhythmic activity:  None.  Events:  No electrographic seizures or significant clinical events.  Provocations:  Hyperventilation and Photic stimulation: was not performed.  EKG: tachycardia    Daily Summary:    Mild-to-moderate generalized slowing suggestive of a similar degree of diffuse or multifocal cerebral dysfunction.   No epileptiform discharges or electrographic seizures were recorded.     Edison Lal DO  CNP Fellow    Leland Garza MD  Attending Neurologist, [facility]

## 2022-03-14 NOTE — PROGRESS NOTE ADULT - SUBJECTIVE AND OBJECTIVE BOX
ACCEPTANCE NOTE FROM F TO ICU    HOSPITAL COURSE  67 YO M, current smoker (1ppd x 50 years), current every day ETOH abuse (1 pint vodka and several beers daily x 50 years), with PMHx of HLD, HTN (not on any medications), CABG (LIMA to LAD, SVG to PDA in 2017), AS (s/p AVR in 2017) with recently discovered restenosis, presented to [facility] ED on 2/24/22 with chest pain and episode of LOC, initially admitted to cardiology for NSTEMI with LHC showing nonobstructive disease. TTE was reperformed and patient was  found to have restenosis of bioprosthetic valve for which aortic root surgery was planned.  Patient was transferred to CT surgery given complex anatomy with plan for TAVR and aortic root surgery.  During this time on CT surgery service he was found to have liver cirrhosis and a 2.4cmx2.4 cm mass on his liver concerning for HCC.  Decision was made not to proceed with surgery given poor functional status, cirrhosis and non compliance with medications.  The patient had worsening mental status on CT surgery service and med consult was called for hepatic encephalopathy.  The patient had an elevated ammonia level (123) however did not display asterixes  He was A&02-3 when transferred to medicine.  Patient was started on rifaxamin and lactulose with 2-3 bowel movements per day, the patient remained orientated A&O2-3 with no asterixis Although the patient is awake and alert his mentation is somnolent with slow speech.  He has been followed GI  for liver findings.  He has not been infected until this time.  Patient received intermittent blood transfusion, however with no leslie blood loss aside from minor nose bleeds. Hemolysis labs were positive and likely caused by prosthetic aortic valve. Hematology and surgical oncology was consulted as imaging of abdomen confirmed HCC.  Patient was not a surgical candidate and IR was consulted who planned for embolization.  Patient completed a course of high dose thiamine given alcohol history without improvement in mental status. MRI brain obtained showing mild chronic microvascular ischemic disease and brain parenchymal  volume loss, advanced for patient's age likely 2/2 alcohol abuse.  Neurology was consulted and  VEEG showing moderate diffuse nonspecific cerebral dysfunction. On 3/14 patient was seen with his normal baseline mental status and neuro exam prior to embolization procedure for HCC.  However patient was found to be tachycardic to 120s with new elevated WBC count to 14.  UA was positive, CXR unchanged, bladder scan w/o evidence of retention. Started on ceftriaxone for possible UTI also for concerns for aspiration pneumonia due to persistent somnolence.  Patient taken to IR for TACE procedure. Following Procedure patient became hypotensive to 80s/50s tachycardic to 120s, was minimally arousable to sternal rub and gurgling with c/f airway protection. ICU consult called. Patient's wife called requesting ALL life saving measures be performed. Pt intubated in the nuclear medicine suite and stepped up to MICU.    OVERNIGHT EVENTS: mild epistaxis that self resolved    SUBJECTIVE / INTERVAL HPI: Patient seen and examined at bedside. Unable to obtain, patient intubated and sedated.     SUBJECTIVE: Patient seen and examined at bedside.     OBJECTIVE:    VITAL SIGNS:  ICU Vital Signs Last 24 Hrs  T(C): 36.9 (14 Mar 2022 05:23), Max: 37.8 (14 Mar 2022 00:29)  T(F): 98.5 (14 Mar 2022 05:23), Max: 100.1 (14 Mar 2022 00:29)  HR: 123 (14 Mar 2022 05:23) (104 - 125)  BP: 119/63 (14 Mar 2022 05:23) (119/63 - 143/82)  BP(mean): --  ABP: --  ABP(mean): --  RR: 19 (14 Mar 2022 05:23) (16 - 19)  SpO2: 97% (14 Mar 2022 05:23) (95% - 98%)        CAPILLARY BLOOD GLUCOSE          PHYSICAL EXAM:    Constitutional: intubated and sedated  HEENT: PERRLA, EOMI, Normal Hearing, MMM  Neck: No LAD, No JVD  Back: Normal spine flexure, No CVA tenderness  Respiratory: CTAB   Cardiovascular: S1 and S2, RRR, no M/G/R  Gastrointestinal: : soft, NT, distended, tympanic  Extremities: No peripheral edema  Vascular: 2+ peripheral pulses  Neurological: A/O x 3, no focal deficits  Psychiatric: Normal mood, normal affect  Musculoskeletal: 5/5 strength b/l upper and lower extremities  Skin: No rashes    MEDICATIONS:  MEDICATIONS  (STANDING):  artificial tears (preservative free) Ophthalmic Solution 1 Drop(s) Both EYES two times a day  aspirin enteric coated 81 milliGRAM(s) Oral daily  atorvastatin 80 milliGRAM(s) Oral at bedtime  cefTRIAXone   IVPB 1000 milliGRAM(s) IV Intermittent every 24 hours  cyanocobalamin 1000 MICROGram(s) Oral daily  DOXOrubicin INTRA-ARTERIAL (eMAR) 50 milliGRAM(s) IntraArterial once  ferrous    sulfate 325 milliGRAM(s) Oral <User Schedule>  folic acid 1 milliGRAM(s) Oral daily  lactulose Syrup 30 Gram(s) Oral three times a day  multivitamin 1 Tablet(s) Oral daily  mupirocin 2% Nasal 1 Application(s) Both Nostrils two times a day  nicotine - 21 mG/24Hr(s) Patch 1 patch Transdermal daily  pantoprazole    Tablet 40 milliGRAM(s) Oral daily  rifAXIMin 550 milliGRAM(s) Oral two times a day  tamsulosin 0.4 milliGRAM(s) Oral at bedtime    MEDICATIONS  (PRN):  oxymetazoline 0.05% Nasal Spray 2 Spray(s) Both Nostrils two times a day PRN nose bleeding  sodium chloride 0.65% Nasal 1 Spray(s) Both Nostrils four times a day PRN Nasal Congestion      ALLERGIES:  Allergies    No Known Allergies    Intolerances        LABS:                        8.1    14.33 )-----------( 153      ( 14 Mar 2022 07:17 )             25.9     03-14    138  |  107  |  17  ----------------------------<  120<H>  4.0   |  19<L>  |  0.73    Ca    8.5      14 Mar 2022 07:17  Phos  3.0     03-14  Mg     1.9     03-14    TPro  6.9  /  Alb  2.7<L>  /  TBili  1.0  /  DBili  x   /  AST  57<H>  /  ALT  30  /  AlkPhos  87  03-14    PT/INR - ( 14 Mar 2022 07:17 )   PT: 16.4 sec;   INR: 1.37          PTT - ( 14 Mar 2022 07:17 )  PTT:42.8 sec  Urinalysis Basic - ( 14 Mar 2022 08:47 )    Color: x / Appearance: x / SG: x / pH: x  Gluc: x / Ketone: x  / Bili: x / Urobili: x   Blood: x / Protein: x / Nitrite: x   Leuk Esterase: x / RBC: < 5 /HPF / WBC Many /HPF   Sq Epi: x / Non Sq Epi: 0-5 /HPF / Bacteria: Present /HPF        RADIOLOGY & ADDITIONAL TESTS: Reviewed. ACCEPTANCE NOTE FROM F TO ICU    HOSPITAL COURSE  65 YO M, current smoker (1ppd x 50 years), current every day ETOH abuse (1 pint vodka and several beers daily x 50 years), with PMHx of HLD, HTN (not on any medications), CABG (LIMA to LAD, SVG to PDA in 2017), AS (s/p AVR in 2017) with recently discovered restenosis, presented to [facility] ED on 2/24/22 with chest pain and episode of LOC, initially admitted to cardiology for NSTEMI with LHC showing nonobstructive disease. TTE was reperformed and patient was  found to have restenosis of bioprosthetic valve for which aortic root surgery was planned.  Patient was transferred to CT surgery given complex anatomy with plan for TAVR and aortic root surgery.  During this time on CT surgery service he was found to have liver cirrhosis and a 2.4cmx2.4 cm mass on his liver concerning for HCC.  Decision was made not to proceed with surgery given poor functional status, cirrhosis and non compliance with medications.  The patient had worsening mental status on CT surgery service and med consult was called for hepatic encephalopathy.  The patient had an elevated ammonia level (123) however did not display asterixes  He was A&02-3 when transferred to medicine.  Patient was started on rifaxamin and lactulose with 2-3 bowel movements per day, the patient remained orientated A&O2-3 with no asterixis Although the patient is awake and alert his mentation is somnolent with slow speech.  He has been followed GI  for liver findings.  He has not been infected until this time.  Patient received intermittent blood transfusion, however with no leslie blood loss aside from minor nose bleeds. Hemolysis labs were positive and likely caused by prosthetic aortic valve. Hematology and surgical oncology was consulted as imaging of abdomen confirmed HCC.  Patient was not a surgical candidate and IR was consulted who planned for embolization.  Patient completed a course of high dose thiamine given alcohol history without improvement in mental status. MRI brain obtained showing mild chronic microvascular ischemic disease and brain parenchymal  volume loss, advanced for patient's age likely 2/2 alcohol abuse.  Neurology was consulted and  VEEG showing moderate diffuse nonspecific cerebral dysfunction. On 3/14 patient was seen with his normal baseline mental status and neuro exam prior to embolization procedure for HCC.  However patient was found to be tachycardic to 120s with new elevated WBC count to 14.  UA was positive, CXR unchanged, bladder scan w/o evidence of retention. Started on ceftriaxone for possible UTI also for concerns for aspiration pneumonia due to persistent somnolence.  Patient taken to IR for TACE procedure. Following Procedure patient became hypotensive to 80s/50s tachycardic to 120s, was minimally arousable to sternal rub and gurgling with c/f airway protection. ICU consult called. Patient's wife called requesting ALL life saving measures be performed. Pt intubated in the nuclear medicine suite and stepped up to MICU.    OVERNIGHT EVENTS: mild epistaxis that self resolved    SUBJECTIVE: Patient seen and examined at bedside. Unable to obtain, patient intubated not sedated but unable to follow commands.     OBJECTIVE:    VITAL SIGNS:  ICU Vital Signs Last 24 Hrs  T(C): 36.9 (14 Mar 2022 05:23), Max: 37.8 (14 Mar 2022 00:29)  T(F): 98.5 (14 Mar 2022 05:23), Max: 100.1 (14 Mar 2022 00:29)  HR: 123 (14 Mar 2022 05:23) (104 - 125)  BP: 119/63 (14 Mar 2022 05:23) (119/63 - 143/82)  BP(mean): --  ABP: --  ABP(mean): --  RR: 19 (14 Mar 2022 05:23) (16 - 19)  SpO2: 97% (14 Mar 2022 05:23) (95% - 98%)        CAPILLARY BLOOD GLUCOSE          PHYSICAL EXAM:    Constitutional: intubated and sedated  HEENT: PERRLA but sluggish, dry epistaxis, dry blood in oral cavity, dry MM  Neck: No LAD, No JVD  Respiratory: mechanical breath sounds   Cardiovascular: tachycardic, regular rate, Loud systolic murmur best heard at right 2  Gastrointestinal: : soft, NT, distended, tympanic  Extremities: WWP, No peripheral edema,   Vascular: 2+ peripheral pulses  Neurological: intubated, unable to follow commands, no focal deficit   Psychiatric: unable to assess  Skin: No rashes    MEDICATIONS:  MEDICATIONS  (STANDING):  artificial tears (preservative free) Ophthalmic Solution 1 Drop(s) Both EYES two times a day  aspirin enteric coated 81 milliGRAM(s) Oral daily  atorvastatin 80 milliGRAM(s) Oral at bedtime  cefTRIAXone   IVPB 1000 milliGRAM(s) IV Intermittent every 24 hours  cyanocobalamin 1000 MICROGram(s) Oral daily  DOXOrubicin INTRA-ARTERIAL (eMAR) 50 milliGRAM(s) IntraArterial once  ferrous    sulfate 325 milliGRAM(s) Oral <User Schedule>  folic acid 1 milliGRAM(s) Oral daily  lactulose Syrup 30 Gram(s) Oral three times a day  multivitamin 1 Tablet(s) Oral daily  mupirocin 2% Nasal 1 Application(s) Both Nostrils two times a day  nicotine - 21 mG/24Hr(s) Patch 1 patch Transdermal daily  pantoprazole    Tablet 40 milliGRAM(s) Oral daily  rifAXIMin 550 milliGRAM(s) Oral two times a day  tamsulosin 0.4 milliGRAM(s) Oral at bedtime    MEDICATIONS  (PRN):  oxymetazoline 0.05% Nasal Spray 2 Spray(s) Both Nostrils two times a day PRN nose bleeding  sodium chloride 0.65% Nasal 1 Spray(s) Both Nostrils four times a day PRN Nasal Congestion      ALLERGIES:  Allergies    No Known Allergies    Intolerances        LABS:                        8.1    14.33 )-----------( 153      ( 14 Mar 2022 07:17 )             25.9     03-14    138  |  107  |  17  ----------------------------<  120<H>  4.0   |  19<L>  |  0.73    Ca    8.5      14 Mar 2022 07:17  Phos  3.0     03-14  Mg     1.9     03-14    TPro  6.9  /  Alb  2.7<L>  /  TBili  1.0  /  DBili  x   /  AST  57<H>  /  ALT  30  /  AlkPhos  87  03-14    PT/INR - ( 14 Mar 2022 07:17 )   PT: 16.4 sec;   INR: 1.37          PTT - ( 14 Mar 2022 07:17 )  PTT:42.8 sec  Urinalysis Basic - ( 14 Mar 2022 08:47 )    Color: x / Appearance: x / SG: x / pH: x  Gluc: x / Ketone: x  / Bili: x / Urobili: x   Blood: x / Protein: x / Nitrite: x   Leuk Esterase: x / RBC: < 5 /HPF / WBC Many /HPF   Sq Epi: x / Non Sq Epi: 0-5 /HPF / Bacteria: Present /HPF        RADIOLOGY & ADDITIONAL TESTS: Reviewed. ACCEPTANCE NOTE FROM F TO ICU    HOSPITAL COURSE  67 YO M, current smoker (1ppd x 50 years), current every day ETOH abuse (1 pint vodka and several beers daily x 50 years), with PMHx of HLD, HTN (not on any medications), CABG (LIMA to LAD, SVG to PDA in 2017), AS (s/p AVR in 2017) with recently discovered restenosis, presented to [facility] ED on 2/24/22 with chest pain and episode of LOC, initially admitted to cardiology for NSTEMI with LHC showing nonobstructive disease. TTE was reperformed and patient was  found to have restenosis of bioprosthetic valve for which aortic root surgery was planned.  Patient was transferred to CT surgery given complex anatomy with plan for TAVR and aortic root surgery.  During this time on CT surgery service he was found to have liver cirrhosis and a 2.4cmx2.4 cm mass on his liver concerning for HCC.  Decision was made not to proceed with surgery given poor functional status, cirrhosis and non compliance with medications.  The patient had worsening mental status on CT surgery service and med consult was called for hepatic encephalopathy.  The patient had an elevated ammonia level (123) however did not display asterixes  He was A&02-3 when transferred to medicine.  Patient was started on rifaxamin and lactulose with 2-3 bowel movements per day, the patient remained orientated A&O2-3 with no asterixis Although the patient is awake and alert his mentation is somnolent with slow speech.  He has been followed GI  for liver findings.  He has not been infected until this time.  Patient received intermittent blood transfusion, however with no leslie blood loss aside from minor nose bleeds. Hemolysis labs were positive and likely caused by prosthetic aortic valve. Hematology and surgical oncology was consulted as imaging of abdomen confirmed HCC.  Patient was not a surgical candidate and IR was consulted who planned for embolization.  Patient completed a course of high dose thiamine given alcohol history without improvement in mental status. MRI brain obtained showing mild chronic microvascular ischemic disease and brain parenchymal  volume loss, advanced for patient's age likely 2/2 alcohol abuse.  Neurology was consulted and  VEEG showing moderate diffuse nonspecific cerebral dysfunction. On 3/14 patient was seen with his normal baseline mental status and neuro exam prior to embolization procedure for HCC.  However patient was found to be tachycardic to 120s with new elevated WBC count to 14.  UA was positive, CXR unchanged, bladder scan w/o evidence of retention. Started on ceftriaxone for possible UTI also for concerns for aspiration pneumonia due to persistent somnolence.    Today, 3/14, patient went for planned transarterial chemoembolization by IR for further evaluation for liver lesion but around start of procedure became obtunded with increased secretions and concern that he was not protecting his airway.  ICU consulted for evaluation of urgent vs. emergent intubation. course c/b anemia likely attributed to intermittent epistaxis for which ENT was consulted w/o plans for acute intervention.  Also of note, concern that patient may have aspirated over the weekend.        Patient taken to IR for TACE procedure. Following Procedure patient became hypotensive to 80s/50s tachycardic to 120s, was minimally arousable to sternal rub and gurgling with c/f airway protection. ICU consult called. Patient's wife called requesting ALL life saving measures be performed. Pt intubated in the nuclear medicine suite and stepped up to MICU.    OVERNIGHT EVENTS: mild epistaxis that self resolved    SUBJECTIVE: Patient seen and examined at bedside. Unable to obtain, patient intubated not sedated but unable to follow commands.     OBJECTIVE:    VITAL SIGNS:  ICU Vital Signs Last 24 Hrs  T(C): 36.9 (14 Mar 2022 05:23), Max: 37.8 (14 Mar 2022 00:29)  T(F): 98.5 (14 Mar 2022 05:23), Max: 100.1 (14 Mar 2022 00:29)  HR: 123 (14 Mar 2022 05:23) (104 - 125)  BP: 119/63 (14 Mar 2022 05:23) (119/63 - 143/82)  BP(mean): --  ABP: --  ABP(mean): --  RR: 19 (14 Mar 2022 05:23) (16 - 19)  SpO2: 97% (14 Mar 2022 05:23) (95% - 98%)        CAPILLARY BLOOD GLUCOSE          PHYSICAL EXAM:    Constitutional: intubated and sedated  HEENT: PERRLA but sluggish, dry epistaxis, dry blood in oral cavity, dry MM  Neck: No LAD, No JVD  Respiratory: mechanical breath sounds   Cardiovascular: tachycardic, regular rate, Loud systolic murmur best heard at right 2  Gastrointestinal: : soft, NT, distended, tympanic  Extremities: WWP, No peripheral edema,   Vascular: 2+ peripheral pulses  Neurological: intubated, unable to follow commands, no focal deficit   Psychiatric: unable to assess  Skin: No rashes    MEDICATIONS:  MEDICATIONS  (STANDING):  artificial tears (preservative free) Ophthalmic Solution 1 Drop(s) Both EYES two times a day  aspirin enteric coated 81 milliGRAM(s) Oral daily  atorvastatin 80 milliGRAM(s) Oral at bedtime  cefTRIAXone   IVPB 1000 milliGRAM(s) IV Intermittent every 24 hours  cyanocobalamin 1000 MICROGram(s) Oral daily  DOXOrubicin INTRA-ARTERIAL (eMAR) 50 milliGRAM(s) IntraArterial once  ferrous    sulfate 325 milliGRAM(s) Oral <User Schedule>  folic acid 1 milliGRAM(s) Oral daily  lactulose Syrup 30 Gram(s) Oral three times a day  multivitamin 1 Tablet(s) Oral daily  mupirocin 2% Nasal 1 Application(s) Both Nostrils two times a day  nicotine - 21 mG/24Hr(s) Patch 1 patch Transdermal daily  pantoprazole    Tablet 40 milliGRAM(s) Oral daily  rifAXIMin 550 milliGRAM(s) Oral two times a day  tamsulosin 0.4 milliGRAM(s) Oral at bedtime    MEDICATIONS  (PRN):  oxymetazoline 0.05% Nasal Spray 2 Spray(s) Both Nostrils two times a day PRN nose bleeding  sodium chloride 0.65% Nasal 1 Spray(s) Both Nostrils four times a day PRN Nasal Congestion      ALLERGIES:  Allergies    No Known Allergies    Intolerances        LABS:                        8.1    14.33 )-----------( 153      ( 14 Mar 2022 07:17 )             25.9     03-14    138  |  107  |  17  ----------------------------<  120<H>  4.0   |  19<L>  |  0.73    Ca    8.5      14 Mar 2022 07:17  Phos  3.0     03-14  Mg     1.9     03-14    TPro  6.9  /  Alb  2.7<L>  /  TBili  1.0  /  DBili  x   /  AST  57<H>  /  ALT  30  /  AlkPhos  87  03-14    PT/INR - ( 14 Mar 2022 07:17 )   PT: 16.4 sec;   INR: 1.37          PTT - ( 14 Mar 2022 07:17 )  PTT:42.8 sec  Urinalysis Basic - ( 14 Mar 2022 08:47 )    Color: x / Appearance: x / SG: x / pH: x  Gluc: x / Ketone: x  / Bili: x / Urobili: x   Blood: x / Protein: x / Nitrite: x   Leuk Esterase: x / RBC: < 5 /HPF / WBC Many /HPF   Sq Epi: x / Non Sq Epi: 0-5 /HPF / Bacteria: Present /HPF        RADIOLOGY & ADDITIONAL TESTS: Reviewed. ACCEPTANCE NOTE FROM F TO ICU    HOSPITAL COURSE  67 YO M, current smoker (1ppd x 50 years), current every day ETOH abuse (1 pint vodka and several beers daily x 50 years), with PMHx of HLD, HTN (not on any medications), CABG (LIMA to LAD, SVG to PDA in 2017), AS (s/p AVR in 2017) with recently discovered restenosis, presented to [facility] ED on 2/24/22 with chest pain and episode of LOC, initially admitted to cardiology for NSTEMI with LHC showing nonobstructive disease. TTE was reperformed and patient was  found to have restenosis of bioprosthetic valve for which aortic root surgery was planned.  Patient was transferred to CT surgery given complex anatomy with plan for TAVR and aortic root surgery.  During this time on CT surgery service he was found to have liver cirrhosis and a 2.4cmx2.4 cm mass on his liver concerning for HCC.  Decision was made not to proceed with surgery given poor functional status, cirrhosis and non compliance with medications.  The patient had worsening mental status on CT surgery service and med consult was called for hepatic encephalopathy.  The patient had an elevated ammonia level (123) however did not display asterixes  He was A&02-3 when transferred to medicine.  Patient was started on rifaxamin and lactulose with 2-3 bowel movements per day, the patient remained orientated A&O2-3 with no asterixis Although the patient is awake and alert his mentation is somnolent with slow speech.  He has been followed GI  for liver findings.  He has not been infected until this time.  Patient received intermittent blood transfusion, however with no leslie blood loss aside from minor nose bleeds. Hemolysis labs were positive and likely caused by prosthetic aortic valve. Hematology and surgical oncology was consulted as imaging of abdomen confirmed HCC.  Patient was not a surgical candidate and IR was consulted who planned for embolization.  Patient completed a course of high dose thiamine given alcohol history without improvement in mental status. MRI brain obtained showing mild chronic microvascular ischemic disease and brain parenchymal  volume loss, advanced for patient's age likely 2/2 alcohol abuse.  Neurology was consulted and  VEEG showing moderate diffuse nonspecific cerebral dysfunction. On 3/14 patient was seen with his normal baseline mental status and neuro exam prior to embolization procedure for HCC.  However patient was found to be tachycardic to 120s with new elevated WBC count to 14.  UA was positive, CXR unchanged, bladder scan w/o evidence of retention. Started on ceftriaxone for possible UTI also for concerns for aspiration pneumonia due to persistent somnolence.    Today, 3/14, patient went for planned transarterial chemoembolization by IR for further evaluation for liver lesion but around start of procedure became obtunded with increased secretions and concern that he was not protecting his airway. Following Procedure patient became hypotensive to 80s/50s tachycardic to 120s, was minimally arousable to sternal rub and gurgling with c/f airway protection. ICU consulted for evaluation of urgent vs. emergent intubation. Course c/b anemia likely attributed to intermittent epistaxis for which ENT was consulted w/o plans for acute intervention.  Also of note, concern that patient may have aspirated over the weekend.    OVERNIGHT EVENTS: mild epistaxis that self resolved    SUBJECTIVE: Patient seen and examined at bedside. Unable to obtain, patient intubated not sedated but unable to follow commands.     OBJECTIVE:    VITAL SIGNS:  ICU Vital Signs Last 24 Hrs  T(C): 36.9 (14 Mar 2022 05:23), Max: 37.8 (14 Mar 2022 00:29)  T(F): 98.5 (14 Mar 2022 05:23), Max: 100.1 (14 Mar 2022 00:29)  HR: 123 (14 Mar 2022 05:23) (104 - 125)  BP: 119/63 (14 Mar 2022 05:23) (119/63 - 143/82)  BP(mean): --  ABP: --  ABP(mean): --  RR: 19 (14 Mar 2022 05:23) (16 - 19)  SpO2: 97% (14 Mar 2022 05:23) (95% - 98%)        CAPILLARY BLOOD GLUCOSE          PHYSICAL EXAM:    Constitutional: intubated and sedated  HEENT: PERRLA but sluggish, dry epistaxis, dry blood in oral cavity, dry MM  Neck: No LAD, No JVD  Respiratory: mechanical breath sounds   Cardiovascular: tachycardic, regular rate, Loud systolic murmur best heard at right 2  Gastrointestinal: : soft, NT, distended, tympanic  Extremities: WWP, No peripheral edema,   Vascular: 2+ peripheral pulses  Neurological: intubated, unable to follow commands, no focal deficit   Psychiatric: unable to assess  Skin: No rashes    MEDICATIONS:  MEDICATIONS  (STANDING):  artificial tears (preservative free) Ophthalmic Solution 1 Drop(s) Both EYES two times a day  aspirin enteric coated 81 milliGRAM(s) Oral daily  atorvastatin 80 milliGRAM(s) Oral at bedtime  cefTRIAXone   IVPB 1000 milliGRAM(s) IV Intermittent every 24 hours  cyanocobalamin 1000 MICROGram(s) Oral daily  DOXOrubicin INTRA-ARTERIAL (eMAR) 50 milliGRAM(s) IntraArterial once  ferrous    sulfate 325 milliGRAM(s) Oral <User Schedule>  folic acid 1 milliGRAM(s) Oral daily  lactulose Syrup 30 Gram(s) Oral three times a day  multivitamin 1 Tablet(s) Oral daily  mupirocin 2% Nasal 1 Application(s) Both Nostrils two times a day  nicotine - 21 mG/24Hr(s) Patch 1 patch Transdermal daily  pantoprazole    Tablet 40 milliGRAM(s) Oral daily  rifAXIMin 550 milliGRAM(s) Oral two times a day  tamsulosin 0.4 milliGRAM(s) Oral at bedtime    MEDICATIONS  (PRN):  oxymetazoline 0.05% Nasal Spray 2 Spray(s) Both Nostrils two times a day PRN nose bleeding  sodium chloride 0.65% Nasal 1 Spray(s) Both Nostrils four times a day PRN Nasal Congestion      ALLERGIES:  Allergies    No Known Allergies    Intolerances        LABS:                        8.1    14.33 )-----------( 153      ( 14 Mar 2022 07:17 )             25.9     03-14    138  |  107  |  17  ----------------------------<  120<H>  4.0   |  19<L>  |  0.73    Ca    8.5      14 Mar 2022 07:17  Phos  3.0     03-14  Mg     1.9     03-14    TPro  6.9  /  Alb  2.7<L>  /  TBili  1.0  /  DBili  x   /  AST  57<H>  /  ALT  30  /  AlkPhos  87  03-14    PT/INR - ( 14 Mar 2022 07:17 )   PT: 16.4 sec;   INR: 1.37          PTT - ( 14 Mar 2022 07:17 )  PTT:42.8 sec  Urinalysis Basic - ( 14 Mar 2022 08:47 )    Color: x / Appearance: x / SG: x / pH: x  Gluc: x / Ketone: x  / Bili: x / Urobili: x   Blood: x / Protein: x / Nitrite: x   Leuk Esterase: x / RBC: < 5 /HPF / WBC Many /HPF   Sq Epi: x / Non Sq Epi: 0-5 /HPF / Bacteria: Present /HPF        RADIOLOGY & ADDITIONAL TESTS: Reviewed.

## 2022-03-14 NOTE — PROGRESS NOTE ADULT - ASSESSMENT
65 y/o M, current smoker (1ppd x 50 years), current every day ETOH abuse (1 pint vodka and several beers daily x 50 years), with PMHx of HLD, HTN (not on any medications),  CABG (LIMA to LAD, SVG to PDA in 2017), AS (s/p AVR in 2017), who presented to [facility] ED on 2/24/22 NSTEMI with LHC with nonobstructive disease, then found to have restenosis of bioprosthetic valve for which aortic root surgery was planned. Now w/ newly diagnosed 2.5cm left hepatic mass concerning for HCC found on CT AP. Pending IR transarterial chemicalembolization since he is not a candidate for ablation. Worsen leukocytosis today, hemoglobin stable, mild increase in coagulation factors.     Recommendations:   No acute surgical intervention at this time  Pending IR procedure for TACE 3/14  Rest of care per primary team  Team 1c will continue to follow    Patient discussed with attending and chief resident

## 2022-03-14 NOTE — PROGRESS NOTE ADULT - PROBLEM SELECTOR PLAN 8
.  Complex medical decision making / symptom management in the setting of advanced illness.    Acute decompensation with life-threatening complications. Will continue to follow for ongoing GOC discussion. Emotional support provided, questions answered.    Active Psychosocial Referrals:  [x]Social Work/Case management []PT/OT []Chaplaincy []Hospice  []Patient/Family Support []Holistic RN []Massage Therapy []Ethics  Coping: [] well [] with difficulty [] poor coping [x] unable to assess  Support system: [] strong [] adequate [x] inadequate    For new or uncontrolled symptoms, please call Palliative Care at [phone]. The service is available 24/7 (including nights & weekends) to provide symptom management recommendations over the phone as appropriate

## 2022-03-14 NOTE — PROCEDURE NOTE - NSPROCDETAILS_GEN_ALL_CORE
Seldinger technique location identified, draped/prepped, sterile technique used, needle inserted/introduced/hemostasis with direct pressure, dressing applied/all materials/supplies accounted for at end of procedure

## 2022-03-14 NOTE — PROGRESS NOTE ADULT - PROBLEM SELECTOR PLAN 9
F: as appropriate  E: replenish as appropriate  N: high protein diet  VTE Prophylaxis: heparin TID subq - held for procedure  GI: protonix PO qd  C: Full Code (confirmed on 3/14 with pts wife)  D: MICU

## 2022-03-14 NOTE — PROGRESS NOTE ADULT - SUBJECTIVE AND OBJECTIVE BOX
GASTROENTEROLOGY PROGRESS NOTE  Patient seen and examined at bedside.  ANAM. EEG in place. Plan for TACE today.  Patient AOx2 (person and place)  Hgb 8.1    PERTINENT REVIEW OF SYSTEMS:  CONSTITUTIONAL: No weakness, fevers or chills  HEENT: No visual changes; No vertigo or throat pain   GASTROINTESTINAL: As above.  NEUROLOGICAL: No numbness or weakness  SKIN: No itching, burning, rashes, or lesions     Allergies    No Known Allergies    Intolerances      MEDICATIONS:  MEDICATIONS  (STANDING):  artificial tears (preservative free) Ophthalmic Solution 1 Drop(s) Both EYES two times a day  aspirin enteric coated 81 milliGRAM(s) Oral daily  atorvastatin 80 milliGRAM(s) Oral at bedtime  cyanocobalamin 1000 MICROGram(s) Oral daily  DOXOrubicin INTRA-ARTERIAL (eMAR) 50 milliGRAM(s) IntraArterial once  ferrous    sulfate 325 milliGRAM(s) Oral <User Schedule>  folic acid 1 milliGRAM(s) Oral daily  lactulose Syrup 30 Gram(s) Oral three times a day  multivitamin 1 Tablet(s) Oral daily  mupirocin 2% Nasal 1 Application(s) Both Nostrils two times a day  nicotine - 21 mG/24Hr(s) Patch 1 patch Transdermal daily  pantoprazole    Tablet 40 milliGRAM(s) Oral daily  rifAXIMin 550 milliGRAM(s) Oral two times a day  tamsulosin 0.4 milliGRAM(s) Oral at bedtime    MEDICATIONS  (PRN):  oxymetazoline 0.05% Nasal Spray 2 Spray(s) Both Nostrils two times a day PRN nose bleeding  sodium chloride 0.65% Nasal 1 Spray(s) Both Nostrils four times a day PRN Nasal Congestion    Vital Signs Last 24 Hrs  T(C): 36.9 (14 Mar 2022 05:23), Max: 37.8 (14 Mar 2022 00:29)  T(F): 98.5 (14 Mar 2022 05:23), Max: 100.1 (14 Mar 2022 00:29)  HR: 123 (14 Mar 2022 05:23) (104 - 125)  BP: 119/63 (14 Mar 2022 05:23) (119/63 - 143/82)  BP(mean): --  RR: 19 (14 Mar 2022 05:23) (16 - 19)  SpO2: 97% (14 Mar 2022 05:23) (95% - 98%)    PHYSICAL EXAM:    General: in no acute distress, AOx 2 (person and place)  HEENT: EEG in place, conjunctiva and sclera clear  Gastrointestinal: Soft non-tender non-distended; No rebound or guarding  Skin: Warm and dry. No obvious rash    LABS:                        8.1    14.33 )-----------( 153      ( 14 Mar 2022 07:17 )             25.9     03-14    138  |  107  |  17  ----------------------------<  120<H>  4.0   |  19<L>  |  0.73    Ca    8.5      14 Mar 2022 07:17  Phos  3.0     03-14  Mg     1.9     03-14    TPro  6.9  /  Alb  2.7<L>  /  TBili  1.0  /  DBili  x   /  AST  57<H>  /  ALT  30  /  AlkPhos  87  03-14    PT/INR - ( 14 Mar 2022 07:17 )   PT: 16.4 sec;   INR: 1.37          PTT - ( 14 Mar 2022 07:17 )  PTT:42.8 sec      Urinalysis Basic - ( 14 Mar 2022 08:47 )    Color: x / Appearance: x / SG: x / pH: x  Gluc: x / Ketone: x  / Bili: x / Urobili: x   Blood: x / Protein: x / Nitrite: x   Leuk Esterase: x / RBC: < 5 /HPF / WBC Many /HPF   Sq Epi: x / Non Sq Epi: 0-5 /HPF / Bacteria: Present /HPF                Urinalysis with Rflx Culture (collected 14 Mar 2022 08:37)      RADIOLOGY & ADDITIONAL STUDIES:  Reviewed

## 2022-03-14 NOTE — PROGRESS NOTE ADULT - SUBJECTIVE AND OBJECTIVE BOX
[facility] and Palliative Care  Min Ram, Palliative Care Attending  Contact Info: Call [phone] (HEAL Line) or message on Microsoft Teams (Min Ram)    SUBJECTIVE AND OBJECTIVE:  INTERVAL HPI/OVERNIGHT EVENTS: Interval events noted. Patient required PRNs of  in past 24hrs.    ALLERGIES:  No Known Allergies    MEDICATIONS  (STANDING):  artificial tears (preservative free) Ophthalmic Solution 1 Drop(s) Both EYES two times a day  aspirin enteric coated 81 milliGRAM(s) Oral daily  atorvastatin 80 milliGRAM(s) Oral at bedtime  cefTRIAXone   IVPB 2000 milliGRAM(s) IV Intermittent every 24 hours  cyanocobalamin 1000 MICROGram(s) Oral daily  dexMEDEtomidine Infusion 0.2 MICROgram(s)/kG/Hr (3.4 mL/Hr) IV Continuous <Continuous>  DOXOrubicin INTRA-ARTERIAL (eMAR) 50 milliGRAM(s) IntraArterial once  ferrous    sulfate 325 milliGRAM(s) Oral <User Schedule>  folic acid 1 milliGRAM(s) Oral daily  lactulose Syrup 30 Gram(s) Oral three times a day  multivitamin 1 Tablet(s) Oral daily  mupirocin 2% Nasal 1 Application(s) Both Nostrils two times a day  nicotine - 21 mG/24Hr(s) Patch 1 patch Transdermal daily  norepinephrine Infusion 0.05 MICROgram(s)/kG/Min (6.38 mL/Hr) IV Continuous <Continuous>  pantoprazole    Tablet 40 milliGRAM(s) Oral daily  phenylephrine    Infusion 0.1 MICROgram(s)/kG/Min (2.55 mL/Hr) IV Continuous <Continuous>  rifAXIMin 550 milliGRAM(s) Oral two times a day  tamsulosin 0.4 milliGRAM(s) Oral at bedtime  vancomycin  IVPB 1000 milliGRAM(s) IV Intermittent every 12 hours    MEDICATIONS  (PRN):  oxymetazoline 0.05% Nasal Spray 2 Spray(s) Both Nostrils two times a day PRN nose bleeding  sodium chloride 0.65% Nasal 1 Spray(s) Both Nostrils four times a day PRN Nasal Congestion      Analgesic Use (Scheduled and PRNs) for past 24 hours:  acetaminophen   IVPB ..   400 mL/Hr IV Intermittent (03-14-22 @ 15:32)    dexMEDEtomidine Infusion   3.4 mL/Hr IV Continuous (03-14-22 @ 13:44)      ITEMS UNCHECKED ARE NOT PRESENT  PRESENT SYMPTOMS/REVIEW OF SYSTEMS: []Unable to obtain due to poor mentation   Source if other than patient:  []Family   []Team         Vital Signs Last 24 Hrs  T(C): 36.8 (14 Mar 2022 12:47), Max: 37.8 (14 Mar 2022 00:29)  T(F): 98.2 (14 Mar 2022 12:47), Max: 100.1 (14 Mar 2022 00:29)  HR: 98 (14 Mar 2022 16:50) (98 - 126)  BP: 92/51 (14 Mar 2022 16:50) (72/48 - 143/82)  BP(mean): 65 (14 Mar 2022 16:50) (56 - 109)  RR: 17 (14 Mar 2022 16:50) (16 - 26)  SpO2: 100% (14 Mar 2022 16:50) (95% - 100%)    LABS:                         8.0    14.97 )-----------( 145      ( 14 Mar 2022 14:26 )             26.2   03-14    137  |  104  |  15  ----------------------------<  127<H>  4.4   |  18<L>  |  0.80    Ca    8.2<L>      14 Mar 2022 14:26  Phos  4.0     03-14  Mg     1.7     03-14    TPro  7.0  /  Alb  2.8<L>  /  TBili  0.8  /  DBili  x   /  AST  58<H>  /  ALT  29  /  AlkPhos  96  03-14      RADIOLOGY & ADDITIONAL STUDIES: None new    DISCUSSION OF CASE: Family - to provide updates and emotional support     [facility] and Palliative Care  Min Ram, Palliative Care Attending  Contact Info: Call [phone] (HEAL Line) or message on Microsoft Teams (Min Ram)    SUBJECTIVE AND OBJECTIVE:  INTERVAL HPI/OVERNIGHT EVENTS: Interval events noted. Patient re-intubated after IR procedure. Intubated/sedated, unable to participate in interview. Further collateral and discussion with patient's wife, daughter, and sister. Patient required PRNs of Tylenol x1 in past 24hrs.    ALLERGIES:  No Known Allergies    MEDICATIONS  (STANDING):  artificial tears (preservative free) Ophthalmic Solution 1 Drop(s) Both EYES two times a day  aspirin enteric coated 81 milliGRAM(s) Oral daily  atorvastatin 80 milliGRAM(s) Oral at bedtime  cefTRIAXone   IVPB 2000 milliGRAM(s) IV Intermittent every 24 hours  cyanocobalamin 1000 MICROGram(s) Oral daily  dexMEDEtomidine Infusion 0.2 MICROgram(s)/kG/Hr (3.4 mL/Hr) IV Continuous <Continuous>  DOXOrubicin INTRA-ARTERIAL (eMAR) 50 milliGRAM(s) IntraArterial once  ferrous    sulfate 325 milliGRAM(s) Oral <User Schedule>  folic acid 1 milliGRAM(s) Oral daily  lactulose Syrup 30 Gram(s) Oral three times a day  multivitamin 1 Tablet(s) Oral daily  mupirocin 2% Nasal 1 Application(s) Both Nostrils two times a day  nicotine - 21 mG/24Hr(s) Patch 1 patch Transdermal daily  norepinephrine Infusion 0.05 MICROgram(s)/kG/Min (6.38 mL/Hr) IV Continuous <Continuous>  pantoprazole    Tablet 40 milliGRAM(s) Oral daily  phenylephrine    Infusion 0.1 MICROgram(s)/kG/Min (2.55 mL/Hr) IV Continuous <Continuous>  rifAXIMin 550 milliGRAM(s) Oral two times a day  tamsulosin 0.4 milliGRAM(s) Oral at bedtime  vancomycin  IVPB 1000 milliGRAM(s) IV Intermittent every 12 hours    MEDICATIONS  (PRN):  oxymetazoline 0.05% Nasal Spray 2 Spray(s) Both Nostrils two times a day PRN nose bleeding  sodium chloride 0.65% Nasal 1 Spray(s) Both Nostrils four times a day PRN Nasal Congestion    Analgesic Use (Scheduled and PRNs) for past 24 hours:  acetaminophen   IVPB ..   400 mL/Hr IV Intermittent (03-14-22 @ 15:32)    dexMEDEtomidine Infusion   3.4 mL/Hr IV Continuous (03-14-22 @ 13:44)    ITEMS UNCHECKED ARE NOT PRESENT  PRESENT SYMPTOMS/REVIEW OF SYSTEMS: [x]Unable to obtain due to poor mentation   Source if other than patient:  []Family   []Team     Pain: [] yes [x] no - see PAINAD/CPOT score  QOL impact -   Location -                    Aggravating factors -  Quality -  Radiation -  Timing -  Severity (0-10 scale) -  Minimal acceptable level (0-10 scale) -    PAINAD Score: 0    CPOT Score: 0    Dyspnea:                           []Mild  []Moderate []Severe  Anxiety:                             []Mild []Moderate []Severe  Fatigue:                             []Mild []Moderate []Severe  Nausea:                             []Mild []Moderate []Severe  Loss of appetite:              []Mild []Moderate []Severe  Constipation:                    []Mild []Moderate []Severe    Other Symptoms:  []All other review of systems negative     Palliative Performance Status Version 2: 10%    GENERAL:  []Alert  []Oriented x   [x]Lethargic  []Cachexia  []Unarousable  []Verbal  [x]Non-Verbal  Behavioral:   []Anxiety  [x]Delirium []Agitation []Cooperative  HEENT:  []Normal   []Dry mouth   [x]ET Tube/Trach  []Oral lesions  PULMONARY:   []Clear []Tachypnea  []Audible excessive secretions   [x]Rhonchi        []Right []Left [x]Bilateral  []Crackles        []Right []Left []Bilateral  []Wheezing     []Right []Left []Bilateral  CARDIOVASCULAR:    [x]Regular []Irregular []Tachy  []Dominick []Murmur []Other  GASTROINTESTINAL:  [x]Soft  [x]Distended   [x]+BS  [x]Non tender []Tender  []PEG [x]OGT/ NGT  Last BM:  GENITOURINARY:  []Normal [x] Incontinent   []Oliguria/Anuria   []Sanchez  MUSCULOSKELETAL:   []Normal   [x]Weakness  [x]Bed/Wheelchair bound []Edema  NEUROLOGIC:   []No focal deficits  [x]Cognitive impairment  []Dysphagia []Dysarthria []Paresis [x]Encephalopathic  SKIN:   [x]Normal   []Pressure ulcer(s)  []Rash    CRITICAL CARE:  [x]Shock Present  [x]Septic [ ]Cardiogenic [ ]Neurologic [ ]Hypovolemic  [x] Vasopressors [ ]Inotropes   [x]Respiratory failure present [x]Mechanical Ventilation [ ]Non-invasive ventilatory support [ ]High-Flow  [x]Acute  [ ]Chronic [x]Hypoxic  [ ]Hypercarbic   [ ]Other organ failure    Vital Signs Last 24 Hrs  T(C): 36.8 (14 Mar 2022 12:47), Max: 37.8 (14 Mar 2022 00:29)  T(F): 98.2 (14 Mar 2022 12:47), Max: 100.1 (14 Mar 2022 00:29)  HR: 98 (14 Mar 2022 16:50) (98 - 126)  BP: 92/51 (14 Mar 2022 16:50) (72/48 - 143/82)  BP(mean): 65 (14 Mar 2022 16:50) (56 - 109)  RR: 17 (14 Mar 2022 16:50) (16 - 26)  SpO2: 100% (14 Mar 2022 16:50) (95% - 100%)    LABS:                         8.0    14.97 )-----------( 145      ( 14 Mar 2022 14:26 )             26.2   03-14    137  |  104  |  15  ----------------------------<  127<H>  4.4   |  18<L>  |  0.80    Ca    8.2<L>      14 Mar 2022 14:26  Phos  4.0     03-14  Mg     1.7     03-14  TPro  7.0  /  Alb  2.8<L>  /  TBili  0.8  /  DBili  x   /  AST  58<H>  /  ALT  29  /  AlkPhos  96  03-14    RADIOLOGY & ADDITIONAL STUDIES:  < from: MR Head No Cont (03.12.22 @ 16:41) >  There are scattered T2/FLAIR hyperintense changes in the periventricular   subcortical white matter, nonspecific finding, but most likely   representing sequelae of mild chronic microvascular ischemic disease.  There is cortical sulcal prominence related to underlying brain   parenchymal volume loss, which appears advanced for patient's age.  No acute infarction, intracranial hemorrhage or mass.  There is no evidence of hydrocephalus. There are no extra-axial fluid   collections. The skull base flow voids appear patent.  The patient is status post bilateral lens replacement. There are   scattered mucosal palmitate changes of the ethmoid air cells and mild.   The mastoid air cells are clear. The visualized soft tissues and osseous   structures appear unremarkable.  IMPRESSION: Mild chronic microvascular ischemic disease.  Brain parenchymal volume loss, advanced for patient's age.    DISCUSSION OF CASE: Wife, Daughter, Sister - to provide updates and emotional support

## 2022-03-14 NOTE — CONSULT NOTE ADULT - SUBJECTIVE AND OBJECTIVE BOX
Patient is a 66y old  Male who presents with a chief complaint of Obtundation (14 Mar 2022 12:31)      HPI:  Patient is a 65 y/o M, current smoker (1ppd x 50 years), current every day ETOH abuse (1 pint vodka and several beers daily x 50 years), with PMHx of HLD, HTN (not on any medications),  CABG (LIMA to LAD, SVG to PDA in 2017), AS (s/p AVR in 2017), who presented to [facility] ED on 2/24/22 with complaints of 9/10 chest pain with associated diaphoresis that occurred when walking to the bathroom last night around 1 am. Patient states that during this time, he fell and "blacked out" and hit the back of his head. Patient then went back to sleep. Patient states that he had a similiar chest pain 3 days ago that resolved on his own. Patient's wife states that patient has not seen a physician for over 2 years due to insurance issues.  Patient states last drink was 2 shots of vodka last night. Patient denies current chest pain, palpitations, dizziness, diaphoresis, headache, fever, chills, abdominal pain, back pain, n/v/d, recent travel or sick contacts.     In the ED, VS: 97.2, HR: 85 bpm, BP: 121/75 mmHg, RR: 18 breaths/min, spO2: 100% on RA   Labs significant for: Hgb/Hct: 9.5/31.4, AST: 51, Troponin: 0.02. EKG: NSR @ 85 bpm, ST depressions in II, V4, V5, V6. CXR: No cardiopulmonary edema   Patient admitted to cardiology/telemetry for further management of NSTEMI.    (24 Feb 2022 11:57)    Interval events:  Patient was initially admitted to cardiology for NSTEMI with [facility] with nonobstructive disease, then found to have restenosis of bioprosthetic aortic valve and transferred to CTS service for planned TAVR, then aortic root surgery, but in interim he was found to liver cirrhosis and 2.4cm liver lesion with concern for HCC, for which GI was consulted. Initially he was planned for TAVR, but subsequently decision was made that because of complicated aortic root anatomy he would need open heart surgery. It was decided that he is not a surgical candidate given poor functional status, liver cirrhosis, prior noncompliance with medications.  He was seen by medicine consult on 3/3 with request for transfer to medicine - at that time it was deemed that further work-up should happen outpatient.  He was supposed to be dc'd with follow-up in [facility] hospital, but in interim he was seen by PT on 3/3 and BRENDA was recommended, but he did not have insurance.  Since 3/3 patient reportedly remaining AAOX3 but overall worsening mental status with neuro w/u unrevealing thus far and patient currently being treated for hepatic encephalopathy.  Today, 3/14, patient went for planned transarterial chemoembolization by IR for further evaluation for liver lesion but around start of procedure became obtunded with increased secretions and concern that he was not protecting his airway.  ICU consulted for evaluation of urgent vs. emergent intubation.      No Known Allergies    Intolerances      Home Medications:  folic acid 1 mg oral tablet: 1 tab(s) orally once a day (04 Mar 2022 11:39)  Multiple Vitamins oral tablet: 1 tab(s) orally once a day (04 Mar 2022 11:39)      SOCIAL HX:     Smoking          ETOH/Illicit drugs          Occupation    PAST MEDICAL & SURGICAL HISTORY:  HTN (hypertension)    ETOH abuse    Smoker    Hyperlipidemia    Aortic valve stenosis, unspecified etiology    No significant past surgical history        FAMILY HISTORY:  :    No known cardiovascular or pulmonary family history     ROS:  See HPI     PHYSICAL EXAM    ICU Vital Signs Last 24 Hrs  T(C): 36.9 (14 Mar 2022 05:23), Max: 37.8 (14 Mar 2022 00:29)  T(F): 98.5 (14 Mar 2022 05:23), Max: 100.1 (14 Mar 2022 00:29)  HR: 123 (14 Mar 2022 05:23) (104 - 125)  BP: 119/63 (14 Mar 2022 05:23) (119/63 - 143/82)  BP(mean): --  ABP: --  ABP(mean): --  RR: 19 (14 Mar 2022 05:23) (16 - 19)  SpO2: 97% (14 Mar 2022 05:23) (95% - 98%)      General: WDWN, NAD, resting comfortably in bed  HEENT: NC/AT; anicteric sclera; MMM  Neck: supple  Cardiovascular: +S1/S2; RRR  Respiratory: CTA B/L; no W/R/R  Gastrointestinal: soft, NT/ND; +BSx4  Extremities: WWP; no edema, clubbing or cyanosis  Vascular: 2+ radial, DP/PT pulses B/L  Neurological: AAOx3        LABS:                          8.1    14.33 )-----------( 153      ( 14 Mar 2022 07:17 )             25.9                                               03-14    138  |  107  |  17  ----------------------------<  120<H>  4.0   |  19<L>  |  0.73    Ca    8.5      14 Mar 2022 07:17  Phos  3.0     03-14  Mg     1.9     03-14    TPro  6.9  /  Alb  2.7<L>  /  TBili  1.0  /  DBili  x   /  AST  57<H>  /  ALT  30  /  AlkPhos  87  03-14      PT/INR - ( 14 Mar 2022 07:17 )   PT: 16.4 sec;   INR: 1.37          PTT - ( 14 Mar 2022 07:17 )  PTT:42.8 sec                                       Urinalysis Basic - ( 14 Mar 2022 08:47 )    Color: x / Appearance: x / SG: x / pH: x  Gluc: x / Ketone: x  / Bili: x / Urobili: x   Blood: x / Protein: x / Nitrite: x   Leuk Esterase: x / RBC: < 5 /HPF / WBC Many /HPF   Sq Epi: x / Non Sq Epi: 0-5 /HPF / Bacteria: Present /HPF                                                  LIVER FUNCTIONS - ( 14 Mar 2022 07:17 )  Alb: 2.7 g/dL / Pro: 6.9 g/dL / ALK PHOS: 87 U/L / ALT: 30 U/L / AST: 57 U/L / GGT: x                                                  Urinalysis with Rflx Culture (collected 14 Mar 2022 08:37)                                                                                               CXR:    ECHO:    MEDICATIONS  (STANDING):  artificial tears (preservative free) Ophthalmic Solution 1 Drop(s) Both EYES two times a day  aspirin enteric coated 81 milliGRAM(s) Oral daily  atorvastatin 80 milliGRAM(s) Oral at bedtime  cefTRIAXone   IVPB 1000 milliGRAM(s) IV Intermittent every 24 hours  cyanocobalamin 1000 MICROGram(s) Oral daily  DOXOrubicin INTRA-ARTERIAL (eMAR) 50 milliGRAM(s) IntraArterial once  ferrous    sulfate 325 milliGRAM(s) Oral <User Schedule>  folic acid 1 milliGRAM(s) Oral daily  lactulose Syrup 30 Gram(s) Oral three times a day  multivitamin 1 Tablet(s) Oral daily  mupirocin 2% Nasal 1 Application(s) Both Nostrils two times a day  nicotine - 21 mG/24Hr(s) Patch 1 patch Transdermal daily  pantoprazole    Tablet 40 milliGRAM(s) Oral daily  rifAXIMin 550 milliGRAM(s) Oral two times a day  tamsulosin 0.4 milliGRAM(s) Oral at bedtime    MEDICATIONS  (PRN):  oxymetazoline 0.05% Nasal Spray 2 Spray(s) Both Nostrils two times a day PRN nose bleeding  sodium chloride 0.65% Nasal 1 Spray(s) Both Nostrils four times a day PRN Nasal Congestion           **INCOMPLETE NOTE**    Patient is a 66y old  Male who presents with a chief complaint of Obtundation (14 Mar 2022 12:31)      HPI:  Patient is a 65 y/o M, current smoker (1ppd x 50 years), current every day ETOH abuse (1 pint vodka and several beers daily x 50 years), with PMHx of HLD, HTN (not on any medications),  CABG (LIMA to LAD, SVG to PDA in 2017), AS (s/p AVR in 2017), who presented to [facility] ED on 2/24/22 with complaints of 9/10 chest pain with associated diaphoresis that occurred when walking to the bathroom last night around 1 am. Patient states that during this time, he fell and "blacked out" and hit the back of his head. Patient then went back to sleep. Patient states that he had a similiar chest pain 3 days ago that resolved on his own. Patient's wife states that patient has not seen a physician for over 2 years due to insurance issues.  Patient states last drink was 2 shots of vodka last night. Patient denies current chest pain, palpitations, dizziness, diaphoresis, headache, fever, chills, abdominal pain, back pain, n/v/d, recent travel or sick contacts.     In the ED, VS: 97.2, HR: 85 bpm, BP: 121/75 mmHg, RR: 18 breaths/min, spO2: 100% on RA   Labs significant for: Hgb/Hct: 9.5/31.4, AST: 51, Troponin: 0.02. EKG: NSR @ 85 bpm, ST depressions in II, V4, V5, V6. CXR: No cardiopulmonary edema   Patient admitted to cardiology/telemetry for further management of NSTEMI.    (24 Feb 2022 11:57)    Interval events:  Patient was initially admitted to cardiology for NSTEMI with [facility] with nonobstructive disease, then found to have restenosis of bioprosthetic aortic valve and transferred to CTS service for planned TAVR, then aortic root surgery, but in interim he was found to liver cirrhosis and 2.4cm liver lesion with concern for HCC, for which GI was consulted. Initially he was planned for TAVR, but subsequently decision was made that because of complicated aortic root anatomy he would need open heart surgery. It was decided that he is not a surgical candidate given poor functional status, liver cirrhosis, prior noncompliance with medications.  He was seen by medicine consult on 3/3 with request for transfer to medicine - at that time it was deemed that further work-up should happen outpatient.  He was supposed to be dc'd with follow-up in [facility] hospital, but in interim he was seen by PT on 3/3 and BRENDA was recommended, but he did not have insurance.  Since 3/3 patient reportedly remaining AAOX3 but overall worsening mental status with neuro w/u unrevealing thus far and patient currently being treated for hepatic encephalopathy.  Today, 3/14, patient went for planned transarterial chemoembolization by IR for further evaluation for liver lesion but around start of procedure became obtunded with increased secretions and concern that he was not protecting his airway.  ICU consulted for evaluation of urgent vs. emergent intubation. course c/b anemia likely attributed to intermittent epistaxis for which ENT was consulted w/o plans for acute intervention.    Upon arrival to Nuclear Medicine suite:   P: 110s, SBP: 80s-90s, satting % on RA      No Known Allergies    Intolerances      Home Medications:  folic acid 1 mg oral tablet: 1 tab(s) orally once a day (04 Mar 2022 11:39)  Multiple Vitamins oral tablet: 1 tab(s) orally once a day (04 Mar 2022 11:39)      SOCIAL HX:  as per HPI    PAST MEDICAL & SURGICAL HISTORY:  HTN (hypertension)    ETOH abuse    Smoker    Hyperlipidemia    Aortic valve stenosis, unspecified etiology    No significant past surgical history        FAMILY HISTORY:  :    No known cardiovascular or pulmonary family history     ROS:  See HPI     PHYSICAL EXAM    ICU Vital Signs Last 24 Hrs  T(C): 36.9 (14 Mar 2022 05:23), Max: 37.8 (14 Mar 2022 00:29)  T(F): 98.5 (14 Mar 2022 05:23), Max: 100.1 (14 Mar 2022 00:29)  HR: 123 (14 Mar 2022 05:23) (104 - 125)  BP: 119/63 (14 Mar 2022 05:23) (119/63 - 143/82)  BP(mean): --  ABP: --  ABP(mean): --  RR: 19 (14 Mar 2022 05:23) (16 - 19)  SpO2: 97% (14 Mar 2022 05:23) (95% - 98%)      General: WDWN, NAD, resting comfortably in bed  HEENT: NC/AT; anicteric sclera; MMM  Neck: supple  Cardiovascular: +S1/S2; RRR  Respiratory: CTA B/L; no W/R/R  Gastrointestinal: soft, NT/ND; +BSx4  Extremities: WWP; no edema, clubbing or cyanosis  Vascular: 2+ radial, DP/PT pulses B/L  Neurological: AAOx3        LABS:                          8.1    14.33 )-----------( 153      ( 14 Mar 2022 07:17 )             25.9                                               03-14    138  |  107  |  17  ----------------------------<  120<H>  4.0   |  19<L>  |  0.73    Ca    8.5      14 Mar 2022 07:17  Phos  3.0     03-14  Mg     1.9     03-14    TPro  6.9  /  Alb  2.7<L>  /  TBili  1.0  /  DBili  x   /  AST  57<H>  /  ALT  30  /  AlkPhos  87  03-14      PT/INR - ( 14 Mar 2022 07:17 )   PT: 16.4 sec;   INR: 1.37          PTT - ( 14 Mar 2022 07:17 )  PTT:42.8 sec                                       Urinalysis Basic - ( 14 Mar 2022 08:47 )    Color: x / Appearance: x / SG: x / pH: x  Gluc: x / Ketone: x  / Bili: x / Urobili: x   Blood: x / Protein: x / Nitrite: x   Leuk Esterase: x / RBC: < 5 /HPF / WBC Many /HPF   Sq Epi: x / Non Sq Epi: 0-5 /HPF / Bacteria: Present /HPF                                                  LIVER FUNCTIONS - ( 14 Mar 2022 07:17 )  Alb: 2.7 g/dL / Pro: 6.9 g/dL / ALK PHOS: 87 U/L / ALT: 30 U/L / AST: 57 U/L / GGT: x                                                  Urinalysis with Rflx Culture (collected 14 Mar 2022 08:37)                                                                                               CXR:    ECHO:    MEDICATIONS  (STANDING):  artificial tears (preservative free) Ophthalmic Solution 1 Drop(s) Both EYES two times a day  aspirin enteric coated 81 milliGRAM(s) Oral daily  atorvastatin 80 milliGRAM(s) Oral at bedtime  cefTRIAXone   IVPB 1000 milliGRAM(s) IV Intermittent every 24 hours  cyanocobalamin 1000 MICROGram(s) Oral daily  DOXOrubicin INTRA-ARTERIAL (eMAR) 50 milliGRAM(s) IntraArterial once  ferrous    sulfate 325 milliGRAM(s) Oral <User Schedule>  folic acid 1 milliGRAM(s) Oral daily  lactulose Syrup 30 Gram(s) Oral three times a day  multivitamin 1 Tablet(s) Oral daily  mupirocin 2% Nasal 1 Application(s) Both Nostrils two times a day  nicotine - 21 mG/24Hr(s) Patch 1 patch Transdermal daily  pantoprazole    Tablet 40 milliGRAM(s) Oral daily  rifAXIMin 550 milliGRAM(s) Oral two times a day  tamsulosin 0.4 milliGRAM(s) Oral at bedtime    MEDICATIONS  (PRN):  oxymetazoline 0.05% Nasal Spray 2 Spray(s) Both Nostrils two times a day PRN nose bleeding  sodium chloride 0.65% Nasal 1 Spray(s) Both Nostrils four times a day PRN Nasal Congestion           Patient is a 66y old  Male who presents with a chief complaint of Obtundation (14 Mar 2022 12:31)      HPI:  Patient is a 67 y/o M, current smoker (1ppd x 50 years), current every day ETOH abuse (1 pint vodka and several beers daily x 50 years), with PMHx of HLD, HTN (not on any medications),  CABG (LIMA to LAD, SVG to PDA in 2017), AS (s/p AVR in 2017), who presented to [facility] ED on 2/24/22 with complaints of 9/10 chest pain with associated diaphoresis that occurred when walking to the bathroom last night around 1 am. Patient states that during this time, he fell and "blacked out" and hit the back of his head. Patient then went back to sleep. Patient states that he had a similiar chest pain 3 days ago that resolved on his own. Patient's wife states that patient has not seen a physician for over 2 years due to insurance issues.  Patient states last drink was 2 shots of vodka last night. Patient denies current chest pain, palpitations, dizziness, diaphoresis, headache, fever, chills, abdominal pain, back pain, n/v/d, recent travel or sick contacts.     In the ED, VS: 97.2, HR: 85 bpm, BP: 121/75 mmHg, RR: 18 breaths/min, spO2: 100% on RA   Labs significant for: Hgb/Hct: 9.5/31.4, AST: 51, Troponin: 0.02. EKG: NSR @ 85 bpm, ST depressions in II, V4, V5, V6. CXR: No cardiopulmonary edema   Patient admitted to cardiology/telemetry for further management of NSTEMI.    (24 Feb 2022 11:57)    Interval events:  Patient was initially admitted to cardiology for NSTEMI with [facility] with nonobstructive disease, then found to have restenosis of bioprosthetic aortic valve and transferred to CTS service for planned TAVR, then aortic root surgery, but in interim he was found to liver cirrhosis and 2.4cm liver lesion with concern for HCC, for which GI was consulted. Initially he was planned for TAVR, but subsequently decision was made that because of complicated aortic root anatomy he would need open heart surgery. It was decided that he is not a surgical candidate given poor functional status, liver cirrhosis, prior noncompliance with medications.  He was seen by medicine consult on 3/3 with request for transfer to medicine - at that time it was deemed that further work-up should happen outpatient.  He was supposed to be dc'd with follow-up in [facility] hospital, but in interim he was seen by PT on 3/3 and BRENDA was recommended, but he did not have insurance.  Since 3/3 patient reportedly remaining AAOX3 but overall worsening mental status with neuro w/u unrevealing thus far and patient currently being treated for hepatic encephalopathy.  Today, 3/14, patient went for planned transarterial chemoembolization by IR for further evaluation for liver lesion but around start of procedure became obtunded with increased secretions and concern that he was not protecting his airway.  ICU consulted for evaluation of urgent vs. emergent intubation. course c/b anemia likely attributed to intermittent epistaxis for which ENT was consulted w/o plans for acute intervention.  Also of note, concern that patient may have aspirated over the weekend.    Upon arrival to Nuclear Medicine suite:   P: 110s, SBP: 80s-90s, satting % on RA      No Known Allergies    Intolerances      Home Medications:  folic acid 1 mg oral tablet: 1 tab(s) orally once a day (04 Mar 2022 11:39)  Multiple Vitamins oral tablet: 1 tab(s) orally once a day (04 Mar 2022 11:39)      SOCIAL HX:  as per HPI    PAST MEDICAL & SURGICAL HISTORY:  HTN (hypertension)    ETOH abuse    Smoker    Hyperlipidemia    Aortic valve stenosis, unspecified etiology    No significant past surgical history        FAMILY HISTORY:  :    No known cardiovascular or pulmonary family history     ROS:  See HPI     PHYSICAL EXAM    ICU Vital Signs Last 24 Hrs  T(C): 36.9 (14 Mar 2022 05:23), Max: 37.8 (14 Mar 2022 00:29)  T(F): 98.5 (14 Mar 2022 05:23), Max: 100.1 (14 Mar 2022 00:29)  HR: 123 (14 Mar 2022 05:23) (104 - 125)  BP: 119/63 (14 Mar 2022 05:23) (119/63 - 143/82)  BP(mean): --  ABP: --  ABP(mean): --  RR: 19 (14 Mar 2022 05:23) (16 - 19)  SpO2: 97% (14 Mar 2022 05:23) (95% - 98%)      General: elderly, lethargic, only arousable to sternal rub  HEENT: dry mucous membranes, pupils equal but minimally reactive to light  Neck: supple  Cardiovascular: +S1/S2; tachycardic with systolic murmur over aortic area  Respiratory: diffuse rhonchi  Gastrointestinal: soft, Nontender, significantly distended; +BSx4  Extremities: no edema  Vascular: 2+ radial, DP pulses B/L  Neurological: lethargic, unable to assess AAO status        LABS:                          8.1    14.33 )-----------( 153      ( 14 Mar 2022 07:17 )             25.9                                               03-14    138  |  107  |  17  ----------------------------<  120<H>  4.0   |  19<L>  |  0.73    Ca    8.5      14 Mar 2022 07:17  Phos  3.0     03-14  Mg     1.9     03-14    TPro  6.9  /  Alb  2.7<L>  /  TBili  1.0  /  DBili  x   /  AST  57<H>  /  ALT  30  /  AlkPhos  87  03-14      PT/INR - ( 14 Mar 2022 07:17 )   PT: 16.4 sec;   INR: 1.37          PTT - ( 14 Mar 2022 07:17 )  PTT:42.8 sec                                       Urinalysis Basic - ( 14 Mar 2022 08:47 )    Color: x / Appearance: x / SG: x / pH: x  Gluc: x / Ketone: x  / Bili: x / Urobili: x   Blood: x / Protein: x / Nitrite: x   Leuk Esterase: x / RBC: < 5 /HPF / WBC Many /HPF   Sq Epi: x / Non Sq Epi: 0-5 /HPF / Bacteria: Present /HPF                                                  LIVER FUNCTIONS - ( 14 Mar 2022 07:17 )  Alb: 2.7 g/dL / Pro: 6.9 g/dL / ALK PHOS: 87 U/L / ALT: 30 U/L / AST: 57 U/L / GGT: x                                                  Urinalysis with Rflx Culture (collected 14 Mar 2022 08:37)                                                                                               CXR:    ECHO:    MEDICATIONS  (STANDING):  artificial tears (preservative free) Ophthalmic Solution 1 Drop(s) Both EYES two times a day  aspirin enteric coated 81 milliGRAM(s) Oral daily  atorvastatin 80 milliGRAM(s) Oral at bedtime  cefTRIAXone   IVPB 1000 milliGRAM(s) IV Intermittent every 24 hours  cyanocobalamin 1000 MICROGram(s) Oral daily  DOXOrubicin INTRA-ARTERIAL (eMAR) 50 milliGRAM(s) IntraArterial once  ferrous    sulfate 325 milliGRAM(s) Oral <User Schedule>  folic acid 1 milliGRAM(s) Oral daily  lactulose Syrup 30 Gram(s) Oral three times a day  multivitamin 1 Tablet(s) Oral daily  mupirocin 2% Nasal 1 Application(s) Both Nostrils two times a day  nicotine - 21 mG/24Hr(s) Patch 1 patch Transdermal daily  pantoprazole    Tablet 40 milliGRAM(s) Oral daily  rifAXIMin 550 milliGRAM(s) Oral two times a day  tamsulosin 0.4 milliGRAM(s) Oral at bedtime    MEDICATIONS  (PRN):  oxymetazoline 0.05% Nasal Spray 2 Spray(s) Both Nostrils two times a day PRN nose bleeding  sodium chloride 0.65% Nasal 1 Spray(s) Both Nostrils four times a day PRN Nasal Congestion

## 2022-03-14 NOTE — CONSULT NOTE ADULT - ASSESSMENT
67 YO M, current smoker (1ppd x 50 years), current every day ETOH abuse (1 pint vodka and several beers daily x 50 years), with PMHx of HLD, HTN (not on any medications), CABG (LIMA to LAD, SVG to PDA in 2017), AS (s/p AVR in 2017) with recently discovered restenosis, presented to [facility] ED on 2/24/22 with chest pain and episode of LOC, initially admitted to cardiology for NSTEMI with [facility] with nonobstructive disease, then found to have restenosis of bioprosthetic valve for which aortic root surgery was planned, but now deferred given liver lesion c/f HCC. Patient transferred to medicine for further w/u of HCC and management of AMS, which is still of unclear etiology.  During IR TACE today, 3/14, patient noted to be obtunded with difficulty protecting airway, so emergently intubated and stepped up to ICU.    #Inability to protect airway  Patient responsive only to sternal rub with c/f aspiration and significant, diffuse rhonchi on exam.  Pulse Ox ranging from %.  Decision made to intubate patient for airway protection.  - SBT in AM    #AMS  Unclear etiology, as neurology following with unrevealing imaging and vEEG w/o seizure activity.  Perhaps related to hepatic encephalopathy 2/2 cirrhosis, although cirrhosis appears compensated.  Patient noted to have positive UA today, 3/14 with low grade fever, increasing WBC, and tachycardia.    - c/w txt for hepatic encephalopathy  - c/w CTX 1g q24h for UTI  - f/u UCx  - obtain BCx  - f/u neuro    #Liver lesion  Concerning for HCC.  Underwent TACE today, 3/14.  - f/u results  - f/u GI    Code Status: Full Code  Dispo: ICU 67 YO M, current smoker (1ppd x 50 years), current every day ETOH abuse (1 pint vodka and several beers daily x 50 years), with PMHx of HLD, HTN (not on any medications), CABG (LIMA to LAD, SVG to PDA in 2017), AS (s/p AVR in 2017) with recently discovered restenosis, presented to [facility] ED on 2/24/22 with chest pain and episode of LOC, initially admitted to cardiology for NSTEMI with [facility] with nonobstructive disease, then found to have restenosis of bioprosthetic valve for which aortic root surgery was planned, but now deferred given liver lesion c/f HCC. Patient transferred to medicine for further w/u of HCC and management of AMS, which is still of unclear etiology.  During IR TACE today, 3/14, patient noted to be obtunded with difficulty protecting airway, so emergently intubated and stepped up to ICU.    #Inability to protect airway  Patient responsive only to sternal rub with c/f aspiration and significant, diffuse rhonchi on exam.  Pulse Ox ranging from %.  Decision made to intubate patient for airway protection.  - SBT in AM    #AMS  Unclear etiology, as neurology following with unrevealing imaging and vEEG w/o seizure activity.  Perhaps related to hepatic encephalopathy 2/2 cirrhosis, although cirrhosis appears compensated.  Patient noted to have positive UA today, 3/14 with low grade fever, increasing WBC, and tachycardia.    - c/w txt for hepatic encephalopathy  - c/w CTX 1g q24h for UTI  - f/u UCx  - obtain BCx  - f/u neuro    #Sepsis  Source likely UTI vs. ? aspiration pna, as patient noted to be aspirating over the weekend.  UA positive. 3/4 SIRS with fever, tachycardia, and leukocytosis.  - CTX 2g q24h    #Liver lesion  Concerning for HCC.  Underwent TACE today, 3/14.  - f/u results  - f/u GI    Code Status: Full Code  Dispo: ICU

## 2022-03-14 NOTE — PROGRESS NOTE ADULT - ASSESSMENT
65 YO M, current smoker (1ppd x 50 years), current every day ETOH abuse (1 pint vodka and several beers daily x 50 years), with PMHx of HLD, HTN (not on any medications), CABG (LIMA to LAD, SVG to PDA in 2017), AS (s/p AVR in 2017) with recently discovered restenosis, presented to [facility] ED on 2/24/22 with chest pain and episode of LOC, initially admitted to cardiology for NSTEMI with [facility] with nonobstructive disease, then found to have restenosis of bioprosthetic valve for which aortic root surgery was planned, but now deferred given liver lesion c/f HCC. Now he is transferred to medicine for concern for mild hepatic encephalopathy, however unlikely to have HE and is compensated cirrhotic. Now found to have HCC, pending further workup.      NEURO  #Intubated and sedated      CARDIO        PULM     GI    RENAL        ID    ENDO    HEME/ONC        Prophylactic measure.   F: as appropriate  E: replenish as appropriate  N: high protein diet  VTE PPx: heparin TID subq - hold for procedure____________________  GI: protonix PO qd  C: Full Code  D: [facility] -> MICU 67 YO M, current smoker (1ppd x 50 years), current every day ETOH abuse (1 pint vodka and several beers daily x 50 years), with PMHx of HLD, HTN (not on any medications), CABG (LIMA to LAD, SVG to PDA in 2017), AS (s/p AVR in 2017) with recently discovered restenosis, presented to [facility] ED on 2/24/22 with chest pain and episode of LOC, initially admitted to cardiology for NSTEMI with [facility] with nonobstructive disease, then found to have restenosis of bioprosthetic valve for which aortic root surgery was planned, but now deferred given liver lesion c/f HCC. Now he is transferred to medicine for concern for mild hepatic encephalopathy, however unlikely to have HE and is compensated cirrhotic, found to have HCC. Patient intubated 2/2 hypoxemia and altered mental status, and stepped up to the ICU.    ___________________________________________________    NEURO  #Intubated and sedated  - started on precedex     #AMS (altered mental status)  AAOx2 (person and place). Unlikely hepatic encephalopathy as no asterixis and is currently compensated. Neuro exam nonfocal. Possibly related to chronic alcohol use, Wernicke's or depression i/s/o HCC diagnosis. As per wife, pt is not normally lethargic at home. completed course of high dose thiamine, last librium dose on 03/01. CTH and MRI w/op acute abnormality. Overnight flow sheets showing 100.1 Temp and pt becoming tachycardic to 120s. Pt not complaining of any localizing signs of infection, CXR unremarkable however UA was positive. UCX sent and pt started on CTX 1g Q24hrs (did not receive first dose as taken to TACE procedure). Post TACE procedure c/f protection of airway given increased somnolence potentially 2/2 anesthesia. Pt was intubated for airway protection and transferred to MICU    - f/u VEEG  - monitor BMs - 1 watery BM yesterday  - monitor mental status  - Neuro following  - c/w CTX 1g Q24hrs for UTI  - Monitor for pneumonia, potential aspiration event given mental status.      #History of alcohol use  Long standing EtOH consumption. Not showing any signs of withdrawal at the moment. Last drink prior to admission which is almost 2 weeks ago. Pt was given 3 days of librium Q8hrs while on CT surgery however did not display signs of withdrawal per chart review  - c/w Multivitamin and Folic Acid, and B12.      CARDIO  #AS (aortic stenosis)  AS i/s/o prior TAVR in 2017 with Dr Wilcox. JOSELO with peak transvalvular velocity of 4.7, mean gradient 56. symptomatic: exertional CP, episode of syncope  - initially planned for valve in valve TAVR, but then it was decided that because of aortic root anatomy, he would need aortic root open heart surgery  - he was deemed not to be a surgical candidate given poor functional status, liver cirrhosis, prior medication noncompliance which likely contributed to valve restenosis. his recovery after open heart surgically would be extremely difficult if possible. He would NOT be a surgical candidate     #CAD (coronary artery disease)  S/p CABG with Dr Wilcox in 2017  - recent [facility] with nonobstructive disease  - c/w Lipitor 80mg QD  - c/w ASA 81mg.    #HTN (hypertension)  _______________  Off meds. BP well controlled now.    PULM   #Intubated  abg post intubation:    GI  #ALC (alcoholic liver cirrhosis)  2/2 long standing EtOH abuse. Likely compensated.. Likely no hepatic encephalopathy. No known EV bleeding. NO ascites however now with signs of new infection started on antibiotics today.  - CTX 1g Qd  - GI following  - no known Varices, but no prior EGD evaluation, he will need it outpatient, although has significant risk factors.    #HCC (hepatocellular carcinoma)  CTAP with 2.4 hypervascular liver mass. i/s/o Liver cirrhosis and EtOH use disorder. triple phase CT confirms HCC  - AFP is 7.2 which is normal  - MRI abdomen: 2.5 cm left hepatic lesion consistent with LI-RADS v 2018 Category: LR-5 Definitely HCC. OPTN Category (if LR-5): 5B. No locally invasive or metastatic disease. Left hepatic artery variant. No additional suspicious hepatic lesions.   - s/p TACE procedure  - Surg/onc following NTD  - Palliative care following.    RENAL      #Sanchez in place______________    ID  ceftriaxone 2 grams    ENDO  #No active problem    HEME/ONC  #Anemia__________________________  Likely secondary to hemolysis due to prosthetic valve. Minimal epistaxis that does not explain low hgb, Hgb 8.1 today    - Completed IV iron X 3 days  - c/w b12 1mg Qd, c/w folate 1mg qd  - Hep Subq DVT PPx held for procedure today  - Transfusion goal > 8  - heme/onc, GI, IR following.        Prophylactic measure.   F: as appropriate  E: replenish as appropriate  N: high protein diet  VTE PPx: heparin TID subq - hold for procedure____________________  GI: protonix PO qd  C: Full Code  [facility] 65 [facility], current smoker (1ppd x 50 years), current every day ETOH abuse (1 pint vodka and several beers daily x 50 years), with PMHx of HLD, HTN (not on any medications), CABG (LIMA to LAD, SVG to PDA in 2017), AS (s/p AVR in 2017) with recently discovered restenosis, presented to [facility] ED on 2/24/22 with chest pain and episode of LOC, initially admitted to cardiology for NSTEMI with [facility] with nonobstructive disease, then found to have restenosis of bioprosthetic valve for which aortic root surgery was planned, but now deferred given liver lesion c/f HCC. Patient transferred to medicine for further w/u of HCC and management of AMS, which is still of unclear etiology.  During IR TACE today, 3/14, patient noted to be obtunded with difficulty protecting airway, so emergently intubated and stepped up to ICU.    NEURO  #Inability to protect airway  Patient responsive only to sternal rub with c/f aspiration and significant, diffuse rhonchi on exam.  Pulse Ox ranging from %.  Decision made to intubate patient for airway protection.  - SBT in AM  - started on precedex     #AMS (altered mental status)  AAOx2 (person and place). Unlikely hepatic encephalopathy as no asterixis and is currently compensated. Neuro exam nonfocal. Possibly related to chronic alcohol use, Wernicke's or depression i/s/o HCC diagnosis. As per wife, pt is not normally lethargic at home. Completed course of high dose thiamine, last librium dose on 03/01. CTH and MRI w/op acute abnormality. During the night of 3/13-3/14 flow sheets showing 100.1 Temp and pt becoming tachycardic to 120s. Pt not complaining of any localizing signs of infection, CXR unremarkable however UA was positive. UCX sent and pt started on CTX 1g Q24hrs (did not receive first dose as taken to TACE procedure). Post TACE procedure c/f protection of airway given increased somnolence potentially 2/2 anesthesia. Pt was intubated for airway protection and transferred to MICU  - f/u VEEG  - monitor mental status  - Neuro following  - c/w CTX 2g Q24hrs for UTI  - c/w hepatic encephalopathy treatment   - f/u UCx  - f/u BCx   - Monitor for pneumonia, potential aspiration event given mental status.    #History of alcohol use  Long standing EtOH consumption. Not showing any signs of withdrawal at the moment. Last drink prior to admission which is almost 2 weeks ago. Pt was given 3 days of librium Q8hrs while on CT surgery however did not display signs of withdrawal per chart review  - c/w Multivitamin and Folic Acid, and B12.    CARDIO  #AS (aortic stenosis)  AS i/s/o prior TAVR in 2017 with Dr Wilcox. JOSELO with peak transvalvular velocity of 4.7, mean gradient 56. symptomatic: exertional CP, episode of syncope  - initially planned for valve in valve TAVR, but then it was decided that because of aortic root anatomy, he would need aortic root open heart surgery  - he was deemed not to be a surgical candidate given poor functional status, liver cirrhosis, prior medication noncompliance which likely contributed to valve restenosis. his recovery after open heart surgically would be extremely difficult if possible. He would NOT be a surgical candidate     #CAD (coronary artery disease)  S/p CABG with Dr Wilcox in 2017  - recent [facility] with nonobstructive disease  - c/w Lipitor 80mg QD  - c/w ASA 81mg.    #HTN (hypertension)  Off meds.   - Monitor Vs in the setting of sepsis    PULM   #Concern for PNA  Prior to arrival to the MICU on 3/14 there were concerns for aspiration PNA.   - ceftriaxone 2g q24 hrs  - CXR post intubation on 3/14: no acute infiltrates  - patient NPO with OGT    #Intubated  -see above     GI  #ALC (alcoholic liver cirrhosis)  2/2 long standing EtOH abuse. Likely compensated.. Likely no hepatic encephalopathy. No known EV bleeding. NO ascites however now with signs of new infection started on antibiotics today.  - GI following  - no known Varices, but no prior EGD evaluation, he will need it outpatient, although has significant risk factors.    #HCC (hepatocellular carcinoma)  CTAP with 2.4 hypervascular liver mass. i/s/o Liver cirrhosis and EtOH use disorder. triple phase CT confirms HCC  - AFP is 7.2 which is normal  - MRI abdomen: 2.5 cm left hepatic lesion consistent with LI-RADS v 2018 Category: LR-5 Definitely HCC. OPTN Category (if LR-5): 5B. No locally invasive or metastatic disease. Left hepatic artery variant. No additional suspicious hepatic lesions.   - s/p TACE procedure  - Surg/onc following NTD  - Palliative care following.    RENAL  #no active issues      #UTI  Patient with positive UA (WBC, bacteria LE) on 3/14. Unable to assess urinary symptoms, started empiric treatment given mental status.   - c/w CTX 2g Q24hrs for UTI  - f/u UCx  - f/u BCx     #Sanchez in place  - monitor I&O    ID  #Sepsis  Source likely UTI vs. ? aspiration pna, as patient noted to be aspirating over the weekend.  UA positive. 3/4 SIRS with fever, tachycardia, and leukocytosis.  - CTX 2g q24h    ENDO  #No active problem    HEME/ONC  #Normocytic hypochromic anemia  Likely secondary to hemolysis due to prosthetic valve. Minimal epistaxis that does not explain low hgb, Hgb 8.1 on 3/14.  - Completed IV iron X 3 days  - c/w b12 1mg Qd  - c/w folate 1mg qd  - Transfusion goal > 7  - heme/onc, GI, IR following.    Prophylactic measure  F: as appropriate  E: replenish as appropriate  N: OGT, NPO except meds   VTE PPx: holding heparin TID subq, f/u IR  recs after procedure   GI: protonix PO qd  C: Full Code  D: [facility] -> MICU

## 2022-03-14 NOTE — PROGRESS NOTE ADULT - ATTENDING COMMENTS
#Hepatic Lesion  Patient presented to [facility] ED on 2/24/22 with complaints of 9/10 chest pain with associated diaphoresis with exertion, admitted for NSTEMI, transferred to CTsx service for severe AS, requiring a TAVR procedure. During workup for TAVR, CTA A/P (2/28) revealing a nodular liver contour, c/w cirrhosis, A 2.4 cm hypervascular liver mass suspicious for HCC, as well as splenomegaly. AFP 7.2 (WNL), While AFP noted to be normal, does not preclude risk of possibility of HCC, especially in the setting of what appears to be cirrhosis 2/2 underlying long-standing history of EtOH use, which places him at higher risk.   - CT A/P (3/6) revealing 2.5 cm left hepatic lesion consistent with LI-RADS v 2018 Category: LR-5 Definitely HCC. OPTN Category (if LR-5): 5B. No locally invasive or metastatic disease. Left hepatic artery variant. No additional suspicious hepatic lesions.  - In light of active EtOH use, would not qualify for liver transplant evaluation   - Not a surgical candidate as per surgical onc team  - Appreciate ongoing Heme/Onc recs  - MR Abdomen (3/9) confirming HCC. Per IR, will consider for TACE today 3/14/22    #Cirrhosis, likely compensated (-HE, -ascites, - no known EV bleeding)  CTA A/P with radiographic findings c/f cirrhosis, including nodular liver contour and splenomegaly, which is suggestive likely portal HTN. Likely in the setting of long-standing history of EtOH abuse.   MELD-Na: 11 (based on 3/11/22 BW)  HE: [id number] (less conversive on exam today), no asterixis   Ascites: None present on CT imaging  HCC: CTA A/P (2/28) with 2.4 cm hypervascular lesion. CT A/P (3/6), lesion c/w HCC  Varices: No prior EGD evaluation, would be warranted, can pursue as an outpatient  - Abdominal US with duplex (3/6) The portal veins, hepatic veins and hepatic arteries are patent with normal directionality of flow. Cirrhosis. Since 2/28/2022, again a 2.9 cm heterogeneous lesion is present within the left lobe of the liver (see CT findings as noted above, appears to be c/w HCC). MRI confirming HCC  - Daily CMPs & Coags to calculate MELD-Na  - Nutrition consult  - High protein diet  -  on alcohol cessation  - c/w Rifaximin 500 mg BID & lactulose, titrate to 2-3 BMs/day    #AMS  Unclear etiology for AMS this am with increased lethargy. Ddx includes HE, infection, librium, delirium. The elevated ammonia lever to 123 could be due to his nose bleed, which has been ongoing since 3/5. He does not have Asterixis on exam and is A&O with understanding of where he is and why he is hospitalized. If HE, unclear the inciting event to cause decompensation. Additionally, RUQ US today w/o ascites. Most likely this is build up of librium that is being poorly metabolized due to his liver function.  - Continue to hold librium  - c/w Rifaximin 500 mg BID & lactulose, titrate to 2-3 BMs/day  - CXR (3/6/22) negative for infiltrates  - UA with no pyuria however +bacteria, + nitrite, trace LE, abxs as per primary team. If with no improvement in MS, consider initiation of abxs  - CT Head (3/10) negative    #Anemia   With slow downtrend in H/H, Hgb 6.9, s/p 1 u pRBC however with extravasation. Downtrended again to 6.6 today (3/10). With dried blood on right nare, noted week prior to have blood from right nare along with leslie blood in oropharynx, currently with no blood in oropharynx on today's exam. With no overt bleeding including melena, hematochezia, hematemesis, coffee ground emesis.   - Hgb now stable and nose bleeding has resolved  - LDH/Haptoglobin c/w hemolysis, likely in the setting of severe AS  - Monitor H/H  - Transfusion goal >8  - Appreciate ongoing recs from ENT  - In light of underlying cirrhosis, with evidence of thrombocytopenia and splenomegaly, suggestive of e/o portal HTN, would warrant endoscopic evaluation to further assess for EV/PHG however given recent NSTEMI, higher risk for endoscopic evaluation at this time. Still remains without any e/o overt GI bleeding including melena, hematemesis, hematochezia. Hemolysis labs positive yesterday, suspect 2/2 severe AS.

## 2022-03-14 NOTE — PROGRESS NOTE ADULT - SUBJECTIVE AND OBJECTIVE BOX
Pt came for hepatic chemo embolization. Pt A&O x 0, consent obtained from his wife.  After procedure pt extubated, still obtunded. Called medicine team to the bedside to evaluate the patient. Concern for airway protection.  Pt mental status unchanged from baseline. Sat 100%. After discussion with the primary team the resident noted that there was concern for possible aspiration event overnight. Asked to call the MICU and decide if the pt should be reintubated for airway protection given the lack of mental status.  Decision made to intubate the patient in the end.

## 2022-03-14 NOTE — PROGRESS NOTE ADULT - SUBJECTIVE AND OBJECTIVE BOX
Colchicine Pregnancy And Lactation Text: This medication is Pregnancy Category C and isn't considered safe during pregnancy. It is excreted in breast milk. TRANSFER FROM [facility] TO ICU  HOSPITAL COURSE  67 YO M, current smoker (1ppd x 50 years), current every day ETOH abuse (1 pint vodka and several beers daily x 50 years), with PMHx of HLD, HTN (not on any medications), CABG (LIMA to LAD, SVG to PDA in 2017), AS (s/p AVR in 2017) with recently discovered restenosis, presented to [facility] ED on 2/24/22 with chest pain and episode of LOC, initially admitted to cardiology for NSTEMI with [facility] showing nonobstructive disease. TTE was reperformed and patient was  found to have restenosis of bioprosthetic valve for which aortic root surgery was planned.  Patient was transferred to CT surgery given complex anatomy with plan for TAVR and aortic root surgery.  During this time on CT surgery service he was found to have liver cirrhosis and a 2.4cmx2.4 cm mass on his liver concerning for HCC.  Decision was made not to proceed with surgery given poor functional status, cirrhosis and non compliance with medications.  The patient had worsening mental status on CT surgery service and med consult was called for hepatic encephalopathy.  The patient had an elevated ammonia level (123) however did not display asterixes  He was A&02-3 when transferred to medicine.  Patient was started on rifaxamin and lactulose with 2-3 bowel movements per day, the patient remained orientated A&O2-3 with no asterixis Although the patient is awake and alert his mentation is somnolent with slow speech.  He has been followed GI  for liver findings.  He has not been infected until this time.  Patient received intermittent blood transfusion, however with no leslie blood loss aside from minor nose bleeds. Hemolysis labs were positive and likely caused by prosthetic aortic valve. Hematology and surgical oncology was consulted as imaging of abdomen confirmed HCC.  Patient was not a surgical candidate and IR was consulted who planned for embolization.  Patient completed a course of high dose thiamine given alcohol history without improvement in mental status. MRI brain obtained showing mild chronic microvascular ischemic disease and brain parenchymal  volume loss, advanced for patient's age likely 2/2 alcohol abuse.  Neurology was consulted and  VEEG showing moderate diffuse nonspecific cerebral dysfunction. On 3/14 patient was seen with his normal baseline mental status and neuro exam prior to embolization procedure for HCC.  However patient was found to be tachycardic to 120s with new elevated WBC count to 14.  UA was positive, CXR unchanged, bladder scan w/o evidence of retention. Started on ceftriaxone for possible UTI also for concerngs for aspiration pneumonia due to persistent somnolence.  Patient taken to IR for TACE procedure. Following Procedure patient became hypotensive to 80s/50s tachycardic to 120s, was minimally arousable to sternal rub and gurgling with c/f airway protection. ICU consult called. Pts wife called requesting ALL life saving measures be performed. Pt intubated in the nuclear medicine suite and stepped up to MICU.    OVERNIGHT EVENTS: mild epistaxis that self resolved    SUBJECTIVE / INTERVAL HPI: Patient seen and examined at bedside. Denies any chest pain, SOB, cough, dysuria or abdominal pain. Endorse 1 watery BM yesterday.      PHYSICAL EXAM:  General: NAD, arousable to voice, able to follow simple commands (raising hands) and answer simple questions  HEENT: MMM, LEIGH (sluggish), dirty sclera, no clots visualized in nares or oral mucosa  Neck: supple  Cardiovascular: regular rate and rhythm. Loud systolic murmur best heard at right 2nd ICS   Respiratory: CTA B/L anteriorly; no W/R/R  Gastrointestinal: soft, NT, distended, tympanic  Extremities: moving all extremities, RUE 4/5 but history of trauma to RUE  Neurological: AAOx2-3; symmetric face. No asterixis. Poor short-term memory. Limited cooperation.     VITAL SIGNS:  Vital Signs Last 24 Hrs  T(C): 36.9 (14 Mar 2022 05:23), Max: 37.8 (14 Mar 2022 00:29)  T(F): 98.5 (14 Mar 2022 05:23), Max: 100.1 (14 Mar 2022 00:29)  HR: 123 (14 Mar 2022 05:23) (104 - 125)  BP: 119/63 (14 Mar 2022 05:23) (119/63 - 143/82)  BP(mean): --  RR: 19 (14 Mar 2022 05:23) (16 - 19)  SpO2: 97% (14 Mar 2022 05:23) (95% - 98%)      MEDICATIONS:  MEDICATIONS  (STANDING):  artificial tears (preservative free) Ophthalmic Solution 1 Drop(s) Both EYES two times a day  aspirin enteric coated 81 milliGRAM(s) Oral daily  atorvastatin 80 milliGRAM(s) Oral at bedtime  cefTRIAXone   IVPB 1000 milliGRAM(s) IV Intermittent every 24 hours  cyanocobalamin 1000 MICROGram(s) Oral daily  DOXOrubicin INTRA-ARTERIAL (eMAR) 50 milliGRAM(s) IntraArterial once  ferrous    sulfate 325 milliGRAM(s) Oral <User Schedule>  folic acid 1 milliGRAM(s) Oral daily  lactulose Syrup 30 Gram(s) Oral three times a day  multivitamin 1 Tablet(s) Oral daily  mupirocin 2% Nasal 1 Application(s) Both Nostrils two times a day  nicotine - 21 mG/24Hr(s) Patch 1 patch Transdermal daily  pantoprazole    Tablet 40 milliGRAM(s) Oral daily  rifAXIMin 550 milliGRAM(s) Oral two times a day  tamsulosin 0.4 milliGRAM(s) Oral at bedtime    MEDICATIONS  (PRN):  oxymetazoline 0.05% Nasal Spray 2 Spray(s) Both Nostrils two times a day PRN nose bleeding  sodium chloride 0.65% Nasal 1 Spray(s) Both Nostrils four times a day PRN Nasal Congestion      ALLERGIES:  Allergies    No Known Allergies    Intolerances        LABS:                        8.1    14.33 )-----------( 153      ( 14 Mar 2022 07:17 )             25.9     03-14    138  |  107  |  17  ----------------------------<  120<H>  4.0   |  19<L>  |  0.73    Ca    8.5      14 Mar 2022 07:17  Phos  3.0     03-14  Mg     1.9     03-14    TPro  6.9  /  Alb  2.7<L>  /  TBili  1.0  /  DBili  x   /  AST  57<H>  /  ALT  30  /  AlkPhos  87  03-14    PT/INR - ( 14 Mar 2022 07:17 )   PT: 16.4 sec;   INR: 1.37          PTT - ( 14 Mar 2022 07:17 )  PTT:42.8 sec  Urinalysis Basic - ( 14 Mar 2022 08:47 )    Color: x / Appearance: x / SG: x / pH: x  Gluc: x / Ketone: x  / Bili: x / Urobili: x   Blood: x / Protein: x / Nitrite: x   Leuk Esterase: x / RBC: < 5 /HPF / WBC Many /HPF   Sq Epi: x / Non Sq Epi: 0-5 /HPF / Bacteria: Present /HPF      CAPILLARY BLOOD GLUCOSE          RADIOLOGY & ADDITIONAL TESTS: Reviewed.

## 2022-03-14 NOTE — PROGRESS NOTE ADULT - PROBLEM SELECTOR PLAN 7
.  Patient is Full Code  -lengthy discussion with patient's wife, daughter, and sister to explain interval events and complicated hospital course; advised them to be prepared for poor outcome even if patient is able to leave the ICU given his multiple co-morbidities and complications -> significant health literacy issues

## 2022-03-14 NOTE — PROGRESS NOTE ADULT - ASSESSMENT
67yo M with Polysubstance Use Disorder (Nicotine, EtOH), HTN, CAD, and AS p/w chest pain in the setting of aortic valve stenosis and found to have cirrhosis c/b newly diagnosed HCC. Palliative consulted for complex medical decision making in the setting of advanced illness.    ·	lengthy discussion with patient's wife, daughter, and sister to explain interval events and complicated hospital course  ·	they are accepting of all indicated medical interventions; cautioned them to think about DNR status if patient continues to decline despite ongoing life support (vent, pressors)  ·	after our discussion, they recognize the severity of the patient's illness but remain hopeful for some kind of recovery 67yo M with Polysubstance Use Disorder (Nicotine, EtOH), HTN, CAD, and AS p/w chest pain in the setting of aortic valve stenosis and found to have cirrhosis c/b newly diagnosed HCC. Palliative consulted for complex medical decision making in the setting of advanced illness.    ·	lengthy discussion with patient's wife, daughter, and sister to explain interval events and complicated hospital course  ·	they are accepting of all indicated medical interventions; cautioned them to think about DNR status if patient continues to decline despite ongoing life support (vent, pressors)  ·	after our meeting, the family recognizes the severity of the patient's illness but remain hopeful for some kind of recovery

## 2022-03-14 NOTE — PROGRESS NOTE ADULT - ASSESSMENT
65 YO M, current smoker (1ppd x 50 years), current every day ETOH abuse (1 pint vodka and several beers daily x 50 years), with PMHx of HLD, HTN (not on any medications), CABG (LIMA to LAD, SVG to PDA in 2017), AS (s/p AVR in 2017) with recently discovered restenosis, presented to [facility] ED on 2/24/22 with chest pain and episode of LOC, initially admitted to cardiology for NSTEMI with [facility] with nonobstructive disease, then found to have restenosis of bioprosthetic valve for which aortic root surgery was planned, but now deferred given liver lesion c/f HCC. Now he is transferred to medicine for concern for mild hepatic encephalopathy, however unlikely to have HE and is compensated cirrhotic. Now found to have HCC, pending further workup.

## 2022-03-14 NOTE — PROGRESS NOTE ADULT - PROBLEM SELECTOR PLAN 1
CTAP with 2.4 hypervascular liver mass. i/s/o Liver cirrhosis and EtOH use disorder. triple phase CT confirms HCC  - AFP is 7.2 which is normal  - MRI abdomen: 2.5 cm left hepatic lesion consistent with LI-RADS v 2018 Category: LR-5 Definitely HCC. OPTN Category (if LR-5): 5B. No locally invasive or metastatic disease. Left hepatic artery variant. No additional suspicious hepatic lesions.   - s/p TACE procedure  - Surg/onc following NTD  - Palliative care following

## 2022-03-14 NOTE — PROGRESS NOTE ADULT - PROBLEM SELECTOR PLAN 6
.  Would be a hospice qualifying diagnosis  -limited life expectancy is be supported by critical illness, HCC, and active EtOH use  -not eligible for hospice given lack of insurance benefit

## 2022-03-14 NOTE — PROGRESS NOTE ADULT - PROBLEM SELECTOR PLAN 5
AS i/s/o prior TAVR in 2017 with Dr Wilcox. JOSELO with peak transvalvular velocity of 4.7, mean gradient 56. symptomatic: exertional CP, episode of syncope  - initially planned for valve in valve TAVR, but then it was decided that because of aortic root anatomy, he would need aortic root open heart surgery  - he was deemed not to be a surgical candidate given poor functional status, liver cirrhosis, prior medication noncompliance which likely contributed to valve restenosis. his recovery after open heart surgically would be extremely difficult if possible. He would NOT be a surgical candidate .

## 2022-03-14 NOTE — PROGRESS NOTE ADULT - ASSESSMENT
65 y/o M, current smoker (1ppd x 50 years), current every day ETOH abuse (1 pint vodka and several beers daily x 50 years), with PMHx of HLD, HTN (not on any medications),  CABG (LIMA to LAD, SVG to PDA in 2017), AS (s/p AVR in 2017), who presented to [facility] ED on 2/24/22 with complaints of 9/10 chest pain with associated diaphoresis with exertion, admitted for NSTEMI, transferred to CTsx service for severe AS, requiring a TAVR procedure; upon workup for TAVR, patient incidentally noted to have a 2.4 cm hypervascular lesion for which GI was consulted, CT A/P concerning for HCC. Now Gi re-consulted for AMS.     #Hepatic Lesion  Patient presented to [facility] ED on 2/24/22 with complaints of 9/10 chest pain with associated diaphoresis with exertion, admitted for NSTEMI, transferred to CTsx service for severe AS, requiring a TAVR procedure. During workup for TAVR, CTA A/P (2/28) revealing a nodular liver contour, c/w cirrhosis, A 2.4 cm hypervascular liver mass suspicious for HCC, as well as splenomegaly. AFP 7.2 (WNL), While AFP noted to be normal, does not preclude risk of possibility of HCC, especially in the setting of what appears to be cirrhosis 2/2 underlying long-standing history of EtOH use, which places him at higher risk.   - CT A/P (3/6) revealing 2.5 cm left hepatic lesion consistent with LI-RADS v 2018 Category: LR-5 Definitely HCC. OPTN Category (if LR-5): 5B. No locally invasive or metastatic disease. Left hepatic artery variant. No additional suspicious hepatic lesions.  - In light of active EtOH use, would not qualify for liver transplant evaluation   - Not a surgical candidate as per surgical onc team  - Appreciate ongoing Heme/Onc recs  - MR Abdomen (3/9) confirming HCC. Per IR, will consider for TACE today 3/14/22    #Cirrhosis, likely compensated (-HE, -ascites, - no known EV bleeding)  CTA A/P with radiographic findings c/f cirrhosis, including nodular liver contour and splenomegaly, which is suggestive likely portal HTN. Likely in the setting of long-standing history of EtOH abuse.   MELD-Na: 11 (based on 3/11/22 BW)  HE: [id number] (less conversive on exam today), no asterixis   Ascites: None present on CT imaging  HCC: CTA A/P (2/28) with 2.4 cm hypervascular lesion. CT A/P (3/6), lesion c/w HCC  Varices: No prior EGD evaluation, would be warranted, can pursue as an outpatient  - Abdominal US with duplex (3/6) The portal veins, hepatic veins and hepatic arteries are patent with normal directionality of flow. Cirrhosis. Since 2/28/2022, again a 2.9 cm heterogeneous lesion is present within the left lobe of the liver (see CT findings as noted above, appears to be c/w HCC). MRI confirming HCC  - Daily CMPs & Coags to calculate MELD-Na  - Nutrition consult  - High protein diet  -  on alcohol cessation  - c/w Rifaximin 500 mg BID & lactulose, titrate to 2-3 BMs/day    #AMS  Unclear etiology for AMS this am with increased lethargy. Ddx includes HE, infection, librium, delirium. The elevated ammonia lever to 123 could be due to his nose bleed, which has been ongoing since 3/5. He does not have Asterixis on exam and is A&O with understanding of where he is and why he is hospitalized. If HE, unclear the inciting event to cause decompensation. Additionally, RUQ US today w/o ascites. Most likely this is build up of librium that is being poorly metabolized due to his liver function.  - Continue to hold librium  - c/w Rifaximin 500 mg BID & lactulose, titrate to 2-3 BMs/day  - CXR (3/6/22) negative for infiltrates  - UA with no pyuria however +bacteria, + nitrite, trace LE, abxs as per primary team. If with no improvement in MS, consider initiation of abxs  - CT Head (3/10) negative    #Anemia   With slow downtrend in H/H, Hgb 6.9, s/p 1 u pRBC however with extravasation. Downtrended again to 6.6 today (3/10). With dried blood on right nare, noted week prior to have blood from right nare along with leslie blood in oropharynx, currently with no blood in oropharynx on today's exam. With no overt bleeding including melena, hematochezia, hematemesis, coffee ground emesis.   - Hgb now stable and nose bleeding has resolved  - LDH/Haptoglobin c/w hemolysis, likely in the setting of severe AS  - Monitor H/H  - Transfusion goal >8  - Appreciate ongoing recs from ENT  - In light of underlying cirrhosis, with evidence of thrombocytopenia and splenomegaly, suggestive of e/o portal HTN, would warrant endoscopic evaluation to further assess for EV/PHG however given recent NSTEMI, higher risk for endoscopic evaluation at this time. Still remains without any e/o overt GI bleeding including melena, hematemesis, hematochezia. Hemolysis labs positive yesterday, suspect 2/2 severe AS.     Kinza Ayoub MD  Gastroenterology Fellow, PGY -4   Pager [phone]  [phone] 65 y/o M, current smoker (1ppd x 50 years), current every day ETOH abuse (1 pint vodka and several beers daily x 50 years), with PMHx of HLD, HTN (not on any medications),  CABG (LIMA to LAD, SVG to PDA in 2017), AS (s/p AVR in 2017), who presented to [facility] ED on 2/24/22 with complaints of 9/10 chest pain with associated diaphoresis with exertion, admitted for NSTEMI, transferred to CTsx service for severe AS, requiring a TAVR procedure; upon workup for TAVR, patient incidentally noted to have a 2.4 cm hypervascular lesion for which GI was consulted, CT A/P concerning for HCC. Now Gi re-consulted for AMS.     #Hepatic Lesion  Patient presented to [facility] ED on 2/24/22 with complaints of 9/10 chest pain with associated diaphoresis with exertion, admitted for NSTEMI, transferred to CTsx service for severe AS, requiring a TAVR procedure. During workup for TAVR, CTA A/P (2/28) revealing a nodular liver contour, c/w cirrhosis, A 2.4 cm hypervascular liver mass suspicious for HCC, as well as splenomegaly. AFP 7.2 (WNL), While AFP noted to be normal, does not preclude risk of possibility of HCC, especially in the setting of what appears to be cirrhosis 2/2 underlying long-standing history of EtOH use, which places him at higher risk.   - CT A/P (3/6) revealing 2.5 cm left hepatic lesion consistent with LI-RADS v 2018 Category: LR-5 Definitely HCC. OPTN Category (if LR-5): 5B. No locally invasive or metastatic disease. Left hepatic artery variant. No additional suspicious hepatic lesions.  - In light of active EtOH use, would not qualify for liver transplant evaluation   - Not a surgical candidate as per surgical onc team  - Appreciate ongoing Heme/Onc recs  - MR Abdomen (3/9) confirming HCC. Per IR, will consider for TACE today 3/14/22    #Cirrhosis, likely compensated (-HE, -ascites, - no known EV bleeding)  CTA A/P with radiographic findings c/f cirrhosis, including nodular liver contour and splenomegaly, which is suggestive likely portal HTN. Likely in the setting of long-standing history of EtOH abuse.   MELD-Na: 11 (based on 3/11/22 BW)  HE: [id number] (less conversive on exam today), no asterixis   Ascites: None present on CT imaging  HCC: CTA A/P (2/28) with 2.4 cm hypervascular lesion. CT A/P (3/6), lesion c/w HCC  Varices: No prior EGD evaluation, would be warranted, can pursue as an outpatient  - Abdominal US with duplex (3/6) The portal veins, hepatic veins and hepatic arteries are patent with normal directionality of flow. Cirrhosis. Since 2/28/2022, again a 2.9 cm heterogeneous lesion is present within the left lobe of the liver (see CT findings as noted above, appears to be c/w HCC). MRI confirming HCC  - Daily CMPs & Coags to calculate MELD-Na  - Nutrition consult  - High protein diet  -  on alcohol cessation  - c/w Rifaximin 500 mg BID & lactulose, titrate to 2-3 BMs/day    #AMS  Unclear etiology for AMS this am with increased lethargy. Ddx includes HE, infection, librium, delirium. The elevated ammonia lever to 123 could be due to his nose bleed, which has been ongoing since 3/5. He does not have Asterixis on exam and is A&O with understanding of where he is and why he is hospitalized. If HE, unclear the inciting event to cause decompensation. Additionally, RUQ US today w/o ascites. Most likely this is build up of librium that is being poorly metabolized due to his liver function.  - Continue to hold librium  - c/w Rifaximin 500 mg BID & lactulose, titrate to 2-3 BMs/day  - CXR (3/6/22) negative for infiltrates  - UA with no pyuria however +bacteria, + nitrite, trace LE, abxs as per primary team. If with no improvement in MS, consider initiation of abxs  - CT Head (3/10) negative    #Anemia   With slow downtrend in H/H, Hgb 6.9, s/p 1 u pRBC however with extravasation. Downtrended again to 6.6 today (3/10). With dried blood on right nare, noted week prior to have blood from right nare along with leslie blood in oropharynx, currently with no blood in oropharynx on today's exam. With no overt bleeding including melena, hematochezia, hematemesis, coffee ground emesis.   - Hgb now stable and nose bleeding has resolved  - LDH/Haptoglobin c/w hemolysis, likely in the setting of severe AS  - Monitor H/H  - Transfusion goal >8  - Appreciate ongoing recs from ENT  - In light of underlying cirrhosis, with evidence of thrombocytopenia and splenomegaly, suggestive of e/o portal HTN, would warrant endoscopic evaluation to further assess for EV/PHG however given recent NSTEMI, higher risk for endoscopic evaluation at this time. Still remains without any e/o overt GI bleeding including melena, hematemesis, hematochezia. Hemolysis labs positive yesterday, suspect 2/2 severe AS.     Thank you for the consult. In the acute setting at this time, GI will sign off. Please re-consult for any questions or concerns.    Kinza Ayoub MD  Gastroenterology Fellow, PGY -4   Pager [phone]  [phone]

## 2022-03-14 NOTE — PROGRESS NOTE ADULT - PROBLEM SELECTOR PLAN 2
AAOx2 (person and place). Unlikely hepatic encephalopathy as no asterixis and is currently compensated. Neuro exam nonfocal. Possibly related to chronic alcohol use, Wernicke's or depression i/s/o HCC diagnosis. As per wife, pt is not normally lethargic at home. completed course of high dose thiamine, last librium dose on 03/01. CTH and MRI w/op acute abnormality. Overnight flow sheets showing 100.1 Temp and pt becoming tachycardic to 120s. Pt not complaining of any localizing signs of infection, CXR unremarkable however UA was positive. UCX sent and pt started on CTX 1g Q24hrs (did not receive first dose as taken to TACE procedure). Post TACE procedure c/f protection of airway given increased somnolence potentially 2/2 anesthesia. Pt was intubated for airway protection and transferred to MICU    - f/u VEEG  - monitor BMs - 1 watery BM yesterday  - monitor mental status  - Neuro following  - c/w CTX 1g Q24hrs for UTI  - Monitor for pneumonia, potential aspiration event given mental status

## 2022-03-14 NOTE — PROGRESS NOTE ADULT - PROBLEM SELECTOR PLAN 1
.  In the setting of decompensated liver disease, infection, and delirium  -c/w supportive care and HSE treatment  -not able to participate in medical decision making

## 2022-03-14 NOTE — PROGRESS NOTE ADULT - PROBLEM SELECTOR PLAN 3
Likely secondary to hemolysis due to prosthetic valve. Minimal epistaxis that does not explain low hgb, Hgb 8.1 today    - Completed IV iron X 3 days  - c/w b12 1mg Qd, c/w folate 1mg qd  - Hep Subq DVT PPx held for procedure today  - Transfusion goal > 8  - heme/onc, GI, IR following

## 2022-03-14 NOTE — PROGRESS NOTE ADULT - SUBJECTIVE AND OBJECTIVE BOX
SUBJECTIVE: Patient seen and examined bedside.    aspirin enteric coated 81 milliGRAM(s) Oral daily  rifAXIMin 550 milliGRAM(s) Oral two times a day  tamsulosin 0.4 milliGRAM(s) Oral at bedtime      Vital Signs Last 24 Hrs  T(C): 36.9 (14 Mar 2022 05:23), Max: 37.8 (14 Mar 2022 00:29)  T(F): 98.5 (14 Mar 2022 05:23), Max: 100.1 (14 Mar 2022 00:29)  HR: 123 (14 Mar 2022 05:23) (99 - 125)  BP: 119/63 (14 Mar 2022 05:23) (116/66 - 143/82)  BP(mean): --  RR: 19 (14 Mar 2022 05:23) (16 - 19)  SpO2: 97% (14 Mar 2022 05:23) (95% - 98%)  I&O's Detail    12 Mar 2022 06:01  -  13 Mar 2022 07:00  --------------------------------------------------------  IN:  Total IN: 0 mL    OUT:    Intermittent Catheterization - Urethral (mL): 400 mL  Total OUT: 400 mL    Total NET: -400 mL          General: NAD, resting comfortably in bed  C/V: NSR  Pulm: Nonlabored breathing, no respiratory distress  Abd: soft, NT/ND.  Extrem: WWP, no edema, SCDs in place        LABS:                        8.5    10.53 )-----------( 151      ( 13 Mar 2022 06:23 )             27.5     03-13    138  |  106  |  16  ----------------------------<  95  3.8   |  21<L>  |  0.87    Ca    8.5      13 Mar 2022 06:23  Phos  3.7     03-13  Mg     2.0     03-13    TPro  6.8  /  Alb  2.6<L>  /  TBili  0.7  /  DBili  x   /  AST  55<H>  /  ALT  34  /  AlkPhos  91  03-12          RADIOLOGY & ADDITIONAL STUDIES:   SUBJECTIVE: Patient seen and examined bedside. Patient connected to EEG. Mumble incompressible speech.      aspirin enteric coated 81 milliGRAM(s) Oral daily  rifAXIMin 550 milliGRAM(s) Oral two times a day  tamsulosin 0.4 milliGRAM(s) Oral at bedtime      Vital Signs Last 24 Hrs  T(C): 36.9 (14 Mar 2022 05:23), Max: 37.8 (14 Mar 2022 00:29)  T(F): 98.5 (14 Mar 2022 05:23), Max: 100.1 (14 Mar 2022 00:29)  HR: 123 (14 Mar 2022 05:23) (99 - 125)  BP: 119/63 (14 Mar 2022 05:23) (116/66 - 143/82)  BP(mean): --  RR: 19 (14 Mar 2022 05:23) (16 - 19)  SpO2: 97% (14 Mar 2022 05:23) (95% - 98%)  I&O's Detail    12 Mar 2022 06:01  -  13 Mar 2022 07:00  --------------------------------------------------------  IN:  Total IN: 0 mL    OUT:    Intermittent Catheterization - Urethral (mL): 400 mL  Total OUT: 400 mL    Total NET: -400 mL      General: NAD, resting comfortably in bed  C/V: NSR  Pulm: Nonlabored breathing, no respiratory distress  Abd: soft, NT/ND.  Extrem: WWP, no edema, SCDs in place        LABS:                        8.5    10.53 )-----------( 151      ( 13 Mar 2022 06:23 )             27.5     03-13    138  |  106  |  16  ----------------------------<  95  3.8   |  21<L>  |  0.87    Ca    8.5      13 Mar 2022 06:23  Phos  3.7     03-13  Mg     2.0     03-13    TPro  6.8  /  Alb  2.6<L>  /  TBili  0.7  /  DBili  x   /  AST  55<H>  /  ALT  34  /  AlkPhos  91  03-12          RADIOLOGY & ADDITIONAL STUDIES:

## 2022-03-14 NOTE — PROGRESS NOTE ADULT - PROBLEM SELECTOR PLAN 8
Long standing EtOH consumption. Not showing any signs of withdrawal at the moment. Last drink prior to admission which is almost 2 weeks ago. Pt was given 3 days of librium Q8hrs while on CT surgery however did not display signs of withdrawal per chart review    - c/w Multivitamin and Folic Acid, and B12

## 2022-03-14 NOTE — PROGRESS NOTE ADULT - PROBLEM SELECTOR PLAN 6
S/p CABG with Dr Wilcox in 2017  - recent [facility] with nonobstructive disease  - c/w Lipitor 80mg QD  - c/w ASA 81mg

## 2022-03-14 NOTE — PROGRESS NOTE ADULT - PROBLEM SELECTOR PLAN 4
2/2 long standing EtOH abuse. Likely compensated.. Likely no hepatic encephalopathy. No known EV bleeding. NO ascites however now with signs of new infection started on antibiotics today.  - CTX 1g Qd  - GI following  - no known Varices, but no prior EGD evaluation, he will need it outpatient, although has significant risk factors

## 2022-03-15 DIAGNOSIS — A41.9 SEPSIS, UNSPECIFIED ORGANISM: ICD-10-CM

## 2022-03-15 DIAGNOSIS — J96.01 ACUTE RESPIRATORY FAILURE WITH HYPOXIA: ICD-10-CM

## 2022-03-15 LAB
ALBUMIN SERPL ELPH-MCNC: 2.3 G/DL — LOW (ref 3.3–5)
ALP SERPL-CCNC: 100 U/L — SIGNIFICANT CHANGE UP (ref 40–120)
ALT FLD-CCNC: 41 U/L — SIGNIFICANT CHANGE UP (ref 10–45)
ANION GAP SERPL CALC-SCNC: 10 MMOL/L — SIGNIFICANT CHANGE UP (ref 5–17)
ANISOCYTOSIS BLD QL: SLIGHT — SIGNIFICANT CHANGE UP
APTT BLD: 41.6 SEC — HIGH (ref 27.5–35.5)
AST SERPL-CCNC: 60 U/L — HIGH (ref 10–40)
BASOPHILS # BLD AUTO: 0 K/UL — SIGNIFICANT CHANGE UP (ref 0–0.2)
BASOPHILS NFR BLD AUTO: 0 % — SIGNIFICANT CHANGE UP (ref 0–2)
BILIRUB SERPL-MCNC: 1.2 MG/DL — SIGNIFICANT CHANGE UP (ref 0.2–1.2)
BUN SERPL-MCNC: 13 MG/DL — SIGNIFICANT CHANGE UP (ref 7–23)
CALCIUM SERPL-MCNC: 8.2 MG/DL — LOW (ref 8.4–10.5)
CHLORIDE SERPL-SCNC: 107 MMOL/L — SIGNIFICANT CHANGE UP (ref 96–108)
CO2 SERPL-SCNC: 18 MMOL/L — LOW (ref 22–31)
CREAT SERPL-MCNC: 0.77 MG/DL — SIGNIFICANT CHANGE UP (ref 0.5–1.3)
DACRYOCYTES BLD QL SMEAR: SLIGHT — SIGNIFICANT CHANGE UP
EGFR: 99 ML/MIN/1.73M2 — SIGNIFICANT CHANGE UP
ELLIPTOCYTES BLD QL SMEAR: SLIGHT — SIGNIFICANT CHANGE UP
EOSINOPHIL # BLD AUTO: 0 K/UL — SIGNIFICANT CHANGE UP (ref 0–0.5)
EOSINOPHIL NFR BLD AUTO: 0 % — SIGNIFICANT CHANGE UP (ref 0–6)
GIANT PLATELETS BLD QL SMEAR: PRESENT — SIGNIFICANT CHANGE UP
GLUCOSE SERPL-MCNC: 169 MG/DL — HIGH (ref 70–99)
GRAM STN FLD: SIGNIFICANT CHANGE UP
HCT VFR BLD CALC: 25 % — LOW (ref 39–50)
HGB BLD-MCNC: 7.8 G/DL — LOW (ref 13–17)
HYPOCHROMIA BLD QL: SIGNIFICANT CHANGE UP
INR BLD: 1.55 — HIGH (ref 0.88–1.16)
LACTATE SERPL-SCNC: 3.4 MMOL/L — HIGH (ref 0.5–2)
LYMPHOCYTES # BLD AUTO: 0.76 K/UL — LOW (ref 1–3.3)
LYMPHOCYTES # BLD AUTO: 4.3 % — LOW (ref 13–44)
MACROCYTES BLD QL: SLIGHT — SIGNIFICANT CHANGE UP
MAGNESIUM SERPL-MCNC: 1.9 MG/DL — SIGNIFICANT CHANGE UP (ref 1.6–2.6)
MANUAL SMEAR VERIFICATION: SIGNIFICANT CHANGE UP
MCHC RBC-ENTMCNC: 29.5 PG — SIGNIFICANT CHANGE UP (ref 27–34)
MCHC RBC-ENTMCNC: 31.2 GM/DL — LOW (ref 32–36)
MCV RBC AUTO: 94.7 FL — SIGNIFICANT CHANGE UP (ref 80–100)
METAMYELOCYTES # FLD: 0.9 % — HIGH (ref 0–0)
MICROCYTES BLD QL: SLIGHT — SIGNIFICANT CHANGE UP
MONOCYTES # BLD AUTO: 0.62 K/UL — SIGNIFICANT CHANGE UP (ref 0–0.9)
MONOCYTES NFR BLD AUTO: 3.5 % — SIGNIFICANT CHANGE UP (ref 2–14)
MYELOCYTES NFR BLD: 0.9 % — HIGH (ref 0–0)
NEUTROPHILS # BLD AUTO: 15.95 K/UL — HIGH (ref 1.8–7.4)
NEUTROPHILS NFR BLD AUTO: 83.5 % — HIGH (ref 43–77)
NEUTS BAND # BLD: 6.9 % — SIGNIFICANT CHANGE UP (ref 0–8)
OVALOCYTES BLD QL SMEAR: SLIGHT — SIGNIFICANT CHANGE UP
PHOSPHATE SERPL-MCNC: 3 MG/DL — SIGNIFICANT CHANGE UP (ref 2.5–4.5)
PLAT MORPH BLD: NORMAL — SIGNIFICANT CHANGE UP
PLATELET # BLD AUTO: 153 K/UL — SIGNIFICANT CHANGE UP (ref 150–400)
POIKILOCYTOSIS BLD QL AUTO: SLIGHT — SIGNIFICANT CHANGE UP
POLYCHROMASIA BLD QL SMEAR: SLIGHT — SIGNIFICANT CHANGE UP
POTASSIUM SERPL-MCNC: 4.3 MMOL/L — SIGNIFICANT CHANGE UP (ref 3.5–5.3)
POTASSIUM SERPL-SCNC: 4.3 MMOL/L — SIGNIFICANT CHANGE UP (ref 3.5–5.3)
PROT SERPL-MCNC: 6.5 G/DL — SIGNIFICANT CHANGE UP (ref 6–8.3)
PROTHROM AB SERPL-ACNC: 18.5 SEC — HIGH (ref 10.5–13.4)
RBC # BLD: 2.64 M/UL — LOW (ref 4.2–5.8)
RBC # FLD: 21 % — HIGH (ref 10.3–14.5)
RBC BLD AUTO: ABNORMAL
SODIUM SERPL-SCNC: 135 MMOL/L — SIGNIFICANT CHANGE UP (ref 135–145)
SPECIMEN SOURCE: SIGNIFICANT CHANGE UP
SPHEROCYTES BLD QL SMEAR: SLIGHT — SIGNIFICANT CHANGE UP
WBC # BLD: 17.64 K/UL — HIGH (ref 3.8–10.5)
WBC # FLD AUTO: 17.64 K/UL — HIGH (ref 3.8–10.5)

## 2022-03-15 PROCEDURE — 99233 SBSQ HOSP IP/OBS HIGH 50: CPT | Mod: GC

## 2022-03-15 PROCEDURE — 99233 SBSQ HOSP IP/OBS HIGH 50: CPT

## 2022-03-15 PROCEDURE — 95720 EEG PHY/QHP EA INCR W/VEEG: CPT

## 2022-03-15 PROCEDURE — 99232 SBSQ HOSP IP/OBS MODERATE 35: CPT

## 2022-03-15 RX ORDER — HEPARIN SODIUM 5000 [USP'U]/ML
5000 INJECTION INTRAVENOUS; SUBCUTANEOUS EVERY 8 HOURS
Refills: 0 | Status: ACTIVE | OUTPATIENT
Start: 2022-03-15 | End: 2023-02-11

## 2022-03-15 RX ORDER — MEROPENEM 1 G/30ML
1000 INJECTION INTRAVENOUS EVERY 8 HOURS
Refills: 0 | Status: DISCONTINUED | OUTPATIENT
Start: 2022-03-16 | End: 2022-03-16

## 2022-03-15 RX ORDER — ASPIRIN/CALCIUM CARB/MAGNESIUM 324 MG
81 TABLET ORAL EVERY 24 HOURS
Refills: 0 | Status: DISCONTINUED | OUTPATIENT
Start: 2022-03-15 | End: 2022-03-18

## 2022-03-15 RX ORDER — MAGNESIUM SULFATE 500 MG/ML
1 VIAL (ML) INJECTION ONCE
Refills: 0 | Status: COMPLETED | OUTPATIENT
Start: 2022-03-15 | End: 2022-03-15

## 2022-03-15 RX ORDER — SODIUM CHLORIDE 9 MG/ML
1000 INJECTION INTRAMUSCULAR; INTRAVENOUS; SUBCUTANEOUS ONCE
Refills: 0 | Status: COMPLETED | OUTPATIENT
Start: 2022-03-15 | End: 2022-03-15

## 2022-03-15 RX ORDER — PIPERACILLIN AND TAZOBACTAM 4; .5 G/20ML; G/20ML
4.5 INJECTION, POWDER, LYOPHILIZED, FOR SOLUTION INTRAVENOUS EVERY 6 HOURS
Refills: 0 | Status: DISCONTINUED | OUTPATIENT
Start: 2022-03-15 | End: 2022-03-15

## 2022-03-15 RX ORDER — MEROPENEM 1 G/30ML
1000 INJECTION INTRAVENOUS EVERY 8 HOURS
Refills: 0 | Status: COMPLETED | OUTPATIENT
Start: 2022-03-15 | End: 2022-03-16

## 2022-03-15 RX ORDER — SODIUM CHLORIDE 9 MG/ML
250 INJECTION, SOLUTION INTRAVENOUS ONCE
Refills: 0 | Status: COMPLETED | OUTPATIENT
Start: 2022-03-15 | End: 2022-03-15

## 2022-03-15 RX ORDER — CHLORHEXIDINE GLUCONATE 213 G/1000ML
1 SOLUTION TOPICAL
Refills: 0 | Status: DISCONTINUED | OUTPATIENT
Start: 2022-03-15 | End: 2022-03-17

## 2022-03-15 RX ADMIN — PIPERACILLIN AND TAZOBACTAM 200 GRAM(S): 4; .5 INJECTION, POWDER, LYOPHILIZED, FOR SOLUTION INTRAVENOUS at 12:52

## 2022-03-15 RX ADMIN — Medication 1 PATCH: at 05:32

## 2022-03-15 RX ADMIN — Medication 100 GRAM(S): at 08:10

## 2022-03-15 RX ADMIN — Medication 1 MILLIGRAM(S): at 12:52

## 2022-03-15 RX ADMIN — CHLORHEXIDINE GLUCONATE 15 MILLILITER(S): 213 SOLUTION TOPICAL at 05:30

## 2022-03-15 RX ADMIN — Medication 1 DROP(S): at 05:31

## 2022-03-15 RX ADMIN — Medication 1 TABLET(S): at 12:52

## 2022-03-15 RX ADMIN — LACTULOSE 30 GRAM(S): 10 SOLUTION ORAL at 05:30

## 2022-03-15 RX ADMIN — CHLORHEXIDINE GLUCONATE 15 MILLILITER(S): 213 SOLUTION TOPICAL at 18:02

## 2022-03-15 RX ADMIN — Medication 1 DROP(S): at 18:35

## 2022-03-15 RX ADMIN — Medication 81 MILLIGRAM(S): at 13:11

## 2022-03-15 RX ADMIN — MUPIROCIN 1 APPLICATION(S): 20 OINTMENT TOPICAL at 18:35

## 2022-03-15 RX ADMIN — LACTULOSE 30 GRAM(S): 10 SOLUTION ORAL at 13:11

## 2022-03-15 RX ADMIN — SODIUM CHLORIDE 500 MILLILITER(S): 9 INJECTION, SOLUTION INTRAVENOUS at 10:12

## 2022-03-15 RX ADMIN — PREGABALIN 1000 MICROGRAM(S): 225 CAPSULE ORAL at 12:53

## 2022-03-15 RX ADMIN — PHENYLEPHRINE HYDROCHLORIDE 2.55 MICROGRAM(S)/KG/MIN: 10 INJECTION INTRAVENOUS at 02:03

## 2022-03-15 RX ADMIN — Medication 1 PATCH: at 18:35

## 2022-03-15 RX ADMIN — Medication 250 MILLIGRAM(S): at 04:24

## 2022-03-15 RX ADMIN — MEROPENEM 100 MILLIGRAM(S): 1 INJECTION INTRAVENOUS at 18:26

## 2022-03-15 RX ADMIN — PHENYLEPHRINE HYDROCHLORIDE 2.55 MICROGRAM(S)/KG/MIN: 10 INJECTION INTRAVENOUS at 18:02

## 2022-03-15 RX ADMIN — HEPARIN SODIUM 5000 UNIT(S): 5000 INJECTION INTRAVENOUS; SUBCUTANEOUS at 18:03

## 2022-03-15 RX ADMIN — PANTOPRAZOLE SODIUM 40 MILLIGRAM(S): 20 TABLET, DELAYED RELEASE ORAL at 05:33

## 2022-03-15 RX ADMIN — TAMSULOSIN HYDROCHLORIDE 0.4 MILLIGRAM(S): 0.4 CAPSULE ORAL at 22:25

## 2022-03-15 RX ADMIN — ATORVASTATIN CALCIUM 80 MILLIGRAM(S): 80 TABLET, FILM COATED ORAL at 22:25

## 2022-03-15 RX ADMIN — PANTOPRAZOLE SODIUM 40 MILLIGRAM(S): 20 TABLET, DELAYED RELEASE ORAL at 18:02

## 2022-03-15 RX ADMIN — SODIUM CHLORIDE 1000 MILLILITER(S): 9 INJECTION INTRAMUSCULAR; INTRAVENOUS; SUBCUTANEOUS at 12:53

## 2022-03-15 RX ADMIN — Medication 1 PATCH: at 22:24

## 2022-03-15 RX ADMIN — Medication 1 PATCH: at 22:23

## 2022-03-15 NOTE — PROGRESS NOTE ADULT - ASSESSMENT
67yo M with Polysubstance Use Disorder (Nicotine, EtOH), HTN, CAD, and AS p/w chest pain in the setting of aortic valve stenosis and found to have cirrhosis c/b newly diagnosed HCC. Palliative consulted for complex medical decision making in the setting of advanced illness.    ·	continued discussion with patient's wife and daughter for medical updates and emotional support

## 2022-03-15 NOTE — PROGRESS NOTE ADULT - ASSESSMENT
67 YO M, current smoker (1ppd x 50 years), current every day ETOH abuse (1 pint vodka and several beers daily x 50 years), with PMHx of HLD, HTN (not on any medications), CABG (LIMA to LAD, SVG to PDA in 2017), AS (s/p AVR in 2017) with recently discovered restenosis, presented to [facility] ED on 2/24/22 with chest pain and episode of LOC, initially admitted to cardiology for NSTEMI with [facility] with nonobstructive disease, then found to have restenosis of bioprosthetic valve for which aortic root surgery was planned, but now deferred given liver lesion c/f HCC. Patient transferred to medicine for further w/u of HCC and management of AMS, which is still of unclear etiology.  During IR TACE today, 3/14, patient noted to be obtunded with difficulty protecting airway, so emergently intubated and stepped up to ICU.    NEURO  #Inability to protect airway  Patient responsive only to sternal rub with c/f aspiration and significant, diffuse rhonchi on exam.  Pulse Ox ranging from %.  Decision made to intubate patient for airway protection.  - SBT in AM  - started on precedex     #AMS (altered mental status)  AAOx2 (person and place). Unlikely hepatic encephalopathy as no asterixis and is currently compensated. Neuro exam nonfocal. Possibly related to chronic alcohol use, Wernicke's or depression i/s/o HCC diagnosis. As per wife, pt is not normally lethargic at home. Completed course of high dose thiamine, last librium dose on 03/01. CTH and MRI w/op acute abnormality. During the night of 3/13-3/14 flow sheets showing 100.1 Temp and pt becoming tachycardic to 120s. Pt not complaining of any localizing signs of infection, CXR unremarkable however UA was positive. UCX sent and pt started on CTX 1g Q24hrs (did not receive first dose as taken to TACE procedure). Post TACE procedure c/f protection of airway given increased somnolence potentially 2/2 anesthesia. Pt was intubated for airway protection and transferred to MICU  - f/u VEEG  - monitor mental status  - Neuro following  - c/w CTX 2g Q24hrs for UTI  - c/w hepatic encephalopathy treatment   - f/u UCx  - f/u BCx   - Monitor for pneumonia, potential aspiration event given mental status.    #History of alcohol use  Long standing EtOH consumption. Not showing any signs of withdrawal at the moment. Last drink prior to admission which is almost 2 weeks ago. Pt was given 3 days of librium Q8hrs while on CT surgery however did not display signs of withdrawal per chart review  - c/w Multivitamin and Folic Acid, and B12.    CARDIO  #AS (aortic stenosis)  AS i/s/o prior TAVR in 2017 with Dr Wilcox. JOSELO with peak transvalvular velocity of 4.7, mean gradient 56. symptomatic: exertional CP, episode of syncope  - initially planned for valve in valve TAVR, but then it was decided that because of aortic root anatomy, he would need aortic root open heart surgery  - he was deemed not to be a surgical candidate given poor functional status, liver cirrhosis, prior medication noncompliance which likely contributed to valve restenosis. his recovery after open heart surgically would be extremely difficult if possible. He would NOT be a surgical candidate     #CAD (coronary artery disease)  S/p CABG with Dr Wilcox in 2017  - recent [facility] with nonobstructive disease  - c/w Lipitor 80mg QD  - c/w ASA 81mg.    #HTN (hypertension)  Off meds.   - Monitor Vs in the setting of sepsis    PULM   #Concern for PNA  Prior to arrival to the MICU on 3/14 there were concerns for aspiration PNA.   - ceftriaxone 2g q24 hrs  - CXR post intubation on 3/14: no acute infiltrates  - patient NPO with OGT    #Intubated  -see above     GI  #ALC (alcoholic liver cirrhosis)  2/2 long standing EtOH abuse. Likely compensated.. Likely no hepatic encephalopathy. No known EV bleeding. NO ascites however now with signs of new infection started on antibiotics today.  - GI following  - no known Varices, but no prior EGD evaluation, he will need it outpatient, although has significant risk factors.    #HCC (hepatocellular carcinoma)  CTAP with 2.4 hypervascular liver mass. i/s/o Liver cirrhosis and EtOH use disorder. triple phase CT confirms HCC  - AFP is 7.2 which is normal  - MRI abdomen: 2.5 cm left hepatic lesion consistent with LI-RADS v 2018 Category: LR-5 Definitely HCC. OPTN Category (if LR-5): 5B. No locally invasive or metastatic disease. Left hepatic artery variant. No additional suspicious hepatic lesions.   - s/p TACE procedure  - Surg/onc following NTD  - Palliative care following.    RENAL  #no active issues      #UTI  Patient with positive UA (WBC, bacteria LE) on 3/14. Unable to assess urinary symptoms, started empiric treatment given mental status.   - c/w CTX 2g Q24hrs for UTI  - f/u UCx  - f/u BCx     #Sanchez in place  - monitor I&O    ID  #Sepsis  Source likely UTI vs. ? aspiration pna, as patient noted to be aspirating over the weekend.  UA positive. 3/4 SIRS with fever, tachycardia, and leukocytosis.  - CTX 2g q24h    ENDO  #No active problem    HEME/ONC  #Normocytic hypochromic anemia  Likely secondary to hemolysis due to prosthetic valve. Minimal epistaxis that does not explain low hgb, Hgb 8.1 on 3/14.  - Completed IV iron X 3 days  - c/w b12 1mg Qd  - c/w folate 1mg qd  - Transfusion goal > 7  - heme/onc, GI, IR following.    Prophylactic measure  F: as appropriate  E: replenish as appropriate  N: OGT, NPO except meds   VTE PPx: holding heparin TID subq, f/u IR  recs after procedure   GI: protonix PO qd  C: Full Code  D: RMF -> MICU   67 YO M, current smoker (1ppd x 50 years), current every day ETOH abuse (1 pint vodka and several beers daily x 50 years), with PMHx of HLD, HTN (not on any medications), CABG (LIMA to LAD, SVG to PDA in 2017), AS (s/p AVR in 2017) with recently discovered restenosis, presented to [facility] ED on 2/24/22 with chest pain and episode of LOC, initially admitted to cardiology for NSTEMI with [facility] with nonobstructive disease, then found to have restenosis of bioprosthetic valve for which aortic root surgery was planned, but now deferred given liver lesion c/f HCC. Patient transferred to medicine for further w/u of HCC and management of AMS, which is still of unclear etiology.  During IR TACE today, 3/14, patient noted to be obtunded with difficulty protecting airway, so emergently intubated and stepped up to ICU.    NEURO  #Inability to protect airway  Patient responsive only to sternal rub with c/f aspiration and significant, diffuse rhonchi on exam.  Pulse Ox ranging from %.  Decision made to intubate patient for airway protection.  - s/p precedex    #AMS (altered mental status)  AAOx2 (person and place). Unlikely hepatic encephalopathy as no asterixis and is currently compensated. Neuro exam nonfocal. Possibly related to chronic alcohol use, Wernicke's or depression i/s/o HCC diagnosis. As per wife, pt is not normally lethargic at home. Completed course of high dose thiamine, last librium dose on 03/01. CTH and MRI w/op acute abnormality. During the night of 3/13-3/14 flow sheets showing 100.1 Temp and pt becoming tachycardic to 120s. Pt not complaining of any localizing signs of infection, CXR unremarkable however UA was positive. UCX sent and pt started on CTX 1g Q24hrs (did not receive first dose as taken to TACE procedure). Post TACE procedure c/f protection of airway given increased somnolence potentially 2/2 anesthesia. Pt was intubated for airway protection and transferred to MICU  - f/u VEEG  - monitor mental status  - Neuro following  - c/w hepatic encephalopathy treatment   - Infectious disease as below   - Monitor for pneumonia, potential aspiration event given mental status.    #History of alcohol use  Long standing EtOH consumption. Not showing any signs of withdrawal at the moment. Last drink prior to admission which is almost 2 weeks ago. Pt was given 3 days of librium Q8hrs while on CT surgery however did not display signs of withdrawal per chart review  - c/w Multivitamin and Folic Acid, and B12.    CARDIO  #AS (aortic stenosis)  AS i/s/o prior TAVR in 2017 with Dr Wilcox. JOSELO with peak transvalvular velocity of 4.7, mean gradient 56. symptomatic: exertional CP, episode of syncope  - initially planned for valve in valve TAVR, but then it was decided that because of aortic root anatomy, he would need aortic root open heart surgery  - he was deemed not to be a surgical candidate given poor functional status, liver cirrhosis, prior medication noncompliance which likely contributed to valve restenosis. his recovery after open heart surgically would be extremely difficult if possible. He would NOT be a surgical candidate   - CXR 3/14: clear lung fields     #CAD (coronary artery disease)  S/p CABG with Dr Wilcox in 2017  - recent [facility] with nonobstructive disease  - c/w Lipitor 80mg QD  - c/w ASA 81mg.    #HTN (hypertension)  Off meds.   - Monitor VS in the setting of sepsis    PULM   #Concern for PNA  Prior to arrival to the MICU on 3/14 there were concerns for aspiration PNA.   - ceftriaxone 2g q24 hrs  - CXR post intubation on 3/14: no acute infiltrates  - OGT    #Intubated  -see above     GI  #ALC (alcoholic liver cirrhosis)  2/2 long standing EtOH abuse. Likely compensated.. Likely no hepatic encephalopathy. No known EV bleeding. NO ascites however now with signs of new infection started on antibiotics today.  - GI following  - no known Varices, but no prior EGD evaluation, he will need it outpatient, although has significant risk factors.    #HCC (hepatocellular carcinoma)  CTAP with 2.4 hypervascular liver mass. i/s/o Liver cirrhosis and EtOH use disorder. triple phase CT confirms HCC  - AFP is 7.2 which is normal  - MRI abdomen: 2.5 cm left hepatic lesion consistent with LI-RADS v 2018 Category: LR-5 Definitely HCC. OPTN Category (if LR-5): 5B. No locally invasive or metastatic disease. Left hepatic artery variant. No additional suspicious hepatic lesions.   - s/p IR transarterial chemical embolization since he is not a candidate for ablation yesterday.  - Surg/onc following NTD  - Palliative care following.    RENAL  #no active issues      #UTI  Patient with positive UA (WBC, bacteria LE) on 3/14. Unable to assess urinary symptoms, started empiric treatment given mental status.   - s/p CTX 2g Q24hrs for UTI  - started on Meropenem 3/15  - see below     #Sanchez in place  - monitor I&O    ID  #Sepsis  Source likely UTI vs. ? aspiration pna, as patient noted to be aspirating over the weekend.  UA positive. 3/4 SIRS with fever, tachycardia, and leukocytosis.  - UCx 3/14: Klebsiella and proteus   - BCx 3/14: NGTD  - Sputum Cx 3/15: f/u   - Spiking fevers on 3/15 while on CTX 2g q24h, switch to Zosyn  pseudomonal coverage and due to persistent spiking                - started on Meropenem     ENDO  #No active problem    HEME/ONC  #Normocytic hypochromic anemia  Likely secondary to hemolysis due to prosthetic valve. Minimal epistaxis that does not explain low hgb, Hgb 8.1 on 3/14.  - Completed IV iron X 3 days  - c/w b12 1mg Qd  - c/w folate 1mg qd  - Transfusion goal > 7  - heme/onc, GI, IR following.    Prophylactic measure  F: as appropriate  E: replenish as appropriate  N: OGT, NPO except meds   VTE PPx: heparin TID sub  GI: protonix IV BID   C: Full Code  D: MICU

## 2022-03-15 NOTE — EEG REPORT - NS EEG TEXT BOX
[facility] Department of Neurology  Inpatient Continuous video-Electroencephalography Report    Acquisition Details:  Electroencephalography was acquired using a minimum of 21 channels on an [employer] Neurology system v 8.5.1 with electrode placement according to the standard International 10-20 system following ACNS (American Clinical Neurophysiology Society) guidelines for Long-Term Video EEG monitoring.  Anterior temporal T1 and T2 electrodes were utilized whenever possible.   The [employer] automated spike & seizure detections were all reviewed in detail, in addition to extensive portions of raw EEG.    Daily Updates (from 7:00 AM to 7:00 AM – break 8:30 AM to 1:30 PM for procedure):  Day 2: 3/14/2022 – 3/15/2022  Pertinent medications: Precedex drip started 3/14 @ 1433  Awake Background:  continuous, with predominantly theta > alpha frequencies in first 90 mins of recording, then continuous, diffusely-attenuated, delta for the remainder of the recording.  Symmetry:  No persistent asymmetries of voltage or frequency.  Organization: Rudimentary anterior-posterior gradient.  Posterior Dominant Rhythm: 8 Hz fragmented and poorly regulated  Voltage:  Normal (20+ uV)  Variability: Yes. 		Reactivity: Yes in first 90 mins, Unclear throughout remainder of recording  State changes: Present without definite N2 sleep transients  Spontaneous Activity:  No epileptiform discharges.  Periodic/rhythmic activity:  None.  Events:  No electrographic seizures or significant clinical events.  Provocations:  Hyperventilation and Photic stimulation: was not performed.  EKG: tachycardia    Daily Summary:    Mild-to-moderate non-specific cerebral dysfunction in first 90 mins of recording. Post-procedure, the recording becomes diffusely attenuated and severely slowed, which be seen in the setting of sedation medications. Clinical correlation is advised.   No epileptiform discharges or electrographic seizures were recorded.     Edison Lal DO  CNP Fellow    Leland Garza MD  Attending Neurologist, [facility] Epilepsy Program   [facility] Department of Neurology  Inpatient Continuous video-Electroencephalography Report    Acquisition Details:  Electroencephalography was acquired using a minimum of 21 channels on an [employer] and [employer] Neurology system v 8.5.1 with electrode placement according to the standard International 10-20 system following ACNS (American Clinical Neurophysiology Society) guidelines for Long-Term Video EEG monitoring.  Anterior temporal T1 and T2 electrodes were utilized whenever possible.   The [employer] automated spike & seizure detections were all reviewed in detail, in addition to extensive portions of raw EEG.    Daily Updates (from 7:00 AM to 7:00 AM – break 8:30 AM to 1:30 PM for procedure):  Day 3: 3/14/2022 – 3/15/2022  Pertinent medications: Precedex drip started 3/14 @ 1433  Awake Background:  continuous, with predominantly theta > alpha frequencies in first 90 mins of recording, then continuous, diffusely-attenuated, delta for the remainder of the recording.  Symmetry:  No persistent asymmetries of voltage or frequency.  Organization: Rudimentary anterior-posterior gradient.  Posterior Dominant Rhythm: 8 Hz fragmented and poorly regulated  Voltage:  Normal (20+ uV)  Variability: Yes. 		Reactivity: Yes in first 90 mins, Unclear throughout remainder of recording  State changes: Present without definite N2 sleep transients  Spontaneous Activity:  No epileptiform discharges.  Periodic/rhythmic activity:  None.  Events:  No electrographic seizures or significant clinical events.  Provocations:  Hyperventilation and Photic stimulation: was not performed.  EKG: tachycardia    Daily Summary:    Mild-to-moderate non-specific cerebral dysfunction in first 90 mins of recording. Post-procedure, the recording becomes diffusely attenuated and severely slowed, which be seen in the setting of sedation medications. Clinical correlation is advised.   No epileptiform discharges or electrographic seizures were recorded.     Edison Lal DO  CNP Fellow    Leland Garza MD  Attending Neurologist, [facility] Epilepsy Program

## 2022-03-15 NOTE — PROGRESS NOTE ADULT - SUBJECTIVE AND OBJECTIVE BOX
SUBJECTIVE: Patient seen and examined bedside by chief resident. Patient currently on pressors. Intubated, not sedated. EEG running.    aspirin enteric coated 81 milliGRAM(s) Oral daily  cefTRIAXone   IVPB 2000 milliGRAM(s) IV Intermittent every 24 hours  phenylephrine    Infusion 0.1 MICROgram(s)/kG/Min IV Continuous <Continuous>  rifAXIMin 550 milliGRAM(s) Oral two times a day  tamsulosin 0.4 milliGRAM(s) Oral at bedtime  vancomycin  IVPB 1000 milliGRAM(s) IV Intermittent every 12 hours      Vital Signs Last 24 Hrs  T(C): 38.7 (15 Mar 2022 06:00), Max: 39 (14 Mar 2022 23:00)  T(F): 101.7 (15 Mar 2022 06:00), Max: 102.2 (14 Mar 2022 23:00)  HR: 119 (15 Mar 2022 09:00) (94 - 126)  BP: 109/62 (15 Mar 2022 09:00) (72/48 - 141/81)  BP(mean): 81 (15 Mar 2022 09:00) (56 - 109)  RR: 23 (15 Mar 2022 09:00) (15 - 31)  SpO2: 99% (15 Mar 2022 09:00) (98% - 100%)  I&O's Detail    14 Mar 2022 07:01  -  15 Mar 2022 07:00  --------------------------------------------------------  IN:    Dexmedetomidine: 6.8 mL    Norepinephrine: 12.6 mL    Phenylephrine: 308.2 mL  Total IN: 327.6 mL    OUT:    Indwelling Catheter - Urethral (mL): 602 mL  Total OUT: 602 mL    Total NET: -274.4 mL      15 Mar 2022 07:01  -  15 Mar 2022 10:28  --------------------------------------------------------  IN:    Phenylephrine: 25.6 mL  Total IN: 25.6 mL    OUT:    Indwelling Catheter - Urethral (mL): 90 mL  Total OUT: 90 mL    Total NET: -64.4 mL          Physical Exam:  General: intubated, not sedated on pressors  C/V: sinus tachycardia   Pulm: intubated  Abd: soft, non-tender, distended  Extrem: warm and well perfused, no edema, SCDs in place    LABS:                        7.8    17.64 )-----------( 153      ( 15 Mar 2022 05:13 )             25.0     03-15    135  |  107  |  13  ----------------------------<  169<H>  4.3   |  18<L>  |  0.77    Ca    8.2<L>      15 Mar 2022 05:13  Phos  3.0     03-15  Mg     1.9     03-15    TPro  6.5  /  Alb  2.3<L>  /  TBili  1.2  /  DBili  x   /  AST  60<H>  /  ALT  41  /  AlkPhos  100  03-15    PT/INR - ( 15 Mar 2022 05:13 )   PT: 18.5 sec;   INR: 1.55          PTT - ( 15 Mar 2022 05:13 )  PTT:41.6 sec  Urinalysis Basic - ( 14 Mar 2022 08:47 )    Color: x / Appearance: x / SG: x / pH: x  Gluc: x / Ketone: x  / Bili: x / Urobili: x   Blood: x / Protein: x / Nitrite: x   Leuk Esterase: x / RBC: < 5 /HPF / WBC Many /HPF   Sq Epi: x / Non Sq Epi: 0-5 /HPF / Bacteria: Present /HPF        RADIOLOGY & ADDITIONAL STUDIES:    65 y/o M, current smoker (1ppd x 50 years), current every day ETOH abuse (1 pint vodka and several beers daily x 50 years), with PMHx of HLD, HTN (not on any medications),  CABG (LIMA to LAD, SVG to PDA in 2017), AS (s/p AVR in 2017), who presented to [facility] ED on 2/24/22 NSTEMI with LHC with nonobstructive disease, then found to have restenosis of bioprosthetic valve for which aortic root surgery was planned. Now w/ newly diagnosed 2.5cm left hepatic mass concerning for HCC found on CT AP. Went for IR transarterial chemical embolization since he is not a candidate for ablation yesterday. He became obtunded with increased secretions and concern that he was not protecting his airway and was intubated after. Remains intubated and on pressors. EEG running    Recommendations:   No acute surgical intervention at this time  Rest of care per primary team  Team 1c will continue to follow    Patient discussed with attending and chief resident

## 2022-03-15 NOTE — PROGRESS NOTE ADULT - ATTENDING COMMENTS
acute hypoxemic resp failure/HCC/metabolic encephalopathy/COPD/NSTEMI/h/o CAD and AVR/sepsis/UTI/ETOH abuse  physical as above  continued fever; change to meropenem for Klebsiella/proteus in urine pending sensitivities  continue mechanical ventilation  f/u for HCC  precedex for sedation  rest as above

## 2022-03-15 NOTE — PROGRESS NOTE ADULT - SUBJECTIVE AND OBJECTIVE BOX
[facility]  Min Ram, Palliative Care Attending  Contact Info: Call [phone] (HEAL Line) or message on Microsoft Teams (Min Ram)    SUBJECTIVE AND OBJECTIVE:  INTERVAL HPI/OVERNIGHT EVENTS: Interval events noted. Patient required PRNs of  in past 24hrs.    ALLERGIES:  No Known Allergies    MEDICATIONS  (STANDING):  artificial tears (preservative free) Ophthalmic Solution 1 Drop(s) Both EYES two times a day  aspirin  chewable 81 milliGRAM(s) Oral every 24 hours  atorvastatin 80 milliGRAM(s) Oral at bedtime  chlorhexidine 0.12% Liquid 15 milliLiter(s) Oral Mucosa every 12 hours  chlorhexidine 2% Cloths 1 Application(s) Topical <User Schedule>  cyanocobalamin 1000 MICROGram(s) Oral daily  DOXOrubicin INTRA-ARTERIAL (eMAR) 50 milliGRAM(s) IntraArterial once  ferrous    sulfate 325 milliGRAM(s) Oral <User Schedule>  folic acid 1 milliGRAM(s) Oral daily  heparin   Injectable 5000 Unit(s) SubCutaneous every 8 hours  lactulose Syrup 30 Gram(s) Oral three times a day  meropenem  IVPB 1000 milliGRAM(s) IV Intermittent every 8 hours  multivitamin 1 Tablet(s) Oral daily  mupirocin 2% Nasal 1 Application(s) Both Nostrils two times a day  nicotine - 21 mG/24Hr(s) Patch 1 patch Transdermal daily  pantoprazole  Injectable 40 milliGRAM(s) IV Push every 12 hours  phenylephrine    Infusion 0.1 MICROgram(s)/kG/Min (2.55 mL/Hr) IV Continuous <Continuous>  rifAXIMin 550 milliGRAM(s) Oral two times a day  tamsulosin 0.4 milliGRAM(s) Oral at bedtime    MEDICATIONS  (PRN):  oxymetazoline 0.05% Nasal Spray 2 Spray(s) Both Nostrils two times a day PRN nose bleeding  sodium chloride 0.65% Nasal 1 Spray(s) Both Nostrils four times a day PRN Nasal Congestion      Analgesic Use (Scheduled and PRNs) for past 24 hours:    acetaminophen   IVPB ..   400 mL/Hr IV Intermittent (03-14-22 @ 22:59)    nicotine - 21 mG/24Hr(s) Patch   1 Patch Transdermal (03-14-22 @ 19:27)      ITEMS UNCHECKED ARE NOT PRESENT  PRESENT SYMPTOMS/REVIEW OF SYSTEMS: []Unable to obtain due to poor mentation   Source if other than patient:  []Family   []Team         Vital Signs Last 24 Hrs  T(C): 38.3 (15 Mar 2022 18:11), Max: 39 (14 Mar 2022 23:00)  T(F): 101 (15 Mar 2022 18:11), Max: 102.2 (14 Mar 2022 23:00)  HR: 123 (15 Mar 2022 16:16) (95 - 123)  BP: 88/51 (15 Mar 2022 16:00) (88/51 - 141/81)  BP(mean): 65 (15 Mar 2022 16:00) (65 - 100)  RR: 27 (15 Mar 2022 16:16) (18 - 31)  SpO2: 100% (15 Mar 2022 16:16) (98% - 100%)    LABS:                         7.8    17.64 )-----------( 153      ( 15 Mar 2022 05:13 )             25.0   03-15    135  |  107  |  13  ----------------------------<  169<H>  4.3   |  18<L>  |  0.77    Ca    8.2<L>      15 Mar 2022 05:13  Phos  3.0     03-15  Mg     1.9     03-15    TPro  6.5  /  Alb  2.3<L>  /  TBili  1.2  /  DBili  x   /  AST  60<H>  /  ALT  41  /  AlkPhos  100  03-15      RADIOLOGY & ADDITIONAL STUDIES: None new    DISCUSSION OF CASE: Family - to provide updates and emotional support    Care Coordination Document:                                        Progress Notes    PROGRESS NOTE  Date & Time of Note   2022-03-15 12:30    Notes    Notes: Case discussed in IDR. Patient is a transfer from a medical regional  floor. On 3/14, patient went for planned transarterial chemoembolization by IR  for further evaluation for liver lesion. Patient extubated and was still  obtunded. Patient was reintubated and sedated for airway protection and  transferred to the MICU. Patient is now off sedation and pressors but remains  intubated. Patient for possible extubation tomorrow 3/16. Pallative care team  following for GOC. CM remains available.       Electronically signed by:  Ashleigh Landa  Electronically signed on:  2022-03-15  16:08       [facility]  Min Ram, Palliative Care Attending  Contact Info: Call [phone] (HEAL Line) or message on Microsoft Teams (Min Ram)    SUBJECTIVE AND OBJECTIVE:  INTERVAL HPI/OVERNIGHT EVENTS: Interval events noted. Patient intubated and unable to participate in interview. Further collateral obtained from daughter at bedside. Extensive time spent on counselling and emotional support. Patient required no additional PRNs in past 24hrs.    ALLERGIES:  No Known Allergies    MEDICATIONS  (STANDING):  artificial tears (preservative free) Ophthalmic Solution 1 Drop(s) Both EYES two times a day  aspirin  chewable 81 milliGRAM(s) Oral every 24 hours  atorvastatin 80 milliGRAM(s) Oral at bedtime  chlorhexidine 0.12% Liquid 15 milliLiter(s) Oral Mucosa every 12 hours  chlorhexidine 2% Cloths 1 Application(s) Topical <User Schedule>  cyanocobalamin 1000 MICROGram(s) Oral daily  DOXOrubicin INTRA-ARTERIAL (eMAR) 50 milliGRAM(s) IntraArterial once  ferrous    sulfate 325 milliGRAM(s) Oral <User Schedule>  folic acid 1 milliGRAM(s) Oral daily  heparin   Injectable 5000 Unit(s) SubCutaneous every 8 hours  lactulose Syrup 30 Gram(s) Oral three times a day  meropenem  IVPB 1000 milliGRAM(s) IV Intermittent every 8 hours  multivitamin 1 Tablet(s) Oral daily  mupirocin 2% Nasal 1 Application(s) Both Nostrils two times a day  nicotine - 21 mG/24Hr(s) Patch 1 patch Transdermal daily  pantoprazole  Injectable 40 milliGRAM(s) IV Push every 12 hours  phenylephrine    Infusion 0.1 MICROgram(s)/kG/Min (2.55 mL/Hr) IV Continuous <Continuous>  rifAXIMin 550 milliGRAM(s) Oral two times a day  tamsulosin 0.4 milliGRAM(s) Oral at bedtime    MEDICATIONS  (PRN):  oxymetazoline 0.05% Nasal Spray 2 Spray(s) Both Nostrils two times a day PRN nose bleeding  sodium chloride 0.65% Nasal 1 Spray(s) Both Nostrils four times a day PRN Nasal Congestion      Analgesic Use (Scheduled and PRNs) for past 24 hours:  acetaminophen   IVPB ..   400 mL/Hr IV Intermittent (03-14-22 @ 22:59)    nicotine - 21 mG/24Hr(s) Patch   1 Patch Transdermal (03-14-22 @ 19:27)    ITEMS UNCHECKED ARE NOT PRESENT  PRESENT SYMPTOMS/REVIEW OF SYSTEMS: [x]Unable to obtain due to poor mentation   Source if other than patient:  []Family   []Team     Pain: [] yes [x] no - see PAINAD/CPOT score  QOL impact -   Location -                    Aggravating factors -  Quality -  Radiation -  Timing -  Severity (0-10 scale) -  Minimal acceptable level (0-10 scale) -    CPOT Score: 0    Dyspnea:                           []Mild  []Moderate []Severe  Anxiety:                             []Mild []Moderate []Severe  Fatigue:                             []Mild []Moderate []Severe  Nausea:                             []Mild []Moderate []Severe  Loss of appetite:              []Mild []Moderate []Severe  Constipation:                    []Mild []Moderate []Severe    Other Symptoms:  []All other review of systems negative     Palliative Performance Status Version 2: 10%    GENERAL:  []Alert  []Oriented x   [x]Lethargic  []Cachexia  []Unarousable  []Verbal  [x]Non-Verbal  Behavioral:   []Anxiety  [x]Delirium []Agitation []Cooperative  HEENT:  []Normal   []Dry mouth   [x]ET Tube/Trach  []Oral lesions  PULMONARY:   []Clear []Tachypnea  []Audible excessive secretions   [x]Rhonchi        [x]Right []Left []Bilateral  []Crackles        []Right []Left []Bilateral  []Wheezing     []Right []Left []Bilateral  CARDIOVASCULAR:    [x]Regular []Irregular []Tachy  []Dominick []Murmur []Other  GASTROINTESTINAL:  [x]Soft  [x]Distended   [x]+BS  [x]Non tender []Tender  []PEG [x]OGT/ NGT  Last BM:  GENITOURINARY:  []Normal [x] Incontinent   []Oliguria/Anuria   []Sanchez  MUSCULOSKELETAL:   []Normal   [x]Weakness  []Bed/Wheelchair bound []Edema  NEUROLOGIC:   []No focal deficits  [x]Cognitive impairment  []Dysphagia []Dysarthria []Paresis [x]Encephalopathic  SKIN:   [x]Normal   []Pressure ulcer(s)  []Rash    CRITICAL CARE:  [ ]Shock Present  [ ]Septic [ ]Cardiogenic [ ]Neurologic [ ]Hypovolemic  [ ] Vasopressors [ ]Inotropes   [x]Respiratory failure present [x]Mechanical Ventilation [ ]Non-invasive ventilatory support [ ]High-Flow  [x]Acute  [ ]Chronic [x]Hypoxic  [ ]Hypercarbic   [ ]Other organ failure    Vital Signs Last 24 Hrs  T(C): 38.3 (15 Mar 2022 18:11), Max: 39 (14 Mar 2022 23:00)  T(F): 101 (15 Mar 2022 18:11), Max: 102.2 (14 Mar 2022 23:00)  HR: 123 (15 Mar 2022 16:16) (95 - 123)  BP: 88/51 (15 Mar 2022 16:00) (88/51 - 141/81)  BP(mean): 65 (15 Mar 2022 16:00) (65 - 100)  RR: 27 (15 Mar 2022 16:16) (18 - 31)  SpO2: 100% (15 Mar 2022 16:16) (98% - 100%)    LABS:                         7.8    17.64 )-----------( 153      ( 15 Mar 2022 05:13 )             25.0   03-15    135  |  107  |  13  ----------------------------<  169<H>  4.3   |  18<L>  |  0.77    Ca    8.2<L>      15 Mar 2022 05:13  Phos  3.0     03-15  Mg     1.9     03-15  TPro  6.5  /  Alb  2.3<L>  /  TBili  1.2  /  DBili  x   /  AST  60<H>  /  ALT  41  /  AlkPhos  100  03-15    RADIOLOGY & ADDITIONAL STUDIES: None new    DISCUSSION OF CASE: Wife/Daughter - to provide updates and emotional support

## 2022-03-15 NOTE — PROGRESS NOTE ADULT - ATTENDING COMMENTS
altered mental status in setting of hyperammonemia and fever - consistent with toxic metabolic encephalopathy (hepatic and septic)  EEG with slowing but no seizures or epileptiform discharges - discontinue  MRI brain with no acute abnormalities  neurology will sign off, call back with further questions

## 2022-03-15 NOTE — PROGRESS NOTE ADULT - SUBJECTIVE AND OBJECTIVE BOX
INTERVAL HPI/OVERNIGHT EVENTS:  O/N: Lactate 2.6 (downtrending). Febrile to 101.6. Given 1g Tylenol (total of 2g on 3/14). Vent order placed. AM lactate increased to 3.4. UOP decreasing overnight  This morning: Patient was seen and examined at bedside. Exam as below.    VITAL SIGNS:  T(F): 101 (03-15-22 @ 18:11)  HR: 109 (03-15-22 @ 19:00)  BP: 96/60 (03-15-22 @ 19:00)  RR: 17 (03-15-22 @ 19:00)  SpO2: 100% (03-15-22 @ 19:00)  Wt(kg): --    PHYSICAL EXAM:  Neuro: Pt intubated, somnolent, on EEG. PERRL. Withdraws to painful stimuli.     MEDICATIONS  (STANDING):  artificial tears (preservative free) Ophthalmic Solution 1 Drop(s) Both EYES two times a day  aspirin  chewable 81 milliGRAM(s) Oral every 24 hours  atorvastatin 80 milliGRAM(s) Oral at bedtime  chlorhexidine 0.12% Liquid 15 milliLiter(s) Oral Mucosa every 12 hours  chlorhexidine 2% Cloths 1 Application(s) Topical <User Schedule>  cyanocobalamin 1000 MICROGram(s) Oral daily  DOXOrubicin INTRA-ARTERIAL (eMAR) 50 milliGRAM(s) IntraArterial once  ferrous    sulfate 325 milliGRAM(s) Oral <User Schedule>  folic acid 1 milliGRAM(s) Oral daily  heparin   Injectable 5000 Unit(s) SubCutaneous every 8 hours  lactulose Syrup 30 Gram(s) Oral three times a day  meropenem  IVPB 1000 milliGRAM(s) IV Intermittent every 8 hours  multivitamin 1 Tablet(s) Oral daily  mupirocin 2% Nasal 1 Application(s) Both Nostrils two times a day  nicotine - 21 mG/24Hr(s) Patch 1 patch Transdermal daily  pantoprazole  Injectable 40 milliGRAM(s) IV Push every 12 hours  phenylephrine    Infusion 0.1 MICROgram(s)/kG/Min (2.55 mL/Hr) IV Continuous <Continuous>  rifAXIMin 550 milliGRAM(s) Oral two times a day  tamsulosin 0.4 milliGRAM(s) Oral at bedtime    MEDICATIONS  (PRN):  oxymetazoline 0.05% Nasal Spray 2 Spray(s) Both Nostrils two times a day PRN nose bleeding  sodium chloride 0.65% Nasal 1 Spray(s) Both Nostrils four times a day PRN Nasal Congestion      Allergies    No Known Allergies    Intolerances        LABS:                        7.8    17.64 )-----------( 153      ( 15 Mar 2022 05:13 )             25.0     03-15    135  |  107  |  13  ----------------------------<  169<H>  4.3   |  18<L>  |  0.77    Ca    8.2<L>      15 Mar 2022 05:13  Phos  3.0     03-15  Mg     1.9     03-15    TPro  6.5  /  Alb  2.3<L>  /  TBili  1.2  /  DBili  x   /  AST  60<H>  /  ALT  41  /  AlkPhos  100  03-15    PT/INR - ( 15 Mar 2022 05:13 )   PT: 18.5 sec;   INR: 1.55          PTT - ( 15 Mar 2022 05:13 )  PTT:41.6 sec  Urinalysis Basic - ( 14 Mar 2022 08:47 )    Color: x / Appearance: x / SG: x / pH: x  Gluc: x / Ketone: x  / Bili: x / Urobili: x   Blood: x / Protein: x / Nitrite: x   Leuk Esterase: x / RBC: < 5 /HPF / WBC Many /HPF   Sq Epi: x / Non Sq Epi: 0-5 /HPF / Bacteria: Present /HPF            Culture - Sputum (collected 03-15-22 @ 12:35)  Source: .Sputum Sputum  Gram Stain (03-15-22 @ 17:19):    Rare epithelial cells    Few WBC's    Few Gram Negative Rods    Rare Gram positive cocci in pairs, chains and clusters        RADIOLOGY & ADDITIONAL TESTS:  Reviewed

## 2022-03-15 NOTE — PROGRESS NOTE ADULT - PROBLEM SELECTOR PLAN 8
.  Complex medical decision making / symptom management in the setting of advanced illness.    Acute decompensation with life-threatening complications. Will continue to follow for ongoing GOC discussion. Emotional support provided, questions answered.    Active Psychosocial Referrals:  [x]Social Work/Case management []PT/OT []Chaplaincy []Hospice  []Patient/Family Support []Holistic RN []Massage Therapy []Ethics  Coping: [] well [] with difficulty [] poor coping [x] unable to assess  Support system: [] strong [] adequate [x] inadequate    For new or uncontrolled symptoms, please call Palliative Care at [phone]. The service is available 24/7 (including nights & weekends) to provide symptom management recommendations over the phone as appropriate

## 2022-03-15 NOTE — PROGRESS NOTE ADULT - ASSESSMENT
66M, current smoker (1ppd x 50 years), current every day ETOH abuse (1 pint vodka and several beers daily x 50 years), PMH HLD, HTN, CABG (2017), AS (s/p AVR in 2017) who presented to [facility] 2/24/22 with exertional chest pain, diaphoresis, and fall admitted for NSTEMI work-up. Neurology was consulted for worsening mental status.    Plan:   - CT Head reviewed, showed mild chronic microvascular ischemia but negative for any acute changes  - MRI brain without contrast reviewed: Mild chronic microvascular ischemic disease. Brain parenchymal volume loss, advanced for patient's age.  - most recent vEEG monitoring report (7:00 AM to 7:00 AM – break 8:30 AM to 1:30 PM for procedure) for day 2 3/14 – 3/15/22 showing no epileptiform discharges or electrographic seizures  Recommend:  -  UA repeated and positive, pt on abx - would treat for any underlying infection that could be contributing to encephalopathy  - Continue treatment of hepatic encephalopathy as per primary team  - Avoid sedating medications    Discussed with Attending Physician Dr. Vallejo. Recommendations are considered final after attending attestation.      66M, current smoker (1ppd x 50 years), current every day ETOH abuse (1 pint vodka and several beers daily x 50 years), PMH HLD, HTN, CABG (2017), AS (s/p AVR in 2017) who presented to [facility] 2/24/22 with exertional chest pain, diaphoresis, and fall admitted for NSTEMI work-up. Neurology was consulted for worsening mental status.    Plan:   - CT Head reviewed, showed mild chronic microvascular ischemia but negative for any acute changes  - MRI brain without contrast reviewed: Mild chronic microvascular ischemic disease. Brain parenchymal volume loss, advanced for patient's age.  - most recent vEEG monitoring report (7:00 AM to 7:00 AM – break 8:30 AM to 1:30 PM for procedure) for day 2 3/14 – 3/15/22 showing no epileptiform discharges or electrographic seizures  Recommend:  - d/c EEG   -  UA repeated and positive  - Continue treatment of hepatic encephalopathy as per primary team  - Avoid sedating medications  - neurology will sign off, call back with further questions

## 2022-03-15 NOTE — PROGRESS NOTE ADULT - SUBJECTIVE AND OBJECTIVE BOX
HOSPITAL COURSE  67 YO M, current smoker (1ppd x 50 years), current every day ETOH abuse (1 pint vodka and several beers daily x 50 years), with PMHx of HLD, HTN (not on any medications), CABG (LIMA to LAD, SVG to PDA in 2017), AS (s/p AVR in 2017) with recently discovered restenosis, presented to [facility] ED on 2/24/22 with chest pain and episode of LOC, initially admitted to cardiology for NSTEMI with LHC showing nonobstructive disease. TTE was reperformed and patient was  found to have restenosis of bioprosthetic valve for which aortic root surgery was planned.  Patient was transferred to CT surgery given complex anatomy with plan for TAVR and aortic root surgery.  During this time on CT surgery service he was found to have liver cirrhosis and a 2.4cmx2.4 cm mass on his liver concerning for HCC.  Decision was made not to proceed with surgery given poor functional status, cirrhosis and non compliance with medications.  The patient had worsening mental status on CT surgery service and med consult was called for hepatic encephalopathy.  The patient had an elevated ammonia level (123) however did not display asterixes  He was A&02-3 when transferred to medicine.  Patient was started on rifaxamin and lactulose with 2-3 bowel movements per day, the patient remained orientated A&O2-3 with no asterixis Although the patient is awake and alert his mentation is somnolent with slow speech.  He has been followed GI  for liver findings.  He has not been infected until this time.  Patient received intermittent blood transfusion, however with no leslie blood loss aside from minor nose bleeds. Hemolysis labs were positive and likely caused by prosthetic aortic valve. Hematology and surgical oncology was consulted as imaging of abdomen confirmed HCC.  Patient was not a surgical candidate and IR was consulted who planned for embolization.  Patient completed a course of high dose thiamine given alcohol history without improvement in mental status. MRI brain obtained showing mild chronic microvascular ischemic disease and brain parenchymal  volume loss, advanced for patient's age likely 2/2 alcohol abuse.  Neurology was consulted and  VEEG showing moderate diffuse nonspecific cerebral dysfunction. On 3/14 patient was seen with his normal baseline mental status and neuro exam prior to embolization procedure for HCC.  However patient was found to be tachycardic to 120s with new elevated WBC count to 14.  UA was positive, CXR unchanged, bladder scan w/o evidence of retention. Started on ceftriaxone for possible UTI also for concerns for aspiration pneumonia due to persistent somnolence.    Today, 3/14, patient went for planned transarterial chemoembolization by IR for further evaluation for liver lesion but around start of procedure became obtunded with increased secretions and concern that he was not protecting his airway. Following Procedure patient became hypotensive to 80s/50s tachycardic to 120s, was minimally arousable to sternal rub and gurgling with c/f airway protection. ICU consulted for evaluation of urgent vs. emergent intubation. Course c/b anemia likely attributed to intermittent epistaxis for which ENT was consulted w/o plans for acute intervention.  Also of note, concern that patient may have aspirated over the weekend.    INTERVAL HPI/OVERNIGHT EVENTS: Lactate 2.6 (downtrending). Febrile to 101.6. Given 1g Tylenol (total of 2g on 3/14). Vent order placed. AM lactate increased to 3.4. UOP decreasing overnight.     SUBJECTIVE: Patient seen and examined at bedside.     OBJECTIVE:    VITAL SIGNS:  ICU Vital Signs Last 24 Hrs  T(C): 39 (14 Mar 2022 23:00), Max: 39 (14 Mar 2022 23:00)  T(F): 102.2 (14 Mar 2022 23:00), Max: 102.2 (14 Mar 2022 23:00)  HR: 119 (15 Mar 2022 06:00) (94 - 126)  BP: 101/62 (15 Mar 2022 06:00) (72/48 - 141/81)  BP(mean): 76 (15 Mar 2022 06:00) (56 - 109)  ABP: --  ABP(mean): --  RR: 31 (15 Mar 2022 06:00) (15 - 31)  SpO2: 99% (15 Mar 2022 06:00) (98% - 100%)    Mode: AC/ CMV (Assist Control/ Continuous Mandatory Ventilation), RR (machine): 12, TV (machine): 420, FiO2: 30, PEEP: 8, ITime: 1, MAP: 9.7, PIP: 17    03-14 @ 07:01  -  03-15 @ 06:26  --------------------------------------------------------  IN: 312.3 mL / OUT: 592 mL / NET: -279.7 mL      CAPILLARY BLOOD GLUCOSE      POCT Blood Glucose.: 140 mg/dL (14 Mar 2022 23:27)      PHYSICAL EXAM:    Constitutional: intubated and sedated  HEENT: PERRLA but sluggish, dry epistaxis, dry blood in oral cavity, dry MM  Neck: No LAD, No JVD  Respiratory: mechanical breath sounds   Cardiovascular: tachycardic, regular rate, Loud systolic murmur best heard at right 2  Gastrointestinal: : soft, NT, distended, tympanic  Extremities: WWP, No peripheral edema,   Vascular: 2+ peripheral pulses  Neurological: intubated, unable to follow commands, no focal deficit   Psychiatric: unable to assess  Skin: No rashes    MEDICATIONS:  MEDICATIONS  (STANDING):  artificial tears (preservative free) Ophthalmic Solution 1 Drop(s) Both EYES two times a day  aspirin enteric coated 81 milliGRAM(s) Oral daily  atorvastatin 80 milliGRAM(s) Oral at bedtime  cefTRIAXone   IVPB 2000 milliGRAM(s) IV Intermittent every 24 hours  chlorhexidine 0.12% Liquid 15 milliLiter(s) Oral Mucosa every 12 hours  cyanocobalamin 1000 MICROGram(s) Oral daily  dexMEDEtomidine Infusion 0.2 MICROgram(s)/kG/Hr (3.4 mL/Hr) IV Continuous <Continuous>  DOXOrubicin INTRA-ARTERIAL (eMAR) 50 milliGRAM(s) IntraArterial once  ferrous    sulfate 325 milliGRAM(s) Oral <User Schedule>  folic acid 1 milliGRAM(s) Oral daily  lactulose Syrup 30 Gram(s) Oral three times a day  multivitamin 1 Tablet(s) Oral daily  mupirocin 2% Nasal 1 Application(s) Both Nostrils two times a day  nicotine - 21 mG/24Hr(s) Patch 1 patch Transdermal daily  pantoprazole  Injectable 40 milliGRAM(s) IV Push every 12 hours  phenylephrine    Infusion 0.1 MICROgram(s)/kG/Min (2.55 mL/Hr) IV Continuous <Continuous>  rifAXIMin 550 milliGRAM(s) Oral two times a day  tamsulosin 0.4 milliGRAM(s) Oral at bedtime  vancomycin  IVPB 1000 milliGRAM(s) IV Intermittent every 12 hours    MEDICATIONS  (PRN):  oxymetazoline 0.05% Nasal Spray 2 Spray(s) Both Nostrils two times a day PRN nose bleeding  sodium chloride 0.65% Nasal 1 Spray(s) Both Nostrils four times a day PRN Nasal Congestion      ALLERGIES:  Allergies    No Known Allergies    Intolerances        LABS:                        7.8    17.64 )-----------( 153      ( 15 Mar 2022 05:13 )             25.0     03-15    135  |  107  |  13  ----------------------------<  169<H>  4.3   |  18<L>  |  0.77    Ca    8.2<L>      15 Mar 2022 05:13  Phos  3.0     03-15  Mg     1.9     03-15    TPro  6.5  /  Alb  2.3<L>  /  TBili  1.2  /  DBili  x   /  AST  60<H>  /  ALT  41  /  AlkPhos  100  03-15    PT/INR - ( 15 Mar 2022 05:13 )   PT: 18.5 sec;   INR: 1.55          PTT - ( 15 Mar 2022 05:13 )  PTT:41.6 sec  Urinalysis Basic - ( 14 Mar 2022 08:47 )    Color: x / Appearance: x / SG: x / pH: x  Gluc: x / Ketone: x  / Bili: x / Urobili: x   Blood: x / Protein: x / Nitrite: x   Leuk Esterase: x / RBC: < 5 /HPF / WBC Many /HPF   Sq Epi: x / Non Sq Epi: 0-5 /HPF / Bacteria: Present /HPF        RADIOLOGY & ADDITIONAL TESTS: Reviewed. INTERVAL HPI/OVERNIGHT EVENTS: Lactate 2.6 (downtrending). Febrile to 101.6. Given 1g Tylenol (total of 2g on 3/14). Vent order placed. AM lactate increased to 3.4. UOP decreasing overnight.     SUBJECTIVE: Patient seen and examined at bedside. During the morning able to nod to ROS and no chest pain, palpitations, SOB, HA, abdominal pain, urinary symptoms.     OBJECTIVE:    VITAL SIGNS:  ICU Vital Signs Last 24 Hrs  T(C): 39 (14 Mar 2022 23:00), Max: 39 (14 Mar 2022 23:00)  T(F): 102.2 (14 Mar 2022 23:00), Max: 102.2 (14 Mar 2022 23:00)  HR: 119 (15 Mar 2022 06:00) (94 - 126)  BP: 101/62 (15 Mar 2022 06:00) (72/48 - 141/81)  BP(mean): 76 (15 Mar 2022 06:00) (56 - 109)  ABP: --  ABP(mean): --  RR: 31 (15 Mar 2022 06:00) (15 - 31)  SpO2: 99% (15 Mar 2022 06:00) (98% - 100%)    Mode: AC/ CMV (Assist Control/ Continuous Mandatory Ventilation), RR (machine): 12, TV (machine): 420, FiO2: 30, PEEP: 8, ITime: 1, MAP: 9.7, PIP: 17    03-14 @ 07:01  -  03-15 @ 06:26  --------------------------------------------------------  IN: 312.3 mL / OUT: 592 mL / NET: -279.7 mL      CAPILLARY BLOOD GLUCOSE      POCT Blood Glucose.: 140 mg/dL (14 Mar 2022 23:27)      PHYSICAL EXAM:    Constitutional: intubated  HEENT: PERRLA but sluggish, dry epistaxis, dry blood in oral cavity, dry MM  Neck: No LAD, No JVD  Respiratory: mechanical breath sounds   Cardiovascular: tachycardic, regular rate, Loud systolic murmur best heard at right 2  Gastrointestinal: : soft, NT, distended, tympanic  Extremities: WWP, No peripheral edema,   Vascular: 2+ peripheral pulses  Neurological: intubated, able to follow commands, no focal deficit   Psychiatric: unable to assess  Skin: No rashes    MEDICATIONS:  MEDICATIONS  (STANDING):  artificial tears (preservative free) Ophthalmic Solution 1 Drop(s) Both EYES two times a day  aspirin enteric coated 81 milliGRAM(s) Oral daily  atorvastatin 80 milliGRAM(s) Oral at bedtime  cefTRIAXone   IVPB 2000 milliGRAM(s) IV Intermittent every 24 hours  chlorhexidine 0.12% Liquid 15 milliLiter(s) Oral Mucosa every 12 hours  cyanocobalamin 1000 MICROGram(s) Oral daily  dexMEDEtomidine Infusion 0.2 MICROgram(s)/kG/Hr (3.4 mL/Hr) IV Continuous <Continuous>  DOXOrubicin INTRA-ARTERIAL (eMAR) 50 milliGRAM(s) IntraArterial once  ferrous    sulfate 325 milliGRAM(s) Oral <User Schedule>  folic acid 1 milliGRAM(s) Oral daily  lactulose Syrup 30 Gram(s) Oral three times a day  multivitamin 1 Tablet(s) Oral daily  mupirocin 2% Nasal 1 Application(s) Both Nostrils two times a day  nicotine - 21 mG/24Hr(s) Patch 1 patch Transdermal daily  pantoprazole  Injectable 40 milliGRAM(s) IV Push every 12 hours  phenylephrine    Infusion 0.1 MICROgram(s)/kG/Min (2.55 mL/Hr) IV Continuous <Continuous>  rifAXIMin 550 milliGRAM(s) Oral two times a day  tamsulosin 0.4 milliGRAM(s) Oral at bedtime  vancomycin  IVPB 1000 milliGRAM(s) IV Intermittent every 12 hours    MEDICATIONS  (PRN):  oxymetazoline 0.05% Nasal Spray 2 Spray(s) Both Nostrils two times a day PRN nose bleeding  sodium chloride 0.65% Nasal 1 Spray(s) Both Nostrils four times a day PRN Nasal Congestion      ALLERGIES:  Allergies    No Known Allergies    Intolerances        LABS:                        7.8    17.64 )-----------( 153      ( 15 Mar 2022 05:13 )             25.0     03-15    135  |  107  |  13  ----------------------------<  169<H>  4.3   |  18<L>  |  0.77    Ca    8.2<L>      15 Mar 2022 05:13  Phos  3.0     03-15  Mg     1.9     03-15    TPro  6.5  /  Alb  2.3<L>  /  TBili  1.2  /  DBili  x   /  AST  60<H>  /  ALT  41  /  AlkPhos  100  03-15    PT/INR - ( 15 Mar 2022 05:13 )   PT: 18.5 sec;   INR: 1.55          PTT - ( 15 Mar 2022 05:13 )  PTT:41.6 sec  Urinalysis Basic - ( 14 Mar 2022 08:47 )    Color: x / Appearance: x / SG: x / pH: x  Gluc: x / Ketone: x  / Bili: x / Urobili: x   Blood: x / Protein: x / Nitrite: x   Leuk Esterase: x / RBC: < 5 /HPF / WBC Many /HPF   Sq Epi: x / Non Sq Epi: 0-5 /HPF / Bacteria: Present /HPF        RADIOLOGY & ADDITIONAL TESTS: Reviewed.

## 2022-03-15 NOTE — PROGRESS NOTE ADULT - PROBLEM SELECTOR PLAN 7
.  Patient is Full Code  -continued discussion with patient's family, ongoing GOC discussions, significant health literacy issues

## 2022-03-15 NOTE — CHART NOTE - NSCHARTNOTEFT_GEN_A_CORE
Admitting Diagnosis:   Patient is a 66y old  Male who presents with a chief complaint of Chest Pain (15 Mar 2022 10:28)      PAST MEDICAL & SURGICAL HISTORY:  HTN (hypertension)    ETOH abuse    Smoker    Hyperlipidemia    Aortic valve stenosis, unspecified etiology    No significant past surgical history    Current Nutrition Order: NPO    PO Intake: Good (%) [   ]  Fair (50-75%) [   ] Poor (<25%) [   ] [x] NPO    GI Issues: No nausea/vomiting documented at this time. Last documented bowel movement 3/15.     Pain: Unable to assess at this time.     Skin Integrity: Left arm edema 2+. +surgical incision. Erwin score: 11.       Labs:   03-15    135  |  107  |  13  ----------------------------<  169<H>  4.3   |  18<L>  |  0.77    Ca    8.2<L>      15 Mar 2022 05:13  Phos  3.0     03-15  Mg     1.9     -15    TPro  6.5  /  Alb  2.3<L>  /  TBili  1.2  /  DBili  x   /  AST  60<H>  /  ALT  41  /  AlkPhos  100  03-15    CAPILLARY BLOOD GLUCOSE      POCT Blood Glucose.: 140 mg/dL (14 Mar 2022 23:27)      Medications:  MEDICATIONS  (STANDING):  artificial tears (preservative free) Ophthalmic Solution 1 Drop(s) Both EYES two times a day  aspirin  chewable 81 milliGRAM(s) Oral every 24 hours  atorvastatin 80 milliGRAM(s) Oral at bedtime  chlorhexidine 0.12% Liquid 15 milliLiter(s) Oral Mucosa every 12 hours  chlorhexidine 2% Cloths 1 Application(s) Topical <User Schedule>  cyanocobalamin 1000 MICROGram(s) Oral daily  DOXOrubicin INTRA-ARTERIAL (eMAR) 50 milliGRAM(s) IntraArterial once  ferrous    sulfate 325 milliGRAM(s) Oral <User Schedule>  folic acid 1 milliGRAM(s) Oral daily  heparin   Injectable 5000 Unit(s) SubCutaneous every 8 hours  lactulose Syrup 30 Gram(s) Oral three times a day  multivitamin 1 Tablet(s) Oral daily  mupirocin 2% Nasal 1 Application(s) Both Nostrils two times a day  nicotine - 21 mG/24Hr(s) Patch 1 patch Transdermal daily  pantoprazole  Injectable 40 milliGRAM(s) IV Push every 12 hours  phenylephrine    Infusion 0.1 MICROgram(s)/kG/Min (2.55 mL/Hr) IV Continuous <Continuous>  piperacillin/tazobactam IVPB.. 4.5 Gram(s) IV Intermittent every 6 hours  rifAXIMin 550 milliGRAM(s) Oral two times a day  tamsulosin 0.4 milliGRAM(s) Oral at bedtime    MEDICATIONS  (PRN):  oxymetazoline 0.05% Nasal Spray 2 Spray(s) Both Nostrils two times a day PRN nose bleeding  sodium chloride 0.65% Nasal 1 Spray(s) Both Nostrils four times a day PRN Nasal Congestion    Dosing Anthropometrics:  · Height for BMI (FEET)	5 Feet  · Height for BMI (INCHES)	7 Inch(s)  · Height for BMI (CENTIMETERS)	170.18 Centimeter(s)  · Weight for BMI (lbs)	149 lb  · Weight for BMI (kg)	67.6 kg  · Body Mass Index	23.3  · Ideal Body Weight (lbs)	148  · Ideal Body Weight (kg)	67.1    Weight Change: No new weights documented in EMR. Obtain biweekly weights to assess changes/trends.    Estimated energy needs: Based on Standards of Care pt within % IBW thus actual body weight used for all calculations. Needs adjusted for advanced age.  20-25 kcal/[id number] kcal  1-1.2 g/k.5-81 g  30-35 mL/[id number] mL    Subjective: 65 YO M, current smoker (1ppd x 50 years), current every day ETOH abuse (1 pint vodka and several beers daily x 50 years), with PMHx of HLD, HTN (not on any medications), CABG (LIMA to LAD, SVG to PDA in 2017), AS (s/p AVR in 2017) with recently discovered restenosis, presented to [facility] ED on 22 with chest pain and episode of LOC, initially admitted to cardiology for NSTEMI with [facility] showing nonobstructive disease. Patient was transferred to CT surgery given complex anatomy with plan for TAVR and aortic root surgery. Pt found to have liver cirrhosis and a 2.4cmx2.4 cm mass on his liver concerning for HCC. Decision was made not to proceed with surgery given poor functional status, cirrhosis and non compliance with medications. The patient had worsening mental status on CT surgery service and med consult was called for hepatic encephalopathy. He has been followed GI  for liver findings. Hematology and surgical oncology was consulted as imaging of abdomen confirmed HCC. Patient was not a surgical candidate and IR was consulted who planned for embolization. Patient completed a course of high dose thiamine given alcohol history without improvement in mental status. MRI brain obtained showing mild chronic microvascular ischemic disease and brain parenchymal volume loss, advanced for patient's age likely 2/2 alcohol abuse 3/14, patient went for planned transarterial chemoembolization by IR for further evaluation for liver lesion but around start of procedure became obtunded with increased secretions and concern that he was not protecting his airway. Following procedure patient became hypotensive to 80s/50s tachycardic to 120s, was minimally arousable to sternal rub and gurgling with c/f airway protection. ICU consulted for evaluation of urgent vs. emergent intubation. Course c/b anemia likely attributed to intermittent epistaxis for which ENT was consulted w/o plans for acute intervention. Concern that patient may have aspirated over the weekend.    Attempted to visit pt at bedside for follow up assessment, however, team at bedside providing care thus interview deferred to EMR. Per chart, pt intubated on VC/AC mode, and on pressor support (Phenylephrine).      Previous Nutrition Diagnosis:    Active [   ]  Resolved [   ]    If resolved, new PES:     Goal:    Recommendations:    Education:     Risk Level: High [   ] Moderate [   ] Low [   ] Admitting Diagnosis:   Patient is a 66y old  Male who presents with a chief complaint of Chest Pain (15 Mar 2022 10:28)      PAST MEDICAL & SURGICAL HISTORY:  HTN (hypertension)    ETOH abuse    Smoker    Hyperlipidemia    Aortic valve stenosis, unspecified etiology    No significant past surgical history    Current Nutrition Order: NPO    PO Intake: Good (%) [   ]  Fair (50-75%) [   ] Poor (<25%) [   ] [x] NPO    GI Issues: No nausea/vomiting documented at this time. Last documented bowel movement 3/15.     Pain: Unable to assess at this time.     Skin Integrity: Left arm edema 2+. +surgical incision. Erwin score: 11.       Labs:   03-15    135  |  107  |  13  ----------------------------<  169<H>  4.3   |  18<L>  |  0.77    Ca    8.2<L>      15 Mar 2022 05:13  Phos  3.0     03-15  Mg     1.9     -15    TPro  6.5  /  Alb  2.3<L>  /  TBili  1.2  /  DBili  x   /  AST  60<H>  /  ALT  41  /  AlkPhos  100  03-15    CAPILLARY BLOOD GLUCOSE      POCT Blood Glucose.: 140 mg/dL (14 Mar 2022 23:27)      Medications:  MEDICATIONS  (STANDING):  artificial tears (preservative free) Ophthalmic Solution 1 Drop(s) Both EYES two times a day  aspirin  chewable 81 milliGRAM(s) Oral every 24 hours  atorvastatin 80 milliGRAM(s) Oral at bedtime  chlorhexidine 0.12% Liquid 15 milliLiter(s) Oral Mucosa every 12 hours  chlorhexidine 2% Cloths 1 Application(s) Topical <User Schedule>  cyanocobalamin 1000 MICROGram(s) Oral daily  DOXOrubicin INTRA-ARTERIAL (eMAR) 50 milliGRAM(s) IntraArterial once  ferrous    sulfate 325 milliGRAM(s) Oral <User Schedule>  folic acid 1 milliGRAM(s) Oral daily  heparin   Injectable 5000 Unit(s) SubCutaneous every 8 hours  lactulose Syrup 30 Gram(s) Oral three times a day  multivitamin 1 Tablet(s) Oral daily  mupirocin 2% Nasal 1 Application(s) Both Nostrils two times a day  nicotine - 21 mG/24Hr(s) Patch 1 patch Transdermal daily  pantoprazole  Injectable 40 milliGRAM(s) IV Push every 12 hours  phenylephrine    Infusion 0.1 MICROgram(s)/kG/Min (2.55 mL/Hr) IV Continuous <Continuous>  piperacillin/tazobactam IVPB.. 4.5 Gram(s) IV Intermittent every 6 hours  rifAXIMin 550 milliGRAM(s) Oral two times a day  tamsulosin 0.4 milliGRAM(s) Oral at bedtime    MEDICATIONS  (PRN):  oxymetazoline 0.05% Nasal Spray 2 Spray(s) Both Nostrils two times a day PRN nose bleeding  sodium chloride 0.65% Nasal 1 Spray(s) Both Nostrils four times a day PRN Nasal Congestion    Dosing Anthropometrics:  · Height for BMI (FEET)	5 Feet  · Height for BMI (INCHES)	7 Inch(s)  · Height for BMI (CENTIMETERS)	170.18 Centimeter(s)  · Weight for BMI (lbs)	149 lb  · Weight for BMI (kg)	67.6 kg  · Body Mass Index	23.3  · Ideal Body Weight (lbs)	148  · Ideal Body Weight (kg)	67.1    Weight Change: No new weights documented in EMR. Obtain biweekly weights to assess changes/trends.    Estimated energy needs: Based on Standards of Care pt within % IBW thus actual body weight used for all calculations. Needs adjusted for advanced age.  20-25 kcal/[id number] kcal  1-1.2 g/k.5-81 g  30-35 mL/k8071-5598 mL    Subjective: 67 YO M, current smoker (1ppd x 50 years), current every day ETOH abuse (1 pint vodka and several beers daily x 50 years), with PMHx of HLD, HTN (not on any medications), CABG (LIMA to LAD, SVG to PDA in 2017), AS (s/p AVR in 2017) with recently discovered restenosis, presented to [facility] ED on 22 with chest pain and episode of LOC, initially admitted to cardiology for NSTEMI with [facility] showing nonobstructive disease. Patient was transferred to CT surgery given complex anatomy with plan for TAVR and aortic root surgery. Pt found to have liver cirrhosis and a 2.4cmx2.4 cm mass on his liver concerning for HCC. Decision was made not to proceed with surgery given poor functional status, cirrhosis and non compliance with medications. The patient had worsening mental status on CT surgery service and med consult was called for hepatic encephalopathy. He has been followed GI  for liver findings. Hematology and surgical oncology was consulted as imaging of abdomen confirmed HCC. Patient was not a surgical candidate and IR was consulted who planned for embolization. Patient completed a course of high dose thiamine given alcohol history without improvement in mental status. MRI brain obtained showing mild chronic microvascular ischemic disease and brain parenchymal volume loss, advanced for patient's age likely 2/2 alcohol abuse 3/14, patient went for planned transarterial chemoembolization by IR for further evaluation for liver lesion but around start of procedure became obtunded with increased secretions and concern that he was not protecting his airway. Following procedure patient became hypotensive to 80s/50s tachycardic to 120s, was minimally arousable to sternal rub and gurgling with c/f airway protection. ICU consulted for evaluation of urgent vs. emergent intubation. Course c/b anemia likely attributed to intermittent epistaxis for which ENT was consulted w/o plans for acute intervention. Concern that patient may have aspirated over the weekend.    Attempted to visit pt at bedside for follow up assessment, however, team at bedside providing care thus interview deferred to EMR. Per chart, pt intubated on VC/AC mode, and on pressor support (Phenylephrine)- MAP 75. TMAX: 102.2. Labs reviewed 3/15; electrolytes within normal limits at this time. Noted w/ elevated blood lactate; trending down. Pt NPO at this time; no nutrition plan noted in EMR. RD remains available. See nutrition recommendations below.     Previous Nutrition Diagnosis: Food & Nutrition Related Knowledge Deficit r/t lack of prior education AEB pt unaware of current dietary restrictions.     Active [ x ]  Resolved [   ]    If resolved, new PES: Inadequate oral intake r/t inability to meet EER AEB NPO    Goal: 1. Pt to teach back at least 3 educational points discussed 2. Pt to meet at least 75% of nutritional needs during hospital stay consistently     Recommendations:  1. As medically feasible/ upon extubation, advance diet to DASH/TLC + Ensure Enlive 3x/day (350 kcal, 20 g protein per serving)   >>>Encourage pt to meet nutritional needs as able   >>>Encourage adherence to diet education (reinforce as able)   >>If alternate route of nutrition is warranted/within GOC; consult RD   4. Pain and bowel regimen per team   5. Will continue to assess/honor preferences as able   6. Continue thiamine     Education: Deferred at this time    Risk Level: High [ x  ] Moderate [   ] Low [   ]

## 2022-03-16 LAB
-  AMPICILLIN/SULBACTAM: SIGNIFICANT CHANGE UP
-  AMPICILLIN/SULBACTAM: SIGNIFICANT CHANGE UP
-  AMPICILLIN: SIGNIFICANT CHANGE UP
-  AMPICILLIN: SIGNIFICANT CHANGE UP
-  CEFAZOLIN: SIGNIFICANT CHANGE UP
-  CEFAZOLIN: SIGNIFICANT CHANGE UP
-  CEFEPIME: SIGNIFICANT CHANGE UP
-  CEFTRIAXONE: SIGNIFICANT CHANGE UP
-  CEFTRIAXONE: SIGNIFICANT CHANGE UP
-  CIPROFLOXACIN: SIGNIFICANT CHANGE UP
-  CIPROFLOXACIN: SIGNIFICANT CHANGE UP
-  ERTAPENEM: SIGNIFICANT CHANGE UP
-  ERTAPENEM: SIGNIFICANT CHANGE UP
-  GENTAMICIN: SIGNIFICANT CHANGE UP
-  GENTAMICIN: SIGNIFICANT CHANGE UP
-  MEROPENEM: SIGNIFICANT CHANGE UP
-  NITROFURANTOIN: SIGNIFICANT CHANGE UP
-  NITROFURANTOIN: SIGNIFICANT CHANGE UP
-  PIPERACILLIN/TAZOBACTAM: SIGNIFICANT CHANGE UP
-  PIPERACILLIN/TAZOBACTAM: SIGNIFICANT CHANGE UP
-  TOBRAMYCIN: SIGNIFICANT CHANGE UP
-  TOBRAMYCIN: SIGNIFICANT CHANGE UP
-  TRIMETHOPRIM/SULFAMETHOXAZOLE: SIGNIFICANT CHANGE UP
-  TRIMETHOPRIM/SULFAMETHOXAZOLE: SIGNIFICANT CHANGE UP
ALBUMIN SERPL ELPH-MCNC: 1.8 G/DL — LOW (ref 3.3–5)
ALP SERPL-CCNC: 69 U/L — SIGNIFICANT CHANGE UP (ref 40–120)
ALT FLD-CCNC: 51 U/L — HIGH (ref 10–45)
ANION GAP SERPL CALC-SCNC: 8 MMOL/L — SIGNIFICANT CHANGE UP (ref 5–17)
ANISOCYTOSIS BLD QL: SIGNIFICANT CHANGE UP
APTT BLD: 30.2 SEC — SIGNIFICANT CHANGE UP (ref 27.5–35.5)
AST SERPL-CCNC: 54 U/L — HIGH (ref 10–40)
BASOPHILS # BLD AUTO: 0.07 K/UL — SIGNIFICANT CHANGE UP (ref 0–0.2)
BASOPHILS NFR BLD AUTO: 0.8 % — SIGNIFICANT CHANGE UP (ref 0–2)
BILIRUB SERPL-MCNC: 0.9 MG/DL — SIGNIFICANT CHANGE UP (ref 0.2–1.2)
BUN SERPL-MCNC: 12 MG/DL — SIGNIFICANT CHANGE UP (ref 7–23)
BURR CELLS BLD QL SMEAR: PRESENT — SIGNIFICANT CHANGE UP
CALCIUM SERPL-MCNC: 7.2 MG/DL — LOW (ref 8.4–10.5)
CHLORIDE SERPL-SCNC: 113 MMOL/L — HIGH (ref 96–108)
CO2 SERPL-SCNC: 17 MMOL/L — LOW (ref 22–31)
CREAT SERPL-MCNC: 0.57 MG/DL — SIGNIFICANT CHANGE UP (ref 0.5–1.3)
CULTURE RESULTS: SIGNIFICANT CHANGE UP
DACRYOCYTES BLD QL SMEAR: SLIGHT — SIGNIFICANT CHANGE UP
EGFR: 108 ML/MIN/1.73M2 — SIGNIFICANT CHANGE UP
EOSINOPHIL # BLD AUTO: 0.21 K/UL — SIGNIFICANT CHANGE UP (ref 0–0.5)
EOSINOPHIL NFR BLD AUTO: 2.6 % — SIGNIFICANT CHANGE UP (ref 0–6)
GIANT PLATELETS BLD QL SMEAR: PRESENT — SIGNIFICANT CHANGE UP
GLUCOSE SERPL-MCNC: 98 MG/DL — SIGNIFICANT CHANGE UP (ref 70–99)
HCT VFR BLD CALC: 18.7 % — CRITICAL LOW (ref 39–50)
HCT VFR BLD CALC: 24.1 % — LOW (ref 39–50)
HGB BLD-MCNC: 5.7 G/DL — CRITICAL LOW (ref 13–17)
HGB BLD-MCNC: 7.5 G/DL — LOW (ref 13–17)
HYPOCHROMIA BLD QL: SIGNIFICANT CHANGE UP
INR BLD: 1.6 — HIGH (ref 0.88–1.16)
LYMPHOCYTES # BLD AUTO: 0.77 K/UL — LOW (ref 1–3.3)
LYMPHOCYTES # BLD AUTO: 9.4 % — LOW (ref 13–44)
MACROCYTES BLD QL: SIGNIFICANT CHANGE UP
MAGNESIUM SERPL-MCNC: 1.6 MG/DL — SIGNIFICANT CHANGE UP (ref 1.6–2.6)
MANUAL SMEAR VERIFICATION: SIGNIFICANT CHANGE UP
MCHC RBC-ENTMCNC: 29.5 PG — SIGNIFICANT CHANGE UP (ref 27–34)
MCHC RBC-ENTMCNC: 29.6 PG — SIGNIFICANT CHANGE UP (ref 27–34)
MCHC RBC-ENTMCNC: 30.5 GM/DL — LOW (ref 32–36)
MCHC RBC-ENTMCNC: 31.1 GM/DL — LOW (ref 32–36)
MCV RBC AUTO: 95.3 FL — SIGNIFICANT CHANGE UP (ref 80–100)
MCV RBC AUTO: 96.9 FL — SIGNIFICANT CHANGE UP (ref 80–100)
METHOD TYPE: SIGNIFICANT CHANGE UP
METHOD TYPE: SIGNIFICANT CHANGE UP
MICROCYTES BLD QL: SLIGHT — SIGNIFICANT CHANGE UP
MONOCYTES # BLD AUTO: 0.35 K/UL — SIGNIFICANT CHANGE UP (ref 0–0.9)
MONOCYTES NFR BLD AUTO: 4.3 % — SIGNIFICANT CHANGE UP (ref 2–14)
NEUTROPHILS # BLD AUTO: 6.81 K/UL — SIGNIFICANT CHANGE UP (ref 1.8–7.4)
NEUTROPHILS NFR BLD AUTO: 81.2 % — HIGH (ref 43–77)
NEUTS BAND # BLD: 1.7 % — SIGNIFICANT CHANGE UP (ref 0–8)
NRBC # BLD: 0 /100 WBCS — SIGNIFICANT CHANGE UP (ref 0–0)
ORGANISM # SPEC MICROSCOPIC CNT: SIGNIFICANT CHANGE UP
OVALOCYTES BLD QL SMEAR: SLIGHT — SIGNIFICANT CHANGE UP
PHOSPHATE SERPL-MCNC: 1.9 MG/DL — LOW (ref 2.5–4.5)
PLAT MORPH BLD: ABNORMAL
PLATELET # BLD AUTO: 91 K/UL — LOW (ref 150–400)
PLATELET # BLD AUTO: 95 K/UL — LOW (ref 150–400)
POIKILOCYTOSIS BLD QL AUTO: SLIGHT — SIGNIFICANT CHANGE UP
POLYCHROMASIA BLD QL SMEAR: SLIGHT — SIGNIFICANT CHANGE UP
POTASSIUM SERPL-MCNC: 3.4 MMOL/L — LOW (ref 3.5–5.3)
POTASSIUM SERPL-SCNC: 3.4 MMOL/L — LOW (ref 3.5–5.3)
PROT SERPL-MCNC: 5.2 G/DL — LOW (ref 6–8.3)
PROTHROM AB SERPL-ACNC: 19.1 SEC — HIGH (ref 10.5–13.4)
RBC # BLD: 1.93 M/UL — LOW (ref 4.2–5.8)
RBC # BLD: 2.53 M/UL — LOW (ref 4.2–5.8)
RBC # FLD: 21.6 % — HIGH (ref 10.3–14.5)
RBC # FLD: 21.7 % — HIGH (ref 10.3–14.5)
RBC BLD AUTO: ABNORMAL
SCHISTOCYTES BLD QL AUTO: SLIGHT — SIGNIFICANT CHANGE UP
SMUDGE CELLS # BLD: PRESENT — SIGNIFICANT CHANGE UP
SODIUM SERPL-SCNC: 138 MMOL/L — SIGNIFICANT CHANGE UP (ref 135–145)
SPECIMEN SOURCE: SIGNIFICANT CHANGE UP
SPHEROCYTES BLD QL SMEAR: SLIGHT — SIGNIFICANT CHANGE UP
WBC # BLD: 8.21 K/UL — SIGNIFICANT CHANGE UP (ref 3.8–10.5)
WBC # BLD: 9.41 K/UL — SIGNIFICANT CHANGE UP (ref 3.8–10.5)
WBC # FLD AUTO: 8.21 K/UL — SIGNIFICANT CHANGE UP (ref 3.8–10.5)
WBC # FLD AUTO: 9.41 K/UL — SIGNIFICANT CHANGE UP (ref 3.8–10.5)

## 2022-03-16 PROCEDURE — 71045 X-RAY EXAM CHEST 1 VIEW: CPT | Mod: 26

## 2022-03-16 PROCEDURE — 99233 SBSQ HOSP IP/OBS HIGH 50: CPT | Mod: GC

## 2022-03-16 PROCEDURE — 71045 X-RAY EXAM CHEST 1 VIEW: CPT | Mod: 26,77

## 2022-03-16 PROCEDURE — 95720 EEG PHY/QHP EA INCR W/VEEG: CPT

## 2022-03-16 RX ORDER — ERTAPENEM SODIUM 1 G/1
1 INJECTION, POWDER, LYOPHILIZED, FOR SOLUTION INTRAMUSCULAR; INTRAVENOUS EVERY 24 HOURS
Refills: 0 | Status: DISCONTINUED | OUTPATIENT
Start: 2022-03-16 | End: 2022-03-16

## 2022-03-16 RX ORDER — MAGNESIUM SULFATE 500 MG/ML
2 VIAL (ML) INJECTION ONCE
Refills: 0 | Status: COMPLETED | OUTPATIENT
Start: 2022-03-16 | End: 2022-03-16

## 2022-03-16 RX ORDER — ERTAPENEM SODIUM 1 G/1
1000 INJECTION, POWDER, LYOPHILIZED, FOR SOLUTION INTRAMUSCULAR; INTRAVENOUS EVERY 24 HOURS
Refills: 0 | Status: DISCONTINUED | OUTPATIENT
Start: 2022-03-16 | End: 2022-03-20

## 2022-03-16 RX ADMIN — Medication 81 MILLIGRAM(S): at 11:00

## 2022-03-16 RX ADMIN — MUPIROCIN 1 APPLICATION(S): 20 OINTMENT TOPICAL at 22:59

## 2022-03-16 RX ADMIN — Medication 1 MILLIGRAM(S): at 11:00

## 2022-03-16 RX ADMIN — CHLORHEXIDINE GLUCONATE 15 MILLILITER(S): 213 SOLUTION TOPICAL at 22:58

## 2022-03-16 RX ADMIN — CHLORHEXIDINE GLUCONATE 1 APPLICATION(S): 213 SOLUTION TOPICAL at 05:24

## 2022-03-16 RX ADMIN — HEPARIN SODIUM 5000 UNIT(S): 5000 INJECTION INTRAVENOUS; SUBCUTANEOUS at 04:47

## 2022-03-16 RX ADMIN — Medication 1 DROP(S): at 11:00

## 2022-03-16 RX ADMIN — Medication 1 DROP(S): at 22:55

## 2022-03-16 RX ADMIN — PANTOPRAZOLE SODIUM 40 MILLIGRAM(S): 20 TABLET, DELAYED RELEASE ORAL at 11:01

## 2022-03-16 RX ADMIN — Medication 1 PATCH: at 06:39

## 2022-03-16 RX ADMIN — Medication 1 PATCH: at 22:58

## 2022-03-16 RX ADMIN — MEROPENEM 100 MILLIGRAM(S): 1 INJECTION INTRAVENOUS at 10:45

## 2022-03-16 RX ADMIN — Medication 325 MILLIGRAM(S): at 05:23

## 2022-03-16 RX ADMIN — ERTAPENEM SODIUM 120 MILLIGRAM(S): 1 INJECTION, POWDER, LYOPHILIZED, FOR SOLUTION INTRAMUSCULAR; INTRAVENOUS at 15:03

## 2022-03-16 RX ADMIN — HEPARIN SODIUM 5000 UNIT(S): 5000 INJECTION INTRAVENOUS; SUBCUTANEOUS at 19:19

## 2022-03-16 RX ADMIN — HEPARIN SODIUM 5000 UNIT(S): 5000 INJECTION INTRAVENOUS; SUBCUTANEOUS at 11:00

## 2022-03-16 RX ADMIN — MEROPENEM 100 MILLIGRAM(S): 1 INJECTION INTRAVENOUS at 04:46

## 2022-03-16 RX ADMIN — Medication 1 PATCH: at 22:57

## 2022-03-16 RX ADMIN — CHLORHEXIDINE GLUCONATE 15 MILLILITER(S): 213 SOLUTION TOPICAL at 10:53

## 2022-03-16 RX ADMIN — PREGABALIN 1000 MICROGRAM(S): 225 CAPSULE ORAL at 11:00

## 2022-03-16 RX ADMIN — LACTULOSE 30 GRAM(S): 10 SOLUTION ORAL at 05:24

## 2022-03-16 RX ADMIN — Medication 1 TABLET(S): at 11:01

## 2022-03-16 RX ADMIN — Medication 25 GRAM(S): at 07:57

## 2022-03-16 RX ADMIN — MUPIROCIN 1 APPLICATION(S): 20 OINTMENT TOPICAL at 11:01

## 2022-03-16 RX ADMIN — Medication 1 PATCH: at 18:15

## 2022-03-16 RX ADMIN — LACTULOSE 30 GRAM(S): 10 SOLUTION ORAL at 14:46

## 2022-03-16 NOTE — EEG REPORT - NS EEG TEXT BOX
[facility] Department of Neurology  Inpatient Continuous video-Electroencephalography Report    Acquisition Details:  Electroencephalography was acquired using a minimum of 21 channels on an [employer] Neurology system v 8.5.1 with electrode placement according to the standard International 10-20 system following ACNS (American Clinical Neurophysiology Society) guidelines for Long-Term Video EEG monitoring.  Anterior temporal T1 and T2 electrodes were utilized whenever possible.   The XLTEK automated spike & seizure detections were all reviewed in detail, in addition to extensive portions of raw EEG.    Day 4: 3/13/2022 @ 7:00 AM to 3/14/2022 @ 9:44 AM (end of study) – 26.75 hrs  Pertinent medications: none  Awake Background:  continuous, with predominantly theta frequencies.  Symmetry:  No persistent asymmetries of voltage or frequency.  Organization: absent  Posterior Dominant Rhythm: 6-7 Hz fragmented and poorly regulated  Voltage:  Normal (20+ uV)  Variability: Yes. 		Reactivity: Yes.  State changes: Present with rudimentary sleep spindles and no definite k-complexes  Spontaneous Activity:  No epileptiform discharges.  Periodic/rhythmic activity:  None.  Events:  No electrographic seizures or significant clinical events.  Provocations:  Hyperventilation and Photic stimulation: was not performed.    Daily Summary:    Mild-to-moderate generalized slowing suggestive of a similar degree of diffuse or multifocal cerebral dysfunction.   No epileptiform discharges or electrographic seizures were recorded.     Edison Lal DO  CNP Fellow    Leland Garza MD  Attending Neurologist, [facility]

## 2022-03-16 NOTE — CHART NOTE - NSCHARTNOTEFT_GEN_A_CORE
Antimicrobial: ertapenem    Dose: 1g    Route: IV    Frequency: q24h    Duration: 6 days    *THIS IS NOT AN INFECTIOUS DISEASES CONSULTATION

## 2022-03-16 NOTE — PROGRESS NOTE ADULT - ASSESSMENT
67 YO M, current smoker (1ppd x 50 years), current every day ETOH abuse (1 pint vodka and several beers daily x 50 years), with PMHx of HLD, HTN (not on any medications), CABG (LIMA to LAD, SVG to PDA in 2017), AS (s/p AVR in 2017) with recently discovered restenosis, presented to [facility] ED on 2/24/22 with chest pain and episode of LOC, initially admitted to cardiology for NSTEMI with [facility] with nonobstructive disease, then found to have restenosis of bioprosthetic valve for which aortic root surgery was planned, but now deferred given liver lesion c/f HCC. Patient transferred to medicine for further w/u of HCC and management of AMS, which is still of unclear etiology.  During IR TACE today, 3/14, patient noted to be obtunded with difficulty protecting airway, so emergently intubated and stepped up to ICU.    NEURO  #AMS (altered mental status)  AAOx2 (person and place). Unlikely hepatic encephalopathy as no asterixis and is currently compensated. Neuro exam nonfocal. Possibly related to chronic alcohol use, Wernicke's or depression i/s/o HCC diagnosis. As per wife, pt is not normally lethargic at home. Completed course of high dose thiamine, last librium dose on 03/01. CTH and MRI w/op acute abnormality. During the night of 3/13-3/14 flow sheets showing 100.1 Temp and pt becoming tachycardic to 120s. Pt not complaining of any localizing signs of infection, CXR unremarkable however UA was positive. UCX sent and pt started on CTX 1g Q24hrs (did not receive first dose as taken to TACE procedure). Post TACE procedure c/f protection of airway given increased somnolence potentially 2/2 anesthesia. Pt was intubated for airway protection and transferred to MICU  - f/u VEEG: Mild-to-moderate generalized slowing suggestive of a similar degree of diffuse or multifocal cerebral dysfunction. No epileptiform discharges or electrographic seizures were recorded.   - monitor mental status  - Neuro following  - c/w hepatic encephalopathy treatment   - Infectious disease as below   - Monitor for pneumonia, potential aspiration event given mental status.    #History of alcohol use  Long standing EtOH consumption. Not showing any signs of withdrawal at the moment. Last drink prior to admission which is almost 2 weeks ago. Pt was given 3 days of librium Q8hrs while on CT surgery however did not display signs of withdrawal per chart review  - c/w Multivitamin and Folic Acid, and B12.    CARDIO  #AS (aortic stenosis)  AS i/s/o prior TAVR in 2017 with Dr Wilcox. JOSELO with peak transvalvular velocity of 4.7, mean gradient 56. symptomatic: exertional CP, episode of syncope  - initially planned for valve in valve TAVR, but then it was decided that because of aortic root anatomy, he would need aortic root open heart surgery  - he was deemed not to be a surgical candidate given poor functional status, liver cirrhosis, prior medication noncompliance which likely contributed to valve restenosis. his recovery after open heart surgically would be extremely difficult if possible. He would NOT be a surgical candidate   - CXR 3/14: clear lung fields     #CAD (coronary artery disease)  S/p CABG with Dr Wilcox in 2017  - recent [facility] with nonobstructive disease  - c/w Lipitor 80mg QD  - c/w ASA 81mg.    #HTN (hypertension)  Off meds.   - Monitor VS in the setting of sepsis    PULM   #Concern for PNA  Prior to arrival to the MICU on 3/14 there were concerns for aspiration PNA.   - s/p ceftriaxone 2g q24 hrs  - CXR post intubation on 3/14: no acute infiltrates  abx below     #s/p Intubated  -Extubated on 3/16    GI  #ALC (alcoholic liver cirrhosis)  2/2 long standing EtOH abuse. Likely compensated.. Likely no hepatic encephalopathy. No known EV bleeding. NO ascites however now with signs of new infection started on antibiotics today.  - GI following  - no known Varices, but no prior EGD evaluation, he will need it outpatient, although has significant risk factors.    #HCC (hepatocellular carcinoma)  CTAP with 2.4 hypervascular liver mass. i/s/o Liver cirrhosis and EtOH use disorder. triple phase CT confirms HCC  - AFP is 7.2 which is normal  - MRI abdomen: 2.5 cm left hepatic lesion consistent with LI-RADS v 2018 Category: LR-5 Definitely HCC. OPTN Category (if LR-5): 5B. No locally invasive or metastatic disease. Left hepatic artery variant. No additional suspicious hepatic lesions.   - s/p IR transarterial chemical embolization since he is not a candidate for ablation yesterday.  - Surg/onc following NTD  - Palliative care following.    RENAL  #no active issues      #UTI  Patient with positive UA (WBC, bacteria LE) on 3/14. Unable to assess urinary symptoms, started empiric treatment given mental status.   - s/p CTX 2g Q24hrs for UTI  - see below     #Sanchez in place  - monitor I&O    ID  #Sepsis  Source likely UTI vs. ? aspiration pna, as patient noted to be aspirating over the weekend.  UA positive. 3/4 SIRS with fever, tachycardia, and leukocytosis.  - UCx 3/14: Klebsiella and proteus   - BCx 3/14: NGTD  - Sputum Cx 3/15: Haemophilus haemolyticus   - Spiking fevers on 3/15 while on CTX 2g q24h, switch to Zosyn  pseudomonal coverage and due to persistent spiking                - s/p Meropenem x1 dose 3/15         - c/w Ertapenem 1 gram for 6 more doses (started 3/16)  ENDO  #No active problem    HEME/ONC  #Normocytic hypochromic anemia  Likely secondary to hemolysis due to prosthetic valve. Minimal epistaxis that does not explain low hgb, Hgb 8.1 on 3/14.  - Completed IV iron X 3 days  - c/w b12 1mg Qd  - c/w folate 1mg qd  - Transfusion goal > 7  - heme/onc, GI, IR following.    Prophylactic measure  F: as appropriate  E: replenish as appropriate  N: NGT started on feeds   VTE PPx: heparin TID sub  GI: none  C: Full Code  D: MICU

## 2022-03-16 NOTE — PROGRESS NOTE ADULT - SUBJECTIVE AND OBJECTIVE BOX
STATUS POST:       SUBJECTIVE: Patient seen and examined bedside by chief resident.     aspirin  chewable 81 milliGRAM(s) Oral every 24 hours  ertapenem  IVPB 1 milliGRAM(s) IV Intermittent every 24 hours  heparin   Injectable 5000 Unit(s) SubCutaneous every 8 hours  rifAXIMin 550 milliGRAM(s) Oral two times a day  tamsulosin 0.4 milliGRAM(s) Oral at bedtime      Vital Signs Last 24 Hrs  T(C): 37 (16 Mar 2022 12:49), Max: 39 (15 Mar 2022 14:37)  T(F): 98.6 (16 Mar 2022 12:49), Max: 102.2 (15 Mar 2022 14:37)  HR: 94 (16 Mar 2022 13:00) (90 - 123)  BP: 85/53 (16 Mar 2022 13:00) (82/50 - 112/68)  BP(mean): 65 (16 Mar 2022 13:00) (61 - 85)  RR: 20 (16 Mar 2022 13:00) (11 - 27)  SpO2: 98% (16 Mar 2022 13:00) (96% - 100%)  I&O's Detail    15 Mar 2022 07:01  -  16 Mar 2022 07:00  --------------------------------------------------------  IN:    Enteral Tube Flush: 180 mL    IV PiggyBack: 150 mL    Phenylephrine: 130.5 mL    Sodium Chloride 0.9% Bolus: 1000 mL  Total IN: 1460.5 mL    OUT:    Indwelling Catheter - Urethral (mL): 965 mL    Rectal Tube (mL): 100 mL  Total OUT: 1065 mL    Total NET: 395.5 mL      16 Mar 2022 07:01  -  16 Mar 2022 13:52  --------------------------------------------------------  IN:    Enteral Tube Flush: 150 mL    IV PiggyBack: 100 mL  Total IN: 250 mL    OUT:    Indwelling Catheter - Urethral (mL): 180 mL  Total OUT: 180 mL    Total NET: 70 mL          Physical Exam:  General: No acute distress, resting comfortably in bed  C/V: normal sinus rhythm  Pulm: Nonlabored breathing, no respiratory distress  Abd: soft, non-tender, non-distended, incisions clean/dry/intact.  Extrem: warm and well perfused, no edema, SCDs in place    LABS:                        7.5    9.41  )-----------( 95       ( 16 Mar 2022 07:58 )             24.1     03-16    138  |  113<H>  |  12  ----------------------------<  98  3.4<L>   |  17<L>  |  0.57    Ca    7.2<L>      16 Mar 2022 06:35  Phos  1.9     03-16  Mg     1.6     03-16    TPro  5.2<L>  /  Alb  1.8<L>  /  TBili  0.9  /  DBili  x   /  AST  54<H>  /  ALT  51<H>  /  AlkPhos  69  03-16    PT/INR - ( 16 Mar 2022 06:35 )   PT: 19.1 sec;   INR: 1.60          PTT - ( 16 Mar 2022 06:35 )  PTT:30.2 sec      RADIOLOGY & ADDITIONAL STUDIES:    65 y/o M, current smoker (1ppd x 50 years), current every day ETOH abuse (1 pint vodka and several beers daily x 50 years), with PMHx of HLD, HTN (not on any medications),  CABG (LIMA to LAD, SVG to PDA in 2017), AS (s/p AVR in 2017), who presented to [facility] ED on 2/24/22 NSTEMI with [facility] with nonobstructive disease, then found to have restenosis of bioprosthetic valve for which aortic root surgery was planned. Now w/ newly diagnosed 2.5cm left hepatic mass concerning for HCC found on CT AP. Went for IR transarterial chemical embolization since he is not a candidate for ablation yesterday. He became obtunded with increased secretions and concern that he was not protecting his airway and was intubated after. Remains intubated and now off pressors. EEG running    Recommendations:   No acute surgical intervention at this time  Patient discussed with attending.   service signing off.   Please call with additional questions or concerns, [phone]

## 2022-03-16 NOTE — PROGRESS NOTE ADULT - SUBJECTIVE AND OBJECTIVE BOX
INTERVAL HPI/OVERNIGHT EVENTS:    SUBJECTIVE: Patient seen and examined at bedside.     OBJECTIVE:    VITAL SIGNS:  ICU Vital Signs Last 24 Hrs  T(C): 37.1 (16 Mar 2022 04:00), Max: 39 (15 Mar 2022 14:37)  T(F): 98.8 (16 Mar 2022 04:00), Max: 102.2 (15 Mar 2022 14:37)  HR: 94 (16 Mar 2022 05:00) (94 - 123)  BP: 97/52 (16 Mar 2022 05:00) (88/51 - 115/66)  BP(mean): 66 (16 Mar 2022 05:00) (65 - 86)  ABP: --  ABP(mean): --  RR: 17 (16 Mar 2022 05:00) (15 - 27)  SpO2: 98% (16 Mar 2022 05:00) (98% - 100%)    Mode: AC/ CMV (Assist Control/ Continuous Mandatory Ventilation), RR (machine): 12, TV (machine): 420, FiO2: 30, PEEP: 8, ITime: 1, MAP: 18, PIP: 12    03-14 @ 07:01 - 03-15 @ 07:00  --------------------------------------------------------  IN: 327.6 mL / OUT: 602 mL / NET: -274.4 mL    03-15 @ 07:01  - 03-16 @ 06:31  --------------------------------------------------------  IN: 1360.5 mL / OUT: 880 mL / NET: 480.5 mL      CAPILLARY BLOOD GLUCOSE      POCT Blood Glucose.: 140 mg/dL (14 Mar 2022 23:27)      PHYSICAL EXAM:    Constitutional: NAD, well-groomed, well-developed  HEENT: PERRLA, EOMI, Normal Hearing, MMM  Neck: No LAD, No JVD  Back: Normal spine flexure, No CVA tenderness  Respiratory: CTAB   Cardiovascular: S1 and S2, RRR, no M/G/R  Gastrointestinal: BS+, soft, NT/ND  Extremities: No peripheral edema  Vascular: 2+ peripheral pulses  Neurological: A/O x 3, no focal deficits  Psychiatric: Normal mood, normal affect  Musculoskeletal: 5/5 strength b/l upper and lower extremities  Skin: No rashes    MEDICATIONS:  MEDICATIONS  (STANDING):  artificial tears (preservative free) Ophthalmic Solution 1 Drop(s) Both EYES two times a day  aspirin  chewable 81 milliGRAM(s) Oral every 24 hours  atorvastatin 80 milliGRAM(s) Oral at bedtime  chlorhexidine 0.12% Liquid 15 milliLiter(s) Oral Mucosa every 12 hours  chlorhexidine 2% Cloths 1 Application(s) Topical <User Schedule>  cyanocobalamin 1000 MICROGram(s) Oral daily  DOXOrubicin INTRA-ARTERIAL (eMAR) 50 milliGRAM(s) IntraArterial once  ferrous    sulfate 325 milliGRAM(s) Oral <User Schedule>  folic acid 1 milliGRAM(s) Oral daily  heparin   Injectable 5000 Unit(s) SubCutaneous every 8 hours  lactulose Syrup 30 Gram(s) Oral three times a day  meropenem  IVPB 1000 milliGRAM(s) IV Intermittent every 8 hours  meropenem  IVPB 1000 milliGRAM(s) IV Intermittent every 8 hours  multivitamin 1 Tablet(s) Oral daily  mupirocin 2% Nasal 1 Application(s) Both Nostrils two times a day  nicotine - 21 mG/24Hr(s) Patch 1 patch Transdermal daily  pantoprazole  Injectable 40 milliGRAM(s) IV Push every 12 hours  phenylephrine    Infusion 0.1 MICROgram(s)/kG/Min (2.55 mL/Hr) IV Continuous <Continuous>  rifAXIMin 550 milliGRAM(s) Oral two times a day  tamsulosin 0.4 milliGRAM(s) Oral at bedtime    MEDICATIONS  (PRN):  oxymetazoline 0.05% Nasal Spray 2 Spray(s) Both Nostrils two times a day PRN nose bleeding  sodium chloride 0.65% Nasal 1 Spray(s) Both Nostrils four times a day PRN Nasal Congestion      ALLERGIES:  Allergies    No Known Allergies    Intolerances        LABS:                        7.8    17.64 )-----------( 153      ( 15 Mar 2022 05:13 )             25.0     03-15    135  |  107  |  13  ----------------------------<  169<H>  4.3   |  18<L>  |  0.77    Ca    8.2<L>      15 Mar 2022 05:13  Phos  3.0     03-15  Mg     1.9     03-15    TPro  6.5  /  Alb  2.3<L>  /  TBili  1.2  /  DBili  x   /  AST  60<H>  /  ALT  41  /  AlkPhos  100  03-15    PT/INR - ( 15 Mar 2022 05:13 )   PT: 18.5 sec;   INR: 1.55          PTT - ( 15 Mar 2022 05:13 )  PTT:41.6 sec  Urinalysis Basic - ( 14 Mar 2022 08:47 )    Color: x / Appearance: x / SG: x / pH: x  Gluc: x / Ketone: x  / Bili: x / Urobili: x   Blood: x / Protein: x / Nitrite: x   Leuk Esterase: x / RBC: < 5 /HPF / WBC Many /HPF   Sq Epi: x / Non Sq Epi: 0-5 /HPF / Bacteria: Present /HPF        RADIOLOGY & ADDITIONAL TESTS: Reviewed. INTERVAL HPI/OVERNIGHT EVENTS: adequate UOP.    SUBJECTIVE: Patient seen and examined at bedside. Patient unable to verbalize response as he is intubated not sedated. Patien with no complaints upon ROS, nodding to questioning.     OBJECTIVE:    VITAL SIGNS:  ICU Vital Signs Last 24 Hrs  T(C): 37.1 (16 Mar 2022 04:00), Max: 39 (15 Mar 2022 14:37)  T(F): 98.8 (16 Mar 2022 04:00), Max: 102.2 (15 Mar 2022 14:37)  HR: 94 (16 Mar 2022 05:00) (94 - 123)  BP: 97/52 (16 Mar 2022 05:00) (88/51 - 115/66)  BP(mean): 66 (16 Mar 2022 05:00) (65 - 86)  ABP: --  ABP(mean): --  RR: 17 (16 Mar 2022 05:00) (15 - 27)  SpO2: 98% (16 Mar 2022 05:00) (98% - 100%)    Mode: AC/ CMV (Assist Control/ Continuous Mandatory Ventilation), RR (machine): 12, TV (machine): 420, FiO2: 30, PEEP: 8, ITime: 1, MAP: 18, PIP: 12    03-14 @ 07:01  -  03-15 @ 07:00  --------------------------------------------------------  IN: 327.6 mL / OUT: 602 mL / NET: -274.4 mL    03-15 @ 07:01  -  03-16 @ 06:31  --------------------------------------------------------  IN: 1360.5 mL / OUT: 880 mL / NET: 480.5 mL      CAPILLARY BLOOD GLUCOSE      POCT Blood Glucose.: 140 mg/dL (14 Mar 2022 23:27)      PHYSICAL EXAM:    Constitutional: intubated, not sedated, NAD  HEENT: PERRLA but sluggish, dry epistaxis, dry blood in oral cavity, dry MM  Neck: No LAD, No JVD  Respiratory: mechanical breath sounds, R sided crackles   Cardiovascular: RRR, Loud systolic murmur best heard at right 2  Gastrointestinal: soft, NT, distended, tympanic  Extremities: WWP, No peripheral edema  Vascular: 2+ peripheral pulses  Neurological: intubated, able to follow commands, no focal deficit   Psychiatric: unable to assess  Skin: No rashes    MEDICATIONS:  MEDICATIONS  (STANDING):  artificial tears (preservative free) Ophthalmic Solution 1 Drop(s) Both EYES two times a day  aspirin  chewable 81 milliGRAM(s) Oral every 24 hours  atorvastatin 80 milliGRAM(s) Oral at bedtime  chlorhexidine 0.12% Liquid 15 milliLiter(s) Oral Mucosa every 12 hours  chlorhexidine 2% Cloths 1 Application(s) Topical <User Schedule>  cyanocobalamin 1000 MICROGram(s) Oral daily  DOXOrubicin INTRA-ARTERIAL (eMAR) 50 milliGRAM(s) IntraArterial once  ferrous    sulfate 325 milliGRAM(s) Oral <User Schedule>  folic acid 1 milliGRAM(s) Oral daily  heparin   Injectable 5000 Unit(s) SubCutaneous every 8 hours  lactulose Syrup 30 Gram(s) Oral three times a day  meropenem  IVPB 1000 milliGRAM(s) IV Intermittent every 8 hours  meropenem  IVPB 1000 milliGRAM(s) IV Intermittent every 8 hours  multivitamin 1 Tablet(s) Oral daily  mupirocin 2% Nasal 1 Application(s) Both Nostrils two times a day  nicotine - 21 mG/24Hr(s) Patch 1 patch Transdermal daily  pantoprazole  Injectable 40 milliGRAM(s) IV Push every 12 hours  phenylephrine    Infusion 0.1 MICROgram(s)/kG/Min (2.55 mL/Hr) IV Continuous <Continuous>  rifAXIMin 550 milliGRAM(s) Oral two times a day  tamsulosin 0.4 milliGRAM(s) Oral at bedtime    MEDICATIONS  (PRN):  oxymetazoline 0.05% Nasal Spray 2 Spray(s) Both Nostrils two times a day PRN nose bleeding  sodium chloride 0.65% Nasal 1 Spray(s) Both Nostrils four times a day PRN Nasal Congestion      ALLERGIES:  Allergies    No Known Allergies    Intolerances        LABS:                        7.8    17.64 )-----------( 153      ( 15 Mar 2022 05:13 )             25.0     03-15    135  |  107  |  13  ----------------------------<  169<H>  4.3   |  18<L>  |  0.77    Ca    8.2<L>      15 Mar 2022 05:13  Phos  3.0     03-15  Mg     1.9     03-15    TPro  6.5  /  Alb  2.3<L>  /  TBili  1.2  /  DBili  x   /  AST  60<H>  /  ALT  41  /  AlkPhos  100  03-15    PT/INR - ( 15 Mar 2022 05:13 )   PT: 18.5 sec;   INR: 1.55          PTT - ( 15 Mar 2022 05:13 )  PTT:41.6 sec  Urinalysis Basic - ( 14 Mar 2022 08:47 )    Color: x / Appearance: x / SG: x / pH: x  Gluc: x / Ketone: x  / Bili: x / Urobili: x   Blood: x / Protein: x / Nitrite: x   Leuk Esterase: x / RBC: < 5 /HPF / WBC Many /HPF   Sq Epi: x / Non Sq Epi: 0-5 /HPF / Bacteria: Present /HPF        RADIOLOGY & ADDITIONAL TESTS: Reviewed.

## 2022-03-16 NOTE — PROGRESS NOTE ADULT - ATTENDING COMMENTS
acute hypoxemic resp failure after TACE for HCC/AVR/AS/CAD/ETOH abuse/UTI with sepsis  physical as above  trial of extubation  stop sedation  appears euvolemic, off pressors  continue ertapenem for ESBL Klebsiella  rest as above  high complexity decision making

## 2022-03-17 LAB
ALBUMIN SERPL ELPH-MCNC: 2.2 G/DL — LOW (ref 3.3–5)
ALP SERPL-CCNC: 94 U/L — SIGNIFICANT CHANGE UP (ref 40–120)
ALT FLD-CCNC: 58 U/L — HIGH (ref 10–45)
ANION GAP SERPL CALC-SCNC: 6 MMOL/L — SIGNIFICANT CHANGE UP (ref 5–17)
APTT BLD: 39.4 SEC — HIGH (ref 27.5–35.5)
AST SERPL-CCNC: 58 U/L — HIGH (ref 10–40)
BASOPHILS # BLD AUTO: 0 K/UL — SIGNIFICANT CHANGE UP (ref 0–0.2)
BASOPHILS NFR BLD AUTO: 0 % — SIGNIFICANT CHANGE UP (ref 0–2)
BILIRUB SERPL-MCNC: 0.8 MG/DL — SIGNIFICANT CHANGE UP (ref 0.2–1.2)
BLD GP AB SCN SERPL QL: NEGATIVE — SIGNIFICANT CHANGE UP
BUN SERPL-MCNC: 17 MG/DL — SIGNIFICANT CHANGE UP (ref 7–23)
CALCIUM SERPL-MCNC: 8 MG/DL — LOW (ref 8.4–10.5)
CHLORIDE SERPL-SCNC: 109 MMOL/L — HIGH (ref 96–108)
CO2 SERPL-SCNC: 23 MMOL/L — SIGNIFICANT CHANGE UP (ref 22–31)
CREAT SERPL-MCNC: 0.66 MG/DL — SIGNIFICANT CHANGE UP (ref 0.5–1.3)
EGFR: 103 ML/MIN/1.73M2 — SIGNIFICANT CHANGE UP
EOSINOPHIL # BLD AUTO: 0.28 K/UL — SIGNIFICANT CHANGE UP (ref 0–0.5)
EOSINOPHIL NFR BLD AUTO: 4.5 % — SIGNIFICANT CHANGE UP (ref 0–6)
GLUCOSE SERPL-MCNC: 114 MG/DL — HIGH (ref 70–99)
HCT VFR BLD CALC: 24.7 % — LOW (ref 39–50)
HGB BLD-MCNC: 7.5 G/DL — LOW (ref 13–17)
IMM GRANULOCYTES NFR BLD AUTO: 0.3 % — SIGNIFICANT CHANGE UP (ref 0–1.5)
INR BLD: 1.37 — HIGH (ref 0.88–1.16)
LYMPHOCYTES # BLD AUTO: 0.86 K/UL — LOW (ref 1–3.3)
LYMPHOCYTES # BLD AUTO: 13.8 % — SIGNIFICANT CHANGE UP (ref 13–44)
MAGNESIUM SERPL-MCNC: 2.2 MG/DL — SIGNIFICANT CHANGE UP (ref 1.6–2.6)
MCHC RBC-ENTMCNC: 29.1 PG — SIGNIFICANT CHANGE UP (ref 27–34)
MCHC RBC-ENTMCNC: 30.4 GM/DL — LOW (ref 32–36)
MCV RBC AUTO: 95.7 FL — SIGNIFICANT CHANGE UP (ref 80–100)
MONOCYTES # BLD AUTO: 0.8 K/UL — SIGNIFICANT CHANGE UP (ref 0–0.9)
MONOCYTES NFR BLD AUTO: 12.8 % — SIGNIFICANT CHANGE UP (ref 2–14)
NEUTROPHILS # BLD AUTO: 4.29 K/UL — SIGNIFICANT CHANGE UP (ref 1.8–7.4)
NEUTROPHILS NFR BLD AUTO: 68.6 % — SIGNIFICANT CHANGE UP (ref 43–77)
NRBC # BLD: 0 /100 WBCS — SIGNIFICANT CHANGE UP (ref 0–0)
PHOSPHATE SERPL-MCNC: 2.3 MG/DL — LOW (ref 2.5–4.5)
PLATELET # BLD AUTO: 100 K/UL — LOW (ref 150–400)
POTASSIUM SERPL-MCNC: 3.6 MMOL/L — SIGNIFICANT CHANGE UP (ref 3.5–5.3)
POTASSIUM SERPL-SCNC: 3.6 MMOL/L — SIGNIFICANT CHANGE UP (ref 3.5–5.3)
PROT SERPL-MCNC: 6.6 G/DL — SIGNIFICANT CHANGE UP (ref 6–8.3)
PROTHROM AB SERPL-ACNC: 16.4 SEC — HIGH (ref 10.5–13.4)
RBC # BLD: 2.58 M/UL — LOW (ref 4.2–5.8)
RBC # FLD: 21.2 % — HIGH (ref 10.3–14.5)
RH IG SCN BLD-IMP: POSITIVE — SIGNIFICANT CHANGE UP
SODIUM SERPL-SCNC: 138 MMOL/L — SIGNIFICANT CHANGE UP (ref 135–145)
WBC # BLD: 6.25 K/UL — SIGNIFICANT CHANGE UP (ref 3.8–10.5)
WBC # FLD AUTO: 6.25 K/UL — SIGNIFICANT CHANGE UP (ref 3.8–10.5)

## 2022-03-17 PROCEDURE — 99233 SBSQ HOSP IP/OBS HIGH 50: CPT | Mod: GC

## 2022-03-17 PROCEDURE — 71045 X-RAY EXAM CHEST 1 VIEW: CPT | Mod: 26,76

## 2022-03-17 RX ORDER — THROMBIN (BOVINE) 10000 UNIT
5000 KIT TOPICAL ONCE
Refills: 0 | Status: COMPLETED | OUTPATIENT
Start: 2022-03-17 | End: 2022-03-17

## 2022-03-17 RX ORDER — POTASSIUM PHOSPHATE, MONOBASIC POTASSIUM PHOSPHATE, DIBASIC 236; 224 MG/ML; MG/ML
30 INJECTION, SOLUTION INTRAVENOUS ONCE
Refills: 0 | Status: COMPLETED | OUTPATIENT
Start: 2022-03-17 | End: 2022-03-17

## 2022-03-17 RX ADMIN — Medication 1 TABLET(S): at 11:25

## 2022-03-17 RX ADMIN — Medication 1 DROP(S): at 11:21

## 2022-03-17 RX ADMIN — Medication 1 MILLIGRAM(S): at 11:25

## 2022-03-17 RX ADMIN — Medication 1 PATCH: at 21:10

## 2022-03-17 RX ADMIN — TAMSULOSIN HYDROCHLORIDE 0.4 MILLIGRAM(S): 0.4 CAPSULE ORAL at 01:33

## 2022-03-17 RX ADMIN — Medication 5000 INTERNATIONAL UNIT(S): at 14:34

## 2022-03-17 RX ADMIN — Medication 1 PATCH: at 21:07

## 2022-03-17 RX ADMIN — HEPARIN SODIUM 5000 UNIT(S): 5000 INJECTION INTRAVENOUS; SUBCUTANEOUS at 19:37

## 2022-03-17 RX ADMIN — PREGABALIN 1000 MICROGRAM(S): 225 CAPSULE ORAL at 11:25

## 2022-03-17 RX ADMIN — ERTAPENEM SODIUM 120 MILLIGRAM(S): 1 INJECTION, POWDER, LYOPHILIZED, FOR SOLUTION INTRAMUSCULAR; INTRAVENOUS at 13:51

## 2022-03-17 RX ADMIN — Medication 1 PATCH: at 19:08

## 2022-03-17 RX ADMIN — Medication 1 DROP(S): at 21:08

## 2022-03-17 RX ADMIN — LACTULOSE 30 GRAM(S): 10 SOLUTION ORAL at 07:20

## 2022-03-17 RX ADMIN — CHLORHEXIDINE GLUCONATE 15 MILLILITER(S): 213 SOLUTION TOPICAL at 21:07

## 2022-03-17 RX ADMIN — HEPARIN SODIUM 5000 UNIT(S): 5000 INJECTION INTRAVENOUS; SUBCUTANEOUS at 04:01

## 2022-03-17 RX ADMIN — TAMSULOSIN HYDROCHLORIDE 0.4 MILLIGRAM(S): 0.4 CAPSULE ORAL at 21:07

## 2022-03-17 RX ADMIN — Medication 1 PATCH: at 06:22

## 2022-03-17 RX ADMIN — ATORVASTATIN CALCIUM 80 MILLIGRAM(S): 80 TABLET, FILM COATED ORAL at 21:10

## 2022-03-17 RX ADMIN — CHLORHEXIDINE GLUCONATE 1 APPLICATION(S): 213 SOLUTION TOPICAL at 06:23

## 2022-03-17 RX ADMIN — Medication 81 MILLIGRAM(S): at 11:21

## 2022-03-17 RX ADMIN — CHLORHEXIDINE GLUCONATE 15 MILLILITER(S): 213 SOLUTION TOPICAL at 11:21

## 2022-03-17 RX ADMIN — POTASSIUM PHOSPHATE, MONOBASIC POTASSIUM PHOSPHATE, DIBASIC 83.33 MILLIMOLE(S): 236; 224 INJECTION, SOLUTION INTRAVENOUS at 08:14

## 2022-03-17 NOTE — PROGRESS NOTE ADULT - SUBJECTIVE AND OBJECTIVE BOX
*TRANSFER ACCEPTANCE FROM MICU TO [facility]*    HOSPITAL COURSE  65 YO M, current smoker (1ppd x 50 years), current every day ETOH abuse (1 pint vodka and several beers daily x 50 years), with PMHx of HLD, HTN (not on any medications), CABG (LIMA to LAD, SVG to PDA in 2017), AS (s/p AVR in 2017) with recently discovered restenosis, presented to [facility] ED on 2/24/22 with chest pain and episode of LOC, initially admitted to cardiology for NSTEMI with LHC showing nonobstructive disease. TTE was reperformed and patient was  found to have restenosis of bioprosthetic valve for which aortic root surgery was planned.  Patient was transferred to CT surgery given complex anatomy with plan for TAVR and aortic root surgery.  During this time on CT surgery service he was found to have liver cirrhosis and a 2.4cmx2.4 cm mass on his liver concerning for HCC.  Decision was made not to proceed with surgery given poor functional status, cirrhosis and non compliance with medications.  The patient had worsening mental status on CT surgery service and med consult was called for hepatic encephalopathy.  The patient had an elevated ammonia level (123) however did not display asterixes  He was A&02-3 when transferred to medicine.  Patient was started on rifaxamin and lactulose with 2-3 bowel movements per day, the patient remained orientated A&O2-3 with no asterixis Although the patient is awake and alert his mentation is somnolent with slow speech.  He has been followed GI  for liver findings.  He has not been infected until this time.  Patient received intermittent blood transfusion, however with no leslie blood loss aside from minor nose bleeds. Hemolysis labs were positive and likely caused by prosthetic aortic valve. Hematology and surgical oncology was consulted as imaging of abdomen confirmed HCC.  Patient was not a surgical candidate and IR was consulted who planned for embolization.  Patient completed a course of high dose thiamine given alcohol history without improvement in mental status. MRI brain obtained showing mild chronic microvascular ischemic disease and brain parenchymal  volume loss, advanced for patient's age likely 2/2 alcohol abuse.  Neurology was consulted and  VEEG showing moderate diffuse nonspecific cerebral dysfunction. On 3/14 patient was seen with his normal baseline mental status and neuro exam prior to embolization procedure for HCC.  However patient was found to be tachycardic to 120s with new elevated WBC count to 14.  UA was positive, CXR unchanged, bladder scan w/o evidence of retention. Started on ceftriaxone for possible UTI also for concerns for aspiration pneumonia due to persistent somnolence. On 3/14, patient went for planned transarterial chemoembolization by IR for further evaluation for liver lesion but around start of procedure became obtunded with increased secretions and concern that he was not protecting his airway. Following Procedure patient became hypotensive to 80s/50s tachycardic to 120s, was minimally arousable to sternal rub and gurgling with c/f airway protection. ICU consulted for evaluation of urgent vs. emergent intubation. Course c/b anemia likely attributed to intermittent epistaxis for which ENT was consulted w/o plans for acute intervention.  Also of note, concern that patient may have aspirated over the weekend. In the MICU started on Ceftriaxone 2 gram for URTI, that was the switched to Ertapenem 1 gram daily due to Klebsiella ESBL. Started on vancomycin for possible HA, that was soon discontinued as MRSA swab was negative on 3/14. Patient was d/c of NE and started on Phenylephrine due to underlying AS. AC restarted on 3/15, decision verified with IR. UO was decreasing for which patient was given 1,250 ml of LR with good response. Patient extubated on 3/16. Patient receiving nutrition by NGT placed on the 3/16 and pending S&S eval. Patient stable to go to e stepped down to [facility].    INTERVAL HPI/OVERNIGHT EVENTS: No interval events.    SUBJECTIVE: Patient seen and examined at bedside. No complaints.    OBJECTIVE:    VITAL SIGNS:  ICU Vital Signs Last 24 Hrs  T(C): 37.8 (17 Mar 2022 22:23), Max: 37.8 (17 Mar 2022 22:23)  T(F): 100 (17 Mar 2022 22:23), Max: 100 (17 Mar 2022 22:23)  HR: 98 (17 Mar 2022 21:00) (86 - 105)  BP: 113/55 (17 Mar 2022 21:00) (95/51 - 128/63)  BP(mean): 77 (17 Mar 2022 21:00) (67 - 94)  ABP: --  ABP(mean): --  RR: 25 (17 Mar 2022 21:00) (14 - 29)  SpO2: 97% (17 Mar 2022 21:00) (93% - 100%)        03-16 @ 07:01 - 03-17 @ 07:00  --------------------------------------------------------  IN: 790 mL / OUT: 2205 mL / NET: -1415 mL    03-17 @ 07:01 - 03-17 @ 23:37  --------------------------------------------------------  IN: 1516.5 mL / OUT: 845 mL / NET: 671.5 mL      CAPILLARY BLOOD GLUCOSE          PHYSICAL EXAM:  Constitutional: NAD, mildly somnolent   HEENT: PERRLA but sluggish, dry epistaxis, dry MM  Neck: No LAD, No JVD  Respiratory: decreased breath sound BL,L side rales   Cardiovascular: RRR, Loud systolic murmur best heard at right 2  Gastrointestinal: soft, NT, distended, tympanic, NGT  Extremities: WWP, No peripheral edema  Vascular: 2+ peripheral pulses  Neurological: AAOx2 (person and place), no focal deficit, able to follow commands  Psychiatric: appropriated, normal affect    Skin: No rashes    MEDICATIONS:  MEDICATIONS  (STANDING):  artificial tears (preservative free) Ophthalmic Solution 1 Drop(s) Both EYES two times a day  aspirin  chewable 81 milliGRAM(s) Oral every 24 hours  atorvastatin 80 milliGRAM(s) Oral at bedtime  chlorhexidine 0.12% Liquid 15 milliLiter(s) Oral Mucosa every 12 hours  chlorhexidine 2% Cloths 1 Application(s) Topical <User Schedule>  cyanocobalamin 1000 MICROGram(s) Oral daily  DOXOrubicin INTRA-ARTERIAL (eMAR) 50 milliGRAM(s) IntraArterial once  ertapenem  IVPB 1000 milliGRAM(s) IV Intermittent every 24 hours  ferrous    sulfate 325 milliGRAM(s) Oral <User Schedule>  folic acid 1 milliGRAM(s) Oral daily  heparin   Injectable 5000 Unit(s) SubCutaneous every 8 hours  lactulose Syrup 30 Gram(s) Oral three times a day  multivitamin 1 Tablet(s) Oral daily  nicotine - 21 mG/24Hr(s) Patch 1 patch Transdermal daily  rifAXIMin 550 milliGRAM(s) Oral two times a day  tamsulosin 0.4 milliGRAM(s) Oral at bedtime    MEDICATIONS  (PRN):  sodium chloride 0.65% Nasal 1 Spray(s) Both Nostrils four times a day PRN Nasal Congestion      ALLERGIES:  Allergies    No Known Allergies    Intolerances        LABS:                        7.5    6.25  )-----------( 100      ( 17 Mar 2022 06:09 )             24.7     03-17    138  |  109<H>  |  17  ----------------------------<  114<H>  3.6   |  23  |  0.66    Ca    8.0<L>      17 Mar 2022 06:09  Phos  2.3     03-17  Mg     2.2     03-17    TPro  6.6  /  Alb  2.2<L>  /  TBili  0.8  /  DBili  x   /  AST  58<H>  /  ALT  58<H>  /  AlkPhos  94  03-17    PT/INR - ( 17 Mar 2022 06:09 )   PT: 16.4 sec;   INR: 1.37          PTT - ( 17 Mar 2022 06:09 )  PTT:39.4 sec      RADIOLOGY & ADDITIONAL TESTS: Reviewed. *TRANSFER ACCEPTANCE FROM [facility]U TO [facility]*    HOSPITAL COURSE  65 YO M, current smoker (1ppd x 50 years), current every day ETOH abuse (1 pint vodka and several beers daily x 50 years), with PMHx of HLD, HTN (not on any medications), CABG (LIMA to LAD, SVG to PDA in 2017), AS (s/p AVR in 2017) with recently discovered restenosis, presented to [facility] ED on 2/24/22 with chest pain and episode of LOC, initially admitted to cardiology for NSTEMI with LHC showing nonobstructive disease. TTE was reperformed and patient was  found to have restenosis of bioprosthetic valve for which aortic root surgery was planned.  Patient was transferred to CT surgery given complex anatomy with plan for TAVR and aortic root surgery.  During this time on CT surgery service he was found to have liver cirrhosis and a 2.4cmx2.4 cm mass on his liver concerning for HCC.  Decision was made not to proceed with surgery given poor functional status, cirrhosis and non compliance with medications.  The patient had worsening mental status on CT surgery service and med consult was called for hepatic encephalopathy.  The patient had an elevated ammonia level (123) however did not display asterixes  He was A&02-3 when transferred to medicine.  Patient was started on rifaxamin and lactulose with 2-3 bowel movements per day, the patient remained orientated A&O2-3 with no asterixis Although the patient is awake and alert his mentation is somnolent with slow speech.  He has been followed GI  for liver findings.  He has not been infected until this time.  Patient received intermittent blood transfusion, however with no leslie blood loss aside from minor nose bleeds. Hemolysis labs were positive and likely caused by prosthetic aortic valve. Hematology and surgical oncology was consulted as imaging of abdomen confirmed HCC.  Patient was not a surgical candidate and IR was consulted who planned for embolization.  Patient completed a course of high dose thiamine given alcohol history without improvement in mental status. MRI brain obtained showing mild chronic microvascular ischemic disease and brain parenchymal  volume loss, advanced for patient's age likely 2/2 alcohol abuse.  Neurology was consulted and  VEEG showing moderate diffuse nonspecific cerebral dysfunction. On 3/14 patient was seen with his normal baseline mental status and neuro exam prior to embolization procedure for HCC.  However patient was found to be tachycardic to 120s with new elevated WBC count to 14.  UA was positive, CXR unchanged, bladder scan w/o evidence of retention. Started on ceftriaxone for possible UTI also for concerns for aspiration pneumonia due to persistent somnolence. On 3/14, patient went for planned transarterial chemoembolization by IR for further evaluation for liver lesion but around start of procedure became obtunded with increased secretions and concern that he was not protecting his airway. Following Procedure patient became hypotensive to 80s/50s tachycardic to 120s, was minimally arousable to sternal rub and gurgling with c/f airway protection. ICU consulted for evaluation of urgent vs. emergent intubation. Course c/b anemia likely attributed to intermittent epistaxis for which ENT was consulted w/o plans for acute intervention.  Also of note, concern that patient may have aspirated over the weekend. In the MICU started on Ceftriaxone 2 gram for URTI, that was the switched to Ertapenem 1 gram daily due to Klebsiella ESBL. Started on vancomycin for possible HA, that was soon discontinued as MRSA swab was negative on 3/14. Patient was d/c of NE and started on Phenylephrine due to underlying AS. AC restarted on 3/15, decision verified with IR. UO was decreasing for which patient was given 1,250 ml of LR with good response. Patient extubated on 3/16. Patient receiving nutrition by NGT placed on the 3/16 and pending S&S eval. Patient stable to go to e stepped down to [facility].    INTERVAL HPI/OVERNIGHT EVENTS: No interval events.    SUBJECTIVE: Patient seen and examined at bedside. No complaints. ROS limited as patient is AAOx1.    OBJECTIVE:    VITAL SIGNS:  ICU Vital Signs Last 24 Hrs  T(C): 37.8 (17 Mar 2022 22:23), Max: 37.8 (17 Mar 2022 22:23)  T(F): 100 (17 Mar 2022 22:23), Max: 100 (17 Mar 2022 22:23)  HR: 98 (17 Mar 2022 21:00) (86 - 105)  BP: 113/55 (17 Mar 2022 21:00) (95/51 - 128/63)  BP(mean): 77 (17 Mar 2022 21:00) (67 - 94)  ABP: --  ABP(mean): --  RR: 25 (17 Mar 2022 21:00) (14 - 29)  SpO2: 97% (17 Mar 2022 21:00) (93% - 100%)        03-16 @ 07:01  -  03-17 @ 07:00  --------------------------------------------------------  IN: 790 mL / OUT: 2205 mL / NET: -1415 mL    03-17 @ 07:01  -  03-17 @ 23:37  --------------------------------------------------------  IN: 1516.5 mL / OUT: 845 mL / NET: 671.5 mL      CAPILLARY BLOOD GLUCOSE          PHYSICAL EXAM:  Constitutional: NAD, resting comfortably,  HEENT: PERRLA, MMM  Neck: No LAD, No JVD  Respiratory: decreased breath sound BL,L side rales   Cardiovascular: RRR, Loud systolic murmur best heard at right 2nd ICS  Gastrointestinal: soft, NT, distended, tympanic, NGT in place  Extremities: WWP, No peripheral edema  Vascular: 2+ peripheral pulses  Neurological: AAOx1 (person), no focal deficit, able to follow commands  Psychiatric: appropriated, normal affect    Skin: ecchymoses on b/l UE    MEDICATIONS:  MEDICATIONS  (STANDING):  artificial tears (preservative free) Ophthalmic Solution 1 Drop(s) Both EYES two times a day  aspirin  chewable 81 milliGRAM(s) Oral every 24 hours  atorvastatin 80 milliGRAM(s) Oral at bedtime  chlorhexidine 0.12% Liquid 15 milliLiter(s) Oral Mucosa every 12 hours  chlorhexidine 2% Cloths 1 Application(s) Topical <User Schedule>  cyanocobalamin 1000 MICROGram(s) Oral daily  DOXOrubicin INTRA-ARTERIAL (eMAR) 50 milliGRAM(s) IntraArterial once  ertapenem  IVPB 1000 milliGRAM(s) IV Intermittent every 24 hours  ferrous    sulfate 325 milliGRAM(s) Oral <User Schedule>  folic acid 1 milliGRAM(s) Oral daily  heparin   Injectable 5000 Unit(s) SubCutaneous every 8 hours  lactulose Syrup 30 Gram(s) Oral three times a day  multivitamin 1 Tablet(s) Oral daily  nicotine - 21 mG/24Hr(s) Patch 1 patch Transdermal daily  rifAXIMin 550 milliGRAM(s) Oral two times a day  tamsulosin 0.4 milliGRAM(s) Oral at bedtime    MEDICATIONS  (PRN):  sodium chloride 0.65% Nasal 1 Spray(s) Both Nostrils four times a day PRN Nasal Congestion      ALLERGIES:  Allergies    No Known Allergies    Intolerances        LABS:                        7.5    6.25  )-----------( 100      ( 17 Mar 2022 06:09 )             24.7     03-17    138  |  109<H>  |  17  ----------------------------<  114<H>  3.6   |  23  |  0.66    Ca    8.0<L>      17 Mar 2022 06:09  Phos  2.3     03-17  Mg     2.2     03-17    TPro  6.6  /  Alb  2.2<L>  /  TBili  0.8  /  DBili  x   /  AST  58<H>  /  ALT  58<H>  /  AlkPhos  94  03-17    PT/INR - ( 17 Mar 2022 06:09 )   PT: 16.4 sec;   INR: 1.37          PTT - ( 17 Mar 2022 06:09 )  PTT:39.4 sec      RADIOLOGY & ADDITIONAL TESTS: Reviewed.

## 2022-03-17 NOTE — PROGRESS NOTE ADULT - ATTENDING COMMENTS
acute hypoxemic resp failure after TACE for HCC/AVR/AS/CAD/ETOH abuse/UTI with sepsis  physical as above  CV stable off pressors  continue ertapenem  swallowing evaluation  continue NG feeds for now  H/H stable  rest as above  high complexity decision making

## 2022-03-17 NOTE — PROGRESS NOTE ADULT - ASSESSMENT
65 YO M, current smoker (1ppd x 50 years), current every day ETOH abuse (1 pint vodka and several beers daily x 50 years), with PMHx of HLD, HTN (not on any medications), CABG (LIMA to LAD, SVG to PDA in 2017), AS (s/p AVR in 2017) with recently discovered restenosis, presented to [facility] ED on 2/24/22 with chest pain and episode of LOC, initially admitted to cardiology for NSTEMI with [facility] with nonobstructive disease, then found to have restenosis of bioprosthetic valve for which aortic root surgery was planned, but now deferred given liver lesion c/f HCC. Patient transferred to medicine for further w/u of HCC and management of AMS, which is still of unclear etiology.  During IR TACE today, 3/14, patient noted to be obtunded with difficulty protecting airway, so emergently intubated and stepped up to ICU extubated on 3/16.    NEURO  #AMS (altered mental status)  AAOx2 (person and place). Unlikely hepatic encephalopathy as no asterixis and is currently compensated. Neuro exam nonfocal. Possibly related to chronic alcohol use, Wernicke's or depression i/s/o HCC diagnosis. As per wife, pt is not normally lethargic at home. Completed course of high dose thiamine, last librium dose on 03/01. CTH and MRI w/o acute abnormality. During the night of 3/13-3/14 flow sheets showing 100.1 Temp and pt becoming tachycardic to 120s. Pt not complaining of any localizing signs of infection, CXR unremarkable however UA was positive. UCX sent and pt started on CTX 1g Q24hrs (did not receive first dose as taken to TACE procedure). Post TACE procedure c/f protection of airway given increased somnolence potentially 2/2 anesthesia. Pt was intubated for airway protection and transferred to MICU. Extubated on 03/16.  - VEEG: Mild-to-moderate generalized slowing suggestive of a similar degree of diffuse or multifocal cerebral dysfunction. No epileptiform discharges or electrographic seizures were recorded.   - Neuro following  - Infectious disease as below   - Monitor for pneumonia, potential aspiration event given mental status.  - c/w hepatic encephalopathy treatment: Rifaximin and lactulose, rectal tube in place     #History of alcohol use  Long standing EtOH consumption. Not showing any signs of withdrawal at the moment. Last drink prior to admission which is almost 2 weeks ago. Pt was given 3 days of librium Q8hrs while on CT surgery however did not display signs of withdrawal per chart review  - c/w Multivitamin, Folic Acid, and B12.    CARDIO  #AS (aortic stenosis)  AS i/s/o prior TAVR in 2017 with Dr Wilcox. JOSELO with peak transvalvular velocity of 4.7, mean gradient 56. symptomatic: exertional CP, episode of syncope  - initially planned for valve in valve TAVR, but then it was decided that because of aortic root anatomy, he would need aortic root open heart surgery  - he was deemed not to be a surgical candidate given poor functional status, liver cirrhosis, prior medication noncompliance which likely contributed to valve restenosis. his recovery after open heart surgically would be extremely difficult if possible. He would NOT be a surgical candidate   - CXR 3/14: clear lung fields     #CAD (coronary artery disease)  S/p CABG with Dr Wilcox in 2017  - recent [facility] with nonobstructive disease  - c/w Lipitor 80mg QD  - c/w ASA 81mg    #HTN (hypertension)  Off meds.   - Monitor VS, introduced accordingly as patient per now normotensive     PULM   #Concern for PNA  Prior to arrival to the MICU on 3/14 there were concerns for aspiration PNA.   - CXR post intubation on 3/14: no acute infiltrates  - Intubated 3/14, Extubated on 3/16  - s/p ceftriaxone 2g q24 hrs  - c/w Ertapenem 1 gram daily   - continue to monitor respiratory status  - currently on NC and wean O2 as tolerated     GI  #ALC (alcoholic liver cirrhosis)  2/2 long standing EtOH abuse. Likely compensated. Likely no hepatic encephalopathy. No known EV bleeding. NO ascites however now with signs of new infection started on antibiotics today.  - GI following  - no known Varices, but no prior EGD evaluation, he will need it outpatient, although has significant risk factors    #HCC (hepatocellular carcinoma)  CTAP with 2.4 hypervascular liver mass. i/s/o Liver cirrhosis and EtOH use disorder. triple phase CT confirms HCC  - AFP is 7.2 which is normal  - MRI abdomen: 2.5 cm left hepatic lesion consistent with LI-RADS v 2018 Category: LR-5 Definitely HCC. OPTN Category (if LR-5): 5B. No locally invasive or metastatic disease. Left hepatic artery variant. No additional suspicious hepatic lesions.   - s/p IR transarterial chemical embolization since he is not a candidate for ablation.  - Surg/onc following NTD  - Palliative care following.    RENAL  #no active issues      #UTI  Patient with positive UA (WBC, bacteria LE) on 3/14. Unable to assess urinary symptoms, started empiric treatment given mental status.   - abx as below     #Sanchez in place  - monitor I&O    ID  #Sepsis  Source likely UTI vs. ? aspiration pna, as patient noted to be aspirating over the weekend.  UA positive. 3/4 SIRS with fever, tachycardia, and leukocytosis.  - UCx 3/14: Klebsiella ESBL  - BCx 3/14: NGTD  - Sputum Cx 3/15: moderate Proteus Mirabilis, few to moderate Klebsiella pneumonia  - Spiking fevers on 3/15 while on CTX 2g q24h, switch to Zosyn pseudomonal coverage and due to persistent spiking                - s/p Meropenem x1 dose 3/15         - c/w Ertapenem 1 gram for 6 more doses (started 3/16)    ENDO  #No active problem    HEME/ONC  #Normocytic hypochromic anemia  Likely secondary to hemolysis due to prosthetic valve. Minimal epistaxis that does not explain low hgb, Hgb 8.1 on 3/14.  - Completed IV iron X 3 days  - c/w b12 1mg Qd  - c/w folate 1mg qd  - Transfusion goal > 7  - heme/onc, GI, IR following.    Prophylactic measure  F: as appropriate, caution due to AS  E: replenish as appropriate  N: NGT started on feeds   VTE PPx: heparin TID sub  GI: none  C: Full Code  D: MICU

## 2022-03-17 NOTE — PROGRESS NOTE ADULT - SUBJECTIVE AND OBJECTIVE BOX
INTERVAL HPI/OVERNIGHT EVENTS:    SUBJECTIVE: Patient seen and examined at bedside.     OBJECTIVE:    VITAL SIGNS:  ICU Vital Signs Last 24 Hrs  T(C): 36.9 (17 Mar 2022 02:10), Max: 37.1 (16 Mar 2022 09:35)  T(F): 98.5 (17 Mar 2022 02:10), Max: 98.8 (16 Mar 2022 22:45)  HR: 99 (17 Mar 2022 06:00) (90 - 109)  BP: 102/55 (17 Mar 2022 06:00) (82/50 - 153/76)  BP(mean): 72 (17 Mar 2022 06:00) (61 - 104)  ABP: --  ABP(mean): --  RR: 16 (17 Mar 2022 06:00) (11 - 29)  SpO2: 99% (17 Mar 2022 06:00) (96% - 100%)    Mode: standby    03-15 @ 07:01  -  03-16 @ 07:00  --------------------------------------------------------  IN: 1460.5 mL / OUT: 1065 mL / NET: 395.5 mL    03-16 @ 07:01  -  03-17 @ 06:39  --------------------------------------------------------  IN: 750 mL / OUT: 870 mL / NET: -120 mL      CAPILLARY BLOOD GLUCOSE          PHYSICAL EXAM:    Constitutional: NAD, well-groomed, well-developed  HEENT: PERRLA, EOMI, Normal Hearing, MMM  Neck: No LAD, No JVD  Back: Normal spine flexure, No CVA tenderness  Respiratory: CTAB   Cardiovascular: S1 and S2, RRR, no M/G/R  Gastrointestinal: BS+, soft, NT/ND  Extremities: No peripheral edema  Vascular: 2+ peripheral pulses  Neurological: A/O x 3, no focal deficits  Psychiatric: Normal mood, normal affect  Musculoskeletal: 5/5 strength b/l upper and lower extremities  Skin: No rashes    MEDICATIONS:  MEDICATIONS  (STANDING):  artificial tears (preservative free) Ophthalmic Solution 1 Drop(s) Both EYES two times a day  aspirin  chewable 81 milliGRAM(s) Oral every 24 hours  atorvastatin 80 milliGRAM(s) Oral at bedtime  chlorhexidine 0.12% Liquid 15 milliLiter(s) Oral Mucosa every 12 hours  chlorhexidine 2% Cloths 1 Application(s) Topical <User Schedule>  cyanocobalamin 1000 MICROGram(s) Oral daily  DOXOrubicin INTRA-ARTERIAL (eMAR) 50 milliGRAM(s) IntraArterial once  ertapenem  IVPB 1000 milliGRAM(s) IV Intermittent every 24 hours  ferrous    sulfate 325 milliGRAM(s) Oral <User Schedule>  folic acid 1 milliGRAM(s) Oral daily  heparin   Injectable 5000 Unit(s) SubCutaneous every 8 hours  lactulose Syrup 30 Gram(s) Oral three times a day  multivitamin 1 Tablet(s) Oral daily  mupirocin 2% Nasal 1 Application(s) Both Nostrils two times a day  nicotine - 21 mG/24Hr(s) Patch 1 patch Transdermal daily  rifAXIMin 550 milliGRAM(s) Oral two times a day  tamsulosin 0.4 milliGRAM(s) Oral at bedtime    MEDICATIONS  (PRN):  oxymetazoline 0.05% Nasal Spray 2 Spray(s) Both Nostrils two times a day PRN nose bleeding  sodium chloride 0.65% Nasal 1 Spray(s) Both Nostrils four times a day PRN Nasal Congestion      ALLERGIES:  Allergies    No Known Allergies    Intolerances        LABS:                        7.5    6.25  )-----------( 100      ( 17 Mar 2022 06:09 )             24.7     03-16    138  |  113<H>  |  12  ----------------------------<  98  3.4<L>   |  17<L>  |  0.57    Ca    7.2<L>      16 Mar 2022 06:35  Phos  1.9     03-16  Mg     1.6     03-16    TPro  5.2<L>  /  Alb  1.8<L>  /  TBili  0.9  /  DBili  x   /  AST  54<H>  /  ALT  51<H>  /  AlkPhos  69  03-16    PT/INR - ( 17 Mar 2022 06:09 )   PT: 16.4 sec;   INR: 1.37          PTT - ( 17 Mar 2022 06:09 )  PTT:39.4 sec      RADIOLOGY & ADDITIONAL TESTS: Reviewed. INTERVAL HPI/OVERNIGHT EVENTS: patient removed NGT, b/l soft wrist restraints ordered. NGT replaced (CXR confirmed placement). Adequate UOP.    SUBJECTIVE: Patient seen and examined at bedside. Patient poorly cooperative during conversation, but upon ROS denies any complaints nor concerns.     OBJECTIVE:    VITAL SIGNS:  ICU Vital Signs Last 24 Hrs  T(C): 36.9 (17 Mar 2022 02:10), Max: 37.1 (16 Mar 2022 09:35)  T(F): 98.5 (17 Mar 2022 02:10), Max: 98.8 (16 Mar 2022 22:45)  HR: 99 (17 Mar 2022 06:00) (90 - 109)  BP: 102/55 (17 Mar 2022 06:00) (82/50 - 153/76)  BP(mean): 72 (17 Mar 2022 06:00) (61 - 104)  ABP: --  ABP(mean): --  RR: 16 (17 Mar 2022 06:00) (11 - 29)  SpO2: 99% (17 Mar 2022 06:00) (96% - 100%)    Mode: standby    03-15 @ 07:01  -  03-16 @ 07:00  --------------------------------------------------------  IN: 1460.5 mL / OUT: 1065 mL / NET: 395.5 mL    03-16 @ 07:01  -  03-17 @ 06:39  --------------------------------------------------------  IN: 750 mL / OUT: 870 mL / NET: -120 mL      CAPILLARY BLOOD GLUCOSE          PHYSICAL EXAM:    Constitutional: NAD, well-groomed, well-developed  HEENT: PERRLA, EOMI, Normal Hearing, MMM  Neck: No LAD, No JVD  Back: Normal spine flexure, No CVA tenderness  Respiratory: CTAB   Cardiovascular: S1 and S2, RRR, no M/G/R  Gastrointestinal: BS+, soft, NT/ND  Extremities: No peripheral edema  Vascular: 2+ peripheral pulses  Neurological: A/O x 3, no focal deficits  Psychiatric: Normal mood, normal affect  Musculoskeletal: 5/5 strength b/l upper and lower extremities  Skin: No rashes    MEDICATIONS:  MEDICATIONS  (STANDING):  artificial tears (preservative free) Ophthalmic Solution 1 Drop(s) Both EYES two times a day  aspirin  chewable 81 milliGRAM(s) Oral every 24 hours  atorvastatin 80 milliGRAM(s) Oral at bedtime  chlorhexidine 0.12% Liquid 15 milliLiter(s) Oral Mucosa every 12 hours  chlorhexidine 2% Cloths 1 Application(s) Topical <User Schedule>  cyanocobalamin 1000 MICROGram(s) Oral daily  DOXOrubicin INTRA-ARTERIAL (eMAR) 50 milliGRAM(s) IntraArterial once  ertapenem  IVPB 1000 milliGRAM(s) IV Intermittent every 24 hours  ferrous    sulfate 325 milliGRAM(s) Oral <User Schedule>  folic acid 1 milliGRAM(s) Oral daily  heparin   Injectable 5000 Unit(s) SubCutaneous every 8 hours  lactulose Syrup 30 Gram(s) Oral three times a day  multivitamin 1 Tablet(s) Oral daily  mupirocin 2% Nasal 1 Application(s) Both Nostrils two times a day  nicotine - 21 mG/24Hr(s) Patch 1 patch Transdermal daily  rifAXIMin 550 milliGRAM(s) Oral two times a day  tamsulosin 0.4 milliGRAM(s) Oral at bedtime    MEDICATIONS  (PRN):  oxymetazoline 0.05% Nasal Spray 2 Spray(s) Both Nostrils two times a day PRN nose bleeding  sodium chloride 0.65% Nasal 1 Spray(s) Both Nostrils four times a day PRN Nasal Congestion      ALLERGIES:  Allergies    No Known Allergies    Intolerances        LABS:                        7.5    6.25  )-----------( 100      ( 17 Mar 2022 06:09 )             24.7     03-16    138  |  113<H>  |  12  ----------------------------<  98  3.4<L>   |  17<L>  |  0.57    Ca    7.2<L>      16 Mar 2022 06:35  Phos  1.9     03-16  Mg     1.6     03-16    TPro  5.2<L>  /  Alb  1.8<L>  /  TBili  0.9  /  DBili  x   /  AST  54<H>  /  ALT  51<H>  /  AlkPhos  69  03-16    PT/INR - ( 17 Mar 2022 06:09 )   PT: 16.4 sec;   INR: 1.37          PTT - ( 17 Mar 2022 06:09 )  PTT:39.4 sec      RADIOLOGY & ADDITIONAL TESTS: Reviewed. [facility]  65 YO M, current smoker (1ppd x 50 years), current every day ETOH abuse (1 pint vodka and several beers daily x 50 years), with PMHx of HLD, HTN (not on any medications), CABG (LIMA to LAD, SVG to PDA in 2017), AS (s/p AVR in 2017) with recently discovered restenosis, presented to [facility] ED on 2/24/22 with chest pain and episode of LOC, initially admitted to cardiology for NSTEMI with LHC showing nonobstructive disease. TTE was reperformed and patient was  found to have restenosis of bioprosthetic valve for which aortic root surgery was planned.  Patient was transferred to CT surgery given complex anatomy with plan for TAVR and aortic root surgery.  During this time on CT surgery service he was found to have liver cirrhosis and a 2.4cmx2.4 cm mass on his liver concerning for HCC.  Decision was made not to proceed with surgery given poor functional status, cirrhosis and non compliance with medications.  The patient had worsening mental status on CT surgery service and med consult was called for hepatic encephalopathy.  The patient had an elevated ammonia level (123) however did not display asterixes  He was A&02-3 when transferred to medicine.  Patient was started on rifaxamin and lactulose with 2-3 bowel movements per day, the patient remained orientated A&O2-3 with no asterixis Although the patient is awake and alert his mentation is somnolent with slow speech.  He has been followed GI  for liver findings.  He has not been infected until this time.  Patient received intermittent blood transfusion, however with no leslie blood loss aside from minor nose bleeds. Hemolysis labs were positive and likely caused by prosthetic aortic valve. Hematology and surgical oncology was consulted as imaging of abdomen confirmed HCC.  Patient was not a surgical candidate and IR was consulted who planned for embolization.  Patient completed a course of high dose thiamine given alcohol history without improvement in mental status. MRI brain obtained showing mild chronic microvascular ischemic disease and brain parenchymal  volume loss, advanced for patient's age likely 2/2 alcohol abuse.  Neurology was consulted and  VEEG showing moderate diffuse nonspecific cerebral dysfunction. On 3/14 patient was seen with his normal baseline mental status and neuro exam prior to embolization procedure for HCC.  However patient was found to be tachycardic to 120s with new elevated WBC count to 14.  UA was positive, CXR unchanged, bladder scan w/o evidence of retention. Started on ceftriaxone for possible UTI also for concerns for aspiration pneumonia due to persistent somnolence.    Today, 3/14, patient went for planned transarterial chemoembolization by IR for further evaluation for liver lesion but around start of procedure became obtunded with increased secretions and concern that he was not protecting his airway. Following Procedure patient became hypotensive to 80s/50s tachycardic to 120s, was minimally arousable to sternal rub and gurgling with c/f airway protection. ICU consulted for evaluation of urgent vs. emergent intubation. Course c/b anemia likely attributed to intermittent epistaxis for which ENT was consulted w/o plans for acute intervention.  Also of note, concern that patient may have aspirated over the weekend. In the MICU started on Ceftriaxone 2 gram for URTI, that was the switched to Ertapenem 1 gram daily due to Klebsiella ESBL. Started on vancomycin for possible HA, that was soon discontinued as MRSA swab was negative on 3/14. Patient was d/c of NE and started on Phenylephrine due to underlying AS. AC restarted on 3/15, decicion verified with IR. UO was decreasing for which patient was given 1,250 ml of LR with good response. Patient extubated on 3/16. Patient receiving nutrition by NGT placed on the 3/16 and pending S&S eval. Patient stable to go to e stepped down to [facility].    INTERVAL HPI/OVERNIGHT EVENTS: patient removed NGT, b/l soft wrist restraints ordered. NGT replaced (CXR confirmed placement). Adequate UOP.    SUBJECTIVE: Patient seen and examined at bedside. Patient poorly cooperative during conversation, but upon ROS denies any complaints nor concerns.     OBJECTIVE:    VITAL SIGNS:  ICU Vital Signs Last 24 Hrs  T(C): 36.9 (17 Mar 2022 02:10), Max: 37.1 (16 Mar 2022 09:35)  T(F): 98.5 (17 Mar 2022 02:10), Max: 98.8 (16 Mar 2022 22:45)  HR: 99 (17 Mar 2022 06:00) (90 - 109)  BP: 102/55 (17 Mar 2022 06:00) (82/50 - 153/76)  BP(mean): 72 (17 Mar 2022 06:00) (61 - 104)  ABP: --  ABP(mean): --  RR: 16 (17 Mar 2022 06:00) (11 - 29)  SpO2: 99% (17 Mar 2022 06:00) (96% - 100%)    Mode: standby    03-15 @ 07:01  -  03-16 @ 07:00  --------------------------------------------------------  IN: 1460.5 mL / OUT: 1065 mL / NET: 395.5 mL    03-16 @ 07:01  -  03-17 @ 06:39  --------------------------------------------------------  IN: 750 mL / OUT: 870 mL / NET: -120 mL      CAPILLARY BLOOD GLUCOSE          PHYSICAL EXAM:    Constitutional: NAD, mildly somnolent   HEENT: PERRLA but sluggish, dry epistaxis, dry MM  Neck: No LAD, No JVD  Respiratory: decreased breath sound BL,L side rales   Cardiovascular: RRR, Loud systolic murmur best heard at right 2  Gastrointestinal: soft, NT, distended, tympanic, NGT  Extremities: WWP, No peripheral edema  Vascular: 2+ peripheral pulses  Neurological: AAOx2 (person and place), no focal deficit, able to follow commands  Psychiatric: appropriated, normal affect    Skin: No rashes    MEDICATIONS:  MEDICATIONS  (STANDING):  artificial tears (preservative free) Ophthalmic Solution 1 Drop(s) Both EYES two times a day  aspirin  chewable 81 milliGRAM(s) Oral every 24 hours  atorvastatin 80 milliGRAM(s) Oral at bedtime  chlorhexidine 0.12% Liquid 15 milliLiter(s) Oral Mucosa every 12 hours  chlorhexidine 2% Cloths 1 Application(s) Topical <User Schedule>  cyanocobalamin 1000 MICROGram(s) Oral daily  DOXOrubicin INTRA-ARTERIAL (eMAR) 50 milliGRAM(s) IntraArterial once  ertapenem  IVPB 1000 milliGRAM(s) IV Intermittent every 24 hours  ferrous    sulfate 325 milliGRAM(s) Oral <User Schedule>  folic acid 1 milliGRAM(s) Oral daily  heparin   Injectable 5000 Unit(s) SubCutaneous every 8 hours  lactulose Syrup 30 Gram(s) Oral three times a day  multivitamin 1 Tablet(s) Oral daily  mupirocin 2% Nasal 1 Application(s) Both Nostrils two times a day  nicotine - 21 mG/24Hr(s) Patch 1 patch Transdermal daily  rifAXIMin 550 milliGRAM(s) Oral two times a day  tamsulosin 0.4 milliGRAM(s) Oral at bedtime    MEDICATIONS  (PRN):  oxymetazoline 0.05% Nasal Spray 2 Spray(s) Both Nostrils two times a day PRN nose bleeding  sodium chloride 0.65% Nasal 1 Spray(s) Both Nostrils four times a day PRN Nasal Congestion      ALLERGIES:  Allergies    No Known Allergies    Intolerances        LABS:                        7.5    6.25  )-----------( 100      ( 17 Mar 2022 06:09 )             24.7     03-16    138  |  113<H>  |  12  ----------------------------<  98  3.4<L>   |  17<L>  |  0.57    Ca    7.2<L>      16 Mar 2022 06:35  Phos  1.9     03-16  Mg     1.6     03-16    TPro  5.2<L>  /  Alb  1.8<L>  /  TBili  0.9  /  DBili  x   /  AST  54<H>  /  ALT  51<H>  /  AlkPhos  69  03-16    PT/INR - ( 17 Mar 2022 06:09 )   PT: 16.4 sec;   INR: 1.37          PTT - ( 17 Mar 2022 06:09 )  PTT:39.4 sec      RADIOLOGY & ADDITIONAL TESTS: Reviewed.

## 2022-03-17 NOTE — CHART NOTE - NSCHARTNOTEFT_GEN_A_CORE
Admitting Diagnosis:   Patient is a 66y old  Male who presents with a chief complaint of Chest Pain (17 Mar 2022 06:39)      PAST MEDICAL & SURGICAL HISTORY:  HTN (hypertension)    ETOH abuse    Smoker    Hyperlipidemia    Aortic valve stenosis, unspecified etiology    No significant past surgical history        Current Nutrition Order:  Jevity 1.2 Jamarcus @ 70ml/hr x 24hrs via NG (1680ml TV, 2016kcal, 93g pro, 1356ml free H2O, 1.4g/kg ABW protein, 24.5npc/kg ABW protein)     PO Intake: Good (%) [   ]  Fair (50-75%) [   ] Poor (<25%) [   ]- NA NPO    GI Issues: Unable to assess at this time 2/2 poor mentation  No emesis or abdominal distention per RN  Rectal tube in place w/watery stool (unclear if pt continues to receive lactulose)    Pain: Non-verbal indicators of pain absent at time of visit    Skin Integrity: Erwin 14, generalized trace edema, B/L arms mild edema  R. groin surgical wound    Labs:       138  |  109<H>  |  17  ----------------------------<  114<H>  3.6   |  23  |  0.66    Ca    8.0<L>      17 Mar 2022 06:09  Phos  2.3       Mg     2.2         TPro  6.6  /  Alb  2.2<L>  /  TBili  0.8  /  DBili  x   /  AST  58<H>  /  ALT  58<H>  /  AlkPhos  94      CAPILLARY BLOOD GLUCOSE          Medications:  MEDICATIONS  (STANDING):  artificial tears (preservative free) Ophthalmic Solution 1 Drop(s) Both EYES two times a day  aspirin  chewable 81 milliGRAM(s) Oral every 24 hours  atorvastatin 80 milliGRAM(s) Oral at bedtime  chlorhexidine 0.12% Liquid 15 milliLiter(s) Oral Mucosa every 12 hours  chlorhexidine 2% Cloths 1 Application(s) Topical <User Schedule>  cyanocobalamin 1000 MICROGram(s) Oral daily  DOXOrubicin INTRA-ARTERIAL (eMAR) 50 milliGRAM(s) IntraArterial once  ertapenem  IVPB 1000 milliGRAM(s) IV Intermittent every 24 hours  ferrous    sulfate 325 milliGRAM(s) Oral <User Schedule>  folic acid 1 milliGRAM(s) Oral daily  heparin   Injectable 5000 Unit(s) SubCutaneous every 8 hours  lactulose Syrup 30 Gram(s) Oral three times a day  multivitamin 1 Tablet(s) Oral daily  nicotine - 21 mG/24Hr(s) Patch 1 patch Transdermal daily  rifAXIMin 550 milliGRAM(s) Oral two times a day  tamsulosin 0.4 milliGRAM(s) Oral at bedtime    MEDICATIONS  (PRN):  sodium chloride 0.65% Nasal 1 Spray(s) Both Nostrils four times a day PRN Nasal Congestion      Dosing Anthropometrics:  · Height for BMI (FEET)	5 Feet  · Height for BMI (INCHES)	7 Inch(s)  · Height for BMI (CENTIMETERS)	170.18 Centimeter(s)  · Weight for BMI (lbs)	149 lb  · Weight for BMI (kg)	67.6 kg  · Body Mass Index	23.3  · Ideal Body Weight (lbs)	148  · Ideal Body Weight (kg)	67.1    Weight Change: No new weights documented in EMR. Obtain biweekly weights to assess changes/trends.    Estimated energy needs: Based on Standards of Care pt within % IBW thus actual body weight used for all calculations. Needs adjusted for advanced age, possible malignancy  23-27 kcal/[id number] kcal/day  1-1.2 g/k.5-81 g pro/day  Fluids per team 2/2 diarrhea, cirrhosis     Subjective: 67 YO M, current smoker (1ppd x 50 years), current every day ETOH abuse (1 pint vodka and several beers daily x 50 years), with PMHx of HLD, HTN (not on any medications), CABG (LIMA to LAD, SVG to PDA in 2017), AS (s/p AVR in 2017) with recently discovered restenosis, presented to [facility] ED on 22 with chest pain and episode of LOC, initially admitted to cardiology for NSTEMI with [facility] showing nonobstructive disease. Patient was transferred to CT surgery given complex anatomy with plan for TAVR and aortic root surgery. Pt found to have liver cirrhosis and a 2.4cmx2.4 cm mass on his liver concerning for HCC. Decision was made not to proceed with surgery given poor functional status, cirrhosis and non compliance with medications. The patient had worsening mental status on CT surgery service and med consult was called for hepatic encephalopathy. He has been followed GI  for liver findings. Hematology and surgical oncology was consulted as imaging of abdomen confirmed HCC. Patient was not a surgical candidate and IR was consulted who planned for embolization. Patient completed a course of high dose thiamine given alcohol history without improvement in mental status. MRI brain obtained showing mild chronic microvascular ischemic disease and brain parenchymal volume loss, advanced for patient's age likely 2/2 alcohol abuse 3/14, patient went for planned transarterial chemoembolization by IR for further evaluation for liver lesion but around start of procedure became obtunded with increased secretions and concern that he was not protecting his airway. Following procedure patient became hypotensive to 80s/50s tachycardic to 120s, was minimally arousable to sternal rub and gurgling with c/f airway protection. ICU consulted for evaluation of urgent vs. emergent intubation. Course c/b anemia likely attributed to intermittent epistaxis for which ENT was consulted w/o plans for acute intervention. Concern that patient may have aspirated over the weekend. Urine cx growing kleb pna and proteus. Successfully extubated on 3/16.    Mental status continues to fluctuate. Attempted to speak w/pt in room but he was sleeping and RN reported that he was confused at that time; left to rest. SLP consulted for PO diet advancement. EN running at goal of 70ml/hr with no emesis or residuals, but had 400cc out of rectal tube x 12hrs. Noted lactulose remains ordered (last ammonia 85 on 3/16). Per RN, pt w/o complaints of pain. Afebrile. Pertinent labs: H/H 7.5/24.7%, Phos 2.3 (L), , ammonia 85. Will continue to follow per RD protocol.       Previous Nutrition Diagnosis: Food & Nutrition Related Knowledge Deficit r/t lack of prior education AEB pt unaware of current dietary restrictions.     Active [ x ]  Resolved [   ]    If resolved, new PES: Inadequate oral intake r/t inability to meet EER AEB NPO  Active [  ]  Resolved [  X ]    Goal: 1. Pt to teach back at least 3 educational points discussed 2. Pt to meet at least 75% of nutritional needs during hospital stay consistently via tolerated route    Recommendations:  1. F/u with SLP recs for diet advancement. If cleared for PO, recommend DASH diet with Ensure Enlive BID (700 kcal, 40g protein, 360 mL free H2O) w/appropriate textures   2. Continue with current EN order if unable to advance diet. Recommend Banatrol BID-TID for diarrhea  3. Monitor lytes and replete prn. POC BG q6hrs   4. Pain regimen per team   5. Continue micronutrient supplementation     Education: Deferred at this time    Risk Level: High [ x  ] Moderate [   ] Low [   ].

## 2022-03-17 NOTE — PROGRESS NOTE ADULT - ASSESSMENT
65 YO M, current smoker (1ppd x 50 years), current every day ETOH abuse (1 pint vodka and several beers daily x 50 years), with PMHx of HLD, HTN (not on any medications), CABG (LIMA to LAD, SVG to PDA in 2017), AS (s/p AVR in 2017) with recently discovered restenosis, presented to [facility] ED on 2/24/22 with chest pain and episode of LOC, initially admitted to cardiology for NSTEMI with [facility] with nonobstructive disease, then found to have restenosis of bioprosthetic valve for which aortic root surgery was planned, but now deferred given liver lesion c/f HCC. Patient transferred to medicine for further w/u of HCC and management of AMS, which is still of unclear etiology.  During IR TACE today, 3/14, patient noted to be obtunded with difficulty protecting airway, so emergently intubated and stepped up to ICU extubated on 3/16.      NEURO  #AMS (altered mental status)  AAOx2 (person and place). Unlikely hepatic encephalopathy as no asterixis and is currently compensated. Neuro exam nonfocal. Possibly related to chronic alcohol use, Wernicke's or depression i/s/o HCC diagnosis. As per wife, pt is not normally lethargic at home. Completed course of high dose thiamine, last librium dose on 03/01. CTH and MRI w/op acute abnormality. During the night of 3/13-3/14 flow sheets showing 100.1 Temp and pt becoming tachycardic to 120s. Pt not complaining of any localizing signs of infection, CXR unremarkable however UA was positive. UCX sent and pt started on CTX 1g Q24hrs (did not receive first dose as taken to TACE procedure). Post TACE procedure c/f protection of airway given increased somnolence potentially 2/2 anesthesia. Pt was intubated for airway protection and transferred to MICU  - VEEG: Mild-to-moderate generalized slowing suggestive of a similar degree of diffuse or multifocal cerebral dysfunction. No epileptiform discharges or electrographic seizures were recorded.   - Neuro following  - Infectious disease as below   - Monitor for pneumonia, potential aspiration event given mental status.  - c/w hepatic encephalopathy treatment: Rifaximin and lactulose, rectal tube in place     #History of alcohol use  Long standing EtOH consumption. Not showing any signs of withdrawal at the moment. Last drink prior to admission which is almost 2 weeks ago. Pt was given 3 days of librium Q8hrs while on CT surgery however did not display signs of withdrawal per chart review  - c/w Multivitamin and Folic Acid, and B12.    CARDIO  #AS (aortic stenosis)  AS i/s/o prior TAVR in 2017 with Dr Wilcox. JOSELO with peak transvalvular velocity of 4.7, mean gradient 56. symptomatic: exertional CP, episode of syncope  - initially planned for valve in valve TAVR, but then it was decided that because of aortic root anatomy, he would need aortic root open heart surgery  - he was deemed not to be a surgical candidate given poor functional status, liver cirrhosis, prior medication noncompliance which likely contributed to valve restenosis. his recovery after open heart surgically would be extremely difficult if possible. He would NOT be a surgical candidate   - CXR 3/14: clear lung fields     #CAD (coronary artery disease)  S/p CABG with Dr Wilcox in 2017  - recent [facility] with nonobstructive disease  - c/w Lipitor 80mg QD  - c/w ASA 81mg.    #HTN (hypertension)  Off meds.   - Monitor VS, introduced accordingly as patient per now normotensive     PULM   #Concern for PNA  Prior to arrival to the MICU on 3/14 there were concerns for aspiration PNA.   - CXR post intubation on 3/14: no acute infiltrates  - Intubated 3/14, Extubated on 3/16  - s/p ceftriaxone 2g q24 hrs  - c/w Ertapenem 1 gram daily   - continue to monitor respiratory status  - currently on NC and wean O2 as tolerated     GI  #ALC (alcoholic liver cirrhosis)  2/2 long standing EtOH abuse. Likely compensated.. Likely no hepatic encephalopathy. No known EV bleeding. NO ascites however now with signs of new infection started on antibiotics today.  - GI following  - no known Varices, but no prior EGD evaluation, he will need it outpatient, although has significant risk factors.    #HCC (hepatocellular carcinoma)  CTAP with 2.4 hypervascular liver mass. i/s/o Liver cirrhosis and EtOH use disorder. triple phase CT confirms HCC  - AFP is 7.2 which is normal  - MRI abdomen: 2.5 cm left hepatic lesion consistent with LI-RADS v 2018 Category: LR-5 Definitely HCC. OPTN Category (if LR-5): 5B. No locally invasive or metastatic disease. Left hepatic artery variant. No additional suspicious hepatic lesions.   - s/p IR transarterial chemical embolization since he is not a candidate for ablation yesterday.  - Surg/onc following NTD  - Palliative care following.    RENAL  #no active issues      #UTI  Patient with positive UA (WBC, bacteria LE) on 3/14. Unable to assess urinary symptoms, started empiric treatment given mental status.   - abx as below     #Sanchez in place  - monitor I&O    ID  #Sepsis  Source likely UTI vs. ? aspiration pna, as patient noted to be aspirating over the weekend.  UA positive. 3/4 SIRS with fever, tachycardia, and leukocytosis.  - UCx 3/14: Klebsiella ESBL  - BCx 3/14: NGTD  - Sputum Cx 3/15: continue to follow   - Spiking fevers on 3/15 while on CTX 2g q24h, switch to Zosyn  pseudomonal coverage and due to persistent spiking                - s/p Meropenem x1 dose 3/15         - c/w Ertapenem 1 gram for 6 more doses (started 3/16)    ENDO  #No active problem    HEME/ONC  #Normocytic hypochromic anemia  Likely secondary to hemolysis due to prosthetic valve. Minimal epistaxis that does not explain low hgb, Hgb 8.1 on 3/14.  - Completed IV iron X 3 days  - c/w b12 1mg Qd  - c/w folate 1mg qd  - Transfusion goal > 7  - heme/onc, GI, IR following.    Prophylactic measure  F: as appropriate, caution due to AS  E: replenish as appropriate  N: NGT started on feeds   VTE PPx: heparin TID sub  GI: none  C: Full Code  D: MICU 67 YO M, current smoker (1ppd x 50 years), current every day ETOH abuse (1 pint vodka and several beers daily x 50 years), with PMHx of HLD, HTN (not on any medications), CABG (LIMA to LAD, SVG to PDA in 2017), AS (s/p AVR in 2017) with recently discovered restenosis, presented to [facility] ED on 2/24/22 with chest pain and episode of LOC, initially admitted to cardiology for NSTEMI with [facility] with nonobstructive disease, then found to have restenosis of bioprosthetic valve for which aortic root surgery was planned, but now deferred given liver lesion c/f HCC. Patient transferred to medicine for further w/u of HCC and management of AMS, which is still of unclear etiology.  During IR TACE today, 3/14, patient noted to be obtunded with difficulty protecting airway, so emergently intubated and stepped up to ICU extubated on 3/16.      NEURO  #AMS (altered mental status)  AAOx2 (person and place). Unlikely hepatic encephalopathy as no asterixis and is currently compensated. Neuro exam nonfocal. Possibly related to chronic alcohol use, Wernicke's or depression i/s/o HCC diagnosis. As per wife, pt is not normally lethargic at home. Completed course of high dose thiamine, last librium dose on 03/01. CTH and MRI w/op acute abnormality. During the night of 3/13-3/14 flow sheets showing 100.1 Temp and pt becoming tachycardic to 120s. Pt not complaining of any localizing signs of infection, CXR unremarkable however UA was positive. UCX sent and pt started on CTX 1g Q24hrs (did not receive first dose as taken to TACE procedure). Post TACE procedure c/f protection of airway given increased somnolence potentially 2/2 anesthesia. Pt was intubated for airway protection and transferred to MICU  - VEEG: Mild-to-moderate generalized slowing suggestive of a similar degree of diffuse or multifocal cerebral dysfunction. No epileptiform discharges or electrographic seizures were recorded.   - Neuro following  - Infectious disease as below   - Monitor for pneumonia, potential aspiration event given mental status.  - c/w hepatic encephalopathy treatment: Rifaximin and lactulose, rectal tube in place     #History of alcohol use  Long standing EtOH consumption. Not showing any signs of withdrawal at the moment. Last drink prior to admission which is almost 2 weeks ago. Pt was given 3 days of librium Q8hrs while on CT surgery however did not display signs of withdrawal per chart review  - c/w Multivitamin and Folic Acid, and B12.    CARDIO  #AS (aortic stenosis)  AS i/s/o prior TAVR in 2017 with Dr Wilcox. JOSELO with peak transvalvular velocity of 4.7, mean gradient 56. symptomatic: exertional CP, episode of syncope  - initially planned for valve in valve TAVR, but then it was decided that because of aortic root anatomy, he would need aortic root open heart surgery  - he was deemed not to be a surgical candidate given poor functional status, liver cirrhosis, prior medication noncompliance which likely contributed to valve restenosis. his recovery after open heart surgically would be extremely difficult if possible. He would NOT be a surgical candidate   - CXR 3/14: clear lung fields     #CAD (coronary artery disease)  S/p CABG with Dr Wilcox in 2017  - recent [facility] with nonobstructive disease  - c/w Lipitor 80mg QD  - c/w ASA 81mg.    #HTN (hypertension)  Off meds.   - Monitor VS, introduced accordingly as patient per now normotensive     PULM   #Concern for PNA  Prior to arrival to the MICU on 3/14 there were concerns for aspiration PNA.   - CXR post intubation on 3/14: no acute infiltrates  - Intubated 3/14, Extubated on 3/16  - s/p ceftriaxone 2g q24 hrs  - c/w Ertapenem 1 gram daily   - continue to monitor respiratory status  - currently on NC and wean O2 as tolerated     GI  #ALC (alcoholic liver cirrhosis)  2/2 long standing EtOH abuse. Likely compensated.. Likely no hepatic encephalopathy. No known EV bleeding. NO ascites however now with signs of new infection started on antibiotics today.  - GI following  - no known Varices, but no prior EGD evaluation, he will need it outpatient, although has significant risk factors.    #HCC (hepatocellular carcinoma)  CTAP with 2.4 hypervascular liver mass. i/s/o Liver cirrhosis and EtOH use disorder. triple phase CT confirms HCC  - AFP is 7.2 which is normal  - MRI abdomen: 2.5 cm left hepatic lesion consistent with LI-RADS v 2018 Category: LR-5 Definitely HCC. OPTN Category (if LR-5): 5B. No locally invasive or metastatic disease. Left hepatic artery variant. No additional suspicious hepatic lesions.   - s/p IR transarterial chemical embolization since he is not a candidate for ablation yesterday.  - Surg/onc following NTD  - Palliative care following.    RENAL  #no active issues      #UTI  Patient with positive UA (WBC, bacteria LE) on 3/14. Unable to assess urinary symptoms, started empiric treatment given mental status.   - abx as below     #Sanchez in place  - monitor I&O    ID  #Sepsis  Source likely UTI vs. ? aspiration pna, as patient noted to be aspirating over the weekend.  UA positive. 3/4 SIRS with fever, tachycardia, and leukocytosis.  - UCx 3/14: Klebsiella ESBL  - BCx 3/14: NGTD  - Sputum Cx 3/15: moderate Proteus Mirabilis, few to moderate Klebsiella pneumonia  - Spiking fevers on 3/15 while on CTX 2g q24h, switch to Zosyn  pseudomonal coverage and due to persistent spiking                - s/p Meropenem x1 dose 3/15         - c/w Ertapenem 1 gram for 6 more doses (started 3/16)    ENDO  #No active problem    HEME/ONC  #Normocytic hypochromic anemia  Likely secondary to hemolysis due to prosthetic valve. Minimal epistaxis that does not explain low hgb, Hgb 8.1 on 3/14.  - Completed IV iron X 3 days  - c/w b12 1mg Qd  - c/w folate 1mg qd  - Transfusion goal > 7  - heme/onc, GI, IR following.    Prophylactic measure  F: as appropriate, caution due to AS  E: replenish as appropriate  N: NGT started on feeds   VTE PPx: heparin TID sub  GI: none  C: Full Code  D: MICU

## 2022-03-18 DIAGNOSIS — N39.0 URINARY TRACT INFECTION, SITE NOT SPECIFIED: ICD-10-CM

## 2022-03-18 LAB
-  AMPICILLIN/SULBACTAM: SIGNIFICANT CHANGE UP
-  AMPICILLIN/SULBACTAM: SIGNIFICANT CHANGE UP
-  AMPICILLIN: SIGNIFICANT CHANGE UP
-  AMPICILLIN: SIGNIFICANT CHANGE UP
-  CEFAZOLIN: SIGNIFICANT CHANGE UP
-  CEFAZOLIN: SIGNIFICANT CHANGE UP
-  CEFEPIME: SIGNIFICANT CHANGE UP
-  CEFTRIAXONE: SIGNIFICANT CHANGE UP
-  CEFTRIAXONE: SIGNIFICANT CHANGE UP
-  CIPROFLOXACIN: SIGNIFICANT CHANGE UP
-  CIPROFLOXACIN: SIGNIFICANT CHANGE UP
-  ERTAPENEM: SIGNIFICANT CHANGE UP
-  ERTAPENEM: SIGNIFICANT CHANGE UP
-  GENTAMICIN: SIGNIFICANT CHANGE UP
-  GENTAMICIN: SIGNIFICANT CHANGE UP
-  MEROPENEM: SIGNIFICANT CHANGE UP
-  PIPERACILLIN/TAZOBACTAM: SIGNIFICANT CHANGE UP
-  PIPERACILLIN/TAZOBACTAM: SIGNIFICANT CHANGE UP
-  TOBRAMYCIN: SIGNIFICANT CHANGE UP
-  TOBRAMYCIN: SIGNIFICANT CHANGE UP
-  TRIMETHOPRIM/SULFAMETHOXAZOLE: SIGNIFICANT CHANGE UP
-  TRIMETHOPRIM/SULFAMETHOXAZOLE: SIGNIFICANT CHANGE UP
ALBUMIN SERPL ELPH-MCNC: 2.3 G/DL — LOW (ref 3.3–5)
ALBUMIN SERPL ELPH-MCNC: 2.5 G/DL — LOW (ref 3.3–5)
ALP SERPL-CCNC: 85 U/L — SIGNIFICANT CHANGE UP (ref 40–120)
ALP SERPL-CCNC: 85 U/L — SIGNIFICANT CHANGE UP (ref 40–120)
ALT FLD-CCNC: 44 U/L — SIGNIFICANT CHANGE UP (ref 10–45)
ALT FLD-CCNC: 48 U/L — HIGH (ref 10–45)
ANION GAP SERPL CALC-SCNC: 13 MMOL/L — SIGNIFICANT CHANGE UP (ref 5–17)
ANION GAP SERPL CALC-SCNC: 5 MMOL/L — SIGNIFICANT CHANGE UP (ref 5–17)
APTT BLD: 37.2 SEC — HIGH (ref 27.5–35.5)
AST SERPL-CCNC: 54 U/L — HIGH (ref 10–40)
AST SERPL-CCNC: 55 U/L — HIGH (ref 10–40)
BASOPHILS # BLD AUTO: 0.01 K/UL — SIGNIFICANT CHANGE UP (ref 0–0.2)
BASOPHILS NFR BLD AUTO: 0.2 % — SIGNIFICANT CHANGE UP (ref 0–2)
BILIRUB SERPL-MCNC: 0.6 MG/DL — SIGNIFICANT CHANGE UP (ref 0.2–1.2)
BILIRUB SERPL-MCNC: 0.7 MG/DL — SIGNIFICANT CHANGE UP (ref 0.2–1.2)
BUN SERPL-MCNC: 19 MG/DL — SIGNIFICANT CHANGE UP (ref 7–23)
BUN SERPL-MCNC: 22 MG/DL — SIGNIFICANT CHANGE UP (ref 7–23)
CALCIUM SERPL-MCNC: 7.7 MG/DL — LOW (ref 8.4–10.5)
CALCIUM SERPL-MCNC: 7.8 MG/DL — LOW (ref 8.4–10.5)
CHLORIDE SERPL-SCNC: 111 MMOL/L — HIGH (ref 96–108)
CHLORIDE SERPL-SCNC: 113 MMOL/L — HIGH (ref 96–108)
CO2 SERPL-SCNC: 15 MMOL/L — LOW (ref 22–31)
CO2 SERPL-SCNC: 21 MMOL/L — LOW (ref 22–31)
CREAT SERPL-MCNC: 0.61 MG/DL — SIGNIFICANT CHANGE UP (ref 0.5–1.3)
CREAT SERPL-MCNC: 0.65 MG/DL — SIGNIFICANT CHANGE UP (ref 0.5–1.3)
CULTURE RESULTS: SIGNIFICANT CHANGE UP
EGFR: 104 ML/MIN/1.73M2 — SIGNIFICANT CHANGE UP
EGFR: 106 ML/MIN/1.73M2 — SIGNIFICANT CHANGE UP
EOSINOPHIL # BLD AUTO: 0.16 K/UL — SIGNIFICANT CHANGE UP (ref 0–0.5)
EOSINOPHIL NFR BLD AUTO: 3.1 % — SIGNIFICANT CHANGE UP (ref 0–6)
GLUCOSE BLDC GLUCOMTR-MCNC: 112 MG/DL — HIGH (ref 70–99)
GLUCOSE SERPL-MCNC: 118 MG/DL — HIGH (ref 70–99)
GLUCOSE SERPL-MCNC: 146 MG/DL — HIGH (ref 70–99)
HCT VFR BLD CALC: 21.8 % — LOW (ref 39–50)
HCT VFR BLD CALC: 22.9 % — LOW (ref 39–50)
HCT VFR BLD CALC: 25.3 % — LOW (ref 39–50)
HGB BLD-MCNC: 6.6 G/DL — CRITICAL LOW (ref 13–17)
HGB BLD-MCNC: 7.2 G/DL — LOW (ref 13–17)
HGB BLD-MCNC: 7.9 G/DL — LOW (ref 13–17)
IMM GRANULOCYTES NFR BLD AUTO: 0.6 % — SIGNIFICANT CHANGE UP (ref 0–1.5)
INR BLD: 1.35 — HIGH (ref 0.88–1.16)
LACTATE SERPL-SCNC: 3.5 MMOL/L — HIGH (ref 0.5–2)
LYMPHOCYTES # BLD AUTO: 0.94 K/UL — LOW (ref 1–3.3)
LYMPHOCYTES # BLD AUTO: 18.5 % — SIGNIFICANT CHANGE UP (ref 13–44)
MAGNESIUM SERPL-MCNC: 2.2 MG/DL — SIGNIFICANT CHANGE UP (ref 1.6–2.6)
MCHC RBC-ENTMCNC: 28.6 PG — SIGNIFICANT CHANGE UP (ref 27–34)
MCHC RBC-ENTMCNC: 29.3 PG — SIGNIFICANT CHANGE UP (ref 27–34)
MCHC RBC-ENTMCNC: 29.6 PG — SIGNIFICANT CHANGE UP (ref 27–34)
MCHC RBC-ENTMCNC: 30.3 GM/DL — LOW (ref 32–36)
MCHC RBC-ENTMCNC: 31.2 GM/DL — LOW (ref 32–36)
MCHC RBC-ENTMCNC: 31.4 GM/DL — LOW (ref 32–36)
MCV RBC AUTO: 93.7 FL — SIGNIFICANT CHANGE UP (ref 80–100)
MCV RBC AUTO: 94.2 FL — SIGNIFICANT CHANGE UP (ref 80–100)
MCV RBC AUTO: 94.4 FL — SIGNIFICANT CHANGE UP (ref 80–100)
METHOD TYPE: SIGNIFICANT CHANGE UP
METHOD TYPE: SIGNIFICANT CHANGE UP
MONOCYTES # BLD AUTO: 0.59 K/UL — SIGNIFICANT CHANGE UP (ref 0–0.9)
MONOCYTES NFR BLD AUTO: 11.6 % — SIGNIFICANT CHANGE UP (ref 2–14)
NEUTROPHILS # BLD AUTO: 3.36 K/UL — SIGNIFICANT CHANGE UP (ref 1.8–7.4)
NEUTROPHILS NFR BLD AUTO: 66 % — SIGNIFICANT CHANGE UP (ref 43–77)
NRBC # BLD: 0 /100 WBCS — SIGNIFICANT CHANGE UP (ref 0–0)
ORGANISM # SPEC MICROSCOPIC CNT: SIGNIFICANT CHANGE UP
PHOSPHATE SERPL-MCNC: 2 MG/DL — LOW (ref 2.5–4.5)
PLATELET # BLD AUTO: 106 K/UL — LOW (ref 150–400)
PLATELET # BLD AUTO: 114 K/UL — LOW (ref 150–400)
PLATELET # BLD AUTO: 131 K/UL — LOW (ref 150–400)
POTASSIUM SERPL-MCNC: 3.9 MMOL/L — SIGNIFICANT CHANGE UP (ref 3.5–5.3)
POTASSIUM SERPL-MCNC: 4.4 MMOL/L — SIGNIFICANT CHANGE UP (ref 3.5–5.3)
POTASSIUM SERPL-SCNC: 3.9 MMOL/L — SIGNIFICANT CHANGE UP (ref 3.5–5.3)
POTASSIUM SERPL-SCNC: 4.4 MMOL/L — SIGNIFICANT CHANGE UP (ref 3.5–5.3)
PROT SERPL-MCNC: 6.4 G/DL — SIGNIFICANT CHANGE UP (ref 6–8.3)
PROT SERPL-MCNC: 6.6 G/DL — SIGNIFICANT CHANGE UP (ref 6–8.3)
PROTHROM AB SERPL-ACNC: 16.1 SEC — HIGH (ref 10.5–13.4)
RBC # BLD: 2.31 M/UL — LOW (ref 4.2–5.8)
RBC # BLD: 2.43 M/UL — LOW (ref 4.2–5.8)
RBC # BLD: 2.7 M/UL — LOW (ref 4.2–5.8)
RBC # FLD: 19.7 % — HIGH (ref 10.3–14.5)
RBC # FLD: 19.8 % — HIGH (ref 10.3–14.5)
RBC # FLD: 21.2 % — HIGH (ref 10.3–14.5)
SODIUM SERPL-SCNC: 139 MMOL/L — SIGNIFICANT CHANGE UP (ref 135–145)
SODIUM SERPL-SCNC: 139 MMOL/L — SIGNIFICANT CHANGE UP (ref 135–145)
SPECIMEN SOURCE: SIGNIFICANT CHANGE UP
WBC # BLD: 5.09 K/UL — SIGNIFICANT CHANGE UP (ref 3.8–10.5)
WBC # BLD: 6.7 K/UL — SIGNIFICANT CHANGE UP (ref 3.8–10.5)
WBC # BLD: 9.96 K/UL — SIGNIFICANT CHANGE UP (ref 3.8–10.5)
WBC # FLD AUTO: 5.09 K/UL — SIGNIFICANT CHANGE UP (ref 3.8–10.5)
WBC # FLD AUTO: 6.7 K/UL — SIGNIFICANT CHANGE UP (ref 3.8–10.5)
WBC # FLD AUTO: 9.96 K/UL — SIGNIFICANT CHANGE UP (ref 3.8–10.5)

## 2022-03-18 PROCEDURE — 72125 CT NECK SPINE W/O DYE: CPT | Mod: 26

## 2022-03-18 PROCEDURE — 93010 ELECTROCARDIOGRAM REPORT: CPT | Mod: 76

## 2022-03-18 PROCEDURE — 99291 CRITICAL CARE FIRST HOUR: CPT

## 2022-03-18 PROCEDURE — 70450 CT HEAD/BRAIN W/O DYE: CPT | Mod: 26

## 2022-03-18 PROCEDURE — 36000 PLACE NEEDLE IN VEIN: CPT | Mod: 59

## 2022-03-18 PROCEDURE — 73030 X-RAY EXAM OF SHOULDER: CPT | Mod: 26,50

## 2022-03-18 PROCEDURE — 72170 X-RAY EXAM OF PELVIS: CPT | Mod: 26

## 2022-03-18 PROCEDURE — 74018 RADEX ABDOMEN 1 VIEW: CPT | Mod: 26

## 2022-03-18 PROCEDURE — 99254 IP/OBS CNSLTJ NEW/EST MOD 60: CPT | Mod: GC

## 2022-03-18 PROCEDURE — 71045 X-RAY EXAM CHEST 1 VIEW: CPT | Mod: 26

## 2022-03-18 PROCEDURE — 99233 SBSQ HOSP IP/OBS HIGH 50: CPT | Mod: GC

## 2022-03-18 RX ORDER — LACTULOSE 10 G/15ML
30 SOLUTION ORAL THREE TIMES A DAY
Refills: 0 | Status: DISCONTINUED | OUTPATIENT
Start: 2022-03-18 | End: 2022-03-23

## 2022-03-18 RX ORDER — FERROUS SULFATE 325(65) MG
325 TABLET ORAL
Refills: 0 | Status: DISCONTINUED | OUTPATIENT
Start: 2022-03-18 | End: 2022-03-30

## 2022-03-18 RX ORDER — ASPIRIN/CALCIUM CARB/MAGNESIUM 324 MG
81 TABLET ORAL EVERY 24 HOURS
Refills: 0 | Status: DISCONTINUED | OUTPATIENT
Start: 2022-03-19 | End: 2022-03-19

## 2022-03-18 RX ORDER — POTASSIUM PHOSPHATE, MONOBASIC POTASSIUM PHOSPHATE, DIBASIC 236; 224 MG/ML; MG/ML
30 INJECTION, SOLUTION INTRAVENOUS ONCE
Refills: 0 | Status: COMPLETED | OUTPATIENT
Start: 2022-03-18 | End: 2022-03-18

## 2022-03-18 RX ORDER — PREGABALIN 225 MG/1
1000 CAPSULE ORAL EVERY 24 HOURS
Refills: 0 | Status: DISCONTINUED | OUTPATIENT
Start: 2022-03-19 | End: 2022-03-30

## 2022-03-18 RX ORDER — FOLIC ACID 0.8 MG
1 TABLET ORAL DAILY
Refills: 0 | Status: DISCONTINUED | OUTPATIENT
Start: 2022-03-19 | End: 2022-03-30

## 2022-03-18 RX ORDER — ATORVASTATIN CALCIUM 80 MG/1
80 TABLET, FILM COATED ORAL AT BEDTIME
Refills: 0 | Status: DISCONTINUED | OUTPATIENT
Start: 2022-03-18 | End: 2022-03-30

## 2022-03-18 RX ORDER — TAMSULOSIN HYDROCHLORIDE 0.4 MG/1
0.4 CAPSULE ORAL AT BEDTIME
Refills: 0 | Status: DISCONTINUED | OUTPATIENT
Start: 2022-03-18 | End: 2022-03-30

## 2022-03-18 RX ORDER — ACETAMINOPHEN 500 MG
500 TABLET ORAL ONCE
Refills: 0 | Status: COMPLETED | OUTPATIENT
Start: 2022-03-18 | End: 2022-03-18

## 2022-03-18 RX ORDER — ACETAMINOPHEN 500 MG
650 TABLET ORAL EVERY 6 HOURS
Refills: 0 | Status: DISCONTINUED | OUTPATIENT
Start: 2022-03-18 | End: 2022-03-30

## 2022-03-18 RX ADMIN — HEPARIN SODIUM 5000 UNIT(S): 5000 INJECTION INTRAVENOUS; SUBCUTANEOUS at 11:27

## 2022-03-18 RX ADMIN — LACTULOSE 30 GRAM(S): 10 SOLUTION ORAL at 23:23

## 2022-03-18 RX ADMIN — ERTAPENEM SODIUM 120 MILLIGRAM(S): 1 INJECTION, POWDER, LYOPHILIZED, FOR SOLUTION INTRAMUSCULAR; INTRAVENOUS at 15:27

## 2022-03-18 RX ADMIN — POTASSIUM PHOSPHATE, MONOBASIC POTASSIUM PHOSPHATE, DIBASIC 83.33 MILLIMOLE(S): 236; 224 INJECTION, SOLUTION INTRAVENOUS at 15:28

## 2022-03-18 RX ADMIN — Medication 325 MILLIGRAM(S): at 07:04

## 2022-03-18 RX ADMIN — Medication 1 PATCH: at 22:42

## 2022-03-18 RX ADMIN — Medication 1 PATCH: at 22:41

## 2022-03-18 RX ADMIN — Medication 200 MILLIGRAM(S): at 06:56

## 2022-03-18 RX ADMIN — Medication 650 MILLIGRAM(S): at 16:23

## 2022-03-18 RX ADMIN — Medication 650 MILLIGRAM(S): at 17:14

## 2022-03-18 RX ADMIN — HEPARIN SODIUM 5000 UNIT(S): 5000 INJECTION INTRAVENOUS; SUBCUTANEOUS at 02:59

## 2022-03-18 RX ADMIN — Medication 1 PATCH: at 07:46

## 2022-03-18 RX ADMIN — Medication 1 MILLIGRAM(S): at 11:27

## 2022-03-18 RX ADMIN — Medication 81 MILLIGRAM(S): at 11:26

## 2022-03-18 RX ADMIN — Medication 1 DROP(S): at 11:26

## 2022-03-18 RX ADMIN — Medication 500 MILLIGRAM(S): at 07:45

## 2022-03-18 RX ADMIN — HEPARIN SODIUM 5000 UNIT(S): 5000 INJECTION INTRAVENOUS; SUBCUTANEOUS at 18:06

## 2022-03-18 RX ADMIN — Medication 1 PATCH: at 18:01

## 2022-03-18 RX ADMIN — PREGABALIN 1000 MICROGRAM(S): 225 CAPSULE ORAL at 11:26

## 2022-03-18 RX ADMIN — ATORVASTATIN CALCIUM 80 MILLIGRAM(S): 80 TABLET, FILM COATED ORAL at 22:50

## 2022-03-18 RX ADMIN — Medication 1 TABLET(S): at 11:26

## 2022-03-18 RX ADMIN — Medication 1 DROP(S): at 22:43

## 2022-03-18 NOTE — PROGRESS NOTE ADULT - PROBLEM SELECTOR PLAN 9
Long standing EtOH consumption. Not showing any signs of withdrawal at the moment. Last drink prior to admission which is almost 2 weeks ago. Pt was given 3 days of librium Q8hrs while on CT surgery however did not display signs of withdrawal per chart review  - c/w Multivitamin, Folic Acid, and B12.

## 2022-03-18 NOTE — PROGRESS NOTE ADULT - PROBLEM SELECTOR PLAN 4
AS i/s/o prior TAVR in 2017 with Dr Wilcox. JOSELO with peak transvalvular velocity of 4.7, mean gradient 56. symptomatic: exertional CP, episode of syncope  - initially planned for valve in valve TAVR, but then it was decided that because of aortic root anatomy, he would need aortic root open heart surgery  - he was deemed not to be a surgical candidate given poor functional status, liver cirrhosis, prior medication noncompliance which likely contributed to valve restenosis. his recovery after open heart surgically would be extremely difficult if possible. He would NOT be a surgical candidate   - CXR 3/14: clear lung fields

## 2022-03-18 NOTE — PROGRESS NOTE ADULT - PROBLEM SELECTOR PLAN 3
AAOx2 (person and place). Unlikely hepatic encephalopathy as no asterixis and is currently compensated. Neuro exam nonfocal. Possibly related to chronic alcohol use, Wernicke's or depression i/s/o HCC diagnosis. As per wife, pt is not normally lethargic at home. Completed course of high dose thiamine, last librium dose on 03/01. CTH and MRI w/o acute abnormality. During the night of 3/13-3/14 flow sheets showing 100.1 Temp and pt becoming tachycardic to 120s. Pt not complaining of any localizing signs of infection, CXR unremarkable however UA was positive. UCX sent and pt started on CTX 1g Q24hrs (did not receive first dose as taken to TACE procedure). Post TACE procedure c/f protection of airway given increased somnolence potentially 2/2 anesthesia. Pt was intubated for airway protection and transferred to MICU. Extubated on 03/16.  - VEEG: Mild-to-moderate generalized slowing suggestive of a similar degree of diffuse or multifocal cerebral dysfunction. No epileptiform discharges or electrographic seizures were recorded.   - Neuro following  - Infectious disease as below   - Monitor for pneumonia, potential aspiration event given mental status.  - c/w hepatic encephalopathy treatment: Rifaximin and lactulose, rectal tube in place

## 2022-03-18 NOTE — PROGRESS NOTE ADULT - SUBJECTIVE AND OBJECTIVE BOX
incomplete  INTERVAL EVENTS: REHANA    SUBJECTIVE / INTERVAL HPI: Patient seen and examined at bedside.     ROS: negative unless otherwise stated above.    VITAL SIGNS:  Vital Signs Last 24 Hrs  T(C): 37.6 (18 Mar 2022 06:00), Max: 37.8 (17 Mar 2022 22:23)  T(F): 99.7 (18 Mar 2022 06:00), Max: 100 (17 Mar 2022 22:23)  HR: 122 (18 Mar 2022 04:12) (86 - 122)  BP: 140/74 (18 Mar 2022 04:12) (95/51 - 140/74)  BP(mean): 99 (18 Mar 2022 04:12) (67 - 99)  RR: 18 (18 Mar 2022 04:12) (14 - 28)  SpO2: 95% (18 Mar 2022 04:12) (93% - 100%)      03-17-22 @ 07:01  -  03-18-22 @ 07:00  --------------------------------------------------------  IN: 1516.5 mL / OUT: 845 mL / NET: 671.5 mL        PHYSICAL EXAM:    General: NAD  HEENT: MMM  Neck: supple  Cardiovascular: +S1/S2; RRR  Respiratory: CTA B/L; no W/R/R  Gastrointestinal: soft, NT/ND  Extremities: WWP; no edema, clubbing or cyanosis  Vascular: 2+ radial, DP/PT pulses B/L  Neurological: AAOx3; no focal deficits    MEDICATIONS:  MEDICATIONS  (STANDING):  artificial tears (preservative free) Ophthalmic Solution 1 Drop(s) Both EYES two times a day  aspirin  chewable 81 milliGRAM(s) Oral every 24 hours  atorvastatin 80 milliGRAM(s) Oral at bedtime  cyanocobalamin 1000 MICROGram(s) Oral daily  DOXOrubicin INTRA-ARTERIAL (eMAR) 50 milliGRAM(s) IntraArterial once  ertapenem  IVPB 1000 milliGRAM(s) IV Intermittent every 24 hours  ferrous    sulfate 325 milliGRAM(s) Oral <User Schedule>  folic acid 1 milliGRAM(s) Oral daily  heparin   Injectable 5000 Unit(s) SubCutaneous every 8 hours  lactulose Syrup 30 Gram(s) Oral three times a day  multivitamin 1 Tablet(s) Oral daily  nicotine - 21 mG/24Hr(s) Patch 1 patch Transdermal daily  rifAXIMin 550 milliGRAM(s) Oral two times a day  tamsulosin 0.4 milliGRAM(s) Oral at bedtime    MEDICATIONS  (PRN):  sodium chloride 0.65% Nasal 1 Spray(s) Both Nostrils four times a day PRN Nasal Congestion      ALLERGIES:  Allergies    No Known Allergies    Intolerances        LABS:                        7.5    6.25  )-----------( 100      ( 17 Mar 2022 06:09 )             24.7     03-17    138  |  109<H>  |  17  ----------------------------<  114<H>  3.6   |  23  |  0.66    Ca    8.0<L>      17 Mar 2022 06:09  Phos  2.3     03-17  Mg     2.2     03-17    TPro  6.6  /  Alb  2.2<L>  /  TBili  0.8  /  DBili  x   /  AST  58<H>  /  ALT  58<H>  /  AlkPhos  94  03-17    PT/INR - ( 17 Mar 2022 06:09 )   PT: 16.4 sec;   INR: 1.37          PTT - ( 17 Mar 2022 06:09 )  PTT:39.4 sec    CAPILLARY BLOOD GLUCOSE          RADIOLOGY & ADDITIONAL TESTS: Reviewed.   INTERVAL EVENTS: REHANA    SUBJECTIVE / INTERVAL HPI: Patient seen and examined at bedside. Patient denies fever, chills, CP, SOB, abd pain, dysuira.    ROS: negative unless otherwise stated above.    VITAL SIGNS:  Vital Signs Last 24 Hrs  T(C): 37.6 (18 Mar 2022 06:00), Max: 37.8 (17 Mar 2022 22:23)  T(F): 99.7 (18 Mar 2022 06:00), Max: 100 (17 Mar 2022 22:23)  HR: 122 (18 Mar 2022 04:12) (86 - 122)  BP: 140/74 (18 Mar 2022 04:12) (95/51 - 140/74)  BP(mean): 99 (18 Mar 2022 04:12) (67 - 99)  RR: 18 (18 Mar 2022 04:12) (14 - 28)  SpO2: 95% (18 Mar 2022 04:12) (93% - 100%)      03-17-22 @ 07:01  -  03-18-22 @ 07:00  --------------------------------------------------------  IN: 1516.5 mL / OUT: 845 mL / NET: 671.5 mL        PHYSICAL EXAM:    General: NAD, laying flat in bed  HEENT: dry MM, EOMI, LEIGH  Neck: supple  Cardiovascular: +S1/S2; RRR, no MRG  Respiratory: CTA B/L ant; no W/R/R  Gastrointestinal: soft, mildly distended, NT  Extremities: echhymosis b/l UE, moving all extremities; floating boots b/l  Vascular: 2+ radial, DP pulses B/L  Neurological: AAOx2 (person, place); CN grossly intact, hand strength 5/5 b/l    MEDICATIONS:  MEDICATIONS  (STANDING):  artificial tears (preservative free) Ophthalmic Solution 1 Drop(s) Both EYES two times a day  aspirin  chewable 81 milliGRAM(s) Oral every 24 hours  atorvastatin 80 milliGRAM(s) Oral at bedtime  cyanocobalamin 1000 MICROGram(s) Oral daily  DOXOrubicin INTRA-ARTERIAL (eMAR) 50 milliGRAM(s) IntraArterial once  ertapenem  IVPB 1000 milliGRAM(s) IV Intermittent every 24 hours  ferrous    sulfate 325 milliGRAM(s) Oral <User Schedule>  folic acid 1 milliGRAM(s) Oral daily  heparin   Injectable 5000 Unit(s) SubCutaneous every 8 hours  lactulose Syrup 30 Gram(s) Oral three times a day  multivitamin 1 Tablet(s) Oral daily  nicotine - 21 mG/24Hr(s) Patch 1 patch Transdermal daily  rifAXIMin 550 milliGRAM(s) Oral two times a day  tamsulosin 0.4 milliGRAM(s) Oral at bedtime    MEDICATIONS  (PRN):  sodium chloride 0.65% Nasal 1 Spray(s) Both Nostrils four times a day PRN Nasal Congestion      ALLERGIES:  Allergies    No Known Allergies    Intolerances        LABS:                        7.5    6.25  )-----------( 100      ( 17 Mar 2022 06:09 )             24.7     03-17    138  |  109<H>  |  17  ----------------------------<  114<H>  3.6   |  23  |  0.66    Ca    8.0<L>      17 Mar 2022 06:09  Phos  2.3     03-17  Mg     2.2     03-17    TPro  6.6  /  Alb  2.2<L>  /  TBili  0.8  /  DBili  x   /  AST  58<H>  /  ALT  58<H>  /  AlkPhos  94  03-17    PT/INR - ( 17 Mar 2022 06:09 )   PT: 16.4 sec;   INR: 1.37          PTT - ( 17 Mar 2022 06:09 )  PTT:39.4 sec    CAPILLARY BLOOD GLUCOSE          RADIOLOGY & ADDITIONAL TESTS: Reviewed.

## 2022-03-18 NOTE — CHART NOTE - NSCHARTNOTEFT_GEN_A_CORE
: Valeri Rutherford Wong    INDICATION: Unwitnessed Fall    PROCEDURE:  [X} LIMITED ECHO  [X] LIMITED CHEST  [ ] LIMITED RETROPERITONEAL  [X] LIMITED ABDOMINAL  [ ] LIMITED DVT  [ ] NEEDLE GUIDANCE VASCULAR  [ ] NEEDLE GUIDANCE THORACENTESIS  [ ] NEEDLE GUIDANCE PARACENTESIS  [ ] NEEDLE GUIDANCE PERICARDIOCENTESIS  [ ] OTHER    FINDINGS: RUL +Lung sliding, YOVANY, +Lung Sliding, RLQ: No fluid noted in hepatorenal recess. LLQ: No Fluid in splenorenal recess. Subxiphoid: No pericardial effusion. Bladder: No fluid in rectovesical pouch.     INTERPRETATION: Negative FAST Exam.

## 2022-03-18 NOTE — CONSULT NOTE ADULT - SUBJECTIVE AND OBJECTIVE BOX
HPI:  65 YO M, current smoker (1ppd x 50 years), current every day ETOH abuse (1 pint vodka and several beers daily x 50 years), with PMHx of HLD, HTN (not on any medications), CABG (LIMA to LAD, SVG to PDA in 2017), AS (s/p AVR in 2017) with recently discovered restenosis, presented to [facility] ED on 2/24/22 with chest pain and episode of LOC, initially admitted to cardiology for NSTEMI with LHC showing nonobstructive disease. TTE was reperformed and patient was  found to have restenosis of bioprosthetic valve for which aortic root surgery was planned.  Patient was transferred to CT surgery given complex anatomy with plan for TAVR and aortic root surgery.  During this time on CT surgery service he was found to have liver cirrhosis and a 2.4cmx2.4 cm mass on his liver concerning for HCC.  Decision was made not to proceed with surgery given poor functional status, cirrhosis and non compliance with medications.  Course c/b hepatic encephalopathy on rifaxamin and lactulose. MRI brain obtained showing mild chronic microvascular ischemic disease and brain parenchymal  volume loss, advanced for patient's age likely 2/2 alcohol abuse.  Neurology was consulted and  VEEG showing moderate diffuse nonspecific cerebral dysfunction. On 3/14 patient was seen with his normal baseline mental status and neuro exam prior to embolization procedure for HCC.  However patient was found to be tachycardic to 120s with new elevated WBC count to 14.  UA was positive, CXR unchanged, bladder scan w/o evidence of retention. Started on ceftriaxone for possible UTI also for concerns for aspiration pneumonia due to persistent somnolence.      On 3/14, patient went for planned transarterial chemoembolization by IR for further evaluation of liver lesion but around start of procedure became obtunded with increased secretions and concern that he was not protecting his airway. Following procedure patient became hypotensive to 80s/50s tachycardic to 120s, was minimally arousable to sternal rub and gurgling with c/f airway protection s/p intubation. In the MICU started on Ceftriaxone 2 gram for UTI, that was the switched to Ertapenem 1 gram daily due to cultures growing ESBL Klebsiella. Started on vancomycin for possible HA, that was soon discontinued as MRSA swab was negative on 3/14. Patient able to be weaned off pressors. Extubated on 3/16. ID consulted because sputum cultures growing Proteus mirabilis and ESBL Klebsiella.    PAST MEDICAL & SURGICAL HISTORY:  HTN (hypertension)    ETOH abuse    Smoker    Hyperlipidemia    Aortic valve stenosis, unspecified etiology    No significant past surgical history          REVIEW OF SYSTEMS:    SUBJECTIVE: Patient seen and examined at bedside. Patient unable to participate in ROS but grossly denies all complaints.      ANTIBIOTICS:  MEDICATIONS  (STANDING):  artificial tears (preservative free) Ophthalmic Solution 1 Drop(s) Both EYES two times a day  aspirin  chewable 81 milliGRAM(s) Oral every 24 hours  atorvastatin 80 milliGRAM(s) Oral at bedtime  cyanocobalamin 1000 MICROGram(s) Oral daily  DOXOrubicin INTRA-ARTERIAL (eMAR) 50 milliGRAM(s) IntraArterial once  ertapenem  IVPB 1000 milliGRAM(s) IV Intermittent every 24 hours  ferrous    sulfate 325 milliGRAM(s) Oral <User Schedule>  folic acid 1 milliGRAM(s) Oral daily  heparin   Injectable 5000 Unit(s) SubCutaneous every 8 hours  lactulose Syrup 30 Gram(s) Oral three times a day  multivitamin 1 Tablet(s) Oral daily  nicotine - 21 mG/24Hr(s) Patch 1 patch Transdermal daily  rifAXIMin 550 milliGRAM(s) Oral two times a day  tamsulosin 0.4 milliGRAM(s) Oral at bedtime    MEDICATIONS  (PRN):  acetaminophen     Tablet .. 650 milliGRAM(s) Oral every 6 hours PRN Temp greater or equal to 38C (100.4F), Mild Pain (1 - 3)  sodium chloride 0.65% Nasal 1 Spray(s) Both Nostrils four times a day PRN Nasal Congestion      Allergies    No Known Allergies    Intolerances        SOCIAL HISTORY:    FAMILY HISTORY:      Vital Signs Last 24 Hrs  T(C): 37 (18 Mar 2022 10:57), Max: 37.8 (17 Mar 2022 22:23)  T(F): 98.6 (18 Mar 2022 10:57), Max: 100 (17 Mar 2022 22:23)  HR: 106 (18 Mar 2022 12:42) (96 - 122)  BP: 123/62 (18 Mar 2022 12:42) (99/56 - 140/74)  BP(mean): 85 (18 Mar 2022 12:42) (74 - 99)  RR: 17 (18 Mar 2022 12:42) (17 - 25)  SpO2: 100% (18 Mar 2022 12:42) (95% - 100%)    PHYSICAL EXAM:  General: NAD  Ear/Nose/Throat: no oral lesion	  Neck: supple  Cardiovascular: +S1/S2; RRR  Respiratory: CTA B/L; no W/R/R  Gastrointestinal: soft, NT/ND  Extremities: WWP  Vascular: 2+ radial, DP/PT pulses B/L  Neurological: not adequately responding to questions, however pt was alert  Skin: no rashes            LABS:                        7.9    6.70  )-----------( 114      ( 18 Mar 2022 15:24 )             25.3     03-18    139  |  113<H>  |  19  ----------------------------<  146<H>  3.9   |  21<L>  |  0.65    Ca    7.8<L>      18 Mar 2022 07:51  Phos  2.0     03-18  Mg     2.2     03-18    TPro  6.4  /  Alb  2.3<L>  /  TBili  0.6  /  DBili  x   /  AST  54<H>  /  ALT  48<H>  /  AlkPhos  85  03-18    PT/INR - ( 18 Mar 2022 07:51 )   PT: 16.1 sec;   INR: 1.35          PTT - ( 18 Mar 2022 07:51 )  PTT:37.2 sec      MICROBIOLOGY:  RADIOLOGY & ADDITIONAL STUDIES:

## 2022-03-18 NOTE — PROGRESS NOTE ADULT - PROBLEM SELECTOR PLAN 5
2/2 long standing EtOH abuse. Likely compensated. Likely no hepatic encephalopathy. No known EV bleeding. NO ascites however now with signs of new infection started on antibiotics today.  - GI following  - no known Varices, but no prior EGD evaluation, he will need it outpatient, although has significant risk factors

## 2022-03-18 NOTE — CONSULT NOTE ADULT - ASSESSMENT
65 YO M, current smoker (1ppd x 50 years), current every day ETOH abuse (1 pint vodka and several beers daily x 50 years), with PMHx of HLD, HTN (not on any medications), CABG (LIMA to LAD, SVG to PDA in 2017), AS (s/p AVR in 2017) with recently discovered restenosis, presented to [facility] ED on 2/24/22 with chest pain and episode of LOC, initially admitted to cardiology for NSTEMI with [facility] with nonobstructive disease, then found to have restenosis of bioprosthetic valve for which aortic root surgery was planned, but now deferred given new diagnosis of cirrhosis and HCC. Course c/b AMS 2/2 likely hepatic encephalopathy and metabolic encephalopathy. Course further c/b septic shock 2/2 ESBL Klebsiella UTI. ID was consulted for concern for PNA. Pt without localizing symptoms or radiographic evidence of pneumonia at this time, however can not rule out in the setting of aspiration event. Sputum cultures with only rare WBCs. Sputum cultures may be seen in colonization with ESBL Klebsiella and Proteus mirabilis. Regardless, ertapenem should also cover pneumonia from these organisms. Pt is clinically improving on antibiotics.    Recommendations:  - continue ertapenem 1g q24 hours for total 14 day course to treat complicated UTI    ID team 1 will sign off, please reconsult with any questions    Note not finalized until attested by attending

## 2022-03-18 NOTE — PROGRESS NOTE ADULT - PROBLEM SELECTOR PLAN 8
Likely secondary to hemolysis due to prosthetic valve. Minimal epistaxis that does not explain low hgb, Hgb 8.1 on 3/14.  - Completed IV iron X 3 days  - c/w b12 1mg Qd  - c/w folate 1mg qd  - Transfusion goal > 7  - heme/onc, GI, IR following.

## 2022-03-18 NOTE — PROGRESS NOTE ADULT - PROBLEM SELECTOR PLAN 6
S/p CABG with Dr Wilcox in 2017  - recent [facility] with nonobstructive disease  - c/w Lipitor 80mg QD  - c/w ASA 81mg

## 2022-03-18 NOTE — PROGRESS NOTE ADULT - PROBLEM SELECTOR PLAN 2
CTAP with 2.4 hypervascular liver mass. i/s/o Liver cirrhosis and EtOH use disorder. triple phase CT confirms HCC  - AFP is 7.2 which is normal  - MRI abdomen: 2.5 cm left hepatic lesion consistent with LI-RADS v 2018 Category: LR-5 Definitely HCC. OPTN Category (if LR-5): 5B. No locally invasive or metastatic disease. Left hepatic artery variant. No additional suspicious hepatic lesions.   - s/p IR transarterial chemical embolization since he is not a candidate for ablation.  - Surg/onc following NTD  - Palliative care following.

## 2022-03-18 NOTE — CHART NOTE - NSCHARTNOTEFT_GEN_A_CORE
***Rapid Response Clinical Impact Advanced Care Provider Note***    Patient is a 66y old  Male current smoker (1ppd x 50 years), current every day ETOH abuse (1 pint vodka and several beers daily x 50 years), with PMHx of HLD, HTN (not on any medications), CABG (LIMA to LAD, SVG to PDA in 2017), AS (s/p AVR in 2017) with recently discovered restenosis, presented to [facility] ED on 2/24/22 with chest pain and episode of LOC, initially admitted to cardiology for NSTEMI with [facility] with nonobstructive disease, then found to have restenosis of bioprosthetic valve for which aortic root surgery was planned, but now deferred given liver lesion c/f HCC. Patient transferred to medicine for further w/u of HCC and management of AMS, which is still of unclear etiology.  During IR TACE today, 3/14, patient noted to be obtunded with difficulty protecting airway, so emergently intubated and stepped up to ICU extubated on 3/16. Now extubated, on treatment for ESBL E Coli UTI and stepped down to [facility] tele.    Patient was found down on ground with right shoulder and right hip on side of bed likely in setting of unwitnessed fall from locked hospital bed in lowest position.     A RRT was called. Patient found lying in a small pool of blood (~50cc) from IV placed in right shoulder earlier today that is dislodged and rectal tube out. Patient still on continuos monitor and noted to be in sinus tachycardia in the 120's. 's. Patient assisted back to bed with assist of 4. Patient without any overt signs of trauma. BG WNL.     Review of Systems: Unable to obtain due to patient's AMS which has been baseline however, patient does not complain of any pain.     Allergies    No Known Allergies    Intolerances    PAST MEDICAL & SURGICAL HISTORY:  HTN (hypertension)    ETOH abuse    Smoker    Hyperlipidemia    Aortic valve stenosis, unspecified etiology    No significant past surgical history    Vital Signs Last 24 Hrs  T(C): 37 (18 Mar 2022 10:57), Max: 37.8 (17 Mar 2022 22:23)  T(F): 98.6 (18 Mar 2022 10:57), Max: 100 (17 Mar 2022 22:23)  HR: 116 (18 Mar 2022 20:36) (96 - 122)  BP: 123/80 (18 Mar 2022 20:36) (106/69 - 150/77)  BP(mean): 98 (18 Mar 2022 20:36) (79 - 106)  RR: 18 (18 Mar 2022 20:36) (17 - 22)  SpO2: 96% (18 Mar 2022 16:15) (95% - 100%)    Physical Exam:    General: Adult male lying supine on ground in small pool of blood. Not other generalized signs of trauma.   HEENT: +PERRLA. EOMI, MMM, No dental trauma. No trismus. No bleeding or drainage from nares of ears. No C-Spine tenderness. Normal ROM of neck without crepitus. Trachea Midline. No JVD.  Pulm: Good bilateral chest rise. LS CTA bilaterally. Speaking full sentences.  Cardiac: S1, S2, Sinus Tachycardia.   Abdomen: SNT to palpation.  MSK: +CMS X 4. OAKLEY X 4. Pelvis intact. No crepitus. Spine without stepoff's.  Skin: WWP. Ecchymosis to left forearm. Right shoulder with bleeding controlled with direct pressure.     03-17 @ 07:01 - 03-18 @ 07:00  --------------------------------------------------------  IN: 1866.5 mL / OUT: 945 mL / NET: 921.5 mL    03-18 @ 07:01 - 03-18 @ 22:20  --------------------------------------------------------  IN: 0 mL / OUT: 1325 mL / NET: -1325 mL                              7.2    9.96  )-----------( 131      ( 18 Mar 2022 20:46 )             22.9     03-18    139  |  111<H>  |  22  ----------------------------<  118<H>  4.4   |  15<L>  |  0.61    Ca    7.7<L>      18 Mar 2022 20:45  Phos  2.0     03-18  Mg     2.2     03-18    TPro  6.6  /  Alb  2.5<L>  /  TBili  0.7  /  DBili  x   /  AST  55<H>  /  ALT  44  /  AlkPhos  85  03-18         LIVER FUNCTIONS - ( 18 Mar 2022 20:45 )  Alb: 2.5 g/dL / Pro: 6.6 g/dL / ALK PHOS: 85 U/L / ALT: 44 U/L / AST: 55 U/L / GGT: x              PT/INR - ( 18 Mar 2022 07:51 )   PT: 16.1 sec;   INR: 1.35          PTT - ( 18 Mar 2022 07:51 )  PTT:37.2 sec    MEDICATIONS  (STANDING):  artificial tears (preservative free) Ophthalmic Solution 1 Drop(s) Both EYES two times a day  aspirin  chewable 81 milliGRAM(s) Oral every 24 hours  atorvastatin 80 milliGRAM(s) Oral at bedtime  cyanocobalamin 1000 MICROGram(s) Oral daily  DOXOrubicin INTRA-ARTERIAL (eMAR) 50 milliGRAM(s) IntraArterial once  ertapenem  IVPB 1000 milliGRAM(s) IV Intermittent every 24 hours  ferrous    sulfate 325 milliGRAM(s) Oral <User Schedule>  folic acid 1 milliGRAM(s) Oral daily  heparin   Injectable 5000 Unit(s) SubCutaneous every 8 hours  lactulose Syrup 30 Gram(s) Oral three times a day  multivitamin 1 Tablet(s) Oral daily  nicotine - 21 mG/24Hr(s) Patch 1 patch Transdermal daily  rifAXIMin 550 milliGRAM(s) Oral two times a day  tamsulosin 0.4 milliGRAM(s) Oral at bedtime    MEDICATIONS  (PRN):  acetaminophen     Tablet .. 650 milliGRAM(s) Oral every 6 hours PRN Temp greater or equal to 38C (100.4F), Mild Pain (1 - 3)  sodium chloride 0.65% Nasal 1 Spray(s) Both Nostrils four times a day PRN Nasal Congestion    POCUS: Negative FAST Exam.    Assessment- Patient is a 66y old  Male current smoker (1ppd x 50 years), current every day ETOH abuse (1 pint vodka and several beers daily x 50 years), with PMHx of HLD, HTN (not on any medications), CABG (LIMA to LAD, SVG to PDA in 2017), AS (s/p AVR in 2017) with recently discovered restenosis, presented to [facility] ED on 2/24/22 with chest pain and episode of LOC, initially admitted to cardiology for NSTEMI with [facility] with nonobstructive disease, then found to have restenosis of bioprosthetic valve for which aortic root surgery was planned, but now deferred given liver lesion c/f HCC. Patient transferred to medicine for further w/u of HCC and management of AMS, which is still of unclear etiology.  During IR TACE today, 3/14, patient noted to be obtunded with difficulty protecting airway, so emergently intubated and stepped up to ICU extubated on 3/16. Now extubated, on treatment for ESBL E Coli UTI and stepped down to med tele now status post RRT for unwitnessed fall from locked lowest position bed.     Plan-   -STAT CBC, CMP, Coags, Type and Screen X 2.  -Holding C-Collar for now, likely low mechanism of injury and will scan C-Spine. OAKLEY X 4. +CMS X 4.  -Follow up STAT CT Head, C-Spine.  -CXR, Shoulder Xray and Pelvis.   -Bedside FAST Exam negative.  -Maintain 2 Large bore IV Accesses.  -Fall Precautions.  -Follow up STAT EKG.    Rest of plan per primary Telemetry team. Above discussed with primary care team, nursing, and PCCM Fellow.    I have personally and independently provided 31 minutes of critical care services.  This excludes any time spent on separate procedures or teaching.

## 2022-03-18 NOTE — CHART NOTE - NSCHARTNOTEFT_GEN_A_CORE
Post Fall Assessment    CHIEF COMPLAINT   Patient is a 66y old  Male who presents with a chief complaint of Chest Pain (18 Mar 2022 18:11)      EVENT SUMMARY   Patient found down on floor during routine night rounds by team. Pt seen and examined at bedside. Patient found on right shoulder R hip handing on to bed rail with L arm. Altered AAOx2, unable to state whether he hit his head. Denies hip/ back pain. Unable to obtain rest of ROS due to confusion    MEDICATIONS  (STANDING):  artificial tears (preservative free) Ophthalmic Solution 1 Drop(s) Both EYES two times a day  aspirin  chewable 81 milliGRAM(s) Oral every 24 hours  atorvastatin 80 milliGRAM(s) Oral at bedtime  cyanocobalamin 1000 MICROGram(s) Oral daily  DOXOrubicin INTRA-ARTERIAL (eMAR) 50 milliGRAM(s) IntraArterial once  ertapenem  IVPB 1000 milliGRAM(s) IV Intermittent every 24 hours  ferrous    sulfate 325 milliGRAM(s) Oral <User Schedule>  folic acid 1 milliGRAM(s) Oral daily  heparin   Injectable 5000 Unit(s) SubCutaneous every 8 hours  lactulose Syrup 30 Gram(s) Oral three times a day  multivitamin 1 Tablet(s) Oral daily  nicotine - 21 mG/24Hr(s) Patch 1 patch Transdermal daily  rifAXIMin 550 milliGRAM(s) Oral two times a day  tamsulosin 0.4 milliGRAM(s) Oral at bedtime    MEDICATIONS  (PRN):  acetaminophen     Tablet .. 650 milliGRAM(s) Oral every 6 hours PRN Temp greater or equal to 38C (100.4F), Mild Pain (1 - 3)  sodium chloride 0.65% Nasal 1 Spray(s) Both Nostrils four times a day PRN Nasal Congestion      Vital Signs Last 24 Hrs  T(C): 37 (18 Mar 2022 10:57), Max: 37.8 (17 Mar 2022 22:23)  T(F): 98.6 (18 Mar 2022 10:57), Max: 100 (17 Mar 2022 22:23)  HR: 102 (18 Mar 2022 16:15) (96 - 122)  BP: 117/66 (18 Mar 2022 16:15) (106/69 - 150/77)  BP(mean): 89 (18 Mar 2022 16:15) (77 - 106)  RR: 18 (18 Mar 2022 16:15) (17 - 25)  SpO2: 96% (18 Mar 2022 16:15) (95% - 100%)    Physical Exam  General: found down on R shoulder with bleeding visualized from IVs in R arm  Mental Status: A&O x2  Skin: Warm, dry, no rashes, lesions, ecchymosis, bruises   Head: NCAT  Neuro: Non focal  Cardiac: Sinus tachycardia to 120s on telemetry S1/S2. + systolic murmur  Lungs: CTA b/l. No rales, wheezes, rhonchi appreciated b/l.   Abdomen: Soft, +BS, non distended  Back: No step-offs  Extremities: No edema. ROM without pain     Pt ambulating appropriately with no vertebral / hip tenderness.     A&P  HPI:  Patient is a 65 y/o M, current smoker (1ppd x 50 years), current every day ETOH abuse (1 pint vodka and several beers daily x 50 years), with PMHx of HLD, HTN (not on any medications),  CABG (LIMA to LAD, SVG to PDA in 2017), AS (s/p AVR in 2017), who presented to [facility] ED on 2/24/22 with complaints of 9/10 chest pain with associated diaphoresis that occurred when walking to the bathroom last night around 1 am. Patient states that during this time, he fell and "blacked out" and hit the back of his head. Patient then went back to sleep. Patient states that he had a similiar chest pain 3 days ago that resolved on his own. Patient's wife states that patient has not seen a physician for over 2 years due to insurance issues.  Patient states last drink was 2 shots of vodka last night. Patient denies current chest pain, palpitations, dizziness, diaphoresis, headache, fever, chills, abdominal pain, back pain, n/v/d, recent travel or sick contacts.     In the ED, VS: 97.2, HR: 85 bpm, BP: 121/75 mmHg, RR: 18 breaths/min, spO2: 100% on RA   Labs significant for: Hgb/Hct: 9.5/31.4, AST: 51, Troponin: 0.02. EKG: NSR @ 85 bpm, ST depressions in II, V4, V5, V6. CXR: No cardiopulmonary edema   Patient admitted to cardiology/telemetry for further management of NSTEMI.    (24 Feb 2022 11:57)      1) s/p fall  - Bedside FAST exam negative for pneumothorax, splenorenal recess and Trinidad's pouch clear. Bladder mildly distended with urine  - CT head ordered to r/o acute bleed, C-spine ordered to r/o fracture  - Xray hips and R shoulder performed to r/o fracture  - EKG - unchanged from prior with ST depressions in V3-V4  - Fingerstick glucose 112  - CBC/BMP performed, Hb stable 7.7  - Bed alarm turned on  - fall precautions ordered  - case discussed with attending Dr. Loving  - Will discuss case with primary team in AM Post Fall Assessment    CHIEF COMPLAINT   Patient is a 66y old  Male who presents with a chief complaint of Chest Pain (18 Mar 2022 18:11)      EVENT SUMMARY   Patient found down on floor during routine night rounds by team. Pt seen and examined at bedside. Patient found on right shoulder R hip handing on to bed rail with L arm. Altered AAOx1, unable to state whether he hit his head. Denies head/hip/back pain. Unable to obtain rest of ROS due to confusion. RRT called, see plan below.    MEDICATIONS  (STANDING):  artificial tears (preservative free) Ophthalmic Solution 1 Drop(s) Both EYES two times a day  aspirin  chewable 81 milliGRAM(s) Oral every 24 hours  atorvastatin 80 milliGRAM(s) Oral at bedtime  cyanocobalamin 1000 MICROGram(s) Oral daily  DOXOrubicin INTRA-ARTERIAL (eMAR) 50 milliGRAM(s) IntraArterial once  ertapenem  IVPB 1000 milliGRAM(s) IV Intermittent every 24 hours  ferrous    sulfate 325 milliGRAM(s) Oral <User Schedule>  folic acid 1 milliGRAM(s) Oral daily  heparin   Injectable 5000 Unit(s) SubCutaneous every 8 hours  lactulose Syrup 30 Gram(s) Oral three times a day  multivitamin 1 Tablet(s) Oral daily  nicotine - 21 mG/24Hr(s) Patch 1 patch Transdermal daily  rifAXIMin 550 milliGRAM(s) Oral two times a day  tamsulosin 0.4 milliGRAM(s) Oral at bedtime    MEDICATIONS  (PRN):  acetaminophen     Tablet .. 650 milliGRAM(s) Oral every 6 hours PRN Temp greater or equal to 38C (100.4F), Mild Pain (1 - 3)  sodium chloride 0.65% Nasal 1 Spray(s) Both Nostrils four times a day PRN Nasal Congestion      Vital Signs Last 24 Hrs  T(C): 37 (18 Mar 2022 10:57), Max: 37.8 (17 Mar 2022 22:23)  T(F): 98.6 (18 Mar 2022 10:57), Max: 100 (17 Mar 2022 22:23)  HR: 102 (18 Mar 2022 16:15) (96 - 122)  BP: 117/66 (18 Mar 2022 16:15) (106/69 - 150/77)  BP(mean): 89 (18 Mar 2022 16:15) (77 - 106)  RR: 18 (18 Mar 2022 16:15) (17 - 25)  SpO2: 96% (18 Mar 2022 16:15) (95% - 100%)    Physical Exam  General: found down on R shoulder with bleeding visualized from IVs in R arm  Mental Status: A&O x1  Skin: Warm, dry, no rashes, lesions, ecchymosis, bruises   Head: NCAT  Neuro: Non focal  Cardiac: Sinus tachycardia to 120s on telemetry S1/S2. + systolic murmur  Lungs: CTA b/l. No rales, wheezes, rhonchi appreciated b/l.   Abdomen: Soft, +BS, non distended  Back: No step-offs  Extremities: No edema. ROM without pain     A&P  HPI:  Patient is a 65 y/o M, current smoker (1ppd x 50 years), current every day ETOH abuse (1 pint vodka and several beers daily x 50 years), with PMHx of HLD, HTN (not on any medications),  CABG (LIMA to LAD, SVG to PDA in 2017), AS (s/p AVR in 2017), who presented to [facility] ED on 2/24/22 with complaints of 9/10 chest pain with associated diaphoresis that occurred when walking to the bathroom last night around 1 am. Patient states that during this time, he fell and "blacked out" and hit the back of his head. Patient then went back to sleep. Patient states that he had a similiar chest pain 3 days ago that resolved on his own. Patient's wife states that patient has not seen a physician for over 2 years due to insurance issues.  Patient states last drink was 2 shots of vodka last night. Patient denies current chest pain, palpitations, dizziness, diaphoresis, headache, fever, chills, abdominal pain, back pain, n/v/d, recent travel or sick contacts.     In the ED, VS: 97.2, HR: 85 bpm, BP: 121/75 mmHg, RR: 18 breaths/min, spO2: 100% on RA   Labs significant for: Hgb/Hct: 9.5/31.4, AST: 51, Troponin: 0.02. EKG: NSR @ 85 bpm, ST depressions in II, V4, V5, V6. CXR: No cardiopulmonary edema   Patient admitted to cardiology/telemetry for further management of NSTEMI.    (24 Feb 2022 11:57)      1) s/p fall  - Bedside FAST exam negative for pneumothorax, splenorenal recess and Trinidad's pouch clear. Bladder mildly distended with urine  - CT head ordered to r/o acute bleed, C-spine ordered to r/o fracture  - Xray hips and R shoulder performed to r/o fracture  - EKG - unchanged from prior with ST depressions in V3-V4  - Fingerstick glucose 112  - CBC/BMP performed, Hb stable 7.7  - Bed alarm turned on  - fall precautions ordered  - case discussed with attending Dr. Loving  - Will discuss case with primary team in AM Post Fall Assessment    CHIEF COMPLAINT   Patient is a 66y old  Male who presents with a chief complaint of Chest Pain (18 Mar 2022 18:11)      EVENT SUMMARY   Patient found down on floor during routine night rounds by team. Pt seen and examined at bedside. Patient found on right shoulder R hip handing on to bed rail with L arm, with blood from his shoulder. Altered AAOx1, unable to state whether he hit his head. Denies head/hip/back pain. Unable to obtain rest of ROS due to confusion. RRT called, see plan below.    MEDICATIONS  (STANDING):  artificial tears (preservative free) Ophthalmic Solution 1 Drop(s) Both EYES two times a day  aspirin  chewable 81 milliGRAM(s) Oral every 24 hours  atorvastatin 80 milliGRAM(s) Oral at bedtime  cyanocobalamin 1000 MICROGram(s) Oral daily  DOXOrubicin INTRA-ARTERIAL (eMAR) 50 milliGRAM(s) IntraArterial once  ertapenem  IVPB 1000 milliGRAM(s) IV Intermittent every 24 hours  ferrous    sulfate 325 milliGRAM(s) Oral <User Schedule>  folic acid 1 milliGRAM(s) Oral daily  heparin   Injectable 5000 Unit(s) SubCutaneous every 8 hours  lactulose Syrup 30 Gram(s) Oral three times a day  multivitamin 1 Tablet(s) Oral daily  nicotine - 21 mG/24Hr(s) Patch 1 patch Transdermal daily  rifAXIMin 550 milliGRAM(s) Oral two times a day  tamsulosin 0.4 milliGRAM(s) Oral at bedtime    MEDICATIONS  (PRN):  acetaminophen     Tablet .. 650 milliGRAM(s) Oral every 6 hours PRN Temp greater or equal to 38C (100.4F), Mild Pain (1 - 3)  sodium chloride 0.65% Nasal 1 Spray(s) Both Nostrils four times a day PRN Nasal Congestion      Vital Signs Last 24 Hrs  T(C): 37 (18 Mar 2022 10:57), Max: 37.8 (17 Mar 2022 22:23)  T(F): 98.6 (18 Mar 2022 10:57), Max: 100 (17 Mar 2022 22:23)  HR: 102 (18 Mar 2022 16:15) (96 - 122)  BP: 117/66 (18 Mar 2022 16:15) (106/69 - 150/77)  BP(mean): 89 (18 Mar 2022 16:15) (77 - 106)  RR: 18 (18 Mar 2022 16:15) (17 - 25)  SpO2: 96% (18 Mar 2022 16:15) (95% - 100%)    Physical Exam  General: found down on R shoulder with bleeding visualized from IVs in R arm  Mental Status: A&O x1  Skin: Warm, dry, no rashes, lesions, ecchymosis, bruises   Head: NCAT  Neuro: Non focal  Cardiac: Sinus tachycardia to 120s on telemetry S1/S2. + systolic murmur  Lungs: CTA b/l. No rales, wheezes, rhonchi appreciated b/l.   Abdomen: Soft, +BS, non distended  Back: No step-offs  Extremities: No edema. ROM without pain     A&P  HPI:  Patient is a 67 y/o M, current smoker (1ppd x 50 years), current every day ETOH abuse (1 pint vodka and several beers daily x 50 years), with PMHx of HLD, HTN (not on any medications),  CABG (LIMA to LAD, SVG to PDA in 2017), AS (s/p AVR in 2017), who presented to [facility] ED on 2/24/22 with complaints of 9/10 chest pain with associated diaphoresis that occurred when walking to the bathroom last night around 1 am. Patient states that during this time, he fell and "blacked out" and hit the back of his head. Patient then went back to sleep. Patient states that he had a similiar chest pain 3 days ago that resolved on his own. Patient's wife states that patient has not seen a physician for over 2 years due to insurance issues.  Patient states last drink was 2 shots of vodka last night. Patient denies current chest pain, palpitations, dizziness, diaphoresis, headache, fever, chills, abdominal pain, back pain, n/v/d, recent travel or sick contacts.     In the ED, VS: 97.2, HR: 85 bpm, BP: 121/75 mmHg, RR: 18 breaths/min, spO2: 100% on RA   Labs significant for: Hgb/Hct: 9.5/31.4, AST: 51, Troponin: 0.02. EKG: NSR @ 85 bpm, ST depressions in II, V4, V5, V6. CXR: No cardiopulmonary edema   Patient admitted to cardiology/telemetry for further management of NSTEMI.    (24 Feb 2022 11:57)      1) s/p fall  - Bedside FAST exam negative for pneumothorax, splenorenal recess and Trinidad's pouch clear. Bladder mildly distended with urine  - CT head ordered to r/o acute bleed, C-spine ordered to r/o fracture  - Xray hips and R shoulder performed to r/o fracture  - EKG - unchanged from prior with ST depressions in V3-V4  - Fingerstick glucose 112  - CBC/BMP performed, Hb stable 7.7  - Bed alarm turned on  - fall precautions ordered  - case discussed with attending Dr. Loving  - Will discuss case with primary team in AM Post Fall Assessment    CHIEF COMPLAINT   Patient is a 66y old  Male who presents with a chief complaint of Chest Pain (18 Mar 2022 18:11)      EVENT SUMMARY   Patient found down on floor during routine night rounds by team. Pt seen and examined at bedside. Patient found on right shoulder R hip handing on to bed rail with L arm, with blood from his shoulder. Altered AAOx1, unable to state whether he hit his head. Denies head/hip/back pain. Unable to obtain rest of ROS due to confusion. RRT called, see plan below.    MEDICATIONS  (STANDING):  artificial tears (preservative free) Ophthalmic Solution 1 Drop(s) Both EYES two times a day  aspirin  chewable 81 milliGRAM(s) Oral every 24 hours  atorvastatin 80 milliGRAM(s) Oral at bedtime  cyanocobalamin 1000 MICROGram(s) Oral daily  DOXOrubicin INTRA-ARTERIAL (eMAR) 50 milliGRAM(s) IntraArterial once  ertapenem  IVPB 1000 milliGRAM(s) IV Intermittent every 24 hours  ferrous    sulfate 325 milliGRAM(s) Oral <User Schedule>  folic acid 1 milliGRAM(s) Oral daily  heparin   Injectable 5000 Unit(s) SubCutaneous every 8 hours  lactulose Syrup 30 Gram(s) Oral three times a day  multivitamin 1 Tablet(s) Oral daily  nicotine - 21 mG/24Hr(s) Patch 1 patch Transdermal daily  rifAXIMin 550 milliGRAM(s) Oral two times a day  tamsulosin 0.4 milliGRAM(s) Oral at bedtime    MEDICATIONS  (PRN):  acetaminophen     Tablet .. 650 milliGRAM(s) Oral every 6 hours PRN Temp greater or equal to 38C (100.4F), Mild Pain (1 - 3)  sodium chloride 0.65% Nasal 1 Spray(s) Both Nostrils four times a day PRN Nasal Congestion      Vital Signs Last 24 Hrs  T(C): 37 (18 Mar 2022 10:57), Max: 37.8 (17 Mar 2022 22:23)  T(F): 98.6 (18 Mar 2022 10:57), Max: 100 (17 Mar 2022 22:23)  HR: 102 (18 Mar 2022 16:15) (96 - 122)  BP: 117/66 (18 Mar 2022 16:15) (106/69 - 150/77)  BP(mean): 89 (18 Mar 2022 16:15) (77 - 106)  RR: 18 (18 Mar 2022 16:15) (17 - 25)  SpO2: 96% (18 Mar 2022 16:15) (95% - 100%)    Physical Exam  General: found down on R shoulder with bleeding visualized from IVs in R arm  Mental Status: A&O x1  Skin: Warm, dry, no rashes, lesions, ecchymosis, bruises   Head: NCAT  Neuro: Non focal  Cardiac: Sinus tachycardia to 120s on telemetry S1/S2. + systolic murmur  Lungs: CTA b/l. No rales, wheezes, rhonchi appreciated b/l.   Abdomen: Soft, +BS, non distended  Back: No step-offs  Extremities: No edema. ROM without pain     A&P  HPI:  Patient is a 67 y/o M, current smoker (1ppd x 50 years), current every day ETOH abuse (1 pint vodka and several beers daily x 50 years), with PMHx of HLD, HTN (not on any medications),  CABG (LIMA to LAD, SVG to PDA in 2017), AS (s/p AVR in 2017), who presented to [facility] ED on 2/24/22 with complaints of 9/10 chest pain with associated diaphoresis that occurred when walking to the bathroom last night around 1 am. Patient states that during this time, he fell and "blacked out" and hit the back of his head. Patient then went back to sleep. Patient states that he had a similiar chest pain 3 days ago that resolved on his own. Patient's wife states that patient has not seen a physician for over 2 years due to insurance issues.  Patient states last drink was 2 shots of vodka last night. Patient denies current chest pain, palpitations, dizziness, diaphoresis, headache, fever, chills, abdominal pain, back pain, n/v/d, recent travel or sick contacts.     In the ED, VS: 97.2, HR: 85 bpm, BP: 121/75 mmHg, RR: 18 breaths/min, spO2: 100% on RA   Labs significant for: Hgb/Hct: 9.5/31.4, AST: 51, Troponin: 0.02. EKG: NSR @ 85 bpm, ST depressions in II, V4, V5, V6. CXR: No cardiopulmonary edema   Patient admitted to cardiology/telemetry for further management of NSTEMI.    (24 Feb 2022 11:57)      1) s/p fall  - Bedside FAST exam negative for pneumothorax, splenorenal recess and Trinidad's pouch without fluid in spaces. Bladder mildly distended with urine  - CT head ordered to r/o acute bleed, C-spine ordered to r/o fracture  - Xray hips and R shoulder performed to r/o fracture  - EKG - unchanged from prior with ST depressions in V3-V4  - Fingerstick glucose 112  - CBC/BMP performed, Hb 7.9 -> 7.2  - Bed alarm turned on  - fall precautions ordered  - case discussed with attending Dr. Loving  - Will discuss case with primary team in AM

## 2022-03-18 NOTE — PROGRESS NOTE ADULT - ASSESSMENT
67 YO M, current smoker (1ppd x 50 years), current every day ETOH abuse (1 pint vodka and several beers daily x 50 years), with PMHx of HLD, HTN (not on any medications), CABG (LIMA to LAD, SVG to PDA in 2017), AS (s/p AVR in 2017) with recently discovered restenosis, presented to [facility] ED on 2/24/22 with chest pain and episode of LOC, initially admitted to cardiology for NSTEMI with [facility] with nonobstructive disease, then found to have restenosis of bioprosthetic valve for which aortic root surgery was planned, but now deferred given liver lesion c/f HCC. Patient transferred to medicine for further w/u of HCC and management of AMS, which is still of unclear etiology.  During IR TACE today, 3/14, patient noted to be obtunded with difficulty protecting airway, so emergently intubated and stepped up to ICU extubated on 3/16. Now extubated, on treatment for ESBL E Coli UTI and stepped down to med [facility].

## 2022-03-19 DIAGNOSIS — A41.9 SEPSIS, UNSPECIFIED ORGANISM: ICD-10-CM

## 2022-03-19 DIAGNOSIS — N39.0 URINARY TRACT INFECTION, SITE NOT SPECIFIED: ICD-10-CM

## 2022-03-19 LAB
ALBUMIN SERPL ELPH-MCNC: 2.6 G/DL — LOW (ref 3.3–5)
ALP SERPL-CCNC: 79 U/L — SIGNIFICANT CHANGE UP (ref 40–120)
ALT FLD-CCNC: 45 U/L — SIGNIFICANT CHANGE UP (ref 10–45)
ANION GAP SERPL CALC-SCNC: 8 MMOL/L — SIGNIFICANT CHANGE UP (ref 5–17)
APPEARANCE UR: CLEAR — SIGNIFICANT CHANGE UP
APTT BLD: 44.2 SEC — HIGH (ref 27.5–35.5)
AST SERPL-CCNC: 63 U/L — HIGH (ref 10–40)
BACTERIA # UR AUTO: PRESENT /HPF
BASOPHILS # BLD AUTO: 0.03 K/UL — SIGNIFICANT CHANGE UP (ref 0–0.2)
BASOPHILS NFR BLD AUTO: 0.2 % — SIGNIFICANT CHANGE UP (ref 0–2)
BILIRUB DIRECT SERPL-MCNC: 0.3 MG/DL — SIGNIFICANT CHANGE UP (ref 0–0.3)
BILIRUB INDIRECT FLD-MCNC: 0.7 MG/DL — SIGNIFICANT CHANGE UP (ref 0.2–1)
BILIRUB SERPL-MCNC: 1 MG/DL — SIGNIFICANT CHANGE UP (ref 0.2–1.2)
BILIRUB UR-MCNC: NEGATIVE — SIGNIFICANT CHANGE UP
BUN SERPL-MCNC: 23 MG/DL — SIGNIFICANT CHANGE UP (ref 7–23)
CALCIUM SERPL-MCNC: 8.2 MG/DL — LOW (ref 8.4–10.5)
CHLORIDE SERPL-SCNC: 110 MMOL/L — HIGH (ref 96–108)
CK MB CFR SERPL CALC: 1.3 NG/ML — SIGNIFICANT CHANGE UP (ref 0–6.7)
CK SERPL-CCNC: 83 U/L — SIGNIFICANT CHANGE UP (ref 30–200)
CO2 SERPL-SCNC: 20 MMOL/L — LOW (ref 22–31)
COLOR SPEC: YELLOW — SIGNIFICANT CHANGE UP
CREAT SERPL-MCNC: 0.75 MG/DL — SIGNIFICANT CHANGE UP (ref 0.5–1.3)
CULTURE RESULTS: SIGNIFICANT CHANGE UP
DIFF PNL FLD: NEGATIVE — SIGNIFICANT CHANGE UP
EGFR: 100 ML/MIN/1.73M2 — SIGNIFICANT CHANGE UP
EOSINOPHIL # BLD AUTO: 0.28 K/UL — SIGNIFICANT CHANGE UP (ref 0–0.5)
EOSINOPHIL NFR BLD AUTO: 2.1 % — SIGNIFICANT CHANGE UP (ref 0–6)
GLUCOSE BLDC GLUCOMTR-MCNC: 131 MG/DL — HIGH (ref 70–99)
GLUCOSE SERPL-MCNC: 115 MG/DL — HIGH (ref 70–99)
GLUCOSE UR QL: NEGATIVE — SIGNIFICANT CHANGE UP
HAPTOGLOB SERPL-MCNC: <10 MG/DL — LOW (ref 34–200)
HCT VFR BLD CALC: 21.9 % — LOW (ref 39–50)
HCT VFR BLD CALC: 22.3 % — LOW (ref 39–50)
HCT VFR BLD CALC: 30.1 % — LOW (ref 39–50)
HGB BLD-MCNC: 6.8 G/DL — CRITICAL LOW (ref 13–17)
HGB BLD-MCNC: 7 G/DL — CRITICAL LOW (ref 13–17)
HGB BLD-MCNC: 9.3 G/DL — LOW (ref 13–17)
IMM GRANULOCYTES NFR BLD AUTO: 0.8 % — SIGNIFICANT CHANGE UP (ref 0–1.5)
INR BLD: 1.43 — HIGH (ref 0.88–1.16)
KETONES UR-MCNC: NEGATIVE — SIGNIFICANT CHANGE UP
LACTATE SERPL-SCNC: 1.4 MMOL/L — SIGNIFICANT CHANGE UP (ref 0.5–2)
LACTATE SERPL-SCNC: 1.8 MMOL/L — SIGNIFICANT CHANGE UP (ref 0.5–2)
LDH SERPL L TO P-CCNC: 412 U/L — HIGH (ref 50–242)
LEUKOCYTE ESTERASE UR-ACNC: ABNORMAL
LYMPHOCYTES # BLD AUTO: 1.61 K/UL — SIGNIFICANT CHANGE UP (ref 1–3.3)
LYMPHOCYTES # BLD AUTO: 12.1 % — LOW (ref 13–44)
MAGNESIUM SERPL-MCNC: 2 MG/DL — SIGNIFICANT CHANGE UP (ref 1.6–2.6)
MCHC RBC-ENTMCNC: 29.4 PG — SIGNIFICANT CHANGE UP (ref 27–34)
MCHC RBC-ENTMCNC: 29.7 PG — SIGNIFICANT CHANGE UP (ref 27–34)
MCHC RBC-ENTMCNC: 29.8 PG — SIGNIFICANT CHANGE UP (ref 27–34)
MCHC RBC-ENTMCNC: 30.9 GM/DL — LOW (ref 32–36)
MCHC RBC-ENTMCNC: 31.1 GM/DL — LOW (ref 32–36)
MCHC RBC-ENTMCNC: 31.4 GM/DL — LOW (ref 32–36)
MCV RBC AUTO: 94.5 FL — SIGNIFICANT CHANGE UP (ref 80–100)
MCV RBC AUTO: 94.8 FL — SIGNIFICANT CHANGE UP (ref 80–100)
MCV RBC AUTO: 96.5 FL — SIGNIFICANT CHANGE UP (ref 80–100)
MONOCYTES # BLD AUTO: 0.94 K/UL — HIGH (ref 0–0.9)
MONOCYTES NFR BLD AUTO: 7.1 % — SIGNIFICANT CHANGE UP (ref 2–14)
NEUTROPHILS # BLD AUTO: 10.33 K/UL — HIGH (ref 1.8–7.4)
NEUTROPHILS NFR BLD AUTO: 77.7 % — HIGH (ref 43–77)
NITRITE UR-MCNC: NEGATIVE — SIGNIFICANT CHANGE UP
NRBC # BLD: 0 /100 WBCS — SIGNIFICANT CHANGE UP (ref 0–0)
PH UR: 7 — SIGNIFICANT CHANGE UP (ref 5–8)
PHOSPHATE SERPL-MCNC: 2.9 MG/DL — SIGNIFICANT CHANGE UP (ref 2.5–4.5)
PLATELET # BLD AUTO: 102 K/UL — LOW (ref 150–400)
PLATELET # BLD AUTO: 106 K/UL — LOW (ref 150–400)
PLATELET # BLD AUTO: 151 K/UL — SIGNIFICANT CHANGE UP (ref 150–400)
POTASSIUM SERPL-MCNC: 4.5 MMOL/L — SIGNIFICANT CHANGE UP (ref 3.5–5.3)
POTASSIUM SERPL-SCNC: 4.5 MMOL/L — SIGNIFICANT CHANGE UP (ref 3.5–5.3)
PROT SERPL-MCNC: 6.8 G/DL — SIGNIFICANT CHANGE UP (ref 6–8.3)
PROT UR-MCNC: NEGATIVE MG/DL — SIGNIFICANT CHANGE UP
PROTHROM AB SERPL-ACNC: 17.1 SEC — HIGH (ref 10.5–13.4)
RBC # BLD: 2.31 M/UL — LOW (ref 4.2–5.8)
RBC # BLD: 2.36 M/UL — LOW (ref 4.2–5.8)
RBC # BLD: 3.12 M/UL — LOW (ref 4.2–5.8)
RBC # FLD: 18.4 % — HIGH (ref 10.3–14.5)
RBC # FLD: 19.2 % — HIGH (ref 10.3–14.5)
RBC # FLD: 20.2 % — HIGH (ref 10.3–14.5)
RETICS #: 94.7 K/UL — SIGNIFICANT CHANGE UP (ref 25–125)
RETICS/RBC NFR: 4.1 % — HIGH (ref 0.5–2.5)
SODIUM SERPL-SCNC: 138 MMOL/L — SIGNIFICANT CHANGE UP (ref 135–145)
SP GR SPEC: 1.01 — SIGNIFICANT CHANGE UP (ref 1–1.03)
SPECIMEN SOURCE: SIGNIFICANT CHANGE UP
TROPONIN T SERPL-MCNC: <0.01 NG/ML — SIGNIFICANT CHANGE UP (ref 0–0.01)
UROBILINOGEN FLD QL: 0.2 E.U./DL — SIGNIFICANT CHANGE UP
WBC # BLD: 12.7 K/UL — HIGH (ref 3.8–10.5)
WBC # BLD: 12.93 K/UL — HIGH (ref 3.8–10.5)
WBC # BLD: 18.68 K/UL — HIGH (ref 3.8–10.5)
WBC # FLD AUTO: 12.7 K/UL — HIGH (ref 3.8–10.5)
WBC # FLD AUTO: 12.93 K/UL — HIGH (ref 3.8–10.5)
WBC # FLD AUTO: 18.68 K/UL — HIGH (ref 3.8–10.5)
WBC UR QL: ABNORMAL /HPF

## 2022-03-19 PROCEDURE — 76937 US GUIDE VASCULAR ACCESS: CPT | Mod: 26

## 2022-03-19 PROCEDURE — 99291 CRITICAL CARE FIRST HOUR: CPT

## 2022-03-19 PROCEDURE — 71045 X-RAY EXAM CHEST 1 VIEW: CPT | Mod: 26

## 2022-03-19 PROCEDURE — 99232 SBSQ HOSP IP/OBS MODERATE 35: CPT

## 2022-03-19 PROCEDURE — 93010 ELECTROCARDIOGRAM REPORT: CPT | Mod: 76

## 2022-03-19 PROCEDURE — 74018 RADEX ABDOMEN 1 VIEW: CPT | Mod: 26

## 2022-03-19 PROCEDURE — 36000 PLACE NEEDLE IN VEIN: CPT

## 2022-03-19 RX ORDER — ASPIRIN/CALCIUM CARB/MAGNESIUM 324 MG
81 TABLET ORAL EVERY 24 HOURS
Refills: 0 | Status: DISCONTINUED | OUTPATIENT
Start: 2022-03-19 | End: 2022-03-19

## 2022-03-19 RX ORDER — PANTOPRAZOLE SODIUM 20 MG/1
8 TABLET, DELAYED RELEASE ORAL
Qty: 80 | Refills: 0 | Status: DISCONTINUED | OUTPATIENT
Start: 2022-03-19 | End: 2022-03-21

## 2022-03-19 RX ORDER — PANTOPRAZOLE SODIUM 20 MG/1
40 TABLET, DELAYED RELEASE ORAL EVERY 12 HOURS
Refills: 0 | Status: DISCONTINUED | OUTPATIENT
Start: 2022-03-19 | End: 2022-03-19

## 2022-03-19 RX ORDER — MORPHINE SULFATE 50 MG/1
2 CAPSULE, EXTENDED RELEASE ORAL ONCE
Refills: 0 | Status: DISCONTINUED | OUTPATIENT
Start: 2022-03-19 | End: 2022-03-19

## 2022-03-19 RX ORDER — OCTREOTIDE ACETATE 200 UG/ML
50 INJECTION, SOLUTION INTRAVENOUS; SUBCUTANEOUS
Qty: 500 | Refills: 0 | Status: DISCONTINUED | OUTPATIENT
Start: 2022-03-19 | End: 2022-03-19

## 2022-03-19 RX ORDER — SODIUM CHLORIDE 9 MG/ML
250 INJECTION INTRAMUSCULAR; INTRAVENOUS; SUBCUTANEOUS ONCE
Refills: 0 | Status: COMPLETED | OUTPATIENT
Start: 2022-03-19 | End: 2022-03-19

## 2022-03-19 RX ORDER — ACETAMINOPHEN 500 MG
500 TABLET ORAL ONCE
Refills: 0 | Status: COMPLETED | OUTPATIENT
Start: 2022-03-19 | End: 2022-03-19

## 2022-03-19 RX ORDER — SODIUM CHLORIDE 9 MG/ML
250 INJECTION, SOLUTION INTRAVENOUS ONCE
Refills: 0 | Status: COMPLETED | OUTPATIENT
Start: 2022-03-19 | End: 2022-03-19

## 2022-03-19 RX ADMIN — Medication 650 MILLIGRAM(S): at 04:49

## 2022-03-19 RX ADMIN — TAMSULOSIN HYDROCHLORIDE 0.4 MILLIGRAM(S): 0.4 CAPSULE ORAL at 21:38

## 2022-03-19 RX ADMIN — ERTAPENEM SODIUM 120 MILLIGRAM(S): 1 INJECTION, POWDER, LYOPHILIZED, FOR SOLUTION INTRAMUSCULAR; INTRAVENOUS at 14:23

## 2022-03-19 RX ADMIN — Medication 1 PATCH: at 21:38

## 2022-03-19 RX ADMIN — Medication 1 DROP(S): at 21:38

## 2022-03-19 RX ADMIN — MORPHINE SULFATE 2 MILLIGRAM(S): 50 CAPSULE, EXTENDED RELEASE ORAL at 04:09

## 2022-03-19 RX ADMIN — SODIUM CHLORIDE 250 MILLILITER(S): 9 INJECTION, SOLUTION INTRAVENOUS at 08:39

## 2022-03-19 RX ADMIN — Medication 500 MILLIGRAM(S): at 07:30

## 2022-03-19 RX ADMIN — MORPHINE SULFATE 2 MILLIGRAM(S): 50 CAPSULE, EXTENDED RELEASE ORAL at 03:29

## 2022-03-19 RX ADMIN — Medication 1 PATCH: at 12:11

## 2022-03-19 RX ADMIN — Medication 1 DROP(S): at 09:45

## 2022-03-19 RX ADMIN — Medication 650 MILLIGRAM(S): at 17:15

## 2022-03-19 RX ADMIN — Medication 650 MILLIGRAM(S): at 02:50

## 2022-03-19 RX ADMIN — LACTULOSE 30 GRAM(S): 10 SOLUTION ORAL at 06:08

## 2022-03-19 RX ADMIN — PANTOPRAZOLE SODIUM 40 MILLIGRAM(S): 20 TABLET, DELAYED RELEASE ORAL at 08:44

## 2022-03-19 RX ADMIN — Medication 1 PATCH: at 22:22

## 2022-03-19 RX ADMIN — HEPARIN SODIUM 5000 UNIT(S): 5000 INJECTION INTRAVENOUS; SUBCUTANEOUS at 02:50

## 2022-03-19 RX ADMIN — Medication 200 MILLIGRAM(S): at 06:56

## 2022-03-19 RX ADMIN — PANTOPRAZOLE SODIUM 10 MG/HR: 20 TABLET, DELAYED RELEASE ORAL at 09:43

## 2022-03-19 RX ADMIN — Medication 650 MILLIGRAM(S): at 23:31

## 2022-03-19 RX ADMIN — SODIUM CHLORIDE 500 MILLILITER(S): 9 INJECTION INTRAMUSCULAR; INTRAVENOUS; SUBCUTANEOUS at 06:08

## 2022-03-19 RX ADMIN — Medication 1 PATCH: at 18:08

## 2022-03-19 RX ADMIN — LACTULOSE 30 GRAM(S): 10 SOLUTION ORAL at 21:34

## 2022-03-19 RX ADMIN — ATORVASTATIN CALCIUM 80 MILLIGRAM(S): 80 TABLET, FILM COATED ORAL at 21:37

## 2022-03-19 NOTE — PROGRESS NOTE ADULT - PROBLEM SELECTOR PLAN 6
S/p CABG with Dr Wilcox in 2017  - recent [facility] with nonobstructive disease  - c/w Lipitor 80mg QD  - c/w ASA 81mg AS i/s/o prior TAVR in 2017 with Dr Wilcox. JOSELO with peak transvalvular velocity of 4.7, mean gradient 56. symptomatic: exertional CP, episode of syncope  - initially planned for valve in valve TAVR, but then it was decided that because of aortic root anatomy, he would need aortic root open heart surgery  - he was deemed not to be a surgical candidate given poor functional status, liver cirrhosis, prior medication noncompliance which likely contributed to valve restenosis. his recovery after open heart surgically would be extremely difficult if possible. He would NOT be a surgical candidate   - CXR 3/14: clear lung fields

## 2022-03-19 NOTE — PROGRESS NOTE ADULT - PROBLEM SELECTOR PLAN 4
AS i/s/o prior TAVR in 2017 with Dr Wilcox. JOSELO with peak transvalvular velocity of 4.7, mean gradient 56. symptomatic: exertional CP, episode of syncope  - initially planned for valve in valve TAVR, but then it was decided that because of aortic root anatomy, he would need aortic root open heart surgery  - he was deemed not to be a surgical candidate given poor functional status, liver cirrhosis, prior medication noncompliance which likely contributed to valve restenosis. his recovery after open heart surgically would be extremely difficult if possible. He would NOT be a surgical candidate   - CXR 3/14: clear lung fields AAOx1 today. s/p unwitnessed fall 3/18, CT head/cervical no acute findings. Unlikely hepatic encephalopathy. Neuro exam nonfocal. Possibly component of Wernicke's or depression i/s/o HCC diagnosis. As per wife, pt is not normally lethargic at home. Completed course of high dose thiamine, last librium dose on 03/01. AMS likely metabolic component in setting of sepsis.  - VEEG: Mild-to-moderate generalized slowing suggestive of a similar degree of diffuse or multifocal cerebral dysfunction. No epileptiform discharges or electrographic seizures were recorded.   - c/w hepatic encephalopathy treatment: Rifaximin and lactulose, rectal tube in place  - management of infx as above

## 2022-03-19 NOTE — PROCEDURE NOTE - ADDITIONAL PROCEDURE DETAILS
Called to assist with placement of difficult vascular access. Site assessed by ultrasound and a angiocath was placed under ultrasound guidance. Catheter visualized in the center of vessel and confirmed with flush exam distal from catheter on ultrasound. Catheter may be used per nursing procedure.

## 2022-03-19 NOTE — PROVIDER CONTACT NOTE (FALL NOTIFICATION) - SITUATION
Patient found down on floor next to bed. Patient confused and unable to recall what happened. IV in right shoulder dislodged upon fall and patient bleeding from site.

## 2022-03-19 NOTE — PROGRESS NOTE ADULT - SUBJECTIVE AND OBJECTIVE BOX
incomplete  INTERVAL EVENTS: REHANA    SUBJECTIVE / INTERVAL HPI: Patient seen and examined at bedside.     ROS: negative unless otherwise stated above.    VITAL SIGNS:  Vital Signs Last 24 Hrs  T(C): 37.8 (19 Mar 2022 09:00), Max: 38.6 (19 Mar 2022 05:52)  T(F): 100.1 (19 Mar 2022 09:00), Max: 101.4 (19 Mar 2022 05:52)  HR: 112 (19 Mar 2022 10:27) (102 - 122)  BP: 101/56 (19 Mar 2022 09:55) (75/49 - 150/77)  BP(mean): 73 (19 Mar 2022 09:55) (58 - 106)  RR: 18 (19 Mar 2022 10:27) (16 - 18)  SpO2: 98% (19 Mar 2022 10:27) (96% - 100%)      22 @ 07:01  -  22 @ 07:00  --------------------------------------------------------  IN: 0 mL / OUT: 2575 mL / NET: -2575 mL        PHYSICAL EXAM:    General: NAD  HEENT: MMM  Neck: supple  Cardiovascular: +S1/S2; RRR  Respiratory: CTA B/L; no W/R/R  Gastrointestinal: soft, NT/ND  Extremities: WWP; no edema, clubbing or cyanosis  Vascular: 2+ radial, DP/PT pulses B/L  Neurological: AAOx3; no focal deficits    MEDICATIONS:  MEDICATIONS  (STANDING):  artificial tears (preservative free) Ophthalmic Solution 1 Drop(s) Both EYES two times a day  atorvastatin 80 milliGRAM(s) Oral at bedtime  cyanocobalamin 1000 MICROGram(s) Oral every 24 hours  DOXOrubicin INTRA-ARTERIAL (eMAR) 50 milliGRAM(s) IntraArterial once  ertapenem  IVPB 1000 milliGRAM(s) IV Intermittent every 24 hours  ferrous    sulfate 325 milliGRAM(s) Oral <User Schedule>  folic acid 1 milliGRAM(s) Oral daily  lactulose Syrup 30 Gram(s) Oral three times a day  multivitamin 1 Tablet(s) Oral daily  nicotine - 21 mG/24Hr(s) Patch 1 patch Transdermal daily  pantoprazole Infusion 8 mG/Hr (10 mL/Hr) IV Continuous <Continuous>  rifAXIMin 550 milliGRAM(s) Oral every 12 hours  tamsulosin 0.4 milliGRAM(s) Oral at bedtime    MEDICATIONS  (PRN):  acetaminophen     Tablet .. 650 milliGRAM(s) Oral every 6 hours PRN Temp greater or equal to 38C (100.4F), Mild Pain (1 - 3)  sodium chloride 0.65% Nasal 1 Spray(s) Both Nostrils four times a day PRN Nasal Congestion      ALLERGIES:  Allergies    No Known Allergies    Intolerances        LABS:                        6.8    12.93 )-----------( 151      ( 19 Mar 2022 06:03 )             21.9     03-19    138  |  110<H>  |  23  ----------------------------<  115<H>  4.5   |  20<L>  |  0.75    Ca    8.2<L>      19 Mar 2022 06:03  Phos  2.9     03-19  Mg     2.0     03-19    TPro  6.8  /  Alb  2.6<L>  /  TBili  1.0  /  DBili  0.3  /  AST  63<H>  /  ALT  45  /  AlkPhos  79  03-19    PT/INR - ( 19 Mar 2022 06:03 )   PT: 17.1 sec;   INR: 1.43          PTT - ( 19 Mar 2022 06:03 )  PTT:44.2 sec  Urinalysis Basic - ( 19 Mar 2022 07:20 )    Color: Yellow / Appearance: Clear / S.015 / pH: x  Gluc: x / Ketone: NEGATIVE  / Bili: Negative / Urobili: 0.2 E.U./dL   Blood: x / Protein: NEGATIVE mg/dL / Nitrite: NEGATIVE   Leuk Esterase: Trace / RBC: x / WBC 5-10 /HPF   Sq Epi: x / Non Sq Epi: x / Bacteria: Present /HPF      CAPILLARY BLOOD GLUCOSE      POCT Blood Glucose.: 131 mg/dL (19 Mar 2022 07:51)      RADIOLOGY & ADDITIONAL TESTS: Reviewed.   INTERVAL EVENTS: REHANA    SUBJECTIVE / INTERVAL HPI: Patient seen and examined at bedside this AM. Patient altered, grunting in response to questions.    ROS: negative unless otherwise stated above.    VITAL SIGNS:  Vital Signs Last 24 Hrs  T(C): 37.8 (19 Mar 2022 09:00), Max: 38.6 (19 Mar 2022 05:52)  T(F): 100.1 (19 Mar 2022 09:00), Max: 101.4 (19 Mar 2022 05:52)  HR: 112 (19 Mar 2022 10:27) (102 - 122)  BP: 101/56 (19 Mar 2022 09:55) (75/49 - 150/77)  BP(mean): 73 (19 Mar 2022 09:55) (58 - 106)  RR: 18 (19 Mar 2022 10:27) (16 - 18)  SpO2: 98% (19 Mar 2022 10:27) (96% - 100%)      22 @ 07:01  -  22 @ 07:00  --------------------------------------------------------  IN: 0 mL / OUT: 2575 mL / NET: -2575 mL        PHYSICAL EXAM:    General: laying flat in bed  HEENT: dry MM, LEIGH (sluggish)  Neck: supple  Cardiovascular: +S1/S2; tachycardic, no MRG  Respiratory: crackles B/L bases; no W/R/R  Gastrointestinal: soft, distended, NT  Extremities: echhymosis b/l UE, moving all extremities  Vascular: 2+ radial, DP pulses B/L  Neurological: AAOx1 (person); following few simple commands    MEDICATIONS:  MEDICATIONS  (STANDING):  artificial tears (preservative free) Ophthalmic Solution 1 Drop(s) Both EYES two times a day  atorvastatin 80 milliGRAM(s) Oral at bedtime  cyanocobalamin 1000 MICROGram(s) Oral every 24 hours  DOXOrubicin INTRA-ARTERIAL (eMAR) 50 milliGRAM(s) IntraArterial once  ertapenem  IVPB 1000 milliGRAM(s) IV Intermittent every 24 hours  ferrous    sulfate 325 milliGRAM(s) Oral <User Schedule>  folic acid 1 milliGRAM(s) Oral daily  lactulose Syrup 30 Gram(s) Oral three times a day  multivitamin 1 Tablet(s) Oral daily  nicotine - 21 mG/24Hr(s) Patch 1 patch Transdermal daily  pantoprazole Infusion 8 mG/Hr (10 mL/Hr) IV Continuous <Continuous>  rifAXIMin 550 milliGRAM(s) Oral every 12 hours  tamsulosin 0.4 milliGRAM(s) Oral at bedtime    MEDICATIONS  (PRN):  acetaminophen     Tablet .. 650 milliGRAM(s) Oral every 6 hours PRN Temp greater or equal to 38C (100.4F), Mild Pain (1 - 3)  sodium chloride 0.65% Nasal 1 Spray(s) Both Nostrils four times a day PRN Nasal Congestion      ALLERGIES:  Allergies    No Known Allergies    Intolerances        LABS:                        6.8    12.93 )-----------( 151      ( 19 Mar 2022 06:03 )             21.9     03-19    138  |  110<H>  |  23  ----------------------------<  115<H>  4.5   |  20<L>  |  0.75    Ca    8.2<L>      19 Mar 2022 06:03  Phos  2.9     03-19  Mg     2.0     -19    TPro  6.8  /  Alb  2.6<L>  /  TBili  1.0  /  DBili  0.3  /  AST  63<H>  /  ALT  45  /  AlkPhos  79  03-19    PT/INR - ( 19 Mar 2022 06:03 )   PT: 17.1 sec;   INR: 1.43          PTT - ( 19 Mar 2022 06:03 )  PTT:44.2 sec  Urinalysis Basic - ( 19 Mar 2022 07:20 )    Color: Yellow / Appearance: Clear / S.015 / pH: x  Gluc: x / Ketone: NEGATIVE  / Bili: Negative / Urobili: 0.2 E.U./dL   Blood: x / Protein: NEGATIVE mg/dL / Nitrite: NEGATIVE   Leuk Esterase: Trace / RBC: x / WBC 5-10 /HPF   Sq Epi: x / Non Sq Epi: x / Bacteria: Present /HPF      CAPILLARY BLOOD GLUCOSE      POCT Blood Glucose.: 131 mg/dL (19 Mar 2022 07:51)      RADIOLOGY & ADDITIONAL TESTS: Reviewed.

## 2022-03-19 NOTE — PROGRESS NOTE ADULT - ATTENDING COMMENTS
#Hepatic Lesion  Patient presented to [facility] ED on 2/24/22 with complaints of 9/10 chest pain with associated diaphoresis with exertion, admitted for NSTEMI, transferred to CTsx service for severe AS, requiring a TAVR procedure. During workup for TAVR, CTA A/P (2/28) revealing a nodular liver contour, c/w cirrhosis, A 2.4 cm hypervascular liver mass suspicious for HCC, as well as splenomegaly. AFP 7.2 (WNL), While AFP noted to be normal, does not preclude risk of possibility of HCC, especially in the setting of what appears to be cirrhosis 2/2 underlying long-standing history of EtOH use, which places him at higher risk.   - CT A/P (3/6) revealing 2.5 cm left hepatic lesion consistent with LI-RADS v 2018 Category: LR-5 Definitely HCC. OPTN Category (if LR-5): 5B. No locally invasive or metastatic disease. Left hepatic artery variant. No additional suspicious hepatic lesions.  - In light of active EtOH use, would not qualify for liver transplant evaluation   - Not a surgical candidate as per surgical onc team  - Appreciate ongoing Heme/Onc recs  - MR Abdomen (3/9) confirming HCC. Per IR, will consider for TACE today 3/14/22    #Cirrhosis, likely compensated (-HE, -ascites, - no known EV bleeding)  CTA A/P with radiographic findings c/f cirrhosis, including nodular liver contour and splenomegaly, which is suggestive likely portal HTN. Likely in the setting of long-standing history of EtOH abuse.   MELD-Na: 11 (based on 3/11/22 BW)  HE: [id number] (less conversive on exam today), no asterixis   Ascites: None present on CT imaging  HCC: CTA A/P (2/28) with 2.4 cm hypervascular lesion. CT A/P (3/6), lesion c/w HCC  Varices: No prior EGD evaluation, would be warranted, can pursue as an outpatient  - Abdominal US with duplex (3/6) The portal veins, hepatic veins and hepatic arteries are patent with normal directionality of flow. Cirrhosis. Since 2/28/2022, again a 2.9 cm heterogeneous lesion is present within the left lobe of the liver (see CT findings as noted above, appears to be c/w HCC). MRI confirming HCC  - Daily CMPs & Coags to calculate MELD-Na  - Nutrition consult  - High protein diet  -  on alcohol cessation  - c/w Rifaximin 500 mg BID & lactulose, titrate to 2-3 BMs/day    #AMS  Unclear etiology for AMS this am with increased lethargy. Ddx includes HE, infection, librium, delirium. The elevated ammonia lever to 123 could be due to his nose bleed, which has been ongoing since 3/5. He does not have Asterixis on exam and is A&O with understanding of where he is and why he is hospitalized. If HE, unclear the inciting event to cause decompensation. Additionally, RUQ US today w/o ascites. Most likely this is build up of librium that is being poorly metabolized due to his liver function.  - Continue to hold librium  - c/w Rifaximin 500 mg BID & lactulose, titrate to 2-3 BMs/day  - CXR (3/6/22) negative for infiltrates  - UA with no pyuria however +bacteria, + nitrite, trace LE, abxs as per primary team. If with no improvement in MS, consider initiation of abxs  - CT Head (3/10) negative    #Anemia   With slow downtrend in H/H, Hgb 6.9, s/p 1 u pRBC however with extravasation. Downtrended again to 6.6 today (3/10). With dried blood on right nare, noted week prior to have blood from right nare along with leslie blood in oropharynx, currently with no blood in oropharynx on today's exam. With no overt bleeding including melena, hematochezia, hematemesis, coffee ground emesis.   - LDH/Haptoglobin c/w hemolysis, likely in the setting of severe AS  - Can obtain a peripheral smear  - Monitor H/H  - Transfusion goal >8  - In light of underlying cirrhosis, with evidence of thrombocytopenia and splenomegaly, suggestive of e/o portal HTN, would warrant endoscopic evaluation to further assess for EV/PHG however given recent NSTEMI, higher risk for endoscopic evaluation at this time. At this time, he has worsening ST depressions, which cardiology attributes to demand ischemia and recommending fluid bolus and blood to avoid hypotension.  - hold aspirin for now  - Agree with palliative care consult and GOC discussion

## 2022-03-19 NOTE — PROGRESS NOTE ADULT - PROBLEM SELECTOR PLAN 8
Likely secondary to hemolysis due to prosthetic valve. Minimal epistaxis that does not explain low hgb, Hgb 8.1 on 3/14.  - Completed IV iron X 3 days  - c/w b12 1mg Qd  - c/w folate 1mg qd  - Transfusion goal > 7  - heme/onc, GI, IR following. S/p CABG with Dr Wilcox in 2017  - recent [facility] with nonobstructive disease  - c/w Lipitor 80mg QD  - c/w ASA 81mg

## 2022-03-19 NOTE — PROGRESS NOTE ADULT - PROBLEM SELECTOR PLAN 10
F: none  E: replenish as appropriate  N: DASH    VTE Prophylaxis: hold for anemia  GI: not needed  C: Full Code  D: (LOCATION) Long standing EtOH consumption. Not showing any signs of withdrawal at the moment. Last drink prior to admission which is almost 2 weeks ago. Pt was given 3 days of librium Q8hrs while on CT surgery however did not display signs of withdrawal per chart review  - c/w Multivitamin, Folic Acid, and B12.

## 2022-03-19 NOTE — PROGRESS NOTE ADULT - PROBLEM SELECTOR PLAN 9
Long standing EtOH consumption. Not showing any signs of withdrawal at the moment. Last drink prior to admission which is almost 2 weeks ago. Pt was given 3 days of librium Q8hrs while on CT surgery however did not display signs of withdrawal per chart review  - c/w Multivitamin, Folic Acid, and B12. Off meds.   - Monitor VS, introduced accordingly as patient per now normotensive

## 2022-03-19 NOTE — PROGRESS NOTE ADULT - PROBLEM SELECTOR PLAN 1
Sepsis 2/2 UTI. Patient with positive UA (WBC, bacteria LE) on 3/14. Growing kleb ESBL. W/ fever and hypotension 3/19, received 500cc fluids.  - maintain MAP >60  - c/w ertapenum 1g qd Sepsis 2/2 UTI. Patient with positive UA (WBC, bacteria LE) on 3/14. Growing kleb ESBL. W/ fever, tachycardia, hypotension 3/19, received 500cc fluids and 1 unit prbc.  - maintain MAP >65  - c/w ertapenum 1g qd

## 2022-03-19 NOTE — PROGRESS NOTE ADULT - ASSESSMENT
67 YO M, current smoker (1ppd x 50 years), current every day ETOH abuse (1 pint vodka and several beers daily x 50 years), with PMHx of HLD, HTN (not on any medications), CABG (LIMA to LAD, SVG to PDA in 2017), AS (s/p AVR in 2017) with recently discovered restenosis, presented to [facility] ED on 2/24/22 with chest pain and episode of LOC, initially admitted to cardiology for NSTEMI with [facility] with nonobstructive disease, then found to have restenosis of bioprosthetic valve for which aortic root surgery was planned, but now deferred given liver lesion c/f HCC. Patient transferred to medicine for further w/u of HCC and management of AMS, which is still of unclear etiology. During IR TACE, 3/14, patient noted to be obtunded with difficulty protecting airway, so intubated and stepped up to ICU, extubated on 3/16. Now extubated, on treatment for ESBL E Coli UTI and stepped down to med tele. Now w/ continued sepsis and anemia 2/2 hemolysis and possible GIB. 65 YO M, current smoker (1ppd x 50 years), current every day ETOH abuse (1 pint vodka and several beers daily x 50 years), with PMHx of HLD, HTN (not on any medications), CABG (LIMA to LAD, SVG to PDA in 2017), AS (s/p AVR in 2017) with recently discovered restenosis, presented to [facility] ED on 2/24/22 with chest pain and episode of LOC, initially admitted to cardiology for NSTEMI with [facility] with nonobstructive disease, then found to have restenosis of bioprosthetic valve for which aortic root surgery was planned, but now deferred given liver lesion c/f HCC. Patient transferred to medicine for further w/u of HCC and management of AMS, which is still of unclear etiology. During IR TACE, 3/14, patient noted to be obtunded with difficulty protecting airway, so intubated and stepped up to ICU, extubated on 3/16. Now extubated, on treatment for ESBL Klebsiella UTI and stepped down to med tele. Now w/ continued sepsis and anemia 2/2 hemolysis and possible GIB.

## 2022-03-19 NOTE — CONSULT NOTE ADULT - TIME BILLING
Pt is a 65 y/o man c CAD s/p CABG, bioprosthetic AS (not amenable to intervention), EtoH cirrhosis, ESBL UTI and cards called with regarsd to worsening ST depressions.
evaluation, coordination of care, chart review
Emotional Support/Supportive Care, Exploration of GOC, and Clarifying Potential Disease Trajectory related to cirrhosis and HCC  Discharge Facilitation: consideration of hospice options

## 2022-03-19 NOTE — CONSULT NOTE ADULT - ASSESSMENT
66M CAD s/p CABG, severe bioprosthetic AS (not amenable to any intervention), newly diagnosed EtOH cirrhosis c/b HCC s/p TACE, anemia, ESBL UTI who cardiology has been consulted for worsening tachycardia and ST depressions.    ST- depressions: V2-V6, I, II, and KRISTIN in aVR. Present on previous ECGs, but worse with increased HR  - Encephalopathic (likely 2/2 cirrhosis), no chest pain  - likely rate related/demand  - had coronary angiogram <1 month ago and LIMA and SVG grafts were widely patent  - Pt warm on exam, no cardiogenic shock at this time  - c/w lipitor 80 mg qd  - can start ASA 81 mg qd if able to tolerate from anemia standpoint    Severe bioprosthetic aortic stenosis: peak velocity 4.9 m/s, mean gradient 79 mmHg.   - Evaluated for valve in valve TAVR and  open aortic root +valve replacement, unfortunately not a surgical candidate for either  - Untreated severe AS w/ poor prognosis  - Pt preload dependent. avoid hypotension  - Received 250 ml bolus x2 this am and 1 unit PRBC

## 2022-03-19 NOTE — PROGRESS NOTE ADULT - PROBLEM SELECTOR PLAN 2
CTAP with 2.4 hypervascular liver mass. i/s/o Liver cirrhosis and EtOH use disorder. triple phase CT confirms HCC  - AFP is 7.2 which is normal  - MRI abdomen: 2.5 cm left hepatic lesion consistent with LI-RADS v 2018 Category: LR-5 Definitely HCC. OPTN Category (if LR-5): 5B. No locally invasive or metastatic disease. Left hepatic artery variant. No additional suspicious hepatic lesions.   - s/p IR transarterial chemical embolization since he is not a candidate for ablation.  - Palliative care following. Sepsis 2/2 UTI. Patient with positive UA (WBC, bacteria LE) on 3/14. Growing kleb ESBL. W/ fever and hypotension 3/19, received 500cc fluids and 1 unit prbc.  - maintain MAP >65  - c/w ertapenum 1g qd

## 2022-03-19 NOTE — PROGRESS NOTE ADULT - PROBLEM SELECTOR PLAN 5
2/2 long standing EtOH abuse. Likely compensated. Likely no hepatic encephalopathy. No known EV bleeding. NO ascites however now with signs of new infection started on antibiotics today.  - GI following  - no known Varices, but no prior EGD evaluation, he will need it outpatient, although has significant risk factors  - now has dark stools and anemia <7  - dvt ppx held in setting of anemia and hypotension  - CTAP with 2.4 hypervascular liver mass. i/s/o Liver cirrhosis and EtOH use disorder. triple phase CT confirms HCC  - AFP is 7.2 which is normal  - MRI abdomen: 2.5 cm left hepatic lesion consistent with LI-RADS v 2018 Category: LR-5 Definitely HCC. OPTN Category (if LR-5): 5B. No locally invasive or metastatic disease. Left hepatic artery variant. No additional suspicious hepatic lesions.   - s/p IR transarterial chemical embolization since he is not a candidate for ablation.  - Palliative care following.

## 2022-03-19 NOTE — PROGRESS NOTE ADULT - ATTENDING COMMENTS
ETOH cirrhosis complicated by HCC s/p chemoembolization on 3/14, intubated for obtunded status, extubated on 3/16.  H/o CAD, HF, AS s/p AVR in 2017 with noted restenosis.  UTI: Continue treatment for  for ESBL Klebsiella   Anemia due to hemolysis and possible GI related blood loss--> will transfuse to correct and support BP, started on protonic, no sign of portal hypertension will hold off octreotide.  Overnight fall no sign of nay bleed injury based on HCT, neck CT, old heal chronic shoulder fx noted.  Overall poor prognosis, will d/w family GOC.

## 2022-03-19 NOTE — PROGRESS NOTE ADULT - PROBLEM SELECTOR PLAN 7
Off meds.   - Monitor VS, introduced accordingly as patient per now normotensive 2/2 long standing EtOH abuse. Likely compensated. Likely no hepatic encephalopathy. No known EV bleeding. NO ascites however now with signs of new infection started on antibiotics today.  - GI following  - no known Varices, but no prior EGD evaluation, he will need it outpatient, although has significant risk factors  - now has dark stools and anemia <7  - dvt ppx held in setting of anemia and hypotension

## 2022-03-19 NOTE — PROGRESS NOTE ADULT - SUBJECTIVE AND OBJECTIVE BOX
GASTROENTEROLOGY PROGRESS NOTE  Patient seen and examined at bedside.  GI reconsulted for dropping hgb and dark fecal material in rectal tube.   Patient also with labs c/w hemolysis 2/2 to sev AS, not amenable to repair  Also with worsening ST depressions from demand ischemia  Getting 1u blood now, hgb 6.8 this am from 7.2    PERTINENT REVIEW OF SYSTEMS:  CONSTITUTIONAL: No weakness, fevers or chills  HEENT: No visual changes; No vertigo or throat pain   GASTROINTESTINAL: As above.  NEUROLOGICAL: No numbness or weakness  SKIN: No itching, burning, rashes, or lesions     Allergies    No Known Allergies    Intolerances      MEDICATIONS:  MEDICATIONS  (STANDING):  artificial tears (preservative free) Ophthalmic Solution 1 Drop(s) Both EYES two times a day  atorvastatin 80 milliGRAM(s) Oral at bedtime  cyanocobalamin 1000 MICROGram(s) Oral every 24 hours  DOXOrubicin INTRA-ARTERIAL (eMAR) 50 milliGRAM(s) IntraArterial once  ertapenem  IVPB 1000 milliGRAM(s) IV Intermittent every 24 hours  ferrous    sulfate 325 milliGRAM(s) Oral <User Schedule>  folic acid 1 milliGRAM(s) Oral daily  lactulose Syrup 30 Gram(s) Oral three times a day  multivitamin 1 Tablet(s) Oral daily  nicotine - 21 mG/24Hr(s) Patch 1 patch Transdermal daily  pantoprazole Infusion 8 mG/Hr (10 mL/Hr) IV Continuous <Continuous>  rifAXIMin 550 milliGRAM(s) Oral every 12 hours  tamsulosin 0.4 milliGRAM(s) Oral at bedtime    MEDICATIONS  (PRN):  acetaminophen     Tablet .. 650 milliGRAM(s) Oral every 6 hours PRN Temp greater or equal to 38C (100.4F), Mild Pain (1 - 3)  sodium chloride 0.65% Nasal 1 Spray(s) Both Nostrils four times a day PRN Nasal Congestion    Vital Signs Last 24 Hrs  T(C): 37.8 (19 Mar 2022 09:00), Max: 38.6 (19 Mar 2022 05:52)  T(F): 100.1 (19 Mar 2022 09:00), Max: 101.4 (19 Mar 2022 05:52)  HR: 104 (19 Mar 2022 12:00) (102 - 122)  BP: 107/59 (19 Mar 2022 12:12) (75/49 - 150/77)  BP(mean): 64 (19 Mar 2022 12:00) (58 - 106)  RR: 18 (19 Mar 2022 12:00) (16 - 18)  SpO2: 100% (19 Mar 2022 12:00) (96% - 100%)     @ 07:01  -  03- @ 07:00  --------------------------------------------------------  IN: 0 mL / OUT: 2575 mL / NET: -2575 mL      PHYSICAL EXAM:    General: in no acute distress  HEENT: MMM, conjunctiva and sclera clear  Gastrointestinal: Soft non-tender non-distended; No rebound or guarding  Skin: Warm and dry. No obvious rash    LABS:                        7.0    12.70 )-----------( 106      ( 19 Mar 2022 11:21 )             22.3     03-19    138  |  110<H>  |  23  ----------------------------<  115<H>  4.5   |  20<L>  |  0.75    Ca    8.2<L>      19 Mar 2022 06:03  Phos  2.9     -  Mg     2.0     -    TPro  6.8  /  Alb  2.6<L>  /  TBili  1.0  /  DBili  0.3  /  AST  63<H>  /  ALT  45  /  AlkPhos  79  03-19    PT/INR - ( 19 Mar 2022 06:03 )   PT: 17.1 sec;   INR: 1.43          PTT - ( 19 Mar 2022 06:03 )  PTT:44.2 sec      Urinalysis Basic - ( 19 Mar 2022 07:20 )    Color: Yellow / Appearance: Clear / S.015 / pH: x  Gluc: x / Ketone: NEGATIVE  / Bili: Negative / Urobili: 0.2 E.U./dL   Blood: x / Protein: NEGATIVE mg/dL / Nitrite: NEGATIVE   Leuk Esterase: Trace / RBC: x / WBC 5-10 /HPF   Sq Epi: x / Non Sq Epi: x / Bacteria: Present /HPF                RADIOLOGY & ADDITIONAL STUDIES:  Reviewed

## 2022-03-19 NOTE — CONSULT NOTE ADULT - SUBJECTIVE AND OBJECTIVE BOX
HPI:  66M current smoker (1ppd x 50 years), CAD s/p CABG, severe AS s/p AVR p/w chest pain in the setting of anterolateral ST depressions.  Admitted to cardiac telemetry for NSTEMI. A LHC was performed that revealed pLAD 90% stenosis, but patent LIMA->LAD and SVG-> LPDA.  An echo revealed severe bioprosthetic AS and was subsequently transferred to CTS for possible valve in valve TAVR. However, given aortic root dilation, this was delayed and was then surgical plans changes to open aortic root and valve replacement. However, given overall frality, was deemed not a surgical candidate. Additionally, he was found to have liver lesion c/w HCC and likely EtOH cirrhosis and transferred to medicine service He underwent TACE that was c/b encephalopathy that required intubation. He has been extubated a few days later on 3/16. unfortunately hospital course has been further complicated by ESBL klebsiella UTI in addition to persistent anemia concerning for hemolysis and possible GIB. Pt has become more tachycardic overnight, lower BP, and ECG with worsening ST depressions for which cardiology has been consulted. Pt encephalopathic, but denies any pain.    ROS: A 10-point review of systems was otherwise negative.    PAST MEDICAL & SURGICAL HISTORY:  HTN (hypertension)    ETOH abuse    Smoker    Hyperlipidemia    Aortic valve stenosis, unspecified etiology    No significant past surgical history        SOCIAL HISTORY:  FAMILY HISTORY:      ALLERGIES: 	  No Known Allergies            MEDICATIONS:  acetaminophen     Tablet .. 650 milliGRAM(s) Oral every 6 hours PRN  artificial tears (preservative free) Ophthalmic Solution 1 Drop(s) Both EYES two times a day  atorvastatin 80 milliGRAM(s) Oral at bedtime  cyanocobalamin 1000 MICROGram(s) Oral every 24 hours  DOXOrubicin INTRA-ARTERIAL (eMAR) 50 milliGRAM(s) IntraArterial once  ertapenem  IVPB 1000 milliGRAM(s) IV Intermittent every 24 hours  ferrous    sulfate 325 milliGRAM(s) Oral <User Schedule>  folic acid 1 milliGRAM(s) Oral daily  lactulose Syrup 30 Gram(s) Oral three times a day  multivitamin 1 Tablet(s) Oral daily  nicotine - 21 mG/24Hr(s) Patch 1 patch Transdermal daily  pantoprazole Infusion 8 mG/Hr IV Continuous <Continuous>  rifAXIMin 550 milliGRAM(s) Oral every 12 hours  sodium chloride 0.65% Nasal 1 Spray(s) Both Nostrils four times a day PRN  tamsulosin 0.4 milliGRAM(s) Oral at bedtime      PHYSICAL EXAM:  T(C): 37.8 (03-19-22 @ 09:00), Max: 38.6 (03-19-22 @ 05:52)  HR: 114 (03-19-22 @ 09:55) (102 - 122)  BP: 101/56 (03-19-22 @ 09:55) (75/49 - 150/77)  RR: 18 (03-19-22 @ 09:55) (16 - 18)  SpO2: 98% (03-19-22 @ 09:55) (96% - 100%)  Wt(kg): --    GEN: mild distress   HEENT: NCAT, EOMI. Mucosa moist. +JVD.   RESP: mild iwob, faint crackles at bases  CV: RRR, normal s1/s2. No m/r/g.  ABD: Soft, distended. BS+  EXT: Warm. No edema, clubbing, or cyanosis.   NEURO: responses to voice, nods head to questions, moves all 4 extremities.    I&O's Summary    18 Mar 2022 07:01  -  19 Mar 2022 07:00  --------------------------------------------------------  IN: 0 mL / OUT: 2575 mL / NET: -2575 mL    	  LABS:	 	    CARDIAC MARKERS:                      6.8    12.93 )-----------( 151      ( 19 Mar 2022 06:03 )             21.9     03-19    138  |  110<H>  |  23  ----------------------------<  115<H>  4.5   |  20<L>  |  0.75    Ca    8.2<L>      19 Mar 2022 06:03  Phos  2.9     03-19  Mg     2.0     03-19    TPro  6.8  /  Alb  2.6<L>  /  TBili  1.0  /  DBili  0.3  /  AST  63<H>  /  ALT  45  /  AlkPhos  79  03-19  	    ECG: Sinus tachycardia. ST depressions in V2-V6, I - II, KRISTIN aVR  	  RADIOLOGY: no infiltrates  ECHO: TTE: Normal EF. Bioprostetic valve in AV position. Peak velocity 4.9. Mean gradient 70. LVOT/AV velocity ratio 2.0.  JOSELO: AV peak velocity 4.7. Mean gradient 56  CATH:  nl. pLAD 80% stenosis, pLCx 80% (competitive flow dLCx, LPDA), RCA small nondominant.  LIMA-> LAD widely patent  SVG-> LPDA widely patent

## 2022-03-19 NOTE — PROCEDURE NOTE - NSPROCDETAILS_GEN_ALL_CORE
Ultrasound/location identified, draped/prepped, sterile technique used/blood seen on insertion/dressing applied/flushes easily/secured in place/sterile technique, catheter placed

## 2022-03-19 NOTE — PROGRESS NOTE ADULT - PROBLEM SELECTOR PLAN 3
AAOx1 today. s/p unwitnessed fall 3/18, CT head/cervical no acute findings. Unlikely hepatic encephalopathy. Neuro exam nonfocal. Possibly component of Wernicke's or depression i/s/o HCC diagnosis. As per wife, pt is not normally lethargic at home. Completed course of high dose thiamine, last librium dose on 03/01. AMS likely metabolic component in setting of sepsis.  - VEEG: Mild-to-moderate generalized slowing suggestive of a similar degree of diffuse or multifocal cerebral dysfunction. No epileptiform discharges or electrographic seizures were recorded.   - c/w hepatic encephalopathy treatment: Rifaximin and lactulose, rectal tube in place Likely secondary to hemolysis due to prosthetic valve, also w/ dark stool w/ concern for GIB. Has never been scoped Minimal epistaxis that does not explain low hgb, Hgb 8.1 on 3/14.  - Completed IV iron X 3 days  - c/w b12 1mg Qd  - c/w folate 1mg qd  - Transfusion goal > 7  - GI following  - PPI infusion started

## 2022-03-20 DIAGNOSIS — A41.9 SEPSIS, UNSPECIFIED ORGANISM: ICD-10-CM

## 2022-03-20 LAB
-  K. PNEUMONIAE GROUP: SIGNIFICANT CHANGE UP
-  KPC RESISTANCE GENE: SIGNIFICANT CHANGE UP
ALBUMIN SERPL ELPH-MCNC: 2 G/DL — LOW (ref 3.3–5)
ALBUMIN SERPL ELPH-MCNC: 2.2 G/DL — LOW (ref 3.3–5)
ALP SERPL-CCNC: 68 U/L — SIGNIFICANT CHANGE UP (ref 40–120)
ALP SERPL-CCNC: 81 U/L — SIGNIFICANT CHANGE UP (ref 40–120)
ALT FLD-CCNC: 31 U/L — SIGNIFICANT CHANGE UP (ref 10–45)
ALT FLD-CCNC: 35 U/L — SIGNIFICANT CHANGE UP (ref 10–45)
AMMONIA BLD-MCNC: 21 UMOL/L — SIGNIFICANT CHANGE UP (ref 11–55)
AMMONIA BLD-MCNC: 51 UMOL/L — SIGNIFICANT CHANGE UP (ref 11–55)
ANION GAP SERPL CALC-SCNC: 6 MMOL/L — SIGNIFICANT CHANGE UP (ref 5–17)
ANION GAP SERPL CALC-SCNC: 7 MMOL/L — SIGNIFICANT CHANGE UP (ref 5–17)
APTT BLD: 32.6 SEC — SIGNIFICANT CHANGE UP (ref 27.5–35.5)
APTT BLD: 33.6 SEC — SIGNIFICANT CHANGE UP (ref 27.5–35.5)
AST SERPL-CCNC: 46 U/L — HIGH (ref 10–40)
AST SERPL-CCNC: 51 U/L — HIGH (ref 10–40)
BASE EXCESS BLDA CALC-SCNC: -3.8 MMOL/L — LOW (ref -2–3)
BASOPHILS # BLD AUTO: 0.01 K/UL — SIGNIFICANT CHANGE UP (ref 0–0.2)
BASOPHILS NFR BLD AUTO: 0.1 % — SIGNIFICANT CHANGE UP (ref 0–2)
BILIRUB SERPL-MCNC: 0.9 MG/DL — SIGNIFICANT CHANGE UP (ref 0.2–1.2)
BILIRUB SERPL-MCNC: 1.2 MG/DL — SIGNIFICANT CHANGE UP (ref 0.2–1.2)
BUN SERPL-MCNC: 16 MG/DL — SIGNIFICANT CHANGE UP (ref 7–23)
BUN SERPL-MCNC: 17 MG/DL — SIGNIFICANT CHANGE UP (ref 7–23)
CALCIUM SERPL-MCNC: 7.4 MG/DL — LOW (ref 8.4–10.5)
CALCIUM SERPL-MCNC: 7.7 MG/DL — LOW (ref 8.4–10.5)
CHLORIDE SERPL-SCNC: 113 MMOL/L — HIGH (ref 96–108)
CHLORIDE SERPL-SCNC: 114 MMOL/L — HIGH (ref 96–108)
CO2 BLDA-SCNC: 20 MMOL/L — SIGNIFICANT CHANGE UP (ref 19–24)
CO2 SERPL-SCNC: 17 MMOL/L — LOW (ref 22–31)
CO2 SERPL-SCNC: 19 MMOL/L — LOW (ref 22–31)
CREAT SERPL-MCNC: 0.68 MG/DL — SIGNIFICANT CHANGE UP (ref 0.5–1.3)
CREAT SERPL-MCNC: 0.81 MG/DL — SIGNIFICANT CHANGE UP (ref 0.5–1.3)
EGFR: 103 ML/MIN/1.73M2 — SIGNIFICANT CHANGE UP
EGFR: 97 ML/MIN/1.73M2 — SIGNIFICANT CHANGE UP
EOSINOPHIL # BLD AUTO: 0.14 K/UL — SIGNIFICANT CHANGE UP (ref 0–0.5)
EOSINOPHIL NFR BLD AUTO: 1.2 % — SIGNIFICANT CHANGE UP (ref 0–6)
GAS PNL BLDA: SIGNIFICANT CHANGE UP
GLUCOSE SERPL-MCNC: 103 MG/DL — HIGH (ref 70–99)
GLUCOSE SERPL-MCNC: 109 MG/DL — HIGH (ref 70–99)
GRAM STN FLD: SIGNIFICANT CHANGE UP
HAPTOGLOB SERPL-MCNC: <10 MG/DL — LOW (ref 34–200)
HCO3 BLDA-SCNC: 19 MMOL/L — LOW (ref 21–28)
HCT VFR BLD CALC: 23 % — LOW (ref 39–50)
HCT VFR BLD CALC: 26.2 % — LOW (ref 39–50)
HGB BLD-MCNC: 7.4 G/DL — LOW (ref 13–17)
HGB BLD-MCNC: 8.3 G/DL — LOW (ref 13–17)
IMM GRANULOCYTES NFR BLD AUTO: 0.6 % — SIGNIFICANT CHANGE UP (ref 0–1.5)
INR BLD: 1.47 — HIGH (ref 0.88–1.16)
INR BLD: 1.61 — HIGH (ref 0.88–1.16)
LACTATE SERPL-SCNC: 0.9 MMOL/L — SIGNIFICANT CHANGE UP (ref 0.5–2)
LDH SERPL L TO P-CCNC: 393 U/L — HIGH (ref 50–242)
LYMPHOCYTES # BLD AUTO: 1.01 K/UL — SIGNIFICANT CHANGE UP (ref 1–3.3)
LYMPHOCYTES # BLD AUTO: 8.4 % — LOW (ref 13–44)
MAGNESIUM SERPL-MCNC: 1.8 MG/DL — SIGNIFICANT CHANGE UP (ref 1.6–2.6)
MAGNESIUM SERPL-MCNC: 2 MG/DL — SIGNIFICANT CHANGE UP (ref 1.6–2.6)
MCHC RBC-ENTMCNC: 30.5 PG — SIGNIFICANT CHANGE UP (ref 27–34)
MCHC RBC-ENTMCNC: 30.6 PG — SIGNIFICANT CHANGE UP (ref 27–34)
MCHC RBC-ENTMCNC: 31.7 GM/DL — LOW (ref 32–36)
MCHC RBC-ENTMCNC: 32.2 GM/DL — SIGNIFICANT CHANGE UP (ref 32–36)
MCV RBC AUTO: 94.7 FL — SIGNIFICANT CHANGE UP (ref 80–100)
MCV RBC AUTO: 96.7 FL — SIGNIFICANT CHANGE UP (ref 80–100)
METHOD TYPE: SIGNIFICANT CHANGE UP
MONOCYTES # BLD AUTO: 0.91 K/UL — HIGH (ref 0–0.9)
MONOCYTES NFR BLD AUTO: 7.5 % — SIGNIFICANT CHANGE UP (ref 2–14)
NEUTROPHILS # BLD AUTO: 9.95 K/UL — HIGH (ref 1.8–7.4)
NEUTROPHILS NFR BLD AUTO: 82.2 % — HIGH (ref 43–77)
NRBC # BLD: 0 /100 WBCS — SIGNIFICANT CHANGE UP (ref 0–0)
NRBC # BLD: 0 /100 WBCS — SIGNIFICANT CHANGE UP (ref 0–0)
PCO2 BLDA: 28 MMHG — LOW (ref 35–48)
PH BLDA: 7.44 — SIGNIFICANT CHANGE UP (ref 7.35–7.45)
PHOSPHATE SERPL-MCNC: 3 MG/DL — SIGNIFICANT CHANGE UP (ref 2.5–4.5)
PHOSPHATE SERPL-MCNC: 3.3 MG/DL — SIGNIFICANT CHANGE UP (ref 2.5–4.5)
PLATELET # BLD AUTO: 120 K/UL — LOW (ref 150–400)
PLATELET # BLD AUTO: 96 K/UL — LOW (ref 150–400)
PO2 BLDA: 172 MMHG — HIGH (ref 83–108)
POTASSIUM SERPL-MCNC: 3.9 MMOL/L — SIGNIFICANT CHANGE UP (ref 3.5–5.3)
POTASSIUM SERPL-MCNC: 4.4 MMOL/L — SIGNIFICANT CHANGE UP (ref 3.5–5.3)
POTASSIUM SERPL-SCNC: 3.9 MMOL/L — SIGNIFICANT CHANGE UP (ref 3.5–5.3)
POTASSIUM SERPL-SCNC: 4.4 MMOL/L — SIGNIFICANT CHANGE UP (ref 3.5–5.3)
PROT SERPL-MCNC: 5.7 G/DL — LOW (ref 6–8.3)
PROT SERPL-MCNC: 6.1 G/DL — SIGNIFICANT CHANGE UP (ref 6–8.3)
PROTHROM AB SERPL-ACNC: 17.6 SEC — HIGH (ref 10.5–13.4)
PROTHROM AB SERPL-ACNC: 19.3 SEC — HIGH (ref 10.5–13.4)
RBC # BLD: 2.43 M/UL — LOW (ref 4.2–5.8)
RBC # BLD: 2.71 M/UL — LOW (ref 4.2–5.8)
RBC # FLD: 18.6 % — HIGH (ref 10.3–14.5)
RBC # FLD: 19.7 % — HIGH (ref 10.3–14.5)
SAO2 % BLDA: 100 % — HIGH (ref 94–98)
SODIUM SERPL-SCNC: 136 MMOL/L — SIGNIFICANT CHANGE UP (ref 135–145)
SODIUM SERPL-SCNC: 140 MMOL/L — SIGNIFICANT CHANGE UP (ref 135–145)
WBC # BLD: 12.09 K/UL — HIGH (ref 3.8–10.5)
WBC # BLD: 15.22 K/UL — HIGH (ref 3.8–10.5)
WBC # FLD AUTO: 12.09 K/UL — HIGH (ref 3.8–10.5)
WBC # FLD AUTO: 15.22 K/UL — HIGH (ref 3.8–10.5)

## 2022-03-20 PROCEDURE — 71045 X-RAY EXAM CHEST 1 VIEW: CPT | Mod: 26,77

## 2022-03-20 PROCEDURE — 76937 US GUIDE VASCULAR ACCESS: CPT | Mod: 26

## 2022-03-20 PROCEDURE — 99291 CRITICAL CARE FIRST HOUR: CPT

## 2022-03-20 PROCEDURE — 71045 X-RAY EXAM CHEST 1 VIEW: CPT | Mod: 26

## 2022-03-20 PROCEDURE — 36000 PLACE NEEDLE IN VEIN: CPT

## 2022-03-20 RX ORDER — SODIUM CHLORIDE 9 MG/ML
500 INJECTION INTRAMUSCULAR; INTRAVENOUS; SUBCUTANEOUS ONCE
Refills: 0 | Status: COMPLETED | OUTPATIENT
Start: 2022-03-20 | End: 2022-03-20

## 2022-03-20 RX ORDER — MEROPENEM 1 G/30ML
1000 INJECTION INTRAVENOUS EVERY 8 HOURS
Refills: 0 | Status: COMPLETED | OUTPATIENT
Start: 2022-03-20 | End: 2022-03-20

## 2022-03-20 RX ORDER — MEROPENEM 1 G/30ML
1000 INJECTION INTRAVENOUS EVERY 8 HOURS
Refills: 0 | Status: DISCONTINUED | OUTPATIENT
Start: 2022-03-20 | End: 2022-03-20

## 2022-03-20 RX ORDER — SODIUM CHLORIDE 9 MG/ML
500 INJECTION, SOLUTION INTRAVENOUS ONCE
Refills: 0 | Status: DISCONTINUED | OUTPATIENT
Start: 2022-03-20 | End: 2022-03-20

## 2022-03-20 RX ORDER — SODIUM CHLORIDE 9 MG/ML
500 INJECTION, SOLUTION INTRAVENOUS
Refills: 0 | Status: DISCONTINUED | OUTPATIENT
Start: 2022-03-20 | End: 2022-03-21

## 2022-03-20 RX ORDER — MEROPENEM 1 G/30ML
1000 INJECTION INTRAVENOUS EVERY 8 HOURS
Refills: 0 | Status: DISCONTINUED | OUTPATIENT
Start: 2022-03-20 | End: 2022-03-22

## 2022-03-20 RX ORDER — SODIUM CHLORIDE 9 MG/ML
500 INJECTION, SOLUTION INTRAVENOUS ONCE
Refills: 0 | Status: COMPLETED | OUTPATIENT
Start: 2022-03-20 | End: 2022-03-20

## 2022-03-20 RX ORDER — POTASSIUM CHLORIDE 20 MEQ
10 PACKET (EA) ORAL
Refills: 0 | Status: DISCONTINUED | OUTPATIENT
Start: 2022-03-20 | End: 2022-03-20

## 2022-03-20 RX ORDER — PHENYLEPHRINE HYDROCHLORIDE 10 MG/ML
0.1 INJECTION INTRAVENOUS
Qty: 40 | Refills: 0 | Status: DISCONTINUED | OUTPATIENT
Start: 2022-03-20 | End: 2022-03-22

## 2022-03-20 RX ORDER — SODIUM CHLORIDE 9 MG/ML
500 INJECTION, SOLUTION INTRAVENOUS
Refills: 0 | Status: DISCONTINUED | OUTPATIENT
Start: 2022-03-20 | End: 2022-03-20

## 2022-03-20 RX ADMIN — MEROPENEM 100 MILLIGRAM(S): 1 INJECTION INTRAVENOUS at 06:19

## 2022-03-20 RX ADMIN — Medication 1 MILLIGRAM(S): at 15:49

## 2022-03-20 RX ADMIN — SODIUM CHLORIDE 250 MILLILITER(S): 9 INJECTION, SOLUTION INTRAVENOUS at 17:29

## 2022-03-20 RX ADMIN — Medication 1 TABLET(S): at 15:55

## 2022-03-20 RX ADMIN — MEROPENEM 100 MILLIGRAM(S): 1 INJECTION INTRAVENOUS at 14:06

## 2022-03-20 RX ADMIN — PREGABALIN 1000 MICROGRAM(S): 225 CAPSULE ORAL at 15:50

## 2022-03-20 RX ADMIN — SODIUM CHLORIDE 500 MILLILITER(S): 9 INJECTION INTRAMUSCULAR; INTRAVENOUS; SUBCUTANEOUS at 01:51

## 2022-03-20 RX ADMIN — Medication 1 DROP(S): at 21:42

## 2022-03-20 RX ADMIN — PHENYLEPHRINE HYDROCHLORIDE 2.55 MICROGRAM(S)/KG/MIN: 10 INJECTION INTRAVENOUS at 11:17

## 2022-03-20 RX ADMIN — MEROPENEM 100 MILLIGRAM(S): 1 INJECTION INTRAVENOUS at 21:43

## 2022-03-20 RX ADMIN — Medication 1 PATCH: at 21:00

## 2022-03-20 RX ADMIN — Medication 1 PATCH: at 18:42

## 2022-03-20 RX ADMIN — PANTOPRAZOLE SODIUM 10 MG/HR: 20 TABLET, DELAYED RELEASE ORAL at 03:20

## 2022-03-20 RX ADMIN — SODIUM CHLORIDE 250 MILLILITER(S): 9 INJECTION, SOLUTION INTRAVENOUS at 10:40

## 2022-03-20 RX ADMIN — LACTULOSE 30 GRAM(S): 10 SOLUTION ORAL at 15:51

## 2022-03-20 RX ADMIN — Medication 1 DROP(S): at 14:06

## 2022-03-20 RX ADMIN — Medication 1 PATCH: at 21:42

## 2022-03-20 RX ADMIN — Medication 1 PATCH: at 06:17

## 2022-03-20 NOTE — PROCEDURE NOTE - NSICDXPROCEDURE_GEN_ALL_CORE_FT
PROCEDURES:  US guided vascular access 14-Mar-2022 15:28:59  Michelle Weldon  
PROCEDURES:  Arterial puncture 14-Mar-2022 15:25:53  Michelle Weldon  
PROCEDURES:  Insertion of intravenous catheter with ultrasound guidance 20-Mar-2022 04:59:07  Jose Luis Kim

## 2022-03-20 NOTE — PROGRESS NOTE ADULT - PROBLEM SELECTOR PLAN 1
Pt spiked fevers today and yesterday, has been tachy, worsening mental status, and tonight was hypotensive. Likely multifactorial septic from UTI and cardiogenic given severe aortic stenosis. Pt sensitive to fluids so given 500cc bolus and broadened to meropenem. Started on phenylephrine drip and stepped up to MICU. Per family full code.  - c/w meropenem (3/20-) f/u w ID  - titrate pressors to >65  - f/u blood cx  - f/u w cardio recs

## 2022-03-20 NOTE — PROGRESS NOTE ADULT - SUBJECTIVE AND OBJECTIVE BOX
CC: Patient is a 66y old  Male who presents with a chief complaint of Chest Pain (20 Mar 2022 08:26)      INTERVAL EVENTS: REHANA    SUBJECTIVE / INTERVAL HPI: Patient seen and examined at bedside.     ROS: negative unless otherwise stated above.    VITAL SIGNS:  Vital Signs Last 24 Hrs  T(C): 37.6 (20 Mar 2022 09:37), Max: 38.5 (19 Mar 2022 23:28)  T(F): 99.6 (20 Mar 2022 09:37), Max: 101.3 (19 Mar 2022 23:28)  HR: 79 (20 Mar 2022 11:00) (76 - 106)  BP: 82/49 (20 Mar 2022 11:00) (65/41 - 139/58)  BP(mean): 61 (20 Mar 2022 11:00) (49 - 85)  RR: 27 (20 Mar 2022 11:00) (15 - 27)  SpO2: 98% (20 Mar 2022 11:00) (98% - 100%)      22 @ 07:01  -  22 @ 07:00  --------------------------------------------------------  IN: 280.8 mL / OUT: 1705 mL / NET: -1424.2 mL    22 @ 07:01  -  22 @ 11:27  --------------------------------------------------------  IN: 106.5 mL / OUT: 0 mL / NET: 106.5 mL        PHYSICAL EXAM:    General: NAD  HEENT: MMM  Neck: supple  Cardiovascular: +S1/S2; RRR  Respiratory: CTA B/L; no W/R/R  Gastrointestinal: soft, NT/ND  Extremities: WWP; no edema, clubbing or cyanosis  Vascular: 2+ radial, DP/PT pulses B/L  Neurological: AAOx3; no focal deficits    MEDICATIONS:  MEDICATIONS  (STANDING):  artificial tears (preservative free) Ophthalmic Solution 1 Drop(s) Both EYES two times a day  atorvastatin 80 milliGRAM(s) Oral at bedtime  cyanocobalamin 1000 MICROGram(s) Oral every 24 hours  DOXOrubicin INTRA-ARTERIAL (eMAR) 50 milliGRAM(s) IntraArterial once  ferrous    sulfate 325 milliGRAM(s) Oral <User Schedule>  folic acid 1 milliGRAM(s) Oral daily  lactulose Syrup 30 Gram(s) Oral three times a day  meropenem  IVPB 1000 milliGRAM(s) IV Intermittent every 8 hours  multivitamin 1 Tablet(s) Oral daily  nicotine - 21 mG/24Hr(s) Patch 1 patch Transdermal daily  pantoprazole Infusion 8 mG/Hr (10 mL/Hr) IV Continuous <Continuous>  phenylephrine    Infusion 0.1 MICROgram(s)/kG/Min (2.55 mL/Hr) IV Continuous <Continuous>  rifAXIMin 550 milliGRAM(s) Oral every 12 hours  tamsulosin 0.4 milliGRAM(s) Oral at bedtime    MEDICATIONS  (PRN):  acetaminophen     Tablet .. 650 milliGRAM(s) Oral every 6 hours PRN Temp greater or equal to 38C (100.4F), Mild Pain (1 - 3)  sodium chloride 0.65% Nasal 1 Spray(s) Both Nostrils four times a day PRN Nasal Congestion      ALLERGIES:  Allergies    No Known Allergies    Intolerances        LABS:                        8.3    15.22 )-----------( 120      ( 20 Mar 2022 06:10 )             26.2     03-20    140  |  114<H>  |  16  ----------------------------<  103<H>  4.4   |  19<L>  |  0.81    Ca    7.7<L>      20 Mar 2022 06:10  Phos  3.3     03-20  Mg     2.0     03-20    TPro  6.1  /  Alb  2.2<L>  /  TBili  1.2  /  DBili  x   /  AST  51<H>  /  ALT  35  /  AlkPhos  81  03-20    PT/INR - ( 20 Mar 2022 06:10 )   PT: 17.6 sec;   INR: 1.47          PTT - ( 20 Mar 2022 06:10 )  PTT:32.6 sec  Urinalysis Basic - ( 19 Mar 2022 07:20 )    Color: Yellow / Appearance: Clear / S.015 / pH: x  Gluc: x / Ketone: NEGATIVE  / Bili: Negative / Urobili: 0.2 E.U./dL   Blood: x / Protein: NEGATIVE mg/dL / Nitrite: NEGATIVE   Leuk Esterase: Trace / RBC: x / WBC 5-10 /HPF   Sq Epi: x / Non Sq Epi: x / Bacteria: Present /HPF      CAPILLARY BLOOD GLUCOSE      POCT Blood Glucose.: 131 mg/dL (19 Mar 2022 07:51)      RADIOLOGY & ADDITIONAL TESTS: Reviewed. CC: Patient is a 66y old  Male who presents with a chief complaint of Chest Pain (20 Mar 2022 08:26)      INTERVAL EVENTS: patient stepped up to MICU     SUBJECTIVE / INTERVAL HPI: Patient seen and examined at bedside. patient lethargic this AM opens eyes to name but then shuts them, even after sternal rub     ROS: negative unless otherwise stated above.    VITAL SIGNS:  Vital Signs Last 24 Hrs  T(C): 37.6 (20 Mar 2022 09:37), Max: 38.5 (19 Mar 2022 23:28)  T(F): 99.6 (20 Mar 2022 09:37), Max: 101.3 (19 Mar 2022 23:28)  HR: 79 (20 Mar 2022 11:00) (76 - 106)  BP: 82/49 (20 Mar 2022 11:00) (65/41 - 139/58)  BP(mean): 61 (20 Mar 2022 11:00) (49 - 85)  RR: 27 (20 Mar 2022 11:00) (15 - 27)  SpO2: 98% (20 Mar 2022 11:00) (98% - 100%)      22 @ 07:  -  22 @ 07:00  --------------------------------------------------------  IN: 280.8 mL / OUT: 1705 mL / NET: -1424.2 mL    22 @ 07:01  -  22 @ 11:27  --------------------------------------------------------  IN: 106.5 mL / OUT: 0 mL / NET: 106.5 mL        PHYSICAL EXAM:    General: NAD, letheragic   HEENT: MMM  Neck: supple  Cardiovascular: +S1/S2; RRR  Respiratory: CTA B/L; no W/R/R  Gastrointestinal: soft, NT/ND  Extremities: WWP; no edema, clubbing or cyanosis  Vascular: 2+ radial, DP/PT pulses B/L  Neurological: AAOx1,  does not engage past opening eyes to name     MEDICATIONS:  MEDICATIONS  (STANDING):  artificial tears (preservative free) Ophthalmic Solution 1 Drop(s) Both EYES two times a day  atorvastatin 80 milliGRAM(s) Oral at bedtime  cyanocobalamin 1000 MICROGram(s) Oral every 24 hours  DOXOrubicin INTRA-ARTERIAL (eMAR) 50 milliGRAM(s) IntraArterial once  ferrous    sulfate 325 milliGRAM(s) Oral <User Schedule>  folic acid 1 milliGRAM(s) Oral daily  lactulose Syrup 30 Gram(s) Oral three times a day  meropenem  IVPB 1000 milliGRAM(s) IV Intermittent every 8 hours  multivitamin 1 Tablet(s) Oral daily  nicotine - 21 mG/24Hr(s) Patch 1 patch Transdermal daily  pantoprazole Infusion 8 mG/Hr (10 mL/Hr) IV Continuous <Continuous>  phenylephrine    Infusion 0.1 MICROgram(s)/kG/Min (2.55 mL/Hr) IV Continuous <Continuous>  rifAXIMin 550 milliGRAM(s) Oral every 12 hours  tamsulosin 0.4 milliGRAM(s) Oral at bedtime    MEDICATIONS  (PRN):  acetaminophen     Tablet .. 650 milliGRAM(s) Oral every 6 hours PRN Temp greater or equal to 38C (100.4F), Mild Pain (1 - 3)  sodium chloride 0.65% Nasal 1 Spray(s) Both Nostrils four times a day PRN Nasal Congestion      ALLERGIES:  Allergies    No Known Allergies    Intolerances        LABS:                        8.3    15.22 )-----------( 120      ( 20 Mar 2022 06:10 )             26.2     03-20    140  |  114<H>  |  16  ----------------------------<  103<H>  4.4   |  19<L>  |  0.81    Ca    7.7<L>      20 Mar 2022 06:10  Phos  3.3     03-20  Mg     2.0     03-20    TPro  6.1  /  Alb  2.2<L>  /  TBili  1.2  /  DBili  x   /  AST  51<H>  /  ALT  35  /  AlkPhos  81  03-20    PT/INR - ( 20 Mar 2022 06:10 )   PT: 17.6 sec;   INR: 1.47          PTT - ( 20 Mar 2022 06:10 )  PTT:32.6 sec  Urinalysis Basic - ( 19 Mar 2022 07:20 )    Color: Yellow / Appearance: Clear / S.015 / pH: x  Gluc: x / Ketone: NEGATIVE  / Bili: Negative / Urobili: 0.2 E.U./dL   Blood: x / Protein: NEGATIVE mg/dL / Nitrite: NEGATIVE   Leuk Esterase: Trace / RBC: x / WBC 5-10 /HPF   Sq Epi: x / Non Sq Epi: x / Bacteria: Present /HPF      CAPILLARY BLOOD GLUCOSE      POCT Blood Glucose.: 131 mg/dL (19 Mar 2022 07:51)      RADIOLOGY & ADDITIONAL TESTS: Reviewed. CC: Patient is a 66y old  Male who presents with a chief complaint of Chest Pain     HOSPITAL COURSE:  65 YO M, current smoker (1ppd x 50 years), current every day ETOH abuse (1 pint vodka and several beers daily x 50 years), with PMHx of HLD, HTN (not on any medications), CABG (LIMA to LAD, SVG to PDA in 2017), AS (s/p AVR in 2017) with recently discovered restenosis, presented to [facility] ED on 22 with chest pain and episode of LOC, initially admitted to cardiology for NSTEMI with LHC showing nonobstructive disease. TTE was reperformed and patient was  found to have restenosis of bioprosthetic valve for which aortic root surgery was planned.  Patient was transferred to CT surgery given complex anatomy with plan for TAVR and aortic root surgery.  During this time on CT surgery service he was found to have liver cirrhosis and a 2.4cmx2.4 cm mass on his liver concerning for HCC. Patient was transferred to medicine service and rifaxamin and lactulose were started for hepatic encephalopathy.  Patient received intermittent blood transfusion, however with no leslie blood loss aside from minor nose bleeds. Patient was not a surgical candidate and IR was consulted who planned for embolization.   However patient was found to be tachycardic to 120s with new leukocytosis. On 3/14, patient went for planned transarterial chemoembolization by IR for further evaluation for liver lesion but around start of procedure became obtunded with increased secretions and concern that he was not protecting his airway. Following Procedure patient became hypotensive to 80s/50s tachycardic to 120s, was minimally arousable to sternal rub and gurgling with c/f airway protection. ICU consulted for evaluation of urgent vs. emergent intubation. Course c/b anemia likely attributed to intermittent epistaxis for which ENT was consulted w/o plans for acute intervention. In the MICU started on Ceftriaxone 2 gram for URTI, that was the switched to Ertapenem 1 gram daily due to Klebsiella ESBL. Started on vancomycin for possible HA, that was soon discontinued as MRSA swab was negative on 3/14. Patient was d/c of NE and started on Phenylephrine due to underlying AS. AC restarted on 3/15, decision verified with IR. Patient extubated on 3/16. Patient receiving nutrition by NGT placed on the 3/16 and pending S&S eval. Patient stepped up overnight due to hypotension to systolic 60s, requiring phenylephrine and monitoring for need for intubation. The patients pressures have stabilized with proper fluid restoration and the Phen is being weaned down... NG tube was placed today and confirmed by radiology.       INTERVAL EVENTS: patient stepped up to MICU     SUBJECTIVE / INTERVAL HPI: Patient seen and examined at bedside. patient lethargic this AM opens eyes to name but then shuts them, even after sternal rub     ROS: negative unless otherwise stated above.    VITAL SIGNS:  Vital Signs Last 24 Hrs  T(C): 37.6 (20 Mar 2022 09:37), Max: 38.5 (19 Mar 2022 23:28)  T(F): 99.6 (20 Mar 2022 09:37), Max: 101.3 (19 Mar 2022 23:28)  HR: 79 (20 Mar 2022 11:00) (76 - 106)  BP: 82/49 (20 Mar 2022 11:00) (65/41 - 139/58)  BP(mean): 61 (20 Mar 2022 11:00) (49 - 85)  RR: 27 (20 Mar 2022 11:00) (15 - 27)  SpO2: 98% (20 Mar 2022 11:00) (98% - 100%)      22 @ 07:  -  22 @ 07:00  --------------------------------------------------------  IN: 280.8 mL / OUT: 1705 mL / NET: -1424.2 mL    22 @ 07:01  -  22 @ 11:27  --------------------------------------------------------  IN: 106.5 mL / OUT: 0 mL / NET: 106.5 mL        PHYSICAL EXAM:    General: NAD, letheragic   HEENT: MMM  Neck: supple  Cardiovascular: +S1/S2; RRR  Respiratory: CTA B/L; no W/R/R  Gastrointestinal: soft, NT/ND  Extremities: WWP; no edema, clubbing or cyanosis  Vascular: 2+ radial, DP/PT pulses B/L  Neurological: AAOx1,  does not engage past opening eyes to name     MEDICATIONS:  MEDICATIONS  (STANDING):  artificial tears (preservative free) Ophthalmic Solution 1 Drop(s) Both EYES two times a day  atorvastatin 80 milliGRAM(s) Oral at bedtime  cyanocobalamin 1000 MICROGram(s) Oral every 24 hours  DOXOrubicin INTRA-ARTERIAL (eMAR) 50 milliGRAM(s) IntraArterial once  ferrous    sulfate 325 milliGRAM(s) Oral <User Schedule>  folic acid 1 milliGRAM(s) Oral daily  lactulose Syrup 30 Gram(s) Oral three times a day  meropenem  IVPB 1000 milliGRAM(s) IV Intermittent every 8 hours  multivitamin 1 Tablet(s) Oral daily  nicotine - 21 mG/24Hr(s) Patch 1 patch Transdermal daily  pantoprazole Infusion 8 mG/Hr (10 mL/Hr) IV Continuous <Continuous>  phenylephrine    Infusion 0.1 MICROgram(s)/kG/Min (2.55 mL/Hr) IV Continuous <Continuous>  rifAXIMin 550 milliGRAM(s) Oral every 12 hours  tamsulosin 0.4 milliGRAM(s) Oral at bedtime    MEDICATIONS  (PRN):  acetaminophen     Tablet .. 650 milliGRAM(s) Oral every 6 hours PRN Temp greater or equal to 38C (100.4F), Mild Pain (1 - 3)  sodium chloride 0.65% Nasal 1 Spray(s) Both Nostrils four times a day PRN Nasal Congestion      ALLERGIES:  Allergies    No Known Allergies    Intolerances        LABS:                        8.3    15.22 )-----------( 120      ( 20 Mar 2022 06:10 )             26.2     03-20    140  |  114<H>  |  16  ----------------------------<  103<H>  4.4   |  19<L>  |  0.81    Ca    7.7<L>      20 Mar 2022 06:10  Phos  3.3     03-20  Mg     2.0     03-20    TPro  6.1  /  Alb  2.2<L>  /  TBili  1.2  /  DBili  x   /  AST  51<H>  /  ALT  35  /  AlkPhos  81  03-20    PT/INR - ( 20 Mar 2022 06:10 )   PT: 17.6 sec;   INR: 1.47          PTT - ( 20 Mar 2022 06:10 )  PTT:32.6 sec  Urinalysis Basic - ( 19 Mar 2022 07:20 )    Color: Yellow / Appearance: Clear / S.015 / pH: x  Gluc: x / Ketone: NEGATIVE  / Bili: Negative / Urobili: 0.2 E.U./dL   Blood: x / Protein: NEGATIVE mg/dL / Nitrite: NEGATIVE   Leuk Esterase: Trace / RBC: x / WBC 5-10 /HPF   Sq Epi: x / Non Sq Epi: x / Bacteria: Present /HPF      CAPILLARY BLOOD GLUCOSE      POCT Blood Glucose.: 131 mg/dL (19 Mar 2022 07:51)      RADIOLOGY & ADDITIONAL TESTS: Reviewed.

## 2022-03-20 NOTE — PROGRESS NOTE ADULT - ATTENDING COMMENTS
seen and evaluated w MICU team  66 M  cirrhosis  hepatic encephalopathy  critical AS  HCC  sepsis  tferred to MICU w hypotension, worsening lethargy  P  Abx  phenylephrine--avoid high volume crystalloid  ABG noted--Mental status somewhat improved , althoughj still lethargic.  Will continue observation--intubate if status worsens

## 2022-03-20 NOTE — PROCEDURE NOTE - NSINDICATIONS_GEN_A_CORE
emergency venous access
emergency venous access
antibiotic therapy
emergency venous access
arterial puncture to obtain ABG's

## 2022-03-20 NOTE — PROGRESS NOTE ADULT - PROBLEM SELECTOR PLAN 4
Likely secondary to hemolysis due to prosthetic valve, also w/ dark stool w/ concern for GIB. Has never been scoped Minimal epistaxis that does not explain low hgb. Could factor in to shock.  - c/w b12 1mg Qd  - c/w folate 1mg qd  - Transfusion goal > 7  - GI following  - PPI infusion started

## 2022-03-20 NOTE — PROGRESS NOTE ADULT - PROBLEM SELECTOR PLAN 12
F: none  E: replenish as appropriate  N: DASH    VTE Prophylaxis: hold for anemia  GI: not needed  C: Full Code  D: step up to MICU

## 2022-03-20 NOTE — PROCEDURE NOTE - NSINFORMCONSENT_GEN_A_CORE
wife/Benefits, risks, and possible complications of procedure explained to patient/caregiver who verbalized understanding and gave verbal consent.
Benefits, risks, and possible complications of procedure explained to patient/caregiver who verbalized understanding and gave verbal consent.
This was an emergent procedure.

## 2022-03-20 NOTE — PROCEDURE NOTE - NSPROCNAME_GEN_A_CORE
Peripheral Line Insertion
Interventional Radiology
Arterial Puncture/Cannulation
Peripheral Line Insertion

## 2022-03-20 NOTE — PROGRESS NOTE ADULT - SUBJECTIVE AND OBJECTIVE BOX
[facility]    Hospital course: 65 YO M, current smoker (1ppd x 50 years), current every day ETOH abuse (1 pint vodka and several beers daily x 50 years), with PMHx of HLD, HTN (not on any medications), CABG (LIMA to LAD, SVG to PDA in 2017), AS (s/p AVR in 2017) with recently discovered restenosis, presented to [facility] ED on 22 with chest pain and episode of LOC, initially admitted to cardiology for NSTEMI and  found to have restenosis of bioprosthetic valve for which aortic root surgery was planned.  Patient was transferred to CT surgery given complex anatomy with plan for TAVR and aortic root surgery.  During this time on CT surgery service he was found to have liver cirrhosis and a 2.4cmx2.4 cm mass on his liver concerning for HCC.  Decision was made not to proceed with surgery given poor functional status, cirrhosis and non compliance with medications.  Course c/b by AMS w suspicion for hepatic encephalopathy and started on rifaxamin and lactulose. Course further c/b hemolytic anemia due to his prosthetic valve. Imaging obtained for poor mental status, MRI brain obtained showing mild chronic microvascular ischemic disease and brain parenchymal volume loss, advanced for patient's age likely 2/2 alcohol abuse. Neurology was consulted and VEEG showing moderate diffuse nonspecific cerebral dysfunction. Pt then had leukocytosis and sinus tach found to have UTI, started on CTX. Hematology and surgical oncology was consulted as imaging of abdomen confirmed HCC and received IR chemo embolization. Following Procedure patient became hypotensive to 80s/50s tachycardic to 120s, was minimally arousable to sternal rub and gurgling with c/f airway protection and stepped up to MICU. Urine cx grew ESBL klebsiella and broadened to meropenem. Due to concern for aspiration pna (due to mental status) pt briefly on vancomycin but discontinued, MRSA swab negative. He was stepped down to [facility] where his course c/b anemia 2/2 GI bleed with melanotic stools, with appropriate response in hgb to transfusion. Further c/b worsening mental status, fevers, and broadened to meropenem for pseudomonal coverage. Pt became hypotensive and more lethargic and stepped back up to MICU with need for pressors.    INTERVAL HPI/OVERNIGHT EVENTS: Pt became hypotensive w worsened mental status (see chart note).    SUBJECTIVE: Patient seen and examined at bedside. could not asses due to pt's mental status.    OBJECTIVE:    VITAL SIGNS:  ICU Vital Signs Last 24 Hrs  T(C): 37.3 (20 Mar 2022 00:40), Max: 38.6 (19 Mar 2022 05:52)  T(F): 99.2 (20 Mar 2022 00:40), Max: 101.4 (19 Mar 2022 05:52)  HR: 77 (20 Mar 2022 02:28) (77 - 122)  BP: 71/48 (20 Mar 2022 01:46) (65/41 - 113/59)  BP(mean): 56 (20 Mar 2022 01:46) (49 - 80)  ABP: --  ABP(mean): --  RR: 18 (20 Mar 2022 02:28) (18 - 20)  SpO2: 98% (20 Mar 2022 02:28) (97% - 100%)        18 @ 07:  -  -19 @ 07:00  --------------------------------------------------------  IN: 0 mL / OUT: 2575 mL / NET: -2575 mL     @ 07:01  -  03-20 @ 02:39  --------------------------------------------------------  IN: 20 mL / OUT: 1455 mL / NET: -1435 mL      CAPILLARY BLOOD GLUCOSE      POCT Blood Glucose.: 131 mg/dL (19 Mar 2022 07:51)      PHYSICAL EXAM:    General: Pt obtunded  HEENT: mild scleral icterus  Neck: supple, no JVD  Respiratory: CTA b/l, on HFNC, no increased work  of breathing  Cardiovascular: +S1/S2; RRR  Abdomen: soft, distended w reducible umbilical hernia  Extremities: LE cool to mid shin, no edema  Skin: normal color and turgor; no rash  Neurological: obtunded, responds to physical stimuli    MEDICATIONS:  MEDICATIONS  (STANDING):  artificial tears (preservative free) Ophthalmic Solution 1 Drop(s) Both EYES two times a day  atorvastatin 80 milliGRAM(s) Oral at bedtime  cyanocobalamin 1000 MICROGram(s) Oral every 24 hours  DOXOrubicin INTRA-ARTERIAL (eMAR) 50 milliGRAM(s) IntraArterial once  ferrous    sulfate 325 milliGRAM(s) Oral <User Schedule>  folic acid 1 milliGRAM(s) Oral daily  lactulose Syrup 30 Gram(s) Oral three times a day  meropenem  IVPB 1000 milliGRAM(s) IV Intermittent every 8 hours  multivitamin 1 Tablet(s) Oral daily  nicotine - 21 mG/24Hr(s) Patch 1 patch Transdermal daily  pantoprazole Infusion 8 mG/Hr (10 mL/Hr) IV Continuous <Continuous>  phenylephrine    Infusion 0.1 MICROgram(s)/kG/Min (2.55 mL/Hr) IV Continuous <Continuous>  rifAXIMin 550 milliGRAM(s) Oral every 12 hours  tamsulosin 0.4 milliGRAM(s) Oral at bedtime    MEDICATIONS  (PRN):  acetaminophen     Tablet .. 650 milliGRAM(s) Oral every 6 hours PRN Temp greater or equal to 38C (100.4F), Mild Pain (1 - 3)  sodium chloride 0.65% Nasal 1 Spray(s) Both Nostrils four times a day PRN Nasal Congestion      ALLERGIES:  Allergies    No Known Allergies    Intolerances        LABS:                        7.4    12.09 )-----------( 96       ( 20 Mar 2022 02:03 )             23.0     03-20    136  |  113<H>  |  17  ----------------------------<  109<H>  3.9   |  17<L>  |  0.68    Ca    7.4<L>      20 Mar 2022 02:03  Phos  3.0     03-20  Mg     1.8     03-20    TPro  5.7<L>  /  Alb  2.0<L>  /  TBili  0.9  /  DBili  x   /  AST  46<H>  /  ALT  31  /  AlkPhos  68  03-20    PT/INR - ( 20 Mar 2022 02:03 )   PT: 19.3 sec;   INR: 1.61          PTT - ( 20 Mar 2022 02:03 )  PTT:33.6 sec  Urinalysis Basic - ( 19 Mar 2022 07:20 )    Color: Yellow / Appearance: Clear / S.015 / pH: x  Gluc: x / Ketone: NEGATIVE  / Bili: Negative / Urobili: 0.2 E.U./dL   Blood: x / Protein: NEGATIVE mg/dL / Nitrite: NEGATIVE   Leuk Esterase: Trace / RBC: x / WBC 5-10 /HPF   Sq Epi: x / Non Sq Epi: x / Bacteria: Present /HPF        RADIOLOGY & ADDITIONAL TESTS: Reviewed.

## 2022-03-20 NOTE — PROGRESS NOTE ADULT - ASSESSMENT
65 YO M, current smoker (1ppd x 50 years), current every day ETOH abuse (1 pint vodka and several beers daily x 50 years), with PMHx of HLD, HTN (not on any medications), CABG (LIMA to LAD, SVG to PDA in 2017), AS (s/p AVR in 2017) with recently discovered restenosis, presented to [facility] ED on 2/24/22 with chest pain and episode of LOC, initially admitted to cardiology for NSTEMI with [facility] with nonobstructive disease, then found to have restenosis of bioprosthetic valve for which aortic root surgery was planned, but now deferred given liver lesion c/f HCC. Patient transferred to medicine for further w/u of HCC and management of AMS, which is still of unclear etiology. During IR TACE, 3/14, patient noted to be obtunded with difficulty protecting airway, so intubated and stepped up to ICU, extubated on 3/16. Now extubated, on treatment for ESBL Klebsiella UTI and stepped down to med tele. Now w/ continued sepsis and anemia 2/2 hemolysis and possible GIB.

## 2022-03-20 NOTE — PROGRESS NOTE ADULT - PROBLEM SELECTOR PLAN 6
CTAP with 2.4 hypervascular liver mass. i/s/o Liver cirrhosis and EtOH use disorder. triple phase CT confirms HCC  - AFP is 7.2 which is normal  - MRI abdomen: 2.5 cm left hepatic lesion consistent with LI-RADS v 2018 Category: LR-5 Definitely HCC. OPTN Category (if LR-5): 5B. No locally invasive or metastatic disease. Left hepatic artery variant. No additional suspicious hepatic lesions.   - s/p IR transarterial chemical embolization since he is not a candidate for ablation.  - Palliative care following.

## 2022-03-20 NOTE — CHART NOTE - NSCHARTNOTEFT_GEN_A_CORE
Around 12am informed by RN that patient hypotensive to 80s/50s. Went to examine patient at bedside, appears lethargic. Repeat BPs in 60s/40s with MAPs in 50s. POCUS demonstrates hyperdynamic LV. STAT labs drawn. Given 500cc bolus x1 with pressure bag. Broadened to meropenem.    Spoke with wife, confirmed that patient is full code, wants pressors. Answered all other questions. Patient transferred to ICU

## 2022-03-20 NOTE — PROCEDURE NOTE - NSPOSTCAREGUIDE_GEN_A_CORE
Verbal/written post procedure instructions were given to patient/caregiver/Instructed patient/caregiver to follow-up with primary care physician/Instructed patient/caregiver regarding signs and symptoms of infection/Keep the cast/splint/dressing clean and dry/Care for catheter as per unit/ICU protocols
Verbal/written post procedure instructions were given to patient/caregiver/Instructed patient/caregiver regarding signs and symptoms of infection/Keep the cast/splint/dressing clean and dry/Care for catheter as per unit/ICU protocols
Verbal/written post procedure instructions were given to patient/caregiver/Instructed patient/caregiver to follow-up with primary care physician/Instructed patient/caregiver regarding signs and symptoms of infection/Keep the cast/splint/dressing clean and dry/Care for catheter as per unit/ICU protocols
Verbal/written post procedure instructions were given to patient/caregiver/Instructed patient/caregiver to follow-up with primary care physician/Instructed patient/caregiver regarding signs and symptoms of infection/Keep the cast/splint/dressing clean and dry/Care for catheter as per unit/ICU protocols
Verbal/written post procedure instructions were given to patient/caregiver/Instructed patient/caregiver regarding signs and symptoms of infection/Keep the cast/splint/dressing clean and dry

## 2022-03-20 NOTE — PROGRESS NOTE ADULT - PROBLEM SELECTOR PLAN 9
S/p CABG with Dr Wilcox in 2017  - recent [facility] with nonobstructive disease  - c/w Lipitor 80mg QD  - ASA held due to anemia

## 2022-03-20 NOTE — PROGRESS NOTE ADULT - SUBJECTIVE AND OBJECTIVE BOX
[facility] TO MICU TRANSFER ACCEPTANCE NOTE  HOSPITAL COURSE:  67 YO M, current smoker (1ppd x 50 years), current every day ETOH abuse (1 pint vodka and several beers daily x 50 years), with PMHx of HLD, HTN (not on any medications), CABG (LIMA to LAD, SVG to PDA in 2017), AS (s/p AVR in 2017) with recently discovered restenosis, presented to [facility] ED on 22 with chest pain and episode of LOC, initially admitted to cardiology for NSTEMI with LHC showing nonobstructive disease. TTE was reperformed and patient was  found to have restenosis of bioprosthetic valve for which aortic root surgery was planned.  Patient was transferred to CT surgery given complex anatomy with plan for TAVR and aortic root surgery.  During this time on CT surgery service he was found to have liver cirrhosis and a 2.4cmx2.4 cm mass on his liver concerning for HCC. Patient was transferred to medicine service and rifaxamin and lactulose were started for hepatic encephalopathy.  Patient received intermittent blood transfusion, however with no leslie blood loss aside from minor nose bleeds. Patient was not a surgical candidate and IR was consulted who planned for embolization.   However patient was found to be tachycardic to 120s with new leukocytosis. On 3/14, patient went for planned transarterial chemoembolization by IR for further evaluation for liver lesion but around start of procedure became obtunded with increased secretions and concern that he was not protecting his airway. Following Procedure patient became hypotensive to 80s/50s tachycardic to 120s, was minimally arousable to sternal rub and gurgling with c/f airway protection. ICU consulted for evaluation of urgent vs. emergent intubation. Course c/b anemia likely attributed to intermittent epistaxis for which ENT was consulted w/o plans for acute intervention. In the MICU started on Ceftriaxone 2 gram for URTI, that was the switched to Ertapenem 1 gram daily due to Klebsiella ESBL. Started on vancomycin for possible HA, that was soon discontinued as MRSA swab was negative on 3/14. Patient was d/c of NE and started on Phenylephrine due to underlying AS. AC restarted on 3/15, decision verified with IR. Patient extubated on 3/16. Patient receiving nutrition by NGT placed on the 3/16 and pending S&S eval. Patient stepped up overnight due to hypotension to systolic 60s, requiring phenylephrine and monitoring for need for intubation.      INTERVAL HPI/OVERNIGHT EVENTS: Patient seen and examined at bedside. He appears lethargic and repeat bedside BPs in the 60s/40s. Given 500cc NS. Since patient had fever overnight, broadened abx to meropenem. Started on phenylephrine i/s/o severe AS for pressure support.    VITAL SIGNS:  T(F): 99.2 (22 @ 00:40)  HR: 83 (22 @ 02:30)  BP: 117/61 (22 @ 02:30)  RR: 17 (22 @ 02:30)  SpO2: 99% (22 @ 02:30)  Wt(kg): --    PHYSICAL EXAM:  GENERAL: Lethargic, with increased work of breathing  HEENT: NC/AT.   CV: RRR. +S1/S2.  LUNGS: Clear to auscultation b/l.   ABDOMEN: Soft, nontender, nondistended. +BS      MEDICATIONS  (STANDING):  artificial tears (preservative free) Ophthalmic Solution 1 Drop(s) Both EYES two times a day  atorvastatin 80 milliGRAM(s) Oral at bedtime  cyanocobalamin 1000 MICROGram(s) Oral every 24 hours  DOXOrubicin INTRA-ARTERIAL (eMAR) 50 milliGRAM(s) IntraArterial once  ferrous    sulfate 325 milliGRAM(s) Oral <User Schedule>  folic acid 1 milliGRAM(s) Oral daily  lactulose Syrup 30 Gram(s) Oral three times a day  meropenem  IVPB 1000 milliGRAM(s) IV Intermittent every 8 hours  multivitamin 1 Tablet(s) Oral daily  nicotine - 21 mG/24Hr(s) Patch 1 patch Transdermal daily  pantoprazole Infusion 8 mG/Hr (10 mL/Hr) IV Continuous <Continuous>  phenylephrine    Infusion 0.1 MICROgram(s)/kG/Min (2.55 mL/Hr) IV Continuous <Continuous>  rifAXIMin 550 milliGRAM(s) Oral every 12 hours  tamsulosin 0.4 milliGRAM(s) Oral at bedtime    MEDICATIONS  (PRN):  acetaminophen     Tablet .. 650 milliGRAM(s) Oral every 6 hours PRN Temp greater or equal to 38C (100.4F), Mild Pain (1 - 3)  sodium chloride 0.65% Nasal 1 Spray(s) Both Nostrils four times a day PRN Nasal Congestion      Allergies    No Known Allergies    Intolerances        LABS:                        7.4    12.09 )-----------( 96       ( 20 Mar 2022 02:03 )             23.0     03-20    136  |  113<H>  |  17  ----------------------------<  109<H>  3.9   |  17<L>  |  0.68    Ca    7.4<L>      20 Mar 2022 02:03  Phos  3.0     03-20  Mg     1.8     03-20    TPro  5.7<L>  /  Alb  2.0<L>  /  TBili  0.9  /  DBili  x   /  AST  46<H>  /  ALT  31  /  AlkPhos  68  03-20    PT/INR - ( 20 Mar 2022 02:03 )   PT: 19.3 sec;   INR: 1.61          PTT - ( 20 Mar 2022 02:03 )  PTT:33.6 sec  Urinalysis Basic - ( 19 Mar 2022 07:20 )    Color: Yellow / Appearance: Clear / S.015 / pH: x  Gluc: x / Ketone: NEGATIVE  / Bili: Negative / Urobili: 0.2 E.U./dL   Blood: x / Protein: NEGATIVE mg/dL / Nitrite: NEGATIVE   Leuk Esterase: Trace / RBC: x / WBC 5-10 /HPF   Sq Epi: x / Non Sq Epi: x / Bacteria: Present /HPF        RADIOLOGY & ADDITIONAL TESTS: Reviewed

## 2022-03-20 NOTE — PROGRESS NOTE ADULT - PROBLEM SELECTOR PLAN 5
AAOx1 today. s/p unwitnessed fall 3/18, CT head/cervical no acute findings. Unlikely hepatic encephalopathy. Neuro exam nonfocal. Possibly component of Wernicke's or depression i/s/o HCC diagnosis. As per wife, pt is not normally lethargic at home. Completed course of high dose thiamine, last librium dose on 03/01. AMS likely metabolic component in setting of sepsis. Now worsening in setting of shock.  - VEEG: Mild-to-moderate generalized slowing suggestive of a similar degree of diffuse or multifocal cerebral dysfunction. No epileptiform discharges or electrographic seizures were recorded.   - c/w hepatic encephalopathy treatment: Rifaximin and lactulose, rectal tube in place  - management of infx as above

## 2022-03-20 NOTE — PROGRESS NOTE ADULT - ASSESSMENT
67 YO M, current smoker (1ppd x 50 years), current every day ETOH abuse (1 pint vodka and several beers daily x 50 years), with PMHx of HLD, HTN (not on any medications), CABG (LIMA to LAD, SVG to PDA in 2017), AS (s/p AVR in 2017) with recently discovered restenosis, presented to [facility] ED on 2/24/22 with chest pain and episode of LOC, initially admitted to cardiology for NSTEMI with C showing nonobstructive disease. Patient was found to have restenosis of bioprosthetic valve, however not surgical candidate. Patient transferred to medicine for further management of HCC, hepatic encephalopathy. After IR TACE of HCC on 3/14, patient intubated for airway protection and transferred to MICU. Patient now on phenylephrine for pressor support.    NEURO  #AMS (altered mental status)  AAOx2 (person and place). currently aox1. Unlikely hepatic encephalopathy as no asterixis and is currently compensated. Neuro exam nonfocal. Possibly related to chronic alcohol use, Wernicke's or depression i/s/o HCC diagnosis. As per wife, pt is not normally lethargic at home. Completed course of high dose thiamine, last librium dose on 03/01. CTH and MRI w/op acute abnormality. During the night of 3/13-3/14 flow sheets showing 100.1 Temp and pt becoming tachycardic to 120s. Pt not complaining of any localizing signs of infection, CXR unremarkable however UA was positive. UCX sent and pt started on CTX 1g Q24hrs (did not receive first dose as taken to TACE procedure). Post TACE procedure c/f protection of airway given increased somnolence potentially 2/2 anesthesia. Pt was intubated for airway protection and transferred to MICU. now on high flow but still lethargic   - VEEG: Mild-to-moderate generalized slowing suggestive of a similar degree of diffuse or multifocal cerebral dysfunction. No epileptiform discharges or electrographic seizures were recorded.   - Neuro following  - Infectious disease as below   - Monitor for pneumonia, potential aspiration event given mental status.  - c/w hepatic encephalopathy treatment: Rifaximin and lactulose, rectal tube in place   - place NG tube     #History of alcohol use  Long standing EtOH consumption. Not showing any signs of withdrawal at the moment. Last drink prior to admission which is almost 2 weeks ago. Pt was given 3 days of librium Q8hrs while on CT surgery however did not display signs of withdrawal per chart review  - c/w Multivitamin and Folic Acid, and B12.    CARDIO  #Shock  Septic vs hypovolemic. Patient with drop in BPs to systolic 60s overnight, s/p 500cc NS bolus. MAP remained in mid 50s. Requiring initiation of phenylephrine i/s/o shock and hx of severe AS. Lactate negative  - wean phenylephrine as tolerated  - patient volume down on POCUS - giving 500 cc bolus LR over 1. hours will repocus     #Severe AS (aortic stenosis)  AS i/s/o prior TAVR in 2017 with Dr Wilcox. JOSELO with peak transvalvular velocity of 4.7, mean gradient 56. symptomatic: exertional CP, episode of syncope  - initially planned for valve in valve TAVR, but then it was decided that because of aortic root anatomy, he would need aortic root open heart surgery  - he was deemed not to be a surgical candidate given poor functional status, liver cirrhosis, prior medication noncompliance which likely contributed to valve restenosis. his recovery after open heart surgically would be extremely difficult if possible. He would NOT be a surgical candidate       #CAD (coronary artery disease)  S/p CABG with Dr Wilcox in 2017  - recent [facility] with nonobstructive disease  - c/w Lipitor 80mg QD  - c/w ASA 81mg.    #HTN (hypertension)  Holding anti-HTN meds given hypotension    PULM   #Concern for PNA  Prior to arrival to the MICU on 3/14 there were concerns for aspiration PNA.   - CXR post intubation on 3/14: no acute infiltrates  - Intubated 3/14, Extubated on 3/16  - s/p ceftriaxone 2g q24 hrs and also Ertapenem 1 gram daily   - c/w meropenem  - continue to monitor respiratory status    GI  #ALC (alcoholic liver cirrhosis)  2/2 long standing EtOH abuse. Likely compensated.. Likely no hepatic encephalopathy. No known EV bleeding. NO ascites however now with signs of new infection started on antibiotics today.  - no known Varices, but no prior EGD evaluation, he will need it outpatient, although has significant risk factors.  will place NG tube    #HCC (hepatocellular carcinoma)  CTAP with 2.4 hypervascular liver mass. i/s/o Liver cirrhosis and EtOH use disorder. triple phase CT confirms HCC  - AFP is 7.2 which is normal  - MRI abdomen: 2.5 cm left hepatic lesion consistent with LI-RADS v 2018 Category: LR-5 Definitely HCC. OPTN Category (if LR-5): 5B. No locally invasive or metastatic disease. Left hepatic artery variant. No additional suspicious hepatic lesions.   - s/p IR transarterial chemical embolization since he is not a candidate for ablation  - Surg/onc following NTD  - Palliative care following.    RENAL  #no active issues    ID  #Sepsis  Source likely UTI vs. ? aspiration pna.  UA positive.  - UCx 3/14: Klebsiella ESBL  - BCx 3/14-19: NGTD  - Sputum Cx 3/15: moderate Proteus Mirabilis, few to moderate Klebsiella pneumonia  - previously on CTX 2g q24h, switch to Zosyn. Had been started on ertapenem  - c/w meropenem      #UTI  Patient with positive UA (WBC, bacteria LE) on 3/14.   - abx as below     ENDO  #No active problem    HEME/ONC  #Normocytic hypochromic anemia  Likely secondary to hemolysis due to prosthetic valve. Minimal epistaxis.  - Completed IV iron X 3 days  - c/w b12 1mg Qd  - c/w folate 1mg qd  - Transfusion goal > 7  - heme/onc, GI, IR following.    Prophylactic measure  F: as appropriate, caution due to AS  E: replenish as appropriate  N: NGT started on feeds   VTE PPx: heparin TID sub  GI: none  C: Full Code  D: MICU

## 2022-03-20 NOTE — PROGRESS NOTE ADULT - PROBLEM SELECTOR PLAN 2
Sepsis 2/2 UTI. Patient with positive UA (WBC, bacteria LE) on 3/14. Growing kleb ESBL. W/ fever, tachycardia, hypotension 3/19, received 500cc fluids and 1 unit prbc. Now w worsening mental status and hypotension w shock  - see plan above

## 2022-03-20 NOTE — CHART NOTE - NSCHARTNOTEFT_GEN_A_CORE
Infectious Diseases Anti-infective Approval Note    Medication:  Meropenem  Dose:  1 g  Route:  IV   Frequency:  q8h    Duration**:  3 d    Dose may be adjusted as needed for alterations in renal function.    *THIS IS NOT AN INFECTIOUS DISEASES CONSULTATION*    **Indicates duration of approval, not necessarily duration of treatment

## 2022-03-20 NOTE — PROGRESS NOTE ADULT - PROBLEM SELECTOR PLAN 8
2/2 long standing EtOH abuse. Likely compensated. Likely no hepatic encephalopathy. No known EV bleeding. NO ascites  - GI following  - no known Varices, but no prior EGD evaluation, he will need it outpatient, although has significant risk factors  - now has dark stools and anemia <7  - dvt ppx held in setting of anemia and hypotension

## 2022-03-20 NOTE — PROGRESS NOTE ADULT - ATTENDING COMMENTS
ETOH cirrhosis complicated by HCC s/p chemoembolization on 3/14, intubated for obtunded status, extubated on 3/16.  H/o CAD, HF, AS s/p AVR in 2017 with noted restenosis.  UTI: Continue treatment for  for ESBL Klebsiella   Shock likely multifactorial: volume loss, dehydration, sepsis  Anemia due to hemolysis and possible GI related blood loss, Hg stable, Continue protonix BID  Volume depleted: end-systolic effacement and virtual IVC noted in setting of hypotension, will carefully volume replete, boluses of LR and titrate off pressors.  Overall poor prognosis.

## 2022-03-20 NOTE — PROGRESS NOTE ADULT - ASSESSMENT
67 YO M, current smoker (1ppd x 50 years), current every day ETOH abuse (1 pint vodka and several beers daily x 50 years), with PMHx of HLD, HTN (not on any medications), CABG (LIMA to LAD, SVG to PDA in 2017), AS (s/p AVR in 2017) with recently discovered restenosis, presented to [facility] ED on 2/24/22 with chest pain and episode of LOC, initially admitted to cardiology for NSTEMI with C showing nonobstructive disease. Patient was found to have restenosis of bioprosthetic valve, however not surgical candidate. Patient transferred to medicine for further management of HCC, hepatic encephalopathy. After IR TACE of HCC on 3/14, patient intubated for airway protection and transferred to MICU. Patient now on phenylephrine for pressor support.    NEURO  #AMS (altered mental status)  AAOx2 (person and place). Unlikely hepatic encephalopathy as no asterixis and is currently compensated. Neuro exam nonfocal. Possibly related to chronic alcohol use, Wernicke's or depression i/s/o HCC diagnosis. As per wife, pt is not normally lethargic at home. Completed course of high dose thiamine, last librium dose on 03/01. CTH and MRI w/op acute abnormality. During the night of 3/13-3/14 flow sheets showing 100.1 Temp and pt becoming tachycardic to 120s. Pt not complaining of any localizing signs of infection, CXR unremarkable however UA was positive. UCX sent and pt started on CTX 1g Q24hrs (did not receive first dose as taken to TACE procedure). Post TACE procedure c/f protection of airway given increased somnolence potentially 2/2 anesthesia. Pt was intubated for airway protection and transferred to MICU  - VEEG: Mild-to-moderate generalized slowing suggestive of a similar degree of diffuse or multifocal cerebral dysfunction. No epileptiform discharges or electrographic seizures were recorded.   - Neuro following  - Infectious disease as below   - Monitor for pneumonia, potential aspiration event given mental status.  - c/w hepatic encephalopathy treatment: Rifaximin and lactulose, rectal tube in place     #History of alcohol use  Long standing EtOH consumption. Not showing any signs of withdrawal at the moment. Last drink prior to admission which is almost 2 weeks ago. Pt was given 3 days of librium Q8hrs while on CT surgery however did not display signs of withdrawal per chart review  - c/w Multivitamin and Folic Acid, and B12.    CARDIO  #Shock  Septic vs hypovolemic. Patient with drop in BPs to systolic 60s overnight, s/p 500cc NS bolus. MAP remained in mid 50s. Requiring initiation of phenylephrine i/s/o shock and hx of severe AS. Lactate negative  - wean phenylephrine as tolerated  - patient is preload dependent    #Severe AS (aortic stenosis)  AS i/s/o prior TAVR in 2017 with Dr Wilcox. JOSELO with peak transvalvular velocity of 4.7, mean gradient 56. symptomatic: exertional CP, episode of syncope  - initially planned for valve in valve TAVR, but then it was decided that because of aortic root anatomy, he would need aortic root open heart surgery  - he was deemed not to be a surgical candidate given poor functional status, liver cirrhosis, prior medication noncompliance which likely contributed to valve restenosis. his recovery after open heart surgically would be extremely difficult if possible. He would NOT be a surgical candidate       #CAD (coronary artery disease)  S/p CABG with Dr Wilcox in 2017  - recent [facility] with nonobstructive disease  - c/w Lipitor 80mg QD  - c/w ASA 81mg.    #HTN (hypertension)  Holding anti-HTN meds given hypotension    PULM   #Concern for PNA  Prior to arrival to the MICU on 3/14 there were concerns for aspiration PNA.   - CXR post intubation on 3/14: no acute infiltrates  - Intubated 3/14, Extubated on 3/16  - s/p ceftriaxone 2g q24 hrs and also Ertapenem 1 gram daily   - now broadened to meropenem  - continue to monitor respiratory status    GI  #ALC (alcoholic liver cirrhosis)  2/2 long standing EtOH abuse. Likely compensated.. Likely no hepatic encephalopathy. No known EV bleeding. NO ascites however now with signs of new infection started on antibiotics today.  - no known Varices, but no prior EGD evaluation, he will need it outpatient, although has significant risk factors.    #HCC (hepatocellular carcinoma)  CTAP with 2.4 hypervascular liver mass. i/s/o Liver cirrhosis and EtOH use disorder. triple phase CT confirms HCC  - AFP is 7.2 which is normal  - MRI abdomen: 2.5 cm left hepatic lesion consistent with LI-RADS v 2018 Category: LR-5 Definitely HCC. OPTN Category (if LR-5): 5B. No locally invasive or metastatic disease. Left hepatic artery variant. No additional suspicious hepatic lesions.   - s/p IR transarterial chemical embolization since he is not a candidate for ablation  - Surg/onc following NTD  - Palliative care following.    RENAL  #no active issues    ID  #Sepsis  Source likely UTI vs. ? aspiration pna.  UA positive.  - UCx 3/14: Klebsiella ESBL  - BCx 3/14-19: NGTD  - Sputum Cx 3/15: moderate Proteus Mirabilis, few to moderate Klebsiella pneumonia  - previously on CTX 2g q24h, switch to Zosyn. Had been started on ertapenem  - broadened to meropenem now given worsening hypotension     #UTI  Patient with positive UA (WBC, bacteria LE) on 3/14.   - abx as below     ENDO  #No active problem    HEME/ONC  #Normocytic hypochromic anemia  Likely secondary to hemolysis due to prosthetic valve. Minimal epistaxis.  - Completed IV iron X 3 days  - c/w b12 1mg Qd  - c/w folate 1mg qd  - Transfusion goal > 7  - heme/onc, GI, IR following.    Prophylactic measure  F: as appropriate, caution due to AS  E: replenish as appropriate  N: NGT started on feeds   VTE PPx: heparin TID sub  GI: none  C: Full Code  D: MICU

## 2022-03-20 NOTE — PROGRESS NOTE ADULT - PROBLEM SELECTOR PLAN 7
AS i/s/o prior TAVR in 2017 with Dr Wilcox. JOSELO with peak transvalvular velocity of 4.7, mean gradient 56. symptomatic: exertional CP, episode of syncope  - initially planned for valve in valve TAVR, but then it was decided that because of aortic root anatomy, he would need aortic root open heart surgery  - he was deemed not to be a surgical candidate given poor functional status, liver cirrhosis, prior medication noncompliance which likely contributed to valve restenosis. his recovery after open heart surgically would be extremely difficult if possible. He would NOT be a surgical candidate   - After receiving fluids yesterday got pulmonary edema, now with shock  - treat infection as above being careful w fluids  - f/u cards

## 2022-03-21 LAB
ANION GAP SERPL CALC-SCNC: 11 MMOL/L — SIGNIFICANT CHANGE UP (ref 5–17)
APTT BLD: 31.8 SEC — SIGNIFICANT CHANGE UP (ref 27.5–35.5)
BASOPHILS # BLD AUTO: 0.01 K/UL — SIGNIFICANT CHANGE UP (ref 0–0.2)
BASOPHILS NFR BLD AUTO: 0.1 % — SIGNIFICANT CHANGE UP (ref 0–2)
BUN SERPL-MCNC: 14 MG/DL — SIGNIFICANT CHANGE UP (ref 7–23)
CALCIUM SERPL-MCNC: 7.6 MG/DL — LOW (ref 8.4–10.5)
CHLORIDE SERPL-SCNC: 112 MMOL/L — HIGH (ref 96–108)
CO2 SERPL-SCNC: 17 MMOL/L — LOW (ref 22–31)
CREAT SERPL-MCNC: 0.73 MG/DL — SIGNIFICANT CHANGE UP (ref 0.5–1.3)
EGFR: 100 ML/MIN/1.73M2 — SIGNIFICANT CHANGE UP
EOSINOPHIL # BLD AUTO: 0.25 K/UL — SIGNIFICANT CHANGE UP (ref 0–0.5)
EOSINOPHIL NFR BLD AUTO: 3.2 % — SIGNIFICANT CHANGE UP (ref 0–6)
GLUCOSE SERPL-MCNC: 104 MG/DL — HIGH (ref 70–99)
GRAM STN FLD: SIGNIFICANT CHANGE UP
HCT VFR BLD CALC: 26.4 % — LOW (ref 39–50)
HGB BLD-MCNC: 8.3 G/DL — LOW (ref 13–17)
IMM GRANULOCYTES NFR BLD AUTO: 0.4 % — SIGNIFICANT CHANGE UP (ref 0–1.5)
INR BLD: 1.53 — HIGH (ref 0.88–1.16)
LYMPHOCYTES # BLD AUTO: 1.15 K/UL — SIGNIFICANT CHANGE UP (ref 1–3.3)
LYMPHOCYTES # BLD AUTO: 14.7 % — SIGNIFICANT CHANGE UP (ref 13–44)
MAGNESIUM SERPL-MCNC: 1.6 MG/DL — SIGNIFICANT CHANGE UP (ref 1.6–2.6)
MCHC RBC-ENTMCNC: 30 PG — SIGNIFICANT CHANGE UP (ref 27–34)
MCHC RBC-ENTMCNC: 31.4 GM/DL — LOW (ref 32–36)
MCV RBC AUTO: 95.3 FL — SIGNIFICANT CHANGE UP (ref 80–100)
MONOCYTES # BLD AUTO: 0.78 K/UL — SIGNIFICANT CHANGE UP (ref 0–0.9)
MONOCYTES NFR BLD AUTO: 9.9 % — SIGNIFICANT CHANGE UP (ref 2–14)
NEUTROPHILS # BLD AUTO: 5.62 K/UL — SIGNIFICANT CHANGE UP (ref 1.8–7.4)
NEUTROPHILS NFR BLD AUTO: 71.7 % — SIGNIFICANT CHANGE UP (ref 43–77)
NRBC # BLD: 0 /100 WBCS — SIGNIFICANT CHANGE UP (ref 0–0)
PHOSPHATE SERPL-MCNC: 2.5 MG/DL — SIGNIFICANT CHANGE UP (ref 2.5–4.5)
PLATELET # BLD AUTO: 84 K/UL — LOW (ref 150–400)
POTASSIUM SERPL-MCNC: 3.8 MMOL/L — SIGNIFICANT CHANGE UP (ref 3.5–5.3)
POTASSIUM SERPL-SCNC: 3.8 MMOL/L — SIGNIFICANT CHANGE UP (ref 3.5–5.3)
PROTHROM AB SERPL-ACNC: 18.3 SEC — HIGH (ref 10.5–13.4)
RBC # BLD: 2.77 M/UL — LOW (ref 4.2–5.8)
RBC # FLD: 20.5 % — HIGH (ref 10.3–14.5)
SARS-COV-2 RNA SPEC QL NAA+PROBE: SIGNIFICANT CHANGE UP
SODIUM SERPL-SCNC: 140 MMOL/L — SIGNIFICANT CHANGE UP (ref 135–145)
WBC # BLD: 7.84 K/UL — SIGNIFICANT CHANGE UP (ref 3.8–10.5)
WBC # FLD AUTO: 7.84 K/UL — SIGNIFICANT CHANGE UP (ref 3.8–10.5)

## 2022-03-21 PROCEDURE — 99233 SBSQ HOSP IP/OBS HIGH 50: CPT

## 2022-03-21 PROCEDURE — 71045 X-RAY EXAM CHEST 1 VIEW: CPT | Mod: 26

## 2022-03-21 PROCEDURE — 99233 SBSQ HOSP IP/OBS HIGH 50: CPT | Mod: GC

## 2022-03-21 PROCEDURE — 99232 SBSQ HOSP IP/OBS MODERATE 35: CPT | Mod: GC

## 2022-03-21 PROCEDURE — 99232 SBSQ HOSP IP/OBS MODERATE 35: CPT

## 2022-03-21 RX ORDER — CHLORHEXIDINE GLUCONATE 213 G/1000ML
1 SOLUTION TOPICAL
Refills: 0 | Status: DISCONTINUED | OUTPATIENT
Start: 2022-03-21 | End: 2022-03-25

## 2022-03-21 RX ORDER — PANTOPRAZOLE SODIUM 20 MG/1
40 TABLET, DELAYED RELEASE ORAL EVERY 12 HOURS
Refills: 0 | Status: DISCONTINUED | OUTPATIENT
Start: 2022-03-21 | End: 2022-03-23

## 2022-03-21 RX ORDER — POTASSIUM PHOSPHATE, MONOBASIC POTASSIUM PHOSPHATE, DIBASIC 236; 224 MG/ML; MG/ML
15 INJECTION, SOLUTION INTRAVENOUS ONCE
Refills: 0 | Status: COMPLETED | OUTPATIENT
Start: 2022-03-21 | End: 2022-03-21

## 2022-03-21 RX ORDER — IOHEXOL 300 MG/ML
30 INJECTION, SOLUTION INTRAVENOUS ONCE
Refills: 0 | Status: COMPLETED | OUTPATIENT
Start: 2022-03-21 | End: 2022-03-21

## 2022-03-21 RX ORDER — MAGNESIUM SULFATE 500 MG/ML
2 VIAL (ML) INJECTION
Refills: 0 | Status: COMPLETED | OUTPATIENT
Start: 2022-03-21 | End: 2022-03-21

## 2022-03-21 RX ADMIN — Medication 1 DROP(S): at 21:22

## 2022-03-21 RX ADMIN — Medication 1 PATCH: at 21:22

## 2022-03-21 RX ADMIN — PANTOPRAZOLE SODIUM 40 MILLIGRAM(S): 20 TABLET, DELAYED RELEASE ORAL at 17:52

## 2022-03-21 RX ADMIN — Medication 25 GRAM(S): at 17:51

## 2022-03-21 RX ADMIN — MEROPENEM 100 MILLIGRAM(S): 1 INJECTION INTRAVENOUS at 21:21

## 2022-03-21 RX ADMIN — IOHEXOL 30 MILLILITER(S): 300 INJECTION, SOLUTION INTRAVENOUS at 20:16

## 2022-03-21 RX ADMIN — Medication 1 PATCH: at 17:50

## 2022-03-21 RX ADMIN — MEROPENEM 100 MILLIGRAM(S): 1 INJECTION INTRAVENOUS at 06:18

## 2022-03-21 RX ADMIN — LACTULOSE 30 GRAM(S): 10 SOLUTION ORAL at 21:23

## 2022-03-21 RX ADMIN — POTASSIUM PHOSPHATE, MONOBASIC POTASSIUM PHOSPHATE, DIBASIC 62.5 MILLIMOLE(S): 236; 224 INJECTION, SOLUTION INTRAVENOUS at 15:12

## 2022-03-21 RX ADMIN — MEROPENEM 100 MILLIGRAM(S): 1 INJECTION INTRAVENOUS at 14:26

## 2022-03-21 RX ADMIN — PANTOPRAZOLE SODIUM 40 MILLIGRAM(S): 20 TABLET, DELAYED RELEASE ORAL at 07:42

## 2022-03-21 RX ADMIN — TAMSULOSIN HYDROCHLORIDE 0.4 MILLIGRAM(S): 0.4 CAPSULE ORAL at 21:22

## 2022-03-21 RX ADMIN — Medication 1 PATCH: at 21:23

## 2022-03-21 RX ADMIN — Medication 25 GRAM(S): at 14:26

## 2022-03-21 RX ADMIN — Medication 1 PATCH: at 05:57

## 2022-03-21 RX ADMIN — ATORVASTATIN CALCIUM 80 MILLIGRAM(S): 80 TABLET, FILM COATED ORAL at 21:22

## 2022-03-21 NOTE — CONSULT NOTE ADULT - ASSESSMENT
67 YO M, current smoker (1ppd x 50 years), current every day ETOH abuse (1 pint vodka and several beers daily x 50 years), with PMHx of HLD, HTN (not on any medications), CABG (LIMA to LAD, SVG to PDA in 2017), AS (s/p AVR in 2017) with recently discovered restenosis, presented to [facility] ED on 2/24/22 with chest pain and episode of LOC, initially admitted to cardiology for NSTEMI with [facility] with nonobstructive disease, then found to have restenosis of bioprosthetic valve for which aortic root surgery was planned, but now deferred given new diagnosis of cirrhosis and HCC. Course c/b AMS 2/2 likely hepatic encephalopathy and metabolic encephalopathy. Course further c/b septic shock 2/2 ESBL Klebsiella UTI.   - Pt without localizing symptoms or radiographic evidence of pneumonia at this time, however can not rule out   - Sputum cultures growing ESBL Klebsiella and Proteus mirabilis with few WBCs  - 3/19 pt went into septic shock while on ertapenem with BCx positive for Klebsiella. Improved with meropenem    Recommendations:  - Initially thought that pt's source for sepsis was UTI earlier on admission. However, should have been treated with ertapenem per susceptibilities. Sputum cultures with only few WBCs and no radiographic evidence or symptoms c/w pneumonia raises concern for airway colonization. Other sources could be endocarditis (although rare with gram negative bacteremia), SBP, prostatitis, or other intraabdominal infection.    - please obtain TTE  - please obtain CT chest without contrast, CT AP with/without contrast and with oral contrast  - recommend paracentesis to assess for SBP  - f/u BCx susceptibilities  - f/u surveillance BCx  - agree with OMFS consult to assess for oral lesions  - continue meropenem 1g q8 hours (3/20-) for now     ID team 1 will sign off, please reconsult with any questions    Note not finalized until attested by attending

## 2022-03-21 NOTE — CONSULT NOTE ADULT - CONSULT REASON
Liver Ca r/o
HCC
ST depressions
airway protection
AMS
Dental evaluation
ESBL UTI
severe aortic valve stenosis (s/p AVR 2017)
Epistaxis
HCC
Hepatic Encephalopathy. Request for transfer to medicine
Complex Medical Decision Making/GOC in the setting of Advanced Illness
Hepatic lesion
Hepatocellular carcinoma
assess bacteremia source

## 2022-03-21 NOTE — PROGRESS NOTE ADULT - ASSESSMENT
65 YO M, current smoker (1ppd x 50 years), current every day ETOH abuse (1 pint vodka and several beers daily x 50 years), with PMHx of HLD, HTN (not on any medications), CABG (LIMA to LAD, SVG to PDA in 2017), AS (s/p AVR in 2017) with recently discovered restenosis, presented to [facility] ED on 2/24/22 with chest pain and episode of LOC, initially admitted to cardiology for NSTEMI with C showing nonobstructive disease. Patient was found to have restenosis of bioprosthetic valve, however not surgical candidate. Patient transferred to medicine for further management of HCC, hepatic encephalopathy. After IR TACE of HCC on 3/14, patient intubated for airway protection and transferred to MICU. Patient now on phenylephrine for pressor support.    NEURO  #AMS (altered mental status)  AAOx2 (person and place). currently aox1. Unlikely hepatic encephalopathy as no asterixis and is currently compensated. Neuro exam nonfocal. Possibly related to chronic alcohol use, Wernicke's or depression i/s/o HCC diagnosis. As per wife, pt is not normally lethargic at home. Completed course of high dose thiamine, last librium dose on 03/01. CTH and MRI w/op acute abnormality. During the night of 3/13-3/14 flow sheets showing 100.1 Temp and pt becoming tachycardic to 120s. Pt not complaining of any localizing signs of infection, CXR unremarkable however UA was positive. UCX sent and pt started on CTX 1g Q24hrs (did not receive first dose as taken to TACE procedure). Post TACE procedure c/f protection of airway given increased somnolence potentially 2/2 anesthesia. Pt was intubated for airway protection and transferred to MICU. now on high flow but still lethargic   - VEEG: Mild-to-moderate generalized slowing suggestive of a similar degree of diffuse or multifocal cerebral dysfunction. No epileptiform discharges or electrographic seizures were recorded.   - Neuro following  - Infectious disease as below   - Monitor for pneumonia, potential aspiration event given mental status.  - c/w hepatic encephalopathy treatment: Rifaximin and lactulose, rectal tube in place   - place NG tube     #History of alcohol use  Long standing EtOH consumption. Not showing any signs of withdrawal at the moment. Last drink prior to admission which is almost 2 weeks ago. Pt was given 3 days of librium Q8hrs while on CT surgery however did not display signs of withdrawal per chart review  - c/w Multivitamin and Folic Acid, and B12.    CARDIO  #Shock  Septic vs hypovolemic. Patient with drop in BPs to systolic 60s overnight, s/p 500cc NS bolus. MAP remained in mid 50s. Requiring initiation of phenylephrine i/s/o shock and hx of severe AS. Lactate negative  - wean phenylephrine as tolerated  - patient volume down on POCUS - giving 500 cc bolus LR over 1. hours will repocus     #Severe AS (aortic stenosis)  AS i/s/o prior TAVR in 2017 with Dr Wilcox. JOSELO with peak transvalvular velocity of 4.7, mean gradient 56. symptomatic: exertional CP, episode of syncope  - initially planned for valve in valve TAVR, but then it was decided that because of aortic root anatomy, he would need aortic root open heart surgery  - he was deemed not to be a surgical candidate given poor functional status, liver cirrhosis, prior medication noncompliance which likely contributed to valve restenosis. his recovery after open heart surgically would be extremely difficult if possible. He would NOT be a surgical candidate       #CAD (coronary artery disease)  S/p CABG with Dr Wilcox in 2017  - recent [facility] with nonobstructive disease  - c/w Lipitor 80mg QD  - c/w ASA 81mg.    #HTN (hypertension)  Holding anti-HTN meds given hypotension    PULM   #Concern for PNA  Prior to arrival to the MICU on 3/14 there were concerns for aspiration PNA.   - CXR post intubation on 3/14: no acute infiltrates  - Intubated 3/14, Extubated on 3/16  - s/p ceftriaxone 2g q24 hrs and also Ertapenem 1 gram daily   - c/w meropenem  - continue to monitor respiratory status    GI  #ALC (alcoholic liver cirrhosis)  2/2 long standing EtOH abuse. Likely compensated.. Likely no hepatic encephalopathy. No known EV bleeding. NO ascites however now with signs of new infection started on antibiotics today.  - no known Varices, but no prior EGD evaluation, he will need it outpatient, although has significant risk factors.  will place NG tube    #HCC (hepatocellular carcinoma)  CTAP with 2.4 hypervascular liver mass. i/s/o Liver cirrhosis and EtOH use disorder. triple phase CT confirms HCC  - AFP is 7.2 which is normal  - MRI abdomen: 2.5 cm left hepatic lesion consistent with LI-RADS v 2018 Category: LR-5 Definitely HCC. OPTN Category (if LR-5): 5B. No locally invasive or metastatic disease. Left hepatic artery variant. No additional suspicious hepatic lesions.   - s/p IR transarterial chemical embolization since he is not a candidate for ablation  - Surg/onc following NTD  - Palliative care following.    RENAL  #no active issues    ID  #Sepsis  Source likely UTI vs. ? aspiration pna.  UA positive.  - UCx 3/14: Klebsiella ESBL  - BCx 3/14-19: NGTD  - Sputum Cx 3/15: moderate Proteus Mirabilis, few to moderate Klebsiella pneumonia  - previously on CTX 2g q24h, switch to Zosyn. Had been started on ertapenem  - c/w meropenem      #UTI  Patient with positive UA (WBC, bacteria LE) on 3/14.   - abx as below     ENDO  #No active problem    HEME/ONC  #Normocytic hypochromic anemia  Likely secondary to hemolysis due to prosthetic valve. Minimal epistaxis.  - Completed IV iron X 3 days  - c/w b12 1mg Qd  - c/w folate 1mg qd  - Transfusion goal > 7  - heme/onc, GI, IR following.    Prophylactic measure  F: as appropriate, caution due to AS  E: replenish as appropriate  N: NGT started on feeds   VTE PPx: heparin TID sub  GI: none  C: Full Code  D: MICU   65 YO M, current smoker (1ppd x 50 years), current every day ETOH abuse (1 pint vodka and several beers daily x 50 years), with PMHx of HLD, HTN (not on any medications), CABG (LIMA to LAD, SVG to PDA in 2017), AS (s/p AVR in 2017) with recently discovered restenosis, presented to [facility] ED on 2/24/22 with chest pain and episode of LOC, initially admitted to cardiology for NSTEMI with LHC showing nonobstructive disease. Patient was found to have restenosis of bioprosthetic valve, however not surgical candidate. Patient transferred to medicine for further management of HCC, hepatic encephalopathy. After IR TACE of HCC on 3/14, patient intubated for airway protection and transferred to MICU. Patient s/p phenylephrine.     NEURO  #AMS (altered mental status)  Unlikely hepatic encephalopathy as no asterixis and is currently compensated. Neuro exam nonfocal. Possibly related to chronic alcohol use, Wernicke's or depression i/s/o HCC diagnosis. As per wife, pt is not normally lethargic at home. Completed course of high dose thiamine, last librium dose on 03/01. CTH and MRI w/op acute abnormality. During the night of 3/13-3/14 flow sheets showing 100.1 Temp and pt becoming tachycardic to 120s. Pt not complaining of any localizing signs of infection, CXR unremarkable however UA was positive. UCX sent and pt started on CTX 1g Q24hrs (did not receive first dose as taken to TACE procedure). Post TACE procedure c/f protection of airway given increased somnolence potentially 2/2 anesthesia. At that time patient intubated for airway protection and transferred to MICU, s/p extubation.   - VEEG: Mild-to-moderate generalized slowing suggestive of a similar degree of diffuse or multifocal cerebral dysfunction. No epileptiform discharges or electrographic seizures were recorded.   - Neuro following  - Infectious disease as below   - c/w hepatic encephalopathy treatment: Rifaximin and lactulose, rectal tube in place     #History of alcohol use  Long standing EtOH consumption. Not showing any signs of withdrawal at the moment. Last drink prior to admission which is almost 2 weeks ago. Pt was given 3 days of librium Q8hrs while on CT surgery however did not display signs of withdrawal per chart review  - c/w Multivitamin and Folic Acid    CARDIO  #Shock-Resolved  Septic vs hypovolemic. Patient with drop in BPs to systolic 60s overnight, s/p 500cc NS bolus. MAP remained in mid 50s. Requiring initiation of phenylephrine i/s/o shock and hx of severe AS.   - s/p NE    #Severe AS (aortic stenosis)  AS i/s/o prior TAVR in 2017 with Dr Wilcox. JOSELO with peak transvalvular velocity of 4.7, mean gradient 56. symptomatic: exertional CP, episode of syncope  - initially planned for valve in valve TAVR, but then it was decided that because of aortic root anatomy, he would need aortic root open heart surgery  - he was deemed not to be a surgical candidate given poor functional status, liver cirrhosis, prior medication noncompliance which likely contributed to valve restenosis. his recovery after open heart surgically would be extremely difficult if possible. He would NOT be a surgical candidate       #CAD (coronary artery disease)  S/p CABG with Dr Wilcox in 2017  - recent [facility] with nonobstructive disease  - c/w Lipitor 80mg QD  - holding ASA 81mg.    #HTN (hypertension)  Holding anti-HTN meds, s/p pressors  - monitor VS     PULM   #Concern for PNA  Prior to arrival to the MICU on 3/14 there were concerns for aspiration PNA.   - CXR post intubation on 3/14: no acute infiltrates  - Intubated 3/14, Extubated on 3/16  - s/p ceftriaxone 2g q24 hrs and also Ertapenem 1 gram daily   - continue to monitor respiratory status  - abx as below    GI  #ALC (alcoholic liver cirrhosis)  2/2 long standing EtOH abuse. Likely compensated.. Likely no hepatic encephalopathy. No known EV bleeding. NO ascites however now with signs of new infection started on antibiotics today.  - no known Varices, but no prior EGD evaluation, he will need it outpatient, although has significant risk factors.    #HCC (hepatocellular carcinoma)  CTAP with 2.4 hypervascular liver mass. i/s/o Liver cirrhosis and EtOH use disorder. triple phase CT confirms HCC  - AFP is 7.2 which is normal  - MRI abdomen: 2.5 cm left hepatic lesion consistent with LI-RADS v 2018 Category: LR-5 Definitely HCC. OPTN Category (if LR-5): 5B. No locally invasive or metastatic disease. Left hepatic artery variant. No additional suspicious hepatic lesions.   - s/p IR transarterial chemical embolization since he is not a candidate for ablation  - Surg/onc following NTD  - Palliative care following.    RENAL  #no active issues    ID  #Sepsis  Source likely UTI vs. ? aspiration pna.  - UCx 3/14: Klebsiella ESBL and Proteus mirabilis  - BCx 3/14-19: NGTD  - Sputum Cx 3/15: Proteus Mirabilis, Klebsiella pneumonia ESBL  - UA 3/19: + small LE, bacteria and WBC  - BCx 3/19: Klebsiella pneumonia  - BCx 3/20: NGTD  - s/p ceftriaxone 2g q24 hrs and also Ertapenem 1 gram daily   - c/w meropenem 1 gram q8 hrs      #UTI  Patient with positive UA (WBC, bacteria, LE) on 3/14.   - abx as above    ENDO  #No active problem    HEME/ONC  #Normocytic hypochromic anemia  Likely secondary to hemolysis due to prosthetic valve. Minimal epistaxis.  - Completed IV iron X 3 days  - c/w b12 1mg Qd  - c/w folate 1mg qd  - c/w ferrous sulfate 325 mg daily  PO  - Transfusion goal > 7    Prophylactic measure  F: as appropriate, caution due to AS  E: replenish as appropriate  N: NGT started on feeds   VTE PPx: heparin TID sub  GI: Protonix  C: Full Code  D: MICU

## 2022-03-21 NOTE — PROGRESS NOTE ADULT - SUBJECTIVE AND OBJECTIVE BOX
INTERVAL EVENTS:      MEDICATIONS  (STANDING):  artificial tears (preservative free) Ophthalmic Solution 1 Drop(s) Both EYES two times a day  atorvastatin 80 milliGRAM(s) Oral at bedtime  chlorhexidine 2% Cloths 1 Application(s) Topical <User Schedule>  cyanocobalamin 1000 MICROGram(s) Oral every 24 hours  DOXOrubicin INTRA-ARTERIAL (eMAR) 50 milliGRAM(s) IntraArterial once  ferrous    sulfate 325 milliGRAM(s) Oral <User Schedule>  folic acid 1 milliGRAM(s) Oral daily  lactulose Syrup 30 Gram(s) Oral three times a day  meropenem  IVPB 1000 milliGRAM(s) IV Intermittent every 8 hours  multivitamin 1 Tablet(s) Oral daily  nicotine - 21 mG/24Hr(s) Patch 1 patch Transdermal daily  pantoprazole  Injectable 40 milliGRAM(s) IV Push every 12 hours  phenylephrine    Infusion 0.1 MICROgram(s)/kG/Min (2.55 mL/Hr) IV Continuous <Continuous>  rifAXIMin 550 milliGRAM(s) Oral every 12 hours  tamsulosin 0.4 milliGRAM(s) Oral at bedtime    MEDICATIONS  (PRN):  acetaminophen     Tablet .. 650 milliGRAM(s) Oral every 6 hours PRN Temp greater or equal to 38C (100.4F), Mild Pain (1 - 3)  sodium chloride 0.65% Nasal 1 Spray(s) Both Nostrils four times a day PRN Nasal Congestion      Home Medications:  folic acid 1 mg oral tablet: 1 tab(s) orally once a day (04 Mar 2022 11:39)  Multiple Vitamins oral tablet: 1 tab(s) orally once a day (04 Mar 2022 11:39)      Vital Signs Last 24 Hrs  T(C): 36.7 (21 Mar 2022 11:00), Max: 37.1 (20 Mar 2022 21:31)  T(F): 98.1 (21 Mar 2022 11:00), Max: 98.7 (20 Mar 2022 21:31)  HR: 84 (21 Mar 2022 13:11) (70 - 100)  BP: 96/54 (21 Mar 2022 11:00) (76/51 - 118/68)  BP(mean): 70 (21 Mar 2022 11:00) (59 - 88)  RR: 19 (21 Mar 2022 13:11) (16 - 36)  SpO2: 99% (21 Mar 2022 13:11) (95% - 100%)     PHYSICAL EXAM:  NAD  +JVD  RRR no m/r/g  Decreased breath sounds at bases  No LE edema      LABS:                        8.3    7.84  )-----------( 84       ( 21 Mar 2022 05:52 )             26.4     03-21    140  |  112<H>  |  14  ----------------------------<  104<H>  3.8   |  17<L>  |  0.73    Ca    7.6<L>      21 Mar 2022 05:52  Phos  2.5     03-21  Mg     1.6     03-21    TPro  6.1  /  Alb  2.2<L>  /  TBili  1.2  /  DBili  x   /  AST  51<H>  /  ALT  35  /  AlkPhos  81  03-20        PT/INR - ( 21 Mar 2022 05:52 )   PT: 18.3 sec;   INR: 1.53          PTT - ( 21 Mar 2022 05:52 )  PTT:31.8 sec    I&O's Summary    20 Mar 2022 07:01  -  21 Mar 2022 07:00  --------------------------------------------------------  IN: 1695.5 mL / OUT: 1515 mL / NET: 180.5 mL    21 Mar 2022 07:01  -  21 Mar 2022 13:46  --------------------------------------------------------  IN: 0 mL / OUT: 235 mL / NET: -235 mL      A/P: 66M CAD s/p CABG, severe bioprosthetic AS (not amenable to any intervention), newly diagnosed EtOH cirrhosis c/b HCC s/p TACE, anemia, ESBL UTI who cardiology has been consulted for worsening tachycardia and ST depressions.    #ST- depressions: V2-V6, I, II, and KRISTIN in aVR. Present on previous ECGs, but worse with increased HR  - Encephalopathic (likely 2/2 cirrhosis), no chest pain  - likely rate related/demand in setting of anemia and tachycardia.   - had coronary angiogram <1 month ago and LIMA and SVG grafts were widely patent  - Pt warm on exam, no cardiogenic shock at this time  - c/w lipitor 80 mg qd  - can start ASA 81 mg qd if able to tolerate from anemia standpoint    ***incomplete note  Hector Colvin   INTERVAL EVENTS:      MEDICATIONS  (STANDING):  artificial tears (preservative free) Ophthalmic Solution 1 Drop(s) Both EYES two times a day  atorvastatin 80 milliGRAM(s) Oral at bedtime  chlorhexidine 2% Cloths 1 Application(s) Topical <User Schedule>  cyanocobalamin 1000 MICROGram(s) Oral every 24 hours  DOXOrubicin INTRA-ARTERIAL (eMAR) 50 milliGRAM(s) IntraArterial once  ferrous    sulfate 325 milliGRAM(s) Oral <User Schedule>  folic acid 1 milliGRAM(s) Oral daily  lactulose Syrup 30 Gram(s) Oral three times a day  meropenem  IVPB 1000 milliGRAM(s) IV Intermittent every 8 hours  multivitamin 1 Tablet(s) Oral daily  nicotine - 21 mG/24Hr(s) Patch 1 patch Transdermal daily  pantoprazole  Injectable 40 milliGRAM(s) IV Push every 12 hours  phenylephrine    Infusion 0.1 MICROgram(s)/kG/Min (2.55 mL/Hr) IV Continuous <Continuous>  rifAXIMin 550 milliGRAM(s) Oral every 12 hours  tamsulosin 0.4 milliGRAM(s) Oral at bedtime    MEDICATIONS  (PRN):  acetaminophen     Tablet .. 650 milliGRAM(s) Oral every 6 hours PRN Temp greater or equal to 38C (100.4F), Mild Pain (1 - 3)  sodium chloride 0.65% Nasal 1 Spray(s) Both Nostrils four times a day PRN Nasal Congestion      Home Medications:  folic acid 1 mg oral tablet: 1 tab(s) orally once a day (04 Mar 2022 11:39)  Multiple Vitamins oral tablet: 1 tab(s) orally once a day (04 Mar 2022 11:39)      Vital Signs Last 24 Hrs  T(C): 36.7 (21 Mar 2022 11:00), Max: 37.1 (20 Mar 2022 21:31)  T(F): 98.1 (21 Mar 2022 11:00), Max: 98.7 (20 Mar 2022 21:31)  HR: 84 (21 Mar 2022 13:11) (70 - 100)  BP: 96/54 (21 Mar 2022 11:00) (76/51 - 118/68)  BP(mean): 70 (21 Mar 2022 11:00) (59 - 88)  RR: 19 (21 Mar 2022 13:11) (16 - 36)  SpO2: 99% (21 Mar 2022 13:11) (95% - 100%)     PHYSICAL EXAM:  NAD  +JVD  RRR no m/r/g  Decreased breath sounds at bases  No LE edema      LABS:                        8.3    7.84  )-----------( 84       ( 21 Mar 2022 05:52 )             26.4     03-21    140  |  112<H>  |  14  ----------------------------<  104<H>  3.8   |  17<L>  |  0.73    Ca    7.6<L>      21 Mar 2022 05:52  Phos  2.5     03-21  Mg     1.6     03-21    TPro  6.1  /  Alb  2.2<L>  /  TBili  1.2  /  DBili  x   /  AST  51<H>  /  ALT  35  /  AlkPhos  81  03-20        PT/INR - ( 21 Mar 2022 05:52 )   PT: 18.3 sec;   INR: 1.53          PTT - ( 21 Mar 2022 05:52 )  PTT:31.8 sec    I&O's Summary    20 Mar 2022 07:01  -  21 Mar 2022 07:00  --------------------------------------------------------  IN: 1695.5 mL / OUT: 1515 mL / NET: 180.5 mL    21 Mar 2022 07:01  -  21 Mar 2022 13:46  --------------------------------------------------------  IN: 0 mL / OUT: 235 mL / NET: -235 mL      A/P: 66M CAD s/p CABG, severe bioprosthetic AS (not amenable to any intervention), newly diagnosed EtOH cirrhosis c/b HCC s/p TACE, anemia, ESBL UTI who cardiology has been consulted for worsening tachycardia and ST depressions.    #ST- depressions: V2-V6, I, II, and KRISTIN in aVR. Present on previous ECGs, but worse with increased HR  - Encephalopathic (likely 2/2 cirrhosis), no chest pain  - likely rate related/demand in setting of anemia and tachycardia.   - had coronary angiogram <1 month ago and LIMA and SVG grafts were widely patent  - Pt warm on exam, no cardiogenic shock at this time  - c/w lipitor 80 mg qd  - can start ASA 81 mg qd if able to tolerate from anemia standpoint  - labs could be consistent with hemolysis, probably in setting of end stage aortic valvular disease    d/w cardiology attending  Hector Colvin MD [id number]

## 2022-03-21 NOTE — CHART NOTE - NSCHARTNOTEFT_GEN_A_CORE
Admitting Diagnosis:   Patient is a 66y old  Male who presents with a chief complaint of Chest Pain (21 Mar 2022 13:45)      PAST MEDICAL & SURGICAL HISTORY:  HTN (hypertension)    ETOH abuse    Smoker    Hyperlipidemia    Aortic valve stenosis, unspecified etiology    No significant past surgical history        Current Nutrition Order:  NPO    PO Intake: Good (%) [   ]  Fair (50-75%) [   ] Poor (<25%) [   ]- NA NPO at this time     GI Issues:   +BM x5 3/15, x1 3/16 3/17  Rectal Tube OP: 200ml 3/21, 350ml 3/20, 400ml 3/19  Lactulose remains ordered, +Protonix order     Pain:  Non-verbal indicators of pain absent at time of visit    Skin Integrity:  Edema: 1+generalized, 2+B/L arm  Erwin 12  No pressure ulcers       Labs:       140  |  112<H>  |  14  ----------------------------<  104<H>  3.8   |  17<L>  |  0.73    Ca    7.6<L>      21 Mar 2022 05:52  Phos  2.5       Mg     1.6         TPro  6.1  /  Alb  2.2<L>  /  TBili  1.2  /  DBili  x   /  AST  51<H>  /  ALT  35  /  AlkPhos  81      CAPILLARY BLOOD GLUCOSE          Medications:  MEDICATIONS  (STANDING):  artificial tears (preservative free) Ophthalmic Solution 1 Drop(s) Both EYES two times a day  atorvastatin 80 milliGRAM(s) Oral at bedtime  chlorhexidine 2% Cloths 1 Application(s) Topical <User Schedule>  cyanocobalamin 1000 MICROGram(s) Oral every 24 hours  DOXOrubicin INTRA-ARTERIAL (eMAR) 50 milliGRAM(s) IntraArterial once  ferrous    sulfate 325 milliGRAM(s) Oral <User Schedule>  folic acid 1 milliGRAM(s) Oral daily  lactulose Syrup 30 Gram(s) Oral three times a day  magnesium sulfate  IVPB 2 Gram(s) IV Intermittent every 2 hours  meropenem  IVPB 1000 milliGRAM(s) IV Intermittent every 8 hours  multivitamin 1 Tablet(s) Oral daily  nicotine - 21 mG/24Hr(s) Patch 1 patch Transdermal daily  pantoprazole  Injectable 40 milliGRAM(s) IV Push every 12 hours  phenylephrine    Infusion 0.1 MICROgram(s)/kG/Min (2.55 mL/Hr) IV Continuous <Continuous>  potassium phosphate IVPB 15 milliMole(s) IV Intermittent once  rifAXIMin 550 milliGRAM(s) Oral every 12 hours  tamsulosin 0.4 milliGRAM(s) Oral at bedtime    MEDICATIONS  (PRN):  acetaminophen     Tablet .. 650 milliGRAM(s) Oral every 6 hours PRN Temp greater or equal to 38C (100.4F), Mild Pain (1 - 3)  sodium chloride 0.65% Nasal 1 Spray(s) Both Nostrils four times a day PRN Nasal Congestion          Dosing Anthropometrics:  · Height for BMI (FEET)	5 Feet  · Height for BMI (INCHES)	7 Inch(s)  · Height for BMI (CENTIMETERS)	170.18 Centimeter(s)  · Weight for BMI (lbs)	149 lb  · Weight for BMI (kg)	67.6 kg  · Body Mass Index	23.3  · Ideal Body Weight (lbs)	148  · Ideal Body Weight (kg)	67.1    Weight Change:  No new weights documented in EMR.  Obtain biweekly weights to assess changes/trends.    Estimated energy needs:  Actual body weight used for all calculations as pt in % %IBW (%CSP=029.6)   Needs adjusted for advanced age, possible malignancy  25-30kcak/k-2040kcal/day  1.2-1.4gm/k-95gm/day   Fluids per team 2/2 diarrhea, cirrhosis     Subjective:  65 YO M, current every day ETOH abuse (1 pint vodka and several beers daily x 50 years), PMHx HLD, HTN, CABG (LIMA to LAD, SVG to PDA in 2017), AS (s/p AVR in 2017) with recently discovered restenosis, presented to [facility] ED [facility] with chest pain and episode of LOC, initially admitted to cardiology for NSTEMI with [facility] showing nonobstructive disease. Transferred to CT surgery given complex anatomy with plan for TAVR and aortic root surgery. Pt found to have liver cirrhosis and a 2.4cmx2.4 cm mass on his liver concerning for HCC. Decision was made not to proceed with surgery given poor functional status, cirrhosis and non compliance with medications. The pt had worsening mental status on CT surgery service and med consult was called for hepatic encephalopathy. He has been followed GI  for liver findings. Hematology and surgical oncology consulted as imaging of abdomen confirmed HCC. Pt was not a surgical candidate and IR was consulted who planned for embolization. Pt completed a course of high dose thiamine given alcohol history without improvement in mental status. MRI brain obtained showing mild chronic microvascular ischemic disease and brain parenchymal volume loss, advanced for patient's age likely 2/2 alcohol abuse 3/14, went for planned transarterial chemoembolization by IR for further evaluation for liver lesion but around start of procedure became obtunded with increased secretions and concern that he was not protecting his airway. Following procedure patient became hypotensive to 80s/50s tachycardic to 120s, was minimally arousable to sternal rub and gurgling with c/f airway protection. ICU consulted for evaluation intubation. Course c/b anemia likely attributed to intermittent epistaxis for which ENT was consulted w/o plans for acute intervention. Concern that pt may have aspirated. Successfully extubated on 3/16. Stepped up overnight due to more lethargic, hypotension to systolic 60s, requiring phenylephrine and monitoring for need for intubation. Pressures have stabilized with proper fluid restoration and the Phen is being weaned down. Now with continued sepsis and anemia 2/2 hemolysis and possible GIB-higher risk for endoscopic evaluation.    Pt seen sleeping in CCU. SLP Eval for regular diet/thin liquids 3/18. Per RN cont with AMS. NPO at this time. NGT placed 3/20, however Per RN pt pulled out, pending replacement by MICU team this AM.  Labs: HH 8.3/26.4, sodium potassium Mg phosphorus WDL, Glucose 104, AST SGOT 51.   Will continue to follow per RD protocol.   Paged team.     Previous Nutrition Diagnosis: Inadequate oral intake r/t inability to meet EER AEB NPO  Active [ x ]  Resolved [   ]  Goal: Pt to meet at least 75% of nutritional needs during hospital stay consistently via tolerated route    Recommendations:  1. Hold feeds Pending GI w/u.  2. Should feeds be feasible:  >> Adv PO diet should pt be awake and alert enough for meals safely. Defer PO cons to SLP.  >> However should TF be indicated and consistent with GOC: Vital 1.5 x24hrs @43ml/hr +1LPS/day. Will provide ~1032ml, 1648kcal, 85gm prot, 788ml water. Start at 20ml/hr, increase 10ml q8hrs.   3. Pain/GI per team.  4. Labs: monitor BMP, CBC, glucose, lytes, trend renal indices, LFTs.  5. Monitor wts, Skin, GOC.  6. RD to remain available for additional nutrition interventions as needed.     Education: Deferred at this time    Risk Level: High [ x  ] Moderate [   ] Low [   ].

## 2022-03-21 NOTE — PROGRESS NOTE ADULT - SUBJECTIVE AND OBJECTIVE BOX
GASTROENTEROLOGY PROGRESS NOTE  Patient seen and examined at bedside. Stepped up to MICU evening of 3/19 for need for pressor support. Patient noted to be lethargic but following commands, endotracheal intubation deferred. Titrated off phenylephrine overnight.  Unable to calculate MELD-Na for 3/21 as with no LFTs. MELD-Na on 3/20 noted to be 11.     ROS: Patient not participating in exam today. Opens eyes to noxious stimuli.     PERTINENT REVIEW OF SYSTEMS:  CONSTITUTIONAL: No weakness, fevers or chills  HEENT: No visual changes; No vertigo or throat pain   GASTROINTESTINAL: As above.  NEUROLOGICAL: No numbness or weakness  SKIN: No itching, burning, rashes, or lesions     Allergies    No Known Allergies    Intolerances      MEDICATIONS:  MEDICATIONS  (STANDING):  artificial tears (preservative free) Ophthalmic Solution 1 Drop(s) Both EYES two times a day  atorvastatin 80 milliGRAM(s) Oral at bedtime  cyanocobalamin 1000 MICROGram(s) Oral every 24 hours  DOXOrubicin INTRA-ARTERIAL (eMAR) 50 milliGRAM(s) IntraArterial once  ferrous    sulfate 325 milliGRAM(s) Oral <User Schedule>  folic acid 1 milliGRAM(s) Oral daily  lactulose Syrup 30 Gram(s) Oral three times a day  meropenem  IVPB 1000 milliGRAM(s) IV Intermittent every 8 hours  multivitamin 1 Tablet(s) Oral daily  nicotine - 21 mG/24Hr(s) Patch 1 patch Transdermal daily  pantoprazole  Injectable 40 milliGRAM(s) IV Push every 12 hours  phenylephrine    Infusion 0.1 MICROgram(s)/kG/Min (2.55 mL/Hr) IV Continuous <Continuous>  rifAXIMin 550 milliGRAM(s) Oral every 12 hours  tamsulosin 0.4 milliGRAM(s) Oral at bedtime    MEDICATIONS  (PRN):  acetaminophen     Tablet .. 650 milliGRAM(s) Oral every 6 hours PRN Temp greater or equal to 38C (100.4F), Mild Pain (1 - 3)  sodium chloride 0.65% Nasal 1 Spray(s) Both Nostrils four times a day PRN Nasal Congestion    Vital Signs Last 24 Hrs  T(C): 36.4 (21 Mar 2022 06:24), Max: 37.8 (20 Mar 2022 13:00)  T(F): 97.6 (21 Mar 2022 06:24), Max: 100.1 (20 Mar 2022 13:00)  HR: 89 (21 Mar 2022 08:00) (70 - 100)  BP: 93/51 (21 Mar 2022 08:00) (76/51 - 118/68)  BP(mean): 66 (21 Mar 2022 08:00) (59 - 88)  RR: 23 (21 Mar 2022 08:00) (16 - 36)  SpO2: 99% (21 Mar 2022 08:00) (95% - 100%)    03-20 @ 07:01  -  03-21 @ 07:00  --------------------------------------------------------  IN: 1695.5 mL / OUT: 1515 mL / NET: 180.5 mL    03-21 @ 07:01  -  03-21 @ 08:41  --------------------------------------------------------  IN: 0 mL / OUT: 30 mL / NET: -30 mL      PHYSICAL EXAM:    General: male, lying in bed; in no acute distress  HEENT: MMM, conjunctiva and sclera clear  Gastrointestinal: Soft, obese, distended abdomen; non-tender; Normal bowel sounds; No rebound or guarding  Rectal: rectal tube in place with dark brown stool noted  Extremities: Normal range of motion, No clubbing, cyanosis or edema  Neurological: opens eyes to noxious stimuli, not following commands, Oriented x 0-1, no asterixis  Skin: Warm and dry. No obvious rash    LABS:                        8.3    7.84  )-----------( 84       ( 21 Mar 2022 05:52 )             26.4     03-21    140  |  112<H>  |  14  ----------------------------<  104<H>  3.8   |  17<L>  |  0.73    Ca    7.6<L>      21 Mar 2022 05:52  Phos  2.5     03-21  Mg     1.6     03-21    TPro  6.1  /  Alb  2.2<L>  /  TBili  1.2  /  DBili  x   /  AST  51<H>  /  ALT  35  /  AlkPhos  81  03-20    PT/INR - ( 21 Mar 2022 05:52 )   PT: 18.3 sec;   INR: 1.53          PTT - ( 21 Mar 2022 05:52 )  PTT:31.8 sec                  Culture - Blood (collected 20 Mar 2022 05:56)  Source: .Blood Blood  Preliminary Report (21 Mar 2022 06:00):    No growth at 1 day.    Culture - Blood (collected 20 Mar 2022 05:56)  Source: .Blood Blood  Preliminary Report (21 Mar 2022 06:00):    No growth at 1 day.    Culture - Blood (collected 19 Mar 2022 06:42)  Source: .Blood Blood  Gram Stain (21 Mar 2022 08:37):    Anaerobic Bottle: Gram Negative Rods    Result called to and read back by_ SUPRIYA Mcgarry RN ([id number])  03/21/2022 08:37:20  Preliminary Report (21 Mar 2022 08:37):    Culture in progress    Culture - Blood (collected 19 Mar 2022 06:42)  Source: .Blood Blood  Gram Stain (20 Mar 2022 18:08):    Anaerobic Bottle: Gram Negative Rods    Result called to and read back by_  TONY Schultz RN ([id number])  03/20/2022    18:07:59    ***Blood Panel PCR results on this specimen are available    approximately 3 hours after the Gram stain result.***    Gram stain, PCR, and/or culture results may not always    correspond due to difference in methodologies.    ************************************************************    This PCR assay was performed using [employer].    The following targets are tested for: Enterococcus,    vancomycin resistant enterococci, Listeria monocytogenes,    coagulase negative staphylococci, S. aureus,    methicillin resistant S. aureus, Streptococcus agalactiae    (Group B), S. pneumoniae, S. pyogenes (Group A),    Acinetobacter baumannii, Enterobacter cloacae, E. coli,    Klebsiella oxytoca, K. pneumoniae, Proteus sp.,    Serratia marcescens, Haemophilus influenzae,    Neisseria meningitidis, Pseudomonas aeruginosa, Candida    albicans, C. glabrata, C krusei, C parapsilosis,    C. tropicalis and the KPC resistance gene.  Preliminary Report (20 Mar 2022 18:05):    Culture in progress  Organism: Blood Culture PCR (20 Mar 2022 20:23)  Organism: Blood Culture PCR (20 Mar 2022 20:23)      RADIOLOGY & ADDITIONAL STUDIES:  Reviewed

## 2022-03-21 NOTE — CONSULT NOTE ADULT - CONSULT REQUESTED DATE/TIME
18-Mar-2022 12:12
21-Mar-2022 14:21
07-Mar-2022 12:42
14-Mar-2022 12:51
11-Mar-2022 13:00
11-Mar-2022 20:17
25-Feb-2022 18:47
28-Feb-2022 18:39
06-Mar-2022 12:48
07-Mar-2022
09-Mar-2022 12:44
09-Mar-2022 15:10
19-Mar-2022 10:35
02-Mar-2022 13:25
03-Mar-2022 13:43

## 2022-03-21 NOTE — PROGRESS NOTE ADULT - ASSESSMENT
67 y/o M, current smoker (1ppd x 50 years), current every day ETOH abuse (1 pint vodka and several beers daily x 50 years), with PMHx of HLD, HTN (not on any medications),  CABG (LIMA to LAD, SVG to PDA in 2017), AS (s/p AVR in 2017), who presented to [facility] ED on 2/24/22 with complaints of 9/10 chest pain with associated diaphoresis with exertion, admitted for NSTEMI, transferred to CTsx service for severe AS, requiring a TAVR procedure; upon workup for TAVR, patient incidentally noted to have a 2.4 cm hypervascular lesion for which GI was consulted, CT A/P concerning for HCC. Now Gi re-consulted for AMS.     #Hepatic Lesion  Patient presented to [facility] ED on 2/24/22 with complaints of 9/10 chest pain with associated diaphoresis with exertion, admitted for NSTEMI, transferred to CTsx service for severe AS, requiring a TAVR procedure. During workup for TAVR, CTA A/P (2/28) revealing a nodular liver contour, c/w cirrhosis, A 2.4 cm hypervascular liver mass suspicious for HCC, as well as splenomegaly. AFP 7.2 (WNL), While AFP noted to be normal, does not preclude risk of possibility of HCC, especially in the setting of what appears to be cirrhosis 2/2 underlying long-standing history of EtOH use, which places him at higher risk.   - CT A/P (3/6) revealing 2.5 cm left hepatic lesion consistent with LI-RADS v 2018 Category: LR-5 Definitely HCC. OPTN Category (if LR-5): 5B. No locally invasive or metastatic disease. Left hepatic artery variant. No additional suspicious hepatic lesions.  - In light of active EtOH use, would not qualify for liver transplant evaluation   - Not a surgical candidate as per surgical onc team  - Appreciate ongoing Heme/Onc recs  - MR Abdomen (3/9) confirming HCC. s/p TACE 3/14/22    #Cirrhosis, likely compensated (-HE, -ascites, - no known EV bleeding)  CTA A/P with radiographic findings c/f cirrhosis, including nodular liver contour and splenomegaly, which is suggestive likely portal HTN. Likely in the setting of long-standing history of EtOH abuse.   MELD-Na: 11 (based on 3/20/22 BW, no LFTs ordered for 3/21/22)  HE: AA0x 0-1 (not participating in exam this morning), no asterixis   Ascites: None present on CT imaging  HCC: CTA A/P (2/28) with 2.4 cm hypervascular lesion. CT A/P (3/6) and MRI confirming lesion c/w HCC  Varices: No prior EGD evaluation, would be warranted, can pursue as an outpatient  - Abdominal US with duplex (3/6) The portal veins, hepatic veins and hepatic arteries are patent with normal directionality of flow. Cirrhosis. Since 2/28/2022, again a 2.9 cm heterogeneous lesion is present within the left lobe of the liver (see CT findings as noted above, appears to be c/w HCC). MRI confirming HCC  - Daily CMPs & Coags to calculate MELD-Na  - Nutrition consult  - High protein diet  -  on alcohol cessation  - c/w Rifaximin 500 mg BID & lactulose, titrate to 2-3 BMs/day  - Please add on ALK Phos, ALT, AST, Total bilirubin to AM labs today (3/21/22)    #AMS  Unclear etiology for AMS this am with increased lethargy. Ddx includes HE, infection, librium, delirium. The elevated ammonia lever to 123 could be due to his nose bleed, which has been ongoing since 3/5. He does not have Asterixis on exam and is A&O with understanding of where he is and why he is hospitalized. If HE, unclear the inciting event to cause decompensation. Additionally, RUQ US today w/o ascites. Utox (3/9/22) + benzos, in the setting of last documented librium 10 days prior, most likely 2/2 poorly metabolized in the setting of his cirrhosis  - Continue to hold librium  - c/w Rifaximin 500 mg BID & lactulose, titrate to 2-3 BMs/day  - CXR (3/6/22) negative for infiltrates  - Bcx (3/19) with GNR, pending speciation, c/w empiric abxs. Appreciate ongoing recs  - CT Head (3/10) negative    #Anemia   Stable, with slow downtrend in H/H, with no e/o overt GI bleeding including melena, hematochezia, hematemesis, coffee ground emesis. Previously noted to have dried blood on right nare, which may have contributed to some blood loss previously however hemolysis labs demonstrating hemolysis, likely in the setting of severe AS  - Monitor H/H  - Transfusion goal >8  - In light of underlying cirrhosis, with evidence of thrombocytopenia and splenomegaly, suggestive of e/o portal HTN, would warrant endoscopic evaluation to further assess for EV/PHG however given recent NSTEMI, higher risk for endoscopic evaluation. At this time, he has worsening ST depressions, which cardiology attributes to demand ischemia and recommending fluid bolus and blood to avoid hypotension.  - Hold aspirin for now  - Agree with palliative care consult and GOC discussion    Case discussed with svc attending and primary team.     So Guthrie DO  Gastroenterology Fellow  Pager: [phone]

## 2022-03-21 NOTE — CONSULT NOTE ADULT - PROVIDER SPECIALTY LIST ADULT
Gastroenterology
Infectious Disease
Intervent Radiology
Structural Heart
Infectious Disease
Internal Medicine
Cardiology
Critical Care
Heme/Onc
Internal Medicine
Neurology
OMFS
Surgery
ENT
Palliative Care

## 2022-03-21 NOTE — PROGRESS NOTE ADULT - ATTENDING COMMENTS
HCC s/p TACE, CAD, ETOH abuse, AVR, Klebsiella bacteremia with septic shock  physical as above  continue meropenem for now but to discuss deescalation  CT of chest abd and pelvis to look for abscess as to why he would become bacteremic on likely appropriate antibiotic therapy  as of now he is off pressors  rest as above  decisions of high complexity HCC s/p TACE, CAD, ETOH abuse, AVR, Klebsiella bacteremia with septic shock  physical as above  continue meropenem for now but to discuss deescalation  CT of maxillofacial, chest abd and pelvis to look for abscess as to why he would become bacteremic on likely appropriate antibiotic therapy  as of now he is off pressors  rest as above  decisions of high complexity

## 2022-03-21 NOTE — CONSULT NOTE ADULT - CONSULT REQUESTED BY NAME
CT surgery
[place]
Lawrence Mccall
Scotty Agosto
Dr Wilcox
Hematooncology, medicine and GI
Dr. Felder
Dr. Garnett
Medicine
CT Surgery
Internal Medicine
Medicine
Darcie Rodrigues
Medicine
Dr. Romano

## 2022-03-21 NOTE — CONSULT NOTE ADULT - REASON FOR ADMISSION
Chest Pain

## 2022-03-21 NOTE — CONSULT NOTE ADULT - SUBJECTIVE AND OBJECTIVE BOX
67 YO M, current smoker (1ppd x 50 years), current every day ETOH abuse (1 pint vodka and several beers daily x 50 years), with PMHx of HLD, HTN (not on any medications), CABG (LIMA to LAD, SVG to PDA in 2017), AS (s/p AVR in 2017) with recently discovered restenosis, presented to [facility] ED on 2/24/22 with chest pain and episode of LOC, initially admitted to cardiology for NSTEMI with LHC showing nonobstructive disease. TTE was reperformed and patient was found to have restenosis of bioprosthetic valve for which aortic root surgery was planned.  Patient was transferred to CT surgery given complex anatomy with plan for TAVR and aortic root surgery.  During this time on CT surgery service he was found to have liver cirrhosis and a 2.4cmx2.4 cm mass on his liver concerning for HCC.  Decision was made not to proceed with surgery given poor functional status, cirrhosis and non compliance with medications.  Course c/b hepatic encephalopathy on rifaxamin and lactulose. MRI brain obtained showing mild chronic microvascular ischemic disease and brain parenchymal  volume loss, advanced for patient's age likely 2/2 alcohol abuse.  Neurology was consulted and  VEEG showing moderate diffuse nonspecific cerebral dysfunction. On 3/14 patient was seen with his normal baseline mental status and neuro exam prior to embolization procedure for HCC.  However patient was found to be tachycardic to 120s with new elevated WBC count to 14.  UA was positive, CXR unchanged, bladder scan w/o evidence of retention. Started on ceftriaxone for possible UTI also for concerns for aspiration pneumonia due to persistent somnolence.      On 3/14, patient went for planned transarterial chemoembolization by IR for further evaluation of liver lesion but around start of procedure became obtunded with increased secretions and concern that he was not protecting his airway. Following procedure patient became hypotensive to 80s/50s tachycardic to 120s, was minimally arousable to sternal rub and gurgling with c/f airway protection s/p intubation. In the MICU started on Ceftriaxone 2 gram for UTI, that was the switched to Ertapenem 1 gram daily due to cultures growing ESBL Klebsiella. Started on vancomycin for possible HA, that was soon discontinued as MRSA swab was negative on 3/14. Patient able to be weaned off pressors. Extubated on 3/16. ID consulted because sputum cultures growing Proteus mirabilis and ESBL Klebsiella, recommended continuing ertapenem and signed off. On 3/19, pt spiked fever with leukocytosis and hypotension requiring phenylephrine. Broadened to meropenem and was able to be weaned off of phenylephrine. BCx growing Klebsiella through ertapenem. ID reconsulted.    PAST MEDICAL & SURGICAL HISTORY:  HTN (hypertension)    ETOH abuse    Smoker    Hyperlipidemia    Aortic valve stenosis, unspecified etiology    No significant past surgical history          REVIEW OF SYSTEMS:    SUBJECTIVE: Patient seen and examined at bedside. Patient unable to participate in ROS, but grossly denies all complaints.      ANTIBIOTICS:  MEDICATIONS  (STANDING):  artificial tears (preservative free) Ophthalmic Solution 1 Drop(s) Both EYES two times a day  atorvastatin 80 milliGRAM(s) Oral at bedtime  chlorhexidine 2% Cloths 1 Application(s) Topical <User Schedule>  cyanocobalamin 1000 MICROGram(s) Oral every 24 hours  DOXOrubicin INTRA-ARTERIAL (eMAR) 50 milliGRAM(s) IntraArterial once  ferrous    sulfate 325 milliGRAM(s) Oral <User Schedule>  folic acid 1 milliGRAM(s) Oral daily  iohexol 300 mG (iodine)/mL Oral Solution 30 milliLiter(s) Oral once  lactulose Syrup 30 Gram(s) Oral three times a day  meropenem  IVPB 1000 milliGRAM(s) IV Intermittent every 8 hours  multivitamin 1 Tablet(s) Oral daily  nicotine - 21 mG/24Hr(s) Patch 1 patch Transdermal daily  pantoprazole  Injectable 40 milliGRAM(s) IV Push every 12 hours  phenylephrine    Infusion 0.1 MICROgram(s)/kG/Min (2.55 mL/Hr) IV Continuous <Continuous>  rifAXIMin 550 milliGRAM(s) Oral every 12 hours  tamsulosin 0.4 milliGRAM(s) Oral at bedtime    MEDICATIONS  (PRN):  acetaminophen     Tablet .. 650 milliGRAM(s) Oral every 6 hours PRN Temp greater or equal to 38C (100.4F), Mild Pain (1 - 3)  sodium chloride 0.65% Nasal 1 Spray(s) Both Nostrils four times a day PRN Nasal Congestion      Allergies    No Known Allergies    Intolerances      Vital Signs Last 24 Hrs  T(C): 36.9 (21 Mar 2022 18:20), Max: 37.1 (20 Mar 2022 21:31)  T(F): 98.4 (21 Mar 2022 18:20), Max: 98.7 (20 Mar 2022 21:31)  HR: 83 (21 Mar 2022 19:00) (70 - 100)  BP: 111/57 (21 Mar 2022 19:00) (76/51 - 118/68)  BP(mean): 77 (21 Mar 2022 19:00) (59 - 88)  RR: 20 (21 Mar 2022 19:00) (14 - 36)  SpO2: 100% (21 Mar 2022 19:00) (95% - 100%)    PHYSICAL EXAM:  General: NAD  Ear/Nose/Throat: cystic lesions in inferior mouth under tongue, appear to be firm	  Neck: supple  Cardiovascular: +S1/S2; RRR  Respiratory: CTA B/L; no W/R/R  Gastrointestinal: soft; distended abdomen  Extremities: WWP  Vascular: 2+ radial, DP/PT pulses B/L  Neurological: AAOx1-2  Skin: no rashes            LABS:                        8.3    7.84  )-----------( 84       ( 21 Mar 2022 05:52 )             26.4     03-21    140  |  112<H>  |  14  ----------------------------<  104<H>  3.8   |  17<L>  |  0.73    Ca    7.6<L>      21 Mar 2022 05:52  Phos  2.5     03-21  Mg     1.6     03-21    TPro  6.1  /  Alb  2.2<L>  /  TBili  1.2  /  DBili  x   /  AST  51<H>  /  ALT  35  /  AlkPhos  81  03-20    PT/INR - ( 21 Mar 2022 05:52 )   PT: 18.3 sec;   INR: 1.53          PTT - ( 21 Mar 2022 05:52 )  PTT:31.8 sec      MICROBIOLOGY:  RADIOLOGY & ADDITIONAL STUDIES:

## 2022-03-21 NOTE — PROGRESS NOTE ADULT - ATTENDING COMMENTS
Patient seen/discussed with fellow at bedside   Reviewed all relevant imaging personally  Agree with above

## 2022-03-21 NOTE — PROGRESS NOTE ADULT - SUBJECTIVE AND OBJECTIVE BOX
INTERVAL HPI/OVERNIGHT EVENTS:    SUBJECTIVE: Patient seen and examined at bedside.     OBJECTIVE:    VITAL SIGNS:  ICU Vital Signs Last 24 Hrs  T(C): 36.6 (21 Mar 2022 01:11), Max: 37.8 (20 Mar 2022 13:00)  T(F): 97.8 (21 Mar 2022 01:11), Max: 100.1 (20 Mar 2022 13:00)  HR: 97 (21 Mar 2022 05:00) (70 - 100)  BP: 96/56 (21 Mar 2022 05:00) (76/51 - 121/58)  BP(mean): 71 (21 Mar 2022 05:00) (59 - 88)  ABP: --  ABP(mean): --  RR: 31 (21 Mar 2022 05:00) (16 - 36)  SpO2: 97% (21 Mar 2022 05:00) (97% - 100%)        0319 @ 07:01  -  03-20 @ 07:00  --------------------------------------------------------  IN: 280.8 mL / OUT: 1705 mL / NET: -1424.2 mL     @ 07:01  -  03-21 @ 06:21  --------------------------------------------------------  IN: 1625.5 mL / OUT: 1250 mL / NET: 375.5 mL      CAPILLARY BLOOD GLUCOSE      POCT Blood Glucose.: 131 mg/dL (19 Mar 2022 07:51)      PHYSICAL EXAM:    General: NAD, letheragic   HEENT: MMM  Neck: supple  Cardiovascular: +S1/S2; RRR  Respiratory: CTA B/L; no W/R/R  Gastrointestinal: soft, NT/ND  Extremities: WWP; no edema, clubbing or cyanosis  Vascular: 2+ radial, DP/PT pulses B/L  Neurological: AAOx1,  does not engage past opening eyes to name     MEDICATIONS:  MEDICATIONS  (STANDING):  artificial tears (preservative free) Ophthalmic Solution 1 Drop(s) Both EYES two times a day  atorvastatin 80 milliGRAM(s) Oral at bedtime  cyanocobalamin 1000 MICROGram(s) Oral every 24 hours  DOXOrubicin INTRA-ARTERIAL (eMAR) 50 milliGRAM(s) IntraArterial once  ferrous    sulfate 325 milliGRAM(s) Oral <User Schedule>  folic acid 1 milliGRAM(s) Oral daily  lactated ringers. 500 milliLiter(s) (250 mL/Hr) IV Continuous <Continuous>  lactulose Syrup 30 Gram(s) Oral three times a day  meropenem  IVPB 1000 milliGRAM(s) IV Intermittent every 8 hours  multivitamin 1 Tablet(s) Oral daily  nicotine - 21 mG/24Hr(s) Patch 1 patch Transdermal daily  pantoprazole Infusion 8 mG/Hr (10 mL/Hr) IV Continuous <Continuous>  phenylephrine    Infusion 0.1 MICROgram(s)/kG/Min (2.55 mL/Hr) IV Continuous <Continuous>  rifAXIMin 550 milliGRAM(s) Oral every 12 hours  tamsulosin 0.4 milliGRAM(s) Oral at bedtime    MEDICATIONS  (PRN):  acetaminophen     Tablet .. 650 milliGRAM(s) Oral every 6 hours PRN Temp greater or equal to 38C (100.4F), Mild Pain (1 - 3)  sodium chloride 0.65% Nasal 1 Spray(s) Both Nostrils four times a day PRN Nasal Congestion      ALLERGIES:  Allergies    No Known Allergies    Intolerances        LABS:                        8.3    15.22 )-----------( 120      ( 20 Mar 2022 06:10 )             26.2     03-21    140  |  x   |  14  ----------------------------<  104<H>  3.8   |  x   |  0.73    Ca    7.7<L>      20 Mar 2022 06:10  Phos  2.5     03-21  Mg     1.6     -    TPro  6.1  /  Alb  2.2<L>  /  TBili  1.2  /  DBili  x   /  AST  51<H>  /  ALT  35  /  AlkPhos  81  03-20    PT/INR - ( 21 Mar 2022 05:52 )   PT: 18.3 sec;   INR: 1.53          PTT - ( 21 Mar 2022 05:52 )  PTT:31.8 sec  Urinalysis Basic - ( 19 Mar 2022 07:20 )    Color: Yellow / Appearance: Clear / S.015 / pH: x  Gluc: x / Ketone: NEGATIVE  / Bili: Negative / Urobili: 0.2 E.U./dL   Blood: x / Protein: NEGATIVE mg/dL / Nitrite: NEGATIVE   Leuk Esterase: Trace / RBC: x / WBC 5-10 /HPF   Sq Epi: x / Non Sq Epi: x / Bacteria: Present /HPF        RADIOLOGY & ADDITIONAL TESTS: Reviewed. INTERVAL HPI/OVERNIGHT EVENTS: Phenylephrine weaned off     SUBJECTIVE: Patient seen and examined at bedside. Patient with no acute complaints, ROS negative.     OBJECTIVE:    VITAL SIGNS:  ICU Vital Signs Last 24 Hrs  T(C): 36.6 (21 Mar 2022 01:11), Max: 37.8 (20 Mar 2022 13:00)  T(F): 97.8 (21 Mar 2022 01:11), Max: 100.1 (20 Mar 2022 13:00)  HR: 97 (21 Mar 2022 05:00) (70 - 100)  BP: 96/56 (21 Mar 2022 05:00) (76/51 - 121/58)  BP(mean): 71 (21 Mar 2022 05:00) (59 - 88)  ABP: --  ABP(mean): --  RR: 31 (21 Mar 2022 05:00) (16 - 36)  SpO2: 97% (21 Mar 2022 05:00) (97% - 100%)         @ 07:01  -  -20 @ 07:00  --------------------------------------------------------  IN: 280.8 mL / OUT: 1705 mL / NET: -1424.2 mL     @ 07:01  -  03-21 @ 06:21  --------------------------------------------------------  IN: 1625.5 mL / OUT: 1250 mL / NET: 375.5 mL      CAPILLARY BLOOD GLUCOSE      POCT Blood Glucose.: 131 mg/dL (19 Mar 2022 07:51)      PHYSICAL EXAM:    General: NAD, letheragic   HEENT: MMM  Neck: supple  Cardiovascular: +S1/S2; RRR  Respiratory: CTA B/L; no W/R/R  Gastrointestinal: soft, NT/ND  Extremities: WWP; no edema, clubbing or cyanosis  Vascular: 2+ radial, DP/PT pulses B/L  Neurological: AAOx1,  does not engage past opening eyes to name     MEDICATIONS:  MEDICATIONS  (STANDING):  artificial tears (preservative free) Ophthalmic Solution 1 Drop(s) Both EYES two times a day  atorvastatin 80 milliGRAM(s) Oral at bedtime  cyanocobalamin 1000 MICROGram(s) Oral every 24 hours  DOXOrubicin INTRA-ARTERIAL (eMAR) 50 milliGRAM(s) IntraArterial once  ferrous    sulfate 325 milliGRAM(s) Oral <User Schedule>  folic acid 1 milliGRAM(s) Oral daily  lactated ringers. 500 milliLiter(s) (250 mL/Hr) IV Continuous <Continuous>  lactulose Syrup 30 Gram(s) Oral three times a day  meropenem  IVPB 1000 milliGRAM(s) IV Intermittent every 8 hours  multivitamin 1 Tablet(s) Oral daily  nicotine - 21 mG/24Hr(s) Patch 1 patch Transdermal daily  pantoprazole Infusion 8 mG/Hr (10 mL/Hr) IV Continuous <Continuous>  phenylephrine    Infusion 0.1 MICROgram(s)/kG/Min (2.55 mL/Hr) IV Continuous <Continuous>  rifAXIMin 550 milliGRAM(s) Oral every 12 hours  tamsulosin 0.4 milliGRAM(s) Oral at bedtime    MEDICATIONS  (PRN):  acetaminophen     Tablet .. 650 milliGRAM(s) Oral every 6 hours PRN Temp greater or equal to 38C (100.4F), Mild Pain (1 - 3)  sodium chloride 0.65% Nasal 1 Spray(s) Both Nostrils four times a day PRN Nasal Congestion      ALLERGIES:  Allergies    No Known Allergies    Intolerances        LABS:                        8.3    15.22 )-----------( 120      ( 20 Mar 2022 06:10 )             26.2     03-    140  |  x   |  14  ----------------------------<  104<H>  3.8   |  x   |  0.73    Ca    7.7<L>      20 Mar 2022 06:10  Phos  2.5     03-  Mg     1.6     -    TPro  6.1  /  Alb  2.2<L>  /  TBili  1.2  /  DBili  x   /  AST  51<H>  /  ALT  35  /  AlkPhos  81  03-20    PT/INR - ( 21 Mar 2022 05:52 )   PT: 18.3 sec;   INR: 1.53          PTT - ( 21 Mar 2022 05:52 )  PTT:31.8 sec  Urinalysis Basic - ( 19 Mar 2022 07:20 )    Color: Yellow / Appearance: Clear / S.015 / pH: x  Gluc: x / Ketone: NEGATIVE  / Bili: Negative / Urobili: 0.2 E.U./dL   Blood: x / Protein: NEGATIVE mg/dL / Nitrite: NEGATIVE   Leuk Esterase: Trace / RBC: x / WBC 5-10 /HPF   Sq Epi: x / Non Sq Epi: x / Bacteria: Present /HPF        RADIOLOGY & ADDITIONAL TESTS: Reviewed.

## 2022-03-21 NOTE — CONSULT NOTE ADULT - ATTENDING COMMENTS
67 yo M with HTN, HLD, CABG, AS s/p AVRadmitted 2/24 with NSTEMI, restenosis of bioprosthetic valve, found to have cirrhosis and mass c/c HCC, course c/b hepatic encephalopathy. He underwent chemoembolization of HCC by IR on 3/14 but became obtunded with possible aspiration shortly after start of the procedure.  He subsequently became febrile with tachycardia, UA with many WBCs.  Urine culture from 3/14 and sputum culture from 3/15 both with ESBL-producing Klebsiella pneumoniae and Proteus mirabilis. CXR without definitive pneumonia.  Ertapenem has excellent penetration into both lung and urine.  Can treat for 14 d for complicated UTI in absence of being able to assess symptoms (3/15-3/28).  Please recall if further ID input is desired – team 1.
I agree with the history, exam and plan as outlined.  I personally evaluated the patient.    Jonn Pablo MD
I was physically present for the key portions of the evaluation and managemnent (E/M) service provided.  I agree with the above history, physical, and plan which I have reviewed and edited where appropriate, with the exceptions as per my note.    pt seen and examined.    he is awake but slightly lethargic and slow to respond. he follows simple commands and names well  perrl, eomi but slow pursuits, face symm, tup ml, no nystagmus, vff  lifts RUE less than LUE but full to confrontation - he says this is related to remote RUE fracture. full strength in both legs.  sens to LT intact  FTN intact    CTH unrevealing    AP: like toxic metabolic encephalopathy    - check vEEG, check MRI brain  - rest of management as per primar team  - will follow
Pt is a 67 y/o man c CAD s/p CABG, bioprosthetic AS (not amenable to intervention), EtoH cirrhosis, ESBL UTI and cards called with regarsd to worsening ST depressions.    ECG: st c global ischemia, worsening ST depressions and elevations in AVR    agree to transfuse pt PRBC  pt with global ischemia and severe AS not amenable to open repair bc of root dilatation  pt c anemia and hemolysis  would engage family with GOC discussion
Urine and lung isolates of Klebsiella pneumoniae were susceptible to ertapenem, which he had been on for several days at the time he became septic with Klebsiella bacteremia.  Source for persistence unclear.  Could be additional intra-abdominal process or endocarditis in the setting of severe AS.  Reason for response to meropenem unclear - suggests either resistance of isolate to ertapenem but susceptible to meropenem (1% at [facility]) or polymicrobial process involving additional organisms not in blood.  Will follow with you - team 1.
67 yo M with severe AS, HCC s/p TACE with procedure complicated by worsening encephalopathy and hypoxia s/p emergent intubation. Worsening of his mental status likely 2/2 sepsis 2/2 UTI in a patient with underlying cirrhosis. c/w antibiotics, precdex for sedation.
67 y/o M, current smoker (1ppd x 50 years), current every day ETOH abuse (1 pint vodka and several beers daily x 50 years), with PMHx of HLD, HTN (not on any medications),  CABG (LIMA to LAD, SVG to PDA in 2017), AS (s/p AVR in 2017), who presented to [facility] ED on 2/24/22 with complaints of 9/10 chest pain, found to have NSTEMI. On CT imaging found to have 2.5 cm HCC. Pt currently encephalopathic. Child-North score is B7. 3/6/22 Triple phase CTAP: 2.5 cm left hepatic lesion consistent with LI-RADS v 2018 Category: LR-5 Definitely HCC. OPTN Category (if LR-5): 5B. No locally invasive or metastatic disease. Left hepatic artery variant. No additional suspicious hepatic lesions. Other incidental comments as above. 2/28/22 CT angio AP:   Nodular liver contour, consistent with cirrhosis. A 2.4 cm hypervascular liver mass is suspicious for hepatocellular carcinoma. Recommend correlation with alpha-fetoprotein levels and MRI of liver. Severe atherosclerotic disease throughout the chest, abdomen and pelvis, with large amount of calcified plaque in pelvic arteries bilaterally and calcified and soft plaque in abdominal aorta. Multiple stenoses, as described above. Groundglass opacity in both lungs, probably edema. Mild splenomegaly.    A/P HCC in the setting of ETOH cirrhosis. Currently encephalopathic. Baseline Child North score B7AFP is 7. Portal HTN, no known varices (did not have EGD). Further mgmt pending surgical oncology consultation .
7 y/o M, current smoker (1ppd x 50 years), current every day ETOH abuse (1 pint vodka and several beers daily x 50 years), with PMHx of HLD, HTN (not on any medications),  CABG, AS (s/p AVR in 2017), who presented to [facility] ED on 2/24/22 with chest pain, admitted for NSTEMI, transferred to CTsx service for severe AS, requiring a TAVR procedure; upon workup for TAVR, patient incidentally noted to have a 2.4 cm hypervascular lesion - medicine consulted for same.    Labs and imaging reviewed    Problem List:  #Cirrhosis Decompensated, mild  #HCC  #Daily EtOH Use  #Aortic Stenosis - non-operative  #CABG  #HTN  #HLD    Plan:  -Lengthy discussion with patient and daughter bedside, importance stressed for outpatient follow up and daughter was able to repeat back why important for father to continue to follow for care  -Explained that if patient can follow and establish care with oncologist early there can be intervention  -Ultimately would need to be on Liver transplant list although mild disease right now  -No indication for inpatient work up for liver mass, patient can follow as outpatient with [facility]  -Recommend following GI recs in relationship to cirrhosis  -Transition problems for discharge to home with planned follow up at [facility], would ensure patient has scheduled appointment ASAP prior to discharge  -Medicine will continue to follow with you
65 YO M, current smoker (1ppd x 50 years), current every day ETOH abuse (1 pint vodka and several beers daily x 50 years), with PMHx of HLD, HTN (not on any medications), CABG (LIMA to LAD, SVG to PDA in 2017), AS (s/p AVR in 2017) with recently discovered restenosis, presented to [facility] ED on 2/24/22 with chest pain and episode of LOC, admitted to cardiology for NSTEMI with [facility] with nonobstructive disease, then found to have restenosis of bioprosthetic valve for which aortic root surgery was planned, but now deferred given liver lesion c/f HCC and new dx of cirrhosis. Today he is transferred to medicine for concern for mild hepatic encephalopathy. See full note above for further details.    - GI recs apprec, no e/o significant HE, no asterixis on exam. C/w lactulose/rifaximin. Will need outpt EGD. Plan for triple phase CT to assess HCC  - No e/o active withdrawal, librium d/c (may be contributing to TME)  - Given complex medical issues and recent sternotomy, pt does not appear to be good surgical candidate for either Shonda TAVR or SAVR/ao root rep per CTSx team. After brief discussion w/pt unclear of his comprehension of situation. Pt is  w/adult children. Will get palliative on board to help plan overall GOC and further medical mgmt  - C/w ASA81/Lipitor 80 for CAD

## 2022-03-22 DIAGNOSIS — K92.2 GASTROINTESTINAL HEMORRHAGE, UNSPECIFIED: ICD-10-CM

## 2022-03-22 DIAGNOSIS — A49.8 OTHER BACTERIAL INFECTIONS OF UNSPECIFIED SITE: ICD-10-CM

## 2022-03-22 LAB
-  AMPICILLIN/SULBACTAM: SIGNIFICANT CHANGE UP
-  AMPICILLIN: SIGNIFICANT CHANGE UP
-  CEFAZOLIN: SIGNIFICANT CHANGE UP
-  CEFTRIAXONE: SIGNIFICANT CHANGE UP
-  CIPROFLOXACIN: SIGNIFICANT CHANGE UP
-  ERTAPENEM: SIGNIFICANT CHANGE UP
-  GENTAMICIN: SIGNIFICANT CHANGE UP
-  MEROPENEM: SIGNIFICANT CHANGE UP
-  PIPERACILLIN/TAZOBACTAM: SIGNIFICANT CHANGE UP
-  TOBRAMYCIN: SIGNIFICANT CHANGE UP
-  TRIMETHOPRIM/SULFAMETHOXAZOLE: SIGNIFICANT CHANGE UP
ALBUMIN SERPL ELPH-MCNC: 2 G/DL — LOW (ref 3.3–5)
ALP SERPL-CCNC: 79 U/L — SIGNIFICANT CHANGE UP (ref 40–120)
ALT FLD-CCNC: 35 U/L — SIGNIFICANT CHANGE UP (ref 10–45)
ANION GAP SERPL CALC-SCNC: 8 MMOL/L — SIGNIFICANT CHANGE UP (ref 5–17)
AST SERPL-CCNC: 63 U/L — HIGH (ref 10–40)
BASOPHILS # BLD AUTO: 0.01 K/UL — SIGNIFICANT CHANGE UP (ref 0–0.2)
BASOPHILS NFR BLD AUTO: 0.2 % — SIGNIFICANT CHANGE UP (ref 0–2)
BILIRUB SERPL-MCNC: 0.9 MG/DL — SIGNIFICANT CHANGE UP (ref 0.2–1.2)
BLD GP AB SCN SERPL QL: NEGATIVE — SIGNIFICANT CHANGE UP
BUN SERPL-MCNC: 11 MG/DL — SIGNIFICANT CHANGE UP (ref 7–23)
CALCIUM SERPL-MCNC: 7.6 MG/DL — LOW (ref 8.4–10.5)
CHLORIDE SERPL-SCNC: 111 MMOL/L — HIGH (ref 96–108)
CO2 SERPL-SCNC: 17 MMOL/L — LOW (ref 22–31)
CREAT SERPL-MCNC: 0.67 MG/DL — SIGNIFICANT CHANGE UP (ref 0.5–1.3)
EGFR: 103 ML/MIN/1.73M2 — SIGNIFICANT CHANGE UP
EOSINOPHIL # BLD AUTO: 0.27 K/UL — SIGNIFICANT CHANGE UP (ref 0–0.5)
EOSINOPHIL NFR BLD AUTO: 4.1 % — SIGNIFICANT CHANGE UP (ref 0–6)
GLUCOSE SERPL-MCNC: 106 MG/DL — HIGH (ref 70–99)
HCT VFR BLD CALC: 30.1 % — LOW (ref 39–50)
HGB BLD-MCNC: 9.3 G/DL — LOW (ref 13–17)
IMM GRANULOCYTES NFR BLD AUTO: 0.3 % — SIGNIFICANT CHANGE UP (ref 0–1.5)
LYMPHOCYTES # BLD AUTO: 1.04 K/UL — SIGNIFICANT CHANGE UP (ref 1–3.3)
LYMPHOCYTES # BLD AUTO: 15.9 % — SIGNIFICANT CHANGE UP (ref 13–44)
MAGNESIUM SERPL-MCNC: 2.2 MG/DL — SIGNIFICANT CHANGE UP (ref 1.6–2.6)
MCHC RBC-ENTMCNC: 30 PG — SIGNIFICANT CHANGE UP (ref 27–34)
MCHC RBC-ENTMCNC: 30.9 GM/DL — LOW (ref 32–36)
MCV RBC AUTO: 97.1 FL — SIGNIFICANT CHANGE UP (ref 80–100)
METHOD TYPE: SIGNIFICANT CHANGE UP
MONOCYTES # BLD AUTO: 0.72 K/UL — SIGNIFICANT CHANGE UP (ref 0–0.9)
MONOCYTES NFR BLD AUTO: 11 % — SIGNIFICANT CHANGE UP (ref 2–14)
NEUTROPHILS # BLD AUTO: 4.5 K/UL — SIGNIFICANT CHANGE UP (ref 1.8–7.4)
NEUTROPHILS NFR BLD AUTO: 68.5 % — SIGNIFICANT CHANGE UP (ref 43–77)
NRBC # BLD: 0 /100 WBCS — SIGNIFICANT CHANGE UP (ref 0–0)
PHOSPHATE SERPL-MCNC: 3.2 MG/DL — SIGNIFICANT CHANGE UP (ref 2.5–4.5)
PLATELET # BLD AUTO: 85 K/UL — LOW (ref 150–400)
POTASSIUM SERPL-MCNC: 3.4 MMOL/L — LOW (ref 3.5–5.3)
POTASSIUM SERPL-SCNC: 3.4 MMOL/L — LOW (ref 3.5–5.3)
PROT SERPL-MCNC: 6.2 G/DL — SIGNIFICANT CHANGE UP (ref 6–8.3)
RBC # BLD: 3.1 M/UL — LOW (ref 4.2–5.8)
RBC # FLD: 20.9 % — HIGH (ref 10.3–14.5)
RH IG SCN BLD-IMP: POSITIVE — SIGNIFICANT CHANGE UP
SODIUM SERPL-SCNC: 136 MMOL/L — SIGNIFICANT CHANGE UP (ref 135–145)
WBC # BLD: 6.56 K/UL — SIGNIFICANT CHANGE UP (ref 3.8–10.5)
WBC # FLD AUTO: 6.56 K/UL — SIGNIFICANT CHANGE UP (ref 3.8–10.5)

## 2022-03-22 PROCEDURE — 71260 CT THORAX DX C+: CPT | Mod: 26

## 2022-03-22 PROCEDURE — 74177 CT ABD & PELVIS W/CONTRAST: CPT | Mod: 26

## 2022-03-22 PROCEDURE — 99233 SBSQ HOSP IP/OBS HIGH 50: CPT

## 2022-03-22 PROCEDURE — 70487 CT MAXILLOFACIAL W/DYE: CPT | Mod: 26

## 2022-03-22 PROCEDURE — 99232 SBSQ HOSP IP/OBS MODERATE 35: CPT | Mod: GC

## 2022-03-22 PROCEDURE — 99232 SBSQ HOSP IP/OBS MODERATE 35: CPT

## 2022-03-22 PROCEDURE — 99233 SBSQ HOSP IP/OBS HIGH 50: CPT | Mod: GC

## 2022-03-22 RX ORDER — POTASSIUM CHLORIDE 20 MEQ
20 PACKET (EA) ORAL ONCE
Refills: 0 | Status: COMPLETED | OUTPATIENT
Start: 2022-03-22 | End: 2022-03-22

## 2022-03-22 RX ORDER — MEROPENEM 1 G/30ML
1000 INJECTION INTRAVENOUS EVERY 8 HOURS
Refills: 0 | Status: DISCONTINUED | OUTPATIENT
Start: 2022-03-22 | End: 2022-03-30

## 2022-03-22 RX ADMIN — Medication 1 PATCH: at 22:14

## 2022-03-22 RX ADMIN — Medication 1 DROP(S): at 11:43

## 2022-03-22 RX ADMIN — LACTULOSE 30 GRAM(S): 10 SOLUTION ORAL at 14:03

## 2022-03-22 RX ADMIN — Medication 1 MILLIGRAM(S): at 11:43

## 2022-03-22 RX ADMIN — Medication 1 PATCH: at 21:00

## 2022-03-22 RX ADMIN — Medication 20 MILLIEQUIVALENT(S): at 15:05

## 2022-03-22 RX ADMIN — Medication 1 DROP(S): at 22:15

## 2022-03-22 RX ADMIN — PREGABALIN 1000 MICROGRAM(S): 225 CAPSULE ORAL at 11:42

## 2022-03-22 RX ADMIN — LACTULOSE 30 GRAM(S): 10 SOLUTION ORAL at 22:16

## 2022-03-22 RX ADMIN — MEROPENEM 100 MILLIGRAM(S): 1 INJECTION INTRAVENOUS at 22:14

## 2022-03-22 RX ADMIN — ATORVASTATIN CALCIUM 80 MILLIGRAM(S): 80 TABLET, FILM COATED ORAL at 22:15

## 2022-03-22 RX ADMIN — PANTOPRAZOLE SODIUM 40 MILLIGRAM(S): 20 TABLET, DELAYED RELEASE ORAL at 05:54

## 2022-03-22 RX ADMIN — PANTOPRAZOLE SODIUM 40 MILLIGRAM(S): 20 TABLET, DELAYED RELEASE ORAL at 17:21

## 2022-03-22 RX ADMIN — MEROPENEM 100 MILLIGRAM(S): 1 INJECTION INTRAVENOUS at 05:54

## 2022-03-22 RX ADMIN — Medication 1 TABLET(S): at 11:42

## 2022-03-22 RX ADMIN — Medication 1 PATCH: at 05:52

## 2022-03-22 RX ADMIN — CHLORHEXIDINE GLUCONATE 1 APPLICATION(S): 213 SOLUTION TOPICAL at 05:54

## 2022-03-22 RX ADMIN — MEROPENEM 100 MILLIGRAM(S): 1 INJECTION INTRAVENOUS at 14:03

## 2022-03-22 RX ADMIN — LACTULOSE 30 GRAM(S): 10 SOLUTION ORAL at 05:54

## 2022-03-22 RX ADMIN — Medication 1 PATCH: at 17:33

## 2022-03-22 RX ADMIN — TAMSULOSIN HYDROCHLORIDE 0.4 MILLIGRAM(S): 0.4 CAPSULE ORAL at 22:15

## 2022-03-22 NOTE — PROGRESS NOTE ADULT - SUBJECTIVE AND OBJECTIVE BOX
[facility] 66[facility]  MRN-[id number]  Patient is a 66y old  Male who presents with a chief complaint of Chest Pain (22 Mar 2022 12:47)    ****Transfer from [facility] to [facility]****    Hospital Course:     SUBJECTIVE:    12 Point ROS Negative unless noted otherwise above.  -------------------------------------------------------------------------------  VITAL SIGNS:  Vital Signs Last 24 Hrs  T(C): 36.6 (22 Mar 2022 12:00), Max: 36.9 (21 Mar 2022 18:20)  T(F): 97.9 (22 Mar 2022 12:00), Max: 98.4 (21 Mar 2022 18:20)  HR: 100 (22 Mar 2022 15:40) (81 - 108)  BP: 119/59 (22 Mar 2022 15:40) (89/51 - 134/71)  BP(mean): 85 (22 Mar 2022 15:40) (66 - 94)  RR: 18 (22 Mar 2022 15:40) (14 - 29)  SpO2: 98% (22 Mar 2022 15:40) (90% - 100%)  I&O's Summary    21 Mar 2022 07:01  -  22 Mar 2022 07:00  --------------------------------------------------------  IN: 1860 mL / OUT: 1715 mL / NET: 145 mL    22 Mar 2022 07:01  -  22 Mar 2022 15:56  --------------------------------------------------------  IN: 320 mL / OUT: 230 mL / NET: 90 mL        PHYSICAL EXAM:    General: NAD, well developed  HEENT: NC/AT; EOMI, PERRLA, anicteric sclera; moist mucosal membranes.  Neck: supple, trachea midline  Cardiovascular: RRR, +S1/S2; NO M/R/G  Respiratory: CTA B/L; no W/R/R  Gastrointestinal: soft, NT/ND; +BSx4  Extremities: WWP; no edema or cyanosis  Vascular: 2+ radial, DP/PT pulses B/L  Neurological: AAOx3; no focal deficits    ALLERGIES:  Allergies    No Known Allergies    Intolerances        MEDICATIONS:  MEDICATIONS  (STANDING):  artificial tears (preservative free) Ophthalmic Solution 1 Drop(s) Both EYES two times a day  atorvastatin 80 milliGRAM(s) Oral at bedtime  chlorhexidine 2% Cloths 1 Application(s) Topical <User Schedule>  cyanocobalamin 1000 MICROGram(s) Oral every 24 hours  DOXOrubicin INTRA-ARTERIAL (eMAR) 50 milliGRAM(s) IntraArterial once  ferrous    sulfate 325 milliGRAM(s) Oral <User Schedule>  folic acid 1 milliGRAM(s) Oral daily  lactulose Syrup 30 Gram(s) Oral three times a day  meropenem  IVPB 1000 milliGRAM(s) IV Intermittent every 8 hours  multivitamin 1 Tablet(s) Oral daily  nicotine - 21 mG/24Hr(s) Patch 1 patch Transdermal daily  pantoprazole  Injectable 40 milliGRAM(s) IV Push every 12 hours  rifAXIMin 550 milliGRAM(s) Oral every 12 hours  tamsulosin 0.4 milliGRAM(s) Oral at bedtime    MEDICATIONS  (PRN):  acetaminophen     Tablet .. 650 milliGRAM(s) Oral every 6 hours PRN Temp greater or equal to 38C (100.4F), Mild Pain (1 - 3)  sodium chloride 0.65% Nasal 1 Spray(s) Both Nostrils four times a day PRN Nasal Congestion      -------------------------------------------------------------------------------  LABS:                        9.3    6.56  )-----------( 85       ( 22 Mar 2022 05:37 )             30.1     03-22    136  |  111<H>  |  11  ----------------------------<  106<H>  3.4<L>   |  17<L>  |  0.67    Ca    7.6<L>      22 Mar 2022 05:37  Phos  3.2     03-22  Mg     2.2     03-22    TPro  6.2  /  Alb  2.0<L>  /  TBili  0.9  /  DBili  x   /  AST  63<H>  /  ALT  35  /  AlkPhos  79  03-22    LIVER FUNCTIONS - ( 22 Mar 2022 05:37 )  Alb: 2.0 g/dL / Pro: 6.2 g/dL / ALK PHOS: 79 U/L / ALT: 35 U/L / AST: 63 U/L / GGT: x           PT/INR - ( 21 Mar 2022 05:52 )   PT: 18.3 sec;   INR: 1.53          PTT - ( 21 Mar 2022 05:52 )  PTT:31.8 sec    CAPILLARY BLOOD GLUCOSE      Culture - Blood (collected 20 Mar 2022 05:56)  Source: .Blood Blood  Preliminary Report (22 Mar 2022 06:01):    No growth at 2 days.    Culture - Blood (collected 20 Mar 2022 05:56)  Source: .Blood Blood  Preliminary Report (22 Mar 2022 06:01):    No growth at 2 days.      COVID-19 PCR: NotDetec (21 Mar 2022 03:33)  COVID-19 PCR: Negative (13 Mar 2022 09:10)  COVID-19 PCR: Negative (09 Mar 2022 07:25)  COVID-19 PCR: NotDetec (28 Feb 2022 10:40)  SARS-CoV-2: NotDetec (24 Feb 2022 11:20)      RADIOLOGY & ADDITIONAL TESTS: Reviewed.   [facility] 66[facility]  MRN-[id number]  Patient is a 66y old  Male who presents with a chief complaint of Chest Pain (22 Mar 2022 12:47)    [facility]    Hospital Course: 65 YO M, current smoker (1ppd x 50 years), current every day ETOH abuse (1 pint vodka and several beers daily x 50 years), with PMHx of HLD, HTN (not on any medications), CABG (LIMA to LAD, SVG to PDA in 2017), AS (s/p AVR in 2017) with recently discovered restenosis, presented to [facility] ED on 2/24/22 with chest pain and episode of LOC, initially admitted to cardiology for NSTEMI with C showing nonobstructive disease. TTE was reperformed and patient was  found to have restenosis of bioprosthetic valve for which aortic root surgery was planned.  Patient was transferred to CT surgery given complex anatomy with plan for TAVR and aortic root surgery.  During this time on CT surgery service he was found to have liver cirrhosis and a 2.4cmx2.4 cm mass on his liver concerning for HCC. Patient was transferred to medicine service and rifaxamin and lactulose were started for hepatic encephalopathy.  Patient received intermittent blood transfusion, however with no leslie blood loss aside from minor nose bleeds. Patient was not a surgical candidate and IR was consulted who planned for embolization.   However patient was found to be tachycardic to 120s with new leukocytosis. On 3/14, patient went for planned transarterial chemoembolization by IR for further evaluation for liver lesion but around start of procedure became obtunded with increased secretions and concern that he was not protecting his airway. Following Procedure patient became hypotensive to 80s/50s tachycardic to 120s, was minimally arousable to sternal rub and gurgling with c/f airway protection. ICU consulted for evaluation of urgent vs. emergent intubation. Course c/b anemia likely attributed to intermittent epistaxis for which ENT was consulted w/o plans for acute intervention. In the MICU started on Ceftriaxone 2 gram for URTI, that was the switched to Ertapenem 1 gram daily due to Klebsiella ESBL. Started on vancomycin for possible HA, that was soon discontinued as MRSA swab was negative on 3/14. Patient was d/c of NE and started on Phenylephrine due to underlying AS. AC restarted on 3/15, decision verified with IR. Patient extubated on 3/16. Patient receiving nutrition by NGT placed on the 3/16 and pending S&S eval. Patient stepped up overnight due to hypotension to systolic 60s, requiring phenylephrine and monitoring for need for intubation. Patient lethargic but arousable for which patient was not intubated. Patient retaining urine for which rogers was placed, and NGT was placed for feeding and medications. Patient started on Meropenem for ESBL pneumonia growing in sputum, blood and urine.  OMFS recommended maxillofacial with IV contrast for possible abscess, that was negative for abscess. Patient with CT chest remarkable for pul artery congestion, pending TTE. Patient will be stepped down to [facility] for further management.       SUBJECTIVE: Pt seen/examined at bedside. Patient denied any active pain at this time. He states he is doing okay and     12 Point ROS Negative unless noted otherwise above.  -------------------------------------------------------------------------------  VITAL SIGNS:  Vital Signs Last 24 Hrs  T(C): 36.6 (22 Mar 2022 12:00), Max: 36.9 (21 Mar 2022 18:20)  T(F): 97.9 (22 Mar 2022 12:00), Max: 98.4 (21 Mar 2022 18:20)  HR: 100 (22 Mar 2022 15:40) (81 - 108)  BP: 119/59 (22 Mar 2022 15:40) (89/51 - 134/71)  BP(mean): 85 (22 Mar 2022 15:40) (66 - 94)  RR: 18 (22 Mar 2022 15:40) (14 - 29)  SpO2: 98% (22 Mar 2022 15:40) (90% - 100%)  I&O's Summary    21 Mar 2022 07:01  -  22 Mar 2022 07:00  --------------------------------------------------------  IN: 1860 mL / OUT: 1715 mL / NET: 145 mL    22 Mar 2022 07:01  -  22 Mar 2022 15:56  --------------------------------------------------------  IN: 320 mL / OUT: 230 mL / NET: 90 mL        PHYSICAL EXAM:    General: NAD, well developed  HEENT: NC/AT; EOMI, PERRLA, anicteric sclera; moist mucosal membranes.  Neck: supple, trachea midline  Cardiovascular: RRR, +S1/S2; NO M/R/G  Respiratory: CTA B/L; no W/R/R  Gastrointestinal: soft, NT/ND; +BSx4  Extremities: WWP; no edema or cyanosis  Vascular: 2+ radial, DP/PT pulses B/L  Neurological: AAOx3; no focal deficits    ALLERGIES:  Allergies    No Known Allergies    Intolerances        MEDICATIONS:  MEDICATIONS  (STANDING):  artificial tears (preservative free) Ophthalmic Solution 1 Drop(s) Both EYES two times a day  atorvastatin 80 milliGRAM(s) Oral at bedtime  chlorhexidine 2% Cloths 1 Application(s) Topical <User Schedule>  cyanocobalamin 1000 MICROGram(s) Oral every 24 hours  DOXOrubicin INTRA-ARTERIAL (eMAR) 50 milliGRAM(s) IntraArterial once  ferrous    sulfate 325 milliGRAM(s) Oral <User Schedule>  folic acid 1 milliGRAM(s) Oral daily  lactulose Syrup 30 Gram(s) Oral three times a day  meropenem  IVPB 1000 milliGRAM(s) IV Intermittent every 8 hours  multivitamin 1 Tablet(s) Oral daily  nicotine - 21 mG/24Hr(s) Patch 1 patch Transdermal daily  pantoprazole  Injectable 40 milliGRAM(s) IV Push every 12 hours  rifAXIMin 550 milliGRAM(s) Oral every 12 hours  tamsulosin 0.4 milliGRAM(s) Oral at bedtime    MEDICATIONS  (PRN):  acetaminophen     Tablet .. 650 milliGRAM(s) Oral every 6 hours PRN Temp greater or equal to 38C (100.4F), Mild Pain (1 - 3)  sodium chloride 0.65% Nasal 1 Spray(s) Both Nostrils four times a day PRN Nasal Congestion      -------------------------------------------------------------------------------  LABS:                        9.3    6.56  )-----------( 85       ( 22 Mar 2022 05:37 )             30.1     03-22    136  |  111<H>  |  11  ----------------------------<  106<H>  3.4<L>   |  17<L>  |  0.67    Ca    7.6<L>      22 Mar 2022 05:37  Phos  3.2     03-22  Mg     2.2     03-22    TPro  6.2  /  Alb  2.0<L>  /  TBili  0.9  /  DBili  x   /  AST  63<H>  /  ALT  35  /  AlkPhos  79  03-22    LIVER FUNCTIONS - ( 22 Mar 2022 05:37 )  Alb: 2.0 g/dL / Pro: 6.2 g/dL / ALK PHOS: 79 U/L / ALT: 35 U/L / AST: 63 U/L / GGT: x           PT/INR - ( 21 Mar 2022 05:52 )   PT: 18.3 sec;   INR: 1.53          PTT - ( 21 Mar 2022 05:52 )  PTT:31.8 sec    CAPILLARY BLOOD GLUCOSE      Culture - Blood (collected 20 Mar 2022 05:56)  Source: .Blood Blood  Preliminary Report (22 Mar 2022 06:01):    No growth at 2 days.    Culture - Blood (collected 20 Mar 2022 05:56)  Source: .Blood Blood  Preliminary Report (22 Mar 2022 06:01):    No growth at 2 days.      COVID-19 PCR: NotDetec (21 Mar 2022 03:33)  COVID-19 PCR: Negative (13 Mar 2022 09:10)  COVID-19 PCR: Negative (09 Mar 2022 07:25)  COVID-19 PCR: NotDetec (28 Feb 2022 10:40)  SARS-CoV-2: NotDetec (24 Feb 2022 11:20)      RADIOLOGY & ADDITIONAL TESTS: Reviewed.   [facility] 66[facility]  MRN-[id number]  Patient is a 66y old  Male who presents with a chief complaint of Chest Pain (22 Mar 2022 12:47)    [facility]    Hospital Course: 67 YO M, current smoker (1ppd x 50 years), current every day ETOH abuse (1 pint vodka and several beers daily x 50 years), with PMHx of HLD, HTN (not on any medications), CABG (LIMA to LAD, SVG to PDA in 2017), AS (s/p AVR in 2017) with recently discovered restenosis, presented to [facility] ED on 2/24/22 with chest pain and episode of LOC, initially admitted to cardiology for NSTEMI with C showing nonobstructive disease. TTE was reperformed and patient was  found to have restenosis of bioprosthetic valve for which aortic root surgery was planned.  Patient was transferred to CT surgery given complex anatomy with plan for TAVR and aortic root surgery.  During this time on CT surgery service he was found to have liver cirrhosis and a 2.4cmx2.4 cm mass on his liver concerning for HCC. Patient was transferred to medicine service and rifaxamin and lactulose were started for hepatic encephalopathy.  Patient received intermittent blood transfusion, however with no leslie blood loss aside from minor nose bleeds. Patient was not a surgical candidate and IR was consulted who planned for embolization.   However patient was found to be tachycardic to 120s with new leukocytosis. On 3/14, patient went for planned transarterial chemoembolization by IR for further evaluation for liver lesion but around start of procedure became obtunded with increased secretions and concern that he was not protecting his airway. Following Procedure patient became hypotensive to 80s/50s tachycardic to 120s, was minimally arousable to sternal rub and gurgling with c/f airway protection. ICU consulted for evaluation of urgent vs. emergent intubation. Course c/b anemia likely attributed to intermittent epistaxis for which ENT was consulted w/o plans for acute intervention. In the MICU started on Ceftriaxone 2 gram for URTI, that was the switched to Ertapenem 1 gram daily due to Klebsiella ESBL. Started on vancomycin for possible HA, that was soon discontinued as MRSA swab was negative on 3/14. Patient was d/c of NE and started on Phenylephrine due to underlying AS. AC restarted on 3/15, decision verified with IR. Patient extubated on 3/16. Patient receiving nutrition by NGT placed on the 3/16 and pending S&S eval. Patient stepped up overnight due to hypotension to systolic 60s, requiring phenylephrine and monitoring for need for intubation. Patient lethargic but arousable for which patient was not intubated. Patient retaining urine for which rogers was placed, and NGT was placed for feeding and medications. Patient started on Meropenem for ESBL pneumonia growing in sputum, blood and urine.  OMFS recommended maxillofacial with IV contrast for possible abscess, that was negative for abscess. Patient with CT chest remarkable for pul artery congestion, pending TTE. Patient will be stepped down to [facility] for further management.       SUBJECTIVE: Pt seen/examined at bedside. Patient denied any active pain at this time. He states he is doing okay and denies any acute pain at this time.     12 Point ROS Negative unless noted otherwise above.  -------------------------------------------------------------------------------  VITAL SIGNS:  Vital Signs Last 24 Hrs  T(C): 36.6 (22 Mar 2022 12:00), Max: 36.9 (21 Mar 2022 18:20)  T(F): 97.9 (22 Mar 2022 12:00), Max: 98.4 (21 Mar 2022 18:20)  HR: 100 (22 Mar 2022 15:40) (81 - 108)  BP: 119/59 (22 Mar 2022 15:40) (89/51 - 134/71)  BP(mean): 85 (22 Mar 2022 15:40) (66 - 94)  RR: 18 (22 Mar 2022 15:40) (14 - 29)  SpO2: 98% (22 Mar 2022 15:40) (90% - 100%)  I&O's Summary    21 Mar 2022 07:01  -  22 Mar 2022 07:00  --------------------------------------------------------  IN: 1860 mL / OUT: 1715 mL / NET: 145 mL    22 Mar 2022 07:01  -  22 Mar 2022 15:56  --------------------------------------------------------  IN: 320 mL / OUT: 230 mL / NET: 90 mL        PHYSICAL EXAM:    General: NAD, laying in bed, NGT in place   HEENT: NC/AT; EOMI, PERRLA, anicteric sclera  Cardiovascular: systolic murmur over R sternal border; regular rhythm and rate   Respiratory: CTA B/L; no W/R/R  Gastrointestinal: soft, NT/ND; +BSx4; gauze and tape over scar in LLQ from procedure   Extremities: WWP; no edema or cyanosis  Vascular: 2+ radial, DP/PT pulses B/L  Neurological: responsive, awake/alert     ALLERGIES:  Allergies    No Known Allergies    Intolerances    MEDICATIONS:  MEDICATIONS  (STANDING):  artificial tears (preservative free) Ophthalmic Solution 1 Drop(s) Both EYES two times a day  atorvastatin 80 milliGRAM(s) Oral at bedtime  chlorhexidine 2% Cloths 1 Application(s) Topical <User Schedule>  cyanocobalamin 1000 MICROGram(s) Oral every 24 hours  DOXOrubicin INTRA-ARTERIAL (eMAR) 50 milliGRAM(s) IntraArterial once  ferrous    sulfate 325 milliGRAM(s) Oral <User Schedule>  folic acid 1 milliGRAM(s) Oral daily  lactulose Syrup 30 Gram(s) Oral three times a day  meropenem  IVPB 1000 milliGRAM(s) IV Intermittent every 8 hours  multivitamin 1 Tablet(s) Oral daily  nicotine - 21 mG/24Hr(s) Patch 1 patch Transdermal daily  pantoprazole  Injectable 40 milliGRAM(s) IV Push every 12 hours  rifAXIMin 550 milliGRAM(s) Oral every 12 hours  tamsulosin 0.4 milliGRAM(s) Oral at bedtime    MEDICATIONS  (PRN):  acetaminophen     Tablet .. 650 milliGRAM(s) Oral every 6 hours PRN Temp greater or equal to 38C (100.4F), Mild Pain (1 - 3)  sodium chloride 0.65% Nasal 1 Spray(s) Both Nostrils four times a day PRN Nasal Congestion  -------------------------------------------------------------------------------  LABS:                        9.3    6.56  )-----------( 85       ( 22 Mar 2022 05:37 )             30.1     03-22    136  |  111<H>  |  11  ----------------------------<  106<H>  3.4<L>   |  17<L>  |  0.67    Ca    7.6<L>      22 Mar 2022 05:37  Phos  3.2     03-22  Mg     2.2     03-22    TPro  6.2  /  Alb  2.0<L>  /  TBili  0.9  /  DBili  x   /  AST  63<H>  /  ALT  35  /  AlkPhos  79  03-22    LIVER FUNCTIONS - ( 22 Mar 2022 05:37 )  Alb: 2.0 g/dL / Pro: 6.2 g/dL / ALK PHOS: 79 U/L / ALT: 35 U/L / AST: 63 U/L / GGT: x           PT/INR - ( 21 Mar 2022 05:52 )   PT: 18.3 sec;   INR: 1.53        PTT - ( 21 Mar 2022 05:52 )  PTT:31.8 sec    CAPILLARY BLOOD GLUCOSE    Culture - Blood (collected 20 Mar 2022 05:56)  Source: .Blood Blood  Preliminary Report (22 Mar 2022 06:01):    No growth at 2 days.    Culture - Blood (collected 20 Mar 2022 05:56)  Source: .Blood Blood  Preliminary Report (22 Mar 2022 06:01):    No growth at 2 days.    COVID-19 PCR: NotDetec (21 Mar 2022 03:33)  COVID-19 PCR: Negative (13 Mar 2022 09:10)  COVID-19 PCR: Negative (09 Mar 2022 07:25)  COVID-19 PCR: NotDetec (28 Feb 2022 10:40)  SARS-CoV-2: NotDetec (24 Feb 2022 11:20)    RADIOLOGY & ADDITIONAL TESTS: Reviewed.

## 2022-03-22 NOTE — PROGRESS NOTE ADULT - ASSESSMENT
67 YO M, current smoker (1ppd x 50 years), current every day ETOH abuse (1 pint vodka and several beers daily x 50 years), with PMHx of HLD, HTN (not on any medications), CABG (LIMA to LAD, SVG to PDA in 2017), AS (s/p AVR in 2017) with recently discovered restenosis, presented to [facility] ED on 2/24/22 with chest pain and episode of LOC, initially admitted to cardiology for NSTEMI with LHC showing nonobstructive disease. Patient was found to have restenosis of bioprosthetic valve, however not surgical candidate. Patient transferred to medicine for further management of HCC, hepatic encephalopathy. After IR TACE of HCC on 3/14, patient intubated for airway protection and transferred to MICU. Patient s/p phenylephrine and extubated. Pt transitioned off of pressors and transferred back to [facility].

## 2022-03-22 NOTE — PROGRESS NOTE ADULT - ATTENDING COMMENTS
clinically appears stable at this time  he was deemed not a candidate for valve in valve  no medications at this time  permissive hypertension  avoid AVN agents  will confirm with structural he is not a candidate in the future as an outpatient

## 2022-03-22 NOTE — PROGRESS NOTE ADULT - PROBLEM SELECTOR PLAN 1
IMPROVING   Unlikely hepatic encephalopathy as no asterixis and is currently compensated. Neuro exam nonfocal. Possibly related to chronic alcohol use, Wernicke's or depression i/s/o HCC diagnosis. As per wife, pt is not normally lethargic at home. Completed course of high dose thiamine, last librium dose on 03/01. CTH and MRI w/op acute abnormality. During the night of 3/13-3/14 flow sheets showing 100.1 Temp and pt becoming tachycardic to 120s. Pt not complaining of any localizing signs of infection, CXR unremarkable however UA was positive. UCX sent and pt started on CTX 1g Q24hrs (did not receive first dose as taken to TACE procedure). Post TACE procedure c/f protection of airway given increased somnolence potentially 2/2 anesthesia. At that time patient intubated for airway protection and transferred to MICU, s/p extubation.   - VEEG: Mild-to-moderate generalized slowing suggestive of a similar degree of diffuse or multifocal cerebral dysfunction. No epileptiform discharges or electrographic seizures were recorded.   - Neuro following  - Infectious disease as below   - c/w hepatic encephalopathy treatment: Rifaximin and lactulose, rectal tube in place

## 2022-03-22 NOTE — PROGRESS NOTE ADULT - PROBLEM SELECTOR PLAN 5
Normocytic hypochromic anemia likely 2/2 hemolysis due to prosthetic valve. Minimal epistaxis.  - Completed IV iron X 3 days  - c/w b12 1mg Qd  - c/w folate 1mg qd  - c/w ferrous sulfate 325 mg daily  PO  - Transfusion goal > 7

## 2022-03-22 NOTE — PROGRESS NOTE ADULT - SUBJECTIVE AND OBJECTIVE BOX
GASTROENTEROLOGY PROGRESS NOTE  Patient seen and examined at bedside. CT A/P (3/21) Hepatic cirrhosis. Mild splenomegaly. Trace volume ascites. No bowel wall thickening or intestinal obstruction; however, there is mild inflammation in the sigmoid mesocolon adjacent to the otherwise unremarkable sigmoid colon, potentially signifying mild colitis. Alternatively, there is diffuse mesenteric edema related to cirrhosis and the stranding in the sigmoid mesocolon could be related to this. Indeterminate 2 cm hypoattenuating hepatic lesion. CT Chest (3/21) Small bilateral pleural effusions and pulmonary venous congestion. Negative for pulmonary consolidation. Findings of hepatic cirrhosis with nodule within the left lobe of the liver consistent with known HCC. CT Maxillofacial (3/21) Multifocal periapical dental lucencies. No associated overlying soft tissue inflammation or abscess. The uvula is subjectively prominent which raises the possibility of uvulitis.      PERTINENT REVIEW OF SYSTEMS:  CONSTITUTIONAL: No weakness, fevers or chills  HEENT: No visual changes; No vertigo or throat pain   GASTROINTESTINAL: As above.  NEUROLOGICAL: No numbness or weakness  SKIN: No itching, burning, rashes, or lesions     Allergies    No Known Allergies    Intolerances      MEDICATIONS:  MEDICATIONS  (STANDING):  artificial tears (preservative free) Ophthalmic Solution 1 Drop(s) Both EYES two times a day  atorvastatin 80 milliGRAM(s) Oral at bedtime  chlorhexidine 2% Cloths 1 Application(s) Topical <User Schedule>  cyanocobalamin 1000 MICROGram(s) Oral every 24 hours  DOXOrubicin INTRA-ARTERIAL (eMAR) 50 milliGRAM(s) IntraArterial once  ferrous    sulfate 325 milliGRAM(s) Oral <User Schedule>  folic acid 1 milliGRAM(s) Oral daily  lactulose Syrup 30 Gram(s) Oral three times a day  meropenem  IVPB 1000 milliGRAM(s) IV Intermittent every 8 hours  multivitamin 1 Tablet(s) Oral daily  nicotine - 21 mG/24Hr(s) Patch 1 patch Transdermal daily  pantoprazole  Injectable 40 milliGRAM(s) IV Push every 12 hours  rifAXIMin 550 milliGRAM(s) Oral every 12 hours  tamsulosin 0.4 milliGRAM(s) Oral at bedtime    MEDICATIONS  (PRN):  acetaminophen     Tablet .. 650 milliGRAM(s) Oral every 6 hours PRN Temp greater or equal to 38C (100.4F), Mild Pain (1 - 3)  sodium chloride 0.65% Nasal 1 Spray(s) Both Nostrils four times a day PRN Nasal Congestion    Vital Signs Last 24 Hrs  T(C): 36.6 (22 Mar 2022 12:00), Max: 36.9 (21 Mar 2022 18:20)  T(F): 97.9 (22 Mar 2022 12:00), Max: 98.4 (21 Mar 2022 18:20)  HR: 100 (22 Mar 2022 15:40) (81 - 108)  BP: 119/59 (22 Mar 2022 15:40) (89/51 - 134/71)  BP(mean): 85 (22 Mar 2022 15:40) (66 - 94)  RR: 18 (22 Mar 2022 15:40) (14 - 29)  SpO2: 98% (22 Mar 2022 15:40) (90% - 100%)    03-21 @ 07:01  -  03-22 @ 07:00  --------------------------------------------------------  IN: 1860 mL / OUT: 1715 mL / NET: 145 mL    03-22 @ 07:01  -  03-22 @ 15:56  --------------------------------------------------------  IN: 320 mL / OUT: 230 mL / NET: 90 mL      PHYSICAL EXAM:    General: male, lying in bed; in no acute distress  HEENT: MMM, conjunctiva and sclera clear  Gastrointestinal: Soft, obese, distended abdomen; non-tender; Normal bowel sounds; No rebound or guarding  Rectal: rectal tube in place with dark brown stool noted  Extremities: Normal range of motion, No clubbing, cyanosis or edema  Neurological: opens eyes to noxious stimuli, not following commands, Oriented x 0-1, no asterixis  Skin: Warm and dry. No obvious rash    LABS:                        9.3    6.56  )-----------( 85       ( 22 Mar 2022 05:37 )             30.1     03-22    136  |  111<H>  |  11  ----------------------------<  106<H>  3.4<L>   |  17<L>  |  0.67    Ca    7.6<L>      22 Mar 2022 05:37  Phos  3.2     03-22  Mg     2.2     03-22    TPro  6.2  /  Alb  2.0<L>  /  TBili  0.9  /  DBili  x   /  AST  63<H>  /  ALT  35  /  AlkPhos  79  03-22    PT/INR - ( 21 Mar 2022 05:52 )   PT: 18.3 sec;   INR: 1.53          PTT - ( 21 Mar 2022 05:52 )  PTT:31.8 sec                  Culture - Blood (collected 20 Mar 2022 05:56)  Source: .Blood Blood  Preliminary Report (22 Mar 2022 06:01):    No growth at 2 days.    Culture - Blood (collected 20 Mar 2022 05:56)  Source: .Blood Blood  Preliminary Report (22 Mar 2022 06:01):    No growth at 2 days.      RADIOLOGY & ADDITIONAL STUDIES:  Reviewed

## 2022-03-22 NOTE — PROGRESS NOTE ADULT - PROBLEM SELECTOR PLAN 10
F: None -- be cautious i/s/o severe aortic stenosis  E: replete as needed  D: NGT in place w/ tube feeds   DVT Proph: held  GI Proph: protonix BID   Dispo: [facility]

## 2022-03-22 NOTE — PROGRESS NOTE ADULT - SUBJECTIVE AND OBJECTIVE BOX
Infectious Disease Progress Note    Interval Events: no acute events    Subjective:  Patient seen and evaluated at bedside. Patient interview limited, but patient denied specific complaints. ROS negative.    ANTIBIOTICS/RELEVANT:  antimicrobials  meropenem  IVPB 1000 milliGRAM(s) IV Intermittent every 8 hours  rifAXIMin 550 milliGRAM(s) Oral every 12 hours    immunologic:    OTHER:  acetaminophen     Tablet .. 650 milliGRAM(s) Oral every 6 hours PRN  artificial tears (preservative free) Ophthalmic Solution 1 Drop(s) Both EYES two times a day  atorvastatin 80 milliGRAM(s) Oral at bedtime  chlorhexidine 2% Cloths 1 Application(s) Topical <User Schedule>  cyanocobalamin 1000 MICROGram(s) Oral every 24 hours  DOXOrubicin INTRA-ARTERIAL (eMAR) 50 milliGRAM(s) IntraArterial once  ferrous    sulfate 325 milliGRAM(s) Oral <User Schedule>  folic acid 1 milliGRAM(s) Oral daily  lactulose Syrup 30 Gram(s) Oral three times a day  multivitamin 1 Tablet(s) Oral daily  nicotine - 21 mG/24Hr(s) Patch 1 patch Transdermal daily  pantoprazole  Injectable 40 milliGRAM(s) IV Push every 12 hours  sodium chloride 0.65% Nasal 1 Spray(s) Both Nostrils four times a day PRN  tamsulosin 0.4 milliGRAM(s) Oral at bedtime      Objective:  Vital Signs Last 24 Hrs  T(C): 36.6 (22 Mar 2022 12:00), Max: 36.9 (21 Mar 2022 18:20)  T(F): 97.9 (22 Mar 2022 12:00), Max: 98.4 (21 Mar 2022 18:20)  HR: 100 (22 Mar 2022 15:40) (81 - 108)  BP: 119/59 (22 Mar 2022 15:40) (89/51 - 134/71)  BP(mean): 85 (22 Mar 2022 15:40) (66 - 94)  RR: 18 (22 Mar 2022 15:40) (14 - 29)  SpO2: 98% (22 Mar 2022 15:40) (90% - 100%)    PHYSICAL EXAM: **NOTE IN PROGRESS**  General: NAD  Ear/Nose/Throat: cystic lesions in inferior mouth under tongue, appear to be firm	  Neck: supple  Cardiovascular: +S1/S2; RRR  Respiratory: CTA B/L; no W/R/R  Gastrointestinal: soft; distended abdomen  Extremities: WWP  Vascular: 2+ radial, DP/PT pulses B/L  Neurological: AAOx1-2  Skin: no rashes        LABS:                        9.3    6.56  )-----------( 85       ( 22 Mar 2022 05:37 )             30.1     03-22    136  |  111<H>  |  11  ----------------------------<  106<H>  3.4<L>   |  17<L>  |  0.67    Ca    7.6<L>      22 Mar 2022 05:37  Phos  3.2     03-22  Mg     2.2     03-22    TPro  6.2  /  Alb  2.0<L>  /  TBili  0.9  /  DBili  x   /  AST  63<H>  /  ALT  35  /  AlkPhos  79  03-22    PT/INR - ( 21 Mar 2022 05:52 )   PT: 18.3 sec;   INR: 1.53          PTT - ( 21 Mar 2022 05:52 )  PTT:31.8 sec      MICROBIOLOGY:            RECENT CULTURES:  03-20 @ 05:56  .Blood Blood  --  --  --    No growth at 2 days.  --  03-19 @ 06:42  .Blood Blood  Klebsiella pneumoniae ESBL  Blood Culture PCR  Blood Culture PCR  PCR    Growth in anaerobic bottle: Klebsiella pneumoniae ESBL  Floor previously notified.  Aerobic Bottle: No growth to date.  Change in culture status will be immediately reported.  --      RADIOLOGY & ADDITIONAL STUDIES:  < from: CT Abdomen and Pelvis w/ IV Cont (03.22.22 @ 00:39) >  IMPRESSION:  1. Hepatic cirrhosis.  2. Mild splenomegaly.  3. Trace volume ascites.  4. No bowel wall thickening or intestinal obstruction; however, there is   mild inflammation in the  sigmoid mesocolon adjacent to the otherwise unremarkable sigmoid colon,   potentially signifying mild  colitis. Alternatively, there is diffuse mesenteric edema related to   cirrhosis and the stranding in the  sigmoid mesocolon could be related to this.  5. Indeterminate 2 cm hypoattenuating hepatic lesion.    < end of copied text >

## 2022-03-22 NOTE — PROGRESS NOTE ADULT - PROBLEM SELECTOR PLAN 6
Long standing EtOH consumption. Not showing any signs of withdrawal at the moment. Last drink prior to admission which is almost 2 weeks ago. Pt was given 3 days of librium Q8hrs while on CT surgery however did not display signs of withdrawal per chart review  - c/w Multivitamin and Folic Acid      #ALC (alcoholic liver cirrhosis)  2/2 long standing EtOH abuse. Likely compensated.. Likely no hepatic encephalopathy. No known EV bleeding. NO ascites however now with signs of new infection started on antibiotics today.  - no known Varices, but no prior EGD evaluation, he will need it outpatient, although has significant risk factors.

## 2022-03-22 NOTE — PROGRESS NOTE ADULT - SUBJECTIVE AND OBJECTIVE BOX
INCOMPLETE    INTERVAL HPI/OVERNIGHT EVENTS: CT  completed     SUBJECTIVE: Patient seen and examined at bedside.     OBJECTIVE:    VITAL SIGNS:  ICU Vital Signs Last 24 Hrs  T(C): 36.4 (22 Mar 2022 01:45), Max: 36.9 (21 Mar 2022 18:20)  T(F): 97.5 (22 Mar 2022 01:45), Max: 98.4 (21 Mar 2022 18:20)  HR: 90 (22 Mar 2022 05:00) (80 - 101)  BP: 89/51 (22 Mar 2022 05:00) (86/52 - 134/71)  BP(mean): 66 (22 Mar 2022 05:00) (64 - 94)  ABP: --  ABP(mean): --  RR: 16 (22 Mar 2022 05:00) (14 - 29)  SpO2: 94% (22 Mar 2022 05:00) (94% - 100%)        03-20 @ 07:01  -  03-21 @ 07:00  --------------------------------------------------------  IN: 1695.5 mL / OUT: 1515 mL / NET: 180.5 mL    03-21 @ 07:01  -  03-22 @ 06:23  --------------------------------------------------------  IN: 1690 mL / OUT: 1115 mL / NET: 575 mL      CAPILLARY BLOOD GLUCOSE          PHYSICAL EXAM:    General: NAD, letheragic   HEENT: MMM  Neck: supple  Cardiovascular: +S1/S2; RRR  Respiratory: CTA B/L; no W/R/R  Gastrointestinal: soft, NT/ND  Extremities: WWP; no edema, clubbing or cyanosis  Vascular: 2+ radial, DP/PT pulses B/L  Neurological: AAOx1,  does not engage past opening eyes to name       MEDICATIONS:  MEDICATIONS  (STANDING):  artificial tears (preservative free) Ophthalmic Solution 1 Drop(s) Both EYES two times a day  atorvastatin 80 milliGRAM(s) Oral at bedtime  chlorhexidine 2% Cloths 1 Application(s) Topical <User Schedule>  cyanocobalamin 1000 MICROGram(s) Oral every 24 hours  DOXOrubicin INTRA-ARTERIAL (eMAR) 50 milliGRAM(s) IntraArterial once  ferrous    sulfate 325 milliGRAM(s) Oral <User Schedule>  folic acid 1 milliGRAM(s) Oral daily  lactulose Syrup 30 Gram(s) Oral three times a day  meropenem  IVPB 1000 milliGRAM(s) IV Intermittent every 8 hours  multivitamin 1 Tablet(s) Oral daily  nicotine - 21 mG/24Hr(s) Patch 1 patch Transdermal daily  pantoprazole  Injectable 40 milliGRAM(s) IV Push every 12 hours  phenylephrine    Infusion 0.1 MICROgram(s)/kG/Min (2.55 mL/Hr) IV Continuous <Continuous>  rifAXIMin 550 milliGRAM(s) Oral every 12 hours  tamsulosin 0.4 milliGRAM(s) Oral at bedtime    MEDICATIONS  (PRN):  acetaminophen     Tablet .. 650 milliGRAM(s) Oral every 6 hours PRN Temp greater or equal to 38C (100.4F), Mild Pain (1 - 3)  sodium chloride 0.65% Nasal 1 Spray(s) Both Nostrils four times a day PRN Nasal Congestion      ALLERGIES:  Allergies    No Known Allergies    Intolerances        LABS:                        9.3    6.56  )-----------( 85       ( 22 Mar 2022 05:37 )             30.1     03-22    136  |  111<H>  |  11  ----------------------------<  106<H>  3.4<L>   |  17<L>  |  0.67    Ca    7.6<L>      22 Mar 2022 05:37  Phos  3.2     03-22  Mg     2.2     03-22    TPro  6.2  /  Alb  2.0<L>  /  TBili  0.9  /  DBili  x   /  AST  63<H>  /  ALT  35  /  AlkPhos  79  03-22    PT/INR - ( 21 Mar 2022 05:52 )   PT: 18.3 sec;   INR: 1.53          PTT - ( 21 Mar 2022 05:52 )  PTT:31.8 sec      RADIOLOGY & ADDITIONAL TESTS: Reviewed.       INTERVAL HPI/OVERNIGHT EVENTS: CT  completed     SUBJECTIVE: Patient seen and examined at bedside.     OBJECTIVE:    VITAL SIGNS:  ICU Vital Signs Last 24 Hrs  T(C): 36.4 (22 Mar 2022 01:45), Max: 36.9 (21 Mar 2022 18:20)  T(F): 97.5 (22 Mar 2022 01:45), Max: 98.4 (21 Mar 2022 18:20)  HR: 90 (22 Mar 2022 05:00) (80 - 101)  BP: 89/51 (22 Mar 2022 05:00) (86/52 - 134/71)  BP(mean): 66 (22 Mar 2022 05:00) (64 - 94)  ABP: --  ABP(mean): --  RR: 16 (22 Mar 2022 05:00) (14 - 29)  SpO2: 94% (22 Mar 2022 05:00) (94% - 100%)        03-20 @ 07:01  -  03-21 @ 07:00  --------------------------------------------------------  IN: 1695.5 mL / OUT: 1515 mL / NET: 180.5 mL    03-21 @ 07:01  -  03-22 @ 06:23  --------------------------------------------------------  IN: 1690 mL / OUT: 1115 mL / NET: 575 mL      CAPILLARY BLOOD GLUCOSE          PHYSICAL EXAM:    General: NAD, letheragic   HEENT: MMM  Neck: supple  Cardiovascular: +S1/S2; RRR  Respiratory: CTA B/L; no W/R/R  Gastrointestinal: soft, NT/ND  Extremities: WWP; no edema, clubbing or cyanosis  Vascular: 2+ radial, DP/PT pulses B/L  Neurological: AAOx1,  does not engage past opening eyes to name       MEDICATIONS:  MEDICATIONS  (STANDING):  artificial tears (preservative free) Ophthalmic Solution 1 Drop(s) Both EYES two times a day  atorvastatin 80 milliGRAM(s) Oral at bedtime  chlorhexidine 2% Cloths 1 Application(s) Topical <User Schedule>  cyanocobalamin 1000 MICROGram(s) Oral every 24 hours  DOXOrubicin INTRA-ARTERIAL (eMAR) 50 milliGRAM(s) IntraArterial once  ferrous    sulfate 325 milliGRAM(s) Oral <User Schedule>  folic acid 1 milliGRAM(s) Oral daily  lactulose Syrup 30 Gram(s) Oral three times a day  meropenem  IVPB 1000 milliGRAM(s) IV Intermittent every 8 hours  multivitamin 1 Tablet(s) Oral daily  nicotine - 21 mG/24Hr(s) Patch 1 patch Transdermal daily  pantoprazole  Injectable 40 milliGRAM(s) IV Push every 12 hours  phenylephrine    Infusion 0.1 MICROgram(s)/kG/Min (2.55 mL/Hr) IV Continuous <Continuous>  rifAXIMin 550 milliGRAM(s) Oral every 12 hours  tamsulosin 0.4 milliGRAM(s) Oral at bedtime    MEDICATIONS  (PRN):  acetaminophen     Tablet .. 650 milliGRAM(s) Oral every 6 hours PRN Temp greater or equal to 38C (100.4F), Mild Pain (1 - 3)  sodium chloride 0.65% Nasal 1 Spray(s) Both Nostrils four times a day PRN Nasal Congestion      ALLERGIES:  Allergies    No Known Allergies    Intolerances        LABS:                        9.3    6.56  )-----------( 85       ( 22 Mar 2022 05:37 )             30.1     03-22    136  |  111<H>  |  11  ----------------------------<  106<H>  3.4<L>   |  17<L>  |  0.67    Ca    7.6<L>      22 Mar 2022 05:37  Phos  3.2     03-22  Mg     2.2     03-22    TPro  6.2  /  Alb  2.0<L>  /  TBili  0.9  /  DBili  x   /  AST  63<H>  /  ALT  35  /  AlkPhos  79  03-22    PT/INR - ( 21 Mar 2022 05:52 )   PT: 18.3 sec;   INR: 1.53          PTT - ( 21 Mar 2022 05:52 )  PTT:31.8 sec      RADIOLOGY & ADDITIONAL TESTS: Reviewed. TRANSFER NOTE FROM MICU TO [facility] course:  67 YO M, current smoker (1ppd x 50 years), current every day ETOH abuse (1 pint vodka and several beers daily x 50 years), with PMHx of HLD, HTN (not on any medications), CABG (LIMA to LAD, SVG to PDA in 2017), AS (s/p AVR in 2017) with recently discovered restenosis, presented to [facility] ED on 2/24/22 with chest pain and episode of LOC, initially admitted to cardiology for NSTEMI with LHC showing nonobstructive disease. TTE was reperformed and patient was  found to have restenosis of bioprosthetic valve for which aortic root surgery was planned.  Patient was transferred to CT surgery given complex anatomy with plan for TAVR and aortic root surgery.  During this time on CT surgery service he was found to have liver cirrhosis and a 2.4cmx2.4 cm mass on his liver concerning for HCC. Patient was transferred to medicine service and rifaxamin and lactulose were started for hepatic encephalopathy.  Patient received intermittent blood transfusion, however with no leslie blood loss aside from minor nose bleeds. Patient was not a surgical candidate and IR was consulted who planned for embolization.   However patient was found to be tachycardic to 120s with new leukocytosis. On 3/14, patient went for planned transarterial chemoembolization by IR for further evaluation for liver lesion but around start of procedure became obtunded with increased secretions and concern that he was not protecting his airway. Following Procedure patient became hypotensive to 80s/50s tachycardic to 120s, was minimally arousable to sternal rub and gurgling with c/f airway protection. ICU consulted for evaluation of urgent vs. emergent intubation. Course c/b anemia likely attributed to intermittent epistaxis for which ENT was consulted w/o plans for acute intervention. In the MICU started on Ceftriaxone 2 gram for URTI, that was the switched to Ertapenem 1 gram daily due to Klebsiella ESBL. Started on vancomycin for possible HA, that was soon discontinued as MRSA swab was negative on 3/14. Patient was d/c of NE and started on Phenylephrine due to underlying AS. AC restarted on 3/15, decision verified with IR. Patient extubated on 3/16. Patient receiving nutrition by NGT placed on the 3/16 and pending S&S eval. Patient stepped up overnight due to hypotension to systolic 60s, requiring phenylephrine and monitoring for need for intubation. Patient lethargic but arousable for which patient was not intubated. Patient retaining urine for which rogers was placed, and NGT was placed for feeding and medications. Patient started on Meropenem for ESBL pneumonia growing in sputum, blood and urine.  OMFS recommended maxillofacial with IV contrast for possible abscess, that was negative for abscess. Patient with CT chest remarkable for pul artery congestion, pending TTE. Patient will be stepped down to [facility] for further management.       INTERVAL HPI/OVERNIGHT EVENTS: CT  completed     SUBJECTIVE: Patient seen and examined at bedside. PATIENT WITH NO ACUTE CONCERNS, ROS NEGATIVE.     OBJECTIVE:    VITAL SIGNS:  ICU Vital Signs Last 24 Hrs  T(C): 36.4 (22 Mar 2022 01:45), Max: 36.9 (21 Mar 2022 18:20)  T(F): 97.5 (22 Mar 2022 01:45), Max: 98.4 (21 Mar 2022 18:20)  HR: 90 (22 Mar 2022 05:00) (80 - 101)  BP: 89/51 (22 Mar 2022 05:00) (86/52 - 134/71)  BP(mean): 66 (22 Mar 2022 05:00) (64 - 94)  ABP: --  ABP(mean): --  RR: 16 (22 Mar 2022 05:00) (14 - 29)  SpO2: 94% (22 Mar 2022 05:00) (94% - 100%)        03-20 @ 07:01  -  03-21 @ 07:00  --------------------------------------------------------  IN: 1695.5 mL / OUT: 1515 mL / NET: 180.5 mL    03-21 @ 07:01  -  03-22 @ 06:23  --------------------------------------------------------  IN: 1690 mL / OUT: 1115 mL / NET: 575 mL      CAPILLARY BLOOD GLUCOSE          PHYSICAL EXAM:    General: NAD  HEENT: MMM  Neck: supple  Cardiovascular: +S1/S2; RRR  Respiratory: CTA B/L; no W/R/R  Gastrointestinal: soft, NT/ND  Extremities: WWP; no edema, clubbing or cyanosis  Vascular: 2+ radial, DP/PT pulses B/L  Neurological: AAOx1,  does not engage past opening eyes to name       MEDICATIONS:  MEDICATIONS  (STANDING):  artificial tears (preservative free) Ophthalmic Solution 1 Drop(s) Both EYES two times a day  atorvastatin 80 milliGRAM(s) Oral at bedtime  chlorhexidine 2% Cloths 1 Application(s) Topical <User Schedule>  cyanocobalamin 1000 MICROGram(s) Oral every 24 hours  DOXOrubicin INTRA-ARTERIAL (eMAR) 50 milliGRAM(s) IntraArterial once  ferrous    sulfate 325 milliGRAM(s) Oral <User Schedule>  folic acid 1 milliGRAM(s) Oral daily  lactulose Syrup 30 Gram(s) Oral three times a day  meropenem  IVPB 1000 milliGRAM(s) IV Intermittent every 8 hours  multivitamin 1 Tablet(s) Oral daily  nicotine - 21 mG/24Hr(s) Patch 1 patch Transdermal daily  pantoprazole  Injectable 40 milliGRAM(s) IV Push every 12 hours  phenylephrine    Infusion 0.1 MICROgram(s)/kG/Min (2.55 mL/Hr) IV Continuous <Continuous>  rifAXIMin 550 milliGRAM(s) Oral every 12 hours  tamsulosin 0.4 milliGRAM(s) Oral at bedtime    MEDICATIONS  (PRN):  acetaminophen     Tablet .. 650 milliGRAM(s) Oral every 6 hours PRN Temp greater or equal to 38C (100.4F), Mild Pain (1 - 3)  sodium chloride 0.65% Nasal 1 Spray(s) Both Nostrils four times a day PRN Nasal Congestion      ALLERGIES:  Allergies    No Known Allergies    Intolerances        LABS:                        9.3    6.56  )-----------( 85       ( 22 Mar 2022 05:37 )             30.1     03-22    136  |  111<H>  |  11  ----------------------------<  106<H>  3.4<L>   |  17<L>  |  0.67    Ca    7.6<L>      22 Mar 2022 05:37  Phos  3.2     03-22  Mg     2.2     03-22    TPro  6.2  /  Alb  2.0<L>  /  TBili  0.9  /  DBili  x   /  AST  63<H>  /  ALT  35  /  AlkPhos  79  03-22    PT/INR - ( 21 Mar 2022 05:52 )   PT: 18.3 sec;   INR: 1.53          PTT - ( 21 Mar 2022 05:52 )  PTT:31.8 sec      RADIOLOGY & ADDITIONAL TESTS: Reviewed.

## 2022-03-22 NOTE — PROGRESS NOTE ADULT - ASSESSMENT
65 YO M, current smoker (1ppd x 50 years), current every day ETOH abuse (1 pint vodka and several beers daily x 50 years), with PMHx of HLD, HTN (not on any medications), CABG (LIMA to LAD, SVG to PDA in 2017), AS (s/p AVR in 2017) with recently discovered restenosis, presented to [facility] ED on 2/24/22 with chest pain and episode of LOC, initially admitted to cardiology for NSTEMI with LHC showing nonobstructive disease. Patient was found to have restenosis of bioprosthetic valve, however not surgical candidate. Patient transferred to medicine for further management of HCC, hepatic encephalopathy. After IR TACE of HCC on 3/14, patient intubated for airway protection and transferred to MICU. Patient s/p phenylephrine.     NEURO  #AMS (altered mental status)  Unlikely hepatic encephalopathy as no asterixis and is currently compensated. Neuro exam nonfocal. Possibly related to chronic alcohol use, Wernicke's or depression i/s/o HCC diagnosis. As per wife, pt is not normally lethargic at home. Completed course of high dose thiamine, last librium dose on 03/01. CTH and MRI w/op acute abnormality. During the night of 3/13-3/14 flow sheets showing 100.1 Temp and pt becoming tachycardic to 120s. Pt not complaining of any localizing signs of infection, CXR unremarkable however UA was positive. UCX sent and pt started on CTX 1g Q24hrs (did not receive first dose as taken to TACE procedure). Post TACE procedure c/f protection of airway given increased somnolence potentially 2/2 anesthesia. At that time patient intubated for airway protection and transferred to MICU, s/p extubation.   - VEEG: Mild-to-moderate generalized slowing suggestive of a similar degree of diffuse or multifocal cerebral dysfunction. No epileptiform discharges or electrographic seizures were recorded.   - Neuro following  - Infectious disease as below   - c/w hepatic encephalopathy treatment: Rifaximin and lactulose, rectal tube in place     #History of alcohol use  Long standing EtOH consumption. Not showing any signs of withdrawal at the moment. Last drink prior to admission which is almost 2 weeks ago. Pt was given 3 days of librium Q8hrs while on CT surgery however did not display signs of withdrawal per chart review  - c/w Multivitamin and Folic Acid    CARDIO  #Shock-Resolved  Septic vs hypovolemic. Patient with drop in BPs to systolic 60s overnight, s/p 500cc NS bolus. MAP remained in mid 50s. Requiring initiation of phenylephrine i/s/o shock and hx of severe AS.   - s/p NE    #Severe AS (aortic stenosis)  AS i/s/o prior TAVR in 2017 with Dr Wilcox. JOSELO with peak transvalvular velocity of 4.7, mean gradient 56. symptomatic: exertional CP, episode of syncope  - initially planned for valve in valve TAVR, but then it was decided that because of aortic root anatomy, he would need aortic root open heart surgery  - he was deemed not to be a surgical candidate given poor functional status, liver cirrhosis, prior medication noncompliance which likely contributed to valve restenosis. his recovery after open heart surgically would be extremely difficult if possible. He would NOT be a surgical candidate       #CAD (coronary artery disease)  S/p CABG with Dr Wilcox in 2017  - recent [facility] with nonobstructive disease  - c/w Lipitor 80mg QD  - holding ASA 81mg.    #HTN (hypertension)  Holding anti-HTN meds, s/p pressors  - monitor VS     PULM   #Concern for PNA  Prior to arrival to the MICU on 3/14 there were concerns for aspiration PNA.   - CXR post intubation on 3/14: no acute infiltrates  - Intubated 3/14, Extubated on 3/16  - s/p ceftriaxone 2g q24 hrs and also Ertapenem 1 gram daily   - continue to monitor respiratory status  - abx as below    GI  #ALC (alcoholic liver cirrhosis)  2/2 long standing EtOH abuse. Likely compensated.. Likely no hepatic encephalopathy. No known EV bleeding. NO ascites however now with signs of new infection started on antibiotics today.  - no known Varices, but no prior EGD evaluation, he will need it outpatient, although has significant risk factors.    #HCC (hepatocellular carcinoma)  CTAP with 2.4 hypervascular liver mass. i/s/o Liver cirrhosis and EtOH use disorder. triple phase CT confirms HCC  - AFP is 7.2 which is normal  - MRI abdomen: 2.5 cm left hepatic lesion consistent with LI-RADS v 2018 Category: LR-5 Definitely HCC. OPTN Category (if LR-5): 5B. No locally invasive or metastatic disease. Left hepatic artery variant. No additional suspicious hepatic lesions.   - s/p IR transarterial chemical embolization since he is not a candidate for ablation  - Surg/onc following NTD  - Palliative care following.    RENAL  #no active issues    ID  #Sepsis  Source likely UTI vs. ? aspiration pna.  - UCx 3/14: Klebsiella ESBL and Proteus mirabilis  - BCx 3/14-19: NGTD  - Sputum Cx 3/15: Proteus Mirabilis, Klebsiella pneumonia ESBL  - UA 3/19: + small LE, bacteria and WBC  - BCx 3/19: Klebsiella pneumonia  - BCx 3/20: NGTD  - s/p ceftriaxone 2g q24 hrs and also Ertapenem 1 gram daily   - c/w meropenem 1 gram q8 hrs      #UTI  Patient with positive UA (WBC, bacteria, LE) on 3/14.   - abx as above    ENDO  #No active problem    HEME/ONC  #Normocytic hypochromic anemia  Likely secondary to hemolysis due to prosthetic valve. Minimal epistaxis.  - Completed IV iron X 3 days  - c/w b12 1mg Qd  - c/w folate 1mg qd  - c/w ferrous sulfate 325 mg daily  PO  - Transfusion goal > 7    Prophylactic measure  F: as appropriate, caution due to AS  E: replenish as appropriate  N: NGT started on feeds   VTE PPx: heparin TID sub  GI: Protonix  C: Full Code  D: MICU   67 YO M, current smoker (1ppd x 50 years), current every day ETOH abuse (1 pint vodka and several beers daily x 50 years), with PMHx of HLD, HTN (not on any medications), CABG (LIMA to LAD, SVG to PDA in 2017), AS (s/p AVR in 2017) with recently discovered restenosis, presented to [facility] ED on 2/24/22 with chest pain and episode of LOC, initially admitted to cardiology for NSTEMI with LHC showing nonobstructive disease. Patient was found to have restenosis of bioprosthetic valve, however not surgical candidate. Patient transferred to medicine for further management of HCC, hepatic encephalopathy. After IR TACE of HCC on 3/14, patient intubated for airway protection and transferred to MICU. Patient s/p phenylephrine and extubated.     NEURO  #AMS (altered mental status)  Unlikely hepatic encephalopathy as no asterixis and is currently compensated. Neuro exam nonfocal. Possibly related to chronic alcohol use, Wernicke's or depression i/s/o HCC diagnosis. As per wife, pt is not normally lethargic at home. Completed course of high dose thiamine, last librium dose on 03/01. CTH and MRI w/op acute abnormality. During the night of 3/13-3/14 flow sheets showing 100.1 Temp and pt becoming tachycardic to 120s. Pt not complaining of any localizing signs of infection, CXR unremarkable however UA was positive. UCX sent and pt started on CTX 1g Q24hrs (did not receive first dose as taken to TACE procedure). Post TACE procedure c/f protection of airway given increased somnolence potentially 2/2 anesthesia. At that time patient intubated for airway protection and transferred to MICU, s/p extubation.   - VEEG: Mild-to-moderate generalized slowing suggestive of a similar degree of diffuse or multifocal cerebral dysfunction. No epileptiform discharges or electrographic seizures were recorded.   - Neuro following  - Infectious disease as below   - c/w hepatic encephalopathy treatment: Rifaximin and lactulose, rectal tube in place     #History of alcohol use  Long standing EtOH consumption. Not showing any signs of withdrawal at the moment. Last drink prior to admission which is almost 2 weeks ago. Pt was given 3 days of librium Q8hrs while on CT surgery however did not display signs of withdrawal per chart review  - c/w Multivitamin and Folic Acid    CARDIO    #ST- depressions:   On EKG: V2-V6, I, II, and KRISTIN in aVR. Present on previous ECGs, but worse with increased HR  - Cardiology consulted, and suspected likely rate related/demand in setting of anemia and tachycardia.   - had coronary angiogram <1 month ago and LIMA and SVG grafts were widely patent  - c/w lipitor 80 mg qd  - can start ASA 81 mg qd if able to tolerate from anemia standpoint  - f/u repeat TTE     #Shock-Resolved  Septic vs hypovolemic. Patient with drop in BPs to systolic 60s overnight, s/p 500cc NS bolus. MAP remained in mid 50s. Requiring initiation of phenylephrine i/s/o shock and hx of severe AS, now sp phenylephrine.   - s/p NE    #Severe AS (aortic stenosis)  AS i/s/o prior TAVR in 2017 with Dr Wilcox. JOSELO with peak transvalvular velocity of 4.7, mean gradient 56. symptomatic: exertional CP, episode of syncope  - initially planned for valve in valve TAVR, but then it was decided that because of aortic root anatomy, he would need aortic root open heart surgery  - he was deemed not to be a surgical candidate given poor functional status, liver cirrhosis, prior medication noncompliance which likely contributed to valve restenosis. his recovery after open heart surgically would be extremely difficult if possible. He would NOT be a surgical candidate   - labs consistent with hemolysis most likely 2/2 to worsening AS      #CAD (coronary artery disease)  S/p CABG with Dr Wilcox in 2017  - recent [facility] with nonobstructive disease 1 year ago   - c/w Lipitor 80mg QD  - holding ASA 81mg.    #HTN (hypertension)  Holding anti-HTN meds, s/p pressors  - monitor VS     PULM   #Concern for PNA  Prior to arrival to the MICU on 3/14 there were concerns for aspiration PNA.   - CXR post intubation on 3/14: no acute infiltrates  - Intubated 3/14, Extubated on 3/16  - s/p ceftriaxone 2g q24 hrs and also Ertapenem 1 gram daily   - continue to monitor respiratory status  - abx as below    GI  #GI bleed   - c/w PPI IV BID   #ALC (alcoholic liver cirrhosis)  2/2 long standing EtOH abuse. Likely compensated.. Likely no hepatic encephalopathy. No known EV bleeding. NO ascites however now with signs of new infection started on antibiotics today.  - no known Varices, but no prior EGD evaluation, he will need it outpatient, although has significant risk factors.    #HCC (hepatocellular carcinoma)  CTAP with 2.4 hypervascular liver mass. i/s/o Liver cirrhosis and EtOH use disorder. triple phase CT confirms HCC  - AFP is 7.2 which is normal  - MRI abdomen: 2.5 cm left hepatic lesion consistent with LI-RADS v 2018 Category: LR-5 Definitely HCC. OPTN Category (if LR-5): 5B. No locally invasive or metastatic disease. Left hepatic artery variant. No additional suspicious hepatic lesions.   - s/p IR transarterial chemical embolization since he is not a candidate for ablation  - Surg/onc following NTD  - Palliative care following.    RENAL  #no active issues    ID  #Sepsis  Source likely UTI vs. ? aspiration pna. Complete translocation of Klebsiella in lung, blood and urine.   - UCx 3/14: Klebsiella ESBL and Proteus mirabilis  - BCx 3/14-19: NGTD  - Sputum Cx 3/15: Proteus Mirabilis, Klebsiella pneumonia ESBL  - UA 3/19: + small LE, bacteria and WBC  - BCx 3/19: Klebsiella pneumonia  - BCx 3/20: NGTD  - s/p ceftriaxone 2g q24 hrs and also Ertapenem 1 gram daily   - c/w meropenem 1 gram q8 hrs      #UTI  Patient with positive UA (WBC, bacteria, LE) on 3/14.   - UCx 3/14: Klebsiella ESBL and Proteus mirabilis  - abx as above    ENDO  #No active problem    HEME/ONC  #Normocytic hypochromic anemia  Likely secondary to hemolysis due to prosthetic valve. Minimal epistaxis.  - Completed IV iron X 3 days  - c/w b12 1mg Qd  - c/w folate 1mg qd  - c/w ferrous sulfate 325 mg daily  PO  - Transfusion goal > 7    Prophylactic measure  F: as appropriate, caution due to AS  E: replenish as appropriate  N: NGT started on feeds   VTE PPx: holding heparin TID sub  GI: Protonix  C: Full Code  D: MICU   65 YO M, current smoker (1ppd x 50 years), current every day ETOH abuse (1 pint vodka and several beers daily x 50 years), with PMHx of HLD, HTN (not on any medications), CABG (LIMA to LAD, SVG to PDA in 2017), AS (s/p AVR in 2017) with recently discovered restenosis, presented to [facility] ED on 2/24/22 with chest pain and episode of LOC, initially admitted to cardiology for NSTEMI with LHC showing nonobstructive disease. Patient was found to have restenosis of bioprosthetic valve, however not surgical candidate. Patient transferred to medicine for further management of HCC, hepatic encephalopathy. After IR TACE of HCC on 3/14, patient intubated for airway protection and transferred to MICU. Patient s/p phenylephrine and extubated.     NEURO  #AMS (altered mental status)  Unlikely hepatic encephalopathy as no asterixis and is currently compensated. Neuro exam nonfocal. Possibly related to chronic alcohol use, Wernicke's or depression i/s/o HCC diagnosis. As per wife, pt is not normally lethargic at home. Completed course of high dose thiamine, last librium dose on 03/01. CTH and MRI w/op acute abnormality. During the night of 3/13-3/14 flow sheets showing 100.1 Temp and pt becoming tachycardic to 120s. Pt not complaining of any localizing signs of infection, CXR unremarkable however UA was positive. UCX sent and pt started on CTX 1g Q24hrs (did not receive first dose as taken to TACE procedure). Post TACE procedure c/f protection of airway given increased somnolence potentially 2/2 anesthesia. At that time patient intubated for airway protection and transferred to MICU, s/p extubation.   - VEEG: Mild-to-moderate generalized slowing suggestive of a similar degree of diffuse or multifocal cerebral dysfunction. No epileptiform discharges or electrographic seizures were recorded.   - Neuro following  - Infectious disease as below   - c/w hepatic encephalopathy treatment: Rifaximin and lactulose, rectal tube in place     #History of alcohol use  Long standing EtOH consumption. Not showing any signs of withdrawal at the moment. Last drink prior to admission which is almost 2 weeks ago. Pt was given 3 days of librium Q8hrs while on CT surgery however did not display signs of withdrawal per chart review  - c/w Multivitamin and Folic Acid    CARDIO    #ST- depressions:   On EKG: V2-V6, I, II, and KRISTIN in aVR. Present on previous ECGs, but worse with increased HR  - Cardiology consulted, and suspected likely rate related/demand in setting of anemia and tachycardia.   - had coronary angiogram <1 month ago and LIMA and SVG grafts were widely patent  - c/w lipitor 80 mg qd  - can start ASA 81 mg qd if able to tolerate from anemia standpoint  - f/u repeat TTE     #Shock-Resolved  Septic vs hypovolemic. Patient with drop in BPs to systolic 60s overnight, s/p 500cc NS bolus. MAP remained in mid 50s. Requiring initiation of phenylephrine i/s/o shock and hx of severe AS, now sp phenylephrine.   - s/p NE    #Severe AS (aortic stenosis)  AS i/s/o prior TAVR in 2017 with Dr Wilcox. JOSELO with peak transvalvular velocity of 4.7, mean gradient 56. symptomatic: exertional CP, episode of syncope  - initially planned for valve in valve TAVR, but then it was decided that because of aortic root anatomy, he would need aortic root open heart surgery  - he was deemed not to be a surgical candidate given poor functional status, liver cirrhosis, prior medication noncompliance which likely contributed to valve restenosis. his recovery after open heart surgically would be extremely difficult if possible. He would NOT be a surgical candidate   - labs consistent with hemolysis most likely 2/2 to worsening AS  - CT chest 3/22: suggestive of pulmonary vein congestion. Most likely in the setting of AS       #CAD (coronary artery disease)  S/p CABG with Dr Wilcox in 2017  - recent [facility] with nonobstructive disease 1 year ago   - c/w Lipitor 80mg QD  - holding ASA 81mg.    #HTN (hypertension)  Holding anti-HTN meds, s/p pressors  - monitor VS     PULM   #Concern for PNA  Prior to arrival to the MICU on 3/14 there were concerns for aspiration PNA.   - CXR post intubation on 3/14: no acute infiltrates  - Intubated 3/14, Extubated on 3/16  - s/p ceftriaxone 2g q24 hrs and also Ertapenem 1 gram daily   - continue to monitor respiratory status  - abx as below    GI  #GI bleed   - c/w PPI IV BID   #ALC (alcoholic liver cirrhosis)  2/2 long standing EtOH abuse. Likely compensated.. Likely no hepatic encephalopathy. No known EV bleeding. NO ascites however now with signs of new infection started on antibiotics today.  - no known Varices, but no prior EGD evaluation, he will need it outpatient, although has significant risk factors.    #HCC (hepatocellular carcinoma)  CTAP with 2.4 hypervascular liver mass. i/s/o Liver cirrhosis and EtOH use disorder. triple phase CT confirms HCC  - AFP is 7.2 which is normal  - MRI abdomen: 2.5 cm left hepatic lesion consistent with LI-RADS v 2018 Category: LR-5 Definitely HCC. OPTN Category (if LR-5): 5B. No locally invasive or metastatic disease. Left hepatic artery variant. No additional suspicious hepatic lesions.   - s/p IR transarterial chemical embolization since he is not a candidate for ablation  - Surg/onc following NTD  - Palliative care following.    RENAL  #no active issues    ID  #Sepsis  Source likely UTI vs. ? aspiration pna. Complete translocation of Klebsiella in lung, blood and urine.   - UCx 3/14: Klebsiella ESBL and Proteus mirabilis  - BCx 3/14-19: NGTD  - Sputum Cx 3/15: Proteus Mirabilis, Klebsiella pneumonia ESBL  - UA 3/19: + small LE, bacteria and WBC  - BCx 3/19: Klebsiella pneumonia  - BCx 3/20: NGTD  - s/p ceftriaxone 2g q24 hrs and also Ertapenem 1 gram daily   - c/w meropenem 1 gram q8 hrs      #UTI  Patient with positive UA (WBC, bacteria, LE) on 3/14.   - UCx 3/14: Klebsiella ESBL and Proteus mirabilis  - abx as above    ENDO  #No active problem    HEME/ONC  #Normocytic hypochromic anemia  Likely secondary to hemolysis due to prosthetic valve. Minimal epistaxis.  - Completed IV iron X 3 days  - c/w b12 1mg Qd  - c/w folate 1mg qd  - c/w ferrous sulfate 325 mg daily  PO  - Transfusion goal > 7    Prophylactic measure  F: as appropriate, caution due to AS  E: replenish as appropriate  N: NGT started on feeds, pending S&S  VTE PPx: holding heparin TID sub  GI: Protonix  C: Full Code  D: MICU

## 2022-03-22 NOTE — PROGRESS NOTE ADULT - PROBLEM SELECTOR PLAN 7
S/p CABG with Dr Wilcox in 2017  - recent [facility] with nonobstructive disease 1 year ago   - c/w Lipitor 80mg QD  - holding ASA 81mg.

## 2022-03-22 NOTE — PROGRESS NOTE ADULT - PROBLEM SELECTOR PLAN 4
CTAP with 2.4 hypervascular liver mass. i/s/o Liver cirrhosis and EtOH use disorder. triple phase CT confirms HCC  - AFP is 7.2 which is normal  - MRI abdomen: 2.5 cm left hepatic lesion consistent with LI-RADS v 2018 Category: LR-5 Definitely HCC. OPTN Category (if LR-5): 5B. No locally invasive or metastatic disease. Left hepatic artery variant. No additional suspicious hepatic lesions.   - s/p IR transarterial chemical embolization since he is not a candidate for ablation  - Surg/onc following NTD  - Palliative care following.

## 2022-03-22 NOTE — PROGRESS NOTE ADULT - ASSESSMENT
67 YO M, current smoker (1ppd x 50 years), current every day ETOH abuse (1 pint vodka and several beers daily x 50 years), with PMHx of HLD, HTN (not on any medications), CABG (LIMA to LAD, SVG to PDA in 2017), AS (s/p AVR in 2017) with recently discovered restenosis, presented to [facility] ED on 2/24/22 with chest pain and episode of LOC, initially admitted to cardiology for NSTEMI with [facility] with nonobstructive disease, then found to have restenosis of bioprosthetic valve for which aortic root surgery was planned, but now deferred given new diagnosis of cirrhosis and HCC. Course c/b AMS 2/2 likely hepatic encephalopathy and metabolic encephalopathy. Course further c/b septic shock 2/2 ESBL Klebsiella UTI.   - Pt without localizing symptoms or radiographic evidence of pneumonia at this time, however can not rule out   - Sputum cultures growing ESBL Klebsiella and Proteus mirabilis with few WBCs  - 3/19 pt went into septic shock while on ertapenem with BCx positive for Klebsiella. Improved with meropenem  - 3/21 CT A/P showing colitis, hcc tumor with necrotic center, and new small ascites.  - 3/21 CT chest showing bilateral effusions.  Initially thought that pt's source for sepsis was UTI earlier on admission. However, should have been treated with ertapenem per susceptibilities. Sputum cultures with only few WBCs and no radiographic evidence or symptoms c/w pneumonia raises concern for airway colonization. Other sources could be endocarditis (although rare with gram negative bacteremia), SBP, prostatitis, or other intraabdominal infection.  It is possible the necrotic center of the tumor is a source of infection. At this point, Gallium scan would be useful to identify source.  Recommendations:  - please obtain TTE  - recommend paracentesis to assess for SBP  - f/u BCx susceptibilities  - f/u surveillance BCx  - f/u OMFS consult to assess for oral lesions  - continue meropenem 1g q8 hours (3/20-) for now   - please obtain Gallium scan    ID Team 1 will continue to follow.   Note not finalized until attested by attending     Fariba Ni MD PGY2 Internal Medicine  Infectious Disease Consult Service, Team 1

## 2022-03-22 NOTE — PROGRESS NOTE ADULT - PROBLEM SELECTOR PLAN 2
Complete translocation of Klebsiella in lung, blood and urine. UCx 3/14: Klebsiella ESBL and Proteus mirabilis. BCx 3/14-19: NGTD. Sputum Cx 3/15: Proteus Mirabilis, Klebsiella pneumonia ESBL. UA 3/19: + small LE, bacteria and WBC. BCx 3/19: Klebsiella pneumonia. BCx 3/20: NGTD  - s/p ceftriaxone 2g q24 hrs and also Ertapenem 1 gram daily   - c/w meropenem 1 gram q8 hrs

## 2022-03-22 NOTE — PROGRESS NOTE ADULT - ATTENDING COMMENTS
Reason for development of Klebsiella pneumoniae bacteremia while on ertapenem remains unclear - suggests persistent focus.  Bloodstream isolate is susceptible to ertapenem.  Reason for clinical improvement with change to meropenem is unclear - may reflect polymicrobial process.  CT C/A/P unrevealing.  Would do paracentesis, obtain gallium scan if within GOC.  Though gram negative endocarditis is relatively rare, he has severe AS - would obtain TTE.  Would continue meropenem for now.  Will follow with you - team 1.

## 2022-03-22 NOTE — PROGRESS NOTE ADULT - PROBLEM SELECTOR PLAN 3
Severe AS i/s/o prior TAVR in 2017 with Dr Wilcox. JOSELO with peak transvalvular velocity of 4.7, mean gradient 56. symptomatic: exertional CP, episode of syncope  - initially planned for valve in valve TAVR, but then it was decided that because of aortic root anatomy, he would need aortic root open heart surgery  - he was deemed not to be a surgical candidate given poor functional status, liver cirrhosis, prior medication noncompliance which likely contributed to valve restenosis. his recovery after open heart surgically would be extremely difficult if possible. He would NOT be a surgical candidate   - labs consistent with hemolysis most likely 2/2 to worsening AS  - CT chest 3/22: suggestive of pulmonary vein congestion. Most likely in the setting of AS

## 2022-03-22 NOTE — PROGRESS NOTE ADULT - ASSESSMENT
65 y/o M, current smoker (1ppd x 50 years), current every day ETOH abuse (1 pint vodka and several beers daily x 50 years), with PMHx of HLD, HTN (not on any medications),  CABG (LIMA to LAD, SVG to PDA in 2017), AS (s/p AVR in 2017), who presented to [facility] ED on 2/24/22 with complaints of 9/10 chest pain with associated diaphoresis with exertion, admitted for NSTEMI, transferred to CTsx service for severe AS, requiring a TAVR procedure; upon workup for TAVR, patient incidentally noted to have a 2.4 cm hypervascular lesion for which GI was consulted, CT A/P concerning for HCC. Now Gi re-consulted for AMS.     #Hepatic Lesion  Patient presented to [facility] ED on 2/24/22 with complaints of 9/10 chest pain with associated diaphoresis with exertion, admitted for NSTEMI, transferred to CTsx service for severe AS, requiring a TAVR procedure. During workup for TAVR, CTA A/P (2/28) revealing a nodular liver contour, c/w cirrhosis, A 2.4 cm hypervascular liver mass suspicious for HCC, as well as splenomegaly. AFP 7.2 (WNL), While AFP noted to be normal, does not preclude risk of possibility of HCC, especially in the setting of what appears to be cirrhosis 2/2 underlying long-standing history of EtOH use, which places him at higher risk.   - CT A/P (3/6) revealing 2.5 cm left hepatic lesion consistent with LI-RADS v 2018 Category: LR-5 Definitely HCC. OPTN Category (if LR-5): 5B. No locally invasive or metastatic disease. Left hepatic artery variant. No additional suspicious hepatic lesions.  - In light of active EtOH use, would not qualify for liver transplant evaluation   - Not a surgical candidate as per surgical onc team  - Appreciate ongoing Heme/Onc recs  - MR Abdomen (3/9) confirming HCC. s/p TACE 3/14/22    #Cirrhosis, likely compensated (-HE, -ascites, - no known EV bleeding)  CTA A/P with radiographic findings c/f cirrhosis, including nodular liver contour and splenomegaly, which is suggestive likely portal HTN. Likely in the setting of long-standing history of EtOH abuse.   MELD-Na: 11 (based on 3/20/22 BW, no AM coags ordered for 3/22/22)  HE: AA0x 0-1 (not participating in exam this morning), no asterixis   Ascites: None present on CT imaging  HCC: CTA A/P (2/28) with 2.4 cm hypervascular lesion. CT A/P (3/6) and MRI confirming lesion c/w HCC  Varices: No prior EGD evaluation, would be warranted, can pursue as an outpatient  - Abdominal US with duplex (3/6) The portal veins, hepatic veins and hepatic arteries are patent with normal directionality of flow. Cirrhosis. Since 2/28/2022, again a 2.9 cm heterogeneous lesion is present within the left lobe of the liver (see CT findings as noted above, appears to be c/w HCC). MRI confirming HCC  - Daily CMPs & Coags to calculate MELD-Na  - Nutrition consult  - High protein diet  -  on alcohol cessation  - c/w Rifaximin 500 mg BID & lactulose, titrate to 2-3 BMs/day    #AMS  Unclear etiology for AMS this am with increased lethargy. Ddx includes HE, infection, librium, delirium. The elevated ammonia lever to 123 could be due to his nose bleed, which has been ongoing since 3/5. He does not have Asterixis on exam and is A&O with understanding of where he is and why he is hospitalized. If HE, unclear the inciting event to cause decompensation. Additionally, RUQ US today w/o ascites. Utox (3/9/22) + benzos, in the setting of last documented librium 10 days prior, most likely 2/2 poorly metabolized in the setting of his cirrhosis  - Continue to hold librium  - c/w Rifaximin 500 mg BID & lactulose, titrate to 2-3 BMs/day  - CXR (3/6/22) negative for infiltrates  - Bcx (3/19) Klebsiella Pneumoniae ESBL, c/w empiric abxs. Appreciate ongoing ID recs  - CT Head (3/10) negative  - CT A/P (3/21) Hepatic cirrhosis. Mild splenomegaly. Trace volume ascites. No bowel wall thickening or intestinal obstruction; however, there is mild inflammation in the sigmoid mesocolon adjacent to the otherwise unremarkable sigmoid colon, potentially signifying mild colitis. Alternatively, there is diffuse mesenteric edema related to cirrhosis and the stranding in the sigmoid mesocolon could be related to this. Indeterminate 2 cm hypoattenuating hepatic lesion.   - CT Chest (3/21) Small bilateral pleural effusions and pulmonary venous congestion. Negative for pulmonary consolidation. Findings of hepatic cirrhosis with nodule within the left lobe of the liver consistent with known HCC.   - CT Maxillofacial (3/21) Multifocal periapical dental lucencies. No associated overlying soft tissue inflammation or abscess. The uvula is subjectively prominent which raises the possibility of uvulitis.    #Anemia   Stable, with slow downtrend in H/H, with no e/o overt GI bleeding including melena, hematochezia, hematemesis, coffee ground emesis. Previously noted to have dried blood on right nare, which may have contributed to some blood loss previously however hemolysis labs demonstrating hemolysis, likely in the setting of severe AS  - Monitor H/H  - Transfusion goal >8  - In light of underlying cirrhosis, with evidence of thrombocytopenia and splenomegaly, suggestive of e/o portal HTN, would warrant endoscopic evaluation to further assess for EV/PHG however given recent NSTEMI, higher risk for endoscopic evaluation. At this time, he has worsening ST depressions, which cardiology attributes to demand ischemia and recommending fluid bolus and blood to avoid hypotension.  - Hold aspirin for now  - Agree with palliative care consult and GOC discussion    Case discussed with c attending and primary team.     So Guthrie DO  Gastroenterology Fellow  Pager: [phone]

## 2022-03-22 NOTE — PROGRESS NOTE ADULT - SUBJECTIVE AND OBJECTIVE BOX
INTERVAL EVENTS:  off pressors      MEDICATIONS  (STANDING):  artificial tears (preservative free) Ophthalmic Solution 1 Drop(s) Both EYES two times a day  atorvastatin 80 milliGRAM(s) Oral at bedtime  chlorhexidine 2% Cloths 1 Application(s) Topical <User Schedule>  cyanocobalamin 1000 MICROGram(s) Oral every 24 hours  DOXOrubicin INTRA-ARTERIAL (eMAR) 50 milliGRAM(s) IntraArterial once  ferrous    sulfate 325 milliGRAM(s) Oral <User Schedule>  folic acid 1 milliGRAM(s) Oral daily  lactulose Syrup 30 Gram(s) Oral three times a day  meropenem  IVPB 1000 milliGRAM(s) IV Intermittent every 8 hours  multivitamin 1 Tablet(s) Oral daily  nicotine - 21 mG/24Hr(s) Patch 1 patch Transdermal daily  pantoprazole  Injectable 40 milliGRAM(s) IV Push every 12 hours  rifAXIMin 550 milliGRAM(s) Oral every 12 hours  tamsulosin 0.4 milliGRAM(s) Oral at bedtime    MEDICATIONS  (PRN):  acetaminophen     Tablet .. 650 milliGRAM(s) Oral every 6 hours PRN Temp greater or equal to 38C (100.4F), Mild Pain (1 - 3)  sodium chloride 0.65% Nasal 1 Spray(s) Both Nostrils four times a day PRN Nasal Congestion      Home Medications:  folic acid 1 mg oral tablet: 1 tab(s) orally once a day (04 Mar 2022 11:39)  Multiple Vitamins oral tablet: 1 tab(s) orally once a day (04 Mar 2022 11:39)      Vital Signs Last 24 Hrs  T(C): 36.6 (22 Mar 2022 12:00), Max: 36.9 (21 Mar 2022 18:20)  T(F): 97.9 (22 Mar 2022 12:00), Max: 98.4 (21 Mar 2022 18:20)  HR: 87 (22 Mar 2022 11:00) (80 - 103)  BP: 110/58 (22 Mar 2022 11:00) (89/51 - 134/71)  BP(mean): 78 (22 Mar 2022 11:00) (66 - 94)  RR: 19 (22 Mar 2022 11:00) (14 - 29)  SpO2: 97% (22 Mar 2022 11:00) (94% - 100%)     PHYSICAL EXAM:  NAD  Trace basilar crackles  RRR absent s2  No LE edema  +JVD    LABS:                        9.3    6.56  )-----------( 85       ( 22 Mar 2022 05:37 )             30.1     03-22    136  |  111<H>  |  11  ----------------------------<  106<H>  3.4<L>   |  17<L>  |  0.67    Ca    7.6<L>      22 Mar 2022 05:37  Phos  3.2     03-22  Mg     2.2     03-22    TPro  6.2  /  Alb  2.0<L>  /  TBili  0.9  /  DBili  x   /  AST  63<H>  /  ALT  35  /  AlkPhos  79  03-22        PT/INR - ( 21 Mar 2022 05:52 )   PT: 18.3 sec;   INR: 1.53          PTT - ( 21 Mar 2022 05:52 )  PTT:31.8 sec    I&O's Summary    21 Mar 2022 07:01  -  22 Mar 2022 07:00  --------------------------------------------------------  IN: 1860 mL / OUT: 1715 mL / NET: 145 mL    22 Mar 2022 07:01  -  22 Mar 2022 12:48  --------------------------------------------------------  IN: 120 mL / OUT: 85 mL / NET: 35 mL      A/P:  66M CAD s/p CABG, severe bioprosthetic AS (not amenable to any intervention), newly diagnosed EtOH cirrhosis c/b HCC s/p TACE, anemia, ESBL UTI who cardiology has been consulted for worsening tachycardia and ST depressions.    #ST- depressions: V2-V6, I, II, and KRISTIN in aVR. Present on previous ECGs, but worse with increased HR  - Encephalopathic (likely 2/2 cirrhosis), no chest pain  - likely rate related/demand in setting of anemia and tachycardia.   - had coronary angiogram <1 month ago and LIMA and SVG grafts were widely patent  - Pt warm on exam, no cardiogenic shock at this time  - c/w lipitor 80 mg qd  - can start ASA 81 mg qd if able to tolerate from anemia standpoint  - labs could be consistent with hemolysis, probably in setting of end stage aortic valvular disease    d/w cardiology attending  Hector Colvin MD PGY5       INTERVAL EVENTS:  off pressors      MEDICATIONS  (STANDING):  artificial tears (preservative free) Ophthalmic Solution 1 Drop(s) Both EYES two times a day  atorvastatin 80 milliGRAM(s) Oral at bedtime  chlorhexidine 2% Cloths 1 Application(s) Topical <User Schedule>  cyanocobalamin 1000 MICROGram(s) Oral every 24 hours  DOXOrubicin INTRA-ARTERIAL (eMAR) 50 milliGRAM(s) IntraArterial once  ferrous    sulfate 325 milliGRAM(s) Oral <User Schedule>  folic acid 1 milliGRAM(s) Oral daily  lactulose Syrup 30 Gram(s) Oral three times a day  meropenem  IVPB 1000 milliGRAM(s) IV Intermittent every 8 hours  multivitamin 1 Tablet(s) Oral daily  nicotine - 21 mG/24Hr(s) Patch 1 patch Transdermal daily  pantoprazole  Injectable 40 milliGRAM(s) IV Push every 12 hours  rifAXIMin 550 milliGRAM(s) Oral every 12 hours  tamsulosin 0.4 milliGRAM(s) Oral at bedtime    MEDICATIONS  (PRN):  acetaminophen     Tablet .. 650 milliGRAM(s) Oral every 6 hours PRN Temp greater or equal to 38C (100.4F), Mild Pain (1 - 3)  sodium chloride 0.65% Nasal 1 Spray(s) Both Nostrils four times a day PRN Nasal Congestion      Home Medications:  folic acid 1 mg oral tablet: 1 tab(s) orally once a day (04 Mar 2022 11:39)  Multiple Vitamins oral tablet: 1 tab(s) orally once a day (04 Mar 2022 11:39)      Vital Signs Last 24 Hrs  T(C): 36.6 (22 Mar 2022 12:00), Max: 36.9 (21 Mar 2022 18:20)  T(F): 97.9 (22 Mar 2022 12:00), Max: 98.4 (21 Mar 2022 18:20)  HR: 87 (22 Mar 2022 11:00) (80 - 103)  BP: 110/58 (22 Mar 2022 11:00) (89/51 - 134/71)  BP(mean): 78 (22 Mar 2022 11:00) (66 - 94)  RR: 19 (22 Mar 2022 11:00) (14 - 29)  SpO2: 97% (22 Mar 2022 11:00) (94% - 100%)     PHYSICAL EXAM:  NAD  Trace basilar crackles  RRR absent s2  No LE edema  +JVD    LABS:                        9.3    6.56  )-----------( 85       ( 22 Mar 2022 05:37 )             30.1     03-22    136  |  111<H>  |  11  ----------------------------<  106<H>  3.4<L>   |  17<L>  |  0.67    Ca    7.6<L>      22 Mar 2022 05:37  Phos  3.2     03-22  Mg     2.2     03-22    TPro  6.2  /  Alb  2.0<L>  /  TBili  0.9  /  DBili  x   /  AST  63<H>  /  ALT  35  /  AlkPhos  79  03-22        PT/INR - ( 21 Mar 2022 05:52 )   PT: 18.3 sec;   INR: 1.53          PTT - ( 21 Mar 2022 05:52 )  PTT:31.8 sec    I&O's Summary    21 Mar 2022 07:01  -  22 Mar 2022 07:00  --------------------------------------------------------  IN: 1860 mL / OUT: 1715 mL / NET: 145 mL    22 Mar 2022 07:01  -  22 Mar 2022 12:48  --------------------------------------------------------  IN: 120 mL / OUT: 85 mL / NET: 35 mL      A/P:  66M CAD s/p CABG, severe bioprosthetic AS (not amenable to any intervention), newly diagnosed EtOH cirrhosis c/b HCC s/p TACE, anemia, ESBL UTI who cardiology has been consulted for worsening tachycardia and ST depressions.    #ST- depressions: V2-V6, I, II, and KRISTIN in aVR. Present on previous ECGs, but worse with increased HR  - Encephalopathic (likely 2/2 cirrhosis), no chest pain  - likely rate related/demand in setting of anemia and tachycardia.   - had coronary angiogram <1 month ago and LIMA and SVG grafts were widely patent  - Pt warm on exam, no cardiogenic shock at this time  - c/w lipitor 80 mg qd  - can start ASA 81 mg qd if able to tolerate from anemia standpoint  - labs could be consistent with hemolysis, probably in setting of end stage aortic valvular disease    #severe bioprosthetic AS: deemed not candidate for MAURI or CTS      d/w cardiology attending  Hector Colvin MD PGY5       INTERVAL EVENTS:  off pressors      MEDICATIONS  (STANDING):  artificial tears (preservative free) Ophthalmic Solution 1 Drop(s) Both EYES two times a day  atorvastatin 80 milliGRAM(s) Oral at bedtime  chlorhexidine 2% Cloths 1 Application(s) Topical <User Schedule>  cyanocobalamin 1000 MICROGram(s) Oral every 24 hours  DOXOrubicin INTRA-ARTERIAL (eMAR) 50 milliGRAM(s) IntraArterial once  ferrous    sulfate 325 milliGRAM(s) Oral <User Schedule>  folic acid 1 milliGRAM(s) Oral daily  lactulose Syrup 30 Gram(s) Oral three times a day  meropenem  IVPB 1000 milliGRAM(s) IV Intermittent every 8 hours  multivitamin 1 Tablet(s) Oral daily  nicotine - 21 mG/24Hr(s) Patch 1 patch Transdermal daily  pantoprazole  Injectable 40 milliGRAM(s) IV Push every 12 hours  rifAXIMin 550 milliGRAM(s) Oral every 12 hours  tamsulosin 0.4 milliGRAM(s) Oral at bedtime    MEDICATIONS  (PRN):  acetaminophen     Tablet .. 650 milliGRAM(s) Oral every 6 hours PRN Temp greater or equal to 38C (100.4F), Mild Pain (1 - 3)  sodium chloride 0.65% Nasal 1 Spray(s) Both Nostrils four times a day PRN Nasal Congestion      Home Medications:  folic acid 1 mg oral tablet: 1 tab(s) orally once a day (04 Mar 2022 11:39)  Multiple Vitamins oral tablet: 1 tab(s) orally once a day (04 Mar 2022 11:39)      Vital Signs Last 24 Hrs  T(C): 36.6 (22 Mar 2022 12:00), Max: 36.9 (21 Mar 2022 18:20)  T(F): 97.9 (22 Mar 2022 12:00), Max: 98.4 (21 Mar 2022 18:20)  HR: 87 (22 Mar 2022 11:00) (80 - 103)  BP: 110/58 (22 Mar 2022 11:00) (89/51 - 134/71)  BP(mean): 78 (22 Mar 2022 11:00) (66 - 94)  RR: 19 (22 Mar 2022 11:00) (14 - 29)  SpO2: 97% (22 Mar 2022 11:00) (94% - 100%)     PHYSICAL EXAM:  NAD  Trace basilar crackles  RRR absent s2  No LE edema  +JVD    LABS:                        9.3    6.56  )-----------( 85       ( 22 Mar 2022 05:37 )             30.1     03-22    136  |  111<H>  |  11  ----------------------------<  106<H>  3.4<L>   |  17<L>  |  0.67    Ca    7.6<L>      22 Mar 2022 05:37  Phos  3.2     03-22  Mg     2.2     03-22    TPro  6.2  /  Alb  2.0<L>  /  TBili  0.9  /  DBili  x   /  AST  63<H>  /  ALT  35  /  AlkPhos  79  03-22        PT/INR - ( 21 Mar 2022 05:52 )   PT: 18.3 sec;   INR: 1.53          PTT - ( 21 Mar 2022 05:52 )  PTT:31.8 sec    I&O's Summary    21 Mar 2022 07:01  -  22 Mar 2022 07:00  --------------------------------------------------------  IN: 1860 mL / OUT: 1715 mL / NET: 145 mL    22 Mar 2022 07:01  -  22 Mar 2022 12:48  --------------------------------------------------------  IN: 120 mL / OUT: 85 mL / NET: 35 mL      A/P:  66M CAD s/p CABG, severe bioprosthetic AS (not amenable to any intervention), newly diagnosed EtOH cirrhosis c/b HCC s/p TACE, anemia, ESBL UTI who cardiology has been consulted for worsening tachycardia and ST depressions.    #ST- depressions: V2-V6, I, II, and KRISTIN in aVR. Present on previous ECGs, but worse with increased HR  - Encephalopathic (likely 2/2 cirrhosis), no chest pain  - likely rate related/demand in setting of anemia and tachycardia.   - had coronary angiogram <1 month ago and LIMA and SVG grafts were widely patent  - Pt warm on exam, no cardiogenic shock at this time  - c/w lipitor 80 mg qd  - can start ASA 81 mg qd if able to tolerate from anemia standpoint  - labs could be consistent with hemolysis, probably in setting of end stage aortic valvular disease    #severe bioprosthetic AS: deemed not candidate for MAURI or CTS  - f/u with Dr. Ashby    d/w cardiology attending  Hector Colvin MD PGY5

## 2022-03-23 DIAGNOSIS — D69.6 THROMBOCYTOPENIA, UNSPECIFIED: ICD-10-CM

## 2022-03-23 DIAGNOSIS — R33.9 RETENTION OF URINE, UNSPECIFIED: ICD-10-CM

## 2022-03-23 LAB
-  AMPICILLIN/SULBACTAM: SIGNIFICANT CHANGE UP
-  AMPICILLIN: SIGNIFICANT CHANGE UP
-  CEFAZOLIN: SIGNIFICANT CHANGE UP
-  CEFTRIAXONE: SIGNIFICANT CHANGE UP
-  CIPROFLOXACIN: SIGNIFICANT CHANGE UP
-  ERTAPENEM: SIGNIFICANT CHANGE UP
-  GENTAMICIN: SIGNIFICANT CHANGE UP
-  MEROPENEM: SIGNIFICANT CHANGE UP
-  PIPERACILLIN/TAZOBACTAM: SIGNIFICANT CHANGE UP
-  TOBRAMYCIN: SIGNIFICANT CHANGE UP
-  TRIMETHOPRIM/SULFAMETHOXAZOLE: SIGNIFICANT CHANGE UP
ALBUMIN SERPL ELPH-MCNC: 2.2 G/DL — LOW (ref 3.3–5)
ALP SERPL-CCNC: 86 U/L — SIGNIFICANT CHANGE UP (ref 40–120)
ALT FLD-CCNC: 38 U/L — SIGNIFICANT CHANGE UP (ref 10–45)
ANION GAP SERPL CALC-SCNC: 8 MMOL/L — SIGNIFICANT CHANGE UP (ref 5–17)
ANISOCYTOSIS BLD QL: SLIGHT — SIGNIFICANT CHANGE UP
AST SERPL-CCNC: 67 U/L — HIGH (ref 10–40)
BASOPHILS # BLD AUTO: 0.05 K/UL — SIGNIFICANT CHANGE UP (ref 0–0.2)
BASOPHILS NFR BLD AUTO: 0.9 % — SIGNIFICANT CHANGE UP (ref 0–2)
BILIRUB SERPL-MCNC: 0.7 MG/DL — SIGNIFICANT CHANGE UP (ref 0.2–1.2)
BUN SERPL-MCNC: 11 MG/DL — SIGNIFICANT CHANGE UP (ref 7–23)
CALCIUM SERPL-MCNC: 7.9 MG/DL — LOW (ref 8.4–10.5)
CHLORIDE SERPL-SCNC: 115 MMOL/L — HIGH (ref 96–108)
CO2 SERPL-SCNC: 21 MMOL/L — LOW (ref 22–31)
CREAT SERPL-MCNC: 0.64 MG/DL — SIGNIFICANT CHANGE UP (ref 0.5–1.3)
EGFR: 104 ML/MIN/1.73M2 — SIGNIFICANT CHANGE UP
EOSINOPHIL # BLD AUTO: 0.23 K/UL — SIGNIFICANT CHANGE UP (ref 0–0.5)
EOSINOPHIL NFR BLD AUTO: 4.4 % — SIGNIFICANT CHANGE UP (ref 0–6)
GIANT PLATELETS BLD QL SMEAR: PRESENT — SIGNIFICANT CHANGE UP
GLUCOSE SERPL-MCNC: 136 MG/DL — HIGH (ref 70–99)
HCT VFR BLD CALC: 27.1 % — LOW (ref 39–50)
HEPARIN-PF4 AB RESULT: 2.2 U/ML — HIGH (ref 0–0.9)
HGB BLD-MCNC: 8.2 G/DL — LOW (ref 13–17)
HYPOCHROMIA BLD QL: SLIGHT — SIGNIFICANT CHANGE UP
LYMPHOCYTES # BLD AUTO: 0.74 K/UL — LOW (ref 1–3.3)
LYMPHOCYTES # BLD AUTO: 14.1 % — SIGNIFICANT CHANGE UP (ref 13–44)
MACROCYTES BLD QL: SLIGHT — SIGNIFICANT CHANGE UP
MAGNESIUM SERPL-MCNC: 2.1 MG/DL — SIGNIFICANT CHANGE UP (ref 1.6–2.6)
MANUAL SMEAR VERIFICATION: SIGNIFICANT CHANGE UP
MCHC RBC-ENTMCNC: 29.4 PG — SIGNIFICANT CHANGE UP (ref 27–34)
MCHC RBC-ENTMCNC: 30.3 GM/DL — LOW (ref 32–36)
MCV RBC AUTO: 97.1 FL — SIGNIFICANT CHANGE UP (ref 80–100)
METHOD TYPE: SIGNIFICANT CHANGE UP
MONOCYTES # BLD AUTO: 0.42 K/UL — SIGNIFICANT CHANGE UP (ref 0–0.9)
MONOCYTES NFR BLD AUTO: 8 % — SIGNIFICANT CHANGE UP (ref 2–14)
NEUTROPHILS # BLD AUTO: 3.79 K/UL — SIGNIFICANT CHANGE UP (ref 1.8–7.4)
NEUTROPHILS NFR BLD AUTO: 72.6 % — SIGNIFICANT CHANGE UP (ref 43–77)
OVALOCYTES BLD QL SMEAR: SLIGHT — SIGNIFICANT CHANGE UP
PF4 HEPARIN CMPLX AB SER-ACNC: POSITIVE — SIGNIFICANT CHANGE UP
PHOSPHATE SERPL-MCNC: 2.4 MG/DL — LOW (ref 2.5–4.5)
PLAT MORPH BLD: ABNORMAL
PLATELET # BLD AUTO: 86 K/UL — LOW (ref 150–400)
POIKILOCYTOSIS BLD QL AUTO: SLIGHT — SIGNIFICANT CHANGE UP
POLYCHROMASIA BLD QL SMEAR: SLIGHT — SIGNIFICANT CHANGE UP
POTASSIUM SERPL-MCNC: 3.7 MMOL/L — SIGNIFICANT CHANGE UP (ref 3.5–5.3)
POTASSIUM SERPL-SCNC: 3.7 MMOL/L — SIGNIFICANT CHANGE UP (ref 3.5–5.3)
PROT SERPL-MCNC: 6.2 G/DL — SIGNIFICANT CHANGE UP (ref 6–8.3)
RBC # BLD: 2.79 M/UL — LOW (ref 4.2–5.8)
RBC # FLD: 20.5 % — HIGH (ref 10.3–14.5)
RBC BLD AUTO: ABNORMAL
SCHISTOCYTES BLD QL AUTO: SLIGHT — SIGNIFICANT CHANGE UP
SMUDGE CELLS # BLD: PRESENT — SIGNIFICANT CHANGE UP
SODIUM SERPL-SCNC: 144 MMOL/L — SIGNIFICANT CHANGE UP (ref 135–145)
SPHEROCYTES BLD QL SMEAR: SLIGHT — SIGNIFICANT CHANGE UP
WBC # BLD: 5.22 K/UL — SIGNIFICANT CHANGE UP (ref 3.8–10.5)
WBC # FLD AUTO: 5.22 K/UL — SIGNIFICANT CHANGE UP (ref 3.8–10.5)

## 2022-03-23 PROCEDURE — 99232 SBSQ HOSP IP/OBS MODERATE 35: CPT | Mod: GC

## 2022-03-23 PROCEDURE — 99232 SBSQ HOSP IP/OBS MODERATE 35: CPT

## 2022-03-23 PROCEDURE — 99358 PROLONG SERVICE W/O CONTACT: CPT

## 2022-03-23 PROCEDURE — 99233 SBSQ HOSP IP/OBS HIGH 50: CPT

## 2022-03-23 RX ORDER — SODIUM,POTASSIUM PHOSPHATES 278-250MG
1 POWDER IN PACKET (EA) ORAL ONCE
Refills: 0 | Status: COMPLETED | OUTPATIENT
Start: 2022-03-23 | End: 2022-03-23

## 2022-03-23 RX ORDER — ENOXAPARIN SODIUM 100 MG/ML
40 INJECTION SUBCUTANEOUS EVERY 24 HOURS
Refills: 0 | Status: DISCONTINUED | OUTPATIENT
Start: 2022-03-23 | End: 2022-03-24

## 2022-03-23 RX ORDER — QUETIAPINE FUMARATE 200 MG/1
12.5 TABLET, FILM COATED ORAL AT BEDTIME
Refills: 0 | Status: DISCONTINUED | OUTPATIENT
Start: 2022-03-23 | End: 2022-03-23

## 2022-03-23 RX ORDER — LACTULOSE 10 G/15ML
10 SOLUTION ORAL EVERY 8 HOURS
Refills: 0 | Status: DISCONTINUED | OUTPATIENT
Start: 2022-03-23 | End: 2022-03-24

## 2022-03-23 RX ORDER — ASPIRIN/CALCIUM CARB/MAGNESIUM 324 MG
81 TABLET ORAL EVERY 24 HOURS
Refills: 0 | Status: DISCONTINUED | OUTPATIENT
Start: 2022-03-24 | End: 2022-03-29

## 2022-03-23 RX ORDER — OLANZAPINE 15 MG/1
2.5 TABLET, FILM COATED ORAL ONCE
Refills: 0 | Status: COMPLETED | OUTPATIENT
Start: 2022-03-23 | End: 2022-03-23

## 2022-03-23 RX ORDER — PANTOPRAZOLE SODIUM 20 MG/1
40 TABLET, DELAYED RELEASE ORAL EVERY 24 HOURS
Refills: 0 | Status: DISCONTINUED | OUTPATIENT
Start: 2022-03-24 | End: 2022-03-29

## 2022-03-23 RX ORDER — QUETIAPINE FUMARATE 200 MG/1
25 TABLET, FILM COATED ORAL ONCE
Refills: 0 | Status: COMPLETED | OUTPATIENT
Start: 2022-03-23 | End: 2022-03-23

## 2022-03-23 RX ORDER — POTASSIUM CHLORIDE 20 MEQ
40 PACKET (EA) ORAL ONCE
Refills: 0 | Status: COMPLETED | OUTPATIENT
Start: 2022-03-23 | End: 2022-03-23

## 2022-03-23 RX ORDER — PANTOPRAZOLE SODIUM 20 MG/1
40 TABLET, DELAYED RELEASE ORAL DAILY
Refills: 0 | Status: DISCONTINUED | OUTPATIENT
Start: 2022-03-23 | End: 2022-03-23

## 2022-03-23 RX ADMIN — LACTULOSE 30 GRAM(S): 10 SOLUTION ORAL at 05:47

## 2022-03-23 RX ADMIN — Medication 1 PATCH: at 22:21

## 2022-03-23 RX ADMIN — Medication 1 PATCH: at 22:00

## 2022-03-23 RX ADMIN — CHLORHEXIDINE GLUCONATE 1 APPLICATION(S): 213 SOLUTION TOPICAL at 05:45

## 2022-03-23 RX ADMIN — QUETIAPINE FUMARATE 25 MILLIGRAM(S): 200 TABLET, FILM COATED ORAL at 00:51

## 2022-03-23 RX ADMIN — Medication 1 DROP(S): at 11:17

## 2022-03-23 RX ADMIN — ATORVASTATIN CALCIUM 80 MILLIGRAM(S): 80 TABLET, FILM COATED ORAL at 22:22

## 2022-03-23 RX ADMIN — TAMSULOSIN HYDROCHLORIDE 0.4 MILLIGRAM(S): 0.4 CAPSULE ORAL at 22:22

## 2022-03-23 RX ADMIN — MEROPENEM 100 MILLIGRAM(S): 1 INJECTION INTRAVENOUS at 22:22

## 2022-03-23 RX ADMIN — OLANZAPINE 2.5 MILLIGRAM(S): 15 TABLET, FILM COATED ORAL at 22:23

## 2022-03-23 RX ADMIN — Medication 1 PACKET(S): at 19:04

## 2022-03-23 RX ADMIN — ENOXAPARIN SODIUM 40 MILLIGRAM(S): 100 INJECTION SUBCUTANEOUS at 19:02

## 2022-03-23 RX ADMIN — Medication 1 TABLET(S): at 11:16

## 2022-03-23 RX ADMIN — Medication 1 PATCH: at 07:08

## 2022-03-23 RX ADMIN — MEROPENEM 100 MILLIGRAM(S): 1 INJECTION INTRAVENOUS at 14:58

## 2022-03-23 RX ADMIN — PREGABALIN 1000 MICROGRAM(S): 225 CAPSULE ORAL at 11:16

## 2022-03-23 RX ADMIN — LACTULOSE 10 GRAM(S): 10 SOLUTION ORAL at 22:23

## 2022-03-23 RX ADMIN — PANTOPRAZOLE SODIUM 40 MILLIGRAM(S): 20 TABLET, DELAYED RELEASE ORAL at 05:47

## 2022-03-23 RX ADMIN — Medication 1 PATCH: at 18:47

## 2022-03-23 RX ADMIN — Medication 1 MILLIGRAM(S): at 11:16

## 2022-03-23 RX ADMIN — Medication 1 DROP(S): at 22:23

## 2022-03-23 RX ADMIN — MEROPENEM 100 MILLIGRAM(S): 1 INJECTION INTRAVENOUS at 06:06

## 2022-03-23 RX ADMIN — Medication 325 MILLIGRAM(S): at 05:47

## 2022-03-23 RX ADMIN — Medication 650 MILLIGRAM(S): at 11:24

## 2022-03-23 RX ADMIN — Medication 40 MILLIEQUIVALENT(S): at 11:17

## 2022-03-23 NOTE — PROGRESS NOTE ADULT - PROBLEM SELECTOR PLAN 9
c/w BID Protonix - c/w tamsulosin 0.4mg   - d/c rogers Holding anti-HTN meds, s/p pressors  - monitor VS and resume when appropriate

## 2022-03-23 NOTE — PROGRESS NOTE ADULT - PROBLEM SELECTOR PLAN 3
Severe AS i/s/o prior TAVR in 2017 with Dr Wilcox. JOSELO with peak transvalvular velocity of 4.7, mean gradient 56. Initially planned for valve in valve TAVR, but then it was decided that because of aortic root anatomy, he would need aortic root open heart surgery and deemed not to be a good surgical candidate. Labs consistent with hemolysis most likely 2/2 to worsening AS. Severe AS i/s/o prior TAVR in 2017 with Dr Wilcox. JOSELO with peak transvalvular velocity of 4.7, mean gradient 56. Initially planned for valve in valve TAVR, but then it was decided that because of aortic root anatomy, he would need aortic root open heart surgery and deemed not to be a good surgical candidate. Labs consistent with hemolysis most likely 2/2 to worsening AS.  - not surgical candidate Normocytic hypochromic anemia likely 2/2 hemolysis due to prosthetic valve. Minimal epistaxis. Completed IV iron X 3 days. Reported history of dark bm in MICU.  - c/w b12 1mg Qd  - c/w folate 1mg qd  - c/w ferrous sulfate 325 mg PO every other day  - Transfusion goal > 8  - maintain active T&S  - c/w protonix 40mg daily  - holding DVT ppx for now, will resume once Hgb stable

## 2022-03-23 NOTE — PROGRESS NOTE ADULT - PROBLEM SELECTOR PLAN 1
.  PPSV = 40%, requires assistance for most ADLs  -c/w supportive care, OOB, encourage movement  -patient is uninsured so BRENDA is not an option, family is unable to care for him at home  -if "mary" BRENDA cannot be arranged, unsure what a safe discharge would look like  -if family was accepting of hospice plan of care, then Jacqueline Owusu could be pursued but they are very far that plan of care

## 2022-03-23 NOTE — PROGRESS NOTE ADULT - PROBLEM SELECTOR PLAN 5
Normocytic hypochromic anemia likely 2/2 hemolysis due to prosthetic valve. Minimal epistaxis. Completed IV iron X 3 days  - c/w b12 1mg Qd  - c/w folate 1mg qd  - c/w ferrous sulfate 325 mg daily  PO  - Transfusion goal > 7  - maintain active T&S Normocytic hypochromic anemia likely 2/2 hemolysis due to prosthetic valve. Minimal epistaxis. Completed IV iron X 3 days. Reported history of dark bm in MICU.  - c/w b12 1mg Qd  - c/w folate 1mg qd  - c/w ferrous sulfate 325 mg PO every other day  - Transfusion goal > 7  - maintain active T&S  - c/w protonix 40mg daily  - holding DVT ppx for now, will resume once Hgb stable Severe AS i/s/o prior TAVR in 2017 with Dr Wilcox. JOSELO with peak transvalvular velocity of 4.7, mean gradient 56. Initially planned for valve in valve TAVR, but then it was decided that because of aortic root anatomy, he would need aortic root open heart surgery and deemed not to be a good surgical candidate. Labs consistent with hemolysis most likely 2/2 to worsening AS.  - not surgical candidate

## 2022-03-23 NOTE — PROGRESS NOTE ADULT - ASSESSMENT
65yo M with Polysubstance Use Disorder (Nicotine, EtOH), HTN, CAD, and AS p/w chest pain in the setting of aortic valve stenosis and found to have cirrhosis c/b newly diagnosed HCC. Palliative consulted for complex medical decision making in the setting of advanced illness.    ·	continued discussion with wife/daughter to explore GOC; they are only focused on recovery, will continue to emphasize realistic expectations 65yo M with Polysubstance Use Disorder (Nicotine, EtOH), HTN, CAD, and AS p/w chest pain in the setting of aortic valve stenosis and found to have cirrhosis c/b newly diagnosed HCC. Palliative consulted for complex medical decision making in the setting of advanced illness.    ·	continued discussion with wife/daughter to explore GOC; they are only focused on recovery  ·	at this time, family will continue to accept any medical interventions that are being offered  ·	Palliative will continue to emphasize realistic expectations and provide support

## 2022-03-23 NOTE — PROGRESS NOTE ADULT - PROBLEM SELECTOR PLAN 1
IMPROVING   Unlikely hepatic encephalopathy as no asterixis and is currently compensated. Neuro exam nonfocal. Possibly related to chronic alcohol use, Wernicke's or depression i/s/o HCC diagnosis. As per wife, pt is not normally lethargic at home. Completed course of high dose thiamine, last librium dose on 03/01. CTH and MRI w/op acute abnormality. Improving. Post TACE procedure c/f protection of airway given increased somnolence potentially 2/2 anesthesia. At that time patient intubated for airway protection and transferred to MICU, s/p extubation. VEEG no seizures.   - Neuro following  - Infectious disease as below   - c/w hepatic encephalopathy treatment: Rifaximin and lactulose, rectal tube in place Improving.  Likely related to chronic alcohol use vs hepatic encephalopathy. As per wife, pt is not normally lethargic at home. Completed course of high dose thiamine, last librium dose on 03/01. CTH and MRI w/op acute abnormality but MR head showed brain parenchymal volume loss, advanced for patient's age. VEEG no seizures.   - Infectious disease as below   - c/w hepatic encephalopathy treatment: Rifaximin and lactulose, rectal tube in place - pt having bms

## 2022-03-23 NOTE — PROGRESS NOTE ADULT - PROBLEM SELECTOR PLAN 6
Long standing EtOH consumption. Not showing any signs of withdrawal at the moment. Last drink prior to admission which is almost 2 weeks ago. Pt was given 3 days of librium Q8hrs while on CT surgery however did not display signs of withdrawal per chart review  - c/w Multivitamin and Folic Acid    #ALC (alcoholic liver cirrhosis)  2/2 long standing EtOH abuse. Likely compensated.. Likely no hepatic encephalopathy. No known EV bleeding. NO ascites however now with signs of new infection started on antibiotics today.  - no known Varices, but no prior EGD evaluation, he will need it outpatient, although has significant risk factors. Long standing EtOH consumption. Not showing any signs of withdrawal at the moment. Last drink prior to admission which is almost 2 weeks ago. Pt was given 3 days of librium Q8hrs while on CT surgery however did not display signs of withdrawal per chart review  - c/w Multivitamin and Folic Acid    #ALC (alcoholic liver cirrhosis)  2/2 long standing EtOH abuse. CTAP with 2.4 hypervascular liver mass. i/s/o Liver cirrhosis and EtOH use disorder. MRI abdomen: 2.5 cm left hepatic lesion consistent with HCC. No locally invasive or metastatic disease. s/p IR transarterial chemical embolization since he is not a candidate for ablation  - Palliative care following

## 2022-03-23 NOTE — PROGRESS NOTE ADULT - ASSESSMENT
65 YO M, current smoker (1ppd x 50 years), current every day ETOH abuse (1 pint vodka and several beers daily x 50 years), with PMHx of HLD, HTN (not on any medications), CABG (LIMA to LAD, SVG to PDA in 2017), AS (s/p AVR in 2017) with recently discovered restenosis, presented to [facility] ED on 2/24/22 with chest pain and episode of LOC, initially admitted to cardiology for NSTEMI with LHC showing nonobstructive disease. Patient was found to have restenosis of bioprosthetic valve, however not surgical candidate. Patient transferred to medicine for further management of HCC, hepatic encephalopathy, course c/b AMS. After IR TACE of HCC on 3/14, patient intubated for airway protection and transferred to MICU. Course further c/b septic shock 2/2 ESBL Klebsiella bacteremia source unclear. Pt extubated, transitioned off of pressors, and transferred back to [facility].    65 YO M, current smoker (1ppd x 50 years), current every day ETOH abuse (1 pint vodka and several beers daily x 50 years), with PMHx of HLD, HTN (not on any medications), CABG (LIMA to LAD, SVG to PDA in 2017), AS (s/p AVR in 2017) with recently discovered restenosis, presented to [facility] ED on 2/24/22 with chest pain and episode of LOC, initially admitted to cardiology for NSTEMI with LHC showing nonobstructive disease. Patient was found to have restenosis of bioprosthetic valve, however not surgical candidate. Patient transferred to medicine for further management of HCC, hepatic encephalopathy, course c/b AMS. After IR TACE of HCC on 3/14, patient intubated for airway protection and transferred to MICU. Course further c/b fall and septic shock 2/2 ESBL Klebsiella bacteremia source unclear. Pt transitioned off of pressors, and transferred back to [facility].

## 2022-03-23 NOTE — PROGRESS NOTE ADULT - SUBJECTIVE AND OBJECTIVE BOX
OVERNIGHT EVENTS: Was agitated o/n and attempting to leave the bed, given seroquel 25mg x1    SUBJECTIVE / INTERVAL HPI: Patient seen and examined at bedside. Pt says he is feeling okay. Denies chest pain, SOB, fevers, chills, abd pain.    VITAL SIGNS:  Vital Signs Last 24 Hrs  T(C): 36.1 (23 Mar 2022 05:44), Max: 36.8 (22 Mar 2022 21:00)  T(F): 97 (23 Mar 2022 05:44), Max: 98.2 (22 Mar 2022 21:00)  HR: 92 (23 Mar 2022 04:30) (87 - 108)  BP: 104/59 (23 Mar 2022 04:00) (100/55 - 129/61)  BP(mean): 78 (23 Mar 2022 04:00) (73 - 93)  RR: 18 (23 Mar 2022 04:00) (17 - 29)  SpO2: 95% (23 Mar 2022 04:30) (90% - 99%)    PHYSICAL EXAM:    General: NAD; calm; cooperative  HEENT: NC/AT, anicteric sclera; MMM  Neck: supple  Cardiovascular: +S1/S2; RRR  Respiratory: CTA B/L; +systolic murmur  Gastrointestinal: NGT in place; soft, NT/ND; +BSx4  Extremities: WWP; no edema, clubbing or cyanosis  Vascular: 2+ radial, DP/PT pulses B/L  Neurological: AAOx2    MEDICATIONS:  MEDICATIONS  (STANDING):  artificial tears (preservative free) Ophthalmic Solution 1 Drop(s) Both EYES two times a day  atorvastatin 80 milliGRAM(s) Oral at bedtime  chlorhexidine 2% Cloths 1 Application(s) Topical <User Schedule>  cyanocobalamin 1000 MICROGram(s) Oral every 24 hours  DOXOrubicin INTRA-ARTERIAL (eMAR) 50 milliGRAM(s) IntraArterial once  ferrous    sulfate 325 milliGRAM(s) Oral <User Schedule>  folic acid 1 milliGRAM(s) Oral daily  lactulose Syrup 30 Gram(s) Oral three times a day  meropenem  IVPB 1000 milliGRAM(s) IV Intermittent every 8 hours  multivitamin 1 Tablet(s) Oral daily  nicotine - 21 mG/24Hr(s) Patch 1 patch Transdermal daily  pantoprazole  Injectable 40 milliGRAM(s) IV Push every 12 hours  rifAXIMin 550 milliGRAM(s) Oral every 12 hours  tamsulosin 0.4 milliGRAM(s) Oral at bedtime    MEDICATIONS  (PRN):  acetaminophen     Tablet .. 650 milliGRAM(s) Oral every 6 hours PRN Temp greater or equal to 38C (100.4F), Mild Pain (1 - 3)  sodium chloride 0.65% Nasal 1 Spray(s) Both Nostrils four times a day PRN Nasal Congestion      ALLERGIES:  Allergies    No Known Allergies    Intolerances        LABS:                        9.3    6.56  )-----------( 85       ( 22 Mar 2022 05:37 )             30.1     03-22    136  |  111<H>  |  11  ----------------------------<  106<H>  3.4<L>   |  17<L>  |  0.67    Ca    7.6<L>      22 Mar 2022 05:37  Phos  3.2     03-22  Mg     2.2     03-22    TPro  6.2  /  Alb  2.0<L>  /  TBili  0.9  /  DBili  x   /  AST  63<H>  /  ALT  35  /  AlkPhos  79  03-22        CAPILLARY BLOOD GLUCOSE          RADIOLOGY & ADDITIONAL TESTS: Reviewed.    ASSESSMENT:    PLAN:  OVERNIGHT EVENTS: Was agitated o/n and attempting to leave the bed, given seroquel 25mg x1    SUBJECTIVE / INTERVAL HPI: Patient seen and examined at bedside. Pt says he is feeling okay. Denies chest pain, SOB, fevers, chills, abd pain.    VITAL SIGNS:  Vital Signs Last 24 Hrs  T(C): 36.1 (23 Mar 2022 05:44), Max: 36.8 (22 Mar 2022 21:00)  T(F): 97 (23 Mar 2022 05:44), Max: 98.2 (22 Mar 2022 21:00)  HR: 92 (23 Mar 2022 04:30) (87 - 108)  BP: 104/59 (23 Mar 2022 04:00) (100/55 - 129/61)  BP(mean): 78 (23 Mar 2022 04:00) (73 - 93)  RR: 18 (23 Mar 2022 04:00) (17 - 29)  SpO2: 95% (23 Mar 2022 04:30) (90% - 99%)    PHYSICAL EXAM:    General: NAD; calm; cooperative; following commands  HEENT: NC/AT, anicteric sclera; MMM  Neck: supple  Cardiovascular: +S1/S2; RRR  Respiratory: CTA B/L; +systolic murmur  Gastrointestinal: NGT in place; soft, NT; mildly distended; no rebound/guarding +BSx4; rectal tube in place  Extremities: WWP; no edema, clubbing or cyanosis  : rogers in place  Vascular: 2+ radial, DP/PT pulses B/L  Neurological: AAOx2    MEDICATIONS:  MEDICATIONS  (STANDING):  artificial tears (preservative free) Ophthalmic Solution 1 Drop(s) Both EYES two times a day  atorvastatin 80 milliGRAM(s) Oral at bedtime  chlorhexidine 2% Cloths 1 Application(s) Topical <User Schedule>  cyanocobalamin 1000 MICROGram(s) Oral every 24 hours  DOXOrubicin INTRA-ARTERIAL (eMAR) 50 milliGRAM(s) IntraArterial once  ferrous    sulfate 325 milliGRAM(s) Oral <User Schedule>  folic acid 1 milliGRAM(s) Oral daily  lactulose Syrup 30 Gram(s) Oral three times a day  meropenem  IVPB 1000 milliGRAM(s) IV Intermittent every 8 hours  multivitamin 1 Tablet(s) Oral daily  nicotine - 21 mG/24Hr(s) Patch 1 patch Transdermal daily  pantoprazole  Injectable 40 milliGRAM(s) IV Push every 12 hours  rifAXIMin 550 milliGRAM(s) Oral every 12 hours  tamsulosin 0.4 milliGRAM(s) Oral at bedtime    MEDICATIONS  (PRN):  acetaminophen     Tablet .. 650 milliGRAM(s) Oral every 6 hours PRN Temp greater or equal to 38C (100.4F), Mild Pain (1 - 3)  sodium chloride 0.65% Nasal 1 Spray(s) Both Nostrils four times a day PRN Nasal Congestion      ALLERGIES:  Allergies    No Known Allergies    Intolerances        LABS:                        9.3    6.56  )-----------( 85       ( 22 Mar 2022 05:37 )             30.1     03-22    136  |  111<H>  |  11  ----------------------------<  106<H>  3.4<L>   |  17<L>  |  0.67    Ca    7.6<L>      22 Mar 2022 05:37  Phos  3.2     03-22  Mg     2.2     03-22    TPro  6.2  /  Alb  2.0<L>  /  TBili  0.9  /  DBili  x   /  AST  63<H>  /  ALT  35  /  AlkPhos  79  03-22        CAPILLARY BLOOD GLUCOSE          RADIOLOGY & ADDITIONAL TESTS: Reviewed.    ASSESSMENT:    PLAN:

## 2022-03-23 NOTE — PROGRESS NOTE ADULT - PROBLEM SELECTOR PLAN 6
.  Patient is Full Code  -family will accept all medical interventions that are offered to them  -if there based on clinical providers' judgement  there is medical futility or risks starting to outweigh benefits, then medical care does not need to be escalated -> would recommend an Ethics consult in that situation    Patient assessed on 03-23-22 to manage: GOC/Advanced Directives, Symptoms, and Supportive Care. 31 Minutes; Start: 2:00PM,End: 2:31PM, Non-Face-to-Face prolonged service provided that relates to Face-to-Face care that has occurred and ongoing patient management, including one or more of the following: - Reviewed documentation from other physicians and other health care professional services - Reviewed other medical records and diagnostic / radiology study results - Coordination with patient's support system

## 2022-03-23 NOTE — PROGRESS NOTE ADULT - PROBLEM SELECTOR PLAN 4
CTAP with 2.4 hypervascular liver mass. i/s/o Liver cirrhosis and EtOH use disorder. triple phase CT confirms HCC. AFP is 7.2.  - MRI abdomen: 2.5 cm left hepatic lesion consistent with LI-RADS v 2018 Category: LR-5 Definitely HCC. OPTN Category (if LR-5): 5B. No locally invasive or metastatic disease. Left hepatic artery variant. No additional suspicious hepatic lesions.   - s/p IR transarterial chemical embolization since he is not a candidate for ablation  - Surg/onc following NTD  - Palliative care following. CTAP with 2.4 hypervascular liver mass. i/s/o Liver cirrhosis and EtOH use disorder. MRI abdomen: 2.5 cm left hepatic lesion consistent with HCC. No locally invasive or metastatic disease. s/p IR transarterial chemical embolization since he is not a candidate for ablation  - Palliative care following likely i/s/o septic shock.  - continue to monitor  - will check HIT panel

## 2022-03-23 NOTE — PROGRESS NOTE ADULT - ASSESSMENT
65 y/o M, current smoker (1ppd x 50 years), current every day ETOH abuse (1 pint vodka and several beers daily x 50 years), with PMHx of HLD, HTN (not on any medications),  CABG (LIMA to LAD, SVG to PDA in 2017), AS (s/p AVR in 2017), who presented to [facility] ED on 2/24/22 with complaints of 9/10 chest pain with associated diaphoresis with exertion, admitted for NSTEMI, transferred to CTsx service for severe AS, requiring a TAVR procedure; upon workup for TAVR, patient incidentally noted to have a 2.4 cm hypervascular lesion for which GI was consulted, CT A/P concerning for HCC. Now Gi re-consulted for AMS.     #HCC  Patient presented to [facility] ED on 2/24/22 with complaints of 9/10 chest pain with associated diaphoresis with exertion, admitted for NSTEMI, transferred to CTsx service for severe AS, requiring a TAVR procedure. During workup for TAVR, CTA A/P (2/28) revealing a nodular liver contour, c/w cirrhosis, A 2.4 cm hypervascular liver mass suspicious for HCC, as well as splenomegaly. AFP 7.2 (WNL), While AFP noted to be normal, does not preclude risk of possibility of HCC, especially in the setting of what appears to be cirrhosis 2/2 underlying long-standing history of EtOH use, which places him at higher risk.   - CT A/P (3/6) revealing 2.5 cm left hepatic lesion consistent with LI-RADS v 2018 Category: LR-5 Definitely HCC. OPTN Category (if LR-5): 5B. No locally invasive or metastatic disease. Left hepatic artery variant. No additional suspicious hepatic lesions.  - In light of active EtOH use, would not qualify for liver transplant evaluation   - Not a surgical candidate as per surgical onc team  - Appreciate ongoing Heme/Onc recs  - MR Abdomen (3/9) confirming HCC. s/p TACE 3/14/22  - To assess the efficacy of TACE, recommend surveillance imaging (MRI or CT) 4 weeks post TACE, then 2-3 months later, followed by q3 month imaging for a total of 2 years    #Cirrhosis, decompensated (+HE, +ascites, - no known EV bleeding)  CTA A/P with radiographic findings c/f cirrhosis, including nodular liver contour and splenomegaly, which is suggestive likely portal HTN. Likely in the setting of long-standing history of EtOH abuse.   MELD-Na: 11 (based on 3/20/22 BW, no AM coags ordered for 3/23/22)  HE: AA0x 2 (more alert and conversive this AM), no asterixis   Ascites: trace perihepatic ascites on most recent CT imaging (3/21)  HCC: CTA A/P (2/28) with 2.4 cm hypervascular lesion. CT A/P (3/6) and MRI confirming lesion c/w HCC  Varices: No prior EGD evaluation, would be warranted, can pursue as an outpatient  - Abdominal US with duplex (3/6) The portal veins, hepatic veins and hepatic arteries are patent with normal directionality of flow. Cirrhosis. Since 2/28/2022, again a 2.9 cm heterogeneous lesion is present within the left lobe of the liver (see CT findings as noted above, appears to be c/w HCC). MRI confirming HCC  - Daily CMPs & Coags to calculate MELD-Na  - Nutrition consult  - High protein diet  -  on alcohol cessation  - c/w Rifaximin 500 mg BID & lactulose, titrate to 2-3 BMs/day    #AMS  Unclear etiology for AMS this am with increased lethargy. Ddx includes HE, infection, librium, delirium. The elevated ammonia lever to 123 could be due to his nose bleed, which has been ongoing since 3/5. He does not have Asterixis on exam and is A&O with understanding of where he is and why he is hospitalized. If HE, unclear the inciting event to cause decompensation. Additionally, RUQ US today w/o ascites. Utox (3/9/22) + benzos, in the setting of last documented librium 10 days prior, most likely 2/2 poorly metabolized in the setting of his cirrhosis  - Continue to hold librium  - c/w Rifaximin 500 mg BID & lactulose, titrate to 2-3 BMs/day  - CXR (3/6/22) negative for infiltrates  - Bcx (3/19) Klebsiella Pneumoniae ESBL, with no clear identifiable source based on CT imaging as noted below, agree with Gallium scan and continuation of abxs. Appreciate ongoing ID recs  - CT Head (3/10) negative  - CT A/P (3/21) Hepatic cirrhosis. Mild splenomegaly. Trace volume ascites. No bowel wall thickening or intestinal obstruction; however, there is mild inflammation in the sigmoid mesocolon adjacent to the otherwise unremarkable sigmoid colon, potentially signifying mild colitis. Alternatively, there is diffuse mesenteric edema related to cirrhosis and the stranding in the sigmoid mesocolon could be related to this. Indeterminate 2 cm hypoattenuating hepatic lesion.   - CT Chest (3/21) Small bilateral pleural effusions and pulmonary venous congestion. Negative for pulmonary consolidation. Findings of hepatic cirrhosis with nodule within the left lobe of the liver consistent with known HCC.   - CT Maxillofacial (3/21) Multifocal periapical dental lucencies. No associated overlying soft tissue inflammation or abscess. The uvula is subjectively prominent which raises the possibility of uvulitis.    #Anemia   Stable, with slow downtrend in H/H, with no e/o overt GI bleeding including melena, hematochezia, hematemesis, coffee ground emesis. Previously noted to have dried blood on right nare, which may have contributed to some blood loss previously however hemolysis labs demonstrating hemolysis, likely in the setting of severe AS  - Monitor H/H  - Transfusion goal >8  - In light of underlying cirrhosis, with evidence of thrombocytopenia and splenomegaly, suggestive of e/o portal HTN, would warrant endoscopic evaluation to further assess for EV/PHG however given recent NSTEMI, higher risk for endoscopic evaluation. At this time, he has worsening ST depressions, which cardiology attributes to demand ischemia and recommending fluid bolus and blood to avoid hypotension.  - H/H stable, with no e/o overt bleeding, from GI standpoint, no contraindication to ASA. Discuss with Cardiology team  - c/w PPI daily  - Recommend ongoing GOC discussion    Case discussed with svc attending and primary team.     Thank you for allowing us to participate in the care of this patient.  GI will sign off. Please call back with any questions or concerns.     So Guthrie DO  Gastroenterology Fellow  Pager: [phone]

## 2022-03-23 NOTE — PROGRESS NOTE ADULT - ASSESSMENT
This is a 67 YO M, current smoker (1ppd x 50 years), current every day ETOH abuse (1 pint vodka and several beers daily x 50 years), with PMHx of HLD, HTN (not on any medications), CABG (LIMA to LAD, SVG to PDA in 2017), AS (s/p AVR in 2017) with recently discovered restenosis, presented to [facility] ED on 2/24/22 with chest pain and episode of LOC, initially admitted to cardiology for NSTEMI with [facility] with nonobstructive disease, then found to have restenosis of bioprosthetic valve for which aortic root surgery was planned, but now deferred given new diagnosis of cirrhosis and HCC. Course c/b AMS 2/2 likely hepatic encephalopathy and metabolic encephalopathy. Course further c/b septic shock 2/2 ESBL Klebsiella UTI.   - Pt without localizing symptoms or radiographic evidence of pneumonia at this time, however can not rule out   - Sputum cultures growing ESBL Klebsiella and Proteus mirabilis with few WBCs  - 3/19 pt went into septic shock while on ertapenem with BCx positive for Klebsiella. Improved with meropenem  - 3/21 CT A/P showing colitis, hcc tumor with necrotic center, and new small ascites.  - 3/21 CT chest showing bilateral effusions.  Initially thought that pt's source for sepsis was UTI earlier on admission. However, should have been treated with ertapenem per susceptibilities. Sputum cultures with only few WBCs and no radiographic evidence or symptoms c/w pneumonia raises concern for airway colonization. Other sources could be endocarditis (although rare with gram negative bacteremia), SBP, prostatitis, or other intraabdominal infection.  It is possible the necrotic center of the tumor is a source of infection. At this point, Gallium scan would be useful to identify source.   Recommendations:  - please obtain TTE  - recommend paracentesis to assess for SBP  - f/u BCx susceptibilities  - f/u surveillance BCx  - f/u OMFS consult to assess for oral lesions  - continue meropenem 1g q8 hours (3/20-) for now   - f/u Gallium scan  (ordered)  - ongoing GOC discussions    ID Team 1 will continue to follow.   Note not finalized until attested by attending     Fariba Ni MD PGY2 Internal Medicine  Infectious Disease Consult Service, Team 1

## 2022-03-23 NOTE — PROGRESS NOTE ADULT - SUBJECTIVE AND OBJECTIVE BOX
GASTROENTEROLOGY PROGRESS NOTE  Patient seen and examined at bedside. No AM coags to calculate MELD-NA     ROS: More alert this morning, with no complaints of abdominal pain, nausea/vomiting, fevers, chills.     PERTINENT REVIEW OF SYSTEMS:  CONSTITUTIONAL: No weakness, fevers or chills  HEENT: No visual changes; No vertigo or throat pain   GASTROINTESTINAL: As above.  NEUROLOGICAL: No numbness or weakness  SKIN: No itching, burning, rashes, or lesions     Allergies    No Known Allergies    Intolerances      MEDICATIONS:  MEDICATIONS  (STANDING):  artificial tears (preservative free) Ophthalmic Solution 1 Drop(s) Both EYES two times a day  atorvastatin 80 milliGRAM(s) Oral at bedtime  chlorhexidine 2% Cloths 1 Application(s) Topical <User Schedule>  cyanocobalamin 1000 MICROGram(s) Oral every 24 hours  DOXOrubicin INTRA-ARTERIAL (eMAR) 50 milliGRAM(s) IntraArterial once  ferrous    sulfate 325 milliGRAM(s) Oral <User Schedule>  folic acid 1 milliGRAM(s) Oral daily  lactulose Syrup 30 Gram(s) Oral three times a day  meropenem  IVPB 1000 milliGRAM(s) IV Intermittent every 8 hours  multivitamin 1 Tablet(s) Oral daily  nicotine - 21 mG/24Hr(s) Patch 1 patch Transdermal daily  rifAXIMin 550 milliGRAM(s) Oral every 12 hours  tamsulosin 0.4 milliGRAM(s) Oral at bedtime    MEDICATIONS  (PRN):  acetaminophen     Tablet .. 650 milliGRAM(s) Oral every 6 hours PRN Temp greater or equal to 38C (100.4F), Mild Pain (1 - 3)  sodium chloride 0.65% Nasal 1 Spray(s) Both Nostrils four times a day PRN Nasal Congestion    Vital Signs Last 24 Hrs  T(C): 36 (23 Mar 2022 09:30), Max: 36.8 (22 Mar 2022 21:00)  T(F): 96.8 (23 Mar 2022 09:30), Max: 98.2 (22 Mar 2022 21:00)  HR: 106 (23 Mar 2022 10:40) (92 - 108)  BP: 134/69 (23 Mar 2022 10:40) (102/55 - 134/71)  BP(mean): 96 (23 Mar 2022 10:40) (73 - 96)  RR: 18 (23 Mar 2022 08:20) (17 - 29)  SpO2: 96% (23 Mar 2022 10:40) (90% - 99%)    03-22 @ 07:01  -  03-23 @ 07:00  --------------------------------------------------------  IN: 1010 mL / OUT: 745 mL / NET: 265 mL      PHYSICAL EXAM:    General: male, lying in bed; in no acute distress, more alert today  HEENT: MMM, conjunctiva and sclera clear  Gastrointestinal: Soft, obese, distended abdomen; non-tender; Normal bowel sounds; No rebound or guarding  Rectal: rectal tube in place with dark brown stool noted  Extremities: Normal range of motion, No clubbing, cyanosis or edema  Neurological: Oriented x 2, no asterixis  Skin: Warm and dry. No obvious rash    LABS:                        8.2    5.22  )-----------( 86       ( 23 Mar 2022 09:49 )             27.1     03-23    144  |  115<H>  |  11  ----------------------------<  136<H>  3.7   |  21<L>  |  0.64    Ca    7.9<L>      23 Mar 2022 09:49  Phos  2.4     03-23  Mg     2.1     03-23    TPro  6.2  /  Alb  2.2<L>  /  TBili  0.7  /  DBili  x   /  AST  67<H>  /  ALT  38  /  AlkPhos  86  03-23                      RADIOLOGY & ADDITIONAL STUDIES:  Reviewed

## 2022-03-23 NOTE — PROGRESS NOTE ADULT - PROBLEM SELECTOR PLAN 10
F: None -- be cautious i/s/o severe aortic stenosis  E: replete as needed  D: NGT in place w/ tube feeds   DVT Proph: held  GI Proph: protonix BID   Dispo: [facility]: None  E: replete as needed  D: NGT in place w/ tube feeds   DVT Proph: held for now  GI Proph: protonix  [facility] c/w tamsulosin 0.4mg   - d/c rogers

## 2022-03-23 NOTE — PROGRESS NOTE ADULT - PROBLEM SELECTOR PLAN 8
Holding anti-HTN meds, s/p pressors  - monitor VS and resume when appropriate S/p CABG with Dr Wilcox in 2017  - recent [facility] with nonobstructive disease 1 year ago   - c/w Lipitor 80mg QD  - holding ASA 81mg while monitoring Hgb

## 2022-03-23 NOTE — PROGRESS NOTE ADULT - SUBJECTIVE AND OBJECTIVE BOX
[facility]  Min Ram, Palliative Care Attending  Contact Info: Call [phone] (HEAL Line) or message on Microsoft Teams (Min Ram)    SUBJECTIVE AND OBJECTIVE:  INTERVAL HPI/OVERNIGHT EVENTS:    ALLERGIES:  No Known Allergies    MEDICATIONS  (STANDING):  artificial tears (preservative free) Ophthalmic Solution 1 Drop(s) Both EYES two times a day  atorvastatin 80 milliGRAM(s) Oral at bedtime  chlorhexidine 2% Cloths 1 Application(s) Topical <User Schedule>  cyanocobalamin 1000 MICROGram(s) Oral every 24 hours  DOXOrubicin INTRA-ARTERIAL (eMAR) 50 milliGRAM(s) IntraArterial once  enoxaparin Injectable 40 milliGRAM(s) SubCutaneous every 24 hours  ferrous    sulfate 325 milliGRAM(s) Oral <User Schedule>  folic acid 1 milliGRAM(s) Oral daily  lactulose Syrup 10 Gram(s) Oral every 8 hours  meropenem  IVPB 1000 milliGRAM(s) IV Intermittent every 8 hours  multivitamin 1 Tablet(s) Oral daily  nicotine - 21 mG/24Hr(s) Patch 1 patch Transdermal daily  potassium phosphate / sodium phosphate Powder (PHOS-NaK) 1 Packet(s) Oral once  rifAXIMin 550 milliGRAM(s) Oral every 12 hours  tamsulosin 0.4 milliGRAM(s) Oral at bedtime    MEDICATIONS  (PRN):  acetaminophen     Tablet .. 650 milliGRAM(s) Oral every 6 hours PRN Temp greater or equal to 38C (100.4F), Mild Pain (1 - 3)  sodium chloride 0.65% Nasal 1 Spray(s) Both Nostrils four times a day PRN Nasal Congestion      Analgesic Use (Scheduled and PRNs) for past 24 hours:  acetaminophen     Tablet ..   650 milliGRAM(s) Oral (03-23-22 @ 11:24)  nicotine - 21 mG/24Hr(s) Patch   1 Patch Transdermal (03-22-22 @ 22:14)  QUEtiapine   25 milliGRAM(s) Oral (03-23-22 @ 00:51)    ITEMS UNCHECKED ARE NOT PRESENT  PRESENT SYMPTOMS/REVIEW OF SYSTEMS: []Unable to obtain due to poor mentation   Source if other than patient:  []Family   []Team         Vital Signs Last 24 Hrs  T(C): 36.6 (23 Mar 2022 13:33), Max: 36.8 (22 Mar 2022 21:00)  T(F): 97.8 (23 Mar 2022 13:33), Max: 98.2 (22 Mar 2022 21:00)  HR: 94 (23 Mar 2022 12:56) (92 - 106)  BP: 106/59 (23 Mar 2022 12:56) (104/57 - 134/71)  BP(mean): 78 (23 Mar 2022 12:56) (75 - 96)  RR: 18 (23 Mar 2022 08:20) (18 - 19)  SpO2: 97% (23 Mar 2022 12:56) (95% - 99%)    LABS:                         8.2    5.22  )-----------( 86       ( 23 Mar 2022 09:49 )             27.1   03-23    144  |  115<H>  |  11  ----------------------------<  136<H>  3.7   |  21<L>  |  0.64    Ca    7.9<L>      23 Mar 2022 09:49  Phos  2.4     03-23  Mg     2.1     03-23  TPro  6.2  /  Alb  2.2<L>  /  TBili  0.7  /  DBili  x   /  AST  67<H>  /  ALT  38  /  AlkPhos  86  03-23    RADIOLOGY & ADDITIONAL STUDIES:  < from: CT Abdomen and Pelvis w/ IV Cont (03.22.22 @ 00:39) >  Tubes, catheters and devices: NG tube tip is in the lumen of the proximal stomach.  Liver: Hepatic cirrhosis. Indeterminate 2 cm hypoattenuating hepatic lesion.  Gallbladder and bile ducts: Diffuse gallbladder wall edema, attributable to cirrhosis with portal hypertension. No biliary ductal dilatation.  Pancreas: Unremarkable.  Spleen: Mild splenomegaly.  Adrenal glands: Normal. No mass.  Kidneys and ureters: Chronic medical renal disease.  Stomach and bowel: No bowel wall thickening or intestinal obstruction; however, there is mild inflammation in the sigmoid mesocolon adjacent to the otherwise unremarkable sigmoid colon, potentially signifying mild colitis. Alternatively, there is diffuse   mesenteric edema related to cirrhosisand the stranding in the sigmoid mesocolon could be related to this.  Appendix: Appendix not visualized. No evidence of appendicitis.  Intraperitoneal space: Trace volume ascites. No pneumoperitoneum or abscess.  Vasculature: No pneumatosis or portal/mesenteric venous gas. SMV and SMA are patent as far as can be traced.  Lymph nodes: Unremarkable.  Urinary bladder: Unremarkable as visualized.  Reproductive: Unremarkable as visualized.  Bones/joints: Unremarkable. No acute fracture.  Soft tissues: Small left inguinal hernia containing fat and trace ascites fluid. 1.6 cm left paraumbilical subcutaneous fluid pocket, no associated fat stranding. Possibly a sebaceous cyst or herniated ascites fluid.  IMPRESSION:  1. Hepatic cirrhosis.  2. Mild splenomegaly.  3. Trace volume ascites.  4. No bowel wall thickening or intestinal obstruction; however, there is mild inflammation in the sigmoid mesocolon adjacent to the otherwise unremarkable sigmoid colon, potentially signifying mild colitis. Alternatively, there is diffuse mesenteric edema related to cirrhosis and the stranding in the sigmoid mesocolon could be related to this.  5. Indeterminate 2 cm hypoattenuating hepatic lesion.    DISCUSSION OF CASE: Wife/Daughter - to provide updates and emotional support [facility]  Min Ram, Palliative Care Attending  Contact Info: Call [phone] (HEAL Line) or message on Microsoft Teams (Min Ram)    SUBJECTIVE AND OBJECTIVE:  INTERVAL HPI/OVERNIGHT EVENTS: Patient overtly comfortable, not following all commands. Stood up with PT. Reportedly improved from the weekend. Continued GOC discussion with wife and daughter.     ALLERGIES:  No Known Allergies    MEDICATIONS  (STANDING):  artificial tears (preservative free) Ophthalmic Solution 1 Drop(s) Both EYES two times a day  atorvastatin 80 milliGRAM(s) Oral at bedtime  chlorhexidine 2% Cloths 1 Application(s) Topical <User Schedule>  cyanocobalamin 1000 MICROGram(s) Oral every 24 hours  DOXOrubicin INTRA-ARTERIAL (eMAR) 50 milliGRAM(s) IntraArterial once  enoxaparin Injectable 40 milliGRAM(s) SubCutaneous every 24 hours  ferrous    sulfate 325 milliGRAM(s) Oral <User Schedule>  folic acid 1 milliGRAM(s) Oral daily  lactulose Syrup 10 Gram(s) Oral every 8 hours  meropenem  IVPB 1000 milliGRAM(s) IV Intermittent every 8 hours  multivitamin 1 Tablet(s) Oral daily  nicotine - 21 mG/24Hr(s) Patch 1 patch Transdermal daily  potassium phosphate / sodium phosphate Powder (PHOS-NaK) 1 Packet(s) Oral once  rifAXIMin 550 milliGRAM(s) Oral every 12 hours  tamsulosin 0.4 milliGRAM(s) Oral at bedtime    MEDICATIONS  (PRN):  acetaminophen     Tablet .. 650 milliGRAM(s) Oral every 6 hours PRN Temp greater or equal to 38C (100.4F), Mild Pain (1 - 3)  sodium chloride 0.65% Nasal 1 Spray(s) Both Nostrils four times a day PRN Nasal Congestion      Analgesic Use (Scheduled and PRNs) for past 24 hours:  acetaminophen     Tablet ..   650 milliGRAM(s) Oral (03-23-22 @ 11:24)  nicotine - 21 mG/24Hr(s) Patch   1 Patch Transdermal (03-22-22 @ 22:14)  QUEtiapine   25 milliGRAM(s) Oral (03-23-22 @ 00:51)    ITEMS UNCHECKED ARE NOT PRESENT  PRESENT SYMPTOMS/REVIEW OF SYSTEMS: []Unable to obtain due to poor mentation   Source if other than patient:  []Family   []Team     Pain: [] yes [x] no - see PAINAD/CPOT score  QOL impact -   Location -                    Aggravating factors -  Quality -  Radiation -  Timing -  Severity (0-10 scale) -  Minimal acceptable level (0-10 scale) -    CPOT Score: 0    Dyspnea:                           []Mild  []Moderate []Severe  Anxiety:                             []Mild []Moderate []Severe  Fatigue:                             []Mild []Moderate []Severe  Nausea:                             []Mild []Moderate []Severe  Loss of appetite:              []Mild []Moderate []Severe  Constipation:                    []Mild []Moderate []Severe    Other Symptoms:  [x]All other review of systems negative     Palliative Performance Status Version 2: 40%    GENERAL:  []Alert  []Oriented x   [x]Lethargic  []Cachexia  []Unarousable  [x]Verbal  []Non-Verbal  Behavioral:   []Anxiety  [x]Delirium []Agitation []Cooperative  HEENT:  []Normal   []Dry mouth   [x]ET Tube/Trach  []Oral lesions  PULMONARY:   []Clear []Tachypnea  []Audible excessive secretions   [x]Rhonchi        [x]Right []Left []Bilateral  []Crackles        []Right []Left []Bilateral  []Wheezing     []Right []Left []Bilateral  CARDIOVASCULAR:    [x]Regular []Irregular []Tachy  []Dominick []Murmur []Other  GASTROINTESTINAL:  [x]Soft  [x]Distended   [x]+BS  [x]Non tender []Tender  []PEG [x]OGT/ NGT  Last BM:  GENITOURINARY:  []Normal [x] Incontinent   []Oliguria/Anuria   []Sanchez  MUSCULOSKELETAL:   []Normal   [x]Weakness  []Bed/Wheelchair bound []Edema  NEUROLOGIC:   []No focal deficits  [x]Cognitive impairment  []Dysphagia []Dysarthria []Paresis [x]Encephalopathic  SKIN:   [x]Normal   []Pressure ulcer(s)  []Rash    Vital Signs Last 24 Hrs  T(C): 36.6 (23 Mar 2022 13:33), Max: 36.8 (22 Mar 2022 21:00)  T(F): 97.8 (23 Mar 2022 13:33), Max: 98.2 (22 Mar 2022 21:00)  HR: 94 (23 Mar 2022 12:56) (92 - 106)  BP: 106/59 (23 Mar 2022 12:56) (104/57 - 134/71)  BP(mean): 78 (23 Mar 2022 12:56) (75 - 96)  RR: 18 (23 Mar 2022 08:20) (18 - 19)  SpO2: 97% (23 Mar 2022 12:56) (95% - 99%)    LABS:                         8.2    5.22  )-----------( 86       ( 23 Mar 2022 09:49 )             27.1   03-23    144  |  115<H>  |  11  ----------------------------<  136<H>  3.7   |  21<L>  |  0.64    Ca    7.9<L>      23 Mar 2022 09:49  Phos  2.4     03-23  Mg     2.1     03-23  TPro  6.2  /  Alb  2.2<L>  /  TBili  0.7  /  DBili  x   /  AST  67<H>  /  ALT  38  /  AlkPhos  86  03-23    RADIOLOGY & ADDITIONAL STUDIES:  < from: CT Abdomen and Pelvis w/ IV Cont (03.22.22 @ 00:39) >  Tubes, catheters and devices: NG tube tip is in the lumen of the proximal stomach.  Liver: Hepatic cirrhosis. Indeterminate 2 cm hypoattenuating hepatic lesion.  Gallbladder and bile ducts: Diffuse gallbladder wall edema, attributable to cirrhosis with portal hypertension. No biliary ductal dilatation.  Pancreas: Unremarkable.  Spleen: Mild splenomegaly.  Adrenal glands: Normal. No mass.  Kidneys and ureters: Chronic medical renal disease.  Stomach and bowel: No bowel wall thickening or intestinal obstruction; however, there is mild inflammation in the sigmoid mesocolon adjacent to the otherwise unremarkable sigmoid colon, potentially signifying mild colitis. Alternatively, there is diffuse   mesenteric edema related to cirrhosisand the stranding in the sigmoid mesocolon could be related to this.  Appendix: Appendix not visualized. No evidence of appendicitis.  Intraperitoneal space: Trace volume ascites. No pneumoperitoneum or abscess.  Vasculature: No pneumatosis or portal/mesenteric venous gas. SMV and SMA are patent as far as can be traced.  Lymph nodes: Unremarkable.  Urinary bladder: Unremarkable as visualized.  Reproductive: Unremarkable as visualized.  Bones/joints: Unremarkable. No acute fracture.  Soft tissues: Small left inguinal hernia containing fat and trace ascites fluid. 1.6 cm left paraumbilical subcutaneous fluid pocket, no associated fat stranding. Possibly a sebaceous cyst or herniated ascites fluid.  IMPRESSION:  1. Hepatic cirrhosis.  2. Mild splenomegaly.  3. Trace volume ascites.  4. No bowel wall thickening or intestinal obstruction; however, there is mild inflammation in the sigmoid mesocolon adjacent to the otherwise unremarkable sigmoid colon, potentially signifying mild colitis. Alternatively, there is diffuse mesenteric edema related to cirrhosis and the stranding in the sigmoid mesocolon could be related to this.  5. Indeterminate 2 cm hypoattenuating hepatic lesion.    DISCUSSION OF CASE: Wife/Daughter - to provide updates and emotional support

## 2022-03-23 NOTE — PROGRESS NOTE ADULT - ATTENDING COMMENTS
Increase feeds. D/C Sanchez. Decrease lactulose and increase feeds. If Mental status continues improved, speech and swallow to see tomorrow. Cont abx per ID. DVT prophylaxis with stable Hb.

## 2022-03-23 NOTE — PROGRESS NOTE ADULT - PROBLEM SELECTOR PLAN 7
.  Complex medical decision making / symptom management in the setting of advanced illness.    Will continue to follow for ongoing GOC discussion. Emotional support provided, questions answered.    Active Psychosocial Referrals:  [x]Social Work/Case management []PT/OT []Chaplaincy []Hospice  []Patient/Family Support []Holistic RN []Massage Therapy []Ethics  Coping: [] well [] with difficulty [] poor coping [x] unable to assess  Support system: [] strong [] adequate [x] inadequate    For new or uncontrolled symptoms, please call Palliative Care at [phone]. The service is available 24/7 (including nights & weekends) to provide symptom management recommendations over the phone as appropriate

## 2022-03-23 NOTE — PROGRESS NOTE ADULT - PROBLEM SELECTOR PLAN 7
S/p CABG with Dr Wilcox in 2017  - recent [facility] with nonobstructive disease 1 year ago   - c/w Lipitor 80mg QD  - holding ASA 81mg. S/p CABG with Dr Wilcox in 2017  - recent [facility] with nonobstructive disease 1 year ago   - c/w Lipitor 80mg QD  - holding ASA 81mg while monitoring Hgb Long standing EtOH consumption. Not showing any signs of withdrawal at the moment. Last drink prior to admission which is almost 2 weeks ago. Pt was given 3 days of librium Q8hrs while on CT surgery however did not display signs of withdrawal per chart review  - c/w Multivitamin and Folic Acid    #ALC (alcoholic liver cirrhosis)  #Varices  2/2 long standing EtOH abuse.   - No prior EGD evaluation, would be warranted, can pursue as an outpatient

## 2022-03-23 NOTE — PROGRESS NOTE ADULT - SUBJECTIVE AND OBJECTIVE BOX
INTERVAL EVENTS:        MEDICATIONS  (STANDING):  artificial tears (preservative free) Ophthalmic Solution 1 Drop(s) Both EYES two times a day  atorvastatin 80 milliGRAM(s) Oral at bedtime  chlorhexidine 2% Cloths 1 Application(s) Topical <User Schedule>  cyanocobalamin 1000 MICROGram(s) Oral every 24 hours  DOXOrubicin INTRA-ARTERIAL (eMAR) 50 milliGRAM(s) IntraArterial once  ferrous    sulfate 325 milliGRAM(s) Oral <User Schedule>  folic acid 1 milliGRAM(s) Oral daily  lactulose Syrup 30 Gram(s) Oral three times a day  meropenem  IVPB 1000 milliGRAM(s) IV Intermittent every 8 hours  multivitamin 1 Tablet(s) Oral daily  nicotine - 21 mG/24Hr(s) Patch 1 patch Transdermal daily  pantoprazole  Injectable 40 milliGRAM(s) IV Push every 12 hours  rifAXIMin 550 milliGRAM(s) Oral every 12 hours  tamsulosin 0.4 milliGRAM(s) Oral at bedtime    MEDICATIONS  (PRN):  acetaminophen     Tablet .. 650 milliGRAM(s) Oral every 6 hours PRN Temp greater or equal to 38C (100.4F), Mild Pain (1 - 3)  sodium chloride 0.65% Nasal 1 Spray(s) Both Nostrils four times a day PRN Nasal Congestion      Home Medications:  folic acid 1 mg oral tablet: 1 tab(s) orally once a day (04 Mar 2022 11:39)  Multiple Vitamins oral tablet: 1 tab(s) orally once a day (04 Mar 2022 11:39)      Vital Signs Last 24 Hrs  T(C): 36 (23 Mar 2022 09:30), Max: 36.8 (22 Mar 2022 21:00)  T(F): 96.8 (23 Mar 2022 09:30), Max: 98.2 (22 Mar 2022 21:00)  HR: 99 (23 Mar 2022 08:20) (87 - 108)  BP: 113/66 (23 Mar 2022 08:20) (102/55 - 129/61)  BP(mean): 82 (23 Mar 2022 08:20) (73 - 93)  RR: 18 (23 Mar 2022 08:20) (17 - 29)  SpO2: 98% (23 Mar 2022 08:20) (90% - 99%)     PHYSICAL EXAM:    LABS:                        9.3    6.56  )-----------( 85       ( 22 Mar 2022 05:37 )             30.1     03-22    136  |  111<H>  |  11  ----------------------------<  106<H>  3.4<L>   |  17<L>  |  0.67    Ca    7.6<L>      22 Mar 2022 05:37  Phos  3.2     03-22  Mg     2.2     03-22    TPro  6.2  /  Alb  2.0<L>  /  TBili  0.9  /  DBili  x   /  AST  63<H>  /  ALT  35  /  AlkPhos  79  03-22            I&O's Summary    22 Mar 2022 07:01  -  23 Mar 2022 07:00  --------------------------------------------------------  IN: 1010 mL / OUT: 745 mL / NET: 265 mL    66M CAD s/p CABG, severe bioprosthetic AS (not amenable to any intervention), newly diagnosed EtOH cirrhosis c/b HCC s/p TACE, anemia, ESBL UTI who cardiology has been consulted for worsening tachycardia and ST depressions.    #ST- depressions: V2-V6, I, II, and KRISTIN in aVR. Present on previous ECGs, but worse with increased HR  - Encephalopathic (likely 2/2 cirrhosis), no chest pain  - likely rate related/demand in setting of anemia and tachycardia.   - had coronary angiogram <1 month ago and LIMA and SVG grafts were widely patent  - Pt warm on exam, no cardiogenic shock at this time  - c/w lipitor 80 mg qd  - can start ASA 81 mg qd if able to tolerate from anemia standpoint  - labs could be consistent with hemolysis, probably in setting of end stage aortic valvular disease    #severe bioprosthetic AS: deemed not candidate for MAURI or CTS  - f/u with Dr. Ashby  - preload dependent, avoid volume depletion    ***incomplete note  d/w cardiology attending  Hector Colvin MD PGY5       INTERVAL EVENTS:        MEDICATIONS  (STANDING):  artificial tears (preservative free) Ophthalmic Solution 1 Drop(s) Both EYES two times a day  atorvastatin 80 milliGRAM(s) Oral at bedtime  chlorhexidine 2% Cloths 1 Application(s) Topical <User Schedule>  cyanocobalamin 1000 MICROGram(s) Oral every 24 hours  DOXOrubicin INTRA-ARTERIAL (eMAR) 50 milliGRAM(s) IntraArterial once  ferrous    sulfate 325 milliGRAM(s) Oral <User Schedule>  folic acid 1 milliGRAM(s) Oral daily  lactulose Syrup 30 Gram(s) Oral three times a day  meropenem  IVPB 1000 milliGRAM(s) IV Intermittent every 8 hours  multivitamin 1 Tablet(s) Oral daily  nicotine - 21 mG/24Hr(s) Patch 1 patch Transdermal daily  pantoprazole  Injectable 40 milliGRAM(s) IV Push every 12 hours  rifAXIMin 550 milliGRAM(s) Oral every 12 hours  tamsulosin 0.4 milliGRAM(s) Oral at bedtime    MEDICATIONS  (PRN):  acetaminophen     Tablet .. 650 milliGRAM(s) Oral every 6 hours PRN Temp greater or equal to 38C (100.4F), Mild Pain (1 - 3)  sodium chloride 0.65% Nasal 1 Spray(s) Both Nostrils four times a day PRN Nasal Congestion      Home Medications:  folic acid 1 mg oral tablet: 1 tab(s) orally once a day (04 Mar 2022 11:39)  Multiple Vitamins oral tablet: 1 tab(s) orally once a day (04 Mar 2022 11:39)      Vital Signs Last 24 Hrs  T(C): 36 (23 Mar 2022 09:30), Max: 36.8 (22 Mar 2022 21:00)  T(F): 96.8 (23 Mar 2022 09:30), Max: 98.2 (22 Mar 2022 21:00)  HR: 99 (23 Mar 2022 08:20) (87 - 108)  BP: 113/66 (23 Mar 2022 08:20) (102/55 - 129/61)  BP(mean): 82 (23 Mar 2022 08:20) (73 - 93)  RR: 18 (23 Mar 2022 08:20) (17 - 29)  SpO2: 98% (23 Mar 2022 08:20) (90% - 99%)     PHYSICAL EXAM:    LABS:                        9.3    6.56  )-----------( 85       ( 22 Mar 2022 05:37 )             30.1     03-22    136  |  111<H>  |  11  ----------------------------<  106<H>  3.4<L>   |  17<L>  |  0.67    Ca    7.6<L>      22 Mar 2022 05:37  Phos  3.2     03-22  Mg     2.2     03-22    TPro  6.2  /  Alb  2.0<L>  /  TBili  0.9  /  DBili  x   /  AST  63<H>  /  ALT  35  /  AlkPhos  79  03-22            I&O's Summary    22 Mar 2022 07:01  -  23 Mar 2022 07:00  --------------------------------------------------------  IN: 1010 mL / OUT: 745 mL / NET: 265 mL    66M CAD s/p CABG, severe bioprosthetic AS (not amenable to any intervention), newly diagnosed EtOH cirrhosis c/b HCC s/p TACE, anemia, ESBL UTI who cardiology has been consulted for worsening tachycardia and ST depressions.    #ST- depressions: V2-V6, I, II, and KRISTIN in aVR. Present on previous ECGs, but worse with increased HR  - Encephalopathic (likely 2/2 cirrhosis), no chest pain  - likely rate related/demand in setting of anemia and tachycardia.   - had coronary angiogram <1 month ago and LIMA and SVG grafts were widely patent  - Pt warm on exam, no cardiogenic shock at this time  - c/w lipitor 80 mg qd  - can start ASA 81 mg qd if able to tolerate from anemia standpoint  - labs could be consistent with hemolysis, probably in setting of end stage aortic valvular disease    #severe bioprosthetic AS: deemed not candidate for MAURI or CTS  - f/u with Dr. Ashby  - preload dependent, avoid volume depletion    #AMS- fluctuating mental status and new abd pain in setting of known cirrhosis  - would d/w GI regarding diag para r/o SBP +/- abx    ***incomplete note  d/w cardiology attending  Hector Colvin MD PGY5       INTERVAL EVENTS:  variable mental status, was improved yesterday and worsened today with new/worsened abd pain      MEDICATIONS  (STANDING):  artificial tears (preservative free) Ophthalmic Solution 1 Drop(s) Both EYES two times a day  atorvastatin 80 milliGRAM(s) Oral at bedtime  chlorhexidine 2% Cloths 1 Application(s) Topical <User Schedule>  cyanocobalamin 1000 MICROGram(s) Oral every 24 hours  DOXOrubicin INTRA-ARTERIAL (eMAR) 50 milliGRAM(s) IntraArterial once  ferrous    sulfate 325 milliGRAM(s) Oral <User Schedule>  folic acid 1 milliGRAM(s) Oral daily  lactulose Syrup 30 Gram(s) Oral three times a day  meropenem  IVPB 1000 milliGRAM(s) IV Intermittent every 8 hours  multivitamin 1 Tablet(s) Oral daily  nicotine - 21 mG/24Hr(s) Patch 1 patch Transdermal daily  pantoprazole  Injectable 40 milliGRAM(s) IV Push every 12 hours  rifAXIMin 550 milliGRAM(s) Oral every 12 hours  tamsulosin 0.4 milliGRAM(s) Oral at bedtime    MEDICATIONS  (PRN):  acetaminophen     Tablet .. 650 milliGRAM(s) Oral every 6 hours PRN Temp greater or equal to 38C (100.4F), Mild Pain (1 - 3)  sodium chloride 0.65% Nasal 1 Spray(s) Both Nostrils four times a day PRN Nasal Congestion      Home Medications:  folic acid 1 mg oral tablet: 1 tab(s) orally once a day (04 Mar 2022 11:39)  Multiple Vitamins oral tablet: 1 tab(s) orally once a day (04 Mar 2022 11:39)      Vital Signs Last 24 Hrs  T(C): 36 (23 Mar 2022 09:30), Max: 36.8 (22 Mar 2022 21:00)  T(F): 96.8 (23 Mar 2022 09:30), Max: 98.2 (22 Mar 2022 21:00)  HR: 99 (23 Mar 2022 08:20) (87 - 108)  BP: 113/66 (23 Mar 2022 08:20) (102/55 - 129/61)  BP(mean): 82 (23 Mar 2022 08:20) (73 - 93)  RR: 18 (23 Mar 2022 08:20) (17 - 29)  SpO2: 98% (23 Mar 2022 08:20) (90% - 99%)     PHYSICAL EXAM:  NAD  Trace basilar crackles  RRR absent s2  No LE edema  +JVD      LABS:                        9.3    6.56  )-----------( 85       ( 22 Mar 2022 05:37 )             30.1     03-22    136  |  111<H>  |  11  ----------------------------<  106<H>  3.4<L>   |  17<L>  |  0.67    Ca    7.6<L>      22 Mar 2022 05:37  Phos  3.2     03-22  Mg     2.2     03-22    TPro  6.2  /  Alb  2.0<L>  /  TBili  0.9  /  DBili  x   /  AST  63<H>  /  ALT  35  /  AlkPhos  79  03-22            I&O's Summary    22 Mar 2022 07:01  -  23 Mar 2022 07:00  --------------------------------------------------------  IN: 1010 mL / OUT: 745 mL / NET: 265 mL    66M CAD s/p CABG, severe bioprosthetic AS (not amenable to any intervention), newly diagnosed EtOH cirrhosis c/b HCC s/p TACE, anemia, ESBL UTI who cardiology has been consulted for worsening tachycardia and ST depressions.    #severe bioprosthetic AS: deemed not candidate for MAURI or CTS  - f/u with Dr. Ashby  - preload dependent, avoid volume depletion  - labs could be consistent with hemolysis, probably in setting of end stage aortic valvular disease    #AMS- fluctuating mental status and new abd pain in setting of known cirrhosis  - would d/w GI regarding diag para r/o SBP +/- abx    #ST- depressions: V2-V6, I, II, and KRISTIN in aVR. Present on previous ECGs, but worse with increased HR  - Encephalopathic (likely 2/2 cirrhosis), no chest pain  - likely rate related/demand in setting of anemia and tachycardia.   - had coronary angiogram <1 month ago and LIMA and SVG grafts were widely patent  - Pt warm on exam, no cardiogenic shock at this time  - c/w lipitor 80 mg qd  - can start ASA 81 mg qd if able to tolerate from anemia standpoint      d/w cardiology attending  Hector Colvin MD PGY5

## 2022-03-23 NOTE — PROGRESS NOTE ADULT - ATTENDING COMMENTS
He was more awake today, afebrile, with lower abd pain and tenderness to palpation.  Reason for development of Klebsiella pneumoniae bacteremia while on ertapenem remains unclear - suggests persistent focus.  Bloodstream isolate is susceptible to ertapenem.  Reason for clinical improvement with change to meropenem is unclear - may reflect polymicrobial process.  CT C/A/P unrevealing.  Would do paracentesis, obtain gallium scan if within GOC.  Though gram negative endocarditis is relatively rare, he has severe AS - would obtain TTE.  Would continue meropenem for now.  Prognosis remains poor.  Agree with ongoing GOC discussions.  Will follow with you - team 1.

## 2022-03-24 DIAGNOSIS — Z71.89 OTHER SPECIFIED COUNSELING: ICD-10-CM

## 2022-03-24 DIAGNOSIS — R13.10 DYSPHAGIA, UNSPECIFIED: ICD-10-CM

## 2022-03-24 DIAGNOSIS — Z51.5 ENCOUNTER FOR PALLIATIVE CARE: ICD-10-CM

## 2022-03-24 DIAGNOSIS — G93.41 METABOLIC ENCEPHALOPATHY: ICD-10-CM

## 2022-03-24 DIAGNOSIS — R53.81 OTHER MALAISE: ICD-10-CM

## 2022-03-24 LAB
ALBUMIN SERPL ELPH-MCNC: 2.2 G/DL — LOW (ref 3.3–5)
ALP SERPL-CCNC: 94 U/L — SIGNIFICANT CHANGE UP (ref 40–120)
ALT FLD-CCNC: 37 U/L — SIGNIFICANT CHANGE UP (ref 10–45)
ANION GAP SERPL CALC-SCNC: 8 MMOL/L — SIGNIFICANT CHANGE UP (ref 5–17)
AST SERPL-CCNC: 65 U/L — HIGH (ref 10–40)
BASOPHILS # BLD AUTO: 0.02 K/UL — SIGNIFICANT CHANGE UP (ref 0–0.2)
BASOPHILS NFR BLD AUTO: 0.3 % — SIGNIFICANT CHANGE UP (ref 0–2)
BILIRUB SERPL-MCNC: 0.9 MG/DL — SIGNIFICANT CHANGE UP (ref 0.2–1.2)
BUN SERPL-MCNC: 8 MG/DL — SIGNIFICANT CHANGE UP (ref 7–23)
CALCIUM SERPL-MCNC: 7.9 MG/DL — LOW (ref 8.4–10.5)
CHLORIDE SERPL-SCNC: 111 MMOL/L — HIGH (ref 96–108)
CO2 SERPL-SCNC: 19 MMOL/L — LOW (ref 22–31)
CREAT SERPL-MCNC: 0.64 MG/DL — SIGNIFICANT CHANGE UP (ref 0.5–1.3)
EGFR: 104 ML/MIN/1.73M2 — SIGNIFICANT CHANGE UP
EOSINOPHIL # BLD AUTO: 0.25 K/UL — SIGNIFICANT CHANGE UP (ref 0–0.5)
EOSINOPHIL NFR BLD AUTO: 3.2 % — SIGNIFICANT CHANGE UP (ref 0–6)
GLUCOSE SERPL-MCNC: 114 MG/DL — HIGH (ref 70–99)
HCT VFR BLD CALC: 29.2 % — LOW (ref 39–50)
HGB BLD-MCNC: 9.1 G/DL — LOW (ref 13–17)
IMM GRANULOCYTES NFR BLD AUTO: 0.6 % — SIGNIFICANT CHANGE UP (ref 0–1.5)
LYMPHOCYTES # BLD AUTO: 1.29 K/UL — SIGNIFICANT CHANGE UP (ref 1–3.3)
LYMPHOCYTES # BLD AUTO: 16.3 % — SIGNIFICANT CHANGE UP (ref 13–44)
MAGNESIUM SERPL-MCNC: 1.8 MG/DL — SIGNIFICANT CHANGE UP (ref 1.6–2.6)
MCHC RBC-ENTMCNC: 30.3 PG — SIGNIFICANT CHANGE UP (ref 27–34)
MCHC RBC-ENTMCNC: 31.2 GM/DL — LOW (ref 32–36)
MCV RBC AUTO: 97.3 FL — SIGNIFICANT CHANGE UP (ref 80–100)
MONOCYTES # BLD AUTO: 0.48 K/UL — SIGNIFICANT CHANGE UP (ref 0–0.9)
MONOCYTES NFR BLD AUTO: 6.1 % — SIGNIFICANT CHANGE UP (ref 2–14)
NEUTROPHILS # BLD AUTO: 5.82 K/UL — SIGNIFICANT CHANGE UP (ref 1.8–7.4)
NEUTROPHILS NFR BLD AUTO: 73.5 % — SIGNIFICANT CHANGE UP (ref 43–77)
NRBC # BLD: 0 /100 WBCS — SIGNIFICANT CHANGE UP (ref 0–0)
PHOSPHATE SERPL-MCNC: 3.1 MG/DL — SIGNIFICANT CHANGE UP (ref 2.5–4.5)
PLATELET # BLD AUTO: 95 K/UL — LOW (ref 150–400)
POTASSIUM SERPL-MCNC: 4.1 MMOL/L — SIGNIFICANT CHANGE UP (ref 3.5–5.3)
POTASSIUM SERPL-SCNC: 4.1 MMOL/L — SIGNIFICANT CHANGE UP (ref 3.5–5.3)
PROT SERPL-MCNC: 6.5 G/DL — SIGNIFICANT CHANGE UP (ref 6–8.3)
RBC # BLD: 3 M/UL — LOW (ref 4.2–5.8)
RBC # FLD: 20.6 % — HIGH (ref 10.3–14.5)
SODIUM SERPL-SCNC: 138 MMOL/L — SIGNIFICANT CHANGE UP (ref 135–145)
WBC # BLD: 7.91 K/UL — SIGNIFICANT CHANGE UP (ref 3.8–10.5)
WBC # FLD AUTO: 7.91 K/UL — SIGNIFICANT CHANGE UP (ref 3.8–10.5)

## 2022-03-24 PROCEDURE — 78830 RP LOCLZJ TUM SPECT W/CT 1: CPT | Mod: 26

## 2022-03-24 PROCEDURE — 99232 SBSQ HOSP IP/OBS MODERATE 35: CPT | Mod: GC

## 2022-03-24 PROCEDURE — 78802 RP LOCLZJ TUM WHBDY 1 D IMG: CPT | Mod: 26

## 2022-03-24 PROCEDURE — 99233 SBSQ HOSP IP/OBS HIGH 50: CPT | Mod: GC

## 2022-03-24 RX ADMIN — Medication 1 TABLET(S): at 12:25

## 2022-03-24 RX ADMIN — PANTOPRAZOLE SODIUM 40 MILLIGRAM(S): 20 TABLET, DELAYED RELEASE ORAL at 06:08

## 2022-03-24 RX ADMIN — Medication 1 PATCH: at 19:07

## 2022-03-24 RX ADMIN — PREGABALIN 1000 MICROGRAM(S): 225 CAPSULE ORAL at 12:25

## 2022-03-24 RX ADMIN — Medication 1 PATCH: at 23:31

## 2022-03-24 RX ADMIN — Medication 1 DROP(S): at 21:07

## 2022-03-24 RX ADMIN — Medication 1 MILLIGRAM(S): at 12:24

## 2022-03-24 RX ADMIN — Medication 1 DROP(S): at 12:24

## 2022-03-24 RX ADMIN — LACTULOSE 10 GRAM(S): 10 SOLUTION ORAL at 06:08

## 2022-03-24 RX ADMIN — ATORVASTATIN CALCIUM 80 MILLIGRAM(S): 80 TABLET, FILM COATED ORAL at 21:08

## 2022-03-24 RX ADMIN — Medication 81 MILLIGRAM(S): at 06:09

## 2022-03-24 RX ADMIN — MEROPENEM 100 MILLIGRAM(S): 1 INJECTION INTRAVENOUS at 14:07

## 2022-03-24 RX ADMIN — Medication 1 PATCH: at 21:07

## 2022-03-24 RX ADMIN — MEROPENEM 100 MILLIGRAM(S): 1 INJECTION INTRAVENOUS at 23:49

## 2022-03-24 RX ADMIN — CHLORHEXIDINE GLUCONATE 1 APPLICATION(S): 213 SOLUTION TOPICAL at 06:09

## 2022-03-24 RX ADMIN — Medication 1 PATCH: at 07:17

## 2022-03-24 RX ADMIN — MEROPENEM 100 MILLIGRAM(S): 1 INJECTION INTRAVENOUS at 06:08

## 2022-03-24 NOTE — PROGRESS NOTE ADULT - ATTENDING COMMENTS
Remains afebrile, more lethargic when examined by me but per  had been alert earlier in the day.  Still with some lower abd tenderness to palpation.  Per primary team, no good window for paracentesis.  He was injected for gallium scan.  Would also do repeat TTE - if he were to have visible vegetation, would have prognostic significance (and change duration).  Though gram negative endocarditis is less common, can occur in people with severe valvular heart disease.  Continue meropenem for now.  Continue GOC discussions.  Will follow with you - team 1.

## 2022-03-24 NOTE — PROGRESS NOTE ADULT - ASSESSMENT
65 YO M, current smoker (1ppd x 50 years), current every day ETOH abuse (1 pint vodka and several beers daily x 50 years), with PMHx of HLD, HTN (not on any medications), CABG (LIMA to LAD, SVG to PDA in 2017), AS (s/p AVR in 2017) with recently discovered restenosis, presented to [facility] ED on 2/24/22 with chest pain and episode of LOC, initially admitted to cardiology for NSTEMI with LHC showing nonobstructive disease. Patient was found to have restenosis of bioprosthetic valve, however not surgical candidate. Patient transferred to medicine for further management of HCC, hepatic encephalopathy, course c/b AMS. After IR TACE of HCC on 3/14, patient intubated for airway protection and transferred to MICU. Course further c/b fall and septic shock 2/2 ESBL Klebsiella bacteremia source unclear. Pt transitioned off of pressors, and transferred back to [facility].

## 2022-03-24 NOTE — PROGRESS NOTE ADULT - PROBLEM SELECTOR PLAN 6
CTAP with 2.4 hypervascular liver mass. i/s/o Liver cirrhosis and EtOH use disorder. MRI abdomen: 2.5 cm left hepatic lesion consistent with HCC. No locally invasive or metastatic disease. s/p IR transarterial chemical embolization since he is not a candidate for ablation  - Palliative care following Severe AS i/s/o prior TAVR in 2017 with Dr Wilcox. JOSELO with peak transvalvular velocity of 4.7, mean gradient 56. Initially planned for valve in valve TAVR, but then it was decided that because of aortic root anatomy, he would need aortic root open heart surgery and deemed not to be a good surgical candidate. Labs consistent with hemolysis most likely 2/2 to worsening AS.  - not surgical candidate

## 2022-03-24 NOTE — PROGRESS NOTE ADULT - ASSESSMENT
This is a 65 YO M, current smoker (1ppd x 50 years), current every day ETOH abuse (1 pint vodka and several beers daily x 50 years), with PMHx of HLD, HTN (not on any medications), CABG (LIMA to LAD, SVG to PDA in 2017), AS (s/p AVR in 2017) with recently discovered restenosis, presented to [facility] ED on 2/24/22 with chest pain and episode of LOC, initially admitted to cardiology for NSTEMI with [facility] with nonobstructive disease, then found to have restenosis of bioprosthetic valve for which aortic root surgery was planned, but now deferred given new diagnosis of cirrhosis and HCC. Course c/b AMS 2/2 likely hepatic encephalopathy and metabolic encephalopathy. Course further c/b septic shock 2/2 ESBL Klebsiella UTI.   - Pt without localizing symptoms or radiographic evidence of pneumonia at this time, however can not rule out   - Sputum cultures growing ESBL Klebsiella and Proteus mirabilis with few WBCs  - 3/19 pt went into septic shock while on ertapenem with BCx positive for Klebsiella. Improved with meropenem  - 3/21 CT A/P showing colitis, hcc tumor with necrotic center, and new small ascites.  - 3/21 CT chest showing bilateral effusions.  Initially thought that pt's source for sepsis was UTI earlier on admission. However, should have been treated with ertapenem per susceptibilities. Sputum cultures with only few WBCs and no radiographic evidence or symptoms c/w pneumonia raises concern for airway colonization. Other sources could be endocarditis (although rare with gram negative bacteremia), SBP, prostatitis, or other intraabdominal infection.  It is possible the necrotic center of the tumor is a source of infection. At this point, Gallium scan would be useful to identify source.   Recommendations:  - please obtain TTE  - recommend paracentesis to assess for SBP  - f/u BCx susceptibilities  - f/u surveillance BCx  - f/u OMFS consult to assess for oral lesions  - continue meropenem 1g q8 hours (3/20-) for now   - f/u Gallium scan  (ordered)  - ongoing GOC discussions    ID Team 1 will continue to follow.   Note not finalized until attested by attending     Fariba Ni MD PGY2 Internal Medicine  Infectious Disease Consult Service, Team 1

## 2022-03-24 NOTE — PROGRESS NOTE ADULT - PROBLEM SELECTOR PLAN 2
Klebsiella found in lung, blood and urine. UCx 3/14: Klebsiella ESBL and Proteus mirabilis. BCx 3/14-19: NGTD. Sputum Cx 3/15: Proteus Mirabilis, Klebsiella pneumonia ESBL. UA 3/19: + small LE, bacteria and WBC. BCx 3/19: Klebsiella pneumonia. BCx 3/20: NGTD  - c/w meropenem 1 gram q8 hrs  - ID consulted, appreciate recs  - continue GOC discussion as pt will require gallium scan, paracentesis, TTE for further evaluation Klebsiella found in lung, blood and urine. UCx 3/14: Klebsiella ESBL and Proteus mirabilis. BCx 3/14-19: NGTD. Sputum Cx 3/15: Proteus Mirabilis, Klebsiella pneumonia ESBL. UA 3/19: + small LE, bacteria and WBC. BCx 3/19: Klebsiella pneumonia. BCx 3/20: NGTD  - c/w meropenem 1 gram q8 hrs  - ID consulted, appreciate recs--necrotic tumor could be source, endocarditis (although rare with gram negative bacteremia), SBP, prostatitis, or other intraabdominal infection, ?pneumonia are other possibilities   - f/u gallium scan and TTE   - continue GOC discussion

## 2022-03-24 NOTE — PROGRESS NOTE ADULT - SUBJECTIVE AND OBJECTIVE BOX
Infectious Disease Progress Note    Interval Events: Agitated at night and intermittently today. On enhanced supervision. GOC ongoing.     Subjective:  Patient seen and evaluated at bedside. Agitated but easily redirectable. Climbing out of bed when not being redirected.     MEDICATIONS  (STANDING):  artificial tears (preservative free) Ophthalmic Solution 1 Drop(s) Both EYES two times a day  aspirin  chewable 81 milliGRAM(s) Oral every 24 hours  atorvastatin 80 milliGRAM(s) Oral at bedtime  chlorhexidine 2% Cloths 1 Application(s) Topical <User Schedule>  cyanocobalamin 1000 MICROGram(s) Oral every 24 hours  DOXOrubicin INTRA-ARTERIAL (eMAR) 50 milliGRAM(s) IntraArterial once  ferrous    sulfate 325 milliGRAM(s) Oral <User Schedule>  folic acid 1 milliGRAM(s) Oral daily  meropenem  IVPB 1000 milliGRAM(s) IV Intermittent every 8 hours  multivitamin 1 Tablet(s) Oral daily  nicotine - 21 mG/24Hr(s) Patch 1 patch Transdermal daily  pantoprazole   Suspension 40 milliGRAM(s) Oral every 24 hours  rifAXIMin 550 milliGRAM(s) Oral every 12 hours  tamsulosin 0.4 milliGRAM(s) Oral at bedtime    MEDICATIONS  (PRN):  acetaminophen     Tablet .. 650 milliGRAM(s) Oral every 6 hours PRN Temp greater or equal to 38C (100.4F), Mild Pain (1 - 3)  sodium chloride 0.65% Nasal 1 Spray(s) Both Nostrils four times a day PRN Nasal Congestion    Vital Signs Last 24 Hrs  T(C): 36.4 (24 Mar 2022 10:51), Max: 36.4 (24 Mar 2022 10:51)  T(F): 97.5 (24 Mar 2022 10:51), Max: 97.5 (24 Mar 2022 10:51)  HR: 110 (24 Mar 2022 08:26) (90 - 110)  BP: 134/63 (24 Mar 2022 08:26) (101/58 - 134/63)  BP(mean): 85 (24 Mar 2022 08:26) (77 - 85)  RR: 18 (24 Mar 2022 08:26) (17 - 18)  SpO2: 99% (24 Mar 2022 08:26) (96% - 99%)    General: climbing out of bed, confused  HEENT: NC/AT, anicteric sclera; MMM  Neck: supple  Cardiovascular: +S1/S2; RRR  Respiratory: CTA B/L; +systolic murmur  Gastrointestinal: NGT, NT. Abdo is mildly distended; no rebound/guarding +BSx4; rectal tube in place  Extremities: WWP; no edema, clubbing or cyanosis  : rogers.  Vascular: 2+ radial, DP/PT pulses B/L  Neurological: AAOx1 self only. redirectable but very confused.       LABS:                        9.1    7.91  )-----------( 95       ( 24 Mar 2022 06:29 )             29.2     03-24    138  |  111<H>  |  8   ----------------------------<  114<H>  4.1   |  19<L>  |  0.64    Ca    7.9<L>      24 Mar 2022 06:29  Phos  3.1     03-24  Mg     1.8     03-24    TPro  6.5  /  Alb  2.2<L>  /  TBili  0.9  /  DBili  x   /  AST  65<H>  /  ALT  37  /  AlkPhos  94  03-24        LIVER FUNCTIONS - ( 24 Mar 2022 06:29 )  Alb: 2.2 g/dL / Pro: 6.5 g/dL / ALK PHOS: 94 U/L / ALT: 37 U/L / AST: 65 U/L / GGT: x                 MICROBIOLOGY:            RECENT CULTURES:  03-20 @ 05:56  .Blood Blood  --  --  --    No growth at 3 days.  --  03-19 @ 06:42  .Blood Blood  Klebsiella pneumoniae ESBL  Blood Culture PCR  Blood Culture PCR  PCR    Growth in anaerobic bottle: Klebsiella pneumoniae ESBL  Floor previously notified.  Aerobic Bottle: No growth to date.  Change in culture status will be immediately reported.  --

## 2022-03-24 NOTE — PROGRESS NOTE ADULT - SUBJECTIVE AND OBJECTIVE BOX
[facility]  MRN-[id number]  Patient is a 66y old  Male who presents with a chief complaint of Chest Pain (23 Mar 2022 19:15)      OVERNIGHT EVENTS: Pt agitated and climbing out of bed, trying to pull out NGT. Given zyprexa 2.5mg IM. BS midnight straight cath 360cc.    SUBJECTIVE: Patient seen and examined at bedside. Reports feeling fine. Denies fevers, chills, HA, chest pain, sob, nausea, vomiting, abdominal pain, diarrhea, constipation, dysuria.     12 Point ROS Negative unless noted otherwise above.  -------------------------------------------------------------------------------  VITAL SIGNS:  Vital Signs Last 24 Hrs  T(C): 36.4 (24 Mar 2022 10:51), Max: 36.6 (23 Mar 2022 13:33)  T(F): 97.5 (24 Mar 2022 10:51), Max: 97.8 (23 Mar 2022 13:33)  HR: 110 (24 Mar 2022 08:26) (90 - 110)  BP: 134/63 (24 Mar 2022 08:26) (101/58 - 134/63)  BP(mean): 85 (24 Mar 2022 08:26) (75 - 85)  RR: 18 (24 Mar 2022 08:26) (17 - 18)  SpO2: 99% (24 Mar 2022 08:26) (96% - 99%)  I&O's Summary    23 Mar 2022 07:01  -  24 Mar 2022 07:00  --------------------------------------------------------  IN: 290 mL / OUT: 1800 mL / NET: -1510 mL        PHYSICAL EXAM:    General: NAD, well developed  HEENT: NC/AT; EOMI, PERRLA, anicteric sclera; dry mucosal membranes.  Neck: supple, trachea midline  Cardiovascular: RRR, +S1/S2;+holosystolic murmur in RUSB   Respiratory: CTA B/L; no W/R/R  Gastrointestinal: +protuberant, soft, mild tenderness in lower abdomen; +BSx4  Extremities: WWP; no edema or cyanosis  Vascular: 2+ radial, DP/PT pulses B/L  Neurological: AAOx1-2; no focal deficits    ALLERGIES:  Allergies    No Known Allergies    Intolerances        MEDICATIONS:  MEDICATIONS  (STANDING):  artificial tears (preservative free) Ophthalmic Solution 1 Drop(s) Both EYES two times a day  aspirin  chewable 81 milliGRAM(s) Oral every 24 hours  atorvastatin 80 milliGRAM(s) Oral at bedtime  chlorhexidine 2% Cloths 1 Application(s) Topical <User Schedule>  cyanocobalamin 1000 MICROGram(s) Oral every 24 hours  DOXOrubicin INTRA-ARTERIAL (eMAR) 50 milliGRAM(s) IntraArterial once  ferrous    sulfate 325 milliGRAM(s) Oral <User Schedule>  folic acid 1 milliGRAM(s) Oral daily  meropenem  IVPB 1000 milliGRAM(s) IV Intermittent every 8 hours  multivitamin 1 Tablet(s) Oral daily  nicotine - 21 mG/24Hr(s) Patch 1 patch Transdermal daily  pantoprazole   Suspension 40 milliGRAM(s) Oral every 24 hours  rifAXIMin 550 milliGRAM(s) Oral every 12 hours  tamsulosin 0.4 milliGRAM(s) Oral at bedtime    MEDICATIONS  (PRN):  acetaminophen     Tablet .. 650 milliGRAM(s) Oral every 6 hours PRN Temp greater or equal to 38C (100.4F), Mild Pain (1 - 3)  sodium chloride 0.65% Nasal 1 Spray(s) Both Nostrils four times a day PRN Nasal Congestion      -------------------------------------------------------------------------------  LABS:                        9.1    7.91  )-----------( 95       ( 24 Mar 2022 06:29 )             29.2     03-24    138  |  111<H>  |  8   ----------------------------<  114<H>  4.1   |  19<L>  |  0.64    Ca    7.9<L>      24 Mar 2022 06:29  Phos  3.1     03-24  Mg     1.8     03-24    TPro  6.5  /  Alb  2.2<L>  /  TBili  0.9  /  DBili  x   /  AST  65<H>  /  ALT  37  /  AlkPhos  94  03-24    LIVER FUNCTIONS - ( 24 Mar 2022 06:29 )  Alb: 2.2 g/dL / Pro: 6.5 g/dL / ALK PHOS: 94 U/L / ALT: 37 U/L / AST: 65 U/L / GGT: x               CAPILLARY BLOOD GLUCOSE          COVID-19 PCR: NotDetec (21 Mar 2022 03:33)  COVID-19 PCR: Negative (13 Mar 2022 09:10)  COVID-19 PCR: Negative (09 Mar 2022 07:25)  COVID-19 PCR: NotDetec (28 Feb 2022 10:40)  SARS-CoV-2: NotDetec (24 Feb 2022 11:20)      RADIOLOGY & ADDITIONAL TESTS: Reviewed.

## 2022-03-24 NOTE — PROGRESS NOTE ADULT - PROBLEM SELECTOR PLAN 7
Long standing EtOH consumption. Not showing any signs of withdrawal at the moment. Last drink prior to admission which is almost 2 weeks ago. Pt was given 3 days of librium Q8hrs while on CT surgery however did not display signs of withdrawal per chart review  - c/w Multivitamin and Folic Acid    #ALC (alcoholic liver cirrhosis)  #Varices  2/2 long standing EtOH abuse.   - No prior EGD evaluation, would be warranted, can pursue as an outpatient Normocytic hypochromic anemia likely 2/2 hemolysis due to prosthetic valve. Minimal epistaxis. Completed IV iron X 3 days. Reported history of dark bm in MICU.  - c/w b12 1mg Qd  - c/w folate 1mg qd  - c/w ferrous sulfate 325 mg PO every other day  - Transfusion goal > 8  - maintain active T&S  - c/w protonix 40mg daily

## 2022-03-24 NOTE — PROGRESS NOTE ADULT - PROBLEM SELECTOR PLAN 1
Improving.  Likely related to chronic alcohol use vs hepatic encephalopathy. As per wife, pt is not normally lethargic at home. Completed course of high dose thiamine, last librium dose on 03/01. CTH and MRI w/op acute abnormality but MR head showed brain parenchymal volume loss, advanced for patient's age. VEEG no seizures.   - Infectious disease as below   - c/w hepatic encephalopathy treatment: Rifaximin (d/c lactulose due to diarrhea), rectal tube in place - pt having diarrhea

## 2022-03-24 NOTE — PROGRESS NOTE ADULT - PROBLEM SELECTOR PLAN 5
Severe AS i/s/o prior TAVR in 2017 with Dr Wilcox. JOSELO with peak transvalvular velocity of 4.7, mean gradient 56. Initially planned for valve in valve TAVR, but then it was decided that because of aortic root anatomy, he would need aortic root open heart surgery and deemed not to be a good surgical candidate. Labs consistent with hemolysis most likely 2/2 to worsening AS.  - not surgical candidate Long standing EtOH consumption. Not showing any signs of withdrawal at the moment. Last drink prior to admission which is almost 2 weeks ago. Pt was given 3 days of librium Q8hrs while on CT surgery however did not display signs of withdrawal per chart review  - c/w Multivitamin and Folic Acid    #ALC (alcoholic liver cirrhosis)  #Varices  2/2 long standing EtOH abuse.   - No prior EGD evaluation, would be warranted, can pursue as an outpatient

## 2022-03-24 NOTE — PROGRESS NOTE ADULT - PROBLEM SELECTOR PLAN 8
S/p CABG with Dr Wilcox in 2017  - recent [facility] with nonobstructive disease 1 year ago   - c/w aspirin 81mg QD   - c/w Lipitor 80mg QD

## 2022-03-24 NOTE — CHART NOTE - NSCHARTNOTEFT_GEN_A_CORE
Admitting Diagnosis:   Patient is a 66y old  Male who presents with a chief complaint of Chest Pain (24 Mar 2022 14:04)    PAST MEDICAL & SURGICAL HISTORY:  HTN (hypertension)  ETOH abuse  Smoker  Hyperlipidemia  Aortic valve stenosis, unspecified etiology  No significant past surgical history      Current Nutrition Order:  Diet, NPO with Tube Feed:   Tube Feeding Modality: Nasogastric  Vital 1.5 Jamarcus (VITAL1.5)  Total Volume for 24 Hours (mL): 1032  Total Number of Cans: 5  Continuous  Starting Tube Feed Rate {mL per Hour}: 20  Increase Tube Feed Rate by (mL): 10     Every 8 hours  Until Goal Tube Feed Rate (mL per Hour): 43  Tube Feed Duration (in Hours): 24  Tube Feed Start Time: 14:00 (22 @ 13:26)    Enteral Nutrition:   Route- NGT/dobhoff   Regimen- Vital 1.5 @ 43ml/hr x24hrs   Infusion- Per pump, 324ml x24hrs which provides 486kcals, 22g protein, 248ml free water. Meets 25-30% energy and 25% protein needs  Water flush- none ordered or programed on pump  Tolerance: abd-firm/distended (ascites), rectal tube in place d/t liquid stools.     GI Issues: Rectal tube- 850ml x24hrs.  *Spoke with RN, states MD decreased lactulose to determine if cause of diarrhea/ liquid stools. RN states liquids stools have significantly decreased over last 12hrs since decrease in lactulose regimen. TF is not cause of diarrhea.   Pain: Denies   Skin Integrity: bruising, no pressure breakdown noted   Erwin Score: 14  -No edema     Labs: chloride 111 H, glucose 114 H, AST 65 H  Last ammonia on 22-  (WNL)  -    138  |  111<H>  |  8   ----------------------------<  114<H>  4.1   |  19<L>  |  0.64    Ca    7.9<L>      24 Mar 2022 06:29  Phos  3.1     -  Mg     1.8         TPro  6.5  /  Alb  2.2<L>  /  TBili  0.9  /  DBili  x   /  AST  65<H>  /  ALT  37  /  AlkPhos  94      Medications:  MEDICATIONS  (STANDING):  artificial tears (preservative free) Ophthalmic Solution 1 Drop(s) Both EYES two times a day  aspirin  chewable 81 milliGRAM(s) Oral every 24 hours  atorvastatin 80 milliGRAM(s) Oral at bedtime  chlorhexidine 2% Cloths 1 Application(s) Topical <User Schedule>  cyanocobalamin 1000 MICROGram(s) Oral every 24 hours  DOXOrubicin INTRA-ARTERIAL (eMAR) 50 milliGRAM(s) IntraArterial once  ferrous    sulfate 325 milliGRAM(s) Oral <User Schedule>  folic acid 1 milliGRAM(s) Oral daily  meropenem  IVPB 1000 milliGRAM(s) IV Intermittent every 8 hours  multivitamin 1 Tablet(s) Oral daily  nicotine - 21 mG/24Hr(s) Patch 1 patch Transdermal daily  pantoprazole   Suspension 40 milliGRAM(s) Oral every 24 hours  rifAXIMin 550 milliGRAM(s) Oral every 12 hours  tamsulosin 0.4 milliGRAM(s) Oral at bedtime    MEDICATIONS  (PRN):  acetaminophen     Tablet .. 650 milliGRAM(s) Oral every 6 hours PRN Temp greater or equal to 38C (100.4F), Mild Pain (1 - 3)  sodium chloride 0.65% Nasal 1 Spray(s) Both Nostrils four times a day PRN Nasal Congestion    Anthropometrics:  Ht: 170.2cm (67")  Wt: 68kg (22)    IBW: 148# (67.3kg)   % IBW: 101%    Weight Change: No new weight since admission. Please obtain minimum biweekly weights to ensure pt receiving adequate nutrition    Estimated energy needs:  Actual body weight used for all calculations as pt in % %IBW (%UGV=462.6)   Needs adjusted for advanced age, possible malignancy  25-30kcak/k-2040kcal/day  1.2-1.4gm/k-95gm/day   Fluids per team 2/2 diarrhea, cirrhosis     Subjective:  67 YO M, current every day ETOH abuse (1 pint vodka and several beers daily x 50 years), PMHx HLD, HTN, CABG (LIMA to LAD, SVG to PDA in 2017), AS (s/p AVR in 2017) with recently discovered restenosis, presented to [facility] ED 2/24/22 with chest pain and episode of LOC, initially admitted to cardiology for NSTEMI with LHC showing nonobstructive disease. Transferred to CT surgery given complex anatomy with plan for TAVR and aortic root surgery. Pt found to have liver cirrhosis and a 2.4cmx2.4 cm mass on his liver concerning for HCC. Decision was made not to proceed with surgery given poor functional status, cirrhosis and non compliance with medications. The pt had worsening mental status on CT surgery service and med consult was called for hepatic encephalopathy. He has been followed GI  for liver findings. Hematology and surgical oncology consulted as imaging of abdomen confirmed HCC. Pt was not a surgical candidate and IR was consulted who planned for embolization. Pt completed a course of high dose thiamine given alcohol history without improvement in mental status. MRI brain obtained showing mild chronic microvascular ischemic disease and brain parenchymal volume loss, advanced for patient's age likely 2/2 alcohol abuse 3/14, went for planned transarterial chemoembolization by IR for further evaluation for liver lesion but around start of procedure became obtunded with increased secretions and concern that he was not protecting his airway. Following procedure patient became hypotensive to 80s/50s tachycardic to 120s, was minimally arousable to sternal rub and gurgling with c/f airway protection. ICU consulted for evaluation intubation. Course c/b anemia likely attributed to intermittent epistaxis for which ENT was consulted w/o plans for acute intervention. Concern that pt may have aspirated. Successfully extubated on 3/16. Stepped up overnight due to more lethargic, hypotension to systolic 60s, requiring phenylephrine and monitoring for need for intubation. Pressures have stabilized with proper fluid restoration and the Phen is being weaned down. Now with continued sepsis and anemia 2/2 hemolysis and possible GIB-higher risk for endoscopic evaluation.    Pt seen resting in bed today with NGT in place, TF infusing at goal rate (43ml/hr). SLP re-evaluated today (3/24) and recommend continue sole source nutrition via feeding tube with allowance for ice-chips and sips of water when pt alert. Noted palliative care consult and continues to have GOC discussion with pt's family. If pt unsafe for po intake long-term, need to consider long-term nutrition support. However, d/t pt's ascites question of feeding tube placement is possible. Consult GI if this route desired to assess tube placement.     Previous Nutrition Diagnosis: Inadequate oral intake r/t inability to meet EER AEB NPO  Active [ x ]  Resolved [   ]  Goal: Pt to meet at least 75% of nutritional needs during hospital stay consistently via tolerated route    Recommendations:  1. Increase TF regimen  -Vital 1.5 @ 45ml/hr x24hrs + 1 LPS  (TF + LPS provides- 1080ml free water, 1720 kcals, 88g protein, 825ml free water)   -Continue GOC discussion to determine if long-term feeding tube placement desired/ able to be placed  2. Diet advancement per SLP recs  3. Monitor BMs, noted decrease in lactulose lessened diarrhea/ liquid stools   4. Monitor labs    Education: N/A    Risk Level: High [ x  ] Moderate [   ] Low [   ].  Norah Alvarez RD,CDN,,CNSC Admitting Diagnosis:   Patient is a 66y old  Male who presents with a chief complaint of Chest Pain (24 Mar 2022 14:04)    PAST MEDICAL & SURGICAL HISTORY:  HTN (hypertension)  ETOH abuse  Smoker  Hyperlipidemia  Aortic valve stenosis, unspecified etiology  No significant past surgical history      Current Nutrition Order:  Diet, NPO with Tube Feed:   Tube Feeding Modality: Nasogastric  Vital 1.5 Jamarcus (VITAL1.5)  Total Volume for 24 Hours (mL): 1032  Total Number of Cans: 5  Continuous  Starting Tube Feed Rate {mL per Hour}: 20  Increase Tube Feed Rate by (mL): 10     Every 8 hours  Until Goal Tube Feed Rate (mL per Hour): 43  Tube Feed Duration (in Hours): 24  Tube Feed Start Time: 14:00 (22 @ 13:26)    Enteral Nutrition:   Route- NGT/dobhoff   Regimen- Vital 1.5 @ 43ml/hr x24hrs   Infusion- Per pump, 324ml x24hrs which provides 486kcals, 22g protein, 248ml free water. Meets 25-30% energy and 25% protein needs  Water flush- none ordered or programed on pump  Tolerance: abd-firm/distended (ascites), rectal tube in place d/t liquid stools.     GI Issues: Rectal tube- 850ml x24hrs.  *Spoke with RN, states MD decreased lactulose to determine if cause of diarrhea/ liquid stools. RN states liquids stools have significantly decreased over last 12hrs since decrease in lactulose regimen. TF is not cause of diarrhea.   Pain: Denies   Skin Integrity: bruising, no pressure breakdown noted   Erwin Score: 14  -No edema     Labs: chloride 111 H, glucose 114 H, AST 65 H  Last ammonia on 22-  (WNL)  -    138  |  111<H>  |  8   ----------------------------<  114<H>  4.1   |  19<L>  |  0.64    Ca    7.9<L>      24 Mar 2022 06:29  Phos  3.1     -  Mg     1.8         TPro  6.5  /  Alb  2.2<L>  /  TBili  0.9  /  DBili  x   /  AST  65<H>  /  ALT  37  /  AlkPhos  94      Medications:  MEDICATIONS  (STANDING):  artificial tears (preservative free) Ophthalmic Solution 1 Drop(s) Both EYES two times a day  aspirin  chewable 81 milliGRAM(s) Oral every 24 hours  atorvastatin 80 milliGRAM(s) Oral at bedtime  chlorhexidine 2% Cloths 1 Application(s) Topical <User Schedule>  cyanocobalamin 1000 MICROGram(s) Oral every 24 hours  DOXOrubicin INTRA-ARTERIAL (eMAR) 50 milliGRAM(s) IntraArterial once  ferrous    sulfate 325 milliGRAM(s) Oral <User Schedule>  folic acid 1 milliGRAM(s) Oral daily  meropenem  IVPB 1000 milliGRAM(s) IV Intermittent every 8 hours  multivitamin 1 Tablet(s) Oral daily  nicotine - 21 mG/24Hr(s) Patch 1 patch Transdermal daily  pantoprazole   Suspension 40 milliGRAM(s) Oral every 24 hours  rifAXIMin 550 milliGRAM(s) Oral every 12 hours  tamsulosin 0.4 milliGRAM(s) Oral at bedtime    MEDICATIONS  (PRN):  acetaminophen     Tablet .. 650 milliGRAM(s) Oral every 6 hours PRN Temp greater or equal to 38C (100.4F), Mild Pain (1 - 3)  sodium chloride 0.65% Nasal 1 Spray(s) Both Nostrils four times a day PRN Nasal Congestion    Anthropometrics:  Ht: 170.2cm (67")  Wt: 68kg (22)    IBW: 148# (67.3kg)   % IBW: 101%    Weight Change: No new weight since admission. Please obtain minimum biweekly weights to ensure pt receiving adequate nutrition    Estimated energy needs:  Actual body weight used for all calculations as pt in % %IBW (%SFI=834.6)   Needs adjusted for advanced age, possible malignancy  25-30kcak/k-2040kcal/day  1.2-1.4gm/k-95gm/day   Fluids per team 2/2 diarrhea, cirrhosis     Subjective:  65 YO M, current every day ETOH abuse (1 pint vodka and several beers daily x 50 years), PMHx HLD, HTN, CABG (LIMA to LAD, SVG to PDA in 2017), AS (s/p AVR in 2017) with recently discovered restenosis, presented to [facility] ED 2/24/22 with chest pain and episode of LOC, initially admitted to cardiology for NSTEMI with LHC showing nonobstructive disease. Transferred to CT surgery given complex anatomy with plan for TAVR and aortic root surgery. Pt found to have liver cirrhosis and a 2.4cmx2.4 cm mass on his liver concerning for HCC. Decision was made not to proceed with surgery given poor functional status, cirrhosis and non compliance with medications. The pt had worsening mental status on CT surgery service and med consult was called for hepatic encephalopathy. He has been followed GI  for liver findings. Hematology and surgical oncology consulted as imaging of abdomen confirmed HCC. Pt was not a surgical candidate and IR was consulted who planned for embolization. Pt completed a course of high dose thiamine given alcohol history without improvement in mental status. MRI brain obtained showing mild chronic microvascular ischemic disease and brain parenchymal volume loss, advanced for patient's age likely 2/2 alcohol abuse 3/14, went for planned transarterial chemoembolization by IR for further evaluation for liver lesion but around start of procedure became obtunded with increased secretions and concern that he was not protecting his airway. Following procedure patient became hypotensive to 80s/50s tachycardic to 120s, was minimally arousable to sternal rub and gurgling with c/f airway protection. ICU consulted for evaluation intubation. Course c/b anemia likely attributed to intermittent epistaxis for which ENT was consulted w/o plans for acute intervention. Concern that pt may have aspirated. Successfully extubated on 3/16. Stepped up overnight due to more lethargic, hypotension to systolic 60s, requiring phenylephrine and monitoring for need for intubation. Pressures have stabilized with proper fluid restoration and the Phen is being weaned down. Now with continued sepsis and anemia 2/2 hemolysis and possible GIB-higher risk for endoscopic evaluation.    Pt seen resting in bed today with NGT in place, TF infusing at goal rate (43ml/hr). SLP re-evaluated today (3/24) and recommend continue sole source nutrition via feeding tube with allowance for ice-chips and sips of water when pt alert. Noted palliative care consult and continues to have GOC discussion with pt's family. If pt unsafe for po intake long-term, need to consider long-term nutrition support. However, d/t pt's ascites question of feeding tube placement is possible. Consult GI if this route desired to assess tube placement.     Previous Nutrition Diagnosis: Inadequate oral intake r/t inability to meet EER AEB NPO  Active [ x ]  Resolved [   ]  Goal: Pt to meet at least 75% of nutritional needs during hospital stay consistently via tolerated route    Recommendations:  1. Increase TF regimen  -Vital 1.5 @ 45ml/hr x24hrs + 1 LPS  (TF + LPS provides- 1080ml free water, 1720 kcals, 88g protein, 825ml free water) *Paged team and pended order (3/24/22)  -Continue GOC discussion to determine if long-term feeding tube placement desired/ able to be placed  2. Diet advancement per SLP recs  3. Monitor BMs, noted decrease in lactulose lessened diarrhea/ liquid stools   4. Monitor labs    Education: N/A    Risk Level: High [ x  ] Moderate [   ] Low [   ].  Norah Alvarez RD,CDN,,CNSC

## 2022-03-24 NOTE — PROGRESS NOTE ADULT - PROBLEM SELECTOR PLAN 4
Multiple potential causes--infection, Meropenem, etoh abuse, HIT.   - positive heparin-PF4 ab   - f/u serotonin assay   - d/c enoxaparin CTAP with 2.4 hypervascular liver mass. i/s/o Liver cirrhosis and EtOH use disorder. MRI abdomen: 2.5 cm left hepatic lesion consistent with HCC. No locally invasive or metastatic disease. s/p IR transarterial chemical embolization since he is not a candidate for ablation  - Palliative care following

## 2022-03-24 NOTE — PROGRESS NOTE ADULT - ATTENDING COMMENTS
Antiplatelet Ab pos and Serotonin assay pending as is Gallium scan. Awake and more alert. Lactulose d/mary and if diarrhea continues will switch feed. Speech and swallow to evaluate. Bladder scan q 6.

## 2022-03-24 NOTE — PROGRESS NOTE ADULT - PROBLEM SELECTOR PLAN 3
Normocytic hypochromic anemia likely 2/2 hemolysis due to prosthetic valve. Minimal epistaxis. Completed IV iron X 3 days. Reported history of dark bm in MICU.  - c/w b12 1mg Qd  - c/w folate 1mg qd  - c/w ferrous sulfate 325 mg PO every other day  - Transfusion goal > 8  - maintain active T&S  - c/w protonix 40mg daily Multiple potential causes--infection, Meropenem, etoh abuse, HIT.   - positive heparin-PF4 ab   - f/u serotonin assay   - d/c enoxaparin Multiple potential causes--infection, Meropenem, etoh abuse, HIT.   - positive heparin-PF4 ab   - f/u serotonin assay   - d/c enoxaparin i/s/o positive HIT ab

## 2022-03-24 NOTE — PROGRESS NOTE ADULT - SUBJECTIVE AND OBJECTIVE BOX
INTERVAL EVENTS:  still lethargic, pending infectious w/u      MEDICATIONS  (STANDING):  artificial tears (preservative free) Ophthalmic Solution 1 Drop(s) Both EYES two times a day  aspirin  chewable 81 milliGRAM(s) Oral every 24 hours  atorvastatin 80 milliGRAM(s) Oral at bedtime  chlorhexidine 2% Cloths 1 Application(s) Topical <User Schedule>  cyanocobalamin 1000 MICROGram(s) Oral every 24 hours  DOXOrubicin INTRA-ARTERIAL (eMAR) 50 milliGRAM(s) IntraArterial once  ferrous    sulfate 325 milliGRAM(s) Oral <User Schedule>  folic acid 1 milliGRAM(s) Oral daily  meropenem  IVPB 1000 milliGRAM(s) IV Intermittent every 8 hours  multivitamin 1 Tablet(s) Oral daily  nicotine - 21 mG/24Hr(s) Patch 1 patch Transdermal daily  pantoprazole   Suspension 40 milliGRAM(s) Oral every 24 hours  rifAXIMin 550 milliGRAM(s) Oral every 12 hours  tamsulosin 0.4 milliGRAM(s) Oral at bedtime    MEDICATIONS  (PRN):  acetaminophen     Tablet .. 650 milliGRAM(s) Oral every 6 hours PRN Temp greater or equal to 38C (100.4F), Mild Pain (1 - 3)  sodium chloride 0.65% Nasal 1 Spray(s) Both Nostrils four times a day PRN Nasal Congestion      Home Medications:  folic acid 1 mg oral tablet: 1 tab(s) orally once a day (04 Mar 2022 11:39)  Multiple Vitamins oral tablet: 1 tab(s) orally once a day (04 Mar 2022 11:39)      Vital Signs Last 24 Hrs  T(C): 36.4 (24 Mar 2022 10:51), Max: 36.4 (24 Mar 2022 10:51)  T(F): 97.5 (24 Mar 2022 10:51), Max: 97.5 (24 Mar 2022 10:51)  HR: 110 (24 Mar 2022 08:26) (90 - 110)  BP: 134/63 (24 Mar 2022 08:26) (101/58 - 134/63)  BP(mean): 85 (24 Mar 2022 08:26) (77 - 85)  RR: 18 (24 Mar 2022 08:26) (17 - 18)  SpO2: 99% (24 Mar 2022 08:26) (96% - 99%)     PHYSICAL EXAM:  NAD  CTAB  RRR absent s2  No LE edema    LABS:                        9.1    7.91  )-----------( 95       ( 24 Mar 2022 06:29 )             29.2     03-24    138  |  111<H>  |  8   ----------------------------<  114<H>  4.1   |  19<L>  |  0.64    Ca    7.9<L>      24 Mar 2022 06:29  Phos  3.1     03-24  Mg     1.8     03-24    TPro  6.5  /  Alb  2.2<L>  /  TBili  0.9  /  DBili  x   /  AST  65<H>  /  ALT  37  /  AlkPhos  94  03-24            I&O's Summary    23 Mar 2022 07:01  -  24 Mar 2022 07:00  --------------------------------------------------------  IN: 290 mL / OUT: 1800 mL / NET: -1510 mL        66M CAD s/p CABG, severe bioprosthetic AS (not amenable to any intervention), newly diagnosed EtOH cirrhosis c/b HCC s/p TACE, anemia, ESBL UTI who cardiology has been consulted for worsening tachycardia and ST depressions.    #severe bioprosthetic AS: deemed not candidate for MAURI or CTS  - f/u with Dr. Ashby  - preload dependent, avoid volume depletion  - labs could be consistent with hemolysis, probably in setting of end stage aortic valvular disease    #AMS- fluctuating mental status and new abd pain in setting of known cirrhosis  - agree with possible diagnostic para and tagged wbc    #ST- depressions: V2-V6, I, II, and KRISTIN in aVR. Present on previous ECGs, but worse with increased HR  - Encephalopathic (likely 2/2 cirrhosis), no chest pain  - likely rate related/demand in setting of anemia and tachycardia.   - had coronary angiogram <1 month ago and LIMA and SVG grafts were widely patent  - Pt warm on exam, no cardiogenic shock at this time  - c/w lipitor 80 mg qd & asa 81      d/w cardiology attending  Hector Colvin MD PGY5

## 2022-03-24 NOTE — SWALLOW BEDSIDE ASSESSMENT ADULT - ADDITIONAL RECOMMENDATIONS
1) Given diagnosis of HCC, recommend establishing long-term feeding goals.  2) This SVC will continue to follow.

## 2022-03-25 LAB
ALBUMIN SERPL ELPH-MCNC: 2 G/DL — LOW (ref 3.3–5)
ALP SERPL-CCNC: 95 U/L — SIGNIFICANT CHANGE UP (ref 40–120)
ALT FLD-CCNC: 31 U/L — SIGNIFICANT CHANGE UP (ref 10–45)
ANION GAP SERPL CALC-SCNC: 8 MMOL/L — SIGNIFICANT CHANGE UP (ref 5–17)
AST SERPL-CCNC: 60 U/L — HIGH (ref 10–40)
BASOPHILS # BLD AUTO: 0.01 K/UL — SIGNIFICANT CHANGE UP (ref 0–0.2)
BASOPHILS NFR BLD AUTO: 0.1 % — SIGNIFICANT CHANGE UP (ref 0–2)
BILIRUB SERPL-MCNC: 0.7 MG/DL — SIGNIFICANT CHANGE UP (ref 0.2–1.2)
BLD GP AB SCN SERPL QL: NEGATIVE — SIGNIFICANT CHANGE UP
BUN SERPL-MCNC: 10 MG/DL — SIGNIFICANT CHANGE UP (ref 7–23)
CALCIUM SERPL-MCNC: 8 MG/DL — LOW (ref 8.4–10.5)
CHLORIDE SERPL-SCNC: 107 MMOL/L — SIGNIFICANT CHANGE UP (ref 96–108)
CO2 SERPL-SCNC: 22 MMOL/L — SIGNIFICANT CHANGE UP (ref 22–31)
CREAT SERPL-MCNC: 0.62 MG/DL — SIGNIFICANT CHANGE UP (ref 0.5–1.3)
CULTURE RESULTS: SIGNIFICANT CHANGE UP
CULTURE RESULTS: SIGNIFICANT CHANGE UP
EGFR: 105 ML/MIN/1.73M2 — SIGNIFICANT CHANGE UP
EOSINOPHIL # BLD AUTO: 0.35 K/UL — SIGNIFICANT CHANGE UP (ref 0–0.5)
EOSINOPHIL NFR BLD AUTO: 4 % — SIGNIFICANT CHANGE UP (ref 0–6)
GLUCOSE SERPL-MCNC: 113 MG/DL — HIGH (ref 70–99)
HCT VFR BLD CALC: 25.4 % — LOW (ref 39–50)
HGB BLD-MCNC: 8.1 G/DL — LOW (ref 13–17)
IMM GRANULOCYTES NFR BLD AUTO: 0.6 % — SIGNIFICANT CHANGE UP (ref 0–1.5)
LYMPHOCYTES # BLD AUTO: 1.39 K/UL — SIGNIFICANT CHANGE UP (ref 1–3.3)
LYMPHOCYTES # BLD AUTO: 16 % — SIGNIFICANT CHANGE UP (ref 13–44)
MAGNESIUM SERPL-MCNC: 1.8 MG/DL — SIGNIFICANT CHANGE UP (ref 1.6–2.6)
MCHC RBC-ENTMCNC: 30.8 PG — SIGNIFICANT CHANGE UP (ref 27–34)
MCHC RBC-ENTMCNC: 31.9 GM/DL — LOW (ref 32–36)
MCV RBC AUTO: 96.6 FL — SIGNIFICANT CHANGE UP (ref 80–100)
MONOCYTES # BLD AUTO: 0.6 K/UL — SIGNIFICANT CHANGE UP (ref 0–0.9)
MONOCYTES NFR BLD AUTO: 6.9 % — SIGNIFICANT CHANGE UP (ref 2–14)
NEUTROPHILS # BLD AUTO: 6.27 K/UL — SIGNIFICANT CHANGE UP (ref 1.8–7.4)
NEUTROPHILS NFR BLD AUTO: 72.4 % — SIGNIFICANT CHANGE UP (ref 43–77)
NRBC # BLD: 0 /100 WBCS — SIGNIFICANT CHANGE UP (ref 0–0)
PHOSPHATE SERPL-MCNC: 3.1 MG/DL — SIGNIFICANT CHANGE UP (ref 2.5–4.5)
PLATELET # BLD AUTO: 107 K/UL — LOW (ref 150–400)
POTASSIUM SERPL-MCNC: 3.9 MMOL/L — SIGNIFICANT CHANGE UP (ref 3.5–5.3)
POTASSIUM SERPL-SCNC: 3.9 MMOL/L — SIGNIFICANT CHANGE UP (ref 3.5–5.3)
PROT SERPL-MCNC: 6.1 G/DL — SIGNIFICANT CHANGE UP (ref 6–8.3)
RBC # BLD: 2.63 M/UL — LOW (ref 4.2–5.8)
RBC # FLD: 20.4 % — HIGH (ref 10.3–14.5)
RH IG SCN BLD-IMP: POSITIVE — SIGNIFICANT CHANGE UP
SODIUM SERPL-SCNC: 137 MMOL/L — SIGNIFICANT CHANGE UP (ref 135–145)
SPECIMEN SOURCE: SIGNIFICANT CHANGE UP
SPECIMEN SOURCE: SIGNIFICANT CHANGE UP
SRA INTERP SER-IMP: SIGNIFICANT CHANGE UP
WBC # BLD: 8.67 K/UL — SIGNIFICANT CHANGE UP (ref 3.8–10.5)
WBC # FLD AUTO: 8.67 K/UL — SIGNIFICANT CHANGE UP (ref 3.8–10.5)

## 2022-03-25 PROCEDURE — 99232 SBSQ HOSP IP/OBS MODERATE 35: CPT | Mod: GC

## 2022-03-25 PROCEDURE — 93306 TTE W/DOPPLER COMPLETE: CPT | Mod: 26

## 2022-03-25 PROCEDURE — 71045 X-RAY EXAM CHEST 1 VIEW: CPT | Mod: 26

## 2022-03-25 RX ORDER — ACETAMINOPHEN 500 MG
1000 TABLET ORAL ONCE
Refills: 0 | Status: COMPLETED | OUTPATIENT
Start: 2022-03-25 | End: 2022-03-25

## 2022-03-25 RX ADMIN — Medication 1 PATCH: at 19:03

## 2022-03-25 RX ADMIN — Medication 81 MILLIGRAM(S): at 05:41

## 2022-03-25 RX ADMIN — Medication 1 PATCH: at 22:48

## 2022-03-25 RX ADMIN — Medication 325 MILLIGRAM(S): at 05:42

## 2022-03-25 RX ADMIN — PANTOPRAZOLE SODIUM 40 MILLIGRAM(S): 20 TABLET, DELAYED RELEASE ORAL at 05:41

## 2022-03-25 RX ADMIN — Medication 1 DROP(S): at 14:33

## 2022-03-25 RX ADMIN — Medication 1000 MILLIGRAM(S): at 15:20

## 2022-03-25 RX ADMIN — Medication 1 PATCH: at 21:00

## 2022-03-25 RX ADMIN — Medication 1 MILLIGRAM(S): at 14:34

## 2022-03-25 RX ADMIN — PREGABALIN 1000 MICROGRAM(S): 225 CAPSULE ORAL at 14:33

## 2022-03-25 RX ADMIN — Medication 1 TABLET(S): at 14:34

## 2022-03-25 RX ADMIN — ATORVASTATIN CALCIUM 80 MILLIGRAM(S): 80 TABLET, FILM COATED ORAL at 22:48

## 2022-03-25 RX ADMIN — MEROPENEM 100 MILLIGRAM(S): 1 INJECTION INTRAVENOUS at 05:41

## 2022-03-25 RX ADMIN — MEROPENEM 100 MILLIGRAM(S): 1 INJECTION INTRAVENOUS at 16:16

## 2022-03-25 RX ADMIN — Medication 1 PATCH: at 07:00

## 2022-03-25 RX ADMIN — Medication 400 MILLIGRAM(S): at 14:33

## 2022-03-25 NOTE — PROGRESS NOTE ADULT - PROBLEM SELECTOR PLAN 1
Improving.  Likely related to chronic alcohol use vs hepatic encephalopathy. As per wife, pt is not normally lethargic at home. Completed course of high dose thiamine, last librium dose on 03/01. CTH and MRI w/op acute abnormality but MR head showed brain parenchymal volume loss, advanced for patient's age. VEEG no seizures.   - Infectious disease as below   - c/w hepatic encephalopathy treatment       -c/w Rifaximin       -d/c lactulose for now due to diarrhea, monitor rectal tube output

## 2022-03-25 NOTE — PROGRESS NOTE ADULT - PROBLEM SELECTOR PLAN 2
Klebsiella found in lung, blood and urine. UCx 3/14: Klebsiella ESBL and Proteus mirabilis. BCx 3/14-19: NGTD. Sputum Cx 3/15: Proteus Mirabilis, Klebsiella pneumonia ESBL. UA 3/19: + small LE, bacteria and WBC. BCx 3/19: Klebsiella pneumonia. BCx 3/20: NGTD  - c/w meropenem 1 gram q8 hrs  - ID consulted, appreciate recs--potential sources include necrotic tumor, endocarditis (although rare with gram negative bacteremia), SBP, prostatitis, or other intraabdominal infection, ?pneumonia   - f/u gallium scan and TTE   - continue GOC discussion

## 2022-03-25 NOTE — PROGRESS NOTE ADULT - ATTENDING COMMENTS
palliative care consult and discuss GOC. Prognosis guarded  repeat s/s eval as mental status is better. Pt is not candidate for feeding tube  consider ethics consult if needed   Monitor hypotension/sepsis/cardiac complications and consider icu as needed  Pt is not candidate for valve replacement as per ct surgery and is at high risk of mortality due to his cardiac/hepatic cause( hepatocellular cancer)   cont Rx for ESBL and ID f/u  Discharge planning to [facility]/Hospice based on GOC discussion

## 2022-03-25 NOTE — PROGRESS NOTE ADULT - PROBLEM SELECTOR PLAN 8
S/p CABG with Dr Wilcox in 2017  - recent [facility] with nonobstructive disease 1 year ago   - c/w aspirin 81mg QD   - c/w Lipitor 80mg QD

## 2022-03-25 NOTE — PROGRESS NOTE ADULT - PROBLEM SELECTOR PLAN 4
CTAP with 2.4 hypervascular liver mass. i/s/o Liver cirrhosis and EtOH use disorder. MRI abdomen: 2.5 cm left hepatic lesion consistent with HCC. No locally invasive or metastatic disease.   - s/p IR transarterial chemical embolization since he is not a candidate for ablation  - Palliative care following

## 2022-03-25 NOTE — PROGRESS NOTE ADULT - PROBLEM SELECTOR PLAN 8
S/p CABG with Dr Wilcox in 2017  - recent [facility] with nonobstructive disease 1 year ago   - c/w aspirin 81mg QD   - c/w Lipitor 80mg QD

## 2022-03-25 NOTE — PROGRESS NOTE ADULT - ASSESSMENT
This is a 65 YO M, current smoker (1ppd x 50 years), current every day ETOH abuse (1 pint vodka and several beers daily x 50 years), with PMHx of HLD, HTN (not on any medications), CABG (LIMA to LAD, SVG to PDA in 2017), AS (s/p AVR in 2017) with recently discovered restenosis, presented to [facility] ED on 2/24/22 with chest pain and episode of LOC, initially admitted to cardiology for NSTEMI with [facility] with nonobstructive disease, then found to have restenosis of bioprosthetic valve for which aortic root surgery was planned, but now deferred given new diagnosis of cirrhosis and HCC. Course c/b AMS 2/2 likely hepatic encephalopathy and metabolic encephalopathy. Course further c/b septic shock 2/2 ESBL Klebsiella UTI.   - Pt without localizing symptoms or radiographic evidence of pneumonia at this time, however can not rule out   - Sputum cultures growing ESBL Klebsiella and Proteus mirabilis with few WBCs  - 3/19 pt went into septic shock while on ertapenem with BCx positive for Klebsiella. Improved with meropenem  - 3/21 CT A/P showing colitis, hcc tumor with necrotic center, and new small ascites.  - 3/21 CT chest showing bilateral effusions.  Initially thought that pt's source for sepsis was UTI earlier on admission. However, should have been treated with ertapenem per susceptibilities. Sputum cultures with only few WBCs and no radiographic evidence or symptoms c/w pneumonia raises concern for airway colonization. Other sources could be endocarditis (although rare with gram negative bacteremia), SBP, prostatitis, or other intraabdominal infection.  It is possible the necrotic center of the tumor is a source of infection. At this point, Gallium scan would be useful to identify source.   Recommendations:  - please obtain TTE  - f/u BCx susceptibilities  - f/u surveillance BCx  - f/u OMFS consult to assess for oral lesions  - continue meropenem 1g q8 hours (3/20-) for now   - f/u Gallium scan read (attn to left hand)  - ongoing GOC discussions    ID Team 1 will continue to follow.   Note not finalized until attested by attending     Fariba Ni MD PGY2 Internal Medicine  Infectious Disease Consult Service, Team 1

## 2022-03-25 NOTE — PROGRESS NOTE ADULT - PROBLEM SELECTOR PLAN 5
Long standing EtOH consumption. Not showing any signs of withdrawal at the moment. Last drink prior to admission. Out of withdrawal window. Pt was given 3 days of librium Q8hrs while on CT surgery however did not display signs of withdrawal per chart review  - c/w Multivitamin and Folic Acid    #ALC (alcoholic liver cirrhosis)  #Varices  2/2 long standing EtOH abuse.   - No prior EGD evaluation, would be warranted, can pursue as an outpatient

## 2022-03-25 NOTE — PROGRESS NOTE ADULT - SUBJECTIVE AND OBJECTIVE BOX
Stepdown from [facility] to [facility]     Hospital Course: 67 YO M, current smoker (1ppd x 50 years), current every day ETOH abuse (1 pint vodka and several beers daily x 50 years), with PMHx of HLD, HTN (not on any medications), CABG (LIMA to LAD, SVG to PDA in 2017), AS (s/p AVR in 2017) with recently discovered restenosis, presented to [facility] ED on 2/24/22 with chest pain and episode of LOC, initially admitted to cardiology for NSTEMI with LHC showing nonobstructive disease. TTE was reperformed and patient was found to have restenosis of bioprosthetic valve for which aortic root surgery was planned.  Patient was transferred to CT surgery given complex anatomy with plan for TAVR and aortic root surgery, however deemed not to be a good surgical candidate.  During this time on CT surgery service he was found to have liver cirrhosis and a 2.4cmx2.4 cm mass on his liver concerning for HCC. Patient was transferred to medicine service and rifaxamin and lactulose were started for altered mental status to treat possible hepatic encephalopathy. Patient was not a surgical candidate and IR was consulted for embolization of HCC.  On 3/14, patient went for planned transarterial chemoembolization (TACE) by IR for treatment of liver lesion. After the procedure, patient became hypotensive to 80s/50s tachycardic to 120s and was minimally arousable with c/f airway protection. Pt was intubated and stepped up to the ICU. Course c/b anemia likely attributed to intermittent epistaxis for which ENT was consulted w/o plans for acute intervention. In the MICU started on Ceftriaxone 2 gram for UTI. Started on vancomycin for possible HAP, that was soon discontinued as MRSA swab was negative on 3/14. Patient extubated on 3/16. NGT placed on the 3/16 with S&S following. Patient stepped down to [facility] on 3/17. ID consulted on 3/18 because 3/14 urine and 3/15 sputum cultures both grew ESBL Klebsiella. Per ID recs, abx switched to Ertapenem 1 gram daily to treat klebsiella ESBL. Pt stepped back up to MICU overnight 3/19 due to hypotension to systolic 60s, requiring phenylephrine. Patient lethargic but arousable and was not intubated. Antibiotics were broadened to meropenem for ESBL klebsiella bacteremia in 3/19 blood cultures. OMFS recommended maxillofacial with IV contrast for possible abscess, that was negative for abscess. Blood cultures drawn on 3/20 were negative. Patient stepped down to [facility] on 3/21. To search for source of ESBL klebsiella bacteremia, gallium scan and TTE ordered. Gallium scan performed 3/24, still pending TTE. Lactulose dc'ed because of persistent diarrhea. 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        BANALBATERESA, 66y, Male  MRN-[id number]  Patient is a 66y old  Male who presents with a chief complaint of Chest Pain (24 Mar 2022 14:04)      OVERNIGHT EVENTS: naeo     SUBJECTIVE: Patient seen and examined at bedside. Reports feeling fine, no pain anywhere in body. Interview limited by patient's mental status     12 Point ROS Negative unless noted otherwise above.  -------------------------------------------------------------------------------  VITAL SIGNS:  Vital Signs Last 24 Hrs  T(C): 36.2 (25 Mar 2022 05:38), Max: 36.5 (24 Mar 2022 22:00)  T(F): 97.2 (25 Mar 2022 05:38), Max: 97.7 (24 Mar 2022 22:00)  HR: 92 (25 Mar 2022 08:41) (92 - 104)  BP: 96/52 (25 Mar 2022 08:41) (90/56 - 120/70)  BP(mean): 67 (25 Mar 2022 08:41) (67 - 91)  RR: 18 (25 Mar 2022 08:41) (18 - 18)  SpO2: 96% (25 Mar 2022 08:41) (94% - 97%)  I&O's Summary    24 Mar 2022 07:01  -  25 Mar 2022 07:00  --------------------------------------------------------  IN: 516 mL / OUT: 335 mL / NET: 181 mL        PHYSICAL EXAM:    General: ill appearing, lethargic man in NAD  HEENT: NC/AT; EOMI, PERRLA, anicteric sclera; dry mucosal membranes.  Neck: supple, trachea midline  Cardiovascular: RRR, +S1/S2; +holosystolic murmur in RUSB   Respiratory: CTA B/L; no W/R/R  Gastrointestinal: soft, enlarged, nontender; tympanic; +BSx4  Extremities: WWP; no edema or cyanosis  Vascular: 2+ radial, DP/PT pulses B/L  Neurological: AAOx1-2; appears lethargic and confused  Skin: jaundiced     ALLERGIES:  Allergies    No Known Allergies    Intolerances        MEDICATIONS:  MEDICATIONS  (STANDING):  artificial tears (preservative free) Ophthalmic Solution 1 Drop(s) Both EYES two times a day  aspirin  chewable 81 milliGRAM(s) Oral every 24 hours  atorvastatin 80 milliGRAM(s) Oral at bedtime  chlorhexidine 2% Cloths 1 Application(s) Topical <User Schedule>  cyanocobalamin 1000 MICROGram(s) Oral every 24 hours  DOXOrubicin INTRA-ARTERIAL (eMAR) 50 milliGRAM(s) IntraArterial once  ferrous    sulfate 325 milliGRAM(s) Oral <User Schedule>  folic acid 1 milliGRAM(s) Oral daily  meropenem  IVPB 1000 milliGRAM(s) IV Intermittent every 8 hours  multivitamin 1 Tablet(s) Oral daily  nicotine - 21 mG/24Hr(s) Patch 1 patch Transdermal daily  pantoprazole   Suspension 40 milliGRAM(s) Oral every 24 hours  rifAXIMin 550 milliGRAM(s) Oral every 12 hours  tamsulosin 0.4 milliGRAM(s) Oral at bedtime    MEDICATIONS  (PRN):  acetaminophen     Tablet .. 650 milliGRAM(s) Oral every 6 hours PRN Temp greater or equal to 38C (100.4F), Mild Pain (1 - 3)  sodium chloride 0.65% Nasal 1 Spray(s) Both Nostrils four times a day PRN Nasal Congestion      -------------------------------------------------------------------------------  LABS:                        8.1    8.67  )-----------( 107      ( 25 Mar 2022 07:27 )             25.4     03-25    137  |  107  |  10  ----------------------------<  113<H>  3.9   |  22  |  0.62    Ca    8.0<L>      25 Mar 2022 07:27  Phos  3.1     03-25  Mg     1.8     03-25    TPro  6.1  /  Alb  2.0<L>  /  TBili  0.7  /  DBili  x   /  AST  60<H>  /  ALT  31  /  AlkPhos  95  03-25    LIVER FUNCTIONS - ( 25 Mar 2022 07:27 )  Alb: 2.0 g/dL / Pro: 6.1 g/dL / ALK PHOS: 95 U/L / ALT: 31 U/L / AST: 60 U/L / GGT: x               CAPILLARY BLOOD GLUCOSE          COVID-19 PCR: NotDetec (21 Mar 2022 03:33)  COVID-19 PCR: Negative (13 Mar 2022 09:10)  COVID-19 PCR: Negative (09 Mar 2022 07:25)  COVID-19 PCR: NotDetec (28 Feb 2022 10:40)  SARS-CoV-2: NotDetec (24 Feb 2022 11:20)      RADIOLOGY & ADDITIONAL TESTS: Reviewed.   Stepdown from [facility] to [facility]     Hospital Course: 65 YO M, current smoker (1ppd x 50 years), current every day ETOH abuse (1 pint vodka and several beers daily x 50 years), with PMHx of HLD, HTN (not on any medications), CABG (LIMA to LAD, SVG to PDA in 2017), AS (s/p AVR in 2017) with recently discovered restenosis, presented to [facility] ED on 2/24/22 with chest pain and episode of LOC, initially admitted to cardiology for NSTEMI with LHC showing nonobstructive disease. TTE was reperformed and patient was found to have restenosis of bioprosthetic valve for which aortic root surgery was planned.  Patient was transferred to CT surgery given complex anatomy with plan for TAVR and aortic root surgery, however deemed not to be a good surgical candidate.  During this time on CT surgery service he was found to have liver cirrhosis and a 2.4cmx2.4 cm mass on his liver concerning for HCC. Patient was transferred to medicine service and rifaxamin and lactulose were started for altered mental status to treat possible hepatic encephalopathy. Patient was not a surgical candidate and IR was consulted for embolization of HCC.  On 3/14, patient went for planned transarterial chemoembolization (TACE) by IR for treatment of liver lesion. After the procedure, patient became hypotensive to 80s/50s tachycardic to 120s and was minimally arousable with c/f airway protection. Pt was intubated and stepped up to the ICU. Course c/b anemia likely attributed to intermittent epistaxis for which ENT was consulted w/o plans for acute intervention. In the MICU started on Ceftriaxone 2 gram for UTI. Started on vancomycin for possible HAP, that was soon discontinued as MRSA swab was negative on 3/14. Patient extubated on 3/16. NGT placed on the 3/16 with S&S following. Patient stepped down to [facility] on 3/17. ID consulted on 3/18 because 3/14 urine and 3/15 sputum cultures both grew ESBL Klebsiella. Per ID recs, abx switched to Ertapenem 1 gram daily to treat klebsiella ESBL. Pt stepped back up to MICU overnight 3/19 due to hypotension to systolic 60s, requiring phenylephrine. Patient lethargic but arousable and was not intubated. Antibiotics were broadened to meropenem for ESBL klebsiella bacteremia in 3/19 blood cultures. OMFS recommended maxillofacial with IV contrast for possible abscess, that was negative for abscess. Blood cultures drawn on 3/20 were negative. Patient stepped down to [facility] on 3/21. To search for source of ESBL klebsiella bacteremia, gallium scan and TTE ordered. Gallium scan performed 3/24, still pending TTE. Lactulose dc'ed because of persistent diarrhea. Patient is currently on q6h bladder scans with straight cath for residual urine. 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 Thrombocytopenia could also be due to meropenem or infection.         TERESA ARCHIBALD, 66y, Male  MRN-[id number]  Patient is a 66y old  Male who presents with a chief complaint of Chest Pain (24 Mar 2022 14:04)      OVERNIGHT EVENTS: naeo     SUBJECTIVE: Patient seen and examined at bedside. Reports feeling fine, no pain anywhere in body. Interview limited by patient's mental status     12 Point ROS Negative unless noted otherwise above.  -------------------------------------------------------------------------------  VITAL SIGNS:  Vital Signs Last 24 Hrs  T(C): 36.2 (25 Mar 2022 05:38), Max: 36.5 (24 Mar 2022 22:00)  T(F): 97.2 (25 Mar 2022 05:38), Max: 97.7 (24 Mar 2022 22:00)  HR: 92 (25 Mar 2022 08:41) (92 - 104)  BP: 96/52 (25 Mar 2022 08:41) (90/56 - 120/70)  BP(mean): 67 (25 Mar 2022 08:41) (67 - 91)  RR: 18 (25 Mar 2022 08:41) (18 - 18)  SpO2: 96% (25 Mar 2022 08:41) (94% - 97%)  I&O's Summary    24 Mar 2022 07:01  -  25 Mar 2022 07:00  --------------------------------------------------------  IN: 516 mL / OUT: 335 mL / NET: 181 mL        PHYSICAL EXAM:    General: ill appearing, lethargic man in NAD  HEENT: NC/AT; EOMI, PERRLA, anicteric sclera; dry mucosal membranes.  Neck: supple, trachea midline  Cardiovascular: RRR, +S1/S2; +holosystolic murmur in RUSB   Respiratory: CTA B/L; no W/R/R  Gastrointestinal: soft, enlarged, nontender; tympanic; +BSx4  Extremities: WWP; no edema or cyanosis  Vascular: 2+ radial, DP/PT pulses B/L  Neurological: AAOx1-2; appears lethargic and confused  Skin: jaundiced     ALLERGIES:  Allergies    No Known Allergies    Intolerances        MEDICATIONS:  MEDICATIONS  (STANDING):  artificial tears (preservative free) Ophthalmic Solution 1 Drop(s) Both EYES two times a day  aspirin  chewable 81 milliGRAM(s) Oral every 24 hours  atorvastatin 80 milliGRAM(s) Oral at bedtime  chlorhexidine 2% Cloths 1 Application(s) Topical <User Schedule>  cyanocobalamin 1000 MICROGram(s) Oral every 24 hours  DOXOrubicin INTRA-ARTERIAL (eMAR) 50 milliGRAM(s) IntraArterial once  ferrous    sulfate 325 milliGRAM(s) Oral <User Schedule>  folic acid 1 milliGRAM(s) Oral daily  meropenem  IVPB 1000 milliGRAM(s) IV Intermittent every 8 hours  multivitamin 1 Tablet(s) Oral daily  nicotine - 21 mG/24Hr(s) Patch 1 patch Transdermal daily  pantoprazole   Suspension 40 milliGRAM(s) Oral every 24 hours  rifAXIMin 550 milliGRAM(s) Oral every 12 hours  tamsulosin 0.4 milliGRAM(s) Oral at bedtime    MEDICATIONS  (PRN):  acetaminophen     Tablet .. 650 milliGRAM(s) Oral every 6 hours PRN Temp greater or equal to 38C (100.4F), Mild Pain (1 - 3)  sodium chloride 0.65% Nasal 1 Spray(s) Both Nostrils four times a day PRN Nasal Congestion      -------------------------------------------------------------------------------  LABS:                        8.1    8.67  )-----------( 107      ( 25 Mar 2022 07:27 )             25.4     03-25    137  |  107  |  10  ----------------------------<  113<H>  3.9   |  22  |  0.62    Ca    8.0<L>      25 Mar 2022 07:27  Phos  3.1     03-25  Mg     1.8     03-25    TPro  6.1  /  Alb  2.0<L>  /  TBili  0.7  /  DBili  x   /  AST  60<H>  /  ALT  31  /  AlkPhos  95  03-25    LIVER FUNCTIONS - ( 25 Mar 2022 07:27 )  Alb: 2.0 g/dL / Pro: 6.1 g/dL / ALK PHOS: 95 U/L / ALT: 31 U/L / AST: 60 U/L / GGT: x               CAPILLARY BLOOD GLUCOSE          COVID-19 PCR: NotDetec (21 Mar 2022 03:33)  COVID-19 PCR: Negative (13 Mar 2022 09:10)  COVID-19 PCR: Negative (09 Mar 2022 07:25)  COVID-19 PCR: NotDetec (28 Feb 2022 10:40)  SARS-CoV-2: NotDetec (24 Feb 2022 11:20)      RADIOLOGY & ADDITIONAL TESTS: Reviewed.

## 2022-03-25 NOTE — PROGRESS NOTE ADULT - PROBLEM SELECTOR PLAN 3
Multiple potential causes--infection, Meropenem, etoh abuse, HIT.   - positive heparin-PF4 ab   - f/u serotonin assay   - d/c enoxaparin i/s/o positive HIT ab

## 2022-03-25 NOTE — PROGRESS NOTE ADULT - PROBLEM SELECTOR PLAN 1
Improving.  Likely related to chronic alcohol use vs hepatic encephalopathy. As per wife, pt is not normally lethargic at home. Completed course of high dose thiamine, last librium dose on 03/01. CTH and MRI w/o acute abnormality but MR head showed brain parenchymal volume loss, advanced for patient's age. VEEG no seizures.     Plan:  - c/w hepatic encephalopathy treatment       -c/w Rifaximin       -d/c'ed lactulose for now due to diarrhea, monitor rectal tube output Improving.  Likely related to chronic alcohol use vs hepatic encephalopathy. As per wife, pt is not normally lethargic at home. Completed course of high dose thiamine, last librium dose on 03/01. CTH and MRI w/o acute abnormality but MR head showed brain parenchymal volume loss, advanced for patient's age. VEEG no seizures.     Plan:  - treat Klebsiella infection as below  - c/w hepatic encephalopathy treatment       -c/w Rifaximin       -d/c'ed lactulose for now due to diarrhea, monitor rectal tube output

## 2022-03-25 NOTE — PROGRESS NOTE ADULT - ATTENDING COMMENTS
Remains afebrile, nl WBC, somewhat more awake.  TTE did not grossly show vegetation, gallium scan done, pending read.  Would continue meropenem for Klebsiella bacteremia, source unclear, developed on ertapenem - possibly polymicrobial process.  Will follow with you – ID Team 1.  [facility], I will resume care 3/28.

## 2022-03-25 NOTE — PROGRESS NOTE ADULT - PROBLEM SELECTOR PLAN 6
Severe AS i/s/o prior TAVR in 2017 with Dr Wilcox. JOSELO with peak transvalvular velocity of 4.7, mean gradient 56. Initially planned for valve in valve TAVR, but then it was decided that because of aortic root anatomy, he would need aortic root open heart surgery and deemed not to be a good surgical candidate. Labs consistent with hemolysis most likely 2/2 to worsening AS.  - not surgical candidate Severe AS i/s/o prior TAVR in 2017 with Dr Wilcox. JOSELO with peak transvalvular velocity of 4.7, mean gradient 56. Initially planned for valve in valve TAVR, but then it was decided that because of aortic root anatomy, he would need aortic root open heart surgery and deemed not to be a good surgical candidate. Labs consistent with hemolysis most likely 2/2 to worsening AS.  - not surgical candidate  - f/u palliative recs

## 2022-03-25 NOTE — PROGRESS NOTE ADULT - PROBLEM SELECTOR PLAN 2
Klebsiella found in lung, blood and urine. UCx 3/14: Klebsiella ESBL and Proteus mirabilis. BCx 3/14-19: NGTD. Sputum Cx 3/15: Proteus Mirabilis, Klebsiella pneumonia ESBL. UA 3/19: + small LE, bacteria and WBC. BCx 3/19: Klebsiella pneumonia.   - BCx 3/20: NGTD  Plan:  - c/w meropenem 1 gram q8 hrs  - ID consulted, appreciate recs--potential sources include necrotic tumor, endocarditis (although rare with gram negative bacteremia and negative TTE), SBP, prostatitis, colitis, ?pneumonia   - f/u gallium scan  - continue GOC discussion

## 2022-03-25 NOTE — PROGRESS NOTE ADULT - SUBJECTIVE AND OBJECTIVE BOX
Infectious Disease Progress Note    Interval Events: diarrhea yesterday while on lactulose. lactulose stopped. gallium performed, pending read.     Subjective:  Patient seen and evaluated at bedside. Calm. Denied much abdo pain.    MEDICATIONS  (STANDING):  artificial tears (preservative free) Ophthalmic Solution 1 Drop(s) Both EYES two times a day  aspirin  chewable 81 milliGRAM(s) Oral every 24 hours  atorvastatin 80 milliGRAM(s) Oral at bedtime  chlorhexidine 2% Cloths 1 Application(s) Topical <User Schedule>  cyanocobalamin 1000 MICROGram(s) Oral every 24 hours  DOXOrubicin INTRA-ARTERIAL (eMAR) 50 milliGRAM(s) IntraArterial once  ferrous    sulfate 325 milliGRAM(s) Oral <User Schedule>  folic acid 1 milliGRAM(s) Oral daily  meropenem  IVPB 1000 milliGRAM(s) IV Intermittent every 8 hours  multivitamin 1 Tablet(s) Oral daily  nicotine - 21 mG/24Hr(s) Patch 1 patch Transdermal daily  pantoprazole   Suspension 40 milliGRAM(s) Oral every 24 hours  rifAXIMin 550 milliGRAM(s) Oral every 12 hours  tamsulosin 0.4 milliGRAM(s) Oral at bedtime    MEDICATIONS  (PRN):  acetaminophen     Tablet .. 650 milliGRAM(s) Oral every 6 hours PRN Temp greater or equal to 38C (100.4F), Mild Pain (1 - 3)  sodium chloride 0.65% Nasal 1 Spray(s) Both Nostrils four times a day PRN Nasal Congestion    Vital Signs Last 24 Hrs  T(C): 36.3 (25 Mar 2022 14:00), Max: 36.5 (24 Mar 2022 22:00)  T(F): 97.3 (25 Mar 2022 14:00), Max: 97.7 (24 Mar 2022 22:00)  HR: 84 (25 Mar 2022 15:15) (84 - 104)  BP: 93/51 (25 Mar 2022 15:15) (90/56 - 120/70)  BP(mean): 69 (25 Mar 2022 15:15) (67 - 91)  RR: 18 (25 Mar 2022 15:15) (18 - 18)  SpO2: 94% (25 Mar 2022 15:15) (94% - 98%)        General: nad, calm  HEENT: NC/AT, anicteric sclera; MMM  Neck: supple  Cardiovascular: +S1/S2; RRR  Respiratory: CTA B/L; +systolic murmur  Gastrointestinal: NGT, NT. Abdo is mildly distended; no rebound/guarding +BSx4;   Extremities: WWP; no edema, clubbing or cyanosis  : rogers.  Vascular: 2+ radial, DP/PT pulses B/L  Neurological: AAOx1 self only. redirectable but very confused.       LABS:                        8.1    8.67  )-----------( 107      ( 25 Mar 2022 07:27 )             25.4     03-25    137  |  107  |  10  ----------------------------<  113<H>  3.9   |  22  |  0.62    Ca    8.0<L>      25 Mar 2022 07:27  Phos  3.1     03-25  Mg     1.8     03-25    TPro  6.1  /  Alb  2.0<L>  /  TBili  0.7  /  DBili  x   /  AST  60<H>  /  ALT  31  /  AlkPhos  95  03-25        LIVER FUNCTIONS - ( 25 Mar 2022 07:27 )  Alb: 2.0 g/dL / Pro: 6.1 g/dL / ALK PHOS: 95 U/L / ALT: 31 U/L / AST: 60 U/L / GGT: x               MICROBIOLOGY:            RECENT CULTURES:  03-20 @ 05:56  .Blood Blood  --  --  --    No growth at 3 days.  --  03-19 @ 06:42  .Blood Blood  Klebsiella pneumoniae ESBL  Blood Culture PCR  Blood Culture PCR  PCR    Growth in anaerobic bottle: Klebsiella pneumoniae ESBL  Floor previously notified.  Aerobic Bottle: No growth to date.  Change in culture status will be immediately reported.  --

## 2022-03-25 NOTE — PROGRESS NOTE ADULT - ASSESSMENT
67 YO M, current smoker (1ppd x 50 years), current every day ETOH abuse (1 pint vodka and several beers daily x 50 years), with PMHx of HLD, HTN (not on any medications), CABG (LIMA to LAD, SVG to PDA in 2017), AS (s/p AVR in 2017) with recently discovered restenosis, presented to [facility] ED on 2/24/22 with chest pain and episode of LOC, initially admitted to cardiology for NSTEMI with LHC showing nonobstructive disease. Patient was found to have restenosis of bioprosthetic valve, however not surgical candidate. Patient transferred to medicine for further management of HCC, hepatic encephalopathy, course c/b AMS. After IR TACE of HCC on 3/14, patient intubated for airway protection and transferred to MICU. Course further c/b septic shock 2/2 ESBL Klebsiella bacteremia source unclear, searching for source with gallium scan.

## 2022-03-25 NOTE — PROGRESS NOTE ADULT - PROBLEM SELECTOR PLAN 4
CTAP with 2.4 hypervascular liver mass. i/s/o Liver cirrhosis and EtOH use disorder. MRI abdomen: 2.5 cm left hepatic lesion consistent with HCC. No locally invasive or metastatic disease.   - s/p IR transarterial chemical embolization since he is not a candidate for ablation  - repeat CTAP with residual tumor  Plan:  - Palliative care following

## 2022-03-25 NOTE — PROGRESS NOTE ADULT - PROBLEM SELECTOR PLAN 5
Long standing EtOH consumption. Not showing any signs of withdrawal at the moment. Last drink prior to admission which is almost 2 weeks ago. Pt was given 3 days of librium Q8hrs while on CT surgery however did not display signs of withdrawal per chart review  - c/w Multivitamin and Folic Acid    #ALC (alcoholic liver cirrhosis)  #Varices  2/2 long standing EtOH abuse.   - No prior EGD evaluation, would be warranted, can pursue as an outpatient

## 2022-03-25 NOTE — PROGRESS NOTE ADULT - SUBJECTIVE AND OBJECTIVE BOX
TRANSFER ACCEPTANCE NOTE [facility] TO [facility]    Hospital Course: 65 YO M, current smoker (1ppd x 50 years), current every day ETOH abuse (1 pint vodka and several beers daily x 50 years), with PMHx of HLD, HTN (not on any medications), CABG (LIMA to LAD, SVG to PDA in 2017), AS (s/p AVR in 2017) with recently discovered restenosis, presented to [facility] ED on 2/24/22 with chest pain and episode of LOC, initially admitted to cardiology for NSTEMI with LHC showing nonobstructive disease. TTE was reperformed and patient was found to have restenosis of bioprosthetic valve for which aortic root surgery was planned.  Patient was transferred to CT surgery given complex anatomy with plan for TAVR and aortic root surgery, however deemed not to be a good surgical candidate.  During this time on CT surgery service he was found to have liver cirrhosis and a 2.4cmx2.4 cm mass on his liver concerning for HCC. Patient was transferred to medicine service and rifaxamin and lactulose were started for altered mental status to treat possible hepatic encephalopathy. Patient was not a surgical candidate and IR was consulted for embolization of HCC.  On 3/14, patient went for planned transarterial chemoembolization (TACE) by IR for treatment of liver lesion. After the procedure, patient became hypotensive to 80s/50s tachycardic to 120s and was minimally arousable with c/f airway protection. Pt was intubated and stepped up to the ICU. Course c/b anemia likely attributed to intermittent epistaxis for which ENT was consulted w/o plans for acute intervention. In the MICU started on Ceftriaxone 2 gram for UTI. Started on vancomycin for possible HAP, that was soon discontinued as MRSA swab was negative on 3/14. Patient extubated on 3/16. NGT placed on the 3/16 with S&S following. Patient stepped down to [facility] on 3/17. ID consulted on 3/18 because 3/14 urine and 3/15 sputum cultures both grew ESBL Klebsiella. Per ID recs, abx switched to Ertapenem 1 gram daily to treat klebsiella ESBL. Pt stepped back up to MICU overnight 3/19 due to hypotension to systolic 60s, requiring phenylephrine. Patient lethargic but arousable and was not intubated. Antibiotics were broadened to meropenem for ESBL klebsiella bacteremia in 3/19 blood cultures. OMFS recommended maxillofacial with IV contrast for possible abscess, that was negative for abscess. Blood cultures drawn on 3/20 were negative. Patient stepped down to [facility] on 3/21. To search for source of ESBL klebsiella bacteremia, gallium scan and TTE ordered. Gallium scan performed 3/24. 3/25 TTE with severe aortic stenosis but no evidence of endocarditis. Lactulose dc'ed because of persistent diarrhea. Patient is currently on q6h bladder scans with straight cath for residual urine. 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 Thrombocytopenia could also be due to meropenem or infection.         SUBJECTIVE / INTERVAL HPI: Patient seen and examined at bedside. Pt unable to participate in ROS but grossly denies all complaints.    ROS: negative unless otherwise stated above.    VITAL SIGNS:  Vital Signs Last 24 Hrs  T(C): 36.3 (25 Mar 2022 14:00), Max: 36.5 (24 Mar 2022 22:00)  T(F): 97.3 (25 Mar 2022 14:00), Max: 97.7 (24 Mar 2022 22:00)  HR: 86 (25 Mar 2022 16:26) (84 - 104)  BP: 97/56 (25 Mar 2022 16:26) (90/56 - 120/70)  BP(mean): 70 (25 Mar 2022 16:26) (67 - 91)  RR: 18 (25 Mar 2022 16:26) (18 - 18)  SpO2: 95% (25 Mar 2022 16:26) (94% - 98%)      03-24-22 @ 07:01  -  03-25-22 @ 07:00  --------------------------------------------------------  IN: 516 mL / OUT: 335 mL / NET: 181 mL    03-25-22 @ 07:01  -  03-25-22 @ 17:28  --------------------------------------------------------  IN: 394 mL / OUT: 420 mL / NET: -26 mL        PHYSICAL EXAM:    General: NAD  HEENT: MMM; NGT in place  Neck: supple  Cardiovascular: +S1/S2; RRR; systolic murmur appreicated  Respiratory: CTA B/L; no W/R/R  Gastrointestinal: soft, distended but nontender  Extremities: WWP  Vascular: 2+ radial, DP/PT pulses B/L  Neurological: AAOx2; no focal deficits    MEDICATIONS:  MEDICATIONS  (STANDING):  artificial tears (preservative free) Ophthalmic Solution 1 Drop(s) Both EYES two times a day  aspirin  chewable 81 milliGRAM(s) Oral every 24 hours  atorvastatin 80 milliGRAM(s) Oral at bedtime  chlorhexidine 2% Cloths 1 Application(s) Topical <User Schedule>  cyanocobalamin 1000 MICROGram(s) Oral every 24 hours  DOXOrubicin INTRA-ARTERIAL (eMAR) 50 milliGRAM(s) IntraArterial once  ferrous    sulfate 325 milliGRAM(s) Oral <User Schedule>  folic acid 1 milliGRAM(s) Oral daily  meropenem  IVPB 1000 milliGRAM(s) IV Intermittent every 8 hours  multivitamin 1 Tablet(s) Oral daily  nicotine - 21 mG/24Hr(s) Patch 1 patch Transdermal daily  pantoprazole   Suspension 40 milliGRAM(s) Oral every 24 hours  rifAXIMin 550 milliGRAM(s) Oral every 12 hours  tamsulosin 0.4 milliGRAM(s) Oral at bedtime    MEDICATIONS  (PRN):  acetaminophen     Tablet .. 650 milliGRAM(s) Oral every 6 hours PRN Temp greater or equal to 38C (100.4F), Mild Pain (1 - 3)  sodium chloride 0.65% Nasal 1 Spray(s) Both Nostrils four times a day PRN Nasal Congestion      ALLERGIES:  Allergies    No Known Allergies    Intolerances        LABS:                        8.1    8.67  )-----------( 107      ( 25 Mar 2022 07:27 )             25.4     03-25    137  |  107  |  10  ----------------------------<  113<H>  3.9   |  22  |  0.62    Ca    8.0<L>      25 Mar 2022 07:27  Phos  3.1     03-25  Mg     1.8     03-25    TPro  6.1  /  Alb  2.0<L>  /  TBili  0.7  /  DBili  x   /  AST  60<H>  /  ALT  31  /  AlkPhos  95  03-25        CAPILLARY BLOOD GLUCOSE          RADIOLOGY & ADDITIONAL TESTS: Reviewed.

## 2022-03-25 NOTE — PROGRESS NOTE ADULT - PROBLEM SELECTOR PLAN 7
Normocytic hypochromic anemia likely 2/2 hemolysis due to prosthetic valve. Minimal epistaxis. Completed IV iron X 3 days. Reported history of dark bm in MICU.  - c/w b12 1mg Qd  - c/w folate 1mg qd  - c/w ferrous sulfate 325 mg PO every other day  - Transfusion goal > 8  - maintain active T&S  - c/w protonix 40mg daily

## 2022-03-25 NOTE — PROGRESS NOTE ADULT - ASSESSMENT
65 YO M, current smoker (1ppd x 50 years), current every day ETOH abuse (1 pint vodka and several beers daily x 50 years), with PMHx of HLD, HTN (not on any medications), CABG (LIMA to LAD, SVG to PDA in 2017), AS (s/p AVR in 2017) with recently discovered restenosis, presented to [facility] ED on 2/24/22 with chest pain and episode of LOC, initially admitted to cardiology for NSTEMI with LHC showing nonobstructive disease. Patient was found to have restenosis of bioprosthetic valve, however not surgical candidate. Patient transferred to medicine for further management of HCC, hepatic encephalopathy, course c/b AMS. After IR TACE of HCC on 3/14, patient intubated for airway protection and transferred to MICU. Course further c/b septic shock 2/2 ESBL Klebsiella bacteremia source unclear, searching for source with gallium scan and TTE

## 2022-03-25 NOTE — PROGRESS NOTE ADULT - PROBLEM SELECTOR PLAN 6
Severe AS i/s/o prior TAVR in 2017 with Dr iWlcox. JOSELO with peak transvalvular velocity of 4.7, mean gradient 56. Initially planned for valve in valve TAVR, but then it was decided that because of aortic root anatomy, he would need aortic root open heart surgery and deemed not to be a good surgical candidate. Labs consistent with hemolysis most likely 2/2 to worsening AS.  - not surgical candidate

## 2022-03-25 NOTE — PROGRESS NOTE ADULT - PROBLEM SELECTOR PLAN 3
Multiple potential causes--infection, Meropenem, etoh abuse, HIT.   - positive heparin-PF4 ab   - d/c'ed enoxaparin i/s/o positive HIT ab  Plan:  - f/u serotonin assay, if negative can consider restarting lovenox

## 2022-03-26 LAB
ALBUMIN SERPL ELPH-MCNC: 1.9 G/DL — LOW (ref 3.3–5)
ALP SERPL-CCNC: 97 U/L — SIGNIFICANT CHANGE UP (ref 40–120)
ALT FLD-CCNC: 28 U/L — SIGNIFICANT CHANGE UP (ref 10–45)
ANION GAP SERPL CALC-SCNC: 7 MMOL/L — SIGNIFICANT CHANGE UP (ref 5–17)
APTT BLD: 35.2 SEC — SIGNIFICANT CHANGE UP (ref 27.5–35.5)
AST SERPL-CCNC: 59 U/L — HIGH (ref 10–40)
BASOPHILS # BLD AUTO: 0.02 K/UL — SIGNIFICANT CHANGE UP (ref 0–0.2)
BASOPHILS NFR BLD AUTO: 0.3 % — SIGNIFICANT CHANGE UP (ref 0–2)
BILIRUB SERPL-MCNC: 0.6 MG/DL — SIGNIFICANT CHANGE UP (ref 0.2–1.2)
BLD GP AB SCN SERPL QL: NEGATIVE — SIGNIFICANT CHANGE UP
BUN SERPL-MCNC: 12 MG/DL — SIGNIFICANT CHANGE UP (ref 7–23)
CALCIUM SERPL-MCNC: 7.9 MG/DL — LOW (ref 8.4–10.5)
CHLORIDE SERPL-SCNC: 107 MMOL/L — SIGNIFICANT CHANGE UP (ref 96–108)
CO2 SERPL-SCNC: 22 MMOL/L — SIGNIFICANT CHANGE UP (ref 22–31)
CREAT SERPL-MCNC: 0.63 MG/DL — SIGNIFICANT CHANGE UP (ref 0.5–1.3)
EGFR: 105 ML/MIN/1.73M2 — SIGNIFICANT CHANGE UP
EOSINOPHIL # BLD AUTO: 0.3 K/UL — SIGNIFICANT CHANGE UP (ref 0–0.5)
EOSINOPHIL NFR BLD AUTO: 4.2 % — SIGNIFICANT CHANGE UP (ref 0–6)
GLUCOSE SERPL-MCNC: 118 MG/DL — HIGH (ref 70–99)
HCT VFR BLD CALC: 25.7 % — LOW (ref 39–50)
HGB BLD-MCNC: 7.8 G/DL — LOW (ref 13–17)
IMM GRANULOCYTES NFR BLD AUTO: 0.4 % — SIGNIFICANT CHANGE UP (ref 0–1.5)
INR BLD: 1.33 — HIGH (ref 0.88–1.16)
LYMPHOCYTES # BLD AUTO: 1.23 K/UL — SIGNIFICANT CHANGE UP (ref 1–3.3)
LYMPHOCYTES # BLD AUTO: 17.2 % — SIGNIFICANT CHANGE UP (ref 13–44)
MAGNESIUM SERPL-MCNC: 1.8 MG/DL — SIGNIFICANT CHANGE UP (ref 1.6–2.6)
MCHC RBC-ENTMCNC: 29.7 PG — SIGNIFICANT CHANGE UP (ref 27–34)
MCHC RBC-ENTMCNC: 30.4 GM/DL — LOW (ref 32–36)
MCV RBC AUTO: 97.7 FL — SIGNIFICANT CHANGE UP (ref 80–100)
MONOCYTES # BLD AUTO: 0.46 K/UL — SIGNIFICANT CHANGE UP (ref 0–0.9)
MONOCYTES NFR BLD AUTO: 6.4 % — SIGNIFICANT CHANGE UP (ref 2–14)
NEUTROPHILS # BLD AUTO: 5.11 K/UL — SIGNIFICANT CHANGE UP (ref 1.8–7.4)
NEUTROPHILS NFR BLD AUTO: 71.5 % — SIGNIFICANT CHANGE UP (ref 43–77)
NRBC # BLD: 0 /100 WBCS — SIGNIFICANT CHANGE UP (ref 0–0)
PHOSPHATE SERPL-MCNC: 3.4 MG/DL — SIGNIFICANT CHANGE UP (ref 2.5–4.5)
PLATELET # BLD AUTO: 103 K/UL — LOW (ref 150–400)
POTASSIUM SERPL-MCNC: 4 MMOL/L — SIGNIFICANT CHANGE UP (ref 3.5–5.3)
POTASSIUM SERPL-SCNC: 4 MMOL/L — SIGNIFICANT CHANGE UP (ref 3.5–5.3)
PROT SERPL-MCNC: 6 G/DL — SIGNIFICANT CHANGE UP (ref 6–8.3)
PROTHROM AB SERPL-ACNC: 15.9 SEC — HIGH (ref 10.5–13.4)
RBC # BLD: 2.63 M/UL — LOW (ref 4.2–5.8)
RBC # FLD: 20.4 % — HIGH (ref 10.3–14.5)
RH IG SCN BLD-IMP: POSITIVE — SIGNIFICANT CHANGE UP
SODIUM SERPL-SCNC: 136 MMOL/L — SIGNIFICANT CHANGE UP (ref 135–145)
WBC # BLD: 7.15 K/UL — SIGNIFICANT CHANGE UP (ref 3.8–10.5)
WBC # FLD AUTO: 7.15 K/UL — SIGNIFICANT CHANGE UP (ref 3.8–10.5)

## 2022-03-26 PROCEDURE — 99233 SBSQ HOSP IP/OBS HIGH 50: CPT

## 2022-03-26 RX ORDER — SODIUM CHLORIDE 9 MG/ML
250 INJECTION INTRAMUSCULAR; INTRAVENOUS; SUBCUTANEOUS ONCE
Refills: 0 | Status: COMPLETED | OUTPATIENT
Start: 2022-03-26 | End: 2022-03-26

## 2022-03-26 RX ADMIN — MEROPENEM 100 MILLIGRAM(S): 1 INJECTION INTRAVENOUS at 13:12

## 2022-03-26 RX ADMIN — Medication 1 PATCH: at 07:33

## 2022-03-26 RX ADMIN — Medication 1 PATCH: at 17:30

## 2022-03-26 RX ADMIN — Medication 1 DROP(S): at 23:03

## 2022-03-26 RX ADMIN — TAMSULOSIN HYDROCHLORIDE 0.4 MILLIGRAM(S): 0.4 CAPSULE ORAL at 23:04

## 2022-03-26 RX ADMIN — Medication 1 TABLET(S): at 09:36

## 2022-03-26 RX ADMIN — PREGABALIN 1000 MICROGRAM(S): 225 CAPSULE ORAL at 09:36

## 2022-03-26 RX ADMIN — Medication 1 DROP(S): at 02:49

## 2022-03-26 RX ADMIN — Medication 81 MILLIGRAM(S): at 07:16

## 2022-03-26 RX ADMIN — Medication 1 MILLIGRAM(S): at 09:36

## 2022-03-26 RX ADMIN — MEROPENEM 100 MILLIGRAM(S): 1 INJECTION INTRAVENOUS at 00:45

## 2022-03-26 RX ADMIN — PANTOPRAZOLE SODIUM 40 MILLIGRAM(S): 20 TABLET, DELAYED RELEASE ORAL at 09:37

## 2022-03-26 RX ADMIN — Medication 1 PATCH: at 22:00

## 2022-03-26 RX ADMIN — ATORVASTATIN CALCIUM 80 MILLIGRAM(S): 80 TABLET, FILM COATED ORAL at 23:02

## 2022-03-26 RX ADMIN — Medication 1 DROP(S): at 09:38

## 2022-03-26 RX ADMIN — Medication 1 PATCH: at 23:02

## 2022-03-26 RX ADMIN — MEROPENEM 100 MILLIGRAM(S): 1 INJECTION INTRAVENOUS at 23:04

## 2022-03-26 RX ADMIN — SODIUM CHLORIDE 250 MILLILITER(S): 9 INJECTION INTRAMUSCULAR; INTRAVENOUS; SUBCUTANEOUS at 13:06

## 2022-03-26 RX ADMIN — MEROPENEM 100 MILLIGRAM(S): 1 INJECTION INTRAVENOUS at 07:16

## 2022-03-26 NOTE — SWALLOW BEDSIDE ASSESSMENT ADULT - COMMENTS
Received awake & alert. Responds to simple questions, Ox self, hospital. Speech intelligibility is reduced due to +accent, raspy voice, & ?dysarthria
Pt was first evaluated by our SVC on this admission on 3/18/22 and found to have a functional oropharyngeal swallow. He was recommended a diet of regular solids and thin liquids. He was then followed-up by our SVC on 3/22 and 3/23. He was confused/lethargic and recommended to continue NPO w NGT.
Pt was first evaluated by our SVC on this admission on 3/18/22 and found to have a functional oropharyngeal swallow. He was recommended a diet of regular solids and thin liquids. He was then followed-up by our SVC on 3/22, 3/23, AND 3/25. He was confused/lethargic and recommended to continue NPO w NGT
labor/delivery

## 2022-03-26 NOTE — SWALLOW BEDSIDE ASSESSMENT ADULT - CONSISTENCIES ADMINISTERED
2 small ice-chips, 1tsp of thin liq by kenton
thin liquid/pureed/regular solid
Thin liquids via sequential cup sip x3oz, puree via tsp x2oz, easy to chew tsp x3, pt declined regular trials despite encouragement

## 2022-03-26 NOTE — SWALLOW BEDSIDE ASSESSMENT ADULT - SPECIFY REASON(S)
To assess swallow function for safe/least restrictive diet
To re-assess for safest, least restrictive PO diet
Assess for safety of PO diet

## 2022-03-26 NOTE — PROGRESS NOTE ADULT - ASSESSMENT
67 YO M, current smoker (1ppd x 50 years), current every day ETOH abuse (1 pint vodka and several beers daily x 50 years), with PMHx of HLD, HTN (not on any medications), CABG (LIMA to LAD, SVG to PDA in 2017), AS (s/p AVR in 2017) with recently discovered restenosis, presented to [facility] ED on 2/24/22 with chest pain and episode of LOC, initially admitted to cardiology for NSTEMI with LHC showing nonobstructive disease. Patient was found to have restenosis of bioprosthetic valve, however not surgical candidate. Patient transferred to medicine for further management of HCC, hepatic encephalopathy, course c/b AMS. After IR TACE of HCC on 3/14, patient intubated for airway protection and transferred to MICU. Course further c/b septic shock 2/2 ESBL Klebsiella bacteremia source unclear, searching for source with gallium scan.

## 2022-03-26 NOTE — PROGRESS NOTE ADULT - PROBLEM SELECTOR PLAN 1
Improving.  Likely related to chronic alcohol use vs hepatic encephalopathy. As per wife, pt is not normally lethargic at home. Completed course of high dose thiamine, last librium dose on 03/01. CTH and MRI w/o acute abnormality but MR head showed brain parenchymal volume loss, advanced for patient's age. VEEG no seizures.     Plan:  - treat Klebsiella infection as below  - c/w hepatic encephalopathy treatment       -c/w Rifaximin       -d/c'ed lactulose for now due to diarrhea, monitor rectal tube output Improving.  Likely related to chronic alcohol use vs hepatic encephalopathy. As per wife, pt is not normally lethargic at home. Completed course of high dose thiamine, last librium dose on 03/01. CTH and MRI w/o acute abnormality but MR head showed brain parenchymal volume loss, advanced for patient's age. VEEG no seizures.     Plan:  - treat Klebsiella infection as below  - c/w hepatic encephalopathy treatment       -c/w Rifaximin       -d/c'ed lactulose for now due to diarrhea, monitor rectal tube output  - appears more awake and alert today - will ask speech/swallow team to re-evaluate

## 2022-03-26 NOTE — SWALLOW BEDSIDE ASSESSMENT ADULT - MODE OF PRESENTATION
cup/spoon/self fed
fed by clinician
tsp water x2, cup sips x6; tsp puree x2; 2 bites dry cracker/cup/spoon/self fed/fed by clinician

## 2022-03-26 NOTE — SWALLOW BEDSIDE ASSESSMENT ADULT - SLP PERTINENT HISTORY OF CURRENT PROBLEM
67 yo M adm w chest pain. PMHx of HLD, HTN, CABG, AS s/p AVR in 2017, current smoker 50 pack yr hx & ETOH abuse.
h/o CABG, AS (s/p AVR in 2017), current smoker (1ppd x 50 years), and current ETOH abuse (1 pint vodka, several beers daily x 50 years),  a/w NSTEMI on 2/24/22, and found to have restenosis of bioprosthetic valve. Aortic root surgery was planned, but was deferred given HCC.
h/o CABG, AS (s/p AVR in 2017), current smoker (1ppd x 50 years), and current ETOH abuse (1 pint vodka, several beers daily x 50 years),  a/w NSTEMI on 2/24/22, and found to have restenosis of bioprosthetic valve. Aortic root surgery was planned, but was deferred given HCC.

## 2022-03-26 NOTE — SWALLOW BEDSIDE ASSESSMENT ADULT - SWALLOW EVAL: RECOMMENDED FEEDING/EATING TECHNIQUES
Feed only when awake/alert/maintain upright posture during/after eating for 30 mins/position upright (90 degrees)

## 2022-03-26 NOTE — SWALLOW BEDSIDE ASSESSMENT ADULT - PHARYNGEAL PHASE
Hyolaryngeal excursion palpated during swallow trigger, appears brisk & timely. No cough, throat clear or change in voice following PO trials.
Laryngeal elevation palpated. No overt s/s of airway protection deficits were noted.
Pt. did not demonstrate clinical signs suggestive of penetration or aspiration across all trials./Within functional limits

## 2022-03-26 NOTE — PROGRESS NOTE ADULT - PROBLEM SELECTOR PLAN 9
Holding anti-HTN meds, s/p pressors  - monitor VS and resume when appropriate  - currently with low bp - can only give very small boluses - if continues to be low bp, may need to be stepped up to telemetry

## 2022-03-26 NOTE — PROGRESS NOTE ADULT - SUBJECTIVE AND OBJECTIVE BOX
SUBJECTIVE / INTERVAL HPI: Patient seen and examined at bedside. Is awake today, sitting up straight and endorsing that he is hungry. Denies any pain/shortness of breath.    ROS: negative unless otherwise stated above.    Vital Signs Last 24 Hrs  T(C): 36.8 (26 Mar 2022 12:53), Max: 37 (26 Mar 2022 04:55)  T(F): 98.2 (26 Mar 2022 12:53), Max: 98.6 (26 Mar 2022 04:55)  HR: 78 (26 Mar 2022 14:02) (76 - 86)  BP: 106/62 (26 Mar 2022 14:02) (87/54 - 106/62)  BP(mean): 67 (25 Mar 2022 17:48) (67 - 70)  RR: 16 (26 Mar 2022 12:53) (16 - 18)  SpO2: 97% (26 Mar 2022 12:53) (93% - 99%)      PHYSICAL EXAM:    General: NAD, awake, alert  HEENT: MMM; NGT in place  Neck: supple  Cardiovascular: +S1/S2; RRR; systolic murmur+  Respiratory: CTA B/L; no W/R/R  Gastrointestinal: soft, distended but nontender  Extremities: WWP  Vascular: 2+ radial, DP/PT pulses B/L  Neurological: AAOx2; no focal deficits    MEDICATIONS  (STANDING):  artificial tears (preservative free) Ophthalmic Solution 1 Drop(s) Both EYES two times a day  aspirin  chewable 81 milliGRAM(s) Oral every 24 hours  atorvastatin 80 milliGRAM(s) Oral at bedtime  cyanocobalamin 1000 MICROGram(s) Oral every 24 hours  ferrous    sulfate 325 milliGRAM(s) Oral <User Schedule>  folic acid 1 milliGRAM(s) Oral daily  meropenem  IVPB 1000 milliGRAM(s) IV Intermittent every 8 hours  multivitamin 1 Tablet(s) Oral daily  nicotine - 21 mG/24Hr(s) Patch 1 patch Transdermal daily  pantoprazole   Suspension 40 milliGRAM(s) Oral every 24 hours  rifAXIMin 550 milliGRAM(s) Oral every 12 hours  tamsulosin 0.4 milliGRAM(s) Oral at bedtime    MEDICATIONS  (PRN):  acetaminophen     Tablet .. 650 milliGRAM(s) Oral every 6 hours PRN Temp greater or equal to 38C (100.4F), Mild Pain (1 - 3)  sodium chloride 0.65% Nasal 1 Spray(s) Both Nostrils four times a day PRN Nasal Congestion    ALLERGIES:  Allergies    No Known Allergies    Intolerances        LABS:                                   7.8    7.15  )-----------( 103      ( 26 Mar 2022 08:31 )             25.7   03-26    136  |  107  |  12  ----------------------------<  118<H>  4.0   |  22  |  0.63    Ca    7.9<L>      26 Mar 2022 08:31  Phos  3.4     03-26  Mg     1.8     03-26    TPro  6.0  /  Alb  1.9<L>  /  TBili  0.6  /  DBili  x   /  AST  59<H>  /  ALT  28  /  AlkPhos  97  03-26      CAPILLARY BLOOD GLUCOSE          RADIOLOGY & ADDITIONAL TESTS: Reviewed.

## 2022-03-26 NOTE — PROGRESS NOTE ADULT - PROBLEM SELECTOR PLAN 6
Severe AS i/s/o prior TAVR in 2017 with Dr Wilcox. JOSELO with peak transvalvular velocity of 4.7, mean gradient 56. Initially planned for valve in valve TAVR, but then it was decided that because of aortic root anatomy, he would need aortic root open heart surgery and deemed not to be a good surgical candidate. Labs consistent with hemolysis most likely 2/2 to worsening AS.  - not surgical candidate  - f/u palliative recs

## 2022-03-26 NOTE — SWALLOW BEDSIDE ASSESSMENT ADULT - ORAL PREPARATORY PHASE
Pt opened mouth to accept these PO trials and functionally stripped the spoon
Within functional limits
Reduced mastication, however, ultimately functional for trialed consistencies

## 2022-03-26 NOTE — PROGRESS NOTE ADULT - PROBLEM SELECTOR PLAN 2
Klebsiella found in lung, blood and urine. UCx 3/14: Klebsiella ESBL and Proteus mirabilis. BCx 3/14-19: NGTD. Sputum Cx 3/15: Proteus Mirabilis, Klebsiella pneumonia ESBL. UA 3/19: + small LE, bacteria and WBC. BCx 3/19: Klebsiella pneumonia.   - BCx 3/20: NGTD  Plan:  - c/w meropenem 1 gram q8 hrs  - ID consulted, appreciate recs--potential sources include necrotic tumor, endocarditis (although rare with gram negative bacteremia and negative TTE), SBP, prostatitis, colitis, ?pneumonia   - f/u gallium scan read today   - continue GOC discussion

## 2022-03-26 NOTE — SWALLOW BEDSIDE ASSESSMENT ADULT - SWALLOW EVAL: DIAGNOSIS
Pt p/w grossly intact oropharyngeal swallow on these very limited PO trials. However, given the limited volume trialed, unable to fully determine whether pt would be able to tolerate full meals (e.g., concern for coordination with larger volumes and other consistencies, endurance, etc). Therefore, at this time, recommend continuation of NPO w NGT, with allowance for ice-chips and tsp sips of water when pt is alert and sitting upright in bed.
Functional oropharyngeal swallow w no overt signs of aspiration on clinical exam
Pt. seen on this date for a swallow evaluation. Pt. known to this service on admission. Pt. with improved mentation and participation on this exam. Pt. presents with a grossly functional oropharyngeal swallow without clinical signs of airway protection deficits. Pt. deemed safe to initiate an oral diet, however, given mentation is still improving, SLP would favor a modified diet in the interim to maximize swallow safety. Suspect pt.'s diet can be advanced as mentation continues to improve. This service will continue to follow.

## 2022-03-26 NOTE — PROGRESS NOTE ADULT - PROBLEM SELECTOR PLAN 8
S/p CABG with Dr Wilcox in 2017  - recent [facility] with nonobstructive disease 1 year ago   - c/w aspirin 81mg QD   - c/w Lipitor 80mg QD

## 2022-03-26 NOTE — SWALLOW BEDSIDE ASSESSMENT ADULT - ORAL PHASE
Immediate oral manipulation. Functional containment. No residue.
Within functional limits
Slightly reduced but functional

## 2022-03-26 NOTE — SWALLOW BEDSIDE ASSESSMENT ADULT - SLP GENERAL OBSERVATIONS
Pt was received awake in bed w 1:1 nsg assistant at bedside. Pt was positioned upright and at midline in bed. However, he continued to lean to the R and shuffle down in bed as this evaluation progressed, despite positioning supports and prompts to remain upright. Oriented only to self. Answered simple yes/no questions with ~50% accuracy. For example, when asked, "Are you home right now?" he answered, "Yes."
Pt. received awake in NAD. A&Ox1. Speech is c/b single words and phrases. Pt. follows simple commands. Pt. is confused but cooperative for exam.

## 2022-03-27 LAB
ALBUMIN SERPL ELPH-MCNC: 2.4 G/DL — LOW (ref 3.3–5)
ALP SERPL-CCNC: 81 U/L — SIGNIFICANT CHANGE UP (ref 40–120)
ALT FLD-CCNC: 28 U/L — SIGNIFICANT CHANGE UP (ref 10–45)
AMMONIA BLD-MCNC: 52 UMOL/L — SIGNIFICANT CHANGE UP (ref 11–55)
ANION GAP SERPL CALC-SCNC: 7 MMOL/L — SIGNIFICANT CHANGE UP (ref 5–17)
AST SERPL-CCNC: 89 U/L — HIGH (ref 10–40)
BASOPHILS # BLD AUTO: 0.02 K/UL — SIGNIFICANT CHANGE UP (ref 0–0.2)
BASOPHILS NFR BLD AUTO: 0.3 % — SIGNIFICANT CHANGE UP (ref 0–2)
BILIRUB SERPL-MCNC: 1 MG/DL — SIGNIFICANT CHANGE UP (ref 0.2–1.2)
BUN SERPL-MCNC: 12 MG/DL — SIGNIFICANT CHANGE UP (ref 7–23)
CALCIUM SERPL-MCNC: 8.3 MG/DL — LOW (ref 8.4–10.5)
CHLORIDE SERPL-SCNC: 105 MMOL/L — SIGNIFICANT CHANGE UP (ref 96–108)
CO2 SERPL-SCNC: 22 MMOL/L — SIGNIFICANT CHANGE UP (ref 22–31)
CREAT SERPL-MCNC: 0.63 MG/DL — SIGNIFICANT CHANGE UP (ref 0.5–1.3)
EGFR: 105 ML/MIN/1.73M2 — SIGNIFICANT CHANGE UP
EOSINOPHIL # BLD AUTO: 0.22 K/UL — SIGNIFICANT CHANGE UP (ref 0–0.5)
EOSINOPHIL NFR BLD AUTO: 3.2 % — SIGNIFICANT CHANGE UP (ref 0–6)
GLUCOSE SERPL-MCNC: 108 MG/DL — HIGH (ref 70–99)
HCT VFR BLD CALC: 24.7 % — LOW (ref 39–50)
HGB BLD-MCNC: 7.5 G/DL — LOW (ref 13–17)
IMM GRANULOCYTES NFR BLD AUTO: 0.6 % — SIGNIFICANT CHANGE UP (ref 0–1.5)
LYMPHOCYTES # BLD AUTO: 1.16 K/UL — SIGNIFICANT CHANGE UP (ref 1–3.3)
LYMPHOCYTES # BLD AUTO: 16.9 % — SIGNIFICANT CHANGE UP (ref 13–44)
MAGNESIUM SERPL-MCNC: 1.8 MG/DL — SIGNIFICANT CHANGE UP (ref 1.6–2.6)
MCHC RBC-ENTMCNC: 30.1 PG — SIGNIFICANT CHANGE UP (ref 27–34)
MCHC RBC-ENTMCNC: 30.4 GM/DL — LOW (ref 32–36)
MCV RBC AUTO: 99.2 FL — SIGNIFICANT CHANGE UP (ref 80–100)
MONOCYTES # BLD AUTO: 0.47 K/UL — SIGNIFICANT CHANGE UP (ref 0–0.9)
MONOCYTES NFR BLD AUTO: 6.9 % — SIGNIFICANT CHANGE UP (ref 2–14)
NEUTROPHILS # BLD AUTO: 4.94 K/UL — SIGNIFICANT CHANGE UP (ref 1.8–7.4)
NEUTROPHILS NFR BLD AUTO: 72.1 % — SIGNIFICANT CHANGE UP (ref 43–77)
NRBC # BLD: 0 /100 WBCS — SIGNIFICANT CHANGE UP (ref 0–0)
PHOSPHATE SERPL-MCNC: 3.4 MG/DL — SIGNIFICANT CHANGE UP (ref 2.5–4.5)
PLATELET # BLD AUTO: 114 K/UL — LOW (ref 150–400)
POTASSIUM SERPL-MCNC: 4.3 MMOL/L — SIGNIFICANT CHANGE UP (ref 3.5–5.3)
POTASSIUM SERPL-SCNC: 4.3 MMOL/L — SIGNIFICANT CHANGE UP (ref 3.5–5.3)
PROT SERPL-MCNC: 6.4 G/DL — SIGNIFICANT CHANGE UP (ref 6–8.3)
RBC # BLD: 2.49 M/UL — LOW (ref 4.2–5.8)
RBC # FLD: 20.2 % — HIGH (ref 10.3–14.5)
SARS-COV-2 RNA SPEC QL NAA+PROBE: NEGATIVE — SIGNIFICANT CHANGE UP
SODIUM SERPL-SCNC: 134 MMOL/L — LOW (ref 135–145)
WBC # BLD: 6.85 K/UL — SIGNIFICANT CHANGE UP (ref 3.8–10.5)
WBC # FLD AUTO: 6.85 K/UL — SIGNIFICANT CHANGE UP (ref 3.8–10.5)

## 2022-03-27 PROCEDURE — 99233 SBSQ HOSP IP/OBS HIGH 50: CPT | Mod: GC

## 2022-03-27 RX ORDER — ENOXAPARIN SODIUM 100 MG/ML
40 INJECTION SUBCUTANEOUS EVERY 24 HOURS
Refills: 0 | Status: DISCONTINUED | OUTPATIENT
Start: 2022-03-27 | End: 2022-03-27

## 2022-03-27 RX ORDER — MAGNESIUM SULFATE 500 MG/ML
1 VIAL (ML) INJECTION ONCE
Refills: 0 | Status: COMPLETED | OUTPATIENT
Start: 2022-03-27 | End: 2022-03-27

## 2022-03-27 RX ADMIN — ATORVASTATIN CALCIUM 80 MILLIGRAM(S): 80 TABLET, FILM COATED ORAL at 23:52

## 2022-03-27 RX ADMIN — PANTOPRAZOLE SODIUM 40 MILLIGRAM(S): 20 TABLET, DELAYED RELEASE ORAL at 06:47

## 2022-03-27 RX ADMIN — MEROPENEM 100 MILLIGRAM(S): 1 INJECTION INTRAVENOUS at 13:02

## 2022-03-27 RX ADMIN — PREGABALIN 1000 MICROGRAM(S): 225 CAPSULE ORAL at 08:51

## 2022-03-27 RX ADMIN — Medication 1 MILLIGRAM(S): at 08:51

## 2022-03-27 RX ADMIN — Medication 100 GRAM(S): at 13:02

## 2022-03-27 RX ADMIN — Medication 1 DROP(S): at 23:53

## 2022-03-27 RX ADMIN — MEROPENEM 100 MILLIGRAM(S): 1 INJECTION INTRAVENOUS at 06:46

## 2022-03-27 RX ADMIN — Medication 1 PATCH: at 23:52

## 2022-03-27 RX ADMIN — Medication 1 DROP(S): at 08:50

## 2022-03-27 RX ADMIN — Medication 1 PATCH: at 07:11

## 2022-03-27 RX ADMIN — Medication 1 PATCH: at 17:53

## 2022-03-27 RX ADMIN — TAMSULOSIN HYDROCHLORIDE 0.4 MILLIGRAM(S): 0.4 CAPSULE ORAL at 23:53

## 2022-03-27 RX ADMIN — ENOXAPARIN SODIUM 40 MILLIGRAM(S): 100 INJECTION SUBCUTANEOUS at 11:32

## 2022-03-27 RX ADMIN — MEROPENEM 100 MILLIGRAM(S): 1 INJECTION INTRAVENOUS at 23:51

## 2022-03-27 RX ADMIN — Medication 81 MILLIGRAM(S): at 06:46

## 2022-03-27 RX ADMIN — Medication 1 TABLET(S): at 08:50

## 2022-03-27 NOTE — PROGRESS NOTE ADULT - PROBLEM SELECTOR PLAN 2
Klebsiella found in lung, blood and urine. UCx 3/14: Klebsiella ESBL and Proteus mirabilis. BCx 3/14-19: NGTD. Sputum Cx 3/15: Proteus Mirabilis, Klebsiella pneumonia ESBL. UA 3/19: + small LE, bacteria and WBC. BCx 3/19: Klebsiella pneumonia.   - BCx 3/20: NGTD  Plan:  - c/w meropenem 1 gram q8 hrs  - ID consulted, appreciate recs--potential sources include necrotic tumor, endocarditis (although rare with gram negative bacteremia and negative TTE), SBP, prostatitis, colitis, ?pneumonia   - gallium scan negative  - continue GOC discussion

## 2022-03-27 NOTE — PROGRESS NOTE ADULT - SUBJECTIVE AND OBJECTIVE BOX
INTERVAL HPI/OVERNIGHT EVENTS:  Patient was seen and examined at bedside. As per nurse and patient, no o/n events, patient resting comfortably. Patient says he is feeling better today, urinating well on own. Denies HA, SOB, CP, palpitations, fever, chills, abdominal pain, N/V/D/C.    VITAL SIGNS:  T(F): 98.6 (03-27-22 @ 12:28)  HR: 90 (03-27-22 @ 12:28)  BP: 101/66 (03-27-22 @ 12:28)  RR: 18 (03-27-22 @ 12:28)  SpO2: 97% (03-27-22 @ 12:28)  Wt(kg): --    PHYSICAL EXAM:  General: NAD, awake, alert  HEENT: MMM;   Neck: supple  Cardiovascular: +S1/S2; RRR; systolic murmur+  Respiratory: CTA B/L; no W/R/R  Gastrointestinal: soft, distended but nontender  Extremities: WWP  Vascular: 2+ radial, DP/PT pulses B/L  Neurological: AAOx1-2; no focal deficits    MEDICATIONS  (STANDING):  artificial tears (preservative free) Ophthalmic Solution 1 Drop(s) Both EYES two times a day  aspirin  chewable 81 milliGRAM(s) Oral every 24 hours  atorvastatin 80 milliGRAM(s) Oral at bedtime  cyanocobalamin 1000 MICROGram(s) Oral every 24 hours  enoxaparin Injectable 40 milliGRAM(s) SubCutaneous every 24 hours  ferrous    sulfate 325 milliGRAM(s) Oral <User Schedule>  folic acid 1 milliGRAM(s) Oral daily  meropenem  IVPB 1000 milliGRAM(s) IV Intermittent every 8 hours  multivitamin 1 Tablet(s) Oral daily  nicotine - 21 mG/24Hr(s) Patch 1 patch Transdermal daily  pantoprazole   Suspension 40 milliGRAM(s) Oral every 24 hours  rifAXIMin 550 milliGRAM(s) Oral every 12 hours  tamsulosin 0.4 milliGRAM(s) Oral at bedtime    MEDICATIONS  (PRN):  acetaminophen     Tablet .. 650 milliGRAM(s) Oral every 6 hours PRN Temp greater or equal to 38C (100.4F), Mild Pain (1 - 3)  sodium chloride 0.65% Nasal 1 Spray(s) Both Nostrils four times a day PRN Nasal Congestion      Allergies    No Known Allergies    Intolerances        LABS:                        7.5    6.85  )-----------( 114      ( 27 Mar 2022 10:50 )             24.7     03-27    134<L>  |  105  |  12  ----------------------------<  108<H>  4.3   |  22  |  0.63    Ca    8.3<L>      27 Mar 2022 10:50  Phos  3.4     03-27  Mg     1.8     03-27    TPro  6.4  /  Alb  2.4<L>  /  TBili  1.0  /  DBili  x   /  AST  89<H>  /  ALT  28  /  AlkPhos  81  03-27    PT/INR - ( 26 Mar 2022 08:31 )   PT: 15.9 sec;   INR: 1.33          PTT - ( 26 Mar 2022 08:31 )  PTT:35.2 sec      RADIOLOGY & ADDITIONAL TESTS:  Reviewed

## 2022-03-27 NOTE — PROGRESS NOTE ADULT - PROBLEM SELECTOR PLAN 8
S/p CABG with Dr Wilcox in 2017  - recent [facility] with nonobstructive disease 1 year ago   - c/w aspirin 81mg QD   - c/w Lipitor 80mg QD

## 2022-03-27 NOTE — PROGRESS NOTE ADULT - PROBLEM SELECTOR PLAN 1
Improving.  Likely related to chronic alcohol use vs hepatic encephalopathy. As per wife, pt is not normally lethargic at home. Completed course of high dose thiamine, last librium dose on 03/01. CTH and MRI w/o acute abnormality but MR head showed brain parenchymal volume loss, advanced for patient's age. VEEG no seizures.     Plan:  - treat Klebsiella infection as below  - c/w hepatic encephalopathy treatment       -c/w Rifaximin       -d/c'ed lactulose for now due to diarrhea, monitor rectal tube output  - appears more awake and alert today

## 2022-03-27 NOTE — PROGRESS NOTE ADULT - ASSESSMENT
67 YO M, current smoker (1ppd x 50 years), current every day ETOH abuse (1 pint vodka and several beers daily x 50 years), with PMHx of HLD, HTN (not on any medications), CABG (LIMA to LAD, SVG to PDA in 2017), AS (s/p AVR in 2017) with recently discovered restenosis, presented to [facility] ED on 2/24/22 with chest pain and episode of LOC, initially admitted to cardiology for NSTEMI with LHC showing nonobstructive disease. Patient was found to have restenosis of bioprosthetic valve, however not surgical candidate. Patient transferred to medicine for further management of HCC, hepatic encephalopathy, course c/b AMS. After IR TACE of HCC on 3/14, patient intubated for airway protection and transferred to MICU. Course further c/b septic shock 2/2 ESBL Klebsiella bacteremia source unclear, searching for source with gallium scan, which was negative

## 2022-03-27 NOTE — PROGRESS NOTE ADULT - ATTENDING COMMENTS
Agree with assessment and plan as documented by resident.  --platelets improving, mentation improving  --walking with PCA today  --h/h likely dilutional after bolus yesterday - received lovenox x 1 this morning - however given softer BP and slightly lower h/h will hold evening dose - consider restarting lovenox tomorrow if cbc stable

## 2022-03-27 NOTE — PROGRESS NOTE ADULT - PROBLEM SELECTOR PLAN 3
Multiple potential causes--infection, Meropenem, etoh abuse, HIT.   - positive heparin-PF4 ab   Plan:  - NAT negative, restart lovenox PPX

## 2022-03-28 LAB
ALBUMIN SERPL ELPH-MCNC: 2.2 G/DL — LOW (ref 3.3–5)
ALP SERPL-CCNC: 88 U/L — SIGNIFICANT CHANGE UP (ref 40–120)
ALT FLD-CCNC: 28 U/L — SIGNIFICANT CHANGE UP (ref 10–45)
ANION GAP SERPL CALC-SCNC: 8 MMOL/L — SIGNIFICANT CHANGE UP (ref 5–17)
AST SERPL-CCNC: 88 U/L — HIGH (ref 10–40)
BILIRUB SERPL-MCNC: 0.8 MG/DL — SIGNIFICANT CHANGE UP (ref 0.2–1.2)
BUN SERPL-MCNC: 12 MG/DL — SIGNIFICANT CHANGE UP (ref 7–23)
CALCIUM SERPL-MCNC: 8.1 MG/DL — LOW (ref 8.4–10.5)
CHLORIDE SERPL-SCNC: 105 MMOL/L — SIGNIFICANT CHANGE UP (ref 96–108)
CO2 SERPL-SCNC: 22 MMOL/L — SIGNIFICANT CHANGE UP (ref 22–31)
CREAT SERPL-MCNC: 0.66 MG/DL — SIGNIFICANT CHANGE UP (ref 0.5–1.3)
CULTURE RESULTS: SIGNIFICANT CHANGE UP
EGFR: 103 ML/MIN/1.73M2 — SIGNIFICANT CHANGE UP
GLUCOSE SERPL-MCNC: 102 MG/DL — HIGH (ref 70–99)
HCT VFR BLD CALC: 25 % — LOW (ref 39–50)
HGB BLD-MCNC: 7.8 G/DL — LOW (ref 13–17)
MAGNESIUM SERPL-MCNC: 1.8 MG/DL — SIGNIFICANT CHANGE UP (ref 1.6–2.6)
MCHC RBC-ENTMCNC: 30.8 PG — SIGNIFICANT CHANGE UP (ref 27–34)
MCHC RBC-ENTMCNC: 31.2 GM/DL — LOW (ref 32–36)
MCV RBC AUTO: 98.8 FL — SIGNIFICANT CHANGE UP (ref 80–100)
NRBC # BLD: 0 /100 WBCS — SIGNIFICANT CHANGE UP (ref 0–0)
ORGANISM # SPEC MICROSCOPIC CNT: SIGNIFICANT CHANGE UP
ORGANISM # SPEC MICROSCOPIC CNT: SIGNIFICANT CHANGE UP
PHOSPHATE SERPL-MCNC: 3.5 MG/DL — SIGNIFICANT CHANGE UP (ref 2.5–4.5)
PLATELET # BLD AUTO: 122 K/UL — LOW (ref 150–400)
POTASSIUM SERPL-MCNC: 4 MMOL/L — SIGNIFICANT CHANGE UP (ref 3.5–5.3)
POTASSIUM SERPL-SCNC: 4 MMOL/L — SIGNIFICANT CHANGE UP (ref 3.5–5.3)
PROT SERPL-MCNC: 6.5 G/DL — SIGNIFICANT CHANGE UP (ref 6–8.3)
RBC # BLD: 2.53 M/UL — LOW (ref 4.2–5.8)
RBC # FLD: 20.3 % — HIGH (ref 10.3–14.5)
SODIUM SERPL-SCNC: 135 MMOL/L — SIGNIFICANT CHANGE UP (ref 135–145)
SPECIMEN SOURCE: SIGNIFICANT CHANGE UP
WBC # BLD: 6 K/UL — SIGNIFICANT CHANGE UP (ref 3.8–10.5)
WBC # FLD AUTO: 6 K/UL — SIGNIFICANT CHANGE UP (ref 3.8–10.5)

## 2022-03-28 PROCEDURE — 99232 SBSQ HOSP IP/OBS MODERATE 35: CPT | Mod: GC

## 2022-03-28 PROCEDURE — 99233 SBSQ HOSP IP/OBS HIGH 50: CPT

## 2022-03-28 RX ORDER — ENOXAPARIN SODIUM 100 MG/ML
40 INJECTION SUBCUTANEOUS EVERY 24 HOURS
Refills: 0 | Status: DISCONTINUED | OUTPATIENT
Start: 2022-03-28 | End: 2022-03-30

## 2022-03-28 RX ADMIN — Medication 81 MILLIGRAM(S): at 06:42

## 2022-03-28 RX ADMIN — ENOXAPARIN SODIUM 40 MILLIGRAM(S): 100 INJECTION SUBCUTANEOUS at 22:16

## 2022-03-28 RX ADMIN — Medication 1 TABLET(S): at 11:05

## 2022-03-28 RX ADMIN — Medication 1 DROP(S): at 10:15

## 2022-03-28 RX ADMIN — MEROPENEM 100 MILLIGRAM(S): 1 INJECTION INTRAVENOUS at 22:15

## 2022-03-28 RX ADMIN — TAMSULOSIN HYDROCHLORIDE 0.4 MILLIGRAM(S): 0.4 CAPSULE ORAL at 22:15

## 2022-03-28 RX ADMIN — Medication 1 PATCH: at 07:02

## 2022-03-28 RX ADMIN — PANTOPRAZOLE SODIUM 40 MILLIGRAM(S): 20 TABLET, DELAYED RELEASE ORAL at 06:42

## 2022-03-28 RX ADMIN — Medication 1 DROP(S): at 22:14

## 2022-03-28 RX ADMIN — MEROPENEM 100 MILLIGRAM(S): 1 INJECTION INTRAVENOUS at 14:00

## 2022-03-28 RX ADMIN — MEROPENEM 100 MILLIGRAM(S): 1 INJECTION INTRAVENOUS at 06:42

## 2022-03-28 RX ADMIN — PREGABALIN 1000 MICROGRAM(S): 225 CAPSULE ORAL at 10:15

## 2022-03-28 RX ADMIN — Medication 325 MILLIGRAM(S): at 06:42

## 2022-03-28 RX ADMIN — Medication 1 PATCH: at 18:12

## 2022-03-28 RX ADMIN — Medication 1 MILLIGRAM(S): at 11:05

## 2022-03-28 RX ADMIN — ATORVASTATIN CALCIUM 80 MILLIGRAM(S): 80 TABLET, FILM COATED ORAL at 22:15

## 2022-03-28 RX ADMIN — Medication 1 PATCH: at 22:15

## 2022-03-28 NOTE — PROGRESS NOTE ADULT - SUBJECTIVE AND OBJECTIVE BOX
Infectious Disease Progress Note    Interval Events: afebrile. VSS    Subjective:  Patient seen and evaluated at bedside. Reports mild and improving abdominal discomfort. No other complaints.     ROS:  negative except as above      ANTIBIOTICS/RELEVANT:  antimicrobials  meropenem  IVPB 1000 milliGRAM(s) IV Intermittent every 8 hours  rifAXIMin 550 milliGRAM(s) Oral every 12 hours    immunologic:    OTHER:  acetaminophen     Tablet .. 650 milliGRAM(s) Oral every 6 hours PRN  artificial tears (preservative free) Ophthalmic Solution 1 Drop(s) Both EYES two times a day  aspirin  chewable 81 milliGRAM(s) Oral every 24 hours  atorvastatin 80 milliGRAM(s) Oral at bedtime  cyanocobalamin 1000 MICROGram(s) Oral every 24 hours  enoxaparin Injectable 40 milliGRAM(s) SubCutaneous every 24 hours  ferrous    sulfate 325 milliGRAM(s) Oral <User Schedule>  folic acid 1 milliGRAM(s) Oral daily  multivitamin 1 Tablet(s) Oral daily  nicotine - 21 mG/24Hr(s) Patch 1 patch Transdermal daily  pantoprazole   Suspension 40 milliGRAM(s) Oral every 24 hours  sodium chloride 0.65% Nasal 1 Spray(s) Both Nostrils four times a day PRN  tamsulosin 0.4 milliGRAM(s) Oral at bedtime      Allergies    No Known Allergies    Intolerances        Objective:  Vital Signs Last 24 Hrs  T(C): 37.2 (28 Mar 2022 15:39), Max: 37.2 (28 Mar 2022 15:39)  T(F): 98.9 (28 Mar 2022 15:39), Max: 98.9 (28 Mar 2022 15:39)  HR: 93 (28 Mar 2022 15:39) (88 - 96)  BP: 100/66 (28 Mar 2022 15:39) (93/58 - 100/66)  BP(mean): --  RR: 18 (28 Mar 2022 15:39) (16 - 18)  SpO2: 95% (28 Mar 2022 15:39) (95% - 99%)    General: nad, calm  HEENT: NC/AT, anicteric sclera; MMM  Neck: supple  Cardiovascular: +S1/S2; RRR  Respiratory: CTA B/L; +systolic murmur  Gastrointestinal: NGT, NT. Abdo is minimially distended but soft; no rebound/guarding +BSx4;   Extremities: WWP; no edema, clubbing or cyanosis  : rogers.  Vascular: 2+ radial, DP/PT pulses B/L  Neurological: AAOx2    LABS:                        7.8    6.00  )-----------( 122      ( 28 Mar 2022 16:11 )             25.0     03-28    135  |  105  |  12  ----------------------------<  102<H>  4.0   |  22  |  0.66    Ca    8.1<L>      28 Mar 2022 16:11  Phos  3.5     03-28  Mg     1.8     03-28    TPro  6.5  /  Alb  2.2<L>  /  TBili  0.8  /  DBili  x   /  AST  88<H>  /  ALT  28  /  AlkPhos  88  03-28

## 2022-03-28 NOTE — PROGRESS NOTE ADULT - ATTENDING COMMENTS
He remains afebrile, mentation improving, no infectious source identified on gallium scan.  Would complete 10 d course of meropenem from negative blood culture (3/20-3/29).  Please recall if further ID input is desired - team 1.

## 2022-03-28 NOTE — PROGRESS NOTE ADULT - SUBJECTIVE AND OBJECTIVE BOX
INTERVAL EVENTS:  +abd pain  denies cp/sob      MEDICATIONS  (STANDING):  artificial tears (preservative free) Ophthalmic Solution 1 Drop(s) Both EYES two times a day  aspirin  chewable 81 milliGRAM(s) Oral every 24 hours  atorvastatin 80 milliGRAM(s) Oral at bedtime  cyanocobalamin 1000 MICROGram(s) Oral every 24 hours  ferrous    sulfate 325 milliGRAM(s) Oral <User Schedule>  folic acid 1 milliGRAM(s) Oral daily  meropenem  IVPB 1000 milliGRAM(s) IV Intermittent every 8 hours  multivitamin 1 Tablet(s) Oral daily  nicotine - 21 mG/24Hr(s) Patch 1 patch Transdermal daily  pantoprazole   Suspension 40 milliGRAM(s) Oral every 24 hours  rifAXIMin 550 milliGRAM(s) Oral every 12 hours  tamsulosin 0.4 milliGRAM(s) Oral at bedtime    MEDICATIONS  (PRN):  acetaminophen     Tablet .. 650 milliGRAM(s) Oral every 6 hours PRN Temp greater or equal to 38C (100.4F), Mild Pain (1 - 3)  sodium chloride 0.65% Nasal 1 Spray(s) Both Nostrils four times a day PRN Nasal Congestion      Home Medications:  folic acid 1 mg oral tablet: 1 tab(s) orally once a day (04 Mar 2022 11:39)  Multiple Vitamins oral tablet: 1 tab(s) orally once a day (04 Mar 2022 11:39)      Vital Signs Last 24 Hrs  T(C): 36.3 (28 Mar 2022 09:21), Max: 37 (27 Mar 2022 17:03)  T(F): 97.3 (28 Mar 2022 09:21), Max: 98.6 (27 Mar 2022 17:03)  HR: 88 (28 Mar 2022 09:21) (88 - 96)  BP: 98/66 (28 Mar 2022 09:21) (93/58 - 99/66)  BP(mean): --  RR: 16 (28 Mar 2022 09:21) (16 - 17)  SpO2: 99% (28 Mar 2022 09:21) (95% - 99%)     PHYSICAL EXAM:  NAD  Trace basilar crackles  RRR absent s2  No LE edema  +JVD    LABS:                        7.5    6.85  )-----------( 114      ( 27 Mar 2022 10:50 )             24.7     03-27    134<L>  |  105  |  12  ----------------------------<  108<H>  4.3   |  22  |  0.63    Ca    8.3<L>      27 Mar 2022 10:50  Phos  3.4     03-27  Mg     1.8     03-27    TPro  6.4  /  Alb  2.4<L>  /  TBili  1.0  /  DBili  x   /  AST  89<H>  /  ALT  28  /  AlkPhos  81  03-27            I&O's Summary    27 Mar 2022 07:01  -  28 Mar 2022 07:00  --------------------------------------------------------  IN: 0 mL / OUT: 351 mL / NET: -351 mL    28 Mar 2022 07:01  -  28 Mar 2022 14:19  --------------------------------------------------------  IN: 0 mL / OUT: 800 mL / NET: -800 mL      66M CAD s/p CABG, severe bioprosthetic AS (not amenable to any intervention), newly diagnosed EtOH cirrhosis c/b HCC s/p TACE, anemia, ESBL UTI who cardiology has been consulted for worsening tachycardia and ST depressions.    #severe bioprosthetic AS: deemed not candidate for MAURI or CTS  - f/u with Dr. Ashby  - preload dependent, avoid volume depletion  - labs could be consistent with hemolysis, probably in setting of end stage aortic valvular disease    #ST- depressions: V2-V6, I, II, and KRISTIN in aVR. Present on previous ECGs, but worse with increased HR  - Encephalopathic (likely 2/2 cirrhosis), no chest pain  - likely rate related/demand in setting of anemia and tachycardia.   - had coronary angiogram <1 month ago and LIMA and SVG grafts were widely patent  - Pt warm on exam, no cardiogenic shock at this time  - c/w lipitor 80 mg qd  - c/w ASA 81 mg qd    cardiology will sign off, please reconsult as needed    d/w cardiology attending  Hector Colvin MD PGY5

## 2022-03-28 NOTE — PROGRESS NOTE ADULT - PROBLEM SELECTOR PLAN 1
Improving.  Likely related to chronic alcohol use vs hepatic encephalopathy. As per wife, pt is not normally lethargic at home. Completed course of high dose thiamine, last librium dose on 03/01. CTH and MRI w/o acute abnormality but MR head showed brain parenchymal volume loss, advanced for patient's age. VEEG no seizures.     Plan:  - treat Klebsiella infection as below  - c/w hepatic encephalopathy treatment       -c/w Rifaximin       -d/c'ed lactulose for now due to diarrhea, monitor rectal tube output  - appears more awake and alert today  -f/u PT recs

## 2022-03-28 NOTE — PROGRESS NOTE ADULT - ATTENDING COMMENTS
Initial attending contact date 3/28/22     . See fellow note written above for details. I reviewed the fellow documentation. I have personally seen and examined this patient. I reviewed vitals, labs, medications, cardiac studies, and additional imaging. I agree with the above fellow's findings and plans as written above with the following additions/statements.    66M CAD s/p CABG, severe bioprosthetic AS (not amenable to any intervention), newly diagnosed EtOH cirrhosis c/b HCC s/p TACE, anemia, ESBL UTI who cardiology has been consulted for worsening tachycardia and ST depressions.  -Without active cardiac issues  -CAD status stable, last cath ~ 1 month ago with patent grafts   -Cont ASA, statin. Currently not on beta blocker due to soft BP  -severe bioprosthetic AS, not amenable to further intervention  -To fu with Dr Krishna as outpatient  -Pls reconsult as needed

## 2022-03-28 NOTE — PROGRESS NOTE ADULT - SUBJECTIVE AND OBJECTIVE BOX
INTERVAL HPI/OVERNIGHT EVENTS:  Patient was seen and examined at bedside. Overnight, patient was hypotensive to 93/58, patient was asymptomatic, nothing was done. This AM, patient has no complaints but says he wants to leave to smoke a cigarette. Denies fever, HA, CP, palpitations, SOB, N/V/D/C.    VITAL SIGNS:  T(F): 97.3 (03-28-22 @ 09:21)  HR: 88 (03-28-22 @ 09:21)  BP: 98/66 (03-28-22 @ 09:21)  RR: 16 (03-28-22 @ 09:21)  SpO2: 99% (03-28-22 @ 09:21)  Wt(kg): --    PHYSICAL EXAM:  General: NAD, awake, alert  HEENT: MMM;   Neck: supple  Cardiovascular: +S1/S2; RRR; systolic murmur+  Respiratory: CTA B/L; no W/R/R  Gastrointestinal: soft, distended but nontender  Extremities: WWP  Vascular: 2+ radial, DP/PT pulses B/L  Neurological: AAOx1-2; no focal deficits    MEDICATIONS  (STANDING):  artificial tears (preservative free) Ophthalmic Solution 1 Drop(s) Both EYES two times a day  aspirin  chewable 81 milliGRAM(s) Oral every 24 hours  atorvastatin 80 milliGRAM(s) Oral at bedtime  cyanocobalamin 1000 MICROGram(s) Oral every 24 hours  ferrous    sulfate 325 milliGRAM(s) Oral <User Schedule>  folic acid 1 milliGRAM(s) Oral daily  meropenem  IVPB 1000 milliGRAM(s) IV Intermittent every 8 hours  multivitamin 1 Tablet(s) Oral daily  nicotine - 21 mG/24Hr(s) Patch 1 patch Transdermal daily  pantoprazole   Suspension 40 milliGRAM(s) Oral every 24 hours  rifAXIMin 550 milliGRAM(s) Oral every 12 hours  tamsulosin 0.4 milliGRAM(s) Oral at bedtime    MEDICATIONS  (PRN):  acetaminophen     Tablet .. 650 milliGRAM(s) Oral every 6 hours PRN Temp greater or equal to 38C (100.4F), Mild Pain (1 - 3)  sodium chloride 0.65% Nasal 1 Spray(s) Both Nostrils four times a day PRN Nasal Congestion      Allergies    No Known Allergies    Intolerances        LABS:                        7.5    6.85  )-----------( 114      ( 27 Mar 2022 10:50 )             24.7     03-27    134<L>  |  105  |  12  ----------------------------<  108<H>  4.3   |  22  |  0.63    Ca    8.3<L>      27 Mar 2022 10:50  Phos  3.4     03-27  Mg     1.8     03-27    TPro  6.4  /  Alb  2.4<L>  /  TBili  1.0  /  DBili  x   /  AST  89<H>  /  ALT  28  /  AlkPhos  81  03-27          RADIOLOGY & ADDITIONAL TESTS:  Reviewed

## 2022-03-28 NOTE — PROGRESS NOTE ADULT - ATTENDING COMMENTS
Denies any issues this morning.  No abdominal pain.    66M with PMH of ETOH abuse, tobacco use, HTN, HLD, CABG, AS s/p TAVR with newly discovered restenosis, initially admitted to the cardiology service for management of NSTEMI.  Hospital course complicated by hepatic encephalopathy, and imaging findings concerning for HCC. S/p TACE, with subsequent MICU admission for airway protection, and diagnosed with klebsiella bacteremia. TAVR restenosis evaluated by CT surgery, and no surgical management indicated at this time.     -continue meropenem.  likely source of klebsiella is necrotic hepatic lesions (likely malignant).  Gallium scan negative for other source.  Determine duration of treatment - discuss with ID  - monitor mental status as well  - still awaiting repeat labs from today to determine if feasible to resume lovenox for DVT ppx

## 2022-03-28 NOTE — PROGRESS NOTE ADULT - PROBLEM SELECTOR PLAN 8
S/p CABG with Dr Wilcox in 2017  - recent [facility] with nonobstructive disease 1 year ago   - c/w aspirin 81mg QD   - c/w Lipitor 80mg QD

## 2022-03-28 NOTE — PROGRESS NOTE ADULT - PROBLEM SELECTOR PLAN 3
Multiple potential causes--infection, Meropenem, etoh abuse, HIT.   - positive heparin-PF4 ab   Plan:  - NAT negative, can restart lovenox PPX if hgb stable

## 2022-03-28 NOTE — PROGRESS NOTE ADULT - ASSESSMENT
67 YO M, current smoker (1ppd x 50 years), current every day ETOH abuse (1 pint vodka and several beers daily x 50 years), with PMHx of HLD, HTN (not on any medications), CABG (LIMA to LAD, SVG to PDA in 2017), AS (s/p AVR in 2017) with recently discovered restenosis, presented to [facility] ED on 2/24/22 with chest pain and episode of LOC, initially admitted to cardiology for NSTEMI with LHC showing nonobstructive disease. Patient was found to have restenosis of bioprosthetic valve, however not surgical candidate. Patient transferred to medicine for further management of HCC, hepatic encephalopathy, course c/b AMS. After IR TACE of HCC on 3/14, patient intubated for airway protection and transferred to MICU. Course further c/b septic shock 2/2 ESBL Klebsiella bacteremia source unclear, searching for source with gallium scan, which was negative

## 2022-03-28 NOTE — PROGRESS NOTE ADULT - ASSESSMENT
This is a 65 YO M, current smoker (1ppd x 50 years), current every day ETOH abuse (1 pint vodka and several beers daily x 50 years), with PMHx of HLD, HTN (not on any medications), CABG (LIMA to LAD, SVG to PDA in 2017), AS (s/p AVR in 2017) with recently discovered restenosis, presented to [facility] ED on 2/24/22 with chest pain and episode of LOC, initially admitted to cardiology for NSTEMI with [facility] with nonobstructive disease, then found to have restenosis of bioprosthetic valve for which aortic root surgery was planned, but now deferred given new diagnosis of cirrhosis and HCC. Course c/b AMS 2/2 likely hepatic encephalopathy and metabolic encephalopathy. Course further c/b septic shock 2/2 ESBL Klebsiella UTI.     Recommendations:  - c/w Meropenem through 3/29 for total 10 day course.     ID Team 1 to sign off. please call back as neede.    Note not finalized until attested by attending     Fariba Ni MD PGY2 Internal Medicine  Infectious Disease Consult Service, Team 1 This is a 67 YO M, current smoker (1ppd x 50 years), current every day ETOH abuse (1 pint vodka and several beers daily x 50 years), with PMHx of HLD, HTN (not on any medications), CABG (LIMA to LAD, SVG to PDA in 2017), AS (s/p AVR in 2017) with recently discovered restenosis, presented to [facility] ED on 2/24/22 with chest pain and episode of LOC, initially admitted to cardiology for NSTEMI with [facility] with nonobstructive disease, then found to have restenosis of bioprosthetic valve for which aortic root surgery was planned, but now deferred given new diagnosis of cirrhosis and HCC. Course c/b AMS 2/2 likely hepatic encephalopathy and metabolic encephalopathy. Course further c/b septic shock 2/2 ESBL Klebsiella UTI.     Recommendations:  - c/w Meropenem through 3/29 for total 10 day course.     ID Team 1 to sign off. Please call back as needed.    Note not finalized until attested by attending     Fariba Ni MD PGY2 Internal Medicine  Infectious Disease Consult Service, Team 1

## 2022-03-29 LAB
ALBUMIN SERPL ELPH-MCNC: 2.3 G/DL — LOW (ref 3.3–5)
ALP SERPL-CCNC: 81 U/L — SIGNIFICANT CHANGE UP (ref 40–120)
ALT FLD-CCNC: 29 U/L — SIGNIFICANT CHANGE UP (ref 10–45)
ANION GAP SERPL CALC-SCNC: 8 MMOL/L — SIGNIFICANT CHANGE UP (ref 5–17)
AST SERPL-CCNC: 92 U/L — HIGH (ref 10–40)
BILIRUB SERPL-MCNC: 0.9 MG/DL — SIGNIFICANT CHANGE UP (ref 0.2–1.2)
BUN SERPL-MCNC: 14 MG/DL — SIGNIFICANT CHANGE UP (ref 7–23)
CALCIUM SERPL-MCNC: 8.3 MG/DL — LOW (ref 8.4–10.5)
CHLORIDE SERPL-SCNC: 106 MMOL/L — SIGNIFICANT CHANGE UP (ref 96–108)
CO2 SERPL-SCNC: 22 MMOL/L — SIGNIFICANT CHANGE UP (ref 22–31)
CREAT SERPL-MCNC: 0.65 MG/DL — SIGNIFICANT CHANGE UP (ref 0.5–1.3)
EGFR: 104 ML/MIN/1.73M2 — SIGNIFICANT CHANGE UP
GLUCOSE SERPL-MCNC: 108 MG/DL — HIGH (ref 70–99)
HCT VFR BLD CALC: 25.8 % — LOW (ref 39–50)
HGB BLD-MCNC: 7.9 G/DL — LOW (ref 13–17)
MAGNESIUM SERPL-MCNC: 1.9 MG/DL — SIGNIFICANT CHANGE UP (ref 1.6–2.6)
MCHC RBC-ENTMCNC: 30.6 GM/DL — LOW (ref 32–36)
MCHC RBC-ENTMCNC: 30.9 PG — SIGNIFICANT CHANGE UP (ref 27–34)
MCV RBC AUTO: 100.8 FL — HIGH (ref 80–100)
NRBC # BLD: 0 /100 WBCS — SIGNIFICANT CHANGE UP (ref 0–0)
PHOSPHATE SERPL-MCNC: 3.3 MG/DL — SIGNIFICANT CHANGE UP (ref 2.5–4.5)
PLATELET # BLD AUTO: 115 K/UL — LOW (ref 150–400)
POTASSIUM SERPL-MCNC: 3.9 MMOL/L — SIGNIFICANT CHANGE UP (ref 3.5–5.3)
POTASSIUM SERPL-SCNC: 3.9 MMOL/L — SIGNIFICANT CHANGE UP (ref 3.5–5.3)
PROT SERPL-MCNC: 6.6 G/DL — SIGNIFICANT CHANGE UP (ref 6–8.3)
RBC # BLD: 2.56 M/UL — LOW (ref 4.2–5.8)
RBC # FLD: 20.6 % — HIGH (ref 10.3–14.5)
SODIUM SERPL-SCNC: 136 MMOL/L — SIGNIFICANT CHANGE UP (ref 135–145)
WBC # BLD: 5.17 K/UL — SIGNIFICANT CHANGE UP (ref 3.8–10.5)
WBC # FLD AUTO: 5.17 K/UL — SIGNIFICANT CHANGE UP (ref 3.8–10.5)

## 2022-03-29 PROCEDURE — 99233 SBSQ HOSP IP/OBS HIGH 50: CPT

## 2022-03-29 PROCEDURE — 99232 SBSQ HOSP IP/OBS MODERATE 35: CPT

## 2022-03-29 RX ORDER — PREGABALIN 225 MG/1
1 CAPSULE ORAL
Qty: 30 | Refills: 0
Start: 2022-03-29 | End: 2022-04-27

## 2022-03-29 RX ORDER — FERROUS SULFATE 325(65) MG
1 TABLET ORAL
Qty: 15 | Refills: 0
Start: 2022-03-29 | End: 2022-04-27

## 2022-03-29 RX ORDER — FOLIC ACID 0.8 MG
1 TABLET ORAL
Qty: 30 | Refills: 0
Start: 2022-03-29 | End: 2022-04-27

## 2022-03-29 RX ORDER — ASPIRIN/CALCIUM CARB/MAGNESIUM 324 MG
81 TABLET ORAL EVERY 24 HOURS
Refills: 0 | Status: DISCONTINUED | OUTPATIENT
Start: 2022-03-29 | End: 2022-03-30

## 2022-03-29 RX ORDER — ASPIRIN/CALCIUM CARB/MAGNESIUM 324 MG
1 TABLET ORAL
Qty: 30 | Refills: 0
Start: 2022-03-29 | End: 2022-04-27

## 2022-03-29 RX ORDER — PANTOPRAZOLE SODIUM 20 MG/1
1 TABLET, DELAYED RELEASE ORAL
Qty: 30 | Refills: 0
Start: 2022-03-29 | End: 2022-04-27

## 2022-03-29 RX ORDER — TAMSULOSIN HYDROCHLORIDE 0.4 MG/1
1 CAPSULE ORAL
Qty: 30 | Refills: 0
Start: 2022-03-29 | End: 2022-04-27

## 2022-03-29 RX ORDER — PREGABALIN 225 MG/1
1 CAPSULE ORAL
Qty: 0 | Refills: 0 | DISCHARGE
Start: 2022-03-29

## 2022-03-29 RX ORDER — FERROUS SULFATE 325(65) MG
1 TABLET ORAL
Qty: 0 | Refills: 0 | DISCHARGE
Start: 2022-03-29

## 2022-03-29 RX ORDER — ATORVASTATIN CALCIUM 80 MG/1
1 TABLET, FILM COATED ORAL
Qty: 30 | Refills: 0
Start: 2022-03-29 | End: 2022-04-27

## 2022-03-29 RX ORDER — PANTOPRAZOLE SODIUM 20 MG/1
40 TABLET, DELAYED RELEASE ORAL
Refills: 0 | Status: DISCONTINUED | OUTPATIENT
Start: 2022-03-29 | End: 2022-03-30

## 2022-03-29 RX ORDER — TAMSULOSIN HYDROCHLORIDE 0.4 MG/1
1 CAPSULE ORAL
Qty: 0 | Refills: 0 | DISCHARGE
Start: 2022-03-29

## 2022-03-29 RX ORDER — PANTOPRAZOLE SODIUM 20 MG/1
1 TABLET, DELAYED RELEASE ORAL
Qty: 0 | Refills: 0 | DISCHARGE
Start: 2022-03-29

## 2022-03-29 RX ADMIN — Medication 1 DROP(S): at 11:04

## 2022-03-29 RX ADMIN — MEROPENEM 100 MILLIGRAM(S): 1 INJECTION INTRAVENOUS at 14:03

## 2022-03-29 RX ADMIN — ENOXAPARIN SODIUM 40 MILLIGRAM(S): 100 INJECTION SUBCUTANEOUS at 21:52

## 2022-03-29 RX ADMIN — MEROPENEM 100 MILLIGRAM(S): 1 INJECTION INTRAVENOUS at 06:22

## 2022-03-29 RX ADMIN — ATORVASTATIN CALCIUM 80 MILLIGRAM(S): 80 TABLET, FILM COATED ORAL at 21:53

## 2022-03-29 RX ADMIN — MEROPENEM 100 MILLIGRAM(S): 1 INJECTION INTRAVENOUS at 21:53

## 2022-03-29 RX ADMIN — Medication 1 PATCH: at 20:03

## 2022-03-29 RX ADMIN — Medication 81 MILLIGRAM(S): at 06:23

## 2022-03-29 RX ADMIN — Medication 1 TABLET(S): at 11:05

## 2022-03-29 RX ADMIN — Medication 1 MILLIGRAM(S): at 11:05

## 2022-03-29 RX ADMIN — Medication 1 DROP(S): at 21:54

## 2022-03-29 RX ADMIN — TAMSULOSIN HYDROCHLORIDE 0.4 MILLIGRAM(S): 0.4 CAPSULE ORAL at 21:52

## 2022-03-29 RX ADMIN — PANTOPRAZOLE SODIUM 40 MILLIGRAM(S): 20 TABLET, DELAYED RELEASE ORAL at 06:23

## 2022-03-29 RX ADMIN — Medication 1 PATCH: at 21:53

## 2022-03-29 RX ADMIN — PREGABALIN 1000 MICROGRAM(S): 225 CAPSULE ORAL at 11:05

## 2022-03-29 NOTE — PROGRESS NOTE ADULT - PROBLEM SELECTOR PLAN 1
.  PPSV = 50%, requires assistance for most ADLs  -c/w supportive care, OOB, encourage movement  -patient is uninsured so BRENDA is not an option, only option will be for family to manage him at home

## 2022-03-29 NOTE — PROGRESS NOTE ADULT - PROBLEM SELECTOR PLAN 3
Multiple potential causes--infection, Meropenem, etoh abuse, HIT.  positive heparin-PF4 ab but NAT negative  Platelets rising to 120s  Plan:   -restart PPX lovenox Multiple potential causes--infection, Meropenem, etoh abuse, HIT.  positive heparin-PF4 ab but NAT negative  Platelets rising to 120s  Plan:   -PPx lovenox

## 2022-03-29 NOTE — PROGRESS NOTE ADULT - PROBLEM SELECTOR PLAN 9
Holding anti-HTN meds, s/p pressors  - monitor VS and resume when appropriate  - currently with low bp - can only give very small boluses - if continues to be low bp, may need to be stepped up to telemetry Holding anti-HTN meds, s/p pressors. Orthostatics positive today  - monitor VS and resume when appropriate  - currently with low bp - can only give very small boluses

## 2022-03-29 NOTE — PROGRESS NOTE ADULT - PROVIDER SPECIALTY LIST ADULT
Cardiology
Infectious Disease
Internal Medicine
MICU
Palliative Care
Palliative Care
Structural Heart
Surgery
Anesthesia
Gastroenterology
Internal Medicine
MICU
MICU
Neurology
Neurology
Structural Heart
Surgery
CT Surgery
Cardiology
Gastroenterology
Infectious Disease
Infectious Disease
Internal Medicine
MICU
MICU
Neurology
Structural Heart
Structural Heart
Surgery
CT Surgery
Gastroenterology
Heme/Onc
Infectious Disease
Infectious Disease
Internal Medicine
MICU
Palliative Care
Structural Heart
Internal Medicine
Hospitalist
Palliative Care
Internal Medicine
Hospitalist
Hospitalist
Internal Medicine
Hospitalist
Hospitalist
Internal Medicine
Internal Medicine
Hospitalist
Cardiology
Hospitalist
Internal Medicine

## 2022-03-29 NOTE — PROGRESS NOTE ADULT - PROBLEM SELECTOR PLAN 11
F: None  E: replete as needed  D: soft and bite sized  DVT Proph: lovenox  GI Proph: protonix  Dispo: [facility]
F: None  E: replete as needed  D: soft and bite sized  DVT Proph: lovenox  GI Proph: protonix  Dispo: [facility]
F: None  E: replete as needed  D: NGT in place w/ tube feeds   DVT Proph: held for now i/s/o possible HIT   GI Proph: protonix  Dispo: [facility]
Long standing EtOH consumption. Not showing any signs of withdrawal at the moment. Last drink prior to admission which is almost 2 weeks ago. Pt was given 3 days of librium Q8hrs while on CT surgery however did not display signs of withdrawal per chart review  - c/w Multivitamin, Folic Acid, and B12.
F: None  E: replete as needed  D: NGT in place w/ tube feeds   DVT Proph: held for now  GI Proph: protonix  [facility]
F: None  E: replete as needed  D: soft and bite sized  DVT Proph: lovenox  GI Proph: protonix  Dispo: [facility]
F: None  E: replete as needed  D: NGT in place w/ tube feeds   DVT Proph: held for now  GI Proph: protonix  [facility]
F: none  E: replenish as appropriate  N: DASH    VTE Prophylaxis: hold for anemia  GI: not needed  C: Full Code  D: (LOCATION)

## 2022-03-29 NOTE — PROGRESS NOTE ADULT - SUBJECTIVE AND OBJECTIVE BOX
[facility] Geriatrics and Palliative Care  Min Ram, Palliative Care Attending  Contact Info: Call [phone] (HEAL Line) or message on Microsoft Teams (Min Ram)    SUBJECTIVE AND OBJECTIVE:  INTERVAL HPI/OVERNIGHT EVENTS: Interval events noted. Patient mentally clearer and more interactive. Worked with PT and was able to stand up, ambulate with walker. Patient required no PRNs in past 24hrs. Denies pain or SOB. Additional discussion with wife, no major changes.    ALLERGIES:  No Known Allergies    MEDICATIONS  (STANDING):  artificial tears (preservative free) Ophthalmic Solution 1 Drop(s) Both EYES two times a day  aspirin  chewable 81 milliGRAM(s) Oral every 24 hours  atorvastatin 80 milliGRAM(s) Oral at bedtime  cyanocobalamin 1000 MICROGram(s) Oral every 24 hours  enoxaparin Injectable 40 milliGRAM(s) SubCutaneous every 24 hours  ferrous    sulfate 325 milliGRAM(s) Oral <User Schedule>  folic acid 1 milliGRAM(s) Oral daily  meropenem  IVPB 1000 milliGRAM(s) IV Intermittent every 8 hours  multivitamin 1 Tablet(s) Oral daily  nicotine - 21 mG/24Hr(s) Patch 1 patch Transdermal daily  pantoprazole    Tablet 40 milliGRAM(s) Oral before breakfast  rifAXIMin 550 milliGRAM(s) Oral every 12 hours  tamsulosin 0.4 milliGRAM(s) Oral at bedtime    MEDICATIONS  (PRN):  acetaminophen     Tablet .. 650 milliGRAM(s) Oral every 6 hours PRN Temp greater or equal to 38C (100.4F), Mild Pain (1 - 3)  sodium chloride 0.65% Nasal 1 Spray(s) Both Nostrils four times a day PRN Nasal Congestion    Analgesic Use (Scheduled and PRNs) for past 24 hours:  nicotine - 21 mG/24Hr(s) Patch   1 Patch Transdermal (03-28-22 @ 22:15)    ITEMS UNCHECKED ARE NOT PRESENT  PRESENT SYMPTOMS/REVIEW OF SYSTEMS: []Unable to obtain due to poor mentation   Source if other than patient:  []Family   []Team     Pain: [] yes [x] no  QOL impact -   Location -                    Aggravating factors -  Quality -  Radiation -  Timing -  Severity (0-10 scale) -  Minimal acceptable level (0-10 scale) -    Dyspnea:                           []Mild  []Moderate []Severe  Anxiety:                             []Mild []Moderate []Severe  Fatigue:                             []Mild []Moderate []Severe  Nausea:                             []Mild []Moderate []Severe  Loss of appetite:              []Mild []Moderate []Severe  Constipation:                    []Mild []Moderate []Severe    Other Symptoms:  [x]All other review of systems negative     Palliative Performance Status Version 2: 60%    GENERAL:  [x]Alert  [x]Oriented x2   []Lethargic  []Cachexia  []Unarousable  [x]Verbal  []Non-Verbal  Behavioral:   []Anxiety  []Delirium []Agitation [x]Cooperative  HEENT:  [x]Normal   []Dry mouth   []ET Tube/Trach  []Oral lesions  PULMONARY:   [x]Clear []Tachypnea  []Audible excessive secretions   []Rhonchi        []Right []Left []Bilateral  []Crackles        []Right []Left []Bilateral  []Wheezing     []Right []Left []Bilateral  CARDIOVASCULAR:    [x]Regular []Irregular []Tachy  []Dominick []Murmur []Other  GASTROINTESTINAL:  [x]Soft  [x]Distended   [x]+BS  [x]Non tender []Tender  []PEG []OGT/ NGT  Last BM:  GENITOURINARY:  [x]Normal [] Incontinent   []Oliguria/Anuria   []Sanchez  MUSCULOSKELETAL:   []Normal   [x]Weakness  []Bed/Wheelchair bound []Edema  NEUROLOGIC:   [x]No focal deficits  []Cognitive impairment  []Dysphagia []Dysarthria []Paresis []Encephalopathic  SKIN:   [x]Normal   []Pressure ulcer(s)  []Rash    Vital Signs Last 24 Hrs  T(C): 36.6 (29 Mar 2022 16:15), Max: 37 (28 Mar 2022 22:11)  T(F): 97.8 (29 Mar 2022 16:15), Max: 98.6 (28 Mar 2022 22:11)  HR: 94 (29 Mar 2022 16:15) (90 - 94)  BP: 128/69 (29 Mar 2022 16:15) (98/63 - 128/69)  BP(mean): 75 (29 Mar 2022 09:00) (75 - 75)  RR: 18 (29 Mar 2022 16:15) (18 - 19)  SpO2: 98% (29 Mar 2022 16:15) (95% - 98%)    LABS:                         7.9    5.17  )-----------( 115      ( 29 Mar 2022 11:34 )             25.8   03-29    136  |  106  |  14  ----------------------------<  108<H>  3.9   |  22  |  0.65    Ca    8.3<L>      29 Mar 2022 11:34  Phos  3.3     03-29  Mg     1.9     03-29    TPro  6.6  /  Alb  2.3<L>  /  TBili  0.9  /  DBili  x   /  AST  92<H>  /  ALT  29  /  AlkPhos  81  03-29      RADIOLOGY & ADDITIONAL STUDIES: None new    DISCUSSION OF CASE: Wife- to provide updates and emotional support    Care Coordination Document:   PROGRESS NOTE  Date & Time of Note   2022-03-29 15:06  Notes: Chart reviewed and notes appreciated. Case discussed in IDR, as per  medical team, patient will receive last dose of IV abx today and ID signed off.  As per team pt medically ready for d/c home, however, patient might be able to  go to [facility] as rec by PT and OT because patient now has [employer]. DANE  completed and SW informed. SW will send referrals for possible BRENDA acceptance.  CM also sent referral to [facility] for RN visits and HPT/ HOT, awaiting response. CM  ordered mary walker for patient from PT department. Patient receiving 1 unit  PRBCs today. If patient goes home spouse will pick patient up tomorrow after  5:30 pm when she leaves work. CM remains available.  Electronically signed by:  Daniel Dunn  Electronically signed on:  2022-03-29  15:06

## 2022-03-29 NOTE — PROGRESS NOTE ADULT - ATTENDING SUPERVISION STATEMENT
Fellow
Resident
Fellow
Fellow
Resident
Fellow
Resident
Resident/Fellow
Resident
Resident/Fellow
Resident
ACP
Resident
Resident

## 2022-03-29 NOTE — PROGRESS NOTE ADULT - SUBJECTIVE AND OBJECTIVE BOX
INTERVAL HPI/OVERNIGHT EVENTS:  Patient was seen and examined at bedside. No overnight events, patient is without complaints. A&Ox3 today, improved. Denies fever, HA, SOB, CP, palpitations, N/V/D/C.    VITAL SIGNS:  T(F): 98 (03-29-22 @ 09:00)  HR: 90 (03-29-22 @ 09:00)  BP: 98/63 (03-29-22 @ 09:00)  RR: 18 (03-29-22 @ 09:00)  SpO2: 95% (03-29-22 @ 09:00)  Wt(kg): --    PHYSICAL EXAM:  General: NAD, awake, alert  HEENT: MMM;   Neck: supple  Cardiovascular: +S1/S2; RRR; systolic murmur+  Respiratory: CTA B/L; no W/R/R  Gastrointestinal: soft, distended but nontender  Extremities: WWP  Vascular: 2+ radial, DP/PT pulses B/L  Neurological: AAOx3 no focal deficits    MEDICATIONS  (STANDING):  artificial tears (preservative free) Ophthalmic Solution 1 Drop(s) Both EYES two times a day  aspirin  chewable 81 milliGRAM(s) Oral every 24 hours  atorvastatin 80 milliGRAM(s) Oral at bedtime  cyanocobalamin 1000 MICROGram(s) Oral every 24 hours  enoxaparin Injectable 40 milliGRAM(s) SubCutaneous every 24 hours  ferrous    sulfate 325 milliGRAM(s) Oral <User Schedule>  folic acid 1 milliGRAM(s) Oral daily  meropenem  IVPB 1000 milliGRAM(s) IV Intermittent every 8 hours  multivitamin 1 Tablet(s) Oral daily  nicotine - 21 mG/24Hr(s) Patch 1 patch Transdermal daily  pantoprazole    Tablet 40 milliGRAM(s) Oral before breakfast  rifAXIMin 550 milliGRAM(s) Oral every 12 hours  tamsulosin 0.4 milliGRAM(s) Oral at bedtime    MEDICATIONS  (PRN):  acetaminophen     Tablet .. 650 milliGRAM(s) Oral every 6 hours PRN Temp greater or equal to 38C (100.4F), Mild Pain (1 - 3)  sodium chloride 0.65% Nasal 1 Spray(s) Both Nostrils four times a day PRN Nasal Congestion      Allergies    No Known Allergies    Intolerances        LABS:                        7.8    6.00  )-----------( 122      ( 28 Mar 2022 16:11 )             25.0     03-28    135  |  105  |  12  ----------------------------<  102<H>  4.0   |  22  |  0.66    Ca    8.1<L>      28 Mar 2022 16:11  Phos  3.5     03-28  Mg     1.8     03-28    TPro  6.5  /  Alb  2.2<L>  /  TBili  0.8  /  DBili  x   /  AST  88<H>  /  ALT  28  /  AlkPhos  88  03-28          RADIOLOGY & ADDITIONAL TESTS:  Reviewed

## 2022-03-29 NOTE — PROGRESS NOTE ADULT - ATTENDING COMMENTS
66M with PMH of ETOH abuse, tobacco use, HTN, HLD, CABG, AS s/p TAVR with newly discovered restenosis, initially admitted to the cardiology service for management of NSTEMI.  Hospital course complicated by hepatic encephalopathy, and imaging findings concerning for HCC. S/p TACE, with subsequent MICU admission for airway protection, and diagnosed with klebsiella bacteremia. TAVR restenosis evaluated by CT surgery, and no surgical management indicated at this time.     -continue meropenem.  likely source of klebsiella is necrotic hepatic lesions (likely malignant).  Last dose of meropenem is today.    - repeat labs today.  If Hb <8, would transfuse a unit of PRBCs, given cardiac history.    - will need to discuss with family if they're ready for him to come home.   - NAT negative - okay to resume lovenox

## 2022-03-29 NOTE — PROGRESS NOTE ADULT - PROBLEM SELECTOR PLAN 4
CTAP with 2.4 hypervascular liver mass. i/s/o Liver cirrhosis and EtOH use disorder. MRI abdomen: 2.5 cm left hepatic lesion consistent with HCC. No locally invasive or metastatic disease.   - s/p IR transarterial chemical embolization since he is not a candidate for ablation  - repeat CTAP with residual tumor? CTAP with 2.4 hypervascular liver mass. i/s/o Liver cirrhosis and EtOH use disorder. MRI abdomen: 2.5 cm left hepatic lesion consistent with HCC. No locally invasive or metastatic disease.   - s/p IR transarterial chemical embolization since he is not a candidate for ablation  - consider repeat CTAP outpatient

## 2022-03-29 NOTE — PROGRESS NOTE ADULT - PROBLEM SELECTOR PLAN 7
.  Complex medical decision making / symptom management in the setting of advanced illness.    Active Psychosocial Referrals:  [x]Social Work/Case management []PT/OT []Chaplaincy []Hospice  []Patient/Family Support []Holistic RN []Massage Therapy []Ethics  Coping: [] well [] with difficulty [] poor coping [x] unable to assess  Support system: [] strong [] adequate [x] inadequate    For new or uncontrolled symptoms, please call Palliative Care at [phone]. The service is available 24/7 (including nights & weekends) to provide symptom management recommendations over the phone as appropriate

## 2022-03-29 NOTE — PROGRESS NOTE ADULT - PROBLEM SELECTOR PLAN 2
Klebsiella found in lung, blood and urine. UCx 3/14: Klebsiella ESBL and Proteus mirabilis. BCx 3/14-19: NGTD. Sputum Cx 3/15: Proteus Mirabilis, Klebsiella pneumonia ESBL. UA 3/19: + small LE, bacteria and WBC. BCx 3/19: Klebsiella pneumonia.   - BCx 3/20: NGTD  Plan:  - c/w meropenem 1 gram q8 hrs, last day is today  - ID consulted, appreciate recs--potential sources include necrotic tumor, endocarditis (although rare with gram negative bacteremia and negative TTE), SBP, prostatitis, colitis, ?pneumonia   - gallium scan negative Klebsiella found in lung, blood and urine. UCx 3/14: Klebsiella ESBL and Proteus mirabilis. BCx 3/14-19: NGTD. Sputum Cx 3/15: Proteus Mirabilis, Klebsiella pneumonia ESBL. UA 3/19: + small LE, bacteria and WBC. BCx 3/19: Klebsiella pneumonia.   - BCx 3/20: NGTD  Plan:  - c/w meropenem 1 gram q8 hrs, last day is today  - gallium scan negative

## 2022-03-29 NOTE — PROGRESS NOTE ADULT - ASSESSMENT
67 YO M, current smoker (1ppd x 50 years), current every day ETOH abuse (1 pint vodka and several beers daily x 50 years), with PMHx of HLD, HTN (not on any medications), CABG (LIMA to LAD, SVG to PDA in 2017), AS (s/p AVR in 2017) with recently discovered restenosis, presented to [facility] ED on 2/24/22 with chest pain and episode of LOC, initially admitted to cardiology for NSTEMI with LHC showing nonobstructive disease. Patient was found to have restenosis of bioprosthetic valve, however not surgical candidate. Patient transferred to medicine for further management of HCC, hepatic encephalopathy, course c/b AMS. After IR TACE of HCC on 3/14, patient intubated for airway protection and transferred to MICU. Course further c/b septic shock 2/2 ESBL Klebsiella bacteremia source unclear, searching for source with gallium scan, which was negative

## 2022-03-29 NOTE — PROGRESS NOTE ADULT - TIME BILLING
as noted above
as above
severe prosthetic aortic valve stenosis
can resume aspirin statin when allowable  avoid BB or afterload reducing agents  No furhter cardiac intervention at this time
evaluation, coordination of care, counseling
severe prosthetic aortic stenosis
as above
Emotional Support/Supportive Care, Exploration of GOC, and Clarifying Potential Disease Trajectory: related to cirrhosis/HCC and valvular disease complicated by critical illness
Dispo: d/c planning  PT recommends BRENDA; however, Pt. does not have insurance; see CM/SW notes re: safe d/c planning  will need outpatient Hepatology, Heme-Onc, and Surg-Onc f/u
Dispo: pending improved Hb, Heme-Onc/IR recs  PT recommends BRENDA; however, Pt. does not have insurance; see CM/SW notes re: safe d/c planning  will need outpatient Hepatology and Heme-Onc f/u  Palliative Care consulted, GOC discussions ongoing
Dispo: pending MRI, improved Hb  PT recommends BRENDA; however, Pt. does not have insurance; see CM/SW notes re: safe d/c planning  will need outpatient Hepatology, Heme-Onc, and Surg-Onc f/u  Palliative Care consulted, GOC discussions ongoing
Dispo: pending improved mental status, MRI, EEG, IR chemoembolization  PT recommends BRENDA; however, Pt. does not have insurance; see CM/SW notes re: safe d/c planning  will need outpatient Hepatology and Heme-Onc f/u  Palliative Care consulted, GOC discussions ongoing
Dispo: pending improved mental status, EEG, IR chemoembolization  PT recommends BRENDA; however, Pt. does not have insurance; see CM/SW notes re: safe d/c planning  will need outpatient Hepatology and Heme-Onc f/u  Palliative Care consulted, GOC discussions ongoing
Emotional Support/Supportive Care, Exploration of GOC, and Clarifying Potential Disease Trajectory: related to cirrhosis/HCC and valvular disease complicated by critical illness
Dispo: d/c planning  PT recommends BRENDA; however, Pt. does not have insurance; see CM/SW notes re: safe d/c planning  will need outpatient Hepatology, Heme-Onc, and Surg-Onc f/u  Palliative Care consulted today
Dispo: pending improved Hb, improved mental status, possible IR chemoembolization  PT recommends BRENDA; however, Pt. does not have insurance; see CM/SW notes re: safe d/c planning  will need outpatient Hepatology and Heme-Onc f/u  Palliative Care consulted, GOC discussions ongoing
Emotional Support/Supportive Care, Exploration of GOC, and Clarifying Potential Disease Trajectory: related to cirrhosis/HCC and valvular disease complicated by critical illness (respiratory failure and shock)

## 2022-03-29 NOTE — PROGRESS NOTE ADULT - PROBLEM SELECTOR PLAN 8
S/p CABG with Dr Wilcox in 2017  - recent [facility] with nonobstructive disease 1 year ago   - c/w aspirin 81mg QD   - c/w Lipitor 80mg QD

## 2022-03-29 NOTE — PROGRESS NOTE ADULT - PROBLEM SELECTOR PROBLEM 11
Prophylactic measure
History of alcohol use
Prophylactic measure

## 2022-03-29 NOTE — PROGRESS NOTE ADULT - PROBLEM SELECTOR PLAN 1
Improving, now A&Ox3  Likely related to chronic alcohol use vs hepatic encephalopathy. As per wife, pt is not normally lethargic at home. Completed course of high dose thiamine, last librium dose on 03/01. CTH and MRI w/o acute abnormality but MR head showed brain parenchymal volume loss, advanced for patient's age. VEEG no seizures.     Plan:  - treat Klebsiella infection as below  - c/w hepatic encephalopathy treatment       -c/w Rifaximin  -f/u PT recs-will need lots of help Improving, now A&Ox3  Likely related to chronic alcohol use vs hepatic encephalopathy. As per wife, pt is not normally lethargic at home. Completed course of high dose thiamine, last librium dose on 03/01. CTH and MRI w/o acute abnormality but MR head showed brain parenchymal volume loss, advanced for patient's age. VEEG no seizures.     Plan:  - treat Klebsiella infection as below  - c/w hepatic encephalopathy treatment       -c/w Rifaximin

## 2022-03-29 NOTE — PROGRESS NOTE ADULT - REASON FOR ADMISSION
Chest Pain
Obtundation
Chest Pain

## 2022-03-29 NOTE — PROGRESS NOTE ADULT - PROBLEM SELECTOR PLAN 6
.  Patient is Full Code  -family will accept all medical interventions that are offered to them  -if there based on clinical providers' judgement there is medical futility or risks starting to outweigh benefits, then medical care does not need to be escalated -> would recommend an Ethics consult in that situation  -patient is recovering at this time, will take a step back

## 2022-03-29 NOTE — PROGRESS NOTE ADULT - ASSESSMENT
65yo M with Polysubstance Use Disorder (Nicotine, EtOH), HTN, CAD, and AS p/w chest pain in the setting of aortic valve stenosis and found to have cirrhosis c/b newly diagnosed HCC. Palliative consulted for complex medical decision making in the setting of advanced illness.    ·	planned for discharge potentially tomorrow  ·	discussed with family (wife)  ·	Palliative Medicine will sign off

## 2022-03-30 VITALS — SYSTOLIC BLOOD PRESSURE: 118 MMHG | HEART RATE: 114 BPM | DIASTOLIC BLOOD PRESSURE: 78 MMHG

## 2022-03-30 LAB
ALBUMIN SERPL ELPH-MCNC: 2.3 G/DL — LOW (ref 3.3–5)
ALP SERPL-CCNC: 98 U/L — SIGNIFICANT CHANGE UP (ref 40–120)
ALT FLD-CCNC: 29 U/L — SIGNIFICANT CHANGE UP (ref 10–45)
ANION GAP SERPL CALC-SCNC: 8 MMOL/L — SIGNIFICANT CHANGE UP (ref 5–17)
AST SERPL-CCNC: 87 U/L — HIGH (ref 10–40)
BILIRUB SERPL-MCNC: 1.2 MG/DL — SIGNIFICANT CHANGE UP (ref 0.2–1.2)
BUN SERPL-MCNC: 20 MG/DL — SIGNIFICANT CHANGE UP (ref 7–23)
CALCIUM SERPL-MCNC: 8.1 MG/DL — LOW (ref 8.4–10.5)
CHLORIDE SERPL-SCNC: 104 MMOL/L — SIGNIFICANT CHANGE UP (ref 96–108)
CO2 SERPL-SCNC: 21 MMOL/L — LOW (ref 22–31)
CREAT SERPL-MCNC: 0.7 MG/DL — SIGNIFICANT CHANGE UP (ref 0.5–1.3)
EGFR: 102 ML/MIN/1.73M2 — SIGNIFICANT CHANGE UP
GLUCOSE SERPL-MCNC: 107 MG/DL — HIGH (ref 70–99)
HCT VFR BLD CALC: 26.5 % — LOW (ref 39–50)
HGB BLD-MCNC: 8.4 G/DL — LOW (ref 13–17)
MAGNESIUM SERPL-MCNC: 1.9 MG/DL — SIGNIFICANT CHANGE UP (ref 1.6–2.6)
MCHC RBC-ENTMCNC: 30.3 PG — SIGNIFICANT CHANGE UP (ref 27–34)
MCHC RBC-ENTMCNC: 31.7 GM/DL — LOW (ref 32–36)
MCV RBC AUTO: 95.7 FL — SIGNIFICANT CHANGE UP (ref 80–100)
NRBC # BLD: 0 /100 WBCS — SIGNIFICANT CHANGE UP (ref 0–0)
PHOSPHATE SERPL-MCNC: 2.6 MG/DL — SIGNIFICANT CHANGE UP (ref 2.5–4.5)
PLATELET # BLD AUTO: 125 K/UL — LOW (ref 150–400)
POTASSIUM SERPL-MCNC: 4.2 MMOL/L — SIGNIFICANT CHANGE UP (ref 3.5–5.3)
POTASSIUM SERPL-SCNC: 4.2 MMOL/L — SIGNIFICANT CHANGE UP (ref 3.5–5.3)
PROT SERPL-MCNC: 6.6 G/DL — SIGNIFICANT CHANGE UP (ref 6–8.3)
RBC # BLD: 2.77 M/UL — LOW (ref 4.2–5.8)
RBC # FLD: 18.6 % — HIGH (ref 10.3–14.5)
SODIUM SERPL-SCNC: 133 MMOL/L — LOW (ref 135–145)
WBC # BLD: 7.2 K/UL — SIGNIFICANT CHANGE UP (ref 3.8–10.5)
WBC # FLD AUTO: 7.2 K/UL — SIGNIFICANT CHANGE UP (ref 3.8–10.5)

## 2022-03-30 PROCEDURE — 99239 HOSP IP/OBS DSCHRG MGMT >30: CPT

## 2022-03-30 RX ADMIN — Medication 1 MILLIGRAM(S): at 11:50

## 2022-03-30 RX ADMIN — Medication 325 MILLIGRAM(S): at 06:21

## 2022-03-30 RX ADMIN — PREGABALIN 1000 MICROGRAM(S): 225 CAPSULE ORAL at 10:26

## 2022-03-30 RX ADMIN — Medication 1 PATCH: at 19:01

## 2022-03-30 RX ADMIN — Medication 1 DROP(S): at 10:26

## 2022-03-30 RX ADMIN — Medication 1 TABLET(S): at 11:50

## 2022-03-30 RX ADMIN — Medication 81 MILLIGRAM(S): at 06:21

## 2022-03-30 RX ADMIN — PANTOPRAZOLE SODIUM 40 MILLIGRAM(S): 20 TABLET, DELAYED RELEASE ORAL at 06:20

## 2022-03-30 NOTE — CHART NOTE - NSCHARTNOTEFT_GEN_A_CORE
Admitting Diagnosis:   Patient is a 66y old  Male who presents with a chief complaint of Chest Pain (29 Mar 2022 16:32)      PAST MEDICAL & SURGICAL HISTORY:  HTN (hypertension)    ETOH abuse    Smoker    Hyperlipidemia    Aortic valve stenosis, unspecified etiology    No significant past surgical history    Current Nutrition Order: Soft and bite sized    PO Intake: Good (%) [   ]  Fair (50-75%) [   ] Poor (<25%) [ x ]    GI Issues: No nausea/vomiting documented at this time. Noted w/ fecal incontinence (last documented output 3/25)-rectal tube in place.    Pain: No noted pain at time of RD interview.    Skin Integrity:  N pressure breakdown noted   Erwin Score: 18  Generalized edema 1+    Labs:       133<L>  |  104  |  20  ----------------------------<  107<H>  4.2   |  21<L>  |  0.70    Ca    8.1<L>      30 Mar 2022 07:24  Phos  2.6     -30  Mg     1.9     -30    TPro  6.6  /  Alb  2.3<L>  /  TBili  1.2  /  DBili  x   /  AST  87<H>  /  ALT  29  /  AlkPhos  98  03-30    CAPILLARY BLOOD GLUCOSE          Medications:  MEDICATIONS  (STANDING):  artificial tears (preservative free) Ophthalmic Solution 1 Drop(s) Both EYES two times a day  aspirin  chewable 81 milliGRAM(s) Oral every 24 hours  atorvastatin 80 milliGRAM(s) Oral at bedtime  cyanocobalamin 1000 MICROGram(s) Oral every 24 hours  enoxaparin Injectable 40 milliGRAM(s) SubCutaneous every 24 hours  ferrous    sulfate 325 milliGRAM(s) Oral <User Schedule>  folic acid 1 milliGRAM(s) Oral daily  multivitamin 1 Tablet(s) Oral daily  nicotine - 21 mG/24Hr(s) Patch 1 patch Transdermal daily  pantoprazole    Tablet 40 milliGRAM(s) Oral before breakfast  rifAXIMin 550 milliGRAM(s) Oral every 12 hours  tamsulosin 0.4 milliGRAM(s) Oral at bedtime    MEDICATIONS  (PRN):  acetaminophen     Tablet .. 650 milliGRAM(s) Oral every 6 hours PRN Temp greater or equal to 38C (100.4F), Mild Pain (1 - 3)  sodium chloride 0.65% Nasal 1 Spray(s) Both Nostrils four times a day PRN Nasal Congestion    Anthropometrics:  Ht: 170.2cm (67")  Wt: 68kg (22)    IBW: 148# (67.3kg)   % IBW: 101%    Weight Change: No new weight since admission. Please obtain minimum biweekly weights to ensure pt receiving adequate nutrition    Estimated energy needs:  Actual body weight used for all calculations as pt in % %IBW (%DCX=467.6)   Needs adjusted for advanced age, possible malignancy  25-30kcak/k-2040kcal/day  1.2-1.4gm/k-95gm/day   Fluids per team      Subjective: 67 YO M, current every day ETOH abuse (1 pint vodka and several beers daily x 50 years), PMHx HLD, HTN, CABG (LIMA to LAD, SVG to PDA in 2017), AS (s/p AVR in 2017) with recently discovered restenosis, [facility] ED 22 with chest pain and episode of LOC, initially admitted to cardiology for NSTEMI with C showing nonobstructive disease. Transferred to CT surgery given complex anatomy with plan for TAVR and aortic root surgery. Pt found to have liver cirrhosis and a 2.4cmx2.4 cm mass on his liver concerning for HCC. Decision was made not to proceed with surgery given poor functional status, cirrhosis and non compliance with medications. The pt had worsening mental status on CT surgery service and med consult was called for hepatic encephalopathy. He has been followed GI  for liver findings. Hematology and surgical oncology consulted as imaging of abdomen confirmed HCC. Pt was not a surgical candidate and IR was consulted who planned for embolization. Pt completed a course of high dose thiamine given alcohol history without improvement in mental status. MRI brain obtained showing mild chronic microvascular ischemic disease and brain parenchymal volume loss, advanced for patient's age likely 2/2 alcohol abuse 3/14, went for planned transarterial chemoembolization by IR for further evaluation for liver lesion but around start of procedure became obtunded with increased secretions and concern that he was not protecting his airway. Following procedure patient became hypotensive to 80s/50s tachycardic to 120s, was minimally arousable to sternal rub and gurgling with c/f airway protection. ICU consulted for evaluation intubation. Course c/b anemia likely attributed to intermittent epistaxis for which ENT was consulted w/o plans for acute intervention. Concern that pt may have aspirated. Successfully extubated on 3/16. Stepped up overnight due to more lethargic, hypotension to systolic 60s, requiring phenylephrine and monitoring for need for intubation. Pressures have stabilized with proper fluid restoration and the Phen is being weaned down. Now with continued sepsis and anemia 2/2 hemolysis and possible GIB-higher risk for endoscopic evaluation. Lactulose stopped 3/25; gallium obtained 3/25. SLP recommendations 3/26; level 6 soft and bite sized (tube feed d/mary). 3/27: gallium scan unremarkable. Per ID no changes to abx or end date, to f/u with Feldmesser tomorrow. advanced diet to small and bite sized. Possible d/c today.     Pt seen at bedside for follow up assessment-on room air. Labs reviewed 3/30; pt hyponatremic, all other electrolytes within normal limits at this time. Per PCA at bedside, pt w/ poor appetite 3/29, unaware of PO intake 3/30 (seen before breakfast). Pt confirms lack of bowel movement. Made aware RD remains available. RD to follow up per protocol. See nutrition recommendations below.     Previous Nutrition Diagnosis: Inadequate oral intake r/t inability to meet EER AEB NPO  Active [ x ]  Resolved [   ]  Goal: Pt to meet at least 75% of nutritional needs during hospital stay consistently via tolerated route    Recommendations:  1. Continue current diet order; consistency per SLP  2. Monitor BMs, bowel regimen per team  3. RD remains available   4. Monitor labs    Education: N/A    Risk Level: High [ x  ] Moderate [   ] Low [   ].

## 2022-03-30 NOTE — CHART NOTE - NSCHARTNOTESELECT_GEN_ALL_CORE
Nutrition Services
POCUS FAST Exam
Anti-infective Approval Note/Event Note
Anti-infective approval/Event Note
Fall/Rapid Response
Follow Up/Nutrition Services
GOC/Event Note
Nutrition Follow Up/Nutrition Services
Nutrition Services
Rapid Response

## 2022-04-05 DIAGNOSIS — Z91.14 PATIENT'S OTHER NONCOMPLIANCE WITH MEDICATION REGIMEN: ICD-10-CM

## 2022-04-05 DIAGNOSIS — I85.10 SECONDARY ESOPHAGEAL VARICES WITHOUT BLEEDING: ICD-10-CM

## 2022-04-05 DIAGNOSIS — K03.81 CRACKED TOOTH: ICD-10-CM

## 2022-04-05 DIAGNOSIS — A41.59 OTHER GRAM-NEGATIVE SEPSIS: ICD-10-CM

## 2022-04-05 DIAGNOSIS — R33.9 RETENTION OF URINE, UNSPECIFIED: ICD-10-CM

## 2022-04-05 DIAGNOSIS — I10 ESSENTIAL (PRIMARY) HYPERTENSION: ICD-10-CM

## 2022-04-05 DIAGNOSIS — F17.200 NICOTINE DEPENDENCE, UNSPECIFIED, UNCOMPLICATED: ICD-10-CM

## 2022-04-05 DIAGNOSIS — B96.1 KLEBSIELLA PNEUMONIAE [K. PNEUMONIAE] AS THE CAUSE OF DISEASES CLASSIFIED ELSEWHERE: ICD-10-CM

## 2022-04-05 DIAGNOSIS — D63.8 ANEMIA IN OTHER CHRONIC DISEASES CLASSIFIED ELSEWHERE: ICD-10-CM

## 2022-04-05 DIAGNOSIS — J69.0 PNEUMONITIS DUE TO INHALATION OF FOOD AND VOMIT: ICD-10-CM

## 2022-04-05 DIAGNOSIS — K72.90 HEPATIC FAILURE, UNSPECIFIED WITHOUT COMA: ICD-10-CM

## 2022-04-05 DIAGNOSIS — I25.110 ATHEROSCLEROTIC HEART DISEASE OF NATIVE CORONARY ARTERY WITH UNSTABLE ANGINA PECTORIS: ICD-10-CM

## 2022-04-05 DIAGNOSIS — D64.9 ANEMIA, UNSPECIFIED: ICD-10-CM

## 2022-04-05 DIAGNOSIS — R07.9 CHEST PAIN, UNSPECIFIED: ICD-10-CM

## 2022-04-05 DIAGNOSIS — G93.41 METABOLIC ENCEPHALOPATHY: ICD-10-CM

## 2022-04-05 DIAGNOSIS — N39.0 URINARY TRACT INFECTION, SITE NOT SPECIFIED: ICD-10-CM
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
## 2022-04-05 DIAGNOSIS — K70.30 ALCOHOLIC CIRRHOSIS OF LIVER WITHOUT ASCITES: ICD-10-CM

## 2022-04-05 DIAGNOSIS — R65.21 SEVERE SEPSIS WITH SEPTIC SHOCK: ICD-10-CM

## 2022-04-05 DIAGNOSIS — T82.857A STENOSIS OF OTHER CARDIAC PROSTHETIC DEVICES, IMPLANTS AND GRAFTS, INITIAL ENCOUNTER: ICD-10-CM

## 2022-04-05 DIAGNOSIS — Z53.09 PROCEDURE AND TREATMENT NOT CARRIED OUT BECAUSE OF OTHER CONTRAINDICATION: ICD-10-CM

## 2022-04-05 DIAGNOSIS — C22.0 LIVER CELL CARCINOMA: ICD-10-CM

## 2022-04-05 DIAGNOSIS — F10.10 ALCOHOL ABUSE, UNCOMPLICATED: ICD-10-CM

## 2022-04-05 DIAGNOSIS — J96.01 ACUTE RESPIRATORY FAILURE WITH HYPOXIA: ICD-10-CM

## 2022-04-05 DIAGNOSIS — D62 ACUTE POSTHEMORRHAGIC ANEMIA: ICD-10-CM

## 2022-04-05 DIAGNOSIS — Z91.81 HISTORY OF FALLING: ICD-10-CM

## 2022-04-05 DIAGNOSIS — R18.8 OTHER ASCITES: ICD-10-CM

## 2022-04-05 DIAGNOSIS — R04.0 EPISTAXIS: ICD-10-CM

## 2022-04-05 DIAGNOSIS — I21.4 NON-ST ELEVATION (NSTEMI) MYOCARDIAL INFARCTION: ICD-10-CM

## 2022-04-05 DIAGNOSIS — E78.5 HYPERLIPIDEMIA, UNSPECIFIED: ICD-10-CM

## 2022-04-05 DIAGNOSIS — D69.6 THROMBOCYTOPENIA, UNSPECIFIED: ICD-10-CM

## 2022-04-10 LAB
CULTURE RESULTS: SIGNIFICANT CHANGE UP
ORGANISM # SPEC MICROSCOPIC CNT: SIGNIFICANT CHANGE UP
SPECIMEN SOURCE: SIGNIFICANT CHANGE UP

## 2022-05-30 PROCEDURE — 80061 LIPID PANEL: CPT

## 2022-05-30 PROCEDURE — 74177 CT ABD & PELVIS W/CONTRAST: CPT

## 2022-05-30 PROCEDURE — 82105 ALPHA-FETOPROTEIN SERUM: CPT

## 2022-05-30 PROCEDURE — C1769: CPT

## 2022-05-30 PROCEDURE — A9585: CPT

## 2022-05-30 PROCEDURE — 84443 ASSAY THYROID STIM HORMONE: CPT

## 2022-05-30 PROCEDURE — 82728 ASSAY OF FERRITIN: CPT

## 2022-05-30 PROCEDURE — 82553 CREATINE MB FRACTION: CPT

## 2022-05-30 PROCEDURE — 97116 GAIT TRAINING THERAPY: CPT

## 2022-05-30 PROCEDURE — 84484 ASSAY OF TROPONIN QUANT: CPT

## 2022-05-30 PROCEDURE — 95700 EEG CONT REC W/VID EEG TECH: CPT

## 2022-05-30 PROCEDURE — U0005: CPT

## 2022-05-30 PROCEDURE — 87150 DNA/RNA AMPLIFIED PROBE: CPT

## 2022-05-30 PROCEDURE — 87640 STAPH A DNA AMP PROBE: CPT

## 2022-05-30 PROCEDURE — 83010 ASSAY OF HAPTOGLOBIN QUANT: CPT

## 2022-05-30 PROCEDURE — 82803 BLOOD GASES ANY COMBINATION: CPT

## 2022-05-30 PROCEDURE — 74183 MRI ABD W/O CNTR FLWD CNTR: CPT

## 2022-05-30 PROCEDURE — 83550 IRON BINDING TEST: CPT

## 2022-05-30 PROCEDURE — P9016: CPT

## 2022-05-30 PROCEDURE — 86900 BLOOD TYPING SEROLOGIC ABO: CPT

## 2022-05-30 PROCEDURE — 97164 PT RE-EVAL EST PLAN CARE: CPT

## 2022-05-30 PROCEDURE — 76937 US GUIDE VASCULAR ACCESS: CPT

## 2022-05-30 PROCEDURE — A9556: CPT

## 2022-05-30 PROCEDURE — 94003 VENT MGMT INPAT SUBQ DAY: CPT

## 2022-05-30 PROCEDURE — C8925: CPT

## 2022-05-30 PROCEDURE — 36415 COLL VENOUS BLD VENIPUNCTURE: CPT

## 2022-05-30 PROCEDURE — 72170 X-RAY EXAM OF PELVIS: CPT

## 2022-05-30 PROCEDURE — 76380 CAT SCAN FOLLOW-UP STUDY: CPT | Mod: 59

## 2022-05-30 PROCEDURE — 93005 ELECTROCARDIOGRAM TRACING: CPT

## 2022-05-30 PROCEDURE — 87186 SC STD MICRODIL/AGAR DIL: CPT

## 2022-05-30 PROCEDURE — 92610 EVALUATE SWALLOWING FUNCTION: CPT

## 2022-05-30 PROCEDURE — 81001 URINALYSIS AUTO W/SCOPE: CPT

## 2022-05-30 PROCEDURE — 80307 DRUG TEST PRSMV CHEM ANLYZR: CPT

## 2022-05-30 PROCEDURE — 71260 CT THORAX DX C+: CPT

## 2022-05-30 PROCEDURE — 82550 ASSAY OF CK (CPK): CPT

## 2022-05-30 PROCEDURE — 94002 VENT MGMT INPAT INIT DAY: CPT

## 2022-05-30 PROCEDURE — 80048 BASIC METABOLIC PNL TOTAL CA: CPT

## 2022-05-30 PROCEDURE — 82746 ASSAY OF FOLIC ACID SERUM: CPT

## 2022-05-30 PROCEDURE — C1887: CPT

## 2022-05-30 PROCEDURE — 80076 HEPATIC FUNCTION PANEL: CPT

## 2022-05-30 PROCEDURE — 74174 CTA ABD&PLVS W/CONTRAST: CPT

## 2022-05-30 PROCEDURE — 70486 CT MAXILLOFACIAL W/O DYE: CPT

## 2022-05-30 PROCEDURE — 70450 CT HEAD/BRAIN W/O DYE: CPT

## 2022-05-30 PROCEDURE — 92526 ORAL FUNCTION THERAPY: CPT

## 2022-05-30 PROCEDURE — 80053 COMPREHEN METABOLIC PANEL: CPT

## 2022-05-30 PROCEDURE — 82140 ASSAY OF AMMONIA: CPT

## 2022-05-30 PROCEDURE — 86803 HEPATITIS C AB TEST: CPT

## 2022-05-30 PROCEDURE — 87635 SARS-COV-2 COVID-19 AMP PRB: CPT

## 2022-05-30 PROCEDURE — 99285 EMERGENCY DEPT VISIT HI MDM: CPT

## 2022-05-30 PROCEDURE — 36245 INS CATH ABD/L-EXT ART 1ST: CPT | Mod: 59

## 2022-05-30 PROCEDURE — 93306 TTE W/DOPPLER COMPLETE: CPT

## 2022-05-30 PROCEDURE — 83615 LACTATE (LD) (LDH) ENZYME: CPT

## 2022-05-30 PROCEDURE — 37243 VASC EMBOLIZE/OCCLUDE ORGAN: CPT

## 2022-05-30 PROCEDURE — 78830 RP LOCLZJ TUM SPECT W/CT 1: CPT

## 2022-05-30 PROCEDURE — 97535 SELF CARE MNGMENT TRAINING: CPT

## 2022-05-30 PROCEDURE — 87070 CULTURE OTHR SPECIMN AEROBIC: CPT

## 2022-05-30 PROCEDURE — 86022 PLATELET ANTIBODIES: CPT

## 2022-05-30 PROCEDURE — 72125 CT NECK SPINE W/O DYE: CPT

## 2022-05-30 PROCEDURE — 36248 INS CATH ABD/L-EXT ART ADDL: CPT

## 2022-05-30 PROCEDURE — 85007 BL SMEAR W/DIFF WBC COUNT: CPT

## 2022-05-30 PROCEDURE — 85730 THROMBOPLASTIN TIME PARTIAL: CPT

## 2022-05-30 PROCEDURE — 83735 ASSAY OF MAGNESIUM: CPT

## 2022-05-30 PROCEDURE — 70551 MRI BRAIN STEM W/O DYE: CPT

## 2022-05-30 PROCEDURE — 74018 RADEX ABDOMEN 1 VIEW: CPT

## 2022-05-30 PROCEDURE — 85025 COMPLETE CBC W/AUTO DIFF WBC: CPT

## 2022-05-30 PROCEDURE — 83605 ASSAY OF LACTIC ACID: CPT

## 2022-05-30 PROCEDURE — 36430 TRANSFUSION BLD/BLD COMPNT: CPT

## 2022-05-30 PROCEDURE — 83036 HEMOGLOBIN GLYCOSYLATED A1C: CPT

## 2022-05-30 PROCEDURE — 75572 CT HRT W/3D IMAGE: CPT

## 2022-05-30 PROCEDURE — 70487 CT MAXILLOFACIAL W/DYE: CPT

## 2022-05-30 PROCEDURE — 97530 THERAPEUTIC ACTIVITIES: CPT

## 2022-05-30 PROCEDURE — 82962 GLUCOSE BLOOD TEST: CPT

## 2022-05-30 PROCEDURE — 86140 C-REACTIVE PROTEIN: CPT

## 2022-05-30 PROCEDURE — 87641 MR-STAPH DNA AMP PROBE: CPT

## 2022-05-30 PROCEDURE — 0225U NFCT DS DNA&RNA 21 SARSCOV2: CPT

## 2022-05-30 PROCEDURE — 85610 PROTHROMBIN TIME: CPT

## 2022-05-30 PROCEDURE — U0003: CPT

## 2022-05-30 PROCEDURE — 95714 VEEG EA 12-26 HR UNMNTR: CPT

## 2022-05-30 PROCEDURE — 97162 PT EVAL MOD COMPLEX 30 MIN: CPT

## 2022-05-30 PROCEDURE — 85652 RBC SED RATE AUTOMATED: CPT

## 2022-05-30 PROCEDURE — 86923 COMPATIBILITY TEST ELECTRIC: CPT

## 2022-05-30 PROCEDURE — 83540 ASSAY OF IRON: CPT

## 2022-05-30 PROCEDURE — 78802 RP LOCLZJ TUM WHBDY 1 D IMG: CPT

## 2022-05-30 PROCEDURE — C1894: CPT

## 2022-05-30 PROCEDURE — 86880 COOMBS TEST DIRECT: CPT

## 2022-05-30 PROCEDURE — 82247 BILIRUBIN TOTAL: CPT

## 2022-05-30 PROCEDURE — 82607 VITAMIN B-12: CPT

## 2022-05-30 PROCEDURE — 97110 THERAPEUTIC EXERCISES: CPT

## 2022-05-30 PROCEDURE — 73030 X-RAY EXAM OF SHOULDER: CPT

## 2022-05-30 PROCEDURE — 97161 PT EVAL LOW COMPLEX 20 MIN: CPT

## 2022-05-30 PROCEDURE — 90662 IIV NO PRSV INCREASED AG IM: CPT

## 2022-05-30 PROCEDURE — 86901 BLOOD TYPING SEROLOGIC RH(D): CPT

## 2022-05-30 PROCEDURE — 87086 URINE CULTURE/COLONY COUNT: CPT

## 2022-05-30 PROCEDURE — 93975 VASCULAR STUDY: CPT

## 2022-05-30 PROCEDURE — 93880 EXTRACRANIAL BILAT STUDY: CPT

## 2022-05-30 PROCEDURE — 71045 X-RAY EXAM CHEST 1 VIEW: CPT

## 2022-05-30 PROCEDURE — 85027 COMPLETE CBC AUTOMATED: CPT

## 2022-05-30 PROCEDURE — P9045: CPT

## 2022-05-30 PROCEDURE — 82248 BILIRUBIN DIRECT: CPT

## 2022-05-30 PROCEDURE — 36247 INS CATH ABD/L-EXT ART 3RD: CPT

## 2022-05-30 PROCEDURE — 87040 BLOOD CULTURE FOR BACTERIA: CPT

## 2022-05-30 PROCEDURE — 86850 RBC ANTIBODY SCREEN: CPT

## 2022-05-30 PROCEDURE — 84100 ASSAY OF PHOSPHORUS: CPT

## 2022-05-30 PROCEDURE — 85045 AUTOMATED RETICULOCYTE COUNT: CPT

## 2022-07-28 NOTE — PHYSICAL THERAPY INITIAL EVALUATION ADULT - MD/RN NOTIFIED
Home Care Instructions Pain Management:    1.  DIET:    You may resume your normal diet today.    2.  BATHING:    You may shower with luke warm water.    3.  DRESSING:    You may remove your bandage today.    4.  ACTIVITY LEVEL:      You may resume your normal activities 24 hours after your procedure.    5.  MEDICATIONS:    You may resume your normal medications today.    6.  SPECIAL INSTRUCTIONS:    No heat to the injection site for 24 hours including bath or shower, heating pad, moist heat or hot tubs.    Use an ice pack to the injection site for any pain or discomfort.  Apply ice packs for 20 minute intervals as needed.    If you have received any sedatives by mouth today, you can not drive for 12 hours.    If you have received sedation through an IV, you can not drive for 24 hours.    PLEASE CALL YOUR DOCTOR FOR THE FOLLOWIN.  Redness or swelling around the injection site.  2.  Fever of 101 degrees.  3.  Drainage (pus) from the injection site.  4.  For any continuous bleeding (some dried blood over the incision is normal.)    FOR EMERGENCIES:    If any unusual problems or difficulties occur during clinic hours, call [phone] or dial [phone].    Follow up with with your physician in 2-3 weeks.       
yes

## 2023-10-27 NOTE — PROGRESS NOTE ADULT - SUBJECTIVE AND OBJECTIVE BOX
Infectious Disease Progress Note    Interval Events:   Gallium scan pending family discussions and goc planning.   Received Seroquel overnight for agitation. This AM c/o Abdo pain.   1 unit drop in Hb.    Subjective:  Patient seen and evaluated at bedside. Abdo pain.    ANTIBIOTICS/RELEVANT:  antimicrobials  meropenem  IVPB 1000 milliGRAM(s) IV Intermittent every 8 hours  rifAXIMin 550 milliGRAM(s) Oral every 12 hours    immunologic:    OTHER:  acetaminophen     Tablet .. 650 milliGRAM(s) Oral every 6 hours PRN  artificial tears (preservative free) Ophthalmic Solution 1 Drop(s) Both EYES two times a day  atorvastatin 80 milliGRAM(s) Oral at bedtime  chlorhexidine 2% Cloths 1 Application(s) Topical <User Schedule>  cyanocobalamin 1000 MICROGram(s) Oral every 24 hours  DOXOrubicin INTRA-ARTERIAL (eMAR) 50 milliGRAM(s) IntraArterial once  enoxaparin Injectable 40 milliGRAM(s) SubCutaneous every 24 hours  ferrous    sulfate 325 milliGRAM(s) Oral <User Schedule>  folic acid 1 milliGRAM(s) Oral daily  lactulose Syrup 10 Gram(s) Oral every 8 hours  multivitamin 1 Tablet(s) Oral daily  nicotine - 21 mG/24Hr(s) Patch 1 patch Transdermal daily  sodium chloride 0.65% Nasal 1 Spray(s) Both Nostrils four times a day PRN  tamsulosin 0.4 milliGRAM(s) Oral at bedtime      Allergies    No Known Allergies    Intolerances        Objective:  Vital Signs Last 24 Hrs  T(C): 35.7 (23 Mar 2022 16:54), Max: 36.8 (22 Mar 2022 21:00)  T(F): 96.3 (23 Mar 2022 16:54), Max: 98.2 (22 Mar 2022 21:00)  HR: 94 (23 Mar 2022 16:21) (92 - 106)  BP: 129/59 (23 Mar 2022 16:21) (104/57 - 134/71)  BP(mean): 85 (23 Mar 2022 16:21) (75 - 96)  RR: 18 (23 Mar 2022 16:21) (18 - 19)  SpO2: 98% (23 Mar 2022 16:21) (95% - 99%)    General: NAD;   HEENT: NC/AT, anicteric sclera; MMM  Neck: supple  Cardiovascular: +S1/S2; RRR  Respiratory: CTA B/L; +systolic murmur  Gastrointestinal: NGT, NT. Abdo is mildly distended; no rebound/guarding +BSx4; rectal tube in place  Extremities: WWP; no edema, clubbing or cyanosis  : rogers.  Vascular: 2+ radial, DP/PT pulses B/L  Neurological: AAOx2. follows commands.   Psychiatry: calm and cooperative.    LABS:                        8.2    5.22  )-----------( 86       ( 23 Mar 2022 09:49 )             27.1     03-23    144  |  115<H>  |  11  ----------------------------<  136<H>  3.7   |  21<L>  |  0.64    Ca    7.9<L>      23 Mar 2022 09:49  Phos  2.4     03-23  Mg     2.1     03-23    TPro  6.2  /  Alb  2.2<L>  /  TBili  0.7  /  DBili  x   /  AST  67<H>  /  ALT  38  /  AlkPhos  86  03-23          MICROBIOLOGY:            RECENT CULTURES:  03-20 @ 05:56  .Blood Blood  --  --  --    No growth at 3 days.  --  03-19 @ 06:42  .Blood Blood  Klebsiella pneumoniae ESBL  Blood Culture PCR  Blood Culture PCR  PCR    Growth in anaerobic bottle: Klebsiella pneumoniae ESBL  Floor previously notified.  Aerobic Bottle: No growth to date.  Change in culture status will be immediately reported.  --      RADIOLOGY & ADDITIONAL STUDIES: Soolantra Pregnancy And Lactation Text: This medication is Pregnancy Category C. This medication is considered safe during breast feeding.

## 2024-03-05 NOTE — PROGRESS NOTE ADULT - PROBLEM SELECTOR PROBLEM 10
Please print prescan out of Media Tab and then complete and sign.   Once form is completed and signed, please fax to [phone].    Please direct any questions to [phone].   Thanks,  Forms Completion Team.  Prophylactic measure

## 2024-05-02 NOTE — PATIENT PROFILE ADULT. - PATIENT REPRESENTATIVE: ( YOU CAN CHOOSE ANY PERSON THAT CAN ASSIST YOU WITH YOUR HEALTH CARE PREFERENCES, DOES NOT HAVE TO BE A SPOUSE, IMMEDIATE FAMILY OR SIGNIFICANT OTHER/PARTNER)
Pre-application: Motor, sensory, and vascular responses intact in the injured extremity./Post-application: Motor, sensory, and vascular responses intact in the injured extremity./The patient/caregiver verbalized understanding of how to care for the injured extremity with splint Yes

## 2024-12-13 NOTE — PROGRESS NOTE ADULT - ASSESSMENT
Please make an appointment to follow up with [facility] (phone: [phone]) as soon as possible if not improving.     Assesment:  67 y/o M, current smoker (1ppd x 50 years), current every day ETOH abuse (1 pint vodka and several beers daily x 50 years, with history of withdrawals), with PMHx of HLD, HTN (not on any medications), CABG (LIMA to LAD, SVG to PDA in 2017 done by Dr. Wilcox), AS (s/p AVR in 2017), who presented to [facility] ED on 2/24/22 with complaints of chest pain and underwent cardiac cath revealing patent grafts (LIMA/SVG). TTE showed severe prosthetic valve stenosis and SHD consulted. Pt transferred to SH team.    Plan:  Problem 1: bioprosthetic valve stenosis  -dental consult  -JOSELO  -Structural CT  -TAVR workup    Problem 2: alcohol use disorder with history of withdrawal  - librium taper  - psych consult  - CIWA protocol  Problem 3: NSTEMI/Hx of CAD  - s/p CABG 2017  - cath 2017: : distal LM 40%, pLAC 80%, pLcx 80%, pRCA 80%, EF 55%, EDP 6 mmHg, JONEL 0.89cm2 consistent with severe AS, right radial artery and right femoral vein access. Right heart cath revealed RAP 3, RV 22/1 mean 4, PAP 25/7 mean 16, PCWP: 6, Ao/PA sat 92/67, CO/CI 5.2/3.4 consistent with normal RV filling pressures and preserved cardiac output/indices  - c/w aspirin, statin    Problem 4: HTN  - pt reports noncompliant with meds  - BP stable 123/56  - consider starting beta blocker or ace    Problem 5: HLD  - started on a statin  - monitor LFTs (w/ ETOH history)

## 2025-06-16 NOTE — PROGRESS NOTE ADULT - SUBJECTIVE AND OBJECTIVE BOX
Neurology Progress Note    HPI:  Patient is a 67 y/o M, current smoker (1ppd x 50 years), current every day ETOH abuse (1 pint vodka and several beers daily x 50 years), with PMHx of HLD, HTN (not on any medications),  CABG (LIMA to LAD, SVG to PDA in 2017), AS (s/p AVR in 2017), who presented to [facility] ED on 2/24/22 with complaints of 9/10 chest pain with associated diaphoresis that occurred when walking to the bathroom last night around 1 am. Patient states that during this time, he fell and "blacked out" and hit the back of his head. Patient then went back to sleep. Patient states that he had a similiar chest pain 3 days ago that resolved on his own. Patient's wife states that patient has not seen a physician for over 2 years due to insurance issues.  Patient states last drink was 2 shots of vodka last night. Patient denies current chest pain, palpitations, dizziness, diaphoresis, headache, fever, chills, abdominal pain, back pain, n/v/d, recent travel or sick contacts.     Interval History:    Patient seen and examined at bedside. There were no acute events overnight. Patient denied any complaints.      PAST MEDICAL & SURGICAL HISTORY:  HTN (hypertension)  ETOH abuse  Smoker  Hyperlipidemia  Aortic valve stenosis, unspecified etiology  No significant past surgical history      Medications:  artificial tears (preservative free) Ophthalmic Solution 1 Drop(s) Both EYES two times a day  aspirin enteric coated 81 milliGRAM(s) Oral daily  atorvastatin 80 milliGRAM(s) Oral at bedtime  cyanocobalamin 1000 MICROGram(s) Oral daily  ferrous    sulfate 325 milliGRAM(s) Oral <User Schedule>  folic acid 1 milliGRAM(s) Oral daily  heparin   Injectable 5000 Unit(s) SubCutaneous every 8 hours  iron sucrose IVPB 200 milliGRAM(s) IV Intermittent every 24 hours  lactulose Syrup 30 Gram(s) Oral three times a day  multivitamin 1 Tablet(s) Oral daily  mupirocin 2% Nasal 1 Application(s) Both Nostrils two times a day  nicotine - 21 mG/24Hr(s) Patch 1 patch Transdermal daily  oxymetazoline 0.05% Nasal Spray 1 Spray(s) Both Nostrils two times a day PRN  pantoprazole    Tablet 40 milliGRAM(s) Oral daily  rifAXIMin 550 milliGRAM(s) Oral two times a day  sodium chloride 0.65% Nasal 1 Spray(s) Both Nostrils four times a day PRN  tamsulosin 0.4 milliGRAM(s) Oral at bedtime      Vital Signs Last 24 Hrs  T(C): 36.6 (12 Mar 2022 15:45), Max: 36.9 (12 Mar 2022 04:45)  T(F): 97.9 (12 Mar 2022 15:45), Max: 98.5 (12 Mar 2022 04:45)  HR: 90 (12 Mar 2022 15:45) (83 - 90)  BP: 106/64 (12 Mar 2022 15:45) (101/62 - 108/68)  RR: 17 (12 Mar 2022 15:45) (17 - 17)  SpO2: 98% (12 Mar 2022 15:45) (94% - 98%)      Neurological Exam:   Mental Status: AAOx2 to self and location, speaks slowly and difficult to understand at times; naming intact, follows simple commands  Cranial Nerves: PERRLA, EOMI, face symmetric, tongue midline  Motor: 3/5 in RUE, 4-/5 strength LUE/LLE/RLE, lifts arms asymmetrically though and R drift noted, but reports that this is due to remote RUE fracture. Strength on confrontation is symmetric and appears full, though exam limited. No abnormal movements or asterixis noted.  Sensation: Intact to light touch in B/L UE and LE  Coordination: Finger to nose testing impaired - slow, (+) dysdiadokinesia  Reflexes: 3+ brisk patellar reflexes, 1+ triceps bilaterally; downgoing toes on L, no movement on R  Gait: Deferred      Labs:  CBC Full  -  ( 12 Mar 2022 06:44 )  WBC Count : 10.03 K/uL  RBC Count : 3.12 M/uL  Hemoglobin : 8.6 g/dL  Hematocrit : 28.1 %  Platelet Count - Automated : 146 K/uL  Mean Cell Volume : 90.1 fl  Mean Cell Hemoglobin : 27.6 pg  Mean Cell Hemoglobin Concentration : 30.6 gm/dL  Auto Neutrophil # : 6.44 K/uL  Auto Lymphocyte # : 2.11 K/uL  Auto Monocyte # : 0.96 K/uL  Auto Eosinophil # : 0.45 K/uL  Auto Basophil # : 0.02 K/uL  Auto Neutrophil % : 64.2 %  Auto Lymphocyte % : 21.0 %  Auto Monocyte % : 9.6 %  Auto Eosinophil % : 4.5 %  Auto Basophil % : 0.2 %    03-12    136  |  107  |  16  ----------------------------<  96  3.9   |  19<L>  |  0.87    Ca    8.4      12 Mar 2022 06:45  Phos  3.4     03-12  Mg     1.9     03-12    TPro  6.8  /  Alb  2.6<L>  /  TBili  0.7  /  DBili  x   /  AST  55<H>  /  ALT  34  /  AlkPhos  91  03-12    LIVER FUNCTIONS - ( 12 Mar 2022 06:45 )  Alb: 2.6 g/dL / Pro: 6.8 g/dL / ALK PHOS: 91 U/L / ALT: 34 U/L / AST: 55 U/L / GGT: x           PT/INR - ( 11 Mar 2022 07:18 )   PT: 15.2 sec;   INR: 1.27     PTT - ( 11 Mar 2022 07:18 )  PTT:35.3 sec       Samy Samuel, [facility] called to inform the Dermatology Department the [employer] needs a new Prior Auth.  [facility], [phone]  Fax Number- [phone]